# Patient Record
Sex: FEMALE | Race: WHITE | NOT HISPANIC OR LATINO | Employment: OTHER | URBAN - METROPOLITAN AREA
[De-identification: names, ages, dates, MRNs, and addresses within clinical notes are randomized per-mention and may not be internally consistent; named-entity substitution may affect disease eponyms.]

---

## 2017-03-16 ENCOUNTER — APPOINTMENT (OUTPATIENT)
Dept: LAB | Facility: CLINIC | Age: 67
End: 2017-03-16
Payer: COMMERCIAL

## 2017-03-16 ENCOUNTER — ALLSCRIPTS OFFICE VISIT (OUTPATIENT)
Dept: OTHER | Facility: OTHER | Age: 67
End: 2017-03-16

## 2017-03-16 ENCOUNTER — GENERIC CONVERSION - ENCOUNTER (OUTPATIENT)
Dept: OTHER | Facility: OTHER | Age: 67
End: 2017-03-16

## 2017-03-16 DIAGNOSIS — R10.9 ABDOMINAL PAIN: ICD-10-CM

## 2017-03-16 LAB
ALBUMIN SERPL BCP-MCNC: 3.3 G/DL (ref 3.5–5)
ALP SERPL-CCNC: 104 U/L (ref 46–116)
ALT SERPL W P-5'-P-CCNC: 28 U/L (ref 12–78)
ANION GAP SERPL CALCULATED.3IONS-SCNC: 8 MMOL/L (ref 4–13)
AST SERPL W P-5'-P-CCNC: 24 U/L (ref 5–45)
BASOPHILS # BLD AUTO: 0.04 THOUSANDS/ΜL (ref 0–0.1)
BASOPHILS NFR BLD AUTO: 1 % (ref 0–1)
BILIRUB SERPL-MCNC: 0.74 MG/DL (ref 0.2–1)
BUN SERPL-MCNC: 25 MG/DL (ref 5–25)
CALCIUM SERPL-MCNC: 9.2 MG/DL (ref 8.3–10.1)
CHLORIDE SERPL-SCNC: 107 MMOL/L (ref 100–108)
CO2 SERPL-SCNC: 28 MMOL/L (ref 21–32)
CREAT SERPL-MCNC: 0.8 MG/DL (ref 0.6–1.3)
EOSINOPHIL # BLD AUTO: 0.38 THOUSAND/ΜL (ref 0–0.61)
EOSINOPHIL NFR BLD AUTO: 5 % (ref 0–6)
ERYTHROCYTE [DISTWIDTH] IN BLOOD BY AUTOMATED COUNT: 12.7 % (ref 11.6–15.1)
GFR SERPL CREATININE-BSD FRML MDRD: >60 ML/MIN/1.73SQ M
GLUCOSE P FAST SERPL-MCNC: 80 MG/DL (ref 65–99)
HCT VFR BLD AUTO: 39.9 % (ref 34.8–46.1)
HGB BLD-MCNC: 13.3 G/DL (ref 11.5–15.4)
LYMPHOCYTES # BLD AUTO: 2.83 THOUSANDS/ΜL (ref 0.6–4.47)
LYMPHOCYTES NFR BLD AUTO: 34 % (ref 14–44)
MCH RBC QN AUTO: 30.4 PG (ref 26.8–34.3)
MCHC RBC AUTO-ENTMCNC: 33.3 G/DL (ref 31.4–37.4)
MCV RBC AUTO: 91 FL (ref 82–98)
MONOCYTES # BLD AUTO: 0.83 THOUSAND/ΜL (ref 0.17–1.22)
MONOCYTES NFR BLD AUTO: 10 % (ref 4–12)
NEUTROPHILS # BLD AUTO: 4.35 THOUSANDS/ΜL (ref 1.85–7.62)
NEUTS SEG NFR BLD AUTO: 50 % (ref 43–75)
NRBC BLD AUTO-RTO: 0 /100 WBCS
PLATELET # BLD AUTO: 285 THOUSANDS/UL (ref 149–390)
PMV BLD AUTO: 10.9 FL (ref 8.9–12.7)
POTASSIUM SERPL-SCNC: 4.4 MMOL/L (ref 3.5–5.3)
PROT SERPL-MCNC: 7.5 G/DL (ref 6.4–8.2)
RBC # BLD AUTO: 4.37 MILLION/UL (ref 3.81–5.12)
SODIUM SERPL-SCNC: 143 MMOL/L (ref 136–145)
TSH SERPL DL<=0.05 MIU/L-ACNC: 1.15 UIU/ML (ref 0.36–3.74)
WBC # BLD AUTO: 8.45 THOUSAND/UL (ref 4.31–10.16)

## 2017-03-16 PROCEDURE — 80053 COMPREHEN METABOLIC PANEL: CPT

## 2017-03-16 PROCEDURE — 84443 ASSAY THYROID STIM HORMONE: CPT

## 2017-03-16 PROCEDURE — 36415 COLL VENOUS BLD VENIPUNCTURE: CPT

## 2017-03-16 PROCEDURE — 85025 COMPLETE CBC W/AUTO DIFF WBC: CPT

## 2017-03-19 ENCOUNTER — HOSPITAL ENCOUNTER (OUTPATIENT)
Dept: ULTRASOUND IMAGING | Facility: HOSPITAL | Age: 67
Discharge: HOME | End: 2017-03-19
Payer: COMMERCIAL

## 2017-03-19 DIAGNOSIS — R10.9 ABDOMINAL PAIN: ICD-10-CM

## 2017-03-19 PROCEDURE — 76700 US EXAM ABDOM COMPLETE: CPT

## 2017-03-21 ENCOUNTER — GENERIC CONVERSION - ENCOUNTER (OUTPATIENT)
Dept: OTHER | Facility: OTHER | Age: 67
End: 2017-03-21

## 2017-09-19 ENCOUNTER — GENERIC CONVERSION - ENCOUNTER (OUTPATIENT)
Dept: OTHER | Facility: OTHER | Age: 67
End: 2017-09-19

## 2017-11-06 ENCOUNTER — ALLSCRIPTS OFFICE VISIT (OUTPATIENT)
Dept: OTHER | Facility: OTHER | Age: 67
End: 2017-11-06

## 2017-11-07 NOTE — PROGRESS NOTES
Assessment  1  Acute tonsillitis (463) (J03 90)    Plan  Acute tonsillitis    · Azithromycin 250 MG Oral Tablet; TAKE 2 TABLETS ON DAY 1 THEN TAKE 1  TABLET A DAY FOR 4 DAYS    Discussion/Summary    Tonsillitis  Patient given prescription for Zithromax Z-Manish take as directed  Patient will call if symptoms persist after medication completed  Chief Complaint  Patient is here c/o a sore throat and pain with swallowing x's 1 wk  All medications were reviewed and updated with the patient  History of Present Illness  HPI: I reviewed chief complaint with the patient  She denies any documented fevers      Review of Systems    Constitutional: feeling tired  ENT: as noted in HPI  Cardiovascular: no complaints of slow or fast heart rate, no chest pain, no palpitations, no leg claudication or lower extremity edema  Respiratory: no complaints of shortness of breath, no wheezing, no dyspnea on exertion, no orthopnea or PND  Gastrointestinal: no complaints of abdominal pain, no constipation, no nausea or diarrhea, no vomiting, no bloody stools  Genitourinary: no complaints of dysuria, no incontinence, no pelvic pain, no dysmenorrhea, no vaginal discharge or abnormal vaginal bleeding  Active Problems  1  Abdominal pain (789 00) (R10 9)   2  Acute tonsillitis (463) (J03 90)   3  Conjunctivitis (372 30) (H10 9)   4  Cough (786 2) (R05)   5  Elevated brain natriuretic peptide (BNP) level (790 99) (R79 89)   6  Focal nodular hyperplasia of liver (573 8) (K76 89)   7  Foot pain (729 5) (M79 673)   8  Gastric nodule (537 89) (K31 89)   9  IPMN (intraductal papillary mucinous neoplasm) (239 0) (D49 0)   10  Laryngitis, chronic (476 0) (J37 0)   11  Pancreatic cyst (577 2) (K86 2)   12  Skin lesion (709 9) (L98 9)    Past Medical History  1  History of Costochondritis (733 6) (M94 0)   2  History of Date Of Last Menstruation   3  History of Dysfunction of left eustachian tube (381 81) (H69 82)   4   History of Dyspepsia (536 8) (K30)   5  History Of 2  Previous Pregnancies (V61 5)   6  History of backache (V13 59) (Z87 39)   7  History of bronchitis (V12 69) (Z87 09)   8  History of pharyngitis (V12 69) (Z87 09)   9  History of sinusitis (V12 69) (Z87 09)   10  History of Pancreatic disorder (577 9) (K86 9)   11  History of Previous Pregnancies Resulted In 2  Live Birth(S)   12  History of Tingling (782 0) (R20 2)   13  History of Tingling Of The Right Leg    Family History  Father    1  Family history of Parkinson Disease  Sister    2  Family history of Cirrhosis  Paternal Grandmother    3  Family history of Colon Cancer (V16 0)  Maternal Grandfather    4  Family history of Colon Cancer (V16 0)    Social History   · Alcohol Use (History)   · Never A Smoker  The social history was reviewed and updated today  Surgical History  1  History of Arthroscopy Knee Left   2  History of Neuroplasty Decompression Median Nerve At Carpal Tunnel    Current Meds   1  Aspirin 81 MG TABS; one qd Recorded   2  Esomeprazole Magnesium 40 MG Oral Capsule Delayed Release; TAKE ONE   CAPSULE BY MOUTH EVERY DAY  Requested for: [id number]; Last Rx:[id number]   Ordered   3  Multivitamins Oral Capsule; TAKE 1 CAPSULE Daily Recorded   4  Vitamin D 2000 UNIT Oral Tablet; Take 2 tablets daily; Therapy: (Recorded:[id number]) to Recorded    The medication list was reviewed and updated today  Allergies  1  Bactrim TABS    Vitals   Recorded: 94ZDX7918 04:16PM   Temperature 98 1 F   Heart Rate 64   Respiration 16   Systolic 295   Diastolic 94   Height 5 ft 3 in   Weight 175 lb    BMI Calculated 31   BSA Calculated 1 83     Physical Exam    Constitutional   General appearance: No acute distress, well appearing and well nourished  Ears, Nose, Mouth, and Throat   External inspection of ears and nose: Normal     Otoscopic examination: Tympanic membranes translucent with normal light reflex  Canals patent without erythema      Oropharynx: Abnormal  -- Large left tonsil  Negative exudates  Pulmonary   Respiratory effort: No increased work of breathing or signs of respiratory distress  Auscultation of lungs: Clear to auscultation  Cardiovascular   Auscultation of heart: Normal rate and rhythm, normal S1 and S2, without murmurs  Lymphatic   Palpation of lymph nodes in neck: Abnormal  -- Minimally tender anterior cervical adenopathy  Skin   Skin and subcutaneous tissue: Normal without rashes or lesions           Signatures   Electronically signed by : JEFF Zuniga ; Nov 6 9081  5:22PM EST                       (Author)

## 2018-01-09 NOTE — RESULT NOTES
Verified Results  * US ABDOMEN COMPLETE [id number] 11:14LP Shai Hendrickson Order Number: [id number]    - Patient Instructions: To schedule this appointment, please contact Central Scheduling at [phone]  Test Name Result Flag Reference   US ABDOMEN COMPLETE (Report)     ABDOMEN ULTRASOUND, COMPLETE     INDICATION: Abdominal pain  COMPARISON: Ultrasound 9/21/2011, MRI abdomen 10/8/2016     TECHNIQUE:  Real-time ultrasound of the abdomen was performed with a curvilinear transducer with both volumetric sweeps and still imaging techniques  FINDINGS:     PANCREAS: 9 x 15 x 8 mm pancreatic body cyst  An additional 5 mm cyst is seen in the body/tail  Additional small cysts seen on previous MRI are not evident on today's exam      AORTA AND IVC: Visualized portions are normal for patient age  LIVER:   Size: Within normal range  The liver measures 16 1 cm in the midclavicular line  Contour: Surface contour is smooth  Parenchyma: Echogenicity and echotexture are within normal limits  1 6 x 0 9 x 1 4 cm slightly hypoechoic, anterior left hepatic lobe nodule  This appears to correspond to previously described [address] on MRI  Small cysts are seen in both lobes measuring up to 1 7 cm on the right  The main portal vein is patent and hepatopetal       BILIARY:   The gallbladder is normal in caliber  No wall thickening or pericholecystic fluid  No stones or sludge identified  Sonographic Garlan Graves sign is negative  No intrahepatic biliary dilatation  CBD measures 2 mm  No choledocholithiasis  KIDNEY:    Right kidney measures 11 7 x 4 1 cm  Within normal limits  Left kidney measures 10 0 x 5 9 cm  Within normal limits  SPLEEN:    Measures 8 2 cm  Within normal limits  ASCITES: None  IMPRESSION:     Grossly stable pancreatic cysts  1 6 cm slightly hypoechoic anterior left lobe nodule likely corresponding to [address] on previous MRI   Small hepatic cysts again identified  Workstation performed: [id number]     Signed by:   Levon Agustin DO   3/20/17       Discussion/Summary   u/s shows stable pancreatic cysts but nothing to explain her GI sx's   If still with sx's I would refer her to Dr Adelina Alan for further eval

## 2018-01-10 NOTE — RESULT NOTES
Message   bw from today was unremarkable  check u/s as we discussed     Verified Results  (1) CBC/PLT/DIFF [id number] 69:30YL Buffy Cutler Order Number: [id number]     Test Name Result Flag Reference   WBC COUNT 8 45 Thousand/uL  4 31-10 16   RBC COUNT 4 37 Million/uL  3 81-5 12   HEMOGLOBIN 13 3 g/dL  11 5-15 4   HEMATOCRIT 39 9 %  34 8-46  1   MCV 91 fL  82-98   MCH 30 4 pg  26 8-34 3   MCHC 33 3 g/dL  31 4-37 4   RDW 12 7 %  11 6-15 1   MPV 10 9 fL  8 9-12 7   PLATELET COUNT 525 Thousands/uL  149-390   nRBC AUTOMATED 0 /100 WBCs     NEUTROPHILS RELATIVE PERCENT 50 %  43-75   LYMPHOCYTES RELATIVE PERCENT 34 %  14-44   MONOCYTES RELATIVE PERCENT 10 %  4-12   EOSINOPHILS RELATIVE PERCENT 5 %  0-6   BASOPHILS RELATIVE PERCENT 1 %  0-1   NEUTROPHILS ABSOLUTE COUNT 4 35 Thousands/? ??L  1 85-7 62   LYMPHOCYTES ABSOLUTE COUNT 2 83 Thousands/? ??L  0 60-4 47   MONOCYTES ABSOLUTE COUNT 0 83 Thousand/? ??L  0 17-1 22   EOSINOPHILS ABSOLUTE COUNT 0 38 Thousand/? ??L  0 00-0 61   BASOPHILS ABSOLUTE COUNT 0 04 Thousands/? ??L  0 00-0 10   - Patient Instructions: This bloodwork is non-fasting  Please drink two glasses of water morning of bloodwork  - Patient Instructions: This bloodwork is non-fasting  Please drink two glasses of water morning of bloodwork       (1) COMPREHENSIVE METABOLIC PANEL [id number] 32:84RM Buffy Cutler Order Number: [id number]     Test Name Result Flag Reference   SODIUM 143 mmol/L  136-145   POTASSIUM 4 4 mmol/L  3 5-5 3   CHLORIDE 107 mmol/L  100-108   CARBON DIOXIDE 28 mmol/L  21-32   ANION GAP (CALC) 8 mmol/L  4-13   BLOOD UREA NITROGEN 25 mg/dL  5-25   CREATININE 0 80 mg/dL  0 60-1 30   Standardized to IDMS reference method   CALCIUM 9 2 mg/dL  8 3-10 1   BILI, TOTAL 0 74 mg/dL  0 20-1 00   ALK PHOSPHATAS 104 U/L     ALT (SGPT) 28 U/L  12-78   AST(SGOT) 24 U/L  5-45   ALBUMIN 3 3 g/dL L 3 5-5 0   TOTAL PROTEIN 7 5 g/dL  6 4-8 2   eGFR Non- >60 0 ml/min/1 73sq m   - Patient Instructions: This bloodwork is non-fasting  Please drink two glasses of water morning of bloodwork  National Kidney Disease Education Program recommendations are as follows:  GFR calculation is accurate only with a steady state creatinine  Chronic Kidney disease less than 60 ml/min/1 73 sq  meters  Kidney failure less than 15 ml/min/1 73 sq  meters  GLUCOSE FASTING 80 mg/dL  65-99     (1) TSH [id number] 83:38DR [facility] Order Number: [id number]     Test Name Result Flag Reference   TSH 1 150 uIU/mL  0 358-3 740   - Patient Instructions: This bloodwork is non-fasting  Please drink two glasses of water morning of bloodwork  - Patient Instructions: This bloodwork is non-fasting  Please drink two glasses of water morning of bloodwork  Patients undergoing fluorescein dye angiography may retain small amounts of fluorescein in the body for 48-72 hours post procedure  Samples containing fluorescein can produce falsely depressed TSH values  If the patient had this procedure,a specimen should be resubmitted post fluorescein clearance            The recommended reference ranges for TSH during pregnancy are as follows:  First trimester 0 1 to 2 5 uIU/mL  Second trimester  0 2 to 3 0 uIU/mL  Third trimester 0 3 to 3 0 uIU/m

## 2018-01-11 NOTE — RESULT NOTES
Message     Please contact patient  Throat culture is positive for strep  Please advise her to complete all 10 days of penicillin  She should not be contagious at present time     Verified Results  (1) THROAT CULTURE (CULTURE, UPPER RESPIRATORY) 21Nov2016 08:17PM Terese Cranker Order Number: [id number]     Test Name Result Flag Reference   CLINICAL REPORT (Report)     Test:        Throat culture  Specimen Type:   Throat  Specimen Date:   11/21/2016 8:17 PM  Result Date:    11/23/2016 1:22 PM  Result Status:   Final result  Resulting Lab:   [place] [address]            Tel: [phone]      CULTURE                                       ------------------                                   1+ Growth of Streptococcus Group A beta hemolytic     *** This organism is intrinisically susceptible to Penicillin  If sensitivites to other antibiotics are required, please call the      Microbiology Department at [phone] within 5 days  ***       Signatures   Electronically signed by :  JEFF Louis ; Nov 23 2016  3:01PM EST                       (Author)

## 2018-01-12 NOTE — RESULT NOTES
Verified Results  ECHO COMPLETE WITH CONTRAST IF INDICATED, TTE / TRANSTHORACIC [id number] 10:66 Consuelo Duenas     Test Name Result Flag Reference   ECHO COMPLETE WITH CONTRAST IF INDICATED (Report)     [facility], [address] [place] [address]   [phone]     Transthoracic Echocardiogram   2D, M-mode, Doppler, and Color Doppler     Study date: 10-Mar-2016     Patient: Scotty Lima   MR number: [id number]   Account number: [de-identified]   : 1950   Age: 60 years   Gender: Female   Status: Outpatient   Location: [address] [place] [address]   Height: 63 in   Weight: 164 6 lb   BP: 124/ 78 mmHg     Indications: Assess left ventricular function  Diagnoses: R79 9 - Abnormal finding of blood chemistry, unspecified     Sonographer: LOUIE Ruiz   Referring Physician: Araceli Vieira MD   Group: [id number]   Interpreting Physician: Marya Gamez MD     SUMMARY     LEFT VENTRICLE:   Systolic function was normal  Ejection fraction was estimated to be 60 %  Although no diagnostic regional wall motion abnormality was identified, this   possibility cannot be completely excluded on the basis of this study  Doppler   parameters were consistent with abnormal left ventricular relaxation (grade 1   diastolic dysfunction)  AORTIC VALVE:   There was trace regurgitation  TRICUSPID VALVE:   There was mild to moderate regurgitation  Estimated peak PA pressure was 35 mmHg  HISTORY: PRIOR HISTORY: Elevated BNP on insurance screening, no CV history,     PROCEDURE: The study was performed in the [address] [place] [address]  This was a routine study  The transthoracic approach was used  The study   included complete 2D imaging, M-mode, complete spectral Doppler, and color   Doppler  The heart rate was 66 bpm, at the start of the study  Images were   obtained from the parasternal, apical, subcostal, and suprasternal notch   acoustic windows   Image quality was adequate  LEFT VENTRICLE: Size was normal  Systolic function was normal  Ejection   fraction was estimated to be 60 %  Although no diagnostic regional wall motion   abnormality was identified, this possibility cannot be completely excluded on   the basis of this study  Wall thickness was normal  DOPPLER: There was an   increased relative contribution of atrial contraction to ventricular filling  Doppler parameters were consistent with abnormal left ventricular relaxation   (grade 1 diastolic dysfunction)  RIGHT VENTRICLE: The size was normal  Systolic function was normal      LEFT ATRIUM: The atrium was mildly dilated  RIGHT ATRIUM: Size was normal      MITRAL VALVE: There was mild annular calcification  Valve structure was normal    There was mild thickening  There was normal leaflet separation  DOPPLER: The   transmitral velocity was within the normal range  There was no evidence for   stenosis  There was trace regurgitation  AORTIC VALVE: The valve was trileaflet  Leaflets exhibited mildly increased   thickness and normal cuspal separation  DOPPLER: Transaortic velocity was   within the normal range  There was no evidence for stenosis  There was trace   regurgitation  TRICUSPID VALVE: The valve structure was normal  There was normal leaflet   separation  DOPPLER: The transtricuspid velocity was within the normal range  There was no evidence for stenosis  There was mild to moderate regurgitation  Estimated peak PA pressure was 35 mmHg  PULMONIC VALVE: Leaflets exhibited normal thickness, no calcification, and   normal cuspal separation  DOPPLER: The transpulmonic velocity was within the   normal range  There was trace regurgitation  PERICARDIUM: There was no pericardial effusion  A pericardial fat pad was   present  The pericardium was normal in appearance  AORTA: The root exhibited normal size       SYSTEMIC VEINS: IVC: The inferior vena cava was normal in size and course  Respirophasic changes were normal      SYSTEM MEASUREMENT TABLES     2D   %FS: 32 25 %   AV Diam: 3 05 cm   EDV(Teich): 115 81 ml   EF(Cube): 68 9 %   EF(Teich): 60 26 %   ESV(Cube): 37 85 ml   ESV(Teich): 46 02 ml   IVSd: 1 01 cm   LA Area: 19 49 cm2   LA Diam: 3 72 cm   LVEDV MOD A4C: 79 14 ml   LVEF MOD A4C: 60 48 %   LVESV MOD A4C: 31 27 ml   LVIDd: 4 96 cm   LVIDs: 3 36 cm   LVLd A4C: 6 83 cm   LVLs A4C: 5 61 cm   LVPWd: 0 97 cm   RA Area: 14 35 cm2   RV Diam: 2 71 cm   SI(Cube): 47 1 ml/m2   SI(Teich): 39 21 ml/m2   SV MOD A4C: 47 87 ml   SV(Cube): 83 84 ml   SV(Teich): 69 79 ml     CW   TR MaxP 53 mmHg   TR Vmax: 2 47 m/s     MM   TAPSE: 2 43 cm     PW   E': 0 05 m/s   E/E': 11 33   LVOT Env  Ti: 310 54 ms   LVOT VTI: 18 46 cm   LVOT Vmax: 0 8 m/s   LVOT Vmean: 0 59 m/s   LVOT maxP 53 mmHg   LVOT meanP 57 mmHg   MV A Sunny: 0 81 m/s   MV Dec [place]: 2 32 m/s2   MV DecT: 267 47 ms   MV E Sunny: 0 62 m/s   MV E/A Ratio: 0 76     [facility] Accredited [facility]     Prepared and electronically signed by     Mao Lobato MD   Signed 10-Mar-2016 09:31:31       Discussion/Summary   no significant findings on her echocardiogram

## 2018-01-13 VITALS
BODY MASS INDEX: 29.26 KG/M2 | SYSTOLIC BLOOD PRESSURE: 132 MMHG | WEIGHT: 165.13 LBS | HEART RATE: 80 BPM | TEMPERATURE: 97.4 F | DIASTOLIC BLOOD PRESSURE: 78 MMHG | HEIGHT: 63 IN

## 2018-01-15 VITALS
RESPIRATION RATE: 16 BRPM | HEIGHT: 63 IN | HEART RATE: 64 BPM | DIASTOLIC BLOOD PRESSURE: 94 MMHG | WEIGHT: 175 LBS | BODY MASS INDEX: 31.01 KG/M2 | TEMPERATURE: 98.1 F | SYSTOLIC BLOOD PRESSURE: 146 MMHG

## 2018-01-18 ENCOUNTER — ALLSCRIPTS OFFICE VISIT (OUTPATIENT)
Dept: OTHER | Facility: OTHER | Age: 68
End: 2018-01-18

## 2018-01-20 NOTE — PROGRESS NOTES
Assessment   1  Acute URI (465 9) (J06 9)    Plan   Acute URI    · Azithromycin 250 MG Oral Tablet; TAKE 2 TABLETS ON DAY 1 THEN TAKE 1    TABLET A DAY FOR 4 DAYS   · Promethazine-Codeine 6 25-10 MG/5ML Oral Syrup; TAKE 5 ML EVERY 4 TO 6    HOURS AS NEEDED    Discussion/Summary      URI  Patient with possible bronchitis  She will take Zithromax and cough medicine as ordered  Patient will call if symptoms persist after medication completed  Chief Complaint   Pt states that she began not feeling well on Monday  Pt is c/o stuffiness, cough, and congestion  Pt states that her throat was sore at the beginning but it has since resolved  Pt denies fever  History of Present Illness   HPI: I reviewed chief complaint with the patient  Review of Systems        Constitutional: feeling tired  ENT: as noted in HPI  Cardiovascular: no complaints of slow or fast heart rate, no chest pain, no palpitations, no leg claudication or lower extremity edema  Respiratory: as noted in HPI  Gastrointestinal: no complaints of abdominal pain, no constipation, no nausea or diarrhea, no vomiting, no bloody stools  Genitourinary: no complaints of dysuria, no incontinence, no pelvic pain, no dysmenorrhea, no vaginal discharge or abnormal vaginal bleeding  Musculoskeletal: no complaints of arthralgia, no myalgia, no joint swelling or stiffness, no limb pain or swelling  Integumentary: no complaints of skin rash or lesion, no itching or dry skin, no skin wounds  Active Problems   1  Abdominal pain (789 00) (R10 9)   2  Acute tonsillitis (463) (J03 90)   3  Conjunctivitis (372 30) (H10 9)   4  Cough (786 2) (R05)   5  Elevated brain natriuretic peptide (BNP) level (790 99) (R79 89)   6  Focal nodular hyperplasia of liver (573 8) (K76 89)   7  Foot pain (729 5) (M79 673)   8  Gastric nodule (537 89) (K31 89)   9  IPMN (intraductal papillary mucinous neoplasm) (239 0) (D49 0)   10   Laryngitis, chronic (476 0) (J37 0)   11  Pancreatic cyst (577 2) (K86 2)   12  Skin lesion (709 9) (L98 9)    Past Medical History   1  History of Costochondritis (733 6) (M94 0)   2  History of Date Of Last Menstruation   3  History of Dysfunction of left eustachian tube (381 81) (H69 82)   4  History of Dyspepsia (536 8) (K30)   5  History Of 2  Previous Pregnancies (V61 5)   6  History of backache (V13 59) (Z87 39)   7  History of bronchitis (V12 69) (Z87 09)   8  History of pharyngitis (V12 69) (Z87 09)   9  History of sinusitis (V12 69) (Z87 09)   10  History of Pancreatic disorder (577 9) (K86 9)   11  History of Previous Pregnancies Resulted In 2  Live Birth(S)   12  History of Tingling (782 0) (R20 2)   13  History of Tingling Of The Right Leg    Family History   Father    1  Family history of Parkinson Disease  Sister    2  Family history of Cirrhosis  Paternal Grandmother    3  Family history of Colon Cancer (V16 0)  Maternal Grandfather    4  Family history of Colon Cancer (V16 0)    Social History    · Alcohol Use (History)   · Never A Smoker  The social history was reviewed and updated today  Surgical History   1  History of Arthroscopy Knee Left   2  History of Neuroplasty Decompression Median Nerve At Carpal Tunnel    Current Meds    1  Aspirin 81 MG TABS; one qd Recorded   2  Esomeprazole Magnesium 40 MG Oral Capsule Delayed Release; TAKE ONE     CAPSULE BY MOUTH EVERY DAY  Requested for: 15Jlq2189; Last Rx:[id number]     Ordered   3  Multivitamins Oral Capsule; TAKE 1 CAPSULE Daily Recorded   4  Vitamin D 2000 UNIT Oral Tablet; Take 2 tablets daily; Therapy: (Recorded:17Qcq4472) to Recorded     The medication list was reviewed and updated today  Allergies   1   Bactrim TABS    Vitals    Recorded: 38ILH6423 04:09PM   Temperature 96 3 F   Heart Rate 70   Systolic 385   Diastolic 80   Height 5 ft 3 in   Weight 178 lb    BMI Calculated 31 53   BSA Calculated 1 84     Physical Exam        Constitutional General appearance: No acute distress, well appearing and well nourished  Ears, Nose, Mouth, and Throat      External inspection of ears and nose: Normal        Otoscopic examination: Tympanic membranes translucent with normal light reflex  Canals patent without erythema  Oropharynx: Normal with no erythema, edema, exudate or lesions  Pulmonary      Respiratory effort: No increased work of breathing or signs of respiratory distress  Auscultation of lungs: Abnormal  -- The patient with mild expiratory wheezing with coughing only  Cardiovascular      Auscultation of heart: Normal rate and rhythm, normal S1 and S2, without murmurs  Lymphatic      Palpation of lymph nodes in neck: No lymphadenopathy            Signatures    Electronically signed by : JEFF Lyons ; Jan 19 8621  7:08AM EST                       (Author)

## 2018-01-23 VITALS
HEART RATE: 70 BPM | SYSTOLIC BLOOD PRESSURE: 142 MMHG | DIASTOLIC BLOOD PRESSURE: 80 MMHG | TEMPERATURE: 96.3 F | HEIGHT: 63 IN | WEIGHT: 178 LBS | BODY MASS INDEX: 31.54 KG/M2

## 2018-03-18 ENCOUNTER — HOSPITAL ENCOUNTER (EMERGENCY)
Facility: HOSPITAL | Age: 68
Discharge: HOME | End: 2018-03-18
Attending: EMERGENCY MEDICINE | Admitting: EMERGENCY MEDICINE
Payer: COMMERCIAL

## 2018-03-18 ENCOUNTER — APPOINTMENT (EMERGENCY)
Dept: CT IMAGING | Facility: HOSPITAL | Age: 68
End: 2018-03-18
Payer: COMMERCIAL

## 2018-03-18 VITALS
OXYGEN SATURATION: 96 % | SYSTOLIC BLOOD PRESSURE: 146 MMHG | RESPIRATION RATE: 18 BRPM | BODY MASS INDEX: 34.27 KG/M2 | WEIGHT: 186.2 LBS | DIASTOLIC BLOOD PRESSURE: 67 MMHG | TEMPERATURE: 97.8 F | HEIGHT: 62 IN | HEART RATE: 66 BPM

## 2018-03-18 DIAGNOSIS — R42 ORTHOSTATIC LIGHTHEADEDNESS: Primary | ICD-10-CM

## 2018-03-18 LAB
ALBUMIN SERPL BCP-MCNC: 3.7 G/DL (ref 3.5–5)
ALP SERPL-CCNC: 110 U/L (ref 46–116)
ALT SERPL W P-5'-P-CCNC: 36 U/L (ref 12–78)
ANION GAP SERPL CALCULATED.3IONS-SCNC: 6 MMOL/L (ref 4–13)
APTT PPP: 29 SECONDS (ref 23–35)
AST SERPL W P-5'-P-CCNC: 23 U/L (ref 5–45)
BASOPHILS # BLD AUTO: 0.11 THOUSANDS/ΜL (ref 0–0.1)
BASOPHILS NFR BLD AUTO: 1 % (ref 0–1)
BILIRUB SERPL-MCNC: 0.6 MG/DL (ref 0.2–1)
BUN SERPL-MCNC: 20 MG/DL (ref 5–25)
CALCIUM SERPL-MCNC: 8.9 MG/DL (ref 8.3–10.1)
CHLORIDE SERPL-SCNC: 106 MMOL/L (ref 100–108)
CO2 SERPL-SCNC: 30 MMOL/L (ref 21–32)
CREAT SERPL-MCNC: 0.72 MG/DL (ref 0.6–1.3)
EOSINOPHIL # BLD AUTO: 0.85 THOUSAND/ΜL (ref 0–0.61)
EOSINOPHIL NFR BLD AUTO: 10 % (ref 0–6)
ERYTHROCYTE [DISTWIDTH] IN BLOOD BY AUTOMATED COUNT: 13.6 % (ref 11.6–15.1)
GFR SERPL CREATININE-BSD FRML MDRD: 88 ML/MIN/1.73SQ M
GLUCOSE SERPL-MCNC: 129 MG/DL (ref 65–140)
HCT VFR BLD AUTO: 37.1 % (ref 34.8–46.1)
HGB BLD-MCNC: 12.1 G/DL (ref 11.5–15.4)
INR PPP: 0.88 (ref 0.86–1.16)
LYMPHOCYTES # BLD AUTO: 2.43 THOUSANDS/ΜL (ref 0.6–4.47)
LYMPHOCYTES NFR BLD AUTO: 28 % (ref 14–44)
MAGNESIUM SERPL-MCNC: 1.7 MG/DL (ref 1.6–2.6)
MCH RBC QN AUTO: 28.5 PG (ref 26.8–34.3)
MCHC RBC AUTO-ENTMCNC: 32.6 G/DL (ref 31.4–37.4)
MCV RBC AUTO: 88 FL (ref 82–98)
MONOCYTES # BLD AUTO: 0.73 THOUSAND/ΜL (ref 0.17–1.22)
MONOCYTES NFR BLD AUTO: 9 % (ref 4–12)
NEUTROPHILS # BLD AUTO: 4.44 THOUSANDS/ΜL (ref 1.85–7.62)
NEUTS SEG NFR BLD AUTO: 52 % (ref 43–75)
PLATELET # BLD AUTO: 269 THOUSANDS/UL (ref 149–390)
PMV BLD AUTO: 10.6 FL (ref 8.9–12.7)
POTASSIUM SERPL-SCNC: 3.4 MMOL/L (ref 3.5–5.3)
PROT SERPL-MCNC: 7.6 G/DL (ref 6.4–8.2)
PROTHROMBIN TIME: 12.2 SECONDS (ref 12.1–14.4)
RBC # BLD AUTO: 4.24 MILLION/UL (ref 3.81–5.12)
SODIUM SERPL-SCNC: 142 MMOL/L (ref 136–145)
TROPONIN I SERPL-MCNC: <0.02 NG/ML
TSH SERPL DL<=0.05 MIU/L-ACNC: 1.92 UIU/ML (ref 0.36–3.74)
WBC # BLD AUTO: 8.56 THOUSAND/UL (ref 4.31–10.16)

## 2018-03-18 PROCEDURE — 70496 CT ANGIOGRAPHY HEAD: CPT

## 2018-03-18 PROCEDURE — 36415 COLL VENOUS BLD VENIPUNCTURE: CPT | Performed by: EMERGENCY MEDICINE

## 2018-03-18 PROCEDURE — 84443 ASSAY THYROID STIM HORMONE: CPT | Performed by: EMERGENCY MEDICINE

## 2018-03-18 PROCEDURE — 84484 ASSAY OF TROPONIN QUANT: CPT | Performed by: EMERGENCY MEDICINE

## 2018-03-18 PROCEDURE — 85610 PROTHROMBIN TIME: CPT | Performed by: EMERGENCY MEDICINE

## 2018-03-18 PROCEDURE — 93005 ELECTROCARDIOGRAM TRACING: CPT | Performed by: EMERGENCY MEDICINE

## 2018-03-18 PROCEDURE — 83735 ASSAY OF MAGNESIUM: CPT | Performed by: EMERGENCY MEDICINE

## 2018-03-18 PROCEDURE — 80053 COMPREHEN METABOLIC PANEL: CPT | Performed by: EMERGENCY MEDICINE

## 2018-03-18 PROCEDURE — 70498 CT ANGIOGRAPHY NECK: CPT

## 2018-03-18 PROCEDURE — 99284 EMERGENCY DEPT VISIT MOD MDM: CPT

## 2018-03-18 PROCEDURE — 85730 THROMBOPLASTIN TIME PARTIAL: CPT | Performed by: EMERGENCY MEDICINE

## 2018-03-18 PROCEDURE — 85025 COMPLETE CBC W/AUTO DIFF WBC: CPT | Performed by: EMERGENCY MEDICINE

## 2018-03-18 RX ORDER — MULTIVITAMIN
1 TABLET ORAL DAILY
COMMUNITY

## 2018-03-18 RX ORDER — ASPIRIN 81 MG/1
81 TABLET, CHEWABLE ORAL DAILY
COMMUNITY
End: 2018-03-26 | Stop reason: SDUPTHER

## 2018-03-18 RX ORDER — OMEPRAZOLE 20 MG/1
CAPSULE, DELAYED RELEASE ORAL
COMMUNITY
End: 2018-03-26 | Stop reason: ALTCHOICE

## 2018-03-18 RX ADMIN — IOHEXOL 85 ML: 350 INJECTION, SOLUTION INTRAVENOUS at 16:20

## 2018-03-18 NOTE — DISCHARGE INSTRUCTIONS
Lightheadedness   WHAT YOU NEED TO KNOW:   Lightheadedness is the feeling that you may faint, but you do not  Your heartbeat may be fast or feel like it flutters  Lightheadedness may occur when you take certain medicines, such as medicine to lower your blood pressure  Dehydration, low sodium, low blood sugar, an abnormal heart rhythm, and anxiety are other common causes  DISCHARGE INSTRUCTIONS:   Return to the emergency department if:   · You have sudden chest pain  · You have trouble breathing or shortness of breath  · You have vision changes, are sweating, and have nausea while you are sitting or lying down  · You feel flushed and your heart is fluttering  · You faint  Contact your healthcare provider if:   · You feel lightheaded often  · Your heart beats faster or slower than usual      · You have questions or concerns about your condition or care  Follow up with your healthcare provider as directed: You may need more tests to help find the cause of your lightheadedness  The tests will help healthcare providers plan the best treatment for you  Write down your questions so you remember to ask them during your visits  Self-care:  Talk with your healthcare provider about these and other ways to manage your symptoms:  · Lie down  when you feel lightheaded, your throat gets tight, or your vision changes  Raise your legs above the level of your heart  · Stand up slowly  Sit on the side of the bed or couch for a few minutes before you stand up  · Take slow, deep breaths when you feel lightheaded  This can help decrease the feeling that you might faint  · Ask if you need to avoid hot baths and saunas  These may make your symptoms worse  Watch for signs of low blood sugar: These include hunger, nervousness, sweating, and fast or fluttery heartbeats  Talk with your healthcare provider about ways to keep your blood sugar level steady    Check your blood pressure often:  You should do this especially if you take medicine to lower your blood pressure  Check your blood pressure when you are lying down and when you are standing  Ask how often to check during the day  Keep a record of your blood pressure numbers  Your healthcare provider may use the record to help plan your treatment  Keep a record of your lightheadedness episodes:  Include your symptoms and your activity before and after the episode  The record can help your healthcare provider find the cause of your lightheadedness and help you manage episodes  © 2017 [facility] Information is for End User's use only and may not be sold, redistributed or otherwise used for commercial purposes  All illustrations and images included in CareNotes® are the copyrighted property of A D A M , Inc  or [employer]  The above information is an  only  It is not intended as medical advice for individual conditions or treatments  Talk to your doctor, nurse or pharmacist before following any medical regimen to see if it is safe and effective for you

## 2018-03-18 NOTE — ED PROVIDER NOTES
History  Chief Complaint   Patient presents with    Dizziness     Pt presents to ED due to c/o dizziness 2x episode occuring yesterday after extension of neck  Single episode occured today while shopping, without neck movement  Neg hx of dizziness   Hypertension     Pt states her BP is high (160's at home)  Denies headache (c/o "foggy"), visual changes, CP, SOB  History provided by:  Patient   used: No     41-year-old female presented for evaluation of lightheadedness/dizziness that began yesterday after she woke up in bed noted extending her neck when symptoms came on acutely  States that symptoms have recurred intermittently when she moves her neck, especially looking upwards  Symptoms also recur when she moves or stands abruptly  No history of similar symptoms  States that her  took her blood pressure at home and it was elevated in the 160s  Denies history of hypertension, TIA, CVA, and cardiac disease     Takes meds for GERD  On exam she is well appearing overall  No cranial nerve deficits  Normal strength, sensation in the extremities  Normal orthostatics  Symptoms not reproduced with lateral turning of the neck  Felt mildly lightheaded when standing up during orthostatic vitals  Differential diagnosis includes a mild autonomic disorder, dehydration, ACS, cerebral artery dissection  Plan labs, EKG, CT of the head/neck  Prior to Admission Medications   Prescriptions Last Dose Informant Patient Reported? Taking?    Cholecalciferol (VITAMIN D PO)   Yes Yes   Sig: Take by mouth   Multiple Vitamin (MULTIVITAMIN) tablet   Yes Yes   Sig: Take 1 tablet by mouth daily   aspirin 81 mg chewable tablet   Yes Yes   Sig: Chew 81 mg daily   omeprazole (PriLOSEC) 20 mg delayed release capsule   Yes Yes   Sig: Take by mouth      Facility-Administered Medications: None       Past Medical History:   Diagnosis Date    Known health problems: none        Past Surgical History: Procedure Laterality Date    CARPAL TUNNEL RELEASE Bilateral     KNEE SURGERY Left     WISDOM TOOTH EXTRACTION         History reviewed  No pertinent family history  I have reviewed and agree with the history as documented  Social History   Substance Use Topics    Smoking status: Never Smoker    Smokeless tobacco: Never Used    Alcohol use Yes      Comment: social        Review of Systems   Constitutional: Negative for activity change, appetite change, fatigue and fever  Respiratory: Negative for cough, chest tightness and shortness of breath  Cardiovascular: Negative for chest pain and palpitations  Gastrointestinal: Negative for abdominal pain, diarrhea, nausea and vomiting  Musculoskeletal: Negative for back pain, neck pain and neck stiffness  Skin: Negative for color change and rash  Neurological: Positive for dizziness and light-headedness  Negative for syncope and weakness  All other systems reviewed and are negative  Physical Exam  ED Triage Vitals [03/18/18 1500]   Temperature Pulse Respirations Blood Pressure SpO2   97 8 °F (36 6 °C) 77 18 (!) 181/87 98 %      Temp Source Heart Rate Source Patient Position - Orthostatic VS BP Location FiO2 (%)   Oral Monitor Sitting Right arm --      Pain Score       No Pain           Orthostatic Vital Signs  Vitals:    03/18/18 1506 03/18/18 1509 03/18/18 1520 03/18/18 1600   BP: (!) 179/84 (!) 181/82 144/56 146/67   Pulse: 72 77 72 66   Patient Position - Orthostatic VS: Sitting - Orthostatic VS Standing - Orthostatic VS Sitting Lying       Physical Exam   Constitutional: She is oriented to person, place, and time  She appears well-developed and well-nourished  No distress  HENT:   Head: Normocephalic  Right Ear: External ear normal    Left Ear: External ear normal    Mouth/Throat: Oropharynx is clear and moist    Eyes: Conjunctivae and EOM are normal  Pupils are equal, round, and reactive to light  No nystagmus     Neck: Normal range of motion  Neck supple  No tenderness  Cardiovascular: Normal rate, regular rhythm and intact distal pulses  Pulmonary/Chest: Effort normal and breath sounds normal    Abdominal: Soft  She exhibits no distension  Musculoskeletal: Normal range of motion  She exhibits no edema or tenderness  Neurological: She is alert and oriented to person, place, and time  No cranial nerve deficit or sensory deficit  She exhibits normal muscle tone  Skin: Skin is warm and dry  No rash noted  Psychiatric: She has a normal mood and affect  Her behavior is normal    Nursing note and vitals reviewed  ED Medications  Medications   iohexol (OMNIPAQUE) 350 MG/ML injection (MULTI-DOSE) 85 mL (85 mL Intravenous Given 3/18/18 1620)       Diagnostic Studies  Results Reviewed     Procedure Component Value Units Date/Time    TSH [[id number]]  (Normal) Collected:  03/18/18 1524    Lab Status:  Final result Specimen:  Blood from Arm, Right Updated:  03/18/18 1559     TSH 3RD GENERATON 1 916 uIU/mL     Narrative:         Patients undergoing fluorescein dye angiography may retain small amounts of fluorescein in the body for 48-72 hours post procedure  Samples containing fluorescein can produce falsely depressed TSH values  If the patient had this procedure,a specimen should be resubmitted post fluorescein clearance            The recommended reference ranges for TSH during pregnancy are as follows:  First trimester 0 1 to 2 5 uIU/mL  Second trimester  0 2 to 3 0 uIU/mL  Third trimester 0 3 to 3 0 uIU/m      Magnesium [[id number]]  (Normal) Collected:  03/18/18 1524    Lab Status:  Final result Specimen:  Blood from Arm, Right Updated:  03/18/18 1559     Magnesium 1 7 mg/dL     Troponin I [[id number]]  (Normal) Collected:  03/18/18 1524    Lab Status:  Final result Specimen:  Blood from Arm, Right Updated:  03/18/18 1553     Troponin I <0 02 ng/mL     Narrative:         [employer] Chemistry analyzer 99% cutoff is > 0 04 ng/mL in network labs    o cTnI 99% cutoff is useful only when applied to patients in the clinical setting of myocardial ischemia  o cTnI 99% cutoff should be interpreted in the context of clinical history, ECG findings and possibly cardiac imaging to establish correct diagnosis  o cTnI 99% cutoff may be suggestive but clearly not indicative of a coronary event without the clinical setting of myocardial ischemia  Comprehensive metabolic panel [[id number]]  (Abnormal) Collected:  03/18/18 1524    Lab Status:  Final result Specimen:  Blood from Arm, Right Updated:  03/18/18 1551     Sodium 142 mmol/L      Potassium 3 4 (L) mmol/L      Chloride 106 mmol/L      CO2 30 mmol/L      Anion Gap 6 mmol/L      BUN 20 mg/dL      Creatinine 0 72 mg/dL      Glucose 129 mg/dL      Calcium 8 9 mg/dL      AST 23 U/L      ALT 36 U/L      Alkaline Phosphatase 110 U/L      Total Protein 7 6 g/dL      Albumin 3 7 g/dL      Total Bilirubin 0 60 mg/dL      eGFR 88 ml/min/1 73sq m     Narrative:         National Kidney Disease Education Program recommendations are as follows:  GFR calculation is accurate only with a steady state creatinine  Chronic Kidney disease less than 60 ml/min/1 73 sq  meters  Kidney failure less than 15 ml/min/1 73 sq  meters  Bhumika Manner [[id number]]  (Normal) Collected:  03/18/18 1524    Lab Status:  Final result Specimen:  Blood from Arm, Right Updated:  03/18/18 1545     Protime 12 2 seconds      INR 0 88    APTT [[id number]]  (Normal) Collected:  03/18/18 1524    Lab Status:  Final result Specimen:  Blood from Arm, Right Updated:  03/18/18 1545     PTT 29 seconds     Narrative:          Therapeutic Heparin Range = 60-90 seconds    CBC and differential [[id number]]  (Abnormal) Collected:  03/18/18 1524    Lab Status:  Final result Specimen:  Blood from Arm, Right Updated:  03/18/18 1536     WBC 8 56 Thousand/uL      RBC 4 24 Million/uL      Hemoglobin 12 1 g/dL      Hematocrit 37 1 %      MCV 88 fL      MCH 28 5 pg      MCHC 32 6 g/dL      RDW 13 6 %      MPV 10 6 fL      Platelets 116 Thousands/uL      Neutrophils Relative 52 %      Lymphocytes Relative 28 %      Monocytes Relative 9 %      Eosinophils Relative 10 (H) %      Basophils Relative 1 %      Neutrophils Absolute 4 44 Thousands/µL      Lymphocytes Absolute 2 43 Thousands/µL      Monocytes Absolute 0 73 Thousand/µL      Eosinophils Absolute 0 85 (H) Thousand/µL      Basophils Absolute 0 11 (H) Thousands/µL                  CTA head and neck with and without contrast   Final Result by Yamileth Reed MD (03/18 1642)      No acute intracranial abnormality  Mild chronic medical vascular ischemia  No signs of intracranial vascular occlusive disease or aneurysm formation  No hemodynamically significant stenosis within the cervical carotids by NASCET criteria  Patent bilateral cervical vertebral arteries  Workstation performed: [id number]                    Procedures  ECG 12 Lead Documentation  Date/Time: 3/18/2018 3:18 PM  Performed by: Tommy Mcmahon  Authorized by: Tommy Mcmahon     Indications / Diagnosis:  Lightheaded/dizzy  ECG reviewed by me, the ED Provider: yes    Patient location:  ED  Rate:     ECG rate:  76  Rhythm:     Rhythm: sinus rhythm    Ectopy:     Ectopy: none    QRS:     QRS axis:  Normal  Conduction:     Conduction: normal    ST segments:     ST segments:  Normal  T waves:     T waves: normal             Phone Contacts  ED Phone Contact    ED Course  ED Course                                MDM  Number of Diagnoses or Management Options  Diagnosis management comments: 56-year-old female presented with intermittent lightheadedness/dizziness beginning yesterday when she had extended her head in bed  Denies any pain  Symptoms mostly present upon quick motions  Unremarkable exam here  Lab work, EKG and CTA of the head/neck normal   Orthostatic vitals unremarkable    Patient's blood pressure was elevated on arrival but improved spontaneously  No evidence of end-organ damage  Will plan discharge and follow up with PCP if symptoms persist        Amount and/or Complexity of Data Reviewed  Clinical lab tests: ordered and reviewed  Tests in the radiology section of CPT®: reviewed and ordered    Patient Progress  Patient progress: stable    CritCare Time    Disposition  Final diagnoses:   Orthostatic lightheadedness     Time reflects when diagnosis was documented in both MDM as applicable and the Disposition within this note     Time User Action Codes Description Comment    3/18/2018  4:59 PM Ford Daily J Add [R42] Orthostatic lightheadedness       ED Disposition     ED Disposition Condition Comment    Discharge  [address] discharge to home/self care  Condition at discharge: Good        Follow-up Information     Follow up With Specialties Details Why Contact Info Additional Information    Floridalma Jo MD [facility] Medicine   [address] [place] [address] Emergency Department Emergency Medicine  If symptoms worsen [address] [facility]   ED,  [address] [place] [address], [id number]        Patient's Medications   Discharge Prescriptions    No medications on file     No discharge procedures on file      ED Provider  Electronically Signed by           Thana Goldberg, MD  03/18/18 1112

## 2018-03-18 NOTE — ED NOTES
EKG completed at 15:02, viewed and signed by Dr Colette Velasquez, copy on chart     Gwen Gordons  03/18/18 [phone]

## 2018-03-20 LAB
ATRIAL RATE: 76 BPM
P AXIS: 54 DEGREES
PR INTERVAL: 126 MS
QRS AXIS: -3 DEGREES
QRSD INTERVAL: 88 MS
QT INTERVAL: 384 MS
QTC INTERVAL: 432 MS
T WAVE AXIS: 34 DEGREES
VENTRICULAR RATE: 76 BPM

## 2018-03-20 PROCEDURE — 93010 ELECTROCARDIOGRAM REPORT: CPT | Performed by: INTERNAL MEDICINE

## 2018-03-26 ENCOUNTER — OFFICE VISIT (OUTPATIENT)
Dept: FAMILY MEDICINE CLINIC | Facility: CLINIC | Age: 68
End: 2018-03-26
Payer: COMMERCIAL

## 2018-03-26 VITALS
RESPIRATION RATE: 16 BRPM | DIASTOLIC BLOOD PRESSURE: 80 MMHG | SYSTOLIC BLOOD PRESSURE: 140 MMHG | HEIGHT: 62 IN | HEART RATE: 84 BPM | WEIGHT: 182.6 LBS | TEMPERATURE: 97.7 F | BODY MASS INDEX: 33.6 KG/M2

## 2018-03-26 DIAGNOSIS — R42 VERTIGO: Primary | ICD-10-CM

## 2018-03-26 DIAGNOSIS — H81.10 POSTURAL VERTIGO, UNSPECIFIED LATERALITY: ICD-10-CM

## 2018-03-26 DIAGNOSIS — L84 CORN OF TOE: ICD-10-CM

## 2018-03-26 PROBLEM — IMO0002 POSITIONAL VERTIGO: Status: ACTIVE | Noted: 2018-03-26

## 2018-03-26 PROCEDURE — 99214 OFFICE O/P EST MOD 30 MIN: CPT | Performed by: FAMILY MEDICINE

## 2018-03-26 PROCEDURE — 3008F BODY MASS INDEX DOCD: CPT | Performed by: FAMILY MEDICINE

## 2018-03-26 RX ORDER — MECLIZINE HCL 12.5 MG/1
12.5 TABLET ORAL 3 TIMES DAILY PRN
Qty: 30 TABLET | Refills: 0 | Status: SHIPPED | OUTPATIENT
Start: 2018-03-26 | End: 2019-02-11 | Stop reason: ALTCHOICE

## 2018-03-26 RX ORDER — MULTIVIT-MIN/IRON/FOLIC ACID/K 18-600-40
1 CAPSULE ORAL DAILY
COMMUNITY

## 2018-03-26 RX ORDER — ESOMEPRAZOLE MAGNESIUM 40 MG/1
1 CAPSULE, DELAYED RELEASE ORAL DAILY
COMMUNITY
End: 2018-04-12 | Stop reason: SDUPTHER

## 2018-03-26 RX ORDER — MULTIVITAMIN
1 CAPSULE ORAL DAILY
COMMUNITY
End: 2018-03-26 | Stop reason: SDUPTHER

## 2018-03-26 NOTE — ASSESSMENT & PLAN NOTE
Corn of toe    Patient will follow up with her podiatrist Dr Leigh Rhodes for further evaluation of her symptoms of toe pain

## 2018-03-26 NOTE — ASSESSMENT & PLAN NOTE
Vertigo  Patient appears to be experiencing position vertigo  She was given instructions on performing Epley exercises 2-3 times daily    He was also given prescription for meclizine 12 5 mg to use t i d  p r n   If symptoms persist without improvement over the next 2 weeks patient will call the office a we will consider formal balance therapy at local physical therapy department

## 2018-03-26 NOTE — PROGRESS NOTES
FAMILY PRACTICE OFFICE VISIT       NAME: Mariposa Bartlett  AGE: 78 y o  SEX: female       : 1950        MRN: [id number]    DATE: 3/26/2018  TIME: 4:02 PM    Assessment and Plan     Problem List Items Addressed This Visit     Positional vertigo     Vertigo  Patient appears to be experiencing position vertigo  She was given instructions on performing Epley exercises 2-3 times daily  He was also given prescription for meclizine 12 5 mg to use t i d  p r n   If symptoms persist without improvement over the next 2 weeks patient will call the office a we will consider formal balance therapy at local physical therapy department         Corn of toe     Corn of toe  Patient will follow up with her podiatrist Dr Eileen Dobson for further evaluation of her symptoms of toe pain           Other Visit Diagnoses     Vertigo    -  Primary    Relevant Medications    meclizine (ANTIVERT) 12 5 MG tablet            There are no Patient Instructions on file for this visit  Chief Complaint     Chief Complaint   Patient presents with    Follow-up     Pt is here for an ER follow up for dizziness        History of Present Illness     I reviewed the patient's emergency room report  She has had a few less severe episodes of loss of balance since hospital visit  She has noticed triggers such as leaning her head back which causes some loss of balance  She denies ever having similar symptoms in the past     Patient also reports unrelated 2 week history of right 5th toe discomfort with several of the shoes she wears  Symptoms improve when she takes off her shoes  She denies any trauma to her foot  She sits primarily during her work day  Review of Systems   Review of Systems   Constitutional: Negative  HENT: Negative  Cardiovascular: Negative  Gastrointestinal: Negative  Genitourinary: Negative  Musculoskeletal:        As per HPI   Skin: Negative      Neurological:        As per HPI Psychiatric/Behavioral: Negative  Active Problem List     Patient Active Problem List   Diagnosis    Positional vertigo    Corn of toe       Past Medical History:  Past Medical History:   Diagnosis Date    Known health problems: none        Past Surgical History:  Past Surgical History:   Procedure Laterality Date    CARPAL TUNNEL RELEASE Bilateral     KNEE SURGERY Left     WISDOM TOOTH EXTRACTION         Family History:  History reviewed  No pertinent family history  Social History:  Social History     Social History    Marital status: /Civil Union     Spouse name: N/A    Number of children: N/A    Years of education: N/A     Occupational History    Not on file  Social History Main Topics    Smoking status: Never Smoker    Smokeless tobacco: Never Used    Alcohol use Yes      Comment: social    Drug use: No    Sexual activity: Not on file     Other Topics Concern    Not on file     Social History Narrative    No narrative on file       Objective     Vitals:    03/26/18 1508   BP: 140/80   Pulse: 84   Resp: 16   Temp: 97 7 °F (36 5 °C)     Wt Readings from Last 3 Encounters:   03/26/18 82 8 kg (182 lb 9 6 oz)   03/18/18 84 5 kg (186 lb 3 2 oz)   01/18/18 80 7 kg (178 lb)       Physical Exam   Constitutional: She is oriented to person, place, and time  No distress  HENT:   Mouth/Throat: Oropharynx is clear and moist  No oropharyngeal exudate  Tympanic membranes within normal limits bilaterally   Neck:   No carotid bruits   Cardiovascular:   Regular rate and rhythm with no murmurs   Pulmonary/Chest:   Lungs are clear to auscultation without wheezes,rales, or rhonchi   Musculoskeletal: She exhibits no edema  The patient is 5th toe on right foot does have a large corn that the patient states has been present for a long time  The no swelling or redness noticed  Plus two dorsalis pedis and posterior tibialis    Normal range of motion toes no point tenderness to palpation of toe or metatarsal bone   Lymphadenopathy:     She has no cervical adenopathy  Neurological: She is alert and oriented to person, place, and time  No cranial nerve deficit  Negative Hallpike [place] maneuver   Psychiatric: She has a normal mood and affect   Her behavior is normal  Judgment and thought content normal        Pertinent Laboratory/Diagnostic Studies:  Lab Results   Component Value Date    GLUCOSE 129 03/18/2018    BUN 20 03/18/2018    CREATININE 0 72 03/18/2018    CALCIUM 8 9 03/18/2018     03/18/2018    K 3 4 (L) 03/18/2018    CO2 30 03/18/2018     03/18/2018     Lab Results   Component Value Date    ALT 36 03/18/2018    AST 23 03/18/2018    ALKPHOS 110 03/18/2018    BILITOT 0 60 03/18/2018       Lab Results   Component Value Date    WBC 8 56 03/18/2018    HGB 12 1 03/18/2018    HCT 37 1 03/18/2018    MCV 88 03/18/2018     03/18/2018       No results found for: TSH    No results found for: CHOL  No results found for: TRIG  No results found for: HDL  No results found for: LDLCALC  No results found for: HGBA1C    Results for orders placed or performed during the hospital encounter of 03/18/18   CBC and differential   Result Value Ref Range    WBC 8 56 4 31 - 10 16 Thousand/uL    RBC 4 24 3 81 - 5 12 Million/uL    Hemoglobin 12 1 11 5 - 15 4 g/dL    Hematocrit 37 1 34 8 - 46 1 %    MCV 88 82 - 98 fL    MCH 28 5 26 8 - 34 3 pg    MCHC 32 6 31 4 - 37 4 g/dL    RDW 13 6 11 6 - 15 1 %    MPV 10 6 8 9 - 12 7 fL    Platelets 199 185 - 915 Thousands/uL    Neutrophils Relative 52 43 - 75 %    Lymphocytes Relative 28 14 - 44 %    Monocytes Relative 9 4 - 12 %    Eosinophils Relative 10 (H) 0 - 6 %    Basophils Relative 1 0 - 1 %    Neutrophils Absolute 4 44 1 85 - 7 62 Thousands/µL    Lymphocytes Absolute 2 43 0 60 - 4 47 Thousands/µL    Monocytes Absolute 0 73 0 17 - 1 22 Thousand/µL    Eosinophils Absolute 0 85 (H) 0 00 - 0 61 Thousand/µL    Basophils Absolute 0 11 (H) 0 00 - 0 10 Thousands/µL Protime-INR   Result Value Ref Range    Protime 12 2 12 1 - 14 4 seconds    INR 0 88 0 86 - 1 16   APTT   Result Value Ref Range    PTT 29 23 - 35 seconds   Comprehensive metabolic panel   Result Value Ref Range    Sodium 142 136 - 145 mmol/L    Potassium 3 4 (L) 3 5 - 5 3 mmol/L    Chloride 106 100 - 108 mmol/L    CO2 30 21 - 32 mmol/L    Anion Gap 6 4 - 13 mmol/L    BUN 20 5 - 25 mg/dL    Creatinine 0 72 0 60 - 1 30 mg/dL    Glucose 129 65 - 140 mg/dL    Calcium 8 9 8 3 - 10 1 mg/dL    AST 23 5 - 45 U/L    ALT 36 12 - 78 U/L    Alkaline Phosphatase 110 46 - 116 U/L    Total Protein 7 6 6 4 - 8 2 g/dL    Albumin 3 7 3 5 - 5 0 g/dL    Total Bilirubin 0 60 0 20 - 1 00 mg/dL    eGFR 88 ml/min/1 73sq m   TSH   Result Value Ref Range    TSH 3RD GENERATON 1 916 0 358 - 3 740 uIU/mL   Magnesium   Result Value Ref Range    Magnesium 1 7 1 6 - 2 6 mg/dL   Troponin I   Result Value Ref Range    Troponin I <0 02 <=0 04 ng/mL   ECG 12 lead   Result Value Ref Range    Ventricular Rate 76 BPM    Atrial Rate 76 BPM    WA Interval 126 ms    QRSD Interval 88 ms    QT Interval 384 ms    QTC Interval 432 ms    P [place] 54 degrees    QRS Axis -3 degrees    T Wave Axis 34 degrees       No orders of the defined types were placed in this encounter  ALLERGIES:  Allergies   Allergen Reactions    Clam Shell Vomiting    Bactrim [Sulfamethoxazole-Trimethoprim]        Current Medications     Current Outpatient Prescriptions   Medication Sig Dispense Refill    aspirin 81 MG tablet Take 1 tablet by mouth daily      Cholecalciferol (VITAMIN D) 2000 units CAPS Take 1 tablet by mouth daily      esomeprazole (NexIUM) 40 MG capsule Take 1 capsule by mouth daily      Multiple Vitamin (MULTIVITAMIN) tablet Take 1 tablet by mouth daily      meclizine (ANTIVERT) 12 5 MG tablet Take 1 tablet (12 5 mg total) by mouth 3 (three) times a day as needed for dizziness 30 tablet 0     No current facility-administered medications for this visit  Health Maintenance     Health Maintenance   Topic Date Due    Hepatitis C Screening  1950    COLONOSCOPY  1950    Depression Screening PHQ-9  10/16/1963    DTaP,Tdap,and Td Vaccines (1 - Tdap) 10/16/1972    Fall Risk  10/16/2016    Urinary Incontinence Screening  10/16/2016    PNEUMOCOCCAL POLYSACCHARIDE VACCINE AGE 72 AND OVER  10/16/2016    INFLUENZA VACCINE  09/01/2017       There is no immunization history on file for this patient      Yolanda Castorena MD

## 2018-04-12 DIAGNOSIS — K21.9 GASTROESOPHAGEAL REFLUX DISEASE WITHOUT ESOPHAGITIS: Primary | ICD-10-CM

## 2018-04-12 RX ORDER — ESOMEPRAZOLE MAGNESIUM 40 MG/1
CAPSULE, DELAYED RELEASE ORAL
Qty: 90 CAPSULE | Refills: 3 | Status: SHIPPED | OUTPATIENT
Start: 2018-04-12 | End: 2019-04-17 | Stop reason: SDUPTHER

## 2018-09-25 DIAGNOSIS — D49.0 IPMN (INTRADUCTAL PAPILLARY MUCINOUS NEOPLASM): Primary | ICD-10-CM

## 2018-10-02 ENCOUNTER — TRANSCRIBE ORDERS (OUTPATIENT)
Dept: LAB | Facility: CLINIC | Age: 68
End: 2018-10-02

## 2018-10-02 ENCOUNTER — APPOINTMENT (OUTPATIENT)
Dept: LAB | Facility: CLINIC | Age: 68
End: 2018-10-02
Payer: COMMERCIAL

## 2018-10-02 LAB
BUN SERPL-MCNC: 19 MG/DL (ref 5–25)
CREAT SERPL-MCNC: 0.79 MG/DL (ref 0.6–1.3)
GFR SERPL CREATININE-BSD FRML MDRD: 78 ML/MIN/1.73SQ M

## 2018-10-02 PROCEDURE — 84520 ASSAY OF UREA NITROGEN: CPT

## 2018-10-02 PROCEDURE — 36415 COLL VENOUS BLD VENIPUNCTURE: CPT

## 2018-10-02 PROCEDURE — 82565 ASSAY OF CREATININE: CPT

## 2018-10-16 ENCOUNTER — HOSPITAL ENCOUNTER (OUTPATIENT)
Dept: MRI IMAGING | Facility: HOSPITAL | Age: 68
Discharge: HOME | End: 2018-10-16
Attending: SURGERY
Payer: COMMERCIAL

## 2018-10-16 DIAGNOSIS — D49.0 IPMN (INTRADUCTAL PAPILLARY MUCINOUS NEOPLASM): ICD-10-CM

## 2018-10-16 PROCEDURE — 74183 MRI ABD W/O CNTR FLWD CNTR: CPT

## 2018-10-16 PROCEDURE — A9585 GADOBUTROL INJECTION: HCPCS | Performed by: SURGERY

## 2018-10-16 RX ADMIN — GADOBUTROL 8 ML: 604.72 INJECTION INTRAVENOUS at 20:02

## 2018-10-17 ENCOUNTER — OFFICE VISIT (OUTPATIENT)
Dept: SURGICAL ONCOLOGY | Facility: CLINIC | Age: 68
End: 2018-10-17
Payer: COMMERCIAL

## 2018-10-17 ENCOUNTER — TELEPHONE (OUTPATIENT)
Dept: GASTROENTEROLOGY | Facility: MEDICAL CENTER | Age: 68
End: 2018-10-17

## 2018-10-17 VITALS
HEIGHT: 62 IN | RESPIRATION RATE: 14 BRPM | BODY MASS INDEX: 35.7 KG/M2 | TEMPERATURE: 96.8 F | DIASTOLIC BLOOD PRESSURE: 86 MMHG | HEART RATE: 102 BPM | WEIGHT: 194 LBS | SYSTOLIC BLOOD PRESSURE: 132 MMHG

## 2018-10-17 DIAGNOSIS — D49.0 IPMN (INTRADUCTAL PAPILLARY MUCINOUS NEOPLASM): Primary | ICD-10-CM

## 2018-10-17 PROCEDURE — 99215 OFFICE O/P EST HI 40 MIN: CPT | Performed by: SURGERY

## 2018-10-17 NOTE — LETTER
October 17, 8515     [place] [address] [place] [address]    Patient: Trell Prather   YOB: 1950   Date of Visit: 10/17/2018       Dear Dr Toussaint Ask: Thank you for referring Emmett Moreno to me for evaluation  Below are my notes for this consultation  If you have questions, please do not hesitate to call me  I look forward to following your patient along with you  Sincerely,        Ghada Chau MD        CC: No Recipients  Ghada Chau MD  10/17/2018  3:37 PM  Sign at close encounter               Surgical Oncology Follow Up       [address] [facility] [address] [facility]   1950  [id number]  [address] [facility] SURGICAL ONCOLOGY [facility] [address]    Diagnoses and all orders for this visit:    IPMN (intraductal papillary mucinous neoplasm)  -     Ambulatory referral to Gastroenterology; Future  -     US abdomen limited; Future        Chief Complaint   Patient presents with    Follow-up     Pt is here for 2 yr f/u for panc cyst        Return in about 3 weeks (around 11/7/2018)  No history exists  History of Present Illness  Diagnosis and Staging: Side branch IPMN 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 2013, 1 1cm   Current Therapy: Observation   Disease Status: Stable     History of Present Illness:   Patient returns in follow-up of her MRI  She is doing well at this time with no complaints except for over the last couple weeks, she has had some increasing fatigue and early satiety  This has slowly improved this week  No nausea, vomiting or unintentional weight loss  Her appetite has been good  MRI from October 16, 2018 reveals that the pancreatic lesions are essentially stable  There was a slightly enlarging cystic lesion at the junction of the pancreatic head and neck  There were no suspicious features seen in this    This measured 1 2 x 1 cm  The main pancreatic duct and other side branch is otherwise appeared normal  The there was scattered cysts in the liver as well as an [address]  In segment 6, there was a 1 4 x 1 cm peripherally enhancing lesion  This was concerning for a metastasis and biopsy was recommended  There was peripancreatic lymph node measuring 2 6 x 1 6 cm as well as a justina hepatis lymph node measuring 1 9 x 1 7 cm, and a celiac node measuring 1 7 x 1 1 cm  I personally reviewed the films  I also discussed this with Radiology  Review of Systems  Complete ROS Surg Onc:   Complete ROS Surg Onc:   Constitutional: The patient denies new or recent history of weight loss, no change in appetite  Eyes: No complaints of visual problems, no scleral icterus  ENT: no complaints of ear pain, no hoarseness, no difficulty swallowing,  no tinnitus and no new masses in head, oral cavity, or neck  Cardiovascular: No complaints of chest pain, no palpitations, no ankle edema  Respiratory: No complaints of shortness of breath, no cough  Gastrointestinal: No complaints of jaundice, no bloody stools, no pale stools  Genitourinary: No complaints of dysuria, no hematuria, no nocturia, no frequent urination, no urethral discharge  Musculoskeletal: No complaints of weakness, paralysis, joint stiffness or arthralgias  Integumentary: No complaints of rash, no new lesions  Neurological: No complaints of convulsions, no seizures, no dizziness  Hematologic/Lymphatic: No complaints of easy bruising  Endocrine:  No hot or cold intolerance  No polydipsia, polyphagia, or polyuria  Allergy/immunology:  No environmental allergies  No food allergies  Not immunocompromised  Skin:  No pallor or rash  No wound  Patient Active Problem List   Diagnosis    Positional vertigo    Corn of toe    Focal nodular hyperplasia of liver    IPMN (intraductal papillary mucinous neoplasm)     No past medical history on file    Past Surgical History:   Procedure Laterality Date    CARPAL TUNNEL RELEASE Bilateral     KNEE SURGERY Left     WISDOM TOOTH EXTRACTION       No family history on file  Social History     Social History    Marital status: /Civil Union     Spouse name: N/A    Number of children: N/A    Years of education: N/A     Occupational History    Not on file  Social History Main Topics    Smoking status: Never Smoker    Smokeless tobacco: Never Used    Alcohol use Yes      Comment: social    Drug use: No    Sexual activity: Not on file     Other Topics Concern    Not on file     Social History Narrative    No narrative on file       Current Outpatient Prescriptions:     aspirin 81 MG tablet, Take 1 tablet by mouth daily, Disp: , Rfl:     Cholecalciferol (VITAMIN D) 2000 units CAPS, Take 1 tablet by mouth daily, Disp: , Rfl:     esomeprazole (NexIUM) 40 MG capsule, TAKE ONE CAPSULE BY MOUTH EVERY DAY, Disp: 90 capsule, Rfl: 3    meclizine (ANTIVERT) 12 5 MG tablet, Take 1 tablet (12 5 mg total) by mouth 3 (three) times a day as needed for dizziness, Disp: 30 tablet, Rfl: 0    Multiple Vitamin (MULTIVITAMIN) tablet, Take 1 tablet by mouth daily, Disp: , Rfl:   No current facility-administered medications for this visit  Allergies   Allergen Reactions    Clam Shell Vomiting    Bactrim [Sulfamethoxazole-Trimethoprim]      Vitals:    10/17/18 1503   BP: 132/86   Pulse: 102   Resp: 14   Temp: (!) 96 8 °F (36 °C)       Physical Exam  Constitutional: General appearance: The Patient is well-developed and well-nourished who appears the stated age in no acute distress  Patient is pleasant and talkative  HEENT:  Normocephalic  Sclerae are anicteric  Mucous membranes are moist  Neck is supple without adenopathy  No JVD  Chest: The lungs are clear to auscultation  Cardiac: Heart is regular rate  Abdomen: Abdomen is soft, non-tender, non-distended and without masses  Extremities:  There is no clubbing or cyanosis  There is no edema  Symmetric  Neuro: Grossly nonfocal  Gait is normal      Lymphatic: No evidence of cervical adenopathy bilaterally  No evidence of axillary adenopathy bilaterally  No evidence of inguinal adenopathy bilaterally  Skin: Warm, anicteric  Psych:  Patient is pleasant and talkative  Breasts:        Pathology:      Labs:      Imaging  Mri Abdomen W Wo Contrast And Mrcp    Result Date: 10/17/2018  Narrative: MRI OF THE ABDOMEN (PANCREAS) WITH AND WITHOUT CONTRAST WITH MRCP INDICATION:  50-year-old female with pancreatic intraductal papillary mucinous neoplasms  COMPARISON: MRI abdomen 10/8/2016; abdominal ultrasound 3/9/2017  TECHNIQUE:  The following pulse sequences were obtained on a 1 5 T scanner:  Coronal and axial T2 with TE of 90 and 180 respectively, axial T2 with fat saturation, axial FIESTA fat-sat, axial  T1-weighted in-and-out-of phase, axial DWI/ADC, pre-contrast axial T1 with fat saturation, post-contrast dynamic axial T1 with fat saturation at 20, 70, and 180 seconds, followed by coronal T1 with fat saturation and 7-minute delayed axial T1 with fat saturation  3D MRCP images were obtained with radial thick slabs and projections  3D rendering was performed from the acquisition scanner  IV Contrast:  8 mL of gadobutrol injection (MULTI-DOSE) FINDINGS: PANCREAS:  General: Normal in morphology and signal intensity  Lesions: The majority of the previously described nonenhancing cysts scattered throughout the pancreatic tissue, suspected side branch intraductal papillary mucinous neoplasms, are stable in size and morphology including the largest measuring 11 mm in the body  There is however an enlarging cystic lesion at the junction of the pancreatic head and neck best seen on series 8, image 75 as well as series 12, image 61, measuring 12 x 10 mm  Consideration should be given to endoscopic ultrasound for potential biopsy    Duct: Main pancreatic duct and side-branches are normal in appearance  BILARY DUCTS:  No intra-hepatic biliary ductal dilatation  Common bile duct is normal in caliber  No evidence of bile duct stricture or choledocholithiasis  No mass identified  LIVER:  Scattered hepatic cysts throughout both lobes are stable as is suspected subcapsular FNH in the lateral segment left lobe measuring 1 4 x 1 4 cm  However, within segment 6 of the right hepatic lobe (12/48), a new peripherally enhancing lesion is  now identified measuring 1 4 x 1 1 cm which demonstrates central washout on delayed images and restricts water diffusion  The finding is concerning for a solitary metastasis  Biopsy is recommended  No loss of signal on out of phase images to suggest hepatic steatosis  Portal and hepatic veins are patent without evidence of thrombosis  GALLBLADDER:  Normal  ADRENAL GLANDS:  Normal  SPLEEN: Normal  KIDNEYS:  Normal  ABDOMINAL CAVITY: Mesenteric lymphadenopathy is now identified including a peripancreatic lymph node measuring 2 6 x 1 6 cm, a justina hepatis node measuring 1 9 x 1 7 cm, and a celiac axis node to the left of midline measuring 1 7 x 1 1 cm  BOWEL:  Scattered sigmoid diverticulosis  No bowel obstruction  OSSEOUS STRUCTURES:  No osseous destruction  VASCULAR STRUCTURES:  Visualized vasculature is normal  ABDOMINAL WALL:  Normal  LOWER CHEST:  Unremarkable MRI appearance  Impression: 1  Enlarging cystic lesion at the junction of the pancreatic head and neck, likely a side branch intraductal papillary mucinous neoplasm  Consideration should be given to endoscopic ultrasound for potential biopsy  Remaining pancreatic cystic lesion stable  2   New 1 4 x 1 1 cm suspected metastatic lesion in the right hepatic lobe for which biopsy is recommended  3   Mesenteric lymphadenopathy    I personally discussed this study with Rey Mercedes on 10/17/2018 at 11:58 AM  Workstation performed: [id number]     I reviewed the above laboratory and imaging data     Discussion/Summary:   52-year-old female with side branch IPMN  This is essentially stable  The 1 lesion has slightly increased in size, but has no worrisome or suspicious features  This is also less than 2 cm  It would be extremely unlikely that this would be malignant  There is also peripancreatic adenopathy, celiac and portal lymphadenopathy  In regard to the lymph nodes, I have recommended that we see if we can have an endoscopic ultrasound and biopsy performed  The cystic lesion in the pancreas can also be seen to see if there is any worrisome features  In regard to the liver lesion, I did discuss this with IR, and an ultrasound was recommended  If this can be seen with ultrasound we will schedule a biopsy  If this cannot be seen with ultrasound we will see if there is some other way of biopsying the liver lesion  I will see her back once we have the results of the pathology  She is agreeable to this plan  All of her questions were answered

## 2018-10-17 NOTE — PROGRESS NOTES
Surgical Oncology Follow Up       [address] [place] [address],[address] [place] [address]  [facility] SURGICAL ONCOLOGY [address]   1950  [id number]  [address] [place] [address],[address] [place] [address]  [facility] SURGICAL ONCOLOGY [address] [address]    Diagnoses and all orders for this visit:    IPMN (intraductal papillary mucinous neoplasm)  -     Ambulatory referral to Gastroenterology; Future  -     US abdomen limited; Future        Chief Complaint   Patient presents with    Follow-up     Pt is here for 2 yr f/u for panc cyst        Return in about 3 weeks (around 11/7/2018)  No history exists  History of Present Illness  Diagnosis and Staging: Side branch IPMN 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 2013, 1 1cm   Current Therapy: Observation   Disease Status: Stable     History of Present Illness:   Patient returns in follow-up of her MRI  She is doing well at this time with no complaints except for over the last couple weeks, she has had some increasing fatigue and early satiety  This has slowly improved this week  No nausea, vomiting or unintentional weight loss  Her appetite has been good  MRI from October 16, 2018 reveals that the pancreatic lesions are essentially stable  There was a slightly enlarging cystic lesion at the junction of the pancreatic head and neck  There were no suspicious features seen in this  This measured 1 2 x 1 cm  The main pancreatic duct and other side branch is otherwise appeared normal  The there was scattered cysts in the liver as well as an [address]  In segment 6, there was a 1 4 x 1 cm peripherally enhancing lesion  This was concerning for a metastasis and biopsy was recommended  There was peripancreatic lymph node measuring 2 6 x 1 6 cm as well as a justina hepatis lymph node measuring 1 9 x 1 7 cm, and a celiac node measuring 1 7 x 1 1 cm  I personally reviewed the films    I also discussed this with Radiology  Review of Systems  Complete ROS Surg Onc:   Complete ROS Surg Onc:   Constitutional: The patient denies new or recent history of weight loss, no change in appetite  Eyes: No complaints of visual problems, no scleral icterus  ENT: no complaints of ear pain, no hoarseness, no difficulty swallowing,  no tinnitus and no new masses in head, oral cavity, or neck  Cardiovascular: No complaints of chest pain, no palpitations, no ankle edema  Respiratory: No complaints of shortness of breath, no cough  Gastrointestinal: No complaints of jaundice, no bloody stools, no pale stools  Genitourinary: No complaints of dysuria, no hematuria, no nocturia, no frequent urination, no urethral discharge  Musculoskeletal: No complaints of weakness, paralysis, joint stiffness or arthralgias  Integumentary: No complaints of rash, no new lesions  Neurological: No complaints of convulsions, no seizures, no dizziness  Hematologic/Lymphatic: No complaints of easy bruising  Endocrine:  No hot or cold intolerance  No polydipsia, polyphagia, or polyuria  Allergy/immunology:  No environmental allergies  No food allergies  Not immunocompromised  Skin:  No pallor or rash  No wound  Patient Active Problem List   Diagnosis    Positional vertigo    Corn of toe    Focal nodular hyperplasia of liver    IPMN (intraductal papillary mucinous neoplasm)     No past medical history on file  Past Surgical History:   Procedure Laterality Date    CARPAL TUNNEL RELEASE Bilateral     KNEE SURGERY Left     WISDOM TOOTH EXTRACTION       No family history on file  Social History     Social History    Marital status: /Civil Union     Spouse name: N/A    Number of children: N/A    Years of education: N/A     Occupational History    Not on file       Social History Main Topics    Smoking status: Never Smoker    Smokeless tobacco: Never Used    Alcohol use Yes      Comment: social    Drug use: No    Sexual activity: Not on file     Other Topics Concern    Not on file     Social History Narrative    No narrative on file       Current Outpatient Prescriptions:     aspirin 81 MG tablet, Take 1 tablet by mouth daily, Disp: , Rfl:     Cholecalciferol (VITAMIN D) 2000 units CAPS, Take 1 tablet by mouth daily, Disp: , Rfl:     esomeprazole (NexIUM) 40 MG capsule, TAKE ONE CAPSULE BY MOUTH EVERY DAY, Disp: 90 capsule, Rfl: 3    meclizine (ANTIVERT) 12 5 MG tablet, Take 1 tablet (12 5 mg total) by mouth 3 (three) times a day as needed for dizziness, Disp: 30 tablet, Rfl: 0    Multiple Vitamin (MULTIVITAMIN) tablet, Take 1 tablet by mouth daily, Disp: , Rfl:   No current facility-administered medications for this visit  Allergies   Allergen Reactions    Clam Shell Vomiting    Bactrim [Sulfamethoxazole-Trimethoprim]      Vitals:    10/17/18 1503   BP: 132/86   Pulse: 102   Resp: 14   Temp: (!) 96 8 °F (36 °C)       Physical Exam  Constitutional: General appearance: The Patient is well-developed and well-nourished who appears the stated age in no acute distress  Patient is pleasant and talkative  HEENT:  Normocephalic  Sclerae are anicteric  Mucous membranes are moist  Neck is supple without adenopathy  No JVD  Chest: The lungs are clear to auscultation  Cardiac: Heart is regular rate  Abdomen: Abdomen is soft, non-tender, non-distended and without masses  Extremities: There is no clubbing or cyanosis  There is no edema  Symmetric  Neuro: Grossly nonfocal  Gait is normal      Lymphatic: No evidence of cervical adenopathy bilaterally  No evidence of axillary adenopathy bilaterally  No evidence of inguinal adenopathy bilaterally  Skin: Warm, anicteric  Psych:  Patient is pleasant and talkative    Breasts:        Pathology:      Labs:      Imaging  Mri Abdomen W Wo Contrast And Mrcp    Result Date: 10/17/2018  Narrative: MRI OF THE ABDOMEN (PANCREAS) WITH AND WITHOUT CONTRAST WITH MRCP INDICATION:  27-year-old female with pancreatic intraductal papillary mucinous neoplasms  COMPARISON: MRI abdomen 10/8/2016; abdominal ultrasound 3/9/2017  TECHNIQUE:  The following pulse sequences were obtained on a 1 5 T scanner:  Coronal and axial T2 with TE of 90 and 180 respectively, axial T2 with fat saturation, axial FIESTA fat-sat, axial  T1-weighted in-and-out-of phase, axial DWI/ADC, pre-contrast axial T1 with fat saturation, post-contrast dynamic axial T1 with fat saturation at 20, 70, and 180 seconds, followed by coronal T1 with fat saturation and 7-minute delayed axial T1 with fat saturation  3D MRCP images were obtained with radial thick slabs and projections  3D rendering was performed from the acquisition scanner  IV Contrast:  8 mL of gadobutrol injection (MULTI-DOSE) FINDINGS: PANCREAS:  General: Normal in morphology and signal intensity  Lesions: The majority of the previously described nonenhancing cysts scattered throughout the pancreatic tissue, suspected side branch intraductal papillary mucinous neoplasms, are stable in size and morphology including the largest measuring 11 mm in the body  There is however an enlarging cystic lesion at the junction of the pancreatic head and neck best seen on series 8, image 75 as well as series 12, image 61, measuring 12 x 10 mm  Consideration should be given to endoscopic ultrasound for potential biopsy  Duct: Main pancreatic duct and side-branches are normal in appearance  BILARY DUCTS:  No intra-hepatic biliary ductal dilatation  Common bile duct is normal in caliber  No evidence of bile duct stricture or choledocholithiasis  No mass identified  LIVER:  Scattered hepatic cysts throughout both lobes are stable as is suspected subcapsular FNH in the lateral segment left lobe measuring 1 4 x 1 4 cm    However, within segment 6 of the right hepatic lobe (12/48), a new peripherally enhancing lesion is  now identified measuring 1 4 x 1 1 cm which demonstrates central washout on delayed images and restricts water diffusion  The finding is concerning for a solitary metastasis  Biopsy is recommended  No loss of signal on out of phase images to suggest hepatic steatosis  Portal and hepatic veins are patent without evidence of thrombosis  GALLBLADDER:  Normal  ADRENAL GLANDS:  Normal  SPLEEN: Normal  KIDNEYS:  Normal  ABDOMINAL CAVITY: Mesenteric lymphadenopathy is now identified including a peripancreatic lymph node measuring 2 6 x 1 6 cm, a justina hepatis node measuring 1 9 x 1 7 cm, and a celiac axis node to the left of midline measuring 1 7 x 1 1 cm  BOWEL:  Scattered sigmoid diverticulosis  No bowel obstruction  OSSEOUS STRUCTURES:  No osseous destruction  VASCULAR STRUCTURES:  Visualized vasculature is normal  ABDOMINAL WALL:  Normal  LOWER CHEST:  Unremarkable MRI appearance  Impression: 1  Enlarging cystic lesion at the junction of the pancreatic head and neck, likely a side branch intraductal papillary mucinous neoplasm  Consideration should be given to endoscopic ultrasound for potential biopsy  Remaining pancreatic cystic lesion stable  2   New 1 4 x 1 1 cm suspected metastatic lesion in the right hepatic lobe for which biopsy is recommended  3   Mesenteric lymphadenopathy  I personally discussed this study with Jb Mix on 10/17/2018 at 11:58 AM  Workstation performed: [id number]     I reviewed the above laboratory and imaging data  Discussion/Summary:   50-year-old female with side branch IPMN  This is essentially stable  The 1 lesion has slightly increased in size, but has no worrisome or suspicious features  This is also less than 2 cm  It would be extremely unlikely that this would be malignant  There is also peripancreatic adenopathy, celiac and portal lymphadenopathy  In regard to the lymph nodes, I have recommended that we see if we can have an endoscopic ultrasound and biopsy performed   The cystic lesion in the pancreas can also be seen to see if there is any worrisome features  In regard to the liver lesion, I did discuss this with IR, and an ultrasound was recommended  If this can be seen with ultrasound we will schedule a biopsy  If this cannot be seen with ultrasound we will see if there is some other way of biopsying the liver lesion  I will see her back once we have the results of the pathology  She is agreeable to this plan  All of her questions were answered

## 2018-10-17 NOTE — TELEPHONE ENCOUNTER
New pt    Dr Rinku Le office called wanting to sched an appt in the [facility] Location for IPMN (intraductal papillary mucinous neoplasm)   Pt can be reached at [phone]

## 2018-10-19 ENCOUNTER — HOSPITAL ENCOUNTER (OUTPATIENT)
Dept: ULTRASOUND IMAGING | Facility: HOSPITAL | Age: 68
Discharge: HOME | End: 2018-10-19
Attending: SURGERY
Payer: COMMERCIAL

## 2018-10-19 DIAGNOSIS — D49.0 IPMN (INTRADUCTAL PAPILLARY MUCINOUS NEOPLASM): ICD-10-CM

## 2018-10-19 PROCEDURE — 76705 ECHO EXAM OF ABDOMEN: CPT

## 2018-10-23 DIAGNOSIS — K76.9 LESION OF LIVER: Primary | ICD-10-CM

## 2018-10-24 ENCOUNTER — OFFICE VISIT (OUTPATIENT)
Dept: GASTROENTEROLOGY | Facility: AMBULARY SURGERY CENTER | Age: 68
End: 2018-10-24
Payer: COMMERCIAL

## 2018-10-24 VITALS
HEART RATE: 78 BPM | SYSTOLIC BLOOD PRESSURE: 132 MMHG | WEIGHT: 194 LBS | HEIGHT: 62 IN | BODY MASS INDEX: 35.7 KG/M2 | DIASTOLIC BLOOD PRESSURE: 78 MMHG | TEMPERATURE: 98.9 F

## 2018-10-24 DIAGNOSIS — D49.0 IPMN (INTRADUCTAL PAPILLARY MUCINOUS NEOPLASM): ICD-10-CM

## 2018-10-24 PROBLEM — R93.5 ABNORMAL MRI OF ABDOMEN: Status: ACTIVE | Noted: 2018-10-24

## 2018-10-24 PROCEDURE — 99244 OFF/OP CNSLTJ NEW/EST MOD 40: CPT | Performed by: INTERNAL MEDICINE

## 2018-10-24 NOTE — PROGRESS NOTES
Consultation - [facility]) [facility]  Dionicio Martinez 80 y o  female MRN: [id number]          Assessment & Plan:    Pleasant 72-year-old female with a history of IPMN, on surveillance MRI now found to have new peripancreatic and portacaval lymphadenopathy as well as a right hepatic lesion which is concerning for metastatic disease  1   Abnormal MRI:  Very concerning for new metastatic and malignant process, possibly secondary to primary pancreatic with degeneration of a IPMN versus upper GI process given her symptoms of early satiety and nausea  -we will plan to proceed with an upper endoscopy as well as endoscopic ultrasound as soon as possible  -I discussed with the patient the risks of the procedure including bleeding, surgery, perforation  -we will check laboratory studies        _____________________________________________________________        CC:  Abnormal MRI    HPI:  Dionicio Martinez is a 80 y  o female who was referred for evaluation of abnormal MR I  As you know this is a pleasant 72-year-old female with history of IPMN which has been monitored over the past several years  She had endoscopic ultrasound many years ago  Recently had a follow-up MRI, newly noted was a new cystic lesion at the junction of the head and body of the pancreas, additionally there were several peripancreatic and portacaval lymph nodes up to 2 cm as well as a new lesion in the right lobe of her liver which appears to be metastatic in origin  Patient's last colonoscopy was 1 year ago  She has not been up-to-date on mammograms or Pap smears  She follows up with surgical oncology was referred her due to these new MRI findings  Patient does reports some early satiety, nausea  Reports vague epigastric and left-sided abdominal pain  She has had increasing fatigue decreased energy levels as well  Past surgical history is notable for arthroscopic knee surgery      Family history is notable for several cancers at her grandmother's on both sides of the family of unclear origin  She denies any tobacco, rarely drinks  She works as a   ROS:  The remainder of the ROS was negative except for the pertinent positives mentioned in HPI  Allergies: Clam shell and Bactrim [sulfamethoxazole-trimethoprim]    Medications:   Current Outpatient Prescriptions:     aspirin 81 MG tablet, Take 1 tablet by mouth daily, Disp: , Rfl:     Cholecalciferol (VITAMIN D) 2000 units CAPS, Take 1 tablet by mouth daily, Disp: , Rfl:     esomeprazole (NexIUM) 40 MG capsule, TAKE ONE CAPSULE BY MOUTH EVERY DAY, Disp: 90 capsule, Rfl: 3    meclizine (ANTIVERT) 12 5 MG tablet, Take 1 tablet (12 5 mg total) by mouth 3 (three) times a day as needed for dizziness, Disp: 30 tablet, Rfl: 0    Multiple Vitamin (MULTIVITAMIN) tablet, Take 1 tablet by mouth daily, Disp: , Rfl: '    History reviewed  No pertinent past medical history  Past Surgical History:   Procedure Laterality Date    CARPAL TUNNEL RELEASE Bilateral     COLONOSCOPY      KNEE SURGERY Left     UPPER GASTROINTESTINAL ENDOSCOPY      WISDOM TOOTH EXTRACTION         History reviewed  No pertinent family history  reports that she has never smoked  She has never used smokeless tobacco  She reports that she drinks alcohol  She reports that she does not use drugs            Physical Exam:     /78 (BP Location: Right arm, Patient Position: Sitting, Cuff Size: Adult)   Pulse 78   Temp 98 9 °F (37 2 °C) (Tympanic)   Ht 5' 2" (1 575 m)   Wt 88 kg (194 lb)   BMI 35 48 kg/m²     Gen: wn/wd, NAD  HEENT: anicteric, MMM, no cervical LAD  CVS: RRR, no m/r/g  CHEST: CTA b/l  ABD: +BS, soft, NT,ND, no hepatosplenomegaly  EXT: no c/c/e  NEURO: aaox3  SKIN: NO rashes

## 2018-10-24 NOTE — LETTER
October 24, 3610     Ashlyn Devries, [address] [place] [facility] [address]    Patient: Kaykay Armendariz   YOB: 1950   Date of Visit: 10/24/2018       Dear Dr Elysia Perdomo: Thank you for referring Kyler Martinez to me for evaluation  Below are my notes for this consultation  If you have questions, please do not hesitate to call me  I look forward to following your patient along with you  Sincerely,        Crystal Chase MD        CC: Sueellen Habermann, MD Shana Ham, MD  10/24/2018  5:47 PM  Sign at close encounter  Consultation - [facility]  Kaykay Armendariz 80 y o  female MRN: [id number]          Assessment & Plan:    Pleasant 18-year-old female with a history of IPMN, on surveillance MRI now found to have new peripancreatic and portacaval lymphadenopathy as well as a right hepatic lesion which is concerning for metastatic disease  1   Abnormal MRI:  Very concerning for new metastatic and malignant process, possibly secondary to primary pancreatic with degeneration of a IPMN versus upper GI process given her symptoms of early satiety and nausea  -we will plan to proceed with an upper endoscopy as well as endoscopic ultrasound as soon as possible  -I discussed with the patient the risks of the procedure including bleeding, surgery, perforation  -we will check laboratory studies        _____________________________________________________________        CC:  Abnormal MRI    HPI:  Kaykay Armendariz is a 80 y  o female who was referred for evaluation of abnormal MR I  As you know this is a pleasant 18-year-old female with history of IPMN which has been monitored over the past several years  She had endoscopic ultrasound many years ago    Recently had a follow-up MRI, newly noted was a new cystic lesion at the junction of the head and body of the pancreas, additionally there were several peripancreatic and portacaval lymph nodes up to 2 cm as well as a new lesion in the right lobe of her liver which appears to be metastatic in origin  Patient's last colonoscopy was 1 year ago  She has not been up-to-date on mammograms or Pap smears  She follows up with surgical oncology was referred her due to these new MRI findings  Patient does reports some early satiety, nausea  Reports vague epigastric and left-sided abdominal pain  She has had increasing fatigue decreased energy levels as well  Past surgical history is notable for arthroscopic knee surgery  Family history is notable for several cancers at her grandmother's on both sides of the family of unclear origin  She denies any tobacco, rarely drinks  She works as a   ROS:  The remainder of the ROS was negative except for the pertinent positives mentioned in HPI  Allergies: Clam shell and Bactrim [sulfamethoxazole-trimethoprim]    Medications:   Current Outpatient Prescriptions:     aspirin 81 MG tablet, Take 1 tablet by mouth daily, Disp: , Rfl:     Cholecalciferol (VITAMIN D) 2000 units CAPS, Take 1 tablet by mouth daily, Disp: , Rfl:     esomeprazole (NexIUM) 40 MG capsule, TAKE ONE CAPSULE BY MOUTH EVERY DAY, Disp: 90 capsule, Rfl: 3    meclizine (ANTIVERT) 12 5 MG tablet, Take 1 tablet (12 5 mg total) by mouth 3 (three) times a day as needed for dizziness, Disp: 30 tablet, Rfl: 0    Multiple Vitamin (MULTIVITAMIN) tablet, Take 1 tablet by mouth daily, Disp: , Rfl: '    History reviewed  No pertinent past medical history  Past Surgical History:   Procedure Laterality Date    CARPAL TUNNEL RELEASE Bilateral     COLONOSCOPY      KNEE SURGERY Left     UPPER GASTROINTESTINAL ENDOSCOPY      WISDOM TOOTH EXTRACTION         History reviewed  No pertinent family history  reports that she has never smoked  She has never used smokeless tobacco  She reports that she drinks alcohol  She reports that she does not use drugs            Physical Exam:     /78 (BP Location: Right arm, Patient Position: Sitting, Cuff Size: Adult)   Pulse 78   Temp 98 9 °F (37 2 °C) (Tympanic)   Ht 5' 2" (1 575 m)   Wt 88 kg (194 lb)   BMI 35 48 kg/m²      Gen: wn/wd, NAD  HEENT: anicteric, MMM, no cervical LAD  CVS: RRR, no m/r/g  CHEST: CTA b/l  ABD: +BS, soft, NT,ND, no hepatosplenomegaly  EXT: no c/c/e  NEURO: aaox3  SKIN: NO rashes

## 2018-10-26 ENCOUNTER — APPOINTMENT (OUTPATIENT)
Dept: LAB | Facility: CLINIC | Age: 68
End: 2018-10-26
Payer: COMMERCIAL

## 2018-10-26 ENCOUNTER — TELEPHONE (OUTPATIENT)
Dept: GASTROENTEROLOGY | Facility: CLINIC | Age: 68
End: 2018-10-26

## 2018-10-26 DIAGNOSIS — D49.0 NEOPLASM OF DIGESTIVE SYSTEM: ICD-10-CM

## 2018-10-26 DIAGNOSIS — D49.0 IPMN (INTRADUCTAL PAPILLARY MUCINOUS NEOPLASM): ICD-10-CM

## 2018-10-26 LAB
ALBUMIN SERPL BCP-MCNC: 3.4 G/DL (ref 3.5–5)
ALP SERPL-CCNC: 122 U/L (ref 46–116)
ALT SERPL W P-5'-P-CCNC: 31 U/L (ref 12–78)
ANION GAP SERPL CALCULATED.3IONS-SCNC: 8 MMOL/L (ref 4–13)
AST SERPL W P-5'-P-CCNC: 24 U/L (ref 5–45)
BASOPHILS # BLD AUTO: 0.1 THOUSANDS/ΜL (ref 0–0.1)
BASOPHILS NFR BLD AUTO: 1 % (ref 0–1)
BILIRUB DIRECT SERPL-MCNC: 0.13 MG/DL (ref 0–0.2)
BILIRUB SERPL-MCNC: 0.7 MG/DL (ref 0.2–1)
BUN SERPL-MCNC: 17 MG/DL (ref 5–25)
CALCIUM SERPL-MCNC: 9.3 MG/DL (ref 8.3–10.1)
CHLORIDE SERPL-SCNC: 103 MMOL/L (ref 100–108)
CO2 SERPL-SCNC: 28 MMOL/L (ref 21–32)
CREAT SERPL-MCNC: 0.94 MG/DL (ref 0.6–1.3)
EOSINOPHIL # BLD AUTO: 0 THOUSAND/ΜL (ref 0–0.61)
EOSINOPHIL NFR BLD AUTO: 0 % (ref 0–6)
ERYTHROCYTE [DISTWIDTH] IN BLOOD BY AUTOMATED COUNT: 12.9 % (ref 11.6–15.1)
GFR SERPL CREATININE-BSD FRML MDRD: 63 ML/MIN/1.73SQ M
GLUCOSE P FAST SERPL-MCNC: 111 MG/DL (ref 65–99)
HCT VFR BLD AUTO: 38.4 % (ref 34.8–46.1)
HGB BLD-MCNC: 12.5 G/DL (ref 11.5–15.4)
IMM GRANULOCYTES # BLD AUTO: 0.02 THOUSAND/UL (ref 0–0.2)
IMM GRANULOCYTES NFR BLD AUTO: 0 % (ref 0–2)
INR PPP: 0.97 (ref 0.86–1.17)
LIPASE SERPL-CCNC: 120 U/L (ref 73–393)
LYMPHOCYTES # BLD AUTO: 1.52 THOUSANDS/ΜL (ref 0.6–4.47)
LYMPHOCYTES NFR BLD AUTO: 20 % (ref 14–44)
MCH RBC QN AUTO: 29.1 PG (ref 26.8–34.3)
MCHC RBC AUTO-ENTMCNC: 32.6 G/DL (ref 31.4–37.4)
MCV RBC AUTO: 89 FL (ref 82–98)
MONOCYTES # BLD AUTO: 0.87 THOUSAND/ΜL (ref 0.17–1.22)
MONOCYTES NFR BLD AUTO: 11 % (ref 4–12)
NEUTROPHILS # BLD AUTO: 5.15 THOUSANDS/ΜL (ref 1.85–7.62)
NEUTS SEG NFR BLD AUTO: 68 % (ref 43–75)
NRBC BLD AUTO-RTO: 0 /100 WBCS
PLATELET # BLD AUTO: 309 THOUSANDS/UL (ref 149–390)
PMV BLD AUTO: 9.9 FL (ref 8.9–12.7)
POTASSIUM SERPL-SCNC: 3.7 MMOL/L (ref 3.5–5.3)
PROT SERPL-MCNC: 7.6 G/DL (ref 6.4–8.2)
PROTHROMBIN TIME: 12.6 SECONDS (ref 11.8–14.2)
RBC # BLD AUTO: 4.3 MILLION/UL (ref 3.81–5.12)
SODIUM SERPL-SCNC: 139 MMOL/L (ref 136–145)
WBC # BLD AUTO: 7.66 THOUSAND/UL (ref 4.31–10.16)

## 2018-10-26 PROCEDURE — 80076 HEPATIC FUNCTION PANEL: CPT

## 2018-10-26 PROCEDURE — 80048 BASIC METABOLIC PNL TOTAL CA: CPT

## 2018-10-26 PROCEDURE — 85025 COMPLETE CBC W/AUTO DIFF WBC: CPT

## 2018-10-26 PROCEDURE — 36415 COLL VENOUS BLD VENIPUNCTURE: CPT

## 2018-10-26 PROCEDURE — 85610 PROTHROMBIN TIME: CPT

## 2018-10-26 PROCEDURE — 83690 ASSAY OF LIPASE: CPT

## 2018-10-26 NOTE — TELEPHONE ENCOUNTER
----- Message from Karen Kaminski MD sent at 10/26/2018 12:07 PM EDT -----  Please inform the patient that her blood tests were normal except for a mild elevation in 1 liver enzyme, her alkaline phosphatase  We will see her at upcoming procedure, please have the patient call with any questions or concerns

## 2018-10-27 ENCOUNTER — ANESTHESIA EVENT (OUTPATIENT)
Dept: GASTROENTEROLOGY | Facility: HOSPITAL | Age: 68
End: 2018-10-27
Payer: COMMERCIAL

## 2018-10-29 ENCOUNTER — ANESTHESIA (OUTPATIENT)
Dept: GASTROENTEROLOGY | Facility: HOSPITAL | Age: 68
End: 2018-10-29
Payer: COMMERCIAL

## 2018-10-29 ENCOUNTER — HOSPITAL ENCOUNTER (OUTPATIENT)
Facility: HOSPITAL | Age: 68
Setting detail: OUTPATIENT SURGERY
Discharge: HOME | End: 2018-10-29
Attending: INTERNAL MEDICINE | Admitting: INTERNAL MEDICINE
Payer: COMMERCIAL

## 2018-10-29 VITALS
DIASTOLIC BLOOD PRESSURE: 69 MMHG | SYSTOLIC BLOOD PRESSURE: 142 MMHG | HEART RATE: 76 BPM | HEIGHT: 62 IN | BODY MASS INDEX: 34.6 KG/M2 | WEIGHT: 188 LBS | TEMPERATURE: 97.9 F | RESPIRATION RATE: 18 BRPM | OXYGEN SATURATION: 100 %

## 2018-10-29 DIAGNOSIS — K76.89 FOCAL NODULAR HYPERPLASIA OF LIVER: ICD-10-CM

## 2018-10-29 DIAGNOSIS — R93.5 ABNORMAL MRI OF ABDOMEN: ICD-10-CM

## 2018-10-29 PROCEDURE — 88172 CYTP DX EVAL FNA 1ST EA SITE: CPT | Performed by: PATHOLOGY

## 2018-10-29 PROCEDURE — 88173 CYTOPATH EVAL FNA REPORT: CPT | Performed by: PATHOLOGY

## 2018-10-29 PROCEDURE — 88184 FLOWCYTOMETRY/ TC 1 MARKER: CPT | Performed by: INTERNAL MEDICINE

## 2018-10-29 PROCEDURE — 88185 FLOWCYTOMETRY/TC ADD-ON: CPT

## 2018-10-29 PROCEDURE — 88172 CYTP DX EVAL FNA 1ST EA SITE: CPT | Performed by: INTERNAL MEDICINE

## 2018-10-29 PROCEDURE — 88305 TISSUE EXAM BY PATHOLOGIST: CPT | Performed by: PATHOLOGY

## 2018-10-29 PROCEDURE — 88173 CYTOPATH EVAL FNA REPORT: CPT | Performed by: INTERNAL MEDICINE

## 2018-10-29 PROCEDURE — 43238 EGD US FINE NEEDLE BX/ASPIR: CPT | Performed by: INTERNAL MEDICINE

## 2018-10-29 RX ORDER — GLYCOPYRROLATE 0.2 MG/ML
INJECTION INTRAMUSCULAR; INTRAVENOUS AS NEEDED
Status: DISCONTINUED | OUTPATIENT
Start: 2018-10-29 | End: 2018-10-29 | Stop reason: SURG

## 2018-10-29 RX ORDER — SODIUM CHLORIDE 9 MG/ML
125 INJECTION, SOLUTION INTRAVENOUS CONTINUOUS
Status: DISCONTINUED | OUTPATIENT
Start: 2018-10-29 | End: 2018-10-29 | Stop reason: HOSPADM

## 2018-10-29 RX ORDER — FENTANYL CITRATE 50 UG/ML
INJECTION, SOLUTION INTRAMUSCULAR; INTRAVENOUS AS NEEDED
Status: DISCONTINUED | OUTPATIENT
Start: 2018-10-29 | End: 2018-10-29 | Stop reason: SURG

## 2018-10-29 RX ORDER — PROPOFOL 10 MG/ML
INJECTION, EMULSION INTRAVENOUS CONTINUOUS PRN
Status: DISCONTINUED | OUTPATIENT
Start: 2018-10-29 | End: 2018-10-29 | Stop reason: SURG

## 2018-10-29 RX ORDER — PROPOFOL 10 MG/ML
INJECTION, EMULSION INTRAVENOUS AS NEEDED
Status: DISCONTINUED | OUTPATIENT
Start: 2018-10-29 | End: 2018-10-29 | Stop reason: SURG

## 2018-10-29 RX ADMIN — GLYCOPYRROLATE 0.2 MG: 0.2 INJECTION, SOLUTION INTRAMUSCULAR; INTRAVENOUS at 11:22

## 2018-10-29 RX ADMIN — FENTANYL CITRATE 25 MCG: 50 INJECTION, SOLUTION INTRAMUSCULAR; INTRAVENOUS at 11:23

## 2018-10-29 RX ADMIN — SODIUM CHLORIDE: 0.9 INJECTION, SOLUTION INTRAVENOUS at 11:30

## 2018-10-29 RX ADMIN — SODIUM CHLORIDE 125 ML/HR: 0.9 INJECTION, SOLUTION INTRAVENOUS at 10:12

## 2018-10-29 RX ADMIN — PROPOFOL 100 MCG/KG/MIN: 10 INJECTION, EMULSION INTRAVENOUS at 11:24

## 2018-10-29 RX ADMIN — PROPOFOL 20 MG: 10 INJECTION, EMULSION INTRAVENOUS at 11:37

## 2018-10-29 RX ADMIN — PROPOFOL 100 MG: 10 INJECTION, EMULSION INTRAVENOUS at 11:24

## 2018-10-29 RX ADMIN — LIDOCAINE HYDROCHLORIDE 80 MG: 20 INJECTION, SOLUTION INTRAVENOUS at 11:24

## 2018-10-29 NOTE — DISCHARGE INSTRUCTIONS
Discharge home  Resume regular diet  Resume home medications  Follow up biopsy results  Continue liver biopsy as previously scheduled  Call with any abdominal pain, bleeding, fevers

## 2018-10-29 NOTE — OP NOTE
ESOPHAGOGASTRODUODENOSCOPY    PROCEDURE: EGD    SEDATION: Monitored anesthesia care, check anesthesia records    ASA Class: 2    INDICATIONS:  Abnormal CT scan    CONSENT:  Informed consent was obtained for the procedure, including sedation after explaining the risks and benefits of the procedure  Risks including but not limited to bleeding, perforation, infection, and missed lesion  PREPARATION:   Telemetry, pulse oximetry, blood pressure were monitored throughout the procedure  Patient was identified by myself both verbally and by visual inspection of ID band  DESCRIPTION:   Patient was placed in the left lateral decubitus position and was sedated with the above medication  The gastroscope was introduced in to the oropharynx and the esophagus was intubated under direct visualization  Scope was passed down the esophagus up to 2nd part of the duodenum  A careful inspection was made as the gastroscope was withdrawn, including a retroflexed view of the stomach; findings and interventions are described below  FINDINGS:    #1  Esophagus- normal esophagus and GE junction    #2  Stomach- normal appearing stomach mild gastritis, normal retroflexion, no mass lesions were noted    #3  Duodenum- normal duodenum to the 3rd portion          EUS:  The linear echo endoscope was advanced to the stomach, the celiac axis was identified there was a 1 3 cm hypo echoic well-defined celiac lymph nodes  Using a 25 gauge needle FNA x3 was performed  The celiac axis and SMA were unremarkable  The pancreas was identified, there were multiple hypoechoic well-defined cysts within the pancreas, likely side branch IPMNs, most of them less than 1 5 cm  There was no focal lesion seen within any of the cyst to distinguish 1 cyst from the next  The pancreatic duct itself was normal with normal diameter, no ductal dilatation  The common bile duct was 5 mm within the head of the pancreas and tapered smoothly to the ampulla  Pancreatic duct was normal within the head of the pancreas into the ampulla  The ampulla itself was unremarkable  There was a portal clinical lymph node, measuring 2 3 cm in greatest diameter, isoechoic and multilobulated  FNA x5 was performed with 1 pass sent for flow cytometry  Liver was identified there w 1 3 cm anechoic cystic lesion seen in the liver, appeared to be a cyst     The 1 4 cm hepatic lesion seen on MRI was not identified  IMPRESSIONS:      Celiac lymph node, FNA x3  Portacaval/peripancreatic lymph node, FNA x5  Multiple pancreatic cysts, anechoic measuring 1 5 cm in greatest diameter, appeared to be side branch IPMNs  The pancreatic duct itself was normal   Nose solid lesions were noted within the pancreatic cysts  RECOMMENDATIONS:     Discharge home  Resume regular diet  Resume home medications  Follow up biopsy results  Continue liver biopsy as previously scheduled  Call with any abdominal pain, bleeding, fevers    COMPLICATIONS:  None; patient tolerated the procedure well            DISPOSITION: PACU           CONDITION: Stable

## 2018-10-29 NOTE — ANESTHESIA POSTPROCEDURE EVALUATION
Post-Op Assessment Note      CV Status:  Stable    Mental Status:  Lethargic    Hydration Status:  Euvolemic    PONV Controlled:  Controlled    Airway Patency:  Patent    Post Op Vitals Reviewed: Yes          Staff: CRNA           /70 (10/29/18 1219)    Temp      Pulse 81 (10/29/18 1219)   Resp 16 (10/29/18 1219)    SpO2 93 % (10/29/18 1219)

## 2018-10-29 NOTE — H&P
History and Physical -  [facility]  Dhara Hernandez 80 y o  female MRN: [id number]    HPI: Dhara Hernandez is a 80y o  year old female who presents with abnormal CT, lymph nodes  Review of Systems    Historical Information   History reviewed  No pertinent past medical history  Past Surgical History:   Procedure Laterality Date    CARPAL TUNNEL RELEASE Bilateral     COLONOSCOPY      2017    KNEE SURGERY Left     UPPER GASTROINTESTINAL ENDOSCOPY      WISDOM TOOTH EXTRACTION       Social History   History   Alcohol Use    Yes     Comment: social     History   Drug Use No     History   Smoking Status    Never Smoker   Smokeless Tobacco    Never Used     History reviewed  No pertinent family history  Meds/Allergies     Prescriptions Prior to Admission   Medication    aspirin 81 MG tablet    Cholecalciferol (VITAMIN D) 2000 units CAPS    Multiple Vitamin (MULTIVITAMIN) tablet    esomeprazole (NexIUM) 40 MG capsule    meclizine (ANTIVERT) 12 5 MG tablet       Allergies   Allergen Reactions    Clam Shell Vomiting    Bactrim [Sulfamethoxazole-Trimethoprim]        Objective     Blood pressure 167/88, pulse 83, temperature 97 9 °F (36 6 °C), temperature source Tympanic, resp  rate 20, height 5' 2" (1 575 m), weight 85 3 kg (188 lb), SpO2 98 %  PHYSICAL EXAM    Gen: NAD  CV: RRR  CHEST: Clear  ABD: soft, NT/ND  EXT: no edema  Neuro: AAO      ASSESSMENT/PLAN:  This is a 80y o  year old female here for abnormal CT, lymph nodes         PLAN:   Procedure: egd/EUS

## 2018-10-29 NOTE — ANESTHESIA PREPROCEDURE EVALUATION
Review of Systems/Medical History          Cardiovascular   Pulmonary       GI/Hepatic    Liver disease ,             Endo/Other    Obesity    GYN       Hematology   Musculoskeletal       Neurology   Psychology           Physical Exam    Airway    Mallampati score: II         Dental   No notable dental hx     Cardiovascular      Pulmonary      Other Findings        Anesthesia Plan  ASA Score- 2     Anesthesia Type- IV sedation with anesthesia with ASA Monitors  Additional Monitors:   Airway Plan:     Comment: I, Dr Luisa Lovelace, the attending physician, have personally seen and evaluated the patient prior to anesthetic care  I have reviewed the pre-anesthetic record, and other medical records if appropriate to the anesthetic care  If a CRNA is involved in the case, I have reviewed the CRNA assessment, if present, and agree  The patient is in a suitable condition to proceed with my formulated anesthetic plan        Plan Factors-    Induction- intravenous  Postoperative Plan-     Informed Consent- Anesthetic plan and risks discussed with patient  I personally reviewed this patient with the CRNA  Discussed and agreed on the Anesthesia Plan with the CRNA  Dara Soulier

## 2018-10-31 LAB — SCAN RESULT: NORMAL

## 2018-11-05 NOTE — TELEPHONE ENCOUNTER
Patient called because she got a letter for results  Looked for results and the letter was from labs, however patient inquired about her EGD  She stated her GYN doctor had govern her the results of the EGD, however I told her that we would also call her with the results once the tissue exam was resulted by the doctor who performed the exam  She said ok   Please advise on tissue exam

## 2018-11-06 ENCOUNTER — TELEPHONE (OUTPATIENT)
Dept: SURGICAL ONCOLOGY | Facility: CLINIC | Age: 68
End: 2018-11-06

## 2018-11-06 ENCOUNTER — HOSPITAL ENCOUNTER (OUTPATIENT)
Dept: RADIOLOGY | Facility: HOSPITAL | Age: 68
Discharge: HOME | End: 2018-11-06
Attending: SURGERY | Admitting: SURGERY
Payer: COMMERCIAL

## 2018-11-06 VITALS
RESPIRATION RATE: 18 BRPM | HEART RATE: 84 BPM | DIASTOLIC BLOOD PRESSURE: 59 MMHG | OXYGEN SATURATION: 96 % | SYSTOLIC BLOOD PRESSURE: 127 MMHG | TEMPERATURE: 98.2 F

## 2018-11-06 DIAGNOSIS — N64.4 BREAST PAIN, LEFT: Primary | ICD-10-CM

## 2018-11-06 DIAGNOSIS — K76.9 LESION OF LIVER: ICD-10-CM

## 2018-11-06 PROCEDURE — 47000 NEEDLE BIOPSY OF LIVER PERQ: CPT

## 2018-11-06 PROCEDURE — 88313 SPECIAL STAINS GROUP 2: CPT | Performed by: PATHOLOGY

## 2018-11-06 PROCEDURE — 76942 ECHO GUIDE FOR BIOPSY: CPT

## 2018-11-06 PROCEDURE — 88365 INSITU HYBRIDIZATION (FISH): CPT

## 2018-11-06 PROCEDURE — 99153 MOD SED SAME PHYS/QHP EA: CPT

## 2018-11-06 PROCEDURE — 99152 MOD SED SAME PHYS/QHP 5/>YRS: CPT | Performed by: RADIOLOGY

## 2018-11-06 PROCEDURE — 88342 IMHCHEM/IMCYTCHM 1ST ANTB: CPT | Performed by: PATHOLOGY

## 2018-11-06 PROCEDURE — 88312 SPECIAL STAINS GROUP 1: CPT | Performed by: PATHOLOGY

## 2018-11-06 PROCEDURE — 47000 NEEDLE BIOPSY OF LIVER PERQ: CPT | Performed by: RADIOLOGY

## 2018-11-06 PROCEDURE — 88307 TISSUE EXAM BY PATHOLOGIST: CPT | Performed by: PATHOLOGY

## 2018-11-06 PROCEDURE — 88342 IMHCHEM/IMCYTCHM 1ST ANTB: CPT

## 2018-11-06 PROCEDURE — 88333 PATH CONSLTJ SURG CYTO XM 1: CPT | Performed by: PATHOLOGY

## 2018-11-06 PROCEDURE — 88341 IMHCHEM/IMCYTCHM EA ADD ANTB: CPT

## 2018-11-06 PROCEDURE — 99152 MOD SED SAME PHYS/QHP 5/>YRS: CPT

## 2018-11-06 PROCEDURE — 76942 ECHO GUIDE FOR BIOPSY: CPT | Performed by: RADIOLOGY

## 2018-11-06 RX ORDER — FENTANYL CITRATE 50 UG/ML
INJECTION, SOLUTION INTRAMUSCULAR; INTRAVENOUS CODE/TRAUMA/SEDATION MEDICATION
Status: COMPLETED | OUTPATIENT
Start: 2018-11-06 | End: 2018-11-06

## 2018-11-06 RX ORDER — MIDAZOLAM HYDROCHLORIDE 1 MG/ML
INJECTION INTRAMUSCULAR; INTRAVENOUS CODE/TRAUMA/SEDATION MEDICATION
Status: COMPLETED | OUTPATIENT
Start: 2018-11-06 | End: 2018-11-06

## 2018-11-06 RX ORDER — ACETAMINOPHEN 325 MG/1
650 TABLET ORAL EVERY 6 HOURS PRN
Status: DISCONTINUED | OUTPATIENT
Start: 2018-11-06 | End: 2018-11-06 | Stop reason: HOSPADM

## 2018-11-06 RX ORDER — SODIUM CHLORIDE 9 MG/ML
75 INJECTION, SOLUTION INTRAVENOUS CONTINUOUS
Status: DISCONTINUED | OUTPATIENT
Start: 2018-11-06 | End: 2018-11-06 | Stop reason: HOSPADM

## 2018-11-06 RX ADMIN — FENTANYL CITRATE 25 MCG: 50 INJECTION, SOLUTION INTRAMUSCULAR; INTRAVENOUS at 10:49

## 2018-11-06 RX ADMIN — FENTANYL CITRATE 25 MCG: 50 INJECTION, SOLUTION INTRAMUSCULAR; INTRAVENOUS at 11:04

## 2018-11-06 RX ADMIN — MIDAZOLAM 0.5 MG: 1 INJECTION INTRAMUSCULAR; INTRAVENOUS at 10:49

## 2018-11-06 RX ADMIN — ACETAMINOPHEN 650 MG: 325 TABLET ORAL at 14:24

## 2018-11-06 RX ADMIN — FENTANYL CITRATE 25 MCG: 50 INJECTION, SOLUTION INTRAMUSCULAR; INTRAVENOUS at 11:39

## 2018-11-06 RX ADMIN — MIDAZOLAM 0.5 MG: 1 INJECTION INTRAMUSCULAR; INTRAVENOUS at 10:59

## 2018-11-06 RX ADMIN — MIDAZOLAM 0.5 MG: 1 INJECTION INTRAMUSCULAR; INTRAVENOUS at 11:04

## 2018-11-06 RX ADMIN — FENTANYL CITRATE 25 MCG: 50 INJECTION, SOLUTION INTRAMUSCULAR; INTRAVENOUS at 11:00

## 2018-11-06 RX ADMIN — MIDAZOLAM 0.5 MG: 1 INJECTION INTRAMUSCULAR; INTRAVENOUS at 10:53

## 2018-11-06 RX ADMIN — FENTANYL CITRATE 25 MCG: 50 INJECTION, SOLUTION INTRAMUSCULAR; INTRAVENOUS at 10:53

## 2018-11-06 RX ADMIN — SODIUM CHLORIDE 75 ML/HR: 0.9 INJECTION, SOLUTION INTRAVENOUS at 07:34

## 2018-11-06 NOTE — BRIEF OP NOTE (RAD/CATH)
IR IMAGE GUIDED BIOPSY/ASPIRATION LIVER  Procedure Note    PATIENT NAME: Herbert Mckeon  : 1950  MRN: [id number]     Pre-op Diagnosis:   1  Lesion of liver      Post-op Diagnosis:   1  Lesion of liver        Surgeon:   Haydee Eldridge MD    Estimated Blood Loss: 1 cc    Findings: Successful US guided targeted liver biopsy of segment 6 lesion using 18 gauge needle  Specimens: 4 core needle samples placed in formalin      Complications:  None    Anesthesia: Conscious sedation and Local    Haydee Eldridge MD     Date: 2018  Time: 11:46 AM

## 2018-11-06 NOTE — H&P
IR H&P    HPI:  80year old female with history of IPMN was found to have a new enhancing lesion in segment 6 liver concerning for metastasis  Portacaval and celiac LNs have been biopsied which were negative for malignancy  PMH:  IPMN  FNH  Vertigo    PSH:  Bilateral carpal tunnel release  Left knee surgery    Vitals:    11/06/18 0700   BP: 165/76   Pulse: 87   Resp: 18   Temp: 97 6 °F (36 4 °C)   SpO2: 96%     Gen: NAD  Abd: soft, NT/ND    Lab Results   Component Value Date    WBC 7 66 10/26/2018    HGB 12 5 10/26/2018    HCT 38 4 10/26/2018    MCV 89 10/26/2018     10/26/2018     Lab Results   Component Value Date    INR 0 97 10/26/2018    INR 0 88 03/18/2018    PROTIME 12 6 10/26/2018    PROTIME 12 2 03/18/2018     Lab Results   Component Value Date    K 3 7 10/26/2018     10/26/2018    CO2 28 10/26/2018    BUN 17 10/26/2018    CREATININE 0 94 10/26/2018    GLUF 111 (H) 10/26/2018    CALCIUM 9 3 10/26/2018    AST 24 10/26/2018    ALT 31 10/26/2018    ALKPHOS 122 (H) 10/26/2018    EGFR 63 10/26/2018     A:  80year old female with history of IPMN was found to have a new enhancing lesion in segment 6 liver concerning for metastasis  Portacaval and celiac LNs have been biopsied which were negative for malignancy      P:  - Targeted liver biopsy

## 2018-11-06 NOTE — DISCHARGE INSTRUCTIONS
Percutaneous Liver Biopsy   WHAT YOU NEED TO KNOW:   A PLB is a procedure to remove a sample of tissue from your liver  The sample can be sent to a lab and tested for liver disease, cancer, or infection  After the procedure you may have pain and bruising at the biopsy site  You may also have pain in your right shoulder  These symptoms should get better in 48 to 72 hours  DISCHARGE INSTRUCTIONS:     [facility] patients,  Contact Interventional Radiology at [phone] PATIENTS: Contact Interventional Radiology at [phone]   [facility] PATIENTS: Contact Interventional Radiology at [phone] if:    · Fever greater than 101 or chills  · You have severe pain in your abdomen  · Your abdomen is larger than usual and feels hard  · Your neck is more swollen and you have trouble swallowing  · You feel weak or dizzy  · Your heart is beating faster than usual    · Your pain does not get better after you take pain medicine  · Your wound is red, swollen, or draining pus  · You have nausea or are vomiting  · Your skin is itchy, swollen, or you have a rash  · You have questions or concerns about your condition or care  Medicines:   · Acetaminophen decreases pain and fever  It is available without a doctor's order  Acetaminophen can cause liver damage if not taken correctly  · Take your home medicine as directed  · Resume your normal diet  Small sips of flat soda will help with mild nausea  Self-care:   · Rest as directed  Do not play sports, exercise, or lift anything heavier than 5 pounds for up to 1 week  · Apply firm, steady pressure if bleeding occurs  A small amount of bleeding from your wound is possible  Apply pressure with a clean gauze or towel for 5 to 10 minutes  Call 911 if bleeding becomes heavy or does not stop  · Ask your healthcare provider when to take your blood thinner or antiplatelet medicine   You may need to wait 24 to 72 hours to take your medicine  This will prevent bleeding  Follow up with your healthcare provider as directed: Write down your questions so you remember to ask them during your visits

## 2018-11-08 ENCOUNTER — HOSPITAL ENCOUNTER (OUTPATIENT)
Dept: MAMMOGRAPHY | Facility: CLINIC | Age: 68
Discharge: HOME | End: 2018-11-08
Payer: COMMERCIAL

## 2018-11-08 ENCOUNTER — HOSPITAL ENCOUNTER (OUTPATIENT)
Dept: ULTRASOUND IMAGING | Facility: CLINIC | Age: 68
Discharge: HOME | End: 2018-11-08
Payer: COMMERCIAL

## 2018-11-08 DIAGNOSIS — N64.4 BREAST PAIN, LEFT: ICD-10-CM

## 2018-11-08 PROCEDURE — G0279 TOMOSYNTHESIS, MAMMO: HCPCS

## 2018-11-08 PROCEDURE — 77066 DX MAMMO INCL CAD BI: CPT

## 2018-11-10 ENCOUNTER — APPOINTMENT (EMERGENCY)
Dept: CT IMAGING | Facility: HOSPITAL | Age: 68
End: 2018-11-10
Payer: COMMERCIAL

## 2018-11-10 ENCOUNTER — HOSPITAL ENCOUNTER (EMERGENCY)
Facility: HOSPITAL | Age: 68
Discharge: HOME | End: 2018-11-10
Attending: EMERGENCY MEDICINE | Admitting: EMERGENCY MEDICINE
Payer: COMMERCIAL

## 2018-11-10 VITALS
WEIGHT: 190.92 LBS | RESPIRATION RATE: 18 BRPM | TEMPERATURE: 97.6 F | OXYGEN SATURATION: 98 % | DIASTOLIC BLOOD PRESSURE: 79 MMHG | HEART RATE: 78 BPM | SYSTOLIC BLOOD PRESSURE: 184 MMHG | BODY MASS INDEX: 34.92 KG/M2

## 2018-11-10 DIAGNOSIS — R10.9 ABDOMINAL PAIN: ICD-10-CM

## 2018-11-10 DIAGNOSIS — R59.0 ABDOMINAL LYMPHADENOPATHY: ICD-10-CM

## 2018-11-10 DIAGNOSIS — R59.0 MEDIASTINAL LYMPHADENOPATHY: Primary | ICD-10-CM

## 2018-11-10 LAB
ALBUMIN SERPL BCP-MCNC: 3.5 G/DL (ref 3.5–5)
ALP SERPL-CCNC: 113 U/L (ref 46–116)
ALT SERPL W P-5'-P-CCNC: 62 U/L (ref 12–78)
ANION GAP SERPL CALCULATED.3IONS-SCNC: 9 MMOL/L (ref 4–13)
AST SERPL W P-5'-P-CCNC: 31 U/L (ref 5–45)
ATRIAL RATE: 81 BPM
BASOPHILS # BLD AUTO: 0.09 THOUSANDS/ΜL (ref 0–0.1)
BASOPHILS NFR BLD AUTO: 1 % (ref 0–1)
BILIRUB SERPL-MCNC: 0.6 MG/DL (ref 0.2–1)
BUN SERPL-MCNC: 19 MG/DL (ref 5–25)
CALCIUM SERPL-MCNC: 9.4 MG/DL (ref 8.3–10.1)
CHLORIDE SERPL-SCNC: 103 MMOL/L (ref 100–108)
CO2 SERPL-SCNC: 29 MMOL/L (ref 21–32)
CREAT SERPL-MCNC: 0.81 MG/DL (ref 0.6–1.3)
EOSINOPHIL # BLD AUTO: 0 THOUSAND/ΜL (ref 0–0.61)
EOSINOPHIL NFR BLD AUTO: 0 % (ref 0–6)
ERYTHROCYTE [DISTWIDTH] IN BLOOD BY AUTOMATED COUNT: 13 % (ref 11.6–15.1)
GFR SERPL CREATININE-BSD FRML MDRD: 75 ML/MIN/1.73SQ M
GLUCOSE SERPL-MCNC: 100 MG/DL (ref 65–140)
HCT VFR BLD AUTO: 38.8 % (ref 34.8–46.1)
HGB BLD-MCNC: 13 G/DL (ref 11.5–15.4)
IMM GRANULOCYTES # BLD AUTO: 0.02 THOUSAND/UL (ref 0–0.2)
IMM GRANULOCYTES NFR BLD AUTO: 0 % (ref 0–2)
LYMPHOCYTES # BLD AUTO: 1.41 THOUSANDS/ΜL (ref 0.6–4.47)
LYMPHOCYTES NFR BLD AUTO: 20 % (ref 14–44)
MCH RBC QN AUTO: 29.6 PG (ref 26.8–34.3)
MCHC RBC AUTO-ENTMCNC: 33.5 G/DL (ref 31.4–37.4)
MCV RBC AUTO: 88 FL (ref 82–98)
MONOCYTES # BLD AUTO: 0.83 THOUSAND/ΜL (ref 0.17–1.22)
MONOCYTES NFR BLD AUTO: 12 % (ref 4–12)
NEUTROPHILS # BLD AUTO: 4.64 THOUSANDS/ΜL (ref 1.85–7.62)
NEUTS SEG NFR BLD AUTO: 67 % (ref 43–75)
NRBC BLD AUTO-RTO: 0 /100 WBCS
P AXIS: 54 DEGREES
PLATELET # BLD AUTO: 323 THOUSANDS/UL (ref 149–390)
PMV BLD AUTO: 9.5 FL (ref 8.9–12.7)
POTASSIUM SERPL-SCNC: 3.6 MMOL/L (ref 3.5–5.3)
PR INTERVAL: 126 MS
PROT SERPL-MCNC: 8.1 G/DL (ref 6.4–8.2)
QRS AXIS: -15 DEGREES
QRSD INTERVAL: 86 MS
QT INTERVAL: 354 MS
QTC INTERVAL: 411 MS
RBC # BLD AUTO: 4.39 MILLION/UL (ref 3.81–5.12)
SODIUM SERPL-SCNC: 141 MMOL/L (ref 136–145)
T WAVE AXIS: 32 DEGREES
TROPONIN I SERPL-MCNC: <0.02 NG/ML
VENTRICULAR RATE: 81 BPM
WBC # BLD AUTO: 6.99 THOUSAND/UL (ref 4.31–10.16)

## 2018-11-10 PROCEDURE — 93010 ELECTROCARDIOGRAM REPORT: CPT | Performed by: INTERNAL MEDICINE

## 2018-11-10 PROCEDURE — C9113 INJ PANTOPRAZOLE SODIUM, VIA: HCPCS | Performed by: EMERGENCY MEDICINE

## 2018-11-10 PROCEDURE — 74175 CTA ABDOMEN W/CONTRAST: CPT

## 2018-11-10 PROCEDURE — 93005 ELECTROCARDIOGRAM TRACING: CPT

## 2018-11-10 PROCEDURE — 36415 COLL VENOUS BLD VENIPUNCTURE: CPT | Performed by: EMERGENCY MEDICINE

## 2018-11-10 PROCEDURE — 99284 EMERGENCY DEPT VISIT MOD MDM: CPT

## 2018-11-10 PROCEDURE — 85025 COMPLETE CBC W/AUTO DIFF WBC: CPT | Performed by: EMERGENCY MEDICINE

## 2018-11-10 PROCEDURE — 96374 THER/PROPH/DIAG INJ IV PUSH: CPT

## 2018-11-10 PROCEDURE — 80053 COMPREHEN METABOLIC PANEL: CPT | Performed by: EMERGENCY MEDICINE

## 2018-11-10 PROCEDURE — 71275 CT ANGIOGRAPHY CHEST: CPT

## 2018-11-10 PROCEDURE — 96361 HYDRATE IV INFUSION ADD-ON: CPT

## 2018-11-10 PROCEDURE — 84484 ASSAY OF TROPONIN QUANT: CPT | Performed by: EMERGENCY MEDICINE

## 2018-11-10 RX ORDER — PANTOPRAZOLE SODIUM 40 MG/1
40 INJECTION, POWDER, FOR SOLUTION INTRAVENOUS ONCE
Status: COMPLETED | OUTPATIENT
Start: 2018-11-10 | End: 2018-11-10

## 2018-11-10 RX ORDER — HYDROCODONE BITARTRATE AND ACETAMINOPHEN 5; 325 MG/1; MG/1
1 TABLET ORAL ONCE
Status: COMPLETED | OUTPATIENT
Start: 2018-11-10 | End: 2018-11-10

## 2018-11-10 RX ORDER — MAGNESIUM HYDROXIDE/ALUMINUM HYDROXICE/SIMETHICONE 120; 1200; 1200 MG/30ML; MG/30ML; MG/30ML
30 SUSPENSION ORAL ONCE
Status: COMPLETED | OUTPATIENT
Start: 2018-11-10 | End: 2018-11-10

## 2018-11-10 RX ORDER — DOCUSATE SODIUM 100 MG/1
100 CAPSULE, LIQUID FILLED ORAL 2 TIMES DAILY PRN
Qty: 60 CAPSULE | Refills: 0 | Status: SHIPPED | OUTPATIENT
Start: 2018-11-10 | End: 2019-02-11 | Stop reason: ALTCHOICE

## 2018-11-10 RX ORDER — HYDROCODONE BITARTRATE AND ACETAMINOPHEN 5; 325 MG/1; MG/1
1 TABLET ORAL EVERY 6 HOURS PRN
Qty: 13 TABLET | Refills: 0 | Status: SHIPPED | OUTPATIENT
Start: 2018-11-10 | End: 2018-11-26 | Stop reason: SDUPTHER

## 2018-11-10 RX ADMIN — SODIUM CHLORIDE 1000 ML: 0.9 INJECTION, SOLUTION INTRAVENOUS at 09:10

## 2018-11-10 RX ADMIN — PANTOPRAZOLE SODIUM 40 MG: 40 INJECTION, POWDER, FOR SOLUTION INTRAVENOUS at 10:39

## 2018-11-10 RX ADMIN — ALUMINUM HYDROXIDE, MAGNESIUM HYDROXIDE, AND SIMETHICONE 30 ML: 200; 200; 20 SUSPENSION ORAL at 10:38

## 2018-11-10 RX ADMIN — HYDROCODONE BITARTRATE AND ACETAMINOPHEN 1 TABLET: 5; 325 TABLET ORAL at 11:32

## 2018-11-10 RX ADMIN — IOHEXOL 100 ML: 350 INJECTION, SOLUTION INTRAVENOUS at 09:58

## 2018-11-10 NOTE — ED NOTES
Pt  Ambulated out of dept , vss, normal gait, no acute distress       Alex Avalos, RN  11/10/18 3487

## 2018-11-10 NOTE — ED PROVIDER NOTES
History  Chief Complaint   Patient presents with    Numbness     pt reports starting a few weeks ago with numbness under her right arm, and few days ago starting with spots of numbness to left lower abdomen and left upper back, pt with hx of recent EGD and liver bx, pt tried Advil with no relief     27-year-old female presents today complaining several weeks epigastric pain radiating to her back and several spots of numbness scattered across her abdomen and back  Being worked up for abnormal lymphadenopathy in her abdomen as well as lesions on her liver  Had EGD with lymph node biopsy done 2 weeks ago, and have a liver core biopsy 4 days ago  Results on the lymph node biopsy was negative for malignancy  Liver biopsy results are still pending  History provided by:  Patient  Chest Pain   Pain location:  Substernal area and epigastric  Pain quality: aching and pressure    Pain radiates to:  Mid back  Pain radiates to the back: yes    Pain severity:  Moderate  Onset quality:  Unable to specify  Timing:  Constant  Progression:  Waxing and waning  Chronicity:  New  Context: at rest    Relieved by:  None tried  Worsened by:  Nothing tried  Ineffective treatments:  None tried  Associated symptoms: abdominal pain and fatigue    Associated symptoms: no back pain, no diaphoresis, no dizziness, no headache, no lower extremity edema, no near-syncope, no palpitations, no shortness of breath and no weakness    Risk factors: no coronary artery disease, no high cholesterol, no hypertension and no smoking        Prior to Admission Medications   Prescriptions Last Dose Informant Patient Reported? Taking?    Cholecalciferol (VITAMIN D) 2000 units CAPS  Self Yes No   Sig: Take 1 tablet by mouth daily   Multiple Vitamin (MULTIVITAMIN) tablet  Self Yes No   Sig: Take 1 tablet by mouth daily   aspirin 81 MG tablet  Self Yes No   Sig: Take 1 tablet by mouth daily   esomeprazole (NexIUM) 40 MG capsule  Self No No   Sig: TAKE ONE CAPSULE BY MOUTH EVERY DAY   meclizine (ANTIVERT) 12 5 MG tablet  Self No No   Sig: Take 1 tablet (12 5 mg total) by mouth 3 (three) times a day as needed for dizziness      Facility-Administered Medications: None       History reviewed  No pertinent past medical history  Past Surgical History:   Procedure Laterality Date    CARPAL TUNNEL RELEASE Bilateral     COLONOSCOPY      2017    IR IMAGE GUIDED BIOPSY/ASPIRATION LIVER  11/6/2018    KNEE SURGERY Left     UT EDG US EXAM SURGICAL ALTER STOM DUODENUM/JEJUNUM N/A 10/29/2018    Procedure: LINEAR ENDOSCOPIC U/S;  Surgeon: Karri Augustin MD;  Location: [facility]; Service: Gastroenterology    UPPER GASTROINTESTINAL ENDOSCOPY      WISDOM TOOTH EXTRACTION         History reviewed  No pertinent family history  I have reviewed and agree with the history as documented  Social History   Substance Use Topics    Smoking status: Never Smoker    Smokeless tobacco: Never Used    Alcohol use Yes      Comment: social        Review of Systems   Constitutional: Positive for fatigue  Negative for diaphoresis  HENT: Negative for congestion and facial swelling  Eyes: Negative for visual disturbance  Respiratory: Negative for shortness of breath  Cardiovascular: Positive for chest pain  Negative for palpitations and near-syncope  Gastrointestinal: Positive for abdominal pain  Musculoskeletal: Negative for back pain  Skin: Negative for rash and wound  Neurological: Negative for dizziness, weakness and headaches  Psychiatric/Behavioral: Negative for confusion  Physical Exam  Physical Exam   Constitutional: She is oriented to person, place, and time  She appears well-developed and well-nourished  HENT:   Head: Normocephalic and atraumatic  Mouth/Throat: Uvula is midline, oropharynx is clear and moist and mucous membranes are normal  No tonsillar exudate  Eyes: Pupils are equal, round, and reactive to light  Neck: Normal range of motion   Neck supple  Cardiovascular: Normal rate and regular rhythm  Pulmonary/Chest: Effort normal and breath sounds normal    Abdominal: Soft  Bowel sounds are normal  There is no tenderness  There is no rebound and no guarding  Musculoskeletal: Normal range of motion  Neurological: She is alert and oriented to person, place, and time  Patient moving all extremities equally, no focal neuro deficits noted  Skin: Skin is warm and dry  Psychiatric: She has a normal mood and affect  Nursing note and vitals reviewed        Vital Signs  ED Triage Vitals   Temperature Pulse Respirations Blood Pressure SpO2   11/10/18 0807 11/10/18 0804 11/10/18 0804 11/10/18 0804 11/10/18 0804   97 6 °F (36 4 °C) 91 18 (!) 210/97 97 %      Temp Source Heart Rate Source Patient Position - Orthostatic VS BP Location FiO2 (%)   11/10/18 0807 11/10/18 0804 11/10/18 0804 11/10/18 0804 --   Oral Monitor Sitting Right arm       Pain Score       11/10/18 0804       7           Vitals:    11/10/18 0804 11/10/18 1011 11/10/18 1015   BP: (!) 210/97 (!) 184/79 (!) 184/79   Pulse: 91 76 78   Patient Position - Orthostatic VS: Sitting Lying        Visual Acuity      ED Medications  Medications   sodium chloride 0 9 % bolus 1,000 mL (0 mL Intravenous Stopped 11/10/18 1010)   pantoprazole (PROTONIX) injection 40 mg (40 mg Intravenous Given 11/10/18 1039)   aluminum-magnesium hydroxide-simethicone (MYLANTA) 200-200-20 mg/5 mL oral suspension 30 mL (30 mL Oral Given 11/10/18 1038)   iohexol (OMNIPAQUE) 350 MG/ML injection (MULTI-DOSE) 100 mL (100 mL Intravenous Given 11/10/18 0958)   HYDROcodone-acetaminophen (NORCO) 5-325 mg per tablet 1 tablet (1 tablet Oral Given 11/10/18 1132)       Diagnostic Studies  Results Reviewed     Procedure Component Value Units Date/Time    Troponin I [[id number]]  (Normal) Collected:  11/10/18 0910    Lab Status:  Final result Specimen:  Blood from Arm, Right Updated:  11/10/18 0935     Troponin I <0 02 ng/mL Comprehensive metabolic panel [[id number]] Collected:  11/10/18 0910    Lab Status:  Final result Specimen:  Blood from Arm, Right Updated:  11/10/18 1947     Sodium 141 mmol/L      Potassium 3 6 mmol/L      Chloride 103 mmol/L      CO2 29 mmol/L      ANION GAP 9 mmol/L      BUN 19 mg/dL      Creatinine 0 81 mg/dL      Glucose 100 mg/dL      Calcium 9 4 mg/dL      AST 31 U/L      ALT 62 U/L      Alkaline Phosphatase 113 U/L      Total Protein 8 1 g/dL      Albumin 3 5 g/dL      Total Bilirubin 0 60 mg/dL      eGFR 75 ml/min/1 73sq m     Narrative:         National Kidney Disease Education Program recommendations are as follows:  GFR calculation is accurate only with a steady state creatinine  Chronic Kidney disease less than 60 ml/min/1 73 sq  meters  Kidney failure less than 15 ml/min/1 73 sq  meters  CBC and differential [[id number]] Collected:  11/10/18 0910    Lab Status:  Final result Specimen:  Blood from Arm, Right Updated:  11/10/18 0917     WBC 6 99 Thousand/uL      RBC 4 39 Million/uL      Hemoglobin 13 0 g/dL      Hematocrit 38 8 %      MCV 88 fL      MCH 29 6 pg      MCHC 33 5 g/dL      RDW 13 0 %      MPV 9 5 fL      Platelets 040 Thousands/uL      nRBC 0 /100 WBCs      Neutrophils Relative 67 %      Immat GRANS % 0 %      Lymphocytes Relative 20 %      Monocytes Relative 12 %      Eosinophils Relative 0 %      Basophils Relative 1 %      Neutrophils Absolute 4 64 Thousands/µL      Immature Grans Absolute 0 02 Thousand/uL      Lymphocytes Absolute 1 41 Thousands/µL      Monocytes Absolute 0 83 Thousand/µL      Eosinophils Absolute 0 00 Thousand/µL      Basophils Absolute 0 09 Thousands/µL                  CTA dissection protocol chest and abdomen   Final Result by Jewell Jain MD (11/10 1048)   1  Negative for acute aortic pathology  2   Marked mediastinal bilateral hilar adenopathy  The upper mediastinal adenopathy is new since 3/18/2018     In addition, there are innumerable randomly distributed pulmonary micronodules throughout both lungs  These findings may related to the abdominal process for which the patient is currently being worked up  The notable differential for the chest findings as sarcoidosis  Recent liver biopsy results are not available at the current time  3   Upper abdominal adenopathy as above is similar to MR 10/16/2018  4   There are 2 irregular mesenteric masses with questionable spiculation which may represent adenopathy though differential considerations would include carcinoid and desmoid  5   Liver lesions were better evaluated on recent abdominal MR  I personally discussed this study with Magaly Allison on 11/10/2018 at 10:37 AM                Workstation performed: [id number]                    Procedures  ECG 12 Lead Documentation  Date/Time: 11/10/2018 10:39 AM  Performed by: Jitendra Barry  Authorized by: Jitendra Barry     Indications / Diagnosis:  Epigastric abdominal pain  ECG reviewed by me, the ED Provider: yes    Patient location:  ED  Previous ECG:     Previous ECG:  Compared to current  Comments:      Normal sinus rhythm 81 beats per minute  Normal axis, slow R progression, no ST T wave abnormalities suggestive of ischemia  No significant change compared to prior from 03/18/2018  Phone Contacts  ED Phone Contact    ED Course                               MDM  Number of Diagnoses or Management Options  Abdominal lymphadenopathy: new and requires workup  Abdominal pain: new and requires workup  Mediastinal lymphadenopathy: new and requires workup  Diagnosis management comments: 11:18 AM  Spoke with radiology regarding CT C/A/P results: 1  Negative for acute aortic pathology  2   Marked mediastinal bilateral hilar adenopathy  The upper mediastinal adenopathy is new since 3/18/2018  In addition, there are innumerable randomly distributed pulmonary micronodules throughout both lungs    These findings may related to the abdominal process for which the patient is currently being worked up  The notable differential for the chest findings as sarcoidosis  Recent liver biopsy results are not available at the current time  3   Upper abdominal adenopathy as above is similar to MR 10/16/2018  4   There are 2 irregular mesenteric masses with questionable spiculation which may represent adenopathy though differential considerations would include carcinoid and desmoid  5   Liver lesions were better evaluated on recent abdominal MR  Will treat with antiinflammatories and pain meds  Recommend f/u with heme/onc as an outpatient  RTED instructions reviewed  Amount and/or Complexity of Data Reviewed  Clinical lab tests: ordered and reviewed  Tests in the radiology section of CPT®: ordered and reviewed  Tests in the medicine section of CPT®: reviewed and ordered  Review and summarize past medical records: yes  Independent visualization of images, tracings, or specimens: yes    Risk of Complications, Morbidity, and/or Mortality  Presenting problems: high  Diagnostic procedures: high  Management options: moderate    Patient Progress  Patient progress: stable    CritCare Time    Disposition  Final diagnoses:   Mediastinal lymphadenopathy   Abdominal lymphadenopathy   Abdominal pain     Time reflects when diagnosis was documented in both MDM as applicable and the Disposition within this note     Time User Action Codes Description Comment    11/10/2018 11:17 AM Gerry Mendoza Add [R59 0] Mediastinal lymphadenopathy     11/10/2018 11:17 AM Renetta Thurman Add [R59 0] Mesenteric lymphadenopathy     11/10/2018 11:19 AM Gerrytejas Mendoza Remove [R59 0] Mesenteric lymphadenopathy     11/10/2018 11:20 AM [email or web address] [email or web address] Add [R59 0] Abdominal lymphadenopathy     11/10/2018 11:25 AM Renetta Thurman Add [R10 9] Abdominal pain       ED Disposition     ED Disposition Condition Comment    Discharge  [address] discharge to home/self care      Condition at discharge: Stable Follow-up Information     Follow up With Specialties Details Why Contact Info Additional Information    Diaz Yuan MD Family Medicine Schedule an appointment as soon as possible for a visit  [address]       [facility] Emergency Department Emergency Medicine  If symptoms worsen [address]  [phone] [facility], [address], [place], [place], [address]    Yoni Black MD Hematology, Hematology and Oncology, Oncology Schedule an appointment as soon as possible for a visit  [address] [facility]  [address] [facility] [address]  [phone]             Discharge Medication List as of 11/10/2018 11:27 AM      START taking these medications    Details   docusate sodium (COLACE) 100 mg capsule Take 1 capsule (100 mg total) by mouth 2 (two) times a day as needed for constipation, Starting Sat 11/10/2018, Print      HYDROcodone-acetaminophen (NORCO) 5-325 mg per tablet Take 1 tablet by mouth every 6 (six) hours as needed for pain for up to 13 doses Max Daily Amount: 4 tablets, Starting Sat 11/10/2018, Print         CONTINUE these medications which have NOT CHANGED    Details   aspirin 81 MG tablet Take 1 tablet by mouth daily, Historical Med      Cholecalciferol (VITAMIN D) 2000 units CAPS Take 1 tablet by mouth daily, Historical Med      esomeprazole (NexIUM) 40 MG capsule TAKE ONE CAPSULE BY MOUTH EVERY DAY, Normal      meclizine (ANTIVERT) 12 5 MG tablet Take 1 tablet (12 5 mg total) by mouth 3 (three) times a day as needed for dizziness, Starting Mon 3/26/2018, Print      Multiple Vitamin (MULTIVITAMIN) tablet Take 1 tablet by mouth daily, Historical Med           No discharge procedures on file      ED Provider  Electronically Signed by           Rosalba Mccann DO  11/10/18 6890

## 2018-11-10 NOTE — DISCHARGE INSTRUCTIONS
Lymphadenopathy   WHAT YOU NEED TO KNOW:   Lymphadenopathy is swelling of your lymph nodes  Lymph nodes are small organs that are part of your immune system  The lymph nodes are found throughout your body  They are most easily felt in your neck, under your arms, and near your groin  Lymphadenopathy can occur in one or more areas of your body  It is usually caused by an infection  DISCHARGE INSTRUCTIONS:   Return to the emergency department if:   · The swollen lymph nodes bleed  · You have swollen lymph nodes in your neck that affect your breathing or swallowing  Contact your healthcare provider if:   · You have a fever  · You have a new swollen and painful lymph node  · You have a skin rash  · Your lymph node remains swollen or painful, or it gets bigger  · Your lymph node has red streaks around it, or the skin around the lymph node is red  · You have questions or concerns about your condition or care  Follow up with your healthcare provider as directed:  Write down your questions so you remember to ask them during your visits  Self-care:   · Do not poke or squeeze  the swollen lymph nodes  · Apply heat to the swollen glands  You may use warm compresses, or an electric heating pad set on low  · Rest as needed  If you have a fever, rest until your temperature returns to normal  Return to your normal daily activities slowly after your fever is gone  © 2017 [address] Information is for End User's use only and may not be sold, redistributed or otherwise used for commercial purposes  All illustrations and images included in CareNotes® are the copyrighted property of A D A M , [employer]  or [employer]  The above information is an  only  It is not intended as medical advice for individual conditions or treatments  Talk to your doctor, nurse or pharmacist before following any medical regimen to see if it is safe and effective for you

## 2018-11-13 PROBLEM — R59.1 LYMPHADENOPATHY: Status: ACTIVE | Noted: 2018-11-13

## 2018-11-13 PROBLEM — R16.0 LIVER MASS: Status: ACTIVE | Noted: 2018-11-13

## 2018-11-14 ENCOUNTER — OFFICE VISIT (OUTPATIENT)
Dept: SURGICAL ONCOLOGY | Facility: CLINIC | Age: 68
End: 2018-11-14
Payer: COMMERCIAL

## 2018-11-14 VITALS
SYSTOLIC BLOOD PRESSURE: 144 MMHG | WEIGHT: 191 LBS | DIASTOLIC BLOOD PRESSURE: 92 MMHG | HEART RATE: 83 BPM | HEIGHT: 62 IN | BODY MASS INDEX: 35.15 KG/M2 | TEMPERATURE: 97 F | RESPIRATION RATE: 16 BRPM

## 2018-11-14 DIAGNOSIS — R16.0 LIVER MASS: ICD-10-CM

## 2018-11-14 DIAGNOSIS — R59.1 LYMPHADENOPATHY: Primary | ICD-10-CM

## 2018-11-14 PROCEDURE — 99215 OFFICE O/P EST HI 40 MIN: CPT | Performed by: SURGERY

## 2018-11-14 RX ORDER — HYDROCODONE BITARTRATE AND ACETAMINOPHEN 5; 325 MG/1; MG/1
1 TABLET ORAL EVERY 6 HOURS PRN
Qty: 30 TABLET | Refills: 0 | Status: SHIPPED | OUTPATIENT
Start: 2018-11-14 | End: 2018-11-21 | Stop reason: ALTCHOICE

## 2018-11-14 NOTE — LETTER
November 14, 8147     Aidee Cardoso [place] [address]    Patient: Medina Nicole   YOB: 1950   Date of Visit: 11/14/2018       Dear Dr Zechariah Hsu: Thank you for referring Flor Calderon to me for evaluation  Below are my notes for this consultation  If you have questions, please do not hesitate to call me  I look forward to following your patient along with you  Sincerely,        John Staley MD        CC: MD Darline Rachel MD Dasie Novak, MD  11/14/2018  3:19 PM  Sign at close encounter               Surgical Oncology Follow Up       [address] [place] [address] Cable  1950  [id number]  [address]  [facility] [place] [id number]    Diagnoses and all orders for this visit:    Lymphadenopathy  -     HYDROcodone-acetaminophen ([address]) 5-325 mg per tablet; Take 1 tablet by mouth every 6 (six) hours as needed for pain Max Daily Amount: 4 tablets  -     Ambulatory referral to Thoracic Surgery; Future    Liver mass        Chief Complaint   Patient presents with    Follow-up     Pt is here for f/u after recent imaging and bx  Return in about 3 weeks (around 12/5/2018)  No history exists  Diagnosis and Staging: Side branch IPMN 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 2013, 1 1cm   Current Therapy: Observation   Disease Status: Stable     History of Present Illness:  Patient returns in follow-up  She had upper endoscopy that did not notice any obvious mass lesions  There was significant celiac and portal lymphadenopathy  The pancreas appeared normal   Biopsy of the portal lymph node was benign  She then underwent liver biopsy  This result is still pending  On my discussion with pathology this is likely going to be benign  She went to the ED on November 10th with chest and back pain  CTA revealed innumerable 1-2 mm pulmonary nodules  There was also significant mediastinal and hilar lymphadenopathy  Her mammogram was negative as well  I personally reviewed the films  She comes in now to discuss further therapy  She is still feeling somewhat poorly  She still has back and abdominal pain and complains of a numbness on her back in abdomen  She does have early satiety and fatigue  No fevers, chills, or night sweats  Review of Systems  Complete ROS Surg Onc:   Complete ROS Surg Onc:   Constitutional: The patient denies new or recent history of general fatigue, no recent weight loss, no change in appetite  Eyes: No complaints of visual problems, no scleral icterus  ENT: no complaints of ear pain, no hoarseness, no difficulty swallowing,  no tinnitus and no new masses in head, oral cavity, or neck  Cardiovascular: No complaints of chest pain, no palpitations, no ankle edema  Respiratory: No complaints of shortness of breath, no cough  Gastrointestinal: No complaints of jaundice, no bloody stools, no pale stools  Genitourinary: No complaints of dysuria, no hematuria, no nocturia, no frequent urination, no urethral discharge  Musculoskeletal: No complaints of weakness, paralysis, joint stiffness or arthralgias  Integumentary: No complaints of rash, no new lesions  Neurological: No complaints of convulsions, no seizures, no dizziness  Hematologic/Lymphatic: No complaints of easy bruising  Endocrine:  No hot or cold intolerance  No polydipsia, polyphagia, or polyuria  Allergy/immunology:  No environmental allergies  No food allergies  Not immunocompromised  Skin:  No pallor or rash  No wound  Patient Active Problem List   Diagnosis    Positional vertigo    Corn of toe    Focal nodular hyperplasia of liver    IPMN (intraductal papillary mucinous neoplasm)    Abnormal MRI of abdomen    Lymphadenopathy    Liver mass     No past medical history on file    Past Surgical History:   Procedure Laterality Date    CARPAL TUNNEL RELEASE Bilateral     COLONOSCOPY      2017    IR IMAGE GUIDED BIOPSY/ASPIRATION LIVER  11/6/2018    KNEE SURGERY Left     [facility] EDG US EXAM SURGICAL ALTER STOM DUODENUM/JEJUNUM N/A 10/29/2018    Procedure: LINEAR ENDOSCOPIC U/S;  Surgeon: Dash Dejesus MD;  Location: [facility]; Service: Gastroenterology    UPPER GASTROINTESTINAL ENDOSCOPY      WISDOM TOOTH EXTRACTION       No family history on file  Social History     Social History    Marital status: /Civil Union     Spouse name: N/A    Number of children: N/A    Years of education: N/A     Occupational History    Not on file       Social History Main Topics    Smoking status: Never Smoker    Smokeless tobacco: Never Used    Alcohol use Yes      Comment: social    Drug use: No    Sexual activity: Not on file     Other Topics Concern    Not on file     Social History Narrative    No narrative on file       Current Outpatient Prescriptions:     aspirin 81 MG tablet, Take 1 tablet by mouth daily, Disp: , Rfl:     Cholecalciferol (VITAMIN D) 2000 units CAPS, Take 1 tablet by mouth daily, Disp: , Rfl:     docusate sodium (COLACE) 100 mg capsule, Take 1 capsule (100 mg total) by mouth 2 (two) times a day as needed for constipation, Disp: 60 capsule, Rfl: 0    esomeprazole (NexIUM) 40 MG capsule, TAKE ONE CAPSULE BY MOUTH EVERY DAY, Disp: 90 capsule, Rfl: 3    HYDROcodone-acetaminophen (NORCO) 5-325 mg per tablet, Take 1 tablet by mouth every 6 (six) hours as needed for pain for up to 13 doses Max Daily Amount: 4 tablets, Disp: 13 tablet, Rfl: 0    HYDROcodone-acetaminophen (NORCO) 5-325 mg per tablet, Take 1 tablet by mouth every 6 (six) hours as needed for pain Max Daily Amount: 4 tablets, Disp: 30 tablet, Rfl: 0    meclizine (ANTIVERT) 12 5 MG tablet, Take 1 tablet (12 5 mg total) by mouth 3 (three) times a day as needed for dizziness, Disp: 30 tablet, Rfl: 0    Multiple Vitamin (MULTIVITAMIN) tablet, Take 1 tablet by mouth daily, Disp: , Rfl:   Allergies   Allergen Reactions    Clam Shell Vomiting    Bactrim [Sulfamethoxazole-Trimethoprim]      Vitals:    11/14/18 1453   BP: 144/92   Pulse: 83   Resp: 16   Temp: (!) 97 °F (36 1 °C)       Physical Exam  Constitutional: General appearance: The Patient is well-developed and well-nourished who appears the stated age in no acute distress  Patient is pleasant and talkative  HEENT:  Normocephalic  Sclerae are anicteric  Mucous membranes are moist  Neck is supple without adenopathy  No JVD  Chest: The lungs are clear to auscultation  Cardiac: Heart is regular rate  Abdomen: Abdomen is soft, non-tender, non-distended and without masses  Extremities: There is no clubbing or cyanosis  There is no edema  Symmetric  Neuro: Grossly nonfocal  Gait is normal      Lymphatic: No evidence of cervical adenopathy bilaterally  No evidence of axillary adenopathy bilaterally  No evidence of inguinal adenopathy bilaterally  Skin: Warm, anicteric  Psych:  Patient is pleasant and talkative  Breasts:        Pathology:  [unfilled]    Labs:      Imaging  Mri Abdomen W Wo Contrast And Mrcp    Result Date: 10/17/2018  Narrative: MRI OF THE ABDOMEN (PANCREAS) WITH AND WITHOUT CONTRAST WITH MRCP INDICATION:  64-year-old female with pancreatic intraductal papillary mucinous neoplasms  COMPARISON: MRI abdomen 10/8/2016; abdominal ultrasound 3/9/2017  TECHNIQUE:  The following pulse sequences were obtained on a 1 5 T scanner:  Coronal and axial T2 with TE of 90 and 180 respectively, axial T2 with fat saturation, axial FIESTA fat-sat, axial  T1-weighted in-and-out-of phase, axial DWI/ADC, pre-contrast axial T1 with fat saturation, post-contrast dynamic axial T1 with fat saturation at 20, 70, and 180 seconds, followed by coronal T1 with fat saturation and 7-minute delayed axial T1 with fat saturation    3D MRCP images were obtained with radial thick slabs and projections  3D rendering was performed from the acquisition scanner  IV Contrast:  8 mL of gadobutrol injection (MULTI-DOSE) FINDINGS: PANCREAS:  General: Normal in morphology and signal intensity  Lesions: The majority of the previously described nonenhancing cysts scattered throughout the pancreatic tissue, suspected side branch intraductal papillary mucinous neoplasms, are stable in size and morphology including the largest measuring 11 mm in the body  There is however an enlarging cystic lesion at the junction of the pancreatic head and neck best seen on series 8, image 75 as well as series 12, image 61, measuring 12 x 10 mm  Consideration should be given to endoscopic ultrasound for potential biopsy  Duct: Main pancreatic duct and side-branches are normal in appearance  BILARY DUCTS:  No intra-hepatic biliary ductal dilatation  Common bile duct is normal in caliber  No evidence of bile duct stricture or choledocholithiasis  No mass identified  LIVER:  Scattered hepatic cysts throughout both lobes are stable as is suspected subcapsular FNH in the lateral segment left lobe measuring 1 4 x 1 4 cm  However, within segment 6 of the right hepatic lobe (12/48), a new peripherally enhancing lesion is  now identified measuring 1 4 x 1 1 cm which demonstrates central washout on delayed images and restricts water diffusion  The finding is concerning for a solitary metastasis  Biopsy is recommended  No loss of signal on out of phase images to suggest hepatic steatosis  Portal and hepatic veins are patent without evidence of thrombosis  GALLBLADDER:  Normal  ADRENAL GLANDS:  Normal  SPLEEN: Normal  KIDNEYS:  Normal  ABDOMINAL CAVITY: Mesenteric lymphadenopathy is now identified including a peripancreatic lymph node measuring 2 6 x 1 6 cm, a justina hepatis node measuring 1 9 x 1 7 cm, and a celiac axis node to the left of midline measuring 1 7 x 1 1 cm   BOWEL:  Scattered sigmoid diverticulosis  No bowel obstruction  OSSEOUS STRUCTURES:  No osseous destruction  VASCULAR STRUCTURES:  Visualized vasculature is normal  ABDOMINAL WALL:  Normal  LOWER CHEST:  Unremarkable MRI appearance  Impression: 1  Enlarging cystic lesion at the junction of the pancreatic head and neck, likely a side branch intraductal papillary mucinous neoplasm  Consideration should be given to endoscopic ultrasound for potential biopsy  Remaining pancreatic cystic lesion stable  2   New 1 4 x 1 1 cm suspected metastatic lesion in the right hepatic lobe for which biopsy is recommended  3   Mesenteric lymphadenopathy  I personally discussed this study with Alen Wilson on 10/17/2018 at 11:58 AM  Workstation performed: [id number]     Ir Image Guided Biopsy/aspiration Liver    Result Date: 11/6/2018  Narrative: EXAMINATION: Ultrasound-guided liver biopsy INDICATION: 81-year-old female with history of IDDM and was found to have a new enhancing lesion in segment 6 liver concerning for metastasis  Portacaval and celiac lymph node FNA have been negative  IMAGES:  3 ANESTHESIA: Moderate sedation and local lidocaine PROCEDURE:  The patient was identified verbally and by wristband  Timeout was performed  Informed consent was obtained  Following obtaining informed consent, the patient was prepped and draped in the usual sterile fashion  All elements of maximal sterile barrier technique, cap and mask and sterile gown and sterile gloves and sterile drape and hand hygiene and 2% chlorhexidine for cutaneous antisepsis  Sterile ultrasound technique with sterile gel and sterile probe covers was also utilized  1% local lidocaine was infiltrated skin and subcutaneous tissues in the right upper quadrant  Under ultrasound guidance, a 17-gauge coaxial introducer needle was advanced into the hyperechoic lesion in segment 6 of liver  Images were saved    18-gauge BioPince needle was advanced through the introducer needle and 4 core needle biopsy samples were obtained and placed into formalin  D-Stat hemostatic agent was injected through the introducer needle during removal of the needle  Postprocedure ultrasound was performed  Bandage was applied  The patient tolerated the procedure well without complication  The patient left the IR department in stable condition  Findings: 1  Preprocedure ultrasound shows a hyperechoic lesion measuring 1 x 1 cm segment 6 of liver  2   Successful ultrasound-guided core needle biopsy of segment 6 lesion with 4 core needle samples obtained  3   Postprocedure ultrasound shows no hematoma  Impression: Impression: Successful ultrasound-guided core needle biopsy of segment 6 hyperechoic lesion  Workstation performed: [id number]     Us Abdomen Limited    Result Date: 10/23/2018  Narrative: RIGHT UPPER QUADRANT ULTRASOUND INDICATION:    Liver lesion  COMPARISON:  10/16/2018 TECHNIQUE:   Real-time ultrasound of the right upper quadrant was performed with a curvilinear transducer with both volumetric sweeps and still imaging techniques  FINDINGS: PANCREAS:  Multiple pancreatic cysts are seen, the largest measuring 1 6 cm  These were better evaluated on MRI  AORTA AND IVC:  Visualized portions are normal for patient age  LIVER: Size:  Mildly enlarged  The liver measures 17 1 cm in the midclavicular line  Contour:  Surface contour is smooth  Parenchyma:  Echogenicity and echotexture are within normal limits  The suspicious lesion seen on MRI may correspond to a 1 0 x 0 9 x 1 4 cm echogenic lesion in the right lobe  Multiple cysts are again seen  There is a subtle hypoechogenic focus in the subcapsular left lobe anteriorly measuring 1 2 x 1 3 x 1 6 cm stable multiple prior MRIs and previously characterized as FNH  Multiple hepatic cysts are also seen  Limited imaging of the main portal vein shows it to be patent and hepatopetal   BILIARY: The gallbladder is normal in caliber   No wall thickening or pericholecystic fluid  No stones or sludge identified  No sonographic Weber's sign  No intrahepatic biliary dilatation  CBD measures 6 mm  No choledocholithiasis  KIDNEY: Right kidney measures 11 5 x 3 8 cm  Unremarkable ASCITES:   None  Impression: 1   Echogenic lesion in the posterior segment of the right hepatic lobe which appears to correspond to the suspicious lesion on MRI  2   Additional hepatic cysts and stable FNH  3   Multiple pancreatic cysts  The study was marked in EPIC for significant notification  Workstation performed: [id number]     Cta Dissection Protocol Chest And Abdomen    Result Date: 11/10/2018  Narrative: CTA - CHEST AND ABDOMEN  - WITHOUT AND WITH IV CONTRAST INDICATION:   chest pain  COMPARISON:  Abdominal MR 10/16/2018  Abdominal CT 8/13/2013  No prior chest CTs available  Head and neck CT 3/18/2018  TECHNIQUE: CT examination of the chest and abdomen was performed both prior to and after the administration of intravenous contrast   Thin section angiographic arterial phase post contrast technique was used in order to evaluate for aortic dissection  3D reformatted images and volume rendering were performed on an independent workstation  Additionally, axial, sagittal, and coronal 2D reformatted images were created from the source data and submitted for interpretation  Radiation dose length product (DLP) for this visit:  1295 mGy-cm   This examination, like all CT scans performed in the [facility], was performed utilizing techniques to minimize radiation dose exposure, including the use of iterative reconstruction and automated exposure control  IV Contrast:  100 mL of iohexol (OMNIPAQUE) Enteric Contrast: Enteric contrast was not administered  FINDINGS: AORTA: There is no aortic dissection or intramural hematoma  There is no aortic aneurysm   CHEST LUNGS:  There are innumerable 1 to 2 mm administered due to pulmonary nodules throughout all lobes of both lungs (best seen on coronal MIPS)  0 5 cm left lower lobe pulmonary nodule (series 3 image 62)  No convincing perilymphatic nodules along the bronchovascular bundles or the fissures  There is no tracheal or endobronchial lesion  PLEURA:  Unremarkable  HEART/GREAT VESSELS:  Mild cardiomegaly with enlargement of the right atrium  No pericardial effusion  Main pulmonary size is normal   Contrast timing does not allow for evaluation of pulmonary embolism  MEDIASTINUM AND BRIGIDA:  There is marked mediastinal and hilar adenopathy  2 1 cm left paratracheal lymph node is new since 3/18/2018 (series 3 image 37)  Subcarinal lymph node measuring 2 0 cm (series 3 image 53)  2 5 cm right hilar lymph node and 2 7 cm left hilar lymph node (series 3 image 48 and 56, respectively)  CHEST WALL AND LOWER NECK:   Unremarkable  ABDOMEN LIVER/BILIARY TREE:  Liver lesions were better evaluated on prior liver MR  GALLBLADDER:  No calcified gallstones  No pericholecystic inflammatory change  SPLEEN:  Unremarkable  PANCREAS:  Unremarkable  ADRENAL GLANDS:  Unremarkable  KIDNEYS/URETERS:  Unremarkable  No hydronephrosis  VISUALIZED STOMACH, BOWEL AND APPENDIX:  Unremarkable  VISUALIZED ABDOMINOPELVIC CAVITY:  No ascites or free intraperitoneal air  2 5 x 1 5 cm justina hepatis lymph node is unchanged since 10/16/2018 (series 3 image 110)  2 3 x 1 2 cm celiac Axis lymph node is unchanged (series 3 image 107)  1 2 x 1 2 cm indistinct mesenteric mass (series 3 image 166) was not well seen on prior studies  ABDOMINAL WALL:  Unremarkable  OSSEOUS STRUCTURES:  No acute fracture or destructive osseous lesion  Impression: 1  Negative for acute aortic pathology  2   Marked mediastinal bilateral hilar adenopathy  The upper mediastinal adenopathy is new since 3/18/2018  In addition, there are innumerable randomly distributed pulmonary micronodules throughout both lungs   These findings may related to the abdominal process for which the patient is currently being worked up  The notable differential for the chest findings as sarcoidosis  Recent liver biopsy results are not available at the current time  3   Upper abdominal adenopathy as above is similar to MR 10/16/2018  4   There are 2 irregular mesenteric masses with questionable spiculation which may represent adenopathy though differential considerations would include carcinoid and desmoid  5   Liver lesions were better evaluated on recent abdominal MR  I personally discussed this study with Jenna Foley on 11/10/2018 at 10:37 AM  Workstation performed: [id number]     Mammo Diagnostic Bilateral W 3d & Cad    Result Date: 11/8/2018  Narrative: Patient History: Patient is postmenopausal  Family history of colorectal cancer at age 48 or over in maternal grandfather, colorectal cancer at age 48 or over in paternal grandmother  Benign excisional biopsy of the left breast, 1996  Took hormonal contraceptives for 8 years  Took estrogen for 1 year  Patient has never smoked  Patient's BMI is 37 2  Reason for exam: clinical finding  Mammo Diagnostic Bilateral W DBT and CAD: November 8, 2018 - Check In #: [de-identified] 2D/3D Procedure 3D views: Bilateral MLO view(s) were taken  2D views: Bilateral CC view(s) were taken  Technologist: SINDHU Shaffer Prior study comparison: June 11, 2015, digital bilateral screening mammogram, performed at [address]  June 5, 2014, digital bilateral screening mammogram, performed at [address]  June 1, 2013, digital bilateral screening mammogram, performed at [address]  May 29, 2012, digital bilateral screening mammogram, performed at [address]  May 21, 2011, [facility]  The breast tissue is heterogeneously dense, potentially limiting the sensitivity of mammography   Patient risk, included in this report, assists in determining the appropriate screening regimen (such as 3-D mammography or the inclusion of automated breast ultrasound or MRI)  3-D mammography may also remain indicated as screening  The parenchymal pattern is stable  No dominant masses or suspicious microcalcifications are seen  Benign-appearing calcifications are identified  Stable upper posterior left breast nodule is identified, probable intramammary lymph node  There is no architectural distortion or skin thickening  The axillary regions are benign  As the patient was unable to localize an area of pain, ultrasound was not performed  IMPRESSION 1  Stable bilateral mammogram  2   Management of any clinical symptoms or findings must be based on clinical grounds  3   Bilateral screening mammography in one year recommended  ACR BI-RADS® Assessments: BiRad:2 - Benign Recommendation: Clinical management  Routine screening mammogram of both breasts in 1 year  The patient is scheduled in a reminder system for screening mammography  8-10% of cancers will be missed on mammography  Management of a palpable abnormality must be based on clinical grounds  Patients will be notified of their results via letter from our facility  Accredited by [employer] of Radiology and FDA  Transcription Location: [facility]: [id number] Risk Value(s): Tyrer-Cuzick 10 Year: 2 800%, Tyrer-Cuzick Lifetime: 5 400%, Myriad Table: 1 5%, MERCEDES 5 Year: 2 4%, NCI Lifetime: 8 1%    I reviewed the above laboratory and imaging data  Discussion/Summary: 50-year-old female with side branch IPMN  This is essentially stable  The 1 lesion has slightly increased in size, but has no worrisome or suspicious features  This is also less than 2 cm  It would be extremely unlikely that this would be malignant  There is also peripancreatic adenopathy, celiac and portal lymphadenopathy  the lymph nodes are negative  The liver biopsy is probably going to be benign as well based on my discussion with pathology    Her mediastinal and celiac adenopathy is worrisome  It appears that the majority of her abnormality, at least on imaging is in her chest   I doubt the scarring in her mesentery is related to desmoid her carcinoid  I recommended that she see thoracic surgery for either lymph node or lung biopsy to rule out lymphoma versus sarcoid  I have refilled her Vicodin  I will see her back once we have the results of the pathology  She is agreeable to this plan  All of her questions were answered

## 2018-11-14 NOTE — PROGRESS NOTES
Surgical Oncology Follow Up       [address] [place] [address]  CANCER CARE [facility] SURGICAL ONCOLOGY [address]   1950  [id number]  [address] [place] [address]  CANCER [facility] SURGICAL ONCOLOGY [place] [id number]    Diagnoses and all orders for this visit:    Lymphadenopathy  -     HYDROcodone-acetaminophen (NORCO) 5-325 mg per tablet; Take 1 tablet by mouth every 6 (six) hours as needed for pain Max Daily Amount: 4 tablets  -     Ambulatory referral to Thoracic Surgery; Future    Liver mass        Chief Complaint   Patient presents with    Follow-up     Pt is here for f/u after recent imaging and bx  Return in about 3 weeks (around 12/5/2018)  No history exists  Diagnosis and Staging: Side branch IPMN 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 2013, 1 1cm   Current Therapy: Observation   Disease Status: Stable     History of Present Illness:  Patient returns in follow-up  She had upper endoscopy that did not notice any obvious mass lesions  There was significant celiac and portal lymphadenopathy  The pancreas appeared normal   Biopsy of the portal lymph node was benign  She then underwent liver biopsy  This result is still pending  On my discussion with pathology this is likely going to be benign  She went to the ED on November 10th with chest and back pain  CTA revealed innumerable 1-2 mm pulmonary nodules  There was also significant mediastinal and hilar lymphadenopathy  Her mammogram was negative as well  I personally reviewed the films  She comes in now to discuss further therapy  She is still feeling somewhat poorly  She still has back and abdominal pain and complains of a numbness on her back in abdomen  She does have early satiety and fatigue  No fevers, chills, or night sweats      Review of Systems  Complete ROS Surg Onc:   Complete ROS Surg Onc:   Constitutional: The patient denies new or recent history of general fatigue, no recent weight loss, no change in appetite  Eyes: No complaints of visual problems, no scleral icterus  ENT: no complaints of ear pain, no hoarseness, no difficulty swallowing,  no tinnitus and no new masses in head, oral cavity, or neck  Cardiovascular: No complaints of chest pain, no palpitations, no ankle edema  Respiratory: No complaints of shortness of breath, no cough  Gastrointestinal: No complaints of jaundice, no bloody stools, no pale stools  Genitourinary: No complaints of dysuria, no hematuria, no nocturia, no frequent urination, no urethral discharge  Musculoskeletal: No complaints of weakness, paralysis, joint stiffness or arthralgias  Integumentary: No complaints of rash, no new lesions  Neurological: No complaints of convulsions, no seizures, no dizziness  Hematologic/Lymphatic: No complaints of easy bruising  Endocrine:  No hot or cold intolerance  No polydipsia, polyphagia, or polyuria  Allergy/immunology:  No environmental allergies  No food allergies  Not immunocompromised  Skin:  No pallor or rash  No wound  Patient Active Problem List   Diagnosis    Positional vertigo    Corn of toe    Focal nodular hyperplasia of liver    IPMN (intraductal papillary mucinous neoplasm)    Abnormal MRI of abdomen    Lymphadenopathy    Liver mass     No past medical history on file  Past Surgical History:   Procedure Laterality Date    CARPAL TUNNEL RELEASE Bilateral     COLONOSCOPY      2017    IR IMAGE GUIDED BIOPSY/ASPIRATION LIVER  11/6/2018    KNEE SURGERY Left     MA EDG US EXAM SURGICAL ALTER STOM DUODENUM/JEJUNUM N/A 10/29/2018    Procedure: LINEAR ENDOSCOPIC U/S;  Surgeon: Delvin Long MD;  Location: [facility]; Service: Gastroenterology    UPPER GASTROINTESTINAL ENDOSCOPY      WISDOM TOOTH EXTRACTION       No family history on file    Social History     Social History    Marital status: /Civil Union     Spouse name: N/A    Number of children: N/A    Years of education: N/A     Occupational History    Not on file  Social History Main Topics    Smoking status: Never Smoker    Smokeless tobacco: Never Used    Alcohol use Yes      Comment: social    Drug use: No    Sexual activity: Not on file     Other Topics Concern    Not on file     Social History Narrative    No narrative on file       Current Outpatient Prescriptions:     aspirin 81 MG tablet, Take 1 tablet by mouth daily, Disp: , Rfl:     Cholecalciferol (VITAMIN D) 2000 units CAPS, Take 1 tablet by mouth daily, Disp: , Rfl:     docusate sodium (COLACE) 100 mg capsule, Take 1 capsule (100 mg total) by mouth 2 (two) times a day as needed for constipation, Disp: 60 capsule, Rfl: 0    esomeprazole (NexIUM) 40 MG capsule, TAKE ONE CAPSULE BY MOUTH EVERY DAY, Disp: 90 capsule, Rfl: 3    HYDROcodone-acetaminophen (NORCO) 5-325 mg per tablet, Take 1 tablet by mouth every 6 (six) hours as needed for pain for up to 13 doses Max Daily Amount: 4 tablets, Disp: 13 tablet, Rfl: 0    HYDROcodone-acetaminophen (NORCO) 5-325 mg per tablet, Take 1 tablet by mouth every 6 (six) hours as needed for pain Max Daily Amount: 4 tablets, Disp: 30 tablet, Rfl: 0    meclizine (ANTIVERT) 12 5 MG tablet, Take 1 tablet (12 5 mg total) by mouth 3 (three) times a day as needed for dizziness, Disp: 30 tablet, Rfl: 0    Multiple Vitamin (MULTIVITAMIN) tablet, Take 1 tablet by mouth daily, Disp: , Rfl:   Allergies   Allergen Reactions    Clam Shell Vomiting    Bactrim [Sulfamethoxazole-Trimethoprim]      Vitals:    11/14/18 1453   BP: 144/92   Pulse: 83   Resp: 16   Temp: (!) 97 °F (36 1 °C)       Physical Exam  Constitutional: General appearance: The Patient is well-developed and well-nourished who appears the stated age in no acute distress  Patient is pleasant and talkative  HEENT:  Normocephalic  Sclerae are anicteric   Mucous membranes are moist  Neck is supple without adenopathy  No JVD  Chest: The lungs are clear to auscultation  Cardiac: Heart is regular rate  Abdomen: Abdomen is soft, non-tender, non-distended and without masses  Extremities: There is no clubbing or cyanosis  There is no edema  Symmetric  Neuro: Grossly nonfocal  Gait is normal      Lymphatic: No evidence of cervical adenopathy bilaterally  No evidence of axillary adenopathy bilaterally  No evidence of inguinal adenopathy bilaterally  Skin: Warm, anicteric  Psych:  Patient is pleasant and talkative  Breasts:        Pathology:  [unfilled]    Labs:      Imaging  Mri Abdomen W Wo Contrast And Mrcp    Result Date: 10/17/2018  Narrative: MRI OF THE ABDOMEN (PANCREAS) WITH AND WITHOUT CONTRAST WITH MRCP INDICATION:  16-year-old female with pancreatic intraductal papillary mucinous neoplasms  COMPARISON: MRI abdomen 10/8/2016; abdominal ultrasound 3/9/2017  TECHNIQUE:  The following pulse sequences were obtained on a 1 5 T scanner:  Coronal and axial T2 with TE of 90 and 180 respectively, axial T2 with fat saturation, axial FIESTA fat-sat, axial  T1-weighted in-and-out-of phase, axial DWI/ADC, pre-contrast axial T1 with fat saturation, post-contrast dynamic axial T1 with fat saturation at 20, 70, and 180 seconds, followed by coronal T1 with fat saturation and 7-minute delayed axial T1 with fat saturation  3D MRCP images were obtained with radial thick slabs and projections  3D rendering was performed from the acquisition scanner  IV Contrast:  8 mL of gadobutrol injection (MULTI-DOSE) FINDINGS: PANCREAS:  General: Normal in morphology and signal intensity  Lesions: The majority of the previously described nonenhancing cysts scattered throughout the pancreatic tissue, suspected side branch intraductal papillary mucinous neoplasms, are stable in size and morphology including the largest measuring 11 mm in the body    There is however an enlarging cystic lesion at the junction of the pancreatic head and neck best seen on series 8, image 75 as well as series 12, image 61, measuring 12 x 10 mm  Consideration should be given to endoscopic ultrasound for potential biopsy  Duct: Main pancreatic duct and side-branches are normal in appearance  BILARY DUCTS:  No intra-hepatic biliary ductal dilatation  Common bile duct is normal in caliber  No evidence of bile duct stricture or choledocholithiasis  No mass identified  LIVER:  Scattered hepatic cysts throughout both lobes are stable as is suspected subcapsular FNH in the lateral segment left lobe measuring 1 4 x 1 4 cm  However, within segment 6 of the right hepatic lobe (12/48), a new peripherally enhancing lesion is  now identified measuring 1 4 x 1 1 cm which demonstrates central washout on delayed images and restricts water diffusion  The finding is concerning for a solitary metastasis  Biopsy is recommended  No loss of signal on out of phase images to suggest hepatic steatosis  Portal and hepatic veins are patent without evidence of thrombosis  GALLBLADDER:  Normal  ADRENAL GLANDS:  Normal  SPLEEN: Normal  KIDNEYS:  Normal  ABDOMINAL CAVITY: Mesenteric lymphadenopathy is now identified including a peripancreatic lymph node measuring 2 6 x 1 6 cm, a justina hepatis node measuring 1 9 x 1 7 cm, and a celiac axis node to the left of midline measuring 1 7 x 1 1 cm  BOWEL:  Scattered sigmoid diverticulosis  No bowel obstruction  OSSEOUS STRUCTURES:  No osseous destruction  VASCULAR STRUCTURES:  Visualized vasculature is normal  ABDOMINAL WALL:  Normal  LOWER CHEST:  Unremarkable MRI appearance  Impression: 1  Enlarging cystic lesion at the junction of the pancreatic head and neck, likely a side branch intraductal papillary mucinous neoplasm  Consideration should be given to endoscopic ultrasound for potential biopsy  Remaining pancreatic cystic lesion stable   2   New 1 4 x 1 1 cm suspected metastatic lesion in the right hepatic lobe for which biopsy is recommended  3   Mesenteric lymphadenopathy  I personally discussed this study with Lulusapphire Joseph on 10/17/2018 at 11:58 AM  Workstation performed: [id number]     Ir Image Guided Biopsy/aspiration Liver    Result Date: 11/6/2018  Narrative: EXAMINATION: Ultrasound-guided liver biopsy INDICATION: 15-year-old female with history of IDDM and was found to have a new enhancing lesion in segment 6 liver concerning for metastasis  Portacaval and celiac lymph node FNA have been negative  IMAGES:  3 ANESTHESIA: Moderate sedation and local lidocaine PROCEDURE:  The patient was identified verbally and by wristband  Timeout was performed  Informed consent was obtained  Following obtaining informed consent, the patient was prepped and draped in the usual sterile fashion  All elements of maximal sterile barrier technique, cap and mask and sterile gown and sterile gloves and sterile drape and hand hygiene and 2% chlorhexidine for cutaneous antisepsis  Sterile ultrasound technique with sterile gel and sterile probe covers was also utilized  1% local lidocaine was infiltrated skin and subcutaneous tissues in the right upper quadrant  Under ultrasound guidance, a 17-gauge coaxial introducer needle was advanced into the hyperechoic lesion in segment 6 of liver  Images were saved  18-gauge BioPince needle was advanced through the introducer needle and 4 core needle biopsy samples were obtained and placed into formalin  D-Stat hemostatic agent was injected through the introducer needle during removal of the needle  Postprocedure ultrasound was performed  Bandage was applied  The patient tolerated the procedure well without complication  The patient left the IR department in stable condition  Findings: 1  Preprocedure ultrasound shows a hyperechoic lesion measuring 1 x 1 cm segment 6 of liver  2   Successful ultrasound-guided core needle biopsy of segment 6 lesion with 4 core needle samples obtained    3  Postprocedure ultrasound shows no hematoma  Impression: Impression: Successful ultrasound-guided core needle biopsy of segment 6 hyperechoic lesion  Workstation performed: [id number]     Us Abdomen Limited    Result Date: 10/23/2018  Narrative: RIGHT UPPER QUADRANT ULTRASOUND INDICATION:    Liver lesion  COMPARISON:  10/16/2018 TECHNIQUE:   Real-time ultrasound of the right upper quadrant was performed with a curvilinear transducer with both volumetric sweeps and still imaging techniques  FINDINGS: PANCREAS:  Multiple pancreatic cysts are seen, the largest measuring 1 6 cm  These were better evaluated on MRI  AORTA AND IVC:  Visualized portions are normal for patient age  LIVER: Size:  Mildly enlarged  The liver measures 17 1 cm in the midclavicular line  Contour:  Surface contour is smooth  Parenchyma:  Echogenicity and echotexture are within normal limits  The suspicious lesion seen on MRI may correspond to a 1 0 x 0 9 x 1 4 cm echogenic lesion in the right lobe  Multiple cysts are again seen  There is a subtle hypoechogenic focus in the subcapsular left lobe anteriorly measuring 1 2 x 1 3 x 1 6 cm stable multiple prior MRIs and previously characterized as FNH  Multiple hepatic cysts are also seen  Limited imaging of the main portal vein shows it to be patent and hepatopetal   BILIARY: The gallbladder is normal in caliber  No wall thickening or pericholecystic fluid  No stones or sludge identified  No sonographic Weber's sign  No intrahepatic biliary dilatation  CBD measures 6 mm  No choledocholithiasis  KIDNEY: Right kidney measures 11 5 x 3 8 cm  Unremarkable ASCITES:   None  Impression: 1   Echogenic lesion in the posterior segment of the right hepatic lobe which appears to correspond to the suspicious lesion on MRI  2   Additional hepatic cysts and stable FNH  3   Multiple pancreatic cysts  The study was marked in EPIC for significant notification   Workstation performed: [id number]     Cta Dissection Protocol Chest And Abdomen    Result Date: 11/10/2018  Narrative: CTA - CHEST AND ABDOMEN  - WITHOUT AND WITH IV CONTRAST INDICATION:   chest pain  COMPARISON:  Abdominal MR 10/16/2018  Abdominal CT 8/13/2013  No prior chest CTs available  Head and neck CT 3/18/2018  TECHNIQUE: CT examination of the chest and abdomen was performed both prior to and after the administration of intravenous contrast   Thin section angiographic arterial phase post contrast technique was used in order to evaluate for aortic dissection  3D reformatted images and volume rendering were performed on an independent workstation  Additionally, axial, sagittal, and coronal 2D reformatted images were created from the source data and submitted for interpretation  Radiation dose length product (DLP) for this visit:  1295 mGy-cm   This examination, like all CT scans performed in the [facility], was performed utilizing techniques to minimize radiation dose exposure, including the use of iterative reconstruction and automated exposure control  IV Contrast:  100 mL of iohexol (OMNIPAQUE) Enteric Contrast: Enteric contrast was not administered  FINDINGS: AORTA: There is no aortic dissection or intramural hematoma  There is no aortic aneurysm  CHEST LUNGS:  There are innumerable 1 to 2 mm administered due to pulmonary nodules throughout all lobes of both lungs (best seen on coronal MIPS)  0 5 cm left lower lobe pulmonary nodule (series 3 image 62)  No convincing perilymphatic nodules along the bronchovascular bundles or the fissures  There is no tracheal or endobronchial lesion  PLEURA:  Unremarkable  HEART/GREAT VESSELS:  Mild cardiomegaly with enlargement of the right atrium  No pericardial effusion  Main pulmonary size is normal   Contrast timing does not allow for evaluation of pulmonary embolism  MEDIASTINUM AND BRIGIDA:  There is marked mediastinal and hilar adenopathy    2 1 cm left paratracheal lymph node is new since 3/18/2018 (series 3 image 37)  Subcarinal lymph node measuring 2 0 cm (series 3 image 53)  2 5 cm right hilar lymph node and 2 7 cm left hilar lymph node (series 3 image 48 and 56, respectively)  CHEST WALL AND LOWER NECK:   Unremarkable  ABDOMEN LIVER/BILIARY TREE:  Liver lesions were better evaluated on prior liver MR  GALLBLADDER:  No calcified gallstones  No pericholecystic inflammatory change  SPLEEN:  Unremarkable  PANCREAS:  Unremarkable  ADRENAL GLANDS:  Unremarkable  KIDNEYS/URETERS:  Unremarkable  No hydronephrosis  VISUALIZED STOMACH, BOWEL AND APPENDIX:  Unremarkable  VISUALIZED ABDOMINOPELVIC CAVITY:  No ascites or free intraperitoneal air  2 5 x 1 5 cm justina hepatis lymph node is unchanged since 10/16/2018 (series 3 image 110)  2 3 x 1 2 cm celiac Axis lymph node is unchanged (series 3 image 107)  1 2 x 1 2 cm indistinct mesenteric mass (series 3 image 166) was not well seen on prior studies  ABDOMINAL WALL:  Unremarkable  OSSEOUS STRUCTURES:  No acute fracture or destructive osseous lesion  Impression: 1  Negative for acute aortic pathology  2   Marked mediastinal bilateral hilar adenopathy  The upper mediastinal adenopathy is new since 3/18/2018  In addition, there are innumerable randomly distributed pulmonary micronodules throughout both lungs  These findings may related to the abdominal process for which the patient is currently being worked up  The notable differential for the chest findings as sarcoidosis  Recent liver biopsy results are not available at the current time  3   Upper abdominal adenopathy as above is similar to MR 10/16/2018  4   There are 2 irregular mesenteric masses with questionable spiculation which may represent adenopathy though differential considerations would include carcinoid and desmoid  5   Liver lesions were better evaluated on recent abdominal MR    I personally discussed this study with Liseth Flores on 11/10/2018 at 10:37 AM  Workstation performed: [id number]     Mammo Diagnostic Bilateral W 3d & Cad    Result Date: 11/8/2018  Narrative: Patient History: Patient is postmenopausal  Family history of colorectal cancer at age 48 or over in maternal grandfather, colorectal cancer at age 48 or over in paternal grandmother  Benign excisional biopsy of the left breast, 1996  Took hormonal contraceptives for 8 years  Took estrogen for 1 year  Patient has never smoked  Patient's BMI is 37 2  Reason for exam: clinical finding  Mammo Diagnostic Bilateral W DBT and CAD: November 8, 2018 - Check In #: [de-identified] 2D/3D Procedure 3D views: Bilateral MLO view(s) were taken  2D views: Bilateral CC view(s) were taken  Technologist: SINDHU Plunkett Prior study comparison: June 11, 2015, digital bilateral screening mammogram, [facility]  June 5, 2014, digital bilateral screening mammogram, [facility] at [facility]  June 1, 2013, digital bilateral screening mammogram, performed at [facility]  May 29, 2012, digital bilateral screening mammogram, performed at [facility]  May 21, 2011, digital bilateral screening mammogram, performed at [facility]  The breast tissue is heterogeneously dense, potentially limiting the sensitivity of mammography  Patient risk, included in this report, assists in determining the appropriate screening regimen (such as 3-D mammography or the inclusion of automated breast ultrasound or MRI)  3-D mammography may also remain indicated as screening  The parenchymal pattern is stable  No dominant masses or suspicious microcalcifications are seen  Benign-appearing calcifications are identified  Stable upper posterior left breast nodule is identified, probable intramammary lymph node  There is no architectural distortion or skin thickening  The axillary regions are benign   As the patient was unable to localize an area of pain, ultrasound was not performed  IMPRESSION 1  Stable bilateral mammogram  2   Management of any clinical symptoms or findings must be based on clinical grounds  3   Bilateral screening mammography in one year recommended  ACR BI-RADS® Assessments: BiRad:2 - Benign Recommendation: Clinical management  Routine screening mammogram of both breasts in 1 year  The patient is scheduled in a reminder system for screening mammography  8-10% of cancers will be missed on mammography  Management of a palpable abnormality must be based on clinical grounds  Patients will be notified of their results via letter from our facility  Accredited by [employer] of Radiology and FDA  Transcription Location: [address] [place] [address]: [id number] Risk Value(s): Tyrer-Cuzick 10 Year: 2 800%, Tyrer-Cuzick Lifetime: 5 400%, Myriad Table: 1 5%, MERCEDES 5 Year: 2 4%, NCI Lifetime: 8 1%    I reviewed the above laboratory and imaging data  Discussion/Summary: 79-year-old female with side branch IPMN  This is essentially stable  The 1 lesion has slightly increased in size, but has no worrisome or suspicious features  This is also less than 2 cm  It would be extremely unlikely that this would be malignant  There is also peripancreatic adenopathy, celiac and portal lymphadenopathy  the lymph nodes are negative  The liver biopsy is probably going to be benign as well based on my discussion with pathology  Her mediastinal and celiac adenopathy is worrisome  It appears that the majority of her abnormality, at least on imaging is in her chest   I doubt the scarring in her mesentery is related to desmoid her carcinoid  I recommended that she see thoracic surgery for either lymph node or lung biopsy to rule out lymphoma versus sarcoid  I have refilled her Vicodin  I will see her back once we have the results of the pathology  She is agreeable to this plan  All of her questions were answered

## 2018-11-21 ENCOUNTER — OFFICE VISIT (OUTPATIENT)
Dept: CARDIAC SURGERY | Facility: CLINIC | Age: 68
End: 2018-11-21
Payer: COMMERCIAL

## 2018-11-21 VITALS
TEMPERATURE: 97.8 F | OXYGEN SATURATION: 97 % | WEIGHT: 191.4 LBS | HEART RATE: 79 BPM | BODY MASS INDEX: 35.22 KG/M2 | HEIGHT: 62 IN | SYSTOLIC BLOOD PRESSURE: 157 MMHG | DIASTOLIC BLOOD PRESSURE: 81 MMHG

## 2018-11-21 DIAGNOSIS — R59.0 MEDIASTINAL ADENOPATHY: Primary | ICD-10-CM

## 2018-11-21 PROCEDURE — 99244 OFF/OP CNSLTJ NEW/EST MOD 40: CPT | Performed by: THORACIC SURGERY (CARDIOTHORACIC VASCULAR SURGERY)

## 2018-11-21 RX ORDER — CEFAZOLIN SODIUM 2 G/50ML
2000 SOLUTION INTRAVENOUS ONCE
Status: CANCELLED | OUTPATIENT
Start: 2018-11-21 | End: 2018-11-21

## 2018-11-21 NOTE — LETTER
November 21, 2018     Michelle Cowan MD  [address] [place] [id number]    Patient: Sherman Ho   YOB: 1950   Date of Visit: 11/21/2018       Dear Dr Trae Soriano:    Thank you for referring Charlotte Hoop to me for evaluation  Below are my notes for this consultation  If you have questions, please do not hesitate to call me  I look forward to following your patient along with you  Sincerely,        Radha Guzman MD        CC: Marthe Krabbe, MD Rande Costa, MD  11/21/2018  4:50 PM  Sign at close encounter  Thoracic Consult  Assessment/Plan:   60-year-old female with IPMN and newly found mediastinal adenopathy of uncertain significance  Her previous biopsies have been no a concerning her nondiagnostic  Based upon her symptoms and mediastinal adenopathy she requires tissue diagnosis at this time  We extensively reviewed her imaging in the office  She does have multiple enlarged paratracheal lymph nodes  We discussed surgical procedure biopsy for these  We will plan on a bronchoscopy with endobronchial ultrasound and biopsy  If this is nondiagnostic then we will proceed with mediastinoscopy with lymph node removal  All risks alternatives and benefits were discussed in my office  Ultimately she could signed consent we will proceed with surgery on Tuesday November 27th  Diagnoses and all orders for this visit:    Mediastinal adenopathy  -     Case request operating room: Bronchoscopy, EBUS with biopsy, possible mediastinoscopy; Standing  -     Type and screen; Future  -     Case request operating room: Bronchoscopy, EBUS with biopsy, possible mediastinoscopy    Other orders  -     Diet NPO; Sips with meds; Standing  -     Height and weight upon arrival; Standing  -     Void on call to OR; Standing  -     Insert peripheral IV;  Standing  -     Place sequential compression device; Standing  -     ceFAZolin (ANCEF) IVPB (premix) 2,000 mg; Infuse 2,000 mg into a venous catheter once             Thoracic History   Diagnosis:    Procedures/Surgeries:    Pathology:    Adjuvant Therapy:       Subjective:    Patient ID: Ede Hannah is a 80 y o  female  HPI  81 yo F with PMH of IPMN followed with serial MRI imaging and newly found abdominal adenopathy, liver lesions and mediastinal adenopathy  She previously underwent workup including EUS with biopsy which was negative for malignancy showing granulomatous disease  She also went IR biopsy of the liver lesions which is so far nondiagnostic  She was referred to our office by surgical oncologist Dr Zachariah Garcia for evaluation of her mediastinal adenopathy  Of note the patient reports intermittent chest pain wrapping around to her back  This was actually found on CT AA with dissection protocol  There is no evidence of dissection  There is no evidence of coronary disease  She does have occasional shortness of breath with this sharp pain  She denies any dysphagia nausea or vomiting  She denies any major fevers or chills  No unintended weight loss  She does have decreased appetite and fatigue      The following portions of the patient's history were reviewed and updated as appropriate: allergies, current medications, past family history, past medical history, past social history, past surgical history and problem list     Past Medical History:   Diagnosis Date    Abdominal pain     Acute tonsillitis     Breast pain, left     GERD without esophagitis     Lesion of liver     Liver mass     Lymphadenopathy     Mediastinal lymphadenopathy     Neoplasm of digestive system     Orthostatic lightheadedness     Vertigo       Past Surgical History:   Procedure Laterality Date    CARPAL TUNNEL RELEASE Bilateral     COLONOSCOPY      2017    IR IMAGE GUIDED BIOPSY/ASPIRATION LIVER  11/6/2018    KNEE SURGERY Left     [place] EDG US EXAM SURGICAL ALTER STOM DUODENUM/JEJUNUM N/A 10/29/2018    Procedure: LINEAR ENDOSCOPIC U/S; Surgeon: Ceferino Whitfield MD;  Location:  GI LAB; Service: Gastroenterology    UPPER GASTROINTESTINAL ENDOSCOPY      WISDOM TOOTH EXTRACTION        Family History   Problem Relation Age of Onset   Kyle Garcia Alzheimer's disease Mother    Earenrique Garcia Parkinsonism Father       Her sister does have Sjogren's disease  Her maternal grandmother had a GI malignancy in her grandfather had colon cancer  Social History     Social History    Marital status: /Civil Union     Spouse name: N/A    Number of children: N/A    Years of education: N/A     Occupational History    Not on file  Social History Main Topics    Smoking status: Never Smoker    Smokeless tobacco: Never Used    Alcohol use Yes      Comment: social    Drug use: No    Sexual activity: Not on file     Other Topics Concern    Not on file     Social History Narrative    No narrative on file      Review of Systems   Constitutional: Positive for appetite change and fatigue  Negative for activity change, chills, diaphoresis, fever and unexpected weight change  HENT: Negative  Eyes: Negative  Respiratory: Positive for chest tightness and shortness of breath  Negative for apnea, cough, wheezing and stridor  Cardiovascular: Positive for chest pain  Negative for palpitations and leg swelling  Gastrointestinal: Negative for abdominal distention, abdominal pain, blood in stool, constipation, diarrhea, nausea and vomiting  Endocrine: Negative  Genitourinary: Negative  Musculoskeletal: Negative  Skin: Negative  Allergic/Immunologic: Negative  Neurological: Negative  Hematological: Negative  Psychiatric/Behavioral: Negative  Objective:   Physical Exam   Constitutional: She is oriented to person, place, and time  She appears well-developed and well-nourished  No distress  HENT:   Head: Normocephalic and atraumatic  Mouth/Throat: Oropharynx is clear and moist  No oropharyngeal exudate     Eyes: Pupils are equal, round, and reactive to light  Conjunctivae and EOM are normal  No scleral icterus  Neck: Normal range of motion  Neck supple  No JVD present  No tracheal deviation present  No thyromegaly present  Cardiovascular: Normal rate, regular rhythm, normal heart sounds and intact distal pulses  Exam reveals no gallop and no friction rub  No murmur heard  Pulmonary/Chest: Effort normal and breath sounds normal  No respiratory distress  She has no wheezes  She has no rales  She exhibits no tenderness  Abdominal: Soft  Bowel sounds are normal  She exhibits no distension and no mass  There is no tenderness  There is no rebound and no guarding  Musculoskeletal: Normal range of motion  She exhibits no edema, tenderness or deformity  Neurological: She is alert and oriented to person, place, and time  Skin: Skin is warm and dry  No rash noted  She is not diaphoretic  No erythema  No pallor  Psychiatric: She has a normal mood and affect  Her behavior is normal  Judgment and thought content normal    Nursing note and vitals reviewed  /81 (BP Location: Left arm, Patient Position: Sitting, Cuff Size: Adult)   Pulse 79   Temp 97 8 °F (36 6 °C)   Ht 5' 2" (1 575 m)   Wt 86 8 kg (191 lb 6 4 oz)   SpO2 97%   BMI 35 01 kg/m²          11/10/18 CT A chest - IMPRESSION:  1  Negative for acute aortic pathology  2   Marked mediastinal bilateral hilar adenopathy  The upper mediastinal adenopathy is new since 3/18/2018  In addition, there are innumerable randomly distributed pulmonary micronodules throughout both lungs  These findings may related to the abdominal process for which the patient is currently being worked up  The notable differential for the chest findings as sarcoidosis  Recent liver biopsy results are not available at the current time  3   Upper abdominal adenopathy as above is similar to MR 10/16/2018    4   There are 2 irregular mesenteric masses with questionable spiculation which may represent adenopathy though differential considerations would include carcinoid and desmoid    5   Liver lesions were better evaluated on recent abdominal MR        Maribel Cuenca MD  Thoracic Surgeon

## 2018-11-21 NOTE — PROGRESS NOTES
Thoracic Consult  Assessment/Plan:   16-year-old female with IPMN and newly found mediastinal adenopathy of uncertain significance  Her previous biopsies have been no a concerning her nondiagnostic  Based upon her symptoms and mediastinal adenopathy she requires tissue diagnosis at this time  We extensively reviewed her imaging in the office  She does have multiple enlarged paratracheal lymph nodes  We discussed surgical procedure biopsy for these  We will plan on a bronchoscopy with endobronchial ultrasound and biopsy  If this is nondiagnostic then we will proceed with mediastinoscopy with lymph node removal  All risks alternatives and benefits were discussed in my office  Ultimately she could signed consent we will proceed with surgery on Tuesday November 27th  Diagnoses and all orders for this visit:    Mediastinal adenopathy  -     Case request operating room: Bronchoscopy, EBUS with biopsy, possible mediastinoscopy; Standing  -     Type and screen; Future  -     Case request operating room: Bronchoscopy, EBUS with biopsy, possible mediastinoscopy    Other orders  -     Diet NPO; Sips with meds; Standing  -     Height and weight upon arrival; Standing  -     Void on call to OR; Standing  -     Insert peripheral IV; Standing  -     Place sequential compression device; Standing  -     ceFAZolin (ANCEF) IVPB (premix) 2,000 mg; Infuse 2,000 mg into a venous catheter once             Thoracic History   Diagnosis:    Procedures/Surgeries:    Pathology:    Adjuvant Therapy:       Subjective:    Patient ID: Elvin Landau is a 80 y o  female  HPI  81 yo F with PMH of IPMN followed with serial MRI imaging and newly found abdominal adenopathy, liver lesions and mediastinal adenopathy  She previously underwent workup including EUS with biopsy which was negative for malignancy showing granulomatous disease  She also went IR biopsy of the liver lesions which is so far nondiagnostic     She was referred to our office by surgical oncologist Dr Galilea Marrufo for evaluation of her mediastinal adenopathy  Of note the patient reports intermittent chest pain wrapping around to her back  This was actually found on CT AA with dissection protocol  There is no evidence of dissection  There is no evidence of coronary disease  She does have occasional shortness of breath with this sharp pain  She denies any dysphagia nausea or vomiting  She denies any major fevers or chills  No unintended weight loss  She does have decreased appetite and fatigue  The following portions of the patient's history were reviewed and updated as appropriate: allergies, current medications, past family history, past medical history, past social history, past surgical history and problem list     Past Medical History:   Diagnosis Date    Abdominal pain     Acute tonsillitis     Breast pain, left     GERD without esophagitis     Lesion of liver     Liver mass     Lymphadenopathy     Mediastinal lymphadenopathy     Neoplasm of digestive system     Orthostatic lightheadedness     Vertigo       Past Surgical History:   Procedure Laterality Date    CARPAL TUNNEL RELEASE Bilateral     COLONOSCOPY      2017    IR IMAGE GUIDED BIOPSY/ASPIRATION LIVER  11/6/2018    KNEE SURGERY Left     MN EDG US EXAM SURGICAL ALTER STOM DUODENUM/JEJUNUM N/A 10/29/2018    Procedure: LINEAR ENDOSCOPIC U/S;  Surgeon: Scott Pagan MD;  Location: [facility]; Service: Gastroenterology    UPPER GASTROINTESTINAL ENDOSCOPY      WISDOM TOOTH EXTRACTION        Family History   Problem Relation Age of Onset   Haim Angel Alzheimer's disease Mother    Haim Angel Parkinsonism Father       Her sister does have Sjogren's disease  Her maternal grandmother had a GI malignancy in her grandfather had colon cancer      Social History     Social History    Marital status: /Civil Union     Spouse name: N/A    Number of children: N/A    Years of education: N/A     Occupational History    Not on file  Social History Main Topics    Smoking status: Never Smoker    Smokeless tobacco: Never Used    Alcohol use Yes      Comment: social    Drug use: No    Sexual activity: Not on file     Other Topics Concern    Not on file     Social History Narrative    No narrative on file      Review of Systems   Constitutional: Positive for appetite change and fatigue  Negative for activity change, chills, diaphoresis, fever and unexpected weight change  HENT: Negative  Eyes: Negative  Respiratory: Positive for chest tightness and shortness of breath  Negative for apnea, cough, wheezing and stridor  Cardiovascular: Positive for chest pain  Negative for palpitations and leg swelling  Gastrointestinal: Negative for abdominal distention, abdominal pain, blood in stool, constipation, diarrhea, nausea and vomiting  Endocrine: Negative  Genitourinary: Negative  Musculoskeletal: Negative  Skin: Negative  Allergic/Immunologic: Negative  Neurological: Negative  Hematological: Negative  Psychiatric/Behavioral: Negative  Objective:   Physical Exam   Constitutional: She is oriented to person, place, and time  She appears well-developed and well-nourished  No distress  HENT:   Head: Normocephalic and atraumatic  Mouth/Throat: Oropharynx is clear and moist  No oropharyngeal exudate  Eyes: Pupils are equal, round, and reactive to light  Conjunctivae and EOM are normal  No scleral icterus  Neck: Normal range of motion  Neck supple  No JVD present  No tracheal deviation present  No thyromegaly present  Cardiovascular: Normal rate, regular rhythm, normal heart sounds and intact distal pulses  Exam reveals no gallop and no friction rub  No murmur heard  Pulmonary/Chest: Effort normal and breath sounds normal  No respiratory distress  She has no wheezes  She has no rales  She exhibits no tenderness  Abdominal: Soft   Bowel sounds are normal  She exhibits no distension and no mass  There is no tenderness  There is no rebound and no guarding  Musculoskeletal: Normal range of motion  She exhibits no edema, tenderness or deformity  Neurological: She is alert and oriented to person, place, and time  Skin: Skin is warm and dry  No rash noted  She is not diaphoretic  No erythema  No pallor  Psychiatric: She has a normal mood and affect  Her behavior is normal  Judgment and thought content normal    Nursing note and vitals reviewed  /81 (BP Location: Left arm, Patient Position: Sitting, Cuff Size: Adult)   Pulse 79   Temp 97 8 °F (36 6 °C)   Ht 5' 2" (1 575 m)   Wt 86 8 kg (191 lb 6 4 oz)   SpO2 97%   BMI 35 01 kg/m²         11/10/18 CT A chest - IMPRESSION:  1  Negative for acute aortic pathology  2   Marked mediastinal bilateral hilar adenopathy  The upper mediastinal adenopathy is new since 3/18/2018  In addition, there are innumerable randomly distributed pulmonary micronodules throughout both lungs  These findings may related to the abdominal process for which the patient is currently being worked up  The notable differential for the chest findings as sarcoidosis  Recent liver biopsy results are not available at the current time  3   Upper abdominal adenopathy as above is similar to MR 10/16/2018  4   There are 2 irregular mesenteric masses with questionable spiculation which may represent adenopathy though differential considerations would include carcinoid and desmoid    5   Liver lesions were better evaluated on recent abdominal MR        Avi Augustine MD  Thoracic Surgeon

## 2018-11-26 ENCOUNTER — ANESTHESIA EVENT (OUTPATIENT)
Dept: PERIOP | Facility: HOSPITAL | Age: 68
End: 2018-11-26
Payer: COMMERCIAL

## 2018-11-26 DIAGNOSIS — R59.0 ABDOMINAL LYMPHADENOPATHY: ICD-10-CM

## 2018-11-26 RX ORDER — HYDROCODONE BITARTRATE AND ACETAMINOPHEN 5; 325 MG/1; MG/1
1 TABLET ORAL EVERY 6 HOURS PRN
Qty: 30 TABLET | Refills: 0 | Status: SHIPPED | OUTPATIENT
Start: 2018-11-26 | End: 2019-02-11 | Stop reason: ALTCHOICE

## 2018-11-26 NOTE — ANESTHESIA PREPROCEDURE EVALUATION
Review of Systems/Medical History  Patient summary reviewed  Chart reviewed  No history of anesthetic complications     Cardiovascular  EKG reviewed, Negative cardio ROS Exercise tolerance (METS): >4,     Pulmonary  Shortness of breath (Increasing SOB with exertion),   Comment: Mediastinal adenopathy     GI/Hepatic    GERD , Liver disease (Nonmalignant liver lesions) ,        Negative  ROS        Endo/Other  Negative endo/other ROS      GYN       Hematology  Negative hematology ROS      Musculoskeletal       Neurology  Negative neurology ROS      Psychology   Negative psychology ROS            Lab Results   Component Value Date    WBC 6 99 11/10/2018    HGB 13 0 11/10/2018    HCT 38 8 11/10/2018    MCV 88 11/10/2018     11/10/2018     Lab Results   Component Value Date    K 3 6 11/10/2018    CO2 29 11/10/2018     11/10/2018    BUN 19 11/10/2018    CREATININE 0 81 11/10/2018     Lab Results   Component Value Date    INR 0 97 10/26/2018    INR 0 88 03/18/2018    PROTIME 12 6 10/26/2018    PROTIME 12 2 03/18/2018     Lab Results   Component Value Date    PTT 29 03/18/2018       No results found for: GLUCOSE    No results found for: HGBA1C    Type and Screen:    TTE 3/10/2016  SUMMARY     LEFT VENTRICLE:  Systolic function was normal  Ejection fraction was estimated to be 60 %  Although no diagnostic regional wall motion abnormality was identified, this  possibility cannot be completely excluded on the basis of this study  Doppler  parameters were consistent with abnormal left ventricular relaxation (grade 1  diastolic dysfunction)      AORTIC VALVE:  There was trace regurgitation      TRICUSPID VALVE:  There was mild to moderate regurgitation  Estimated peak PA pressure was 35 mmHg  Physical Exam    Airway    Mallampati score:  I  TM Distance: >3 FB  Neck ROM: full     Dental       Cardiovascular  Comment: Negative ROS, Rhythm: regular, Rate: normal,     Pulmonary  Breath sounds clear to auscultation,     Other Findings        Anesthesia Plan  ASA Score- 2     Anesthesia Type- general with ASA Monitors  Additional Monitors: arterial line  Airway Plan: ETT and LMA  Comment: Possible faraz  Will defer pending decision on mediastinoscopy   Plan Factors-    Induction- intravenous  Postoperative Plan- Plan for postoperative opioid use  Informed Consent- Anesthetic plan and risks discussed with patient  I personally reviewed this patient with the CRNA  Discussed and agreed on the Anesthesia Plan with the CRNA  Luisa Marshall

## 2018-11-26 NOTE — PRE-PROCEDURE INSTRUCTIONS
Pre-Surgery Instructions:   Medication Instructions    aspirin 81 MG tablet Patient was instructed by Physician and understands   Cholecalciferol (VITAMIN D) 2000 units CAPS Patient was instructed by Physician and understands   docusate sodium (COLACE) 100 mg capsule Patient was instructed by Physician and understands   esomeprazole (NexIUM) 40 MG capsule Patient was instructed by Physician and understands   HYDROcodone-acetaminophen (NORCO) 5-325 mg per tablet Instructed patient per Anesthesia Guidelines   meclizine (ANTIVERT) 12 5 MG tablet Instructed patient per Anesthesia Guidelines   Multiple Vitamin (MULTIVITAMIN) tablet Patient was instructed by Physician and understands  Acetaminophen Med Class     Continue to take this medication on your normal schedule  If this is an oral medication and you take it in the morning, then you may take this medicine with a sip of water  ASA Med Class: Aspirin     Should be discontinued at least one week prior to planned operation, unless specifically stated otherwise by surgical service  Your Surgeon may have patient stop taking aspirin up to a week before surgery if having intracranial, middle ear, posterior eye, spine surgery or prostate surgery  [Patients taking aspirin for coronary stents should be reviewed by an anesthesiologist in the optimization clinic  Please do not discontinue aspirin in patients with coronary stents unless given specific permission to do so by the cardiologist who prescribed medication ]   If your surgeon approves please continue to take this medication on your normal schedule  You may take this medication on the morning of your surgery with a sip of water  Opioid Med Class     Continue to take this medication on your normal schedule  If this is an oral medication and you take it in the morning, then you may take this medicine with a sip of water    Stool Softener Med Class     Continue to take this medication on your normal schedule  If this is an oral medication and you take it in the morning, then you may take this medicine with a sip of water  Reviewed with Pt [facility] call for final inst for DOS, reviewed NPO at [facility] for DOS  If Pt has pain will only take [address]  Pt stated she has been taking all of her vitamins and 81 mg of ASA  Pt stated she took all of her medications today and will not take any tomorrow  PAT RN contact # given if questions arose  Pt verbalized understanding to all of the above

## 2018-11-27 ENCOUNTER — ANESTHESIA (OUTPATIENT)
Dept: PERIOP | Facility: HOSPITAL | Age: 68
End: 2018-11-27
Payer: COMMERCIAL

## 2018-11-27 ENCOUNTER — HOSPITAL ENCOUNTER (OUTPATIENT)
Facility: HOSPITAL | Age: 68
Setting detail: OUTPATIENT SURGERY
Discharge: HOME | End: 2018-11-27
Attending: THORACIC SURGERY (CARDIOTHORACIC VASCULAR SURGERY) | Admitting: THORACIC SURGERY (CARDIOTHORACIC VASCULAR SURGERY)
Payer: COMMERCIAL

## 2018-11-27 VITALS
SYSTOLIC BLOOD PRESSURE: 148 MMHG | RESPIRATION RATE: 20 BRPM | DIASTOLIC BLOOD PRESSURE: 73 MMHG | TEMPERATURE: 98.9 F | BODY MASS INDEX: 35.88 KG/M2 | HEIGHT: 62 IN | OXYGEN SATURATION: 95 % | HEART RATE: 74 BPM | WEIGHT: 195 LBS

## 2018-11-27 DIAGNOSIS — R59.0 MEDIASTINAL ADENOPATHY: ICD-10-CM

## 2018-11-27 LAB
ABO GROUP BLD: NORMAL
BLD GP AB SCN SERPL QL: NEGATIVE
RH BLD: POSITIVE
SPECIMEN EXPIRATION DATE: NORMAL

## 2018-11-27 PROCEDURE — 88305 TISSUE EXAM BY PATHOLOGIST: CPT | Performed by: PATHOLOGY

## 2018-11-27 PROCEDURE — 88172 CYTP DX EVAL FNA 1ST EA SITE: CPT | Performed by: PATHOLOGY

## 2018-11-27 PROCEDURE — 88333 PATH CONSLTJ SURG CYTO XM 1: CPT | Performed by: PATHOLOGY

## 2018-11-27 PROCEDURE — 88342 IMHCHEM/IMCYTCHM 1ST ANTB: CPT | Performed by: PATHOLOGY

## 2018-11-27 PROCEDURE — 87070 CULTURE OTHR SPECIMN AEROBIC: CPT | Performed by: THORACIC SURGERY (CARDIOTHORACIC VASCULAR SURGERY)

## 2018-11-27 PROCEDURE — 88312 SPECIAL STAINS GROUP 1: CPT | Performed by: PATHOLOGY

## 2018-11-27 PROCEDURE — 86901 BLOOD TYPING SEROLOGIC RH(D): CPT | Performed by: THORACIC SURGERY (CARDIOTHORACIC VASCULAR SURGERY)

## 2018-11-27 PROCEDURE — 87176 TISSUE HOMOGENIZATION CULTR: CPT | Performed by: THORACIC SURGERY (CARDIOTHORACIC VASCULAR SURGERY)

## 2018-11-27 PROCEDURE — 39402 MEDIASTINOSCPY W/LMPH NOD BX: CPT | Performed by: THORACIC SURGERY (CARDIOTHORACIC VASCULAR SURGERY)

## 2018-11-27 PROCEDURE — 88331 PATH CONSLTJ SURG 1 BLK 1SPC: CPT | Performed by: PATHOLOGY

## 2018-11-27 PROCEDURE — 87206 SMEAR FLUORESCENT/ACID STAI: CPT | Performed by: THORACIC SURGERY (CARDIOTHORACIC VASCULAR SURGERY)

## 2018-11-27 PROCEDURE — 88331 PATH CONSLTJ SURG 1 BLK 1SPC: CPT | Performed by: THORACIC SURGERY (CARDIOTHORACIC VASCULAR SURGERY)

## 2018-11-27 PROCEDURE — 88173 CYTOPATH EVAL FNA REPORT: CPT | Performed by: PATHOLOGY

## 2018-11-27 PROCEDURE — 31653 BRONCH EBUS SAMPLNG 3/> NODE: CPT | Performed by: THORACIC SURGERY (CARDIOTHORACIC VASCULAR SURGERY)

## 2018-11-27 PROCEDURE — 87102 FUNGUS ISOLATION CULTURE: CPT | Performed by: THORACIC SURGERY (CARDIOTHORACIC VASCULAR SURGERY)

## 2018-11-27 PROCEDURE — 87205 SMEAR GRAM STAIN: CPT | Performed by: THORACIC SURGERY (CARDIOTHORACIC VASCULAR SURGERY)

## 2018-11-27 PROCEDURE — 87116 MYCOBACTERIA CULTURE: CPT | Performed by: THORACIC SURGERY (CARDIOTHORACIC VASCULAR SURGERY)

## 2018-11-27 PROCEDURE — 86900 BLOOD TYPING SEROLOGIC ABO: CPT | Performed by: THORACIC SURGERY (CARDIOTHORACIC VASCULAR SURGERY)

## 2018-11-27 PROCEDURE — 86850 RBC ANTIBODY SCREEN: CPT | Performed by: THORACIC SURGERY (CARDIOTHORACIC VASCULAR SURGERY)

## 2018-11-27 PROCEDURE — 87075 CULTR BACTERIA EXCEPT BLOOD: CPT | Performed by: THORACIC SURGERY (CARDIOTHORACIC VASCULAR SURGERY)

## 2018-11-27 RX ORDER — SODIUM CHLORIDE, SODIUM LACTATE, POTASSIUM CHLORIDE, CALCIUM CHLORIDE 600; 310; 30; 20 MG/100ML; MG/100ML; MG/100ML; MG/100ML
INJECTION, SOLUTION INTRAVENOUS CONTINUOUS PRN
Status: DISCONTINUED | OUTPATIENT
Start: 2018-11-27 | End: 2018-11-27 | Stop reason: SURG

## 2018-11-27 RX ORDER — OXYCODONE HYDROCHLORIDE 5 MG/1
5 TABLET ORAL EVERY 4 HOURS PRN
Qty: 15 TABLET | Refills: 0 | Status: SHIPPED | OUTPATIENT
Start: 2018-11-27 | End: 2018-12-07

## 2018-11-27 RX ORDER — ROCURONIUM BROMIDE 10 MG/ML
INJECTION, SOLUTION INTRAVENOUS AS NEEDED
Status: DISCONTINUED | OUTPATIENT
Start: 2018-11-27 | End: 2018-11-27 | Stop reason: SURG

## 2018-11-27 RX ORDER — ONDANSETRON 2 MG/ML
INJECTION INTRAMUSCULAR; INTRAVENOUS AS NEEDED
Status: DISCONTINUED | OUTPATIENT
Start: 2018-11-27 | End: 2018-11-27 | Stop reason: SURG

## 2018-11-27 RX ORDER — CEFAZOLIN SODIUM 2 G/50ML
2000 SOLUTION INTRAVENOUS ONCE
Status: COMPLETED | OUTPATIENT
Start: 2018-11-27 | End: 2018-11-27

## 2018-11-27 RX ORDER — PROPOFOL 10 MG/ML
INJECTION, EMULSION INTRAVENOUS CONTINUOUS PRN
Status: DISCONTINUED | OUTPATIENT
Start: 2018-11-27 | End: 2018-11-27 | Stop reason: SURG

## 2018-11-27 RX ORDER — MIDAZOLAM HYDROCHLORIDE 1 MG/ML
INJECTION INTRAMUSCULAR; INTRAVENOUS AS NEEDED
Status: DISCONTINUED | OUTPATIENT
Start: 2018-11-27 | End: 2018-11-27 | Stop reason: SURG

## 2018-11-27 RX ORDER — ONDANSETRON 2 MG/ML
4 INJECTION INTRAMUSCULAR; INTRAVENOUS ONCE AS NEEDED
Status: DISCONTINUED | OUTPATIENT
Start: 2018-11-27 | End: 2018-11-27 | Stop reason: HOSPADM

## 2018-11-27 RX ORDER — SUCCINYLCHOLINE CHLORIDE 20 MG/ML
INJECTION INTRAMUSCULAR; INTRAVENOUS AS NEEDED
Status: DISCONTINUED | OUTPATIENT
Start: 2018-11-27 | End: 2018-11-27 | Stop reason: SURG

## 2018-11-27 RX ORDER — PROPOFOL 10 MG/ML
INJECTION, EMULSION INTRAVENOUS AS NEEDED
Status: DISCONTINUED | OUTPATIENT
Start: 2018-11-27 | End: 2018-11-27 | Stop reason: SURG

## 2018-11-27 RX ORDER — FENTANYL CITRATE/PF 50 MCG/ML
25 SYRINGE (ML) INJECTION
Status: DISCONTINUED | OUTPATIENT
Start: 2018-11-27 | End: 2018-11-27 | Stop reason: HOSPADM

## 2018-11-27 RX ORDER — OXYCODONE HYDROCHLORIDE 5 MG/1
5 TABLET ORAL EVERY 4 HOURS PRN
Status: DISCONTINUED | OUTPATIENT
Start: 2018-11-27 | End: 2018-11-27 | Stop reason: HOSPADM

## 2018-11-27 RX ORDER — LIDOCAINE HYDROCHLORIDE 10 MG/ML
INJECTION, SOLUTION INFILTRATION; PERINEURAL AS NEEDED
Status: DISCONTINUED | OUTPATIENT
Start: 2018-11-27 | End: 2018-11-27 | Stop reason: SURG

## 2018-11-27 RX ORDER — ACETAMINOPHEN 325 MG/1
650 TABLET ORAL EVERY 6 HOURS PRN
Status: DISCONTINUED | OUTPATIENT
Start: 2018-11-27 | End: 2018-11-27 | Stop reason: HOSPADM

## 2018-11-27 RX ADMIN — MIDAZOLAM 2 MG: 1 INJECTION INTRAMUSCULAR; INTRAVENOUS at 07:48

## 2018-11-27 RX ADMIN — FENTANYL CITRATE 25 MCG: 50 INJECTION, SOLUTION INTRAMUSCULAR; INTRAVENOUS at 10:41

## 2018-11-27 RX ADMIN — LIDOCAINE HYDROCHLORIDE 50 MG: 10 INJECTION, SOLUTION INFILTRATION; PERINEURAL at 07:55

## 2018-11-27 RX ADMIN — SUGAMMADEX 180 MG: 100 INJECTION, SOLUTION INTRAVENOUS at 10:20

## 2018-11-27 RX ADMIN — PROPOFOL 200 MG: 10 INJECTION, EMULSION INTRAVENOUS at 07:55

## 2018-11-27 RX ADMIN — FENTANYL CITRATE 25 MCG: 50 INJECTION, SOLUTION INTRAMUSCULAR; INTRAVENOUS at 10:54

## 2018-11-27 RX ADMIN — CEFAZOLIN SODIUM 2000 MG: 2 SOLUTION INTRAVENOUS at 09:05

## 2018-11-27 RX ADMIN — DEXAMETHASONE SODIUM PHOSPHATE 8 MG: 10 INJECTION INTRAMUSCULAR; INTRAVENOUS at 09:22

## 2018-11-27 RX ADMIN — SODIUM CHLORIDE, SODIUM LACTATE, POTASSIUM CHLORIDE, AND CALCIUM CHLORIDE: .6; .31; .03; .02 INJECTION, SOLUTION INTRAVENOUS at 07:47

## 2018-11-27 RX ADMIN — FENTANYL CITRATE 25 MCG: 50 INJECTION, SOLUTION INTRAMUSCULAR; INTRAVENOUS at 11:03

## 2018-11-27 RX ADMIN — ROCURONIUM BROMIDE 30 MG: 10 INJECTION INTRAVENOUS at 07:57

## 2018-11-27 RX ADMIN — ROCURONIUM BROMIDE 30 MG: 10 INJECTION INTRAVENOUS at 08:58

## 2018-11-27 RX ADMIN — PROPOFOL 80 MCG/KG/MIN: 10 INJECTION, EMULSION INTRAVENOUS at 08:00

## 2018-11-27 RX ADMIN — OXYCODONE HYDROCHLORIDE 5 MG: 5 TABLET ORAL at 12:19

## 2018-11-27 RX ADMIN — FENTANYL CITRATE 25 MCG: 50 INJECTION, SOLUTION INTRAMUSCULAR; INTRAVENOUS at 11:09

## 2018-11-27 RX ADMIN — ONDANSETRON 4 MG: 2 INJECTION INTRAMUSCULAR; INTRAVENOUS at 10:07

## 2018-11-27 RX ADMIN — SUCCINYLCHOLINE CHLORIDE 100 MG: 20 INJECTION, SOLUTION INTRAMUSCULAR; INTRAVENOUS at 07:55

## 2018-11-27 RX ADMIN — PROPOFOL 100 MCG/KG/MIN: 10 INJECTION, EMULSION INTRAVENOUS at 08:49

## 2018-11-27 NOTE — ANESTHESIA POSTPROCEDURE EVALUATION
Post-Op Assessment Note      CV Status:  Stable    Mental Status:  Alert and awake    Hydration Status:  Euvolemic    PONV Controlled:  Controlled    Airway Patency:  Patent    Post Op Vitals Reviewed: Yes          Staff: CRNA           BP      Temp (!) 97 °F (36 1 °C) (11/27/18 1029)    Pulse 72 (11/27/18 1029)   Resp 16 (11/27/18 1029)    SpO2

## 2018-11-27 NOTE — OP NOTE
OPERATIVE REPORT  PATIENT NAME: Brenna Samson    :  1950  MRN: [id number]  Pt Location: [facility] OR ROOM 08    SURGERY DATE: 2018    Surgeon(s) and Role:     * Adrianne Saldana MD - Primary     * Nelson Hinds MD - Assisting    Preop Diagnosis:  Mediastinal adenopathy [R59 0]    Post-Op Diagnosis Codes:     * Mediastinal adenopathy [R59 0]    Procedure(s) (LRB):  EBUS with biopsy (N/A)  BRONCHOSCOPY FLEXIBLE (N/A)  MEDIASTINOSCOPY (N/A)    Specimen(s):  ID Type Source Tests Collected by Time Destination   1 : level 4R FNA Lymph Node FINE NEEDLE ASPIRATION Adrianne Saldana MD 2018 0810    2 : level 4R FNA Lymph Node FINE NEEDLE ASPIRATION Adrianne Saldana MD 2018 0810    3 : level 4L FNA Lymph Node FINE NEEDLE ASPIRATION Adrianne Saldana MD 2018 0824    4 : level 4L FNA Lymph Node FINE NEEDLE ASPIRATION Adrianne Saldana MD 2018 0839    5 : level 10L FNA Lymph Node FINE NEEDLE ASPIRATION Adrianne Saldana MD 2018 0844    6 : LEVEL 4 R Tissue Lymph Node - Lymphoma Prtocol TISSUE EXAM, LEUKEMIA/LYMPHOMA FLOW CYTOMETRY Adrianne Saldana MD 2018 0919    7 : LEVEL 4 R Tissue Lymph Node TISSUE Van Amato MD 2018 0926    8 : LEVEL 4 L Tissue Lymph Node TISSUE Van Amato MD 2018 0933    9 : LEVEL 4 L Tissue Lymph Node - Lymphoma Prtocol TISSUE EXAM, LEUKEMIA/LYMPHOMA FLOW CYTOMETRY Adrianne Saldana MD 2018 0933    10 : LEVEL 4 L Tissue Lymph Node TISSUE Van Amato MD 2018 0936    11 : LEVEL 7 Tissue Lymph Node TISSUE EXAM Adrianne Saldana MD 2018 0944    12 : LEVEL 7 Tissue Lymph Node - Lymphoma Prtocol TISSUE EXAM, LEUKEMIA/LYMPHOMA FLOW CYTOMETRY Adrianne Saldana MD 2018 3307    A : level 4L Tissue Lymph Node ANAEROBIC CULTURE AND GRAM STAIN, FUNGAL CULTURE, CULTURE, TISSUE AND GRAM STAIN, AFB CULTURE WITH STAIN Adrianne Saldana MD 2018 1010        Estimated Blood Loss: 20 mL    Drains:       Anesthesia Type:   General    Operative Indications:  Mediastinal adenopathy [R610]  81-year-old female with IPMN of the pancreas in newly found mediastinal adenopathy  She additionally had mesenteric adenopathy which did not show to be malignant and a liver mass which was biopsied and shown to be benign  Based on this large mediastinal adenopathy with multiple nodes greater than 2 cm decision was made for surgical biopsy  Operative Findings:  Dense fibrotic maykel tissue consistent with granulomatous inflammation on frozen pathology  Complications:   None    Procedure and Technique:  After obtaining informed consent from the patient, they were transported to the operating room and placed supine on the OR table  General anesthesia was induced and a single lumen endotracheal tube was placed without incident  A formal time-out was performed at this time including patient, date of birth, intended procedure, antibiotic usage as appropriate, beta-blocker usage as appropriate and plans for specimen handling  A thorough bronchoscopy was then performed examining the trachea, mainstem bronchi, sigmoid segmental and subsegmental bronchi  There were no endo luminal masses or any other abnormalities identified  All mucus was suctioned out to improve visualization  The Olympus EBUS scope was then inserted and used to identify mediastinal lymph node stations  Mediastinal stations 4R, 4 L, 10 are, 7 were visualized using ultrasound guidance  Color Doppler was used as needed to identify and avoid surrounding vasculature  A 21 gauge EBUS needle was used to obtain node biopsies under direct visualization  Biopsies were obtained from stations 4R, 4 L, 10 are, 7  These were immediately analyzed by cytopathology showing the following, some evidence of maykel tissue but no evidence of malignancy  Fibrotic changes in the lymph nodes nondiagnostic       There was no significant bleeding seen from the airway  This portion of the procedure was completed at this time  We performed a secondary time-out at this time verifying antibiotics, prepped and procedure with anticipated specimens  The patient was positioned at the top of the table with neck in full extension, supported by a shoulder roll  The patient's neck and chest were prepped and draped in standard sterile fashion  A 3cm incision was made 1 finger-breadth above the sternal notch  Electrocautery was used to separate the soft tissue and platysma  Fascia above the strap muscles was identified and divided vertically  This strap muscles were retracted laterally exposing the midline  We continued dissecting in this plane directly down onto the trachea  The pretracheal fascia was grasped and sharply incised  A finger was used to bluntly establish this pretracheal plane down into the chest making sure to stay directly on top of trachea  The video mediastinum scope was inserted at this time  Using suction dissection as well as brief bursts of electrocautery we dissected down onto the trachea and to the subcarinal space  Appropriate landmarks were identified including the left mainstem bronchus, subcarinal space, right mainstem bronchus, and azygos vein  Care was taken to avoid electrocautery on the left side to avoid recurrent laryngeal nerve injury  There was a large contiguous lymph node in the pretracheal space  This went from 4R to 10 R down to 7 and over to 4 L  These were multiple enlarged nodes that had groove and fused together  There was increased vascularity to these nodes  Multiple biopsies were taken from these maykel packets and sent separately  This was sent for frozen pathology  Additionally this was sent for permanent pathology and possible flow cytometry  Pathology called back with the frozen pathology specimen found this to be consistent with dense granulomatous tissue consistent with benign pathology  Based on this we stopped at this time  The remainder of the specimens were sent for combination of permanent and culture  The dissection space was packed with Surgicel gauze and gauze sponges  Once hemostasis was ensured, the video mediastinoscope was removed  The strap muscles were reapproximated with interrupted 3-0 Vicryl sutures  The platysma and soft tissue was closed with interrupted 3-0 Vicryl sutures and the skin was closed with running 4-0 Monocryl  Surgical glue was applied to the incision  All instrument, needle and sponge counts were correct at the conclusion of the procedure  Patient was extubated and transferred to the PACU in stable condition       I was present for the entire procedure    Patient Disposition:  PACU     SIGNATURE: Avi Augustine MD  DATE: November 27, 2018  TIME: 12:05 PM

## 2018-11-27 NOTE — DISCHARGE INSTRUCTIONS
Mediastinoscopy   WHAT YOU NEED TO KNOW:   A mediastinoscopy is a procedure to look inside your mediastinum  The mediastinum is the space inside your chest between and in front of your lungs  DISCHARGE INSTRUCTIONS:   Medicines:   · Medicines  may be given to help decrease pain and prevent or treat an infection  Ask your healthcare provider how to take prescription pain medicine safely  · Take your medicine as directed  Contact your healthcare provider if you think your medicine is not helping or if you have side effects  Tell him or her if you are allergic to any medicine  Keep a list of the medicines, vitamins, and herbs you take  Include the amounts, and when and why you take them  Bring the list or the pill bottles to follow-up visits  Carry your medicine list with you in case of an emergency  Follow up with your healthcare provider or surgeon as directed: You will need to return to have your wounds checked and the stitches removed  Write down your questions so you remember to ask them during your visits  Bathing with stitches: Follow your healthcare provider's instructions on when you can bathe  Gently wash the part of your body that has the stitches  Do not rub on the stitches to dry your skin  Pat the area gently with a towel  When the area is dry, put on a clean, new bandage as directed  Contact your healthcare provider or surgeon if:   · You have a fever  · You have nausea or are vomiting  · Your skin is itchy, swollen, or has a rash  · You have questions or concerns about your condition or care  Seek care immediately or call 911 if:   · You feel lightheaded, short of breath, and have chest pain  · You cough up blood  · Blood soaks through your bandage  · Your wound has blood, pus, or a foul-smelling odor  © 2017 [facility][note 3]0 [employer]selmo Turner Information is for End User's use only and may not be sold, redistributed or otherwise used for commercial purposes   All illustrations and images included in CareNotes® are the copyrighted property of A D A M , Inc  or Hector Redmond  The above information is an  only  It is not intended as medical advice for individual conditions or treatments  Talk to your doctor, nurse or pharmacist before following any medical regimen to see if it is safe and effective for you

## 2018-11-27 NOTE — DISCHARGE INSTR - AVS FIRST PAGE
Pain Control:    Please take the following: Ibuprofen and Acetaminophen  For appropriate pain control you can alternate the above medications in 3 hour intervals  You can take 2x Ibuprofen and alternate with 2x Acetaminophen every three hours for 4-5 days

## 2018-11-27 NOTE — H&P (VIEW-ONLY)
Thoracic Consult  Assessment/Plan:   72-year-old female with IPMN and newly found mediastinal adenopathy of uncertain significance  Her previous biopsies have been no a concerning her nondiagnostic  Based upon her symptoms and mediastinal adenopathy she requires tissue diagnosis at this time  We extensively reviewed her imaging in the office  She does have multiple enlarged paratracheal lymph nodes  We discussed surgical procedure biopsy for these  We will plan on a bronchoscopy with endobronchial ultrasound and biopsy  If this is nondiagnostic then we will proceed with mediastinoscopy with lymph node removal  All risks alternatives and benefits were discussed in my office  Ultimately she could signed consent we will proceed with surgery on Tuesday November 27th  Diagnoses and all orders for this visit:    Mediastinal adenopathy  -     Case request operating room: Bronchoscopy, EBUS with biopsy, possible mediastinoscopy; Standing  -     Type and screen; Future  -     Case request operating room: Bronchoscopy, EBUS with biopsy, possible mediastinoscopy    Other orders  -     Diet NPO; Sips with meds; Standing  -     Height and weight upon arrival; Standing  -     Void on call to OR; Standing  -     Insert peripheral IV; Standing  -     Place sequential compression device; Standing  -     ceFAZolin (ANCEF) IVPB (premix) 2,000 mg; Infuse 2,000 mg into a venous catheter once             Thoracic History   Diagnosis:    Procedures/Surgeries:    Pathology:    Adjuvant Therapy:       Subjective:    Patient ID: Maria Elena Cadet is a 80 y o  female  HPI  79 yo F with PMH of IPMN followed with serial MRI imaging and newly found abdominal adenopathy, liver lesions and mediastinal adenopathy  She previously underwent workup including EUS with biopsy which was negative for malignancy showing granulomatous disease  She also went IR biopsy of the liver lesions which is so far nondiagnostic     She was referred to our office by surgical oncologist Dr Jim Cheng for evaluation of her mediastinal adenopathy  Of note the patient reports intermittent chest pain wrapping around to her back  This was actually found on CT AA with dissection protocol  There is no evidence of dissection  There is no evidence of coronary disease  She does have occasional shortness of breath with this sharp pain  She denies any dysphagia nausea or vomiting  She denies any major fevers or chills  No unintended weight loss  She does have decreased appetite and fatigue  The following portions of the patient's history were reviewed and updated as appropriate: allergies, current medications, past family history, past medical history, past social history, past surgical history and problem list     Past Medical History:   Diagnosis Date    Abdominal pain     Acute tonsillitis     Breast pain, left     GERD without esophagitis     Lesion of liver     Liver mass     Lymphadenopathy     Mediastinal lymphadenopathy     Neoplasm of digestive system     Orthostatic lightheadedness     Vertigo       Past Surgical History:   Procedure Laterality Date    CARPAL TUNNEL RELEASE Bilateral     COLONOSCOPY      2017    IR IMAGE GUIDED BIOPSY/ASPIRATION LIVER  11/6/2018    KNEE SURGERY Left     NV EDG US EXAM SURGICAL ALTER STOM DUODENUM/JEJUNUM N/A 10/29/2018    Procedure: LINEAR ENDOSCOPIC U/S;  Surgeon: Neil Chavez MD;  Location: [facility]; Service: Gastroenterology    UPPER GASTROINTESTINAL ENDOSCOPY      WISDOM TOOTH EXTRACTION        Family History   Problem Relation Age of Onset   Radha Mare Alzheimer's disease Mother    Radha Mare Parkinsonism Father       Her sister does have Sjogren's disease  Her maternal grandmother had a GI malignancy in her grandfather had colon cancer      Social History     Social History    Marital status: /Civil Union     Spouse name: N/A    Number of children: N/A    Years of education: N/A     Occupational History    Not on file  Social History Main Topics    Smoking status: Never Smoker    Smokeless tobacco: Never Used    Alcohol use Yes      Comment: social    Drug use: No    Sexual activity: Not on file     Other Topics Concern    Not on file     Social History Narrative    No narrative on file      Review of Systems   Constitutional: Positive for appetite change and fatigue  Negative for activity change, chills, diaphoresis, fever and unexpected weight change  HENT: Negative  Eyes: Negative  Respiratory: Positive for chest tightness and shortness of breath  Negative for apnea, cough, wheezing and stridor  Cardiovascular: Positive for chest pain  Negative for palpitations and leg swelling  Gastrointestinal: Negative for abdominal distention, abdominal pain, blood in stool, constipation, diarrhea, nausea and vomiting  Endocrine: Negative  Genitourinary: Negative  Musculoskeletal: Negative  Skin: Negative  Allergic/Immunologic: Negative  Neurological: Negative  Hematological: Negative  Psychiatric/Behavioral: Negative  Objective:   Physical Exam   Constitutional: She is oriented to person, place, and time  She appears well-developed and well-nourished  No distress  HENT:   Head: Normocephalic and atraumatic  Mouth/Throat: Oropharynx is clear and moist  No oropharyngeal exudate  Eyes: Pupils are equal, round, and reactive to light  Conjunctivae and EOM are normal  No scleral icterus  Neck: Normal range of motion  Neck supple  No JVD present  No tracheal deviation present  No thyromegaly present  Cardiovascular: Normal rate, regular rhythm, normal heart sounds and intact distal pulses  Exam reveals no gallop and no friction rub  No murmur heard  Pulmonary/Chest: Effort normal and breath sounds normal  No respiratory distress  She has no wheezes  She has no rales  She exhibits no tenderness  Abdominal: Soft   Bowel sounds are normal  She exhibits no distension and no mass  There is no tenderness  There is no rebound and no guarding  Musculoskeletal: Normal range of motion  She exhibits no edema, tenderness or deformity  Neurological: She is alert and oriented to person, place, and time  Skin: Skin is warm and dry  No rash noted  She is not diaphoretic  No erythema  No pallor  Psychiatric: She has a normal mood and affect  Her behavior is normal  Judgment and thought content normal    Nursing note and vitals reviewed  /81 (BP Location: Left arm, Patient Position: Sitting, Cuff Size: Adult)   Pulse 79   Temp 97 8 °F (36 6 °C)   Ht 5' 2" (1 575 m)   Wt 86 8 kg (191 lb 6 4 oz)   SpO2 97%   BMI 35 01 kg/m²         11/10/18 CT A chest - IMPRESSION:  1  Negative for acute aortic pathology  2   Marked mediastinal bilateral hilar adenopathy  The upper mediastinal adenopathy is new since 3/18/2018  In addition, there are innumerable randomly distributed pulmonary micronodules throughout both lungs  These findings may related to the abdominal process for which the patient is currently being worked up  The notable differential for the chest findings as sarcoidosis  Recent liver biopsy results are not available at the current time  3   Upper abdominal adenopathy as above is similar to MR 10/16/2018  4   There are 2 irregular mesenteric masses with questionable spiculation which may represent adenopathy though differential considerations would include carcinoid and desmoid    5   Liver lesions were better evaluated on recent abdominal MR        Ej Garcia MD  Thoracic Surgeon

## 2018-11-30 ENCOUNTER — TELEPHONE (OUTPATIENT)
Dept: SURGICAL ONCOLOGY | Facility: CLINIC | Age: 68
End: 2018-11-30

## 2018-11-30 ENCOUNTER — TELEPHONE (OUTPATIENT)
Dept: CARDIAC SURGERY | Facility: CLINIC | Age: 68
End: 2018-11-30

## 2018-11-30 LAB — BACTERIA SPEC ANAEROBE CULT: NO GROWTH

## 2018-11-30 NOTE — TELEPHONE ENCOUNTER
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  There is no evidence of malignancy in any specimen  I discussed with her that this was obviously reassuring and benign  She mentioned that her liver biopsy also came back consistent with granulomatous disease  From a surgery standpoint she still has mild fatigue but her previous chest pain radiating to her back is significantly improved  She has no significant shortness of breath or soreness  No issues with her incision  We will see her back in the office in 2 weeks to evaluate her wound  Likely referral to Pulmonary Medicine at that time to do with her granulomatous disease  Will discuss with Dr Sol Moncada

## 2018-11-30 NOTE — TELEPHONE ENCOUNTER
Discussed liver pathology results as liver granulomas with the patient  We will await the mediastinal lymph node pathology  Discussed that she may need to follow-up with a pulmonologist if this is granulomatous disease or sarcoid  All her questions were answered

## 2018-12-01 LAB
BACTERIA TISS AEROBE CULT: NO GROWTH
GRAM STN SPEC: NORMAL

## 2018-12-12 ENCOUNTER — OFFICE VISIT (OUTPATIENT)
Dept: CARDIAC SURGERY | Facility: CLINIC | Age: 68
End: 2018-12-12
Payer: COMMERCIAL

## 2018-12-12 VITALS
BODY MASS INDEX: 34.56 KG/M2 | SYSTOLIC BLOOD PRESSURE: 180 MMHG | TEMPERATURE: 98.6 F | HEIGHT: 62 IN | OXYGEN SATURATION: 98 % | DIASTOLIC BLOOD PRESSURE: 85 MMHG | HEART RATE: 82 BPM | WEIGHT: 187.8 LBS

## 2018-12-12 DIAGNOSIS — R59.0 MEDIASTINAL ADENOPATHY: Primary | ICD-10-CM

## 2018-12-12 PROCEDURE — 99212 OFFICE O/P EST SF 10 MIN: CPT | Performed by: THORACIC SURGERY (CARDIOTHORACIC VASCULAR SURGERY)

## 2018-12-12 NOTE — LETTER
December 12, 2504     Marthe Krabbe, [address] [place] [address]    Patient: Sherman Ho   YOB: 1950   Date of Visit: 12/12/2018       Dear Dr Kell Jones: Thank you for referring [place] to me for evaluation  Below are my notes for this consultation  If you have questions, please do not hesitate to call me  I look forward to following your patient along with you  Sincerely,        Radha Guzman MD        CC: MD Rajesh Hammond PA-C  12/12/2018 11:19 AM  Sign at close encounter  Thoracic Follow-Up  Assessment/Plan:    Mediastinal adenopathy  Ms Josias Zavala has recovered from her procedure and her incision is healing well  She was advised to continue to wash it gently with soap and water and let the scab fall off on its own  Since her pathology was consistent with granulomatous lymphadenitis, she should be followed by a pulmonologist  We will schedule an appointment with Dr Marcus Chavez, since the patient's  is followed by her  She will only need to return to our office on as needed basis  She was instructed to call our office if she has any concerns regarding her incision and is in complete agreement  Diagnoses and all orders for this visit:    Mediastinal adenopathy  -     Ambulatory referral to Pulmonology; Future          Thoracic History      Diagnosis:  Granulomatous disease  Procedure: Flexible bronchoscopy, EBUS with biopsy, and cervical mediastinoscopy on 11/27/18  Pathology: Lymph node level 4R, 4L, and 7 + for granulomatous lymphadenitis, but negative for malignancy  Patient ID: Sherman Ho is a 80 y o  female  HPI    Ms Josias Zavala is a 79 yo female who was referred by Dr Trae Soriano for mediastinal adenopathy  She underwent a bronchoscopy, EBUS with transbronchial biopsies, and a cervical mediastinoscopy on 11/27/18  She tolerated the procedure well and presents today for a post op appointment   Dr Micaela Richardson had previously reviewed her pathology with her, which was consistent granulomatous disease  She is feeling well, without any complaints  Review of Systems      Objective:   Physical Exam   Constitutional: She is oriented to person, place, and time  She appears well-developed and well-nourished  HENT:   Head: Normocephalic and atraumatic  Eyes: Pupils are equal, round, and reactive to light  EOM are normal    + glasses   Neck: Normal range of motion  Neck supple  Minimal generalized erythema, no drainage or evidence of infection  Scab present at right end of incision    Pulmonary/Chest: No respiratory distress  Neurological: She is alert and oriented to person, place, and time  Psychiatric: She has a normal mood and affect  Her behavior is normal    Vitals reviewed      BP (!) 180/85 (BP Location: Left arm, Patient Position: Sitting, Cuff Size: Adult)   Pulse 82   Temp 98 6 °F (37 °C)   Ht 5' 2" (1 575 m)   Wt 85 2 kg (187 lb 12 8 oz)   SpO2 98%   BMI 34 35 kg/m²

## 2018-12-12 NOTE — PROGRESS NOTES
Thoracic Follow-Up  Assessment/Plan:    Mediastinal adenopathy  Ms Cheryl Levy has recovered from her procedure and her incision is healing well  She was advised to continue to wash it gently with soap and water and let the scab fall off on its own  Since her pathology was consistent with granulomatous lymphadenitis, she should be followed by a pulmonologist  We will schedule an appointment with Dr Andres Green, since the patient's  is followed by her  She will only need to return to our office on as needed basis  She was instructed to call our office if she has any concerns regarding her incision and is in complete agreement  Diagnoses and all orders for this visit:    Mediastinal adenopathy  -     Ambulatory referral to Pulmonology; Future          Thoracic History      Diagnosis:  Granulomatous disease  Procedure: Flexible bronchoscopy, EBUS with biopsy, and cervical mediastinoscopy on 11/27/18  Pathology: Lymph node level 4R, 4L, and 7 + for granulomatous lymphadenitis, but negative for malignancy  Patient ID: Fely Gonzalez is a 80 y o  female  HPI    Ms Cheryl Levy is a 79 yo female who was referred by Dr Shahriar Alvarado for mediastinal adenopathy  She underwent a bronchoscopy, EBUS with transbronchial biopsies, and a cervical mediastinoscopy on 11/27/18  She tolerated the procedure well and presents today for a post op appointment  Dr Frank Faust had previously reviewed her pathology with her, which was consistent granulomatous disease  She is feeling well, without any complaints  Review of Systems      Objective:   Physical Exam   Constitutional: She is oriented to person, place, and time  She appears well-developed and well-nourished  HENT:   Head: Normocephalic and atraumatic  Eyes: Pupils are equal, round, and reactive to light  EOM are normal    + glasses   Neck: Normal range of motion  Neck supple  Minimal generalized erythema, no drainage or evidence of infection   Scab present at right end of incision    Pulmonary/Chest: No respiratory distress  Neurological: She is alert and oriented to person, place, and time  Psychiatric: She has a normal mood and affect  Her behavior is normal    Vitals reviewed      BP (!) 180/85 (BP Location: Left arm, Patient Position: Sitting, Cuff Size: Adult)   Pulse 82   Temp 98 6 °F (37 °C)   Ht 5' 2" (1 575 m)   Wt 85 2 kg (187 lb 12 8 oz)   SpO2 98%   BMI 34 35 kg/m²

## 2018-12-12 NOTE — ASSESSMENT & PLAN NOTE
Ms Lucia Bowen has recovered from her procedure and her incision is healing well  She was advised to continue to wash it gently with soap and water and let the scab fall off on its own  Since her pathology was consistent with granulomatous lymphadenitis, she should be followed by a pulmonologist  We will schedule an appointment with Dr Shy Vivar, since the patient's  is followed by her  She will only need to return to our office on as needed basis  She was instructed to call our office if she has any concerns regarding her incision and is in complete agreement

## 2018-12-14 ENCOUNTER — TELEPHONE (OUTPATIENT)
Dept: CARDIAC SURGERY | Facility: CLINIC | Age: 68
End: 2018-12-14

## 2018-12-14 ENCOUNTER — IMMUNIZATION (OUTPATIENT)
Dept: FAMILY MEDICINE CLINIC | Facility: CLINIC | Age: 68
End: 2018-12-14
Payer: COMMERCIAL

## 2018-12-14 DIAGNOSIS — Z23 ENCOUNTER FOR IMMUNIZATION: ICD-10-CM

## 2018-12-14 PROCEDURE — 90662 IIV NO PRSV INCREASED AG IM: CPT

## 2018-12-14 PROCEDURE — 90471 IMMUNIZATION ADMIN: CPT

## 2018-12-14 NOTE — TELEPHONE ENCOUNTER
Patient called looking to check on the status of her pulmonary appt  Francesco Vasquez had called to schedule on Tuesday 12/11 and left message  patient was to be called with appt  Patient has not heard back yet       12/14 9:45 am - Patient has upcoming appt with Dr Sera Wilcox on 12/19 @3:45pm

## 2018-12-18 ENCOUNTER — OFFICE VISIT (OUTPATIENT)
Dept: SURGICAL ONCOLOGY | Facility: CLINIC | Age: 68
End: 2018-12-18
Payer: COMMERCIAL

## 2018-12-18 VITALS
WEIGHT: 187 LBS | HEART RATE: 102 BPM | SYSTOLIC BLOOD PRESSURE: 136 MMHG | TEMPERATURE: 97.9 F | BODY MASS INDEX: 34.41 KG/M2 | DIASTOLIC BLOOD PRESSURE: 82 MMHG | HEIGHT: 62 IN | RESPIRATION RATE: 16 BRPM

## 2018-12-18 DIAGNOSIS — R59.1 LYMPHADENOPATHY: Primary | ICD-10-CM

## 2018-12-18 PROCEDURE — 99215 OFFICE O/P EST HI 40 MIN: CPT | Performed by: SURGERY

## 2018-12-18 NOTE — LETTER
December 18, 1710     Aidee [address] [place] [address]    Patient: Dhara Hernandez   YOB: 1950   Date of Visit: 12/18/2018       Dear Dr Chiqui Kothari: Thank you for referring Kizzy Funez to me for evaluation  Below are my notes for this consultation  If you have questions, please do not hesitate to call me  I look forward to following your patient along with you  Sincerely,        Lam Zuniga MD        CC: DO Lam South MD  12/18/2018  8:49 AM  Sign at close encounter               Surgical Oncology Follow Up       [address] [place] [address] [address] [place] [address]   1950  [id number]  [address] [facility],[address]  [facility] [address] [address]    Diagnoses and all orders for this visit:    Lymphadenopathy  -     MRI abdomen w wo contrast; Future  -     BUN; Future  -     Creatinine, serum; Future        Chief Complaint   Patient presents with    Follow-up     Pt is here for f/u after recent thoracic bx  Return in about 3 months (around 3/18/2019) for Office Visit, Imaging - See orders  No history exists  Diagnosis and Staging: Side branch IPMN 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 2013, 1 1cm   Current Therapy: Observation   Disease Status: Stable     History of Present Illness: Patient returns in follow-up  She had upper endoscopy that did not notice any obvious mass lesions  There was significant celiac and portal lymphadenopathy  The pancreas appeared normal   Biopsy of the portal lymph node was benign  She then underwent liver biopsy  This  revealed non-necrotizing granulomas and focal hemorrhage  Follow-up mediastinoscope be revealed 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  She is actually feeling better  The numbness in her abdomen and back have improved  Her pain is improved    Her appetite she says is still poor  She does have early satiety and fatigue  No fevers, chills, or night sweats  She is following up with pulmonology tomorrow  Review of Systems  Complete ROS Surg Onc:   Complete ROS Surg Onc:   Constitutional: The patient denies new or recent history of general fatigue  Still with a poor appetite  Eyes: No complaints of visual problems, no scleral icterus  ENT: no complaints of ear pain, no hoarseness, no difficulty swallowing,  no tinnitus and no new masses in head, oral cavity, or neck  Cardiovascular: No complaints of chest pain, no palpitations, no ankle edema  Respiratory: No complaints of shortness of breath, no cough  Gastrointestinal: No complaints of jaundice, no bloody stools, no pale stools  Genitourinary: No complaints of dysuria, no hematuria, no nocturia, no frequent urination, no urethral discharge  Musculoskeletal: No complaints of weakness, paralysis, joint stiffness or arthralgias  Integumentary: No complaints of rash, no new lesions  Neurological: No complaints of convulsions, no seizures, no dizziness  Hematologic/Lymphatic: No complaints of easy bruising  Endocrine:  No hot or cold intolerance  No polydipsia, polyphagia, or polyuria  Allergy/immunology:  No environmental allergies  No food allergies  Not immunocompromised  Skin:  No pallor or rash  No wound          Patient Active Problem List   Diagnosis    Positional vertigo    Corn of toe    Focal nodular hyperplasia of liver    IPMN (intraductal papillary mucinous neoplasm)    Abnormal MRI of abdomen    Lymphadenopathy    Liver mass    Mediastinal adenopathy     Past Medical History:   Diagnosis Date    Abdominal pain     Acute tonsillitis     Breast pain, left     GERD without esophagitis     Lesion of liver     Liver mass     Lymphadenopathy     Mediastinal lymphadenopathy     Neoplasm of digestive system     Orthostatic lightheadedness     Vertigo      Past Surgical History:   Procedure Laterality Date    CARPAL TUNNEL RELEASE Bilateral     COLONOSCOPY      2017    IR IMAGE GUIDED BIOPSY/ASPIRATION LIVER  11/6/2018    KNEE SURGERY Left     MEDIASTINOSCOPY N/A 11/27/2018    Procedure: MEDIASTINOSCOPY;  Surgeon: Eneida Mascorro MD;  [facility];  Service: Thoracic    OR BRONCHOSCOPY NEEDLE BX TRACHEA MAIN STEM&/BRON N/A 11/27/2018    Procedure: EBUS with biopsy;  Surgeon: Eneida Mascorro MD;  [facility];  Service: Thoracic    OR [address] N/A 11/27/2018    Procedure: Raynaldo Hunger;  Surgeon: Eneida Mascorro MD;  [facility];  Service: Thoracic    OR EDG US EXAM SURGICAL ALTER STOM DUODENUM/JEJUNUM N/A 10/29/2018    Procedure: LINEAR ENDOSCOPIC U/S;  Surgeon: Olesya Giraldo MD;  [facility]; Service: Gastroenterology    UPPER GASTROINTESTINAL ENDOSCOPY      WISDOM TOOTH EXTRACTION       Family History   Problem Relation Age of Onset    Alzheimer's disease Mother     Parkinsonism Father      Social History     Social History    Marital status: /Civil Union     Spouse name: N/A    Number of children: N/A    Years of education: N/A     Occupational History    Not on file       Social History Main Topics    Smoking status: Never Smoker    Smokeless tobacco: Never Used    Alcohol use Yes      Comment: social    Drug use: No    Sexual activity: Not on file     Other Topics Concern    Not on file     Social History Narrative    No narrative on file       Current Outpatient Prescriptions:     aspirin 81 MG tablet, Take 1 tablet by mouth daily, Disp: , Rfl:     Cholecalciferol (VITAMIN D) 2000 units CAPS, Take 1 tablet by mouth daily, Disp: , Rfl:     docusate sodium (COLACE) 100 mg capsule, Take 1 capsule (100 mg total) by mouth 2 (two) times a day as needed for constipation, Disp: 60 capsule, Rfl: 0    esomeprazole (NexIUM) 40 MG capsule, TAKE ONE CAPSULE BY MOUTH EVERY DAY, Disp: 90 capsule, Rfl: 3    HYDROcodone-acetaminophen (NORCO) 5-325 mg per tablet, Take 1 tablet by mouth every 6 (six) hours as needed for pain for up to 13 doses Max Daily Amount: 4 tablets, Disp: 30 tablet, Rfl: 0    meclizine (ANTIVERT) 12 5 MG tablet, Take 1 tablet (12 5 mg total) by mouth 3 (three) times a day as needed for dizziness, Disp: 30 tablet, Rfl: 0    Multiple Vitamin (MULTIVITAMIN) tablet, Take 1 tablet by mouth daily, Disp: , Rfl:   Allergies   Allergen Reactions    Clam Shell Vomiting    Bactrim [Sulfamethoxazole-Trimethoprim] Hives     Vitals:    12/18/18 0832   BP: 136/82   Pulse: 102   Resp: 16   Temp: 97 9 °F (36 6 °C)       Physical Exam  Constitutional: General appearance: The Patient is well-developed and well-nourished who appears the stated age in no acute distress  Patient is pleasant and talkative  HEENT:  Normocephalic  Sclerae are anicteric  Mucous membranes are moist  Neck is supple without adenopathy  No JVD  Chest: The lungs are clear to auscultation  Cardiac: Heart is regular rate  Abdomen: Abdomen is soft, non-tender, non-distended and without masses  Extremities: There is no clubbing or cyanosis  There is no edema  Symmetric  Neuro: Grossly nonfocal  Gait is normal      Lymphatic: No evidence of cervical adenopathy bilaterally  No evidence of axillary adenopathy bilaterally  No evidence of inguinal adenopathy bilaterally  Skin: Warm, anicteric  Psych:  Patient is pleasant and talkative  Breasts:        Pathology:  Final Diagnosis   A  Liver, core biopsy:    - Liver with non-necrotizing granulomas and focal hemorrhage; see Note       Electronically signed by David Gaines MD on 11/30/2018 at  3:53 PM   Comments: This is an appended report  These results have been appended to a previously preliminary verified report  Comments: This is an appended report  These results have been appended to a previously preliminary verified report  Preliminary Diagnosis   A  Liver, core biopsies:     - Liver parenchyma with a few scattered granulomas and an area of fibrin/hemorrhage      - Final diagnosis pending outside expert consultation  Preliminary result electronically signed by Edna Cannon MD on 11/13/2018 at 10:24 AM   Note   This case was seen in consultation with Dr Benita Magana, an expert in Liver Pathology from [address] [place] [address] [address]  His diagnosis and comment are given below  Dr Geovanni Odom original report is on file in Pathology Department, [address] [place] [address]         Liver Core Needle Biopsy:    - Liver with non-necrotizing granulomas and focal hemorrhage; see comment      Comment: the liver biopsy demonstrates ~7 portal tracts for evaluation  Scattered non-necrotizing granulomas are noted with associated giant-cells and eosinophils in the lobular parenchyma  No necrotizing granulomas or fibrin-ring granulomas are present, and there are no parasitic organisms  The portal tracts have intact bile ducts, without duct-centric inflammation or ductular reaction  There is minimal steatosis (<5%, grade 0, scale 0-3) without ballooned hepatocytes or Neha hyaline  No unpaired arteries or atypical cytoarchitectural features are present, which argues against a hepatocellular lesion  Focally, there are several foci of dissecting hemorrhage, in association with fibrin and large vessels  A reticulin stain shows no regenerative change, but does highlight some reticulin fibers in the area of hemorrhage, to suggest that the hemorrhage may have dissected through fibrous tissue  CD34 does not show diffuse sinusoidal staining and this does not suggest a hepatocellular lesion  GMS is negative for fungal organisms  An AFB stain is negative    Given an abundance of eosinophils, we excluded a Langerhans cell histiocytosis by immunostaining (CD68, , and CD1a) on block A2, which show small clusters of macrophages (CD68 and  positive) and only rare CD1a positive cells, providing no support for Langerhans cell histiocytosis and supporting interpretation as granulomatous inflammation; however, the granulomas are not well sampled on the level section  We also further excluded viral infections associated with granulomas on additional stains (CMV, EBV and PAX5), which were negative for both CMV and EBV (with only rare B-cells on the PAX5 stain)  We note the reported history of a mass lesion, but there is no evidence of a hepatocellular neoplasm or metastatic adenocarcinoma  There is a large area of hemorrhage and fibrin adjacent to a large vessel  This appearance is not specific, but raises the differential diagnosis of a possible subcapsular hematoma or hemorrhage near the hilum and clinical/imaging correlation is suggested  Alternatively this may represent procedure related hemorrhage, and the lesion of concern was not sampled in this biopsy  There are no endothelial lined spaces, which argues against a hemangioma  Radiologic correlation is therefore required for this distinction  In addition,the presence of lobular granulomas raises a wide differential diagnosis  Confluent granulomas from Mycobacterium tuberculosis can lead to radiologically apparent masses (tuberculomas), although no necrotizing granulomatous inflammation is noted in this case  Clinical correlation with travel history as well as ancillary testing for other infectious etiologies could include a tuberculin skin test/QuantFERON-TB, serologies for hepatitis B,C as well as syphilis, Q fever and brucella  Various drugs (including various antihypertensive, antirheumatic and analgesic, anticonvulsant and antimicrobial agents) as well as chemical exposures to beryllium and thorotrast have been reported to result in hepatic granulomas (1,2)  There is no significant duct-centric inflammation or duct damage to suggest primary biliary cholangitis (PBC)   Sarcoidosis also typically shows periportal granulomas rather than a predominant lobular location, but correlation with serum ACE levels may also be considered  Granulomas can also be associated with various hematolymphoid neoplasms including Hodgkin lymphoma, but no large atypical cells are noted in this biopsy and thus there is no histologic evidence of lymphoma, clinical correlation is suggested  References:  1  Kathleen Castorena et al  "Granulomatous liver disease: a review " Journal of the [address] [place] [address] [address]  1(2012):3-13  2  SHAR Law  "Drug-induced granulomatous hepatitis " Laboratory investigation; a journal of technical methods and pathology 44 1(1981):61-73      Mio Cornelius/Pathologist          Comments: This is an appended report  These results have been appended to a previously preliminary verified report  Labs:      Imaging  No results found  I reviewed the above laboratory and imaging data  Discussion/Summary: 60-year-old female with side branch IPMN   This is essentially stable   The 1 lesion has slightly increased in size, but has no worrisome or suspicious features   This is also less than 2 cm   It would be extremely unlikely that this would be malignant  Viola Pale is also peripancreatic adenopathy, celiac and portal lymphadenopathy   The liver biopsy shows non necrotizing granulomas  The chest node showed granulomatous lymphadenitis  She may require a course of steroids  She is seeing pulmonology tomorrow  Because of her MRI findings, I will repeat her MRI in 3 months and see her again at that time for another clinical exam   She is agreeable to this plan   All of her questions were answered

## 2018-12-18 NOTE — PROGRESS NOTES
Surgical Oncology Follow Up       [address] [place] [address]  CANCER CARE [facility] SURGICAL ONCOLOGY [address]   1950  [id number]  [address] [place] [address]  CANCER [facility] SURGICAL ONCOLOGY [address] [address]    Diagnoses and all orders for this visit:    Lymphadenopathy  -     MRI abdomen w wo contrast; Future  -     BUN; Future  -     Creatinine, serum; Future        Chief Complaint   Patient presents with    Follow-up     Pt is here for f/u after recent thoracic bx  Return in about 3 months (around 3/18/2019) for Office Visit, Imaging - See orders  No history exists  Diagnosis and Staging: Side branch IPMN 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 2013, 1 1cm   Current Therapy: Observation   Disease Status: Stable     History of Present Illness: Patient returns in follow-up  She had upper endoscopy that did not notice any obvious mass lesions  There was significant celiac and portal lymphadenopathy  The pancreas appeared normal   Biopsy of the portal lymph node was benign  She then underwent liver biopsy  This revealed non-necrotizing granulomas and focal hemorrhage  Follow-up mediastinoscope be revealed 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  She is actually feeling better  The numbness in her abdomen and back have improved  Her pain is improved  Her appetite she says is still poor  She does have early satiety and fatigue  No fevers, chills, or night sweats  She is following up with pulmonology tomorrow  Review of Systems  Complete ROS Surg Onc:   Complete ROS Surg Onc:   Constitutional: The patient denies new or recent history of general fatigue  Still with a poor appetite  Eyes: No complaints of visual problems, no scleral icterus  ENT: no complaints of ear pain, no hoarseness, no difficulty swallowing,  no tinnitus and no new masses in head, oral cavity, or neck  Cardiovascular: No complaints of chest pain, no palpitations, no ankle edema  Respiratory: No complaints of shortness of breath, no cough  Gastrointestinal: No complaints of jaundice, no bloody stools, no pale stools  Genitourinary: No complaints of dysuria, no hematuria, no nocturia, no frequent urination, no urethral discharge  Musculoskeletal: No complaints of weakness, paralysis, joint stiffness or arthralgias  Integumentary: No complaints of rash, no new lesions  Neurological: No complaints of convulsions, no seizures, no dizziness  Hematologic/Lymphatic: No complaints of easy bruising  Endocrine:  No hot or cold intolerance  No polydipsia, polyphagia, or polyuria  Allergy/immunology:  No environmental allergies  No food allergies  Not immunocompromised  Skin:  No pallor or rash  No wound          Patient Active Problem List   Diagnosis    Positional vertigo    Corn of toe    Focal nodular hyperplasia of liver    IPMN (intraductal papillary mucinous neoplasm)    Abnormal MRI of abdomen    Lymphadenopathy    Liver mass    Mediastinal adenopathy     Past Medical History:   Diagnosis Date    Abdominal pain     Acute tonsillitis     Breast pain, left     GERD without esophagitis     Lesion of liver     Liver mass     Lymphadenopathy     Mediastinal lymphadenopathy     Neoplasm of digestive system     Orthostatic lightheadedness     Vertigo      Past Surgical History:   Procedure Laterality Date    CARPAL TUNNEL RELEASE Bilateral     COLONOSCOPY      2017    IR IMAGE GUIDED BIOPSY/ASPIRATION LIVER  11/6/2018    KNEE SURGERY Left     MEDIASTINOSCOPY N/A 11/27/2018    Procedure: MEDIASTINOSCOPY;  Surgeon: Rosaline Gama MD;  [facility];  Service: Thoracic    CA BRONCHOSCOPY NEEDLE BX TRACHEA MAIN STEM&/BRON N/A 11/27/2018    Procedure: EBUS with biopsy;  Surgeon: Rosaline Gama MD;  [facility];  Service: Thoracic    CA [address] N/A 11/27/2018    Procedure: BRONCHOSCOPY FLEXIBLE;  Surgeon: John Hassan MD;  [facility];  Service: Thoracic    UT EDG US EXAM SURGICAL ALTER STOM DUODENUM/JEJUNUM N/A 10/29/2018    Procedure: LINEAR ENDOSCOPIC U/S;  Surgeon: May Ellis MD;  Location:  GI LAB; Service: Gastroenterology    UPPER GASTROINTESTINAL ENDOSCOPY      WISDOM TOOTH EXTRACTION       Family History   Problem Relation Age of Onset    Alzheimer's disease Mother     Parkinsonism Father      Social History     Social History    Marital status: /Civil Union     Spouse name: N/A    Number of children: N/A    Years of education: N/A     Occupational History    Not on file       Social History Main Topics    Smoking status: Never Smoker    Smokeless tobacco: Never Used    Alcohol use Yes      Comment: social    Drug use: No    Sexual activity: Not on file     Other Topics Concern    Not on file     Social History Narrative    No narrative on file       Current Outpatient Prescriptions:     aspirin 81 MG tablet, Take 1 tablet by mouth daily, Disp: , Rfl:     Cholecalciferol (VITAMIN D) 2000 units CAPS, Take 1 tablet by mouth daily, Disp: , Rfl:     docusate sodium (COLACE) 100 mg capsule, Take 1 capsule (100 mg total) by mouth 2 (two) times a day as needed for constipation, Disp: 60 capsule, Rfl: 0    esomeprazole (NexIUM) 40 MG capsule, TAKE ONE CAPSULE BY MOUTH EVERY DAY, Disp: 90 capsule, Rfl: 3    HYDROcodone-acetaminophen (NORCO) 5-325 mg per tablet, Take 1 tablet by mouth every 6 (six) hours as needed for pain for up to 13 doses Max Daily Amount: 4 tablets, Disp: 30 tablet, Rfl: 0    meclizine (ANTIVERT) 12 5 MG tablet, Take 1 tablet (12 5 mg total) by mouth 3 (three) times a day as needed for dizziness, Disp: 30 tablet, Rfl: 0    Multiple Vitamin (MULTIVITAMIN) tablet, Take 1 tablet by mouth daily, Disp: , Rfl:   Allergies   Allergen Reactions    Clam Shell Vomiting    Bactrim [Sulfamethoxazole-Trimethoprim] Hives     Vitals:    12/18/18 0832   BP: 136/82   Pulse: 102   Resp: 16   Temp: 97 9 °F (36 6 °C)       Physical Exam  Constitutional: General appearance: The Patient is well-developed and well-nourished who appears the stated age in no acute distress  Patient is pleasant and talkative  HEENT:  Normocephalic  Sclerae are anicteric  Mucous membranes are moist  Neck is supple without adenopathy  No JVD  Chest: The lungs are clear to auscultation  Cardiac: Heart is regular rate  Abdomen: Abdomen is soft, non-tender, non-distended and without masses  Extremities: There is no clubbing or cyanosis  There is no edema  Symmetric  Neuro: Grossly nonfocal  Gait is normal      Lymphatic: No evidence of cervical adenopathy bilaterally  No evidence of axillary adenopathy bilaterally  No evidence of inguinal adenopathy bilaterally  Skin: Warm, anicteric  Psych:  Patient is pleasant and talkative  Breasts:        Pathology:  Final Diagnosis   A  Liver, core biopsy:    - Liver with non-necrotizing granulomas and focal hemorrhage; see Note       Electronically signed by Ileana Hutson MD on 11/30/2018 at  3:53 PM   Comments: This is an appended report  These results have been appended to a previously preliminary verified report  Comments: This is an appended report  These results have been appended to a previously preliminary verified report  Preliminary Diagnosis   A  Liver, core biopsies:     - Liver parenchyma with a few scattered granulomas and an area of fibrin/hemorrhage      - Final diagnosis pending outside expert consultation  Preliminary result electronically signed by Ileana Hutson MD on 11/13/2018 at 10:24 AM   Note   This case was seen in consultation with Dr Can Mehta, an expert in Liver Pathology from [address] [place] [address] [place]  His diagnosis and comment are given below    Dr Dale original report is on file in Pathology Department, [place] [id number] [place], [place]         Liver Core Needle Biopsy:    - Liver with non-necrotizing granulomas and focal hemorrhage; see comment      Comment: the liver biopsy demonstrates ~7 portal tracts for evaluation  Scattered non-necrotizing granulomas are noted with associated giant-cells and eosinophils in the lobular parenchyma  No necrotizing granulomas or fibrin-ring granulomas are present, and there are no parasitic organisms  The portal tracts have intact bile ducts, without duct-centric inflammation or ductular reaction  There is minimal steatosis (<5%, grade 0, scale 0-3) without ballooned hepatocytes or Neha hyaline  No unpaired arteries or atypical cytoarchitectural features are present, which argues against a hepatocellular lesion  Focally, there are several foci of dissecting hemorrhage, in association with fibrin and large vessels  A reticulin stain shows no regenerative change, but does highlight some reticulin fibers in the area of hemorrhage, to suggest that the hemorrhage may have dissected through fibrous tissue  CD34 does not show diffuse sinusoidal staining and this does not suggest a hepatocellular lesion  GMS is negative for fungal organisms  An AFB stain is negative  Given an abundance of eosinophils, we excluded a Langerhans cell histiocytosis by immunostaining (CD68, , and CD1a) on block A2, which show small clusters of macrophages (CD68 and  positive) and only rare CD1a positive cells, providing no support for Langerhans cell histiocytosis and supporting interpretation as granulomatous inflammation; however, the granulomas are not well sampled on the level section  We also further excluded viral infections associated with granulomas on additional stains (CMV, EBV and PAX5), which were negative for both CMV and EBV (with only rare B-cells on the PAX5 stain)    We note the reported history of a mass lesion, but there is no evidence of a hepatocellular neoplasm or metastatic adenocarcinoma  There is a large area of hemorrhage and fibrin adjacent to a large vessel  This appearance is not specific, but raises the differential diagnosis of a possible subcapsular hematoma or hemorrhage near the hilum and clinical/imaging correlation is suggested  Alternatively this may represent procedure related hemorrhage, and the lesion of concern was not sampled in this biopsy  There are no endothelial lined spaces, which argues against a hemangioma  Radiologic correlation is therefore required for this distinction  In addition,the presence of lobular granulomas raises a wide differential diagnosis  Confluent granulomas from Mycobacterium tuberculosis can lead to radiologically apparent masses (tuberculomas), although no necrotizing granulomatous inflammation is noted in this case  Clinical correlation with travel history as well as ancillary testing for other infectious etiologies could include a tuberculin skin test/QuantFERON-TB, serologies for hepatitis B,C as well as syphilis, Q fever and brucella  Various drugs (including various antihypertensive, antirheumatic and analgesic, anticonvulsant and antimicrobial agents) as well as chemical exposures to beryllium and thorotrast have been reported to result in hepatic granulomas (1,2)  There is no significant duct-centric inflammation or duct damage to suggest primary biliary cholangitis (PBC)  Sarcoidosis also typically shows periportal granulomas rather than a predominant lobular location, but correlation with serum ACE levels may also be considered  Granulomas can also be associated with various hematolymphoid neoplasms including Hodgkin lymphoma, but no large atypical cells are noted in this biopsy and thus there is no histologic evidence of lymphoma, clinical correlation is suggested  References:  1  miranda Espinosa al  "Granulomatous liver disease: a review " Journal of the [address] [place] [address] [address]  1(2012):3-13    2  SHAR Law  "Drug-induced granulomatous hepatitis " Laboratory investigation; a journal of technical methods and pathology 44 1(1981):61-73      Mio Cornelius/Pathologist          Comments: This is an appended report  These results have been appended to a previously preliminary verified report  Labs:      Imaging  No results found  I reviewed the above laboratory and imaging data  Discussion/Summary: 81-year-old female with side branch IPMN   This is essentially stable   The 1 lesion has slightly increased in size, but has no worrisome or suspicious features   This is also less than 2 cm   It would be extremely unlikely that this would be malignant  Michael Solo is also peripancreatic adenopathy, celiac and portal lymphadenopathy   The liver biopsy shows non necrotizing granulomas  The chest node showed granulomatous lymphadenitis  She may require a course of steroids  She is seeing pulmonology tomorrow  Because of her MRI findings, I will repeat her MRI in 3 months and see her again at that time for another clinical exam   She is agreeable to this plan   All of her questions were answered

## 2018-12-19 ENCOUNTER — OFFICE VISIT (OUTPATIENT)
Dept: PULMONOLOGY | Facility: CLINIC | Age: 68
End: 2018-12-19
Payer: COMMERCIAL

## 2018-12-19 VITALS
DIASTOLIC BLOOD PRESSURE: 84 MMHG | WEIGHT: 189.8 LBS | BODY MASS INDEX: 34.93 KG/M2 | RESPIRATION RATE: 18 BRPM | TEMPERATURE: 97 F | OXYGEN SATURATION: 99 % | HEART RATE: 87 BPM | HEIGHT: 62 IN | SYSTOLIC BLOOD PRESSURE: 140 MMHG

## 2018-12-19 DIAGNOSIS — R59.0 MEDIASTINAL ADENOPATHY: ICD-10-CM

## 2018-12-19 PROCEDURE — 94010 BREATHING CAPACITY TEST: CPT | Performed by: INTERNAL MEDICINE

## 2018-12-19 PROCEDURE — 99245 OFF/OP CONSLTJ NEW/EST HI 55: CPT | Performed by: INTERNAL MEDICINE

## 2018-12-19 RX ORDER — PREDNISONE 10 MG/1
10 TABLET ORAL DAILY
Qty: 130 TABLET | Refills: 0 | Status: SHIPPED | OUTPATIENT
Start: 2018-12-19 | End: 2019-06-04 | Stop reason: ALTCHOICE

## 2018-12-19 NOTE — PATIENT INSTRUCTIONS
Prednisone 60 a day for 5 days, 50 a day for 5 days 40 a day for 5 days 30 a day for 5 days 20 a day for 5 days 10 a day   Recheck CT chest in 1 month with followup  Lab work ASAP    SUMMARY: Non-caseating granulomas in lymph nodes, liver and lung which could be infectious (already ruled out), medication related or immune related  Given ruled out other causes this is most consistent with sarcoidosis which can go untreated  Given significant symptoms we will treat with steroids and attempt to wean off steroids  Sarcoidosis may resolve and never return or it can be a chronic illness that requires intermittent treatment

## 2018-12-20 ENCOUNTER — APPOINTMENT (OUTPATIENT)
Dept: LAB | Facility: CLINIC | Age: 68
End: 2018-12-20
Payer: COMMERCIAL

## 2018-12-20 ENCOUNTER — TRANSCRIBE ORDERS (OUTPATIENT)
Dept: LAB | Facility: CLINIC | Age: 68
End: 2018-12-20

## 2018-12-20 DIAGNOSIS — R59.0 MEDIASTINAL ADENOPATHY: ICD-10-CM

## 2018-12-20 PROBLEM — D86.9 SARCOIDOSIS: Status: ACTIVE | Noted: 2018-12-20

## 2018-12-20 LAB
HBV CORE AB SER QL: NORMAL
HBV CORE IGM SER QL: NORMAL
HBV SURFACE AG SER QL: NORMAL
HCV AB SER QL: NORMAL

## 2018-12-20 PROCEDURE — 86705 HEP B CORE ANTIBODY IGM: CPT

## 2018-12-20 PROCEDURE — 86803 HEPATITIS C AB TEST: CPT

## 2018-12-20 PROCEDURE — 36415 COLL VENOUS BLD VENIPUNCTURE: CPT

## 2018-12-20 PROCEDURE — 82164 ANGIOTENSIN I ENZYME TEST: CPT

## 2018-12-20 PROCEDURE — 86704 HEP B CORE ANTIBODY TOTAL: CPT

## 2018-12-20 PROCEDURE — 86480 TB TEST CELL IMMUN MEASURE: CPT

## 2018-12-20 PROCEDURE — 87340 HEPATITIS B SURFACE AG IA: CPT

## 2018-12-20 NOTE — PROGRESS NOTES
Assessment/Plan:    Sarcoidosis  Mrs Glynn Knee and I had a very long discussion about her most recent health issues  I reviewed all of her biopsies, her scans as well as her spirometry in the office today  We talked about possible causes of granulomatous disease  Granulomas disease can be  into infectious versus noninfectious  Infectious causes include tuleremia, cat scratch and fungal infections like cryptococcus, histoplasmosis and coccidiomycosis  In addition tuberculosis and atypical mycobacterium as well as BCG toxoplasma and rare infections as well  Most of these have been ruled out directly by culture of her tissue  From a non infectious standpoint a diffuse granulomatous disease like this leaves us with the sarcoid like reaction and/or sarcoidosis  This could also be from foreign body granulomatosis although this was ruled out with polarized light examination  I doubt any rare disease like histoplasmosis given her lack of other supporting CT scan findings  All in all this is consistent with sarcoidosis despite the fact that the pathology does not show broncho vascular bundles that we would typically see in a distribution consistent with sarcoid  I am going to call this sarcoidosis and treated as such  We discussed at length the diagnosis of sarcoidosis and how it typically be haze with regards to waxing and waning versus going into remission versus possibly causing ongoing chronic disease  For now will treat her with prednisone starting at 60 mg daily and decreasing by 10 mg every 5 days until we maintain her on 10 mg daily  Repeat the CAT scan in 1 month and if she has response to treatment will consider discontinuing the prednisone at that time and monitoring for recurrence     She is allergic to Bactrim so we opted against prophylaxis for PCP given the short period of high-dose steroids       Diagnoses and all orders for this visit:    Mediastinal adenopathy  -     Ambulatory referral to Pulmonology  -     POCT spirometry  -     Quantiferon TB ([facility]); Future  -     Angiotensin converting enzyme; Future  -     Chronic Hepatitis Panel; Future  -     predniSONE 10 mg tablet; Take 1 tablet (10 mg total) by mouth daily 6 for 5 days, 5 for 5 days, 4 for 5 days 3 for 5 days 2 for 5 days then 1 a day  -     CT chest without contrast; Future          Subjective:      Patient ID: Shiloh Hogue is a 80 y o  female  Mrs  Cable had no significant health problems before October of this year  She is being followed routinely for some abnormal findings on her pancreas  She went for her normal yearly MRI of her pancreas in October  After that she started developed diffuse abdominal pain  On November 17th the pain became so bad that she went to the emergency department  At that time she was found to have adenopathy in her mesentery with a lesion in her liver that looked to be a metastatic lesion  That was biopsied and showed granulomatous disease  On 11/27 she went for mediastinoscopy and EBUS due to bulky adenopathy in her chest   During that bronchoscopy was not reported that she had endobronchial cobblestoning consistent with sarcoid by the surgeon  She also describes some numbness in her armpit area and significant shortness of breath over the past 2 months  Prior to 2 months ago she had no issues with her breathing now she gets short of breath walking up stairs  She works as a   She is a nonsmoker and has had no issues with recent travel  She does not travel throughout the country or out of the [facility]  She has no pets and no significant outdoor exposure  primary symptoms   Associated symptoms include headaches  Pertinent negatives include no chest pain, fever, myalgias or sore throat         The following portions of the patient's history were reviewed and updated as appropriate: allergies, current medications, past family history, past medical history, past social history, past surgical history and problem list     Review of Systems   Constitutional: Positive for appetite change  Negative for fever  HENT: Negative for ear pain, postnasal drip, rhinorrhea, sneezing, sore throat and trouble swallowing  Respiratory: Positive for shortness of breath  Cardiovascular: Negative for chest pain  Musculoskeletal: Negative for myalgias  Neurological: Positive for headaches  Objective:      /84 (BP Location: Right arm, Patient Position: Sitting, Cuff Size: Adult)   Pulse 87   Temp (!) 97 °F (36 1 °C) (Tympanic)   Resp 18   Ht 5' 2" (1 575 m)   Wt 86 1 kg (189 lb 12 8 oz)   SpO2 99% Comment: room air  BMI 34 71 kg/m²          Physical Exam   Constitutional: She is oriented to person, place, and time  She appears well-developed and well-nourished  HENT:   Head: Normocephalic  Eyes: Pupils are equal, round, and reactive to light  Neck: Normal range of motion  Neck supple  Cardiovascular: Normal rate  No murmur heard  Pulmonary/Chest: Effort normal and breath sounds normal  No respiratory distress  She has no wheezes  She has no rales  Abdominal: Soft  Musculoskeletal: Normal range of motion  She exhibits no edema  Neurological: She is alert and oriented to person, place, and time  Skin: Skin is warm and dry         Answers for HPI/ROS submitted by the patient on 12/15/2018   Primary symptoms  Do you experience frequent throat clearing?: Yes  Chronicity: new  When did you first notice your symptoms?: more than 1 month ago  How often do your symptoms occur?: constantly  Since you first noticed this problem, how has it changed?: unchanged  Do you have shortness of breath that occurs with effort or exertion?: Yes  Do you have ear congestion?: No  Do you have heartburn?: No  Do you have fatigue?: Yes  Do you have nasal congestion?: No  Do you have shortness of breath when lying flat?: No  Do you have shortness of breath when you wake up?: No  Do you have sweats?: No  Have you experienced weight loss?: Yes  Which of the following makes your symptoms worse?: climbing stairs, exercise, strenuous activity  Which of the following makes your symptoms better?: nothing, rest  Risk factors for lung disease: animal exposure

## 2018-12-20 NOTE — ASSESSMENT & PLAN NOTE
Mrs Ferrara Breath and I had a very long discussion about her most recent health issues  I reviewed all of her biopsies, her scans as well as her spirometry in the office today  We talked about possible causes of granulomatous disease  Granulomas disease can be  into infectious versus noninfectious  Infectious causes include tuleremia, cat scratch and fungal infections like cryptococcus, histoplasmosis and coccidiomycosis  In addition tuberculosis and atypical mycobacterium as well as BCG toxoplasma and rare infections as well  Most of these have been ruled out directly by culture of her tissue  From a non infectious standpoint a diffuse granulomatous disease like this leaves us with the sarcoid like reaction and/or sarcoidosis  This could also be from foreign body granulomatosis although this was ruled out with polarized light examination  I doubt any rare disease like histoplasmosis given her lack of other supporting CT scan findings  All in all this is consistent with sarcoidosis despite the fact that the pathology does not show broncho vascular bundles that we would typically see in a distribution consistent with sarcoid  I am going to call this sarcoidosis and treated as such  We discussed at length the diagnosis of sarcoidosis and how it typically be haze with regards to waxing and waning versus going into remission versus possibly causing ongoing chronic disease  For now will treat her with prednisone starting at 60 mg daily and decreasing by 10 mg every 5 days until we maintain her on 10 mg daily  Repeat the CAT scan in 1 month and if she has response to treatment will consider discontinuing the prednisone at that time and monitoring for recurrence     She is allergic to Bactrim so we opted against prophylaxis for PCP given the short period of high-dose steroids

## 2018-12-21 LAB
ACE SERPL-CCNC: 64 U/L (ref 14–82)
GAMMA INTERFERON BACKGROUND BLD IA-ACNC: 0.09 IU/ML
M TB IFN-G BLD-IMP: NEGATIVE
M TB IFN-G CD4+ BCKGRND COR BLD-ACNC: -0.01 IU/ML
M TB IFN-G CD4+ BCKGRND COR BLD-ACNC: 0.01 IU/ML
MITOGEN IGNF BCKGRD COR BLD-ACNC: >10 IU/ML

## 2018-12-31 LAB — FUNGUS SPEC CULT: NORMAL

## 2019-01-15 LAB
MYCOBACTERIUM SPEC CULT: NORMAL
RHODAMINE-AURAMINE STN SPEC: NORMAL

## 2019-01-19 ENCOUNTER — HOSPITAL ENCOUNTER (OUTPATIENT)
Dept: CT IMAGING | Facility: HOSPITAL | Age: 69
Discharge: HOME | End: 2019-01-19
Attending: INTERNAL MEDICINE
Payer: COMMERCIAL

## 2019-01-19 DIAGNOSIS — R59.0 MEDIASTINAL ADENOPATHY: ICD-10-CM

## 2019-01-19 PROCEDURE — 71250 CT THORAX DX C-: CPT

## 2019-02-11 ENCOUNTER — OFFICE VISIT (OUTPATIENT)
Dept: PULMONOLOGY | Facility: CLINIC | Age: 69
End: 2019-02-11
Payer: COMMERCIAL

## 2019-02-11 VITALS
SYSTOLIC BLOOD PRESSURE: 140 MMHG | HEIGHT: 62 IN | WEIGHT: 198.2 LBS | BODY MASS INDEX: 36.47 KG/M2 | DIASTOLIC BLOOD PRESSURE: 82 MMHG | RESPIRATION RATE: 18 BRPM | TEMPERATURE: 98 F | HEART RATE: 95 BPM | OXYGEN SATURATION: 98 %

## 2019-02-11 DIAGNOSIS — R16.0 LIVER MASS: ICD-10-CM

## 2019-02-11 DIAGNOSIS — R59.0 MEDIASTINAL ADENOPATHY: ICD-10-CM

## 2019-02-11 DIAGNOSIS — D86.9 SARCOIDOSIS: ICD-10-CM

## 2019-02-11 DIAGNOSIS — D86.0 SARCOIDOSIS, LUNG (HCC): Primary | ICD-10-CM

## 2019-02-11 PROCEDURE — 99214 OFFICE O/P EST MOD 30 MIN: CPT | Performed by: INTERNAL MEDICINE

## 2019-02-11 PROCEDURE — 90670 PCV13 VACCINE IM: CPT

## 2019-02-11 PROCEDURE — 90471 IMMUNIZATION ADMIN: CPT

## 2019-02-11 RX ORDER — PREDNISONE 10 MG/1
10 TABLET ORAL DAILY
Qty: 60 TABLET | Refills: 0 | Status: SHIPPED | OUTPATIENT
Start: 2019-02-11 | End: 2019-04-03

## 2019-02-11 NOTE — PATIENT INSTRUCTIONS
Take prednisone 10 mg a day for 30 more days,  Take 5 mg a day for 2 weeks, then 5mg M-W-F for 2 weeks  Follow up in 3 months  Follow with opthamologist  Schedule PFTs

## 2019-02-11 NOTE — PROGRESS NOTES
Assessment/Plan:    Sarcoidosis  Mrs Encarnacion CT scan seems to be responding with regards to the adenopathy  She continues to have shortness of breath  At this point her dyspnea is likely related to either some alveolitis related to the sarcoidosis versus endobronchial infiltration causing obstructive lung disease  At this point I have ordered pulmonary function tests  If heard diffusion capacity for carbon monoxide is reduced will favor a longer course of prednisone  For now I have asked her to continue prednisone at 10 for 30 days decreased to 5 for 2 weeks and then 5 Monday Wednesday Friday prior to discontinuing  I have also started her on Advair 250/51 puff twice a day to treat endobronchial sarcoid  At her next office visit we will do a follow-up CT scan to assess the stability of her disease  I have given her the Prevnar vaccine in the office today  Diagnoses and all orders for this visit:    Sarcoidosis, lung ([facility] [place] [address] )  -     Pulmonary function test; Future  -     predniSONE 10 mg tablet; Take 1 tablet (10 mg total) by mouth daily  -     fluticasone-salmeterol (ADVAIR) 250-50 mcg/dose inhaler; Inhale 1 puff 2 (two) times a day Rinse mouth after use  -     PNEUMOCOCCAL CONJUGATE VACCINE 13-VALENT GREATER THAN 6 MONTHS    Mediastinal adenopathy    Sarcoidosis    Liver mass          Subjective:      Patient ID: Dustin Mckeon is a 80 y o  female  Mrs Kevin Franklin has no further symptoms of chest pain or abdominal pain  She continues to have dyspnea with exertion  She feels that when she is walking or exposed to cold air  This is much different than last year at this time  She also describes some itching in her nasal bridge and cheeks  She felt like this got much better while on the steroids  primary symptoms   Associated symptoms include headaches  Pertinent negatives include no chest pain, coughing, fever, myalgias or sore throat         The following portions of the patient's history were reviewed and updated as appropriate: allergies, current medications, past family history, past medical history, past social history, past surgical history and problem list     Review of Systems   Constitutional: Negative  Negative for appetite change, fever and unexpected weight change  HENT: Negative  Negative for ear pain, postnasal drip, rhinorrhea, sneezing, sore throat and trouble swallowing  Eyes: Negative  Respiratory: Positive for shortness of breath  Negative for cough and wheezing  Cardiovascular: Negative  Negative for chest pain and leg swelling  Gastrointestinal: Negative  Endocrine: Negative  Genitourinary: Negative  Musculoskeletal: Negative  Negative for myalgias  Allergic/Immunologic: Negative  Neurological: Positive for headaches  Hematological: Negative  Objective:      /82 (BP Location: Left arm, Patient Position: Sitting, Cuff Size: Adult)   Pulse 95   Temp 98 °F (36 7 °C) (Tympanic)   Resp 18   Ht 5' 2" (1 575 m)   Wt 89 9 kg (198 lb 3 2 oz)   SpO2 98% Comment: room air  BMI 36 25 kg/m²          Physical Exam   Constitutional: She is oriented to person, place, and time  She appears well-developed and well-nourished  HENT:   Head: Normocephalic  Eyes: Pupils are equal, round, and reactive to light  Neck: Normal range of motion  Neck supple  Cardiovascular: Normal rate  No murmur heard  Pulmonary/Chest: Effort normal and breath sounds normal  No respiratory distress  She has no wheezes  She has no rales  Abdominal: Soft  Musculoskeletal: Normal range of motion  She exhibits no edema  Neurological: She is alert and oriented to person, place, and time  Skin: Skin is warm and dry

## 2019-02-11 NOTE — ASSESSMENT & PLAN NOTE
Mrs Lillian Bello CT scan seems to be responding with regards to the adenopathy  She continues to have shortness of breath  At this point her dyspnea is likely related to either some alveolitis related to the sarcoidosis versus endobronchial infiltration causing obstructive lung disease  At this point I have ordered pulmonary function tests  If heard diffusion capacity for carbon monoxide is reduced will favor a longer course of prednisone  For now I have asked her to continue prednisone at 10 for 30 days decreased to 5 for 2 weeks and then 5 Monday Wednesday Friday prior to discontinuing  I have also started her on Advair 250/51 puff twice a day to treat endobronchial sarcoid  At her next office visit we will do a follow-up CT scan to assess the stability of her disease  I have given her the Prevnar vaccine in the office today

## 2019-02-13 ENCOUNTER — TELEPHONE (OUTPATIENT)
Dept: PULMONOLOGY | Facility: CLINIC | Age: 69
End: 2019-02-13

## 2019-02-13 DIAGNOSIS — D86.0 SARCOIDOSIS, LUNG (HCC): Primary | ICD-10-CM

## 2019-02-13 RX ORDER — FLUTICASONE FUROATE AND VILANTEROL 200; 25 UG/1; UG/1
1 POWDER RESPIRATORY (INHALATION) DAILY
Qty: 1 INHALER | Refills: 3 | Status: SHIPPED | OUTPATIENT
Start: 2019-02-13 | End: 2019-04-03

## 2019-02-13 NOTE — TELEPHONE ENCOUNTER
Patient called the office back today to let me know that she will go ahead with the one time fee of $290 for her Advair  She had not realized that she had to meet a deductible  Once her deductible is met, she will have a $40 copay for her medication

## 2019-02-21 ENCOUNTER — HOSPITAL ENCOUNTER (OUTPATIENT)
Dept: PULMONOLOGY | Facility: HOSPITAL | Age: 69
Discharge: HOME | End: 2019-02-21
Attending: INTERNAL MEDICINE
Payer: COMMERCIAL

## 2019-02-21 DIAGNOSIS — D86.0 SARCOIDOSIS, LUNG (HCC): ICD-10-CM

## 2019-02-21 PROCEDURE — 94010 BREATHING CAPACITY TEST: CPT

## 2019-02-21 PROCEDURE — 94726 PLETHYSMOGRAPHY LUNG VOLUMES: CPT

## 2019-02-21 PROCEDURE — 94729 DIFFUSING CAPACITY: CPT | Performed by: INTERNAL MEDICINE

## 2019-02-21 PROCEDURE — 94729 DIFFUSING CAPACITY: CPT

## 2019-02-21 PROCEDURE — 94010 BREATHING CAPACITY TEST: CPT | Performed by: INTERNAL MEDICINE

## 2019-02-21 PROCEDURE — 94760 N-INVAS EAR/PLS OXIMETRY 1: CPT

## 2019-02-21 PROCEDURE — 94726 PLETHYSMOGRAPHY LUNG VOLUMES: CPT | Performed by: INTERNAL MEDICINE

## 2019-03-08 ENCOUNTER — APPOINTMENT (OUTPATIENT)
Dept: LAB | Facility: CLINIC | Age: 69
End: 2019-03-08
Payer: COMMERCIAL

## 2019-03-08 DIAGNOSIS — R59.1 LYMPHADENOPATHY: ICD-10-CM

## 2019-03-08 LAB
BUN SERPL-MCNC: 19 MG/DL (ref 5–25)
CREAT SERPL-MCNC: 0.84 MG/DL (ref 0.6–1.3)
GFR SERPL CREATININE-BSD FRML MDRD: 72 ML/MIN/1.73SQ M

## 2019-03-08 PROCEDURE — 36415 COLL VENOUS BLD VENIPUNCTURE: CPT

## 2019-03-08 PROCEDURE — 82565 ASSAY OF CREATININE: CPT

## 2019-03-08 PROCEDURE — 84520 ASSAY OF UREA NITROGEN: CPT

## 2019-03-23 ENCOUNTER — HOSPITAL ENCOUNTER (OUTPATIENT)
Dept: MRI IMAGING | Facility: HOSPITAL | Age: 69
Discharge: HOME | End: 2019-03-23
Attending: SURGERY
Payer: COMMERCIAL

## 2019-03-23 DIAGNOSIS — R59.1 LYMPHADENOPATHY: ICD-10-CM

## 2019-03-23 PROCEDURE — A9585 GADOBUTROL INJECTION: HCPCS | Performed by: SURGERY

## 2019-03-23 PROCEDURE — 74183 MRI ABD W/O CNTR FLWD CNTR: CPT

## 2019-03-23 RX ADMIN — GADOBUTROL 8 ML: 604.72 INJECTION INTRAVENOUS at 09:37

## 2019-04-03 ENCOUNTER — OFFICE VISIT (OUTPATIENT)
Dept: SURGICAL ONCOLOGY | Facility: CLINIC | Age: 69
End: 2019-04-03
Payer: COMMERCIAL

## 2019-04-03 VITALS
BODY MASS INDEX: 37.54 KG/M2 | WEIGHT: 204 LBS | RESPIRATION RATE: 16 BRPM | DIASTOLIC BLOOD PRESSURE: 80 MMHG | TEMPERATURE: 97.7 F | HEIGHT: 62 IN | SYSTOLIC BLOOD PRESSURE: 122 MMHG | HEART RATE: 91 BPM

## 2019-04-03 DIAGNOSIS — R16.0 LIVER MASS: ICD-10-CM

## 2019-04-03 DIAGNOSIS — I88.8 GRANULOMATOUS LYMPHADENITIS: ICD-10-CM

## 2019-04-03 DIAGNOSIS — R59.1 LYMPHADENOPATHY: ICD-10-CM

## 2019-04-03 DIAGNOSIS — D49.0 IPMN (INTRADUCTAL PAPILLARY MUCINOUS NEOPLASM): Primary | ICD-10-CM

## 2019-04-03 PROCEDURE — 99213 OFFICE O/P EST LOW 20 MIN: CPT | Performed by: SURGERY

## 2019-04-17 DIAGNOSIS — K21.9 GASTROESOPHAGEAL REFLUX DISEASE WITHOUT ESOPHAGITIS: ICD-10-CM

## 2019-04-18 RX ORDER — ESOMEPRAZOLE MAGNESIUM 40 MG/1
CAPSULE, DELAYED RELEASE ORAL
Qty: 90 CAPSULE | Refills: 1 | Status: SHIPPED | OUTPATIENT
Start: 2019-04-18 | End: 2019-10-07 | Stop reason: SDUPTHER

## 2019-04-22 RX ORDER — PREDNISONE 1 MG/1
TABLET ORAL
Qty: 60 TABLET | Refills: 0 | OUTPATIENT
Start: 2019-04-22

## 2019-05-02 ENCOUNTER — TELEPHONE (OUTPATIENT)
Dept: PULMONOLOGY | Facility: CLINIC | Age: 69
End: 2019-05-02

## 2019-05-14 ENCOUNTER — DOCUMENTATION (OUTPATIENT)
Dept: PULMONOLOGY | Facility: CLINIC | Age: 69
End: 2019-05-14

## 2019-06-02 ENCOUNTER — APPOINTMENT (EMERGENCY)
Dept: RADIOLOGY | Facility: HOSPITAL | Age: 69
End: 2019-06-02
Payer: COMMERCIAL

## 2019-06-02 ENCOUNTER — OFFICE VISIT (OUTPATIENT)
Dept: URGENT CARE | Age: 69
End: 2019-06-02
Payer: COMMERCIAL

## 2019-06-02 ENCOUNTER — APPOINTMENT (EMERGENCY)
Dept: ULTRASOUND IMAGING | Facility: HOSPITAL | Age: 69
End: 2019-06-02
Payer: COMMERCIAL

## 2019-06-02 ENCOUNTER — HOSPITAL ENCOUNTER (EMERGENCY)
Facility: HOSPITAL | Age: 69
Discharge: HOME | End: 2019-06-02
Attending: EMERGENCY MEDICINE | Admitting: EMERGENCY MEDICINE
Payer: COMMERCIAL

## 2019-06-02 VITALS
DIASTOLIC BLOOD PRESSURE: 78 MMHG | WEIGHT: 204 LBS | BODY MASS INDEX: 37.54 KG/M2 | HEART RATE: 88 BPM | TEMPERATURE: 97.4 F | SYSTOLIC BLOOD PRESSURE: 134 MMHG | RESPIRATION RATE: 18 BRPM | OXYGEN SATURATION: 98 % | HEIGHT: 62 IN

## 2019-06-02 VITALS
DIASTOLIC BLOOD PRESSURE: 77 MMHG | BODY MASS INDEX: 37.42 KG/M2 | RESPIRATION RATE: 16 BRPM | WEIGHT: 204.59 LBS | SYSTOLIC BLOOD PRESSURE: 169 MMHG | TEMPERATURE: 97.7 F | HEART RATE: 77 BPM | OXYGEN SATURATION: 96 %

## 2019-06-02 DIAGNOSIS — M25.561 ACUTE PAIN OF RIGHT KNEE: Primary | ICD-10-CM

## 2019-06-02 DIAGNOSIS — R60.0 EDEMA OF RIGHT LOWER EXTREMITY: Primary | ICD-10-CM

## 2019-06-02 PROCEDURE — S9083 URGENT CARE CENTER GLOBAL: HCPCS | Performed by: FAMILY MEDICINE

## 2019-06-02 PROCEDURE — G0382 LEV 3 HOSP TYPE B ED VISIT: HCPCS | Performed by: FAMILY MEDICINE

## 2019-06-02 PROCEDURE — 73564 X-RAY EXAM KNEE 4 OR MORE: CPT

## 2019-06-02 PROCEDURE — 99283 EMERGENCY DEPT VISIT LOW MDM: CPT | Performed by: PHYSICIAN ASSISTANT

## 2019-06-02 PROCEDURE — 99284 EMERGENCY DEPT VISIT MOD MDM: CPT

## 2019-06-02 PROCEDURE — 93971 EXTREMITY STUDY: CPT

## 2019-06-03 PROCEDURE — 93971 EXTREMITY STUDY: CPT | Performed by: SURGERY

## 2019-06-04 ENCOUNTER — OFFICE VISIT (OUTPATIENT)
Dept: PULMONOLOGY | Facility: CLINIC | Age: 69
End: 2019-06-04
Payer: COMMERCIAL

## 2019-06-04 VITALS
DIASTOLIC BLOOD PRESSURE: 80 MMHG | RESPIRATION RATE: 14 BRPM | HEART RATE: 82 BPM | TEMPERATURE: 97.8 F | WEIGHT: 206 LBS | SYSTOLIC BLOOD PRESSURE: 124 MMHG | BODY MASS INDEX: 36.5 KG/M2 | OXYGEN SATURATION: 98 % | HEIGHT: 63 IN

## 2019-06-04 DIAGNOSIS — D86.0 SARCOIDOSIS, LUNG (HCC): Primary | ICD-10-CM

## 2019-06-04 DIAGNOSIS — R59.0 MEDIASTINAL ADENOPATHY: ICD-10-CM

## 2019-06-04 PROBLEM — I51.89 DIASTOLIC DYSFUNCTION: Status: ACTIVE | Noted: 2019-06-04

## 2019-06-04 PROCEDURE — 99214 OFFICE O/P EST MOD 30 MIN: CPT | Performed by: INTERNAL MEDICINE

## 2019-06-04 RX ORDER — HYDROCHLOROTHIAZIDE 12.5 MG/1
12.5 TABLET ORAL DAILY
Qty: 30 TABLET | Refills: 5 | Status: SHIPPED | OUTPATIENT
Start: 2019-06-04 | End: 2019-11-17 | Stop reason: SDUPTHER

## 2019-06-21 ENCOUNTER — OFFICE VISIT (OUTPATIENT)
Dept: OBGYN CLINIC | Facility: CLINIC | Age: 69
End: 2019-06-21
Payer: COMMERCIAL

## 2019-06-21 VITALS
HEIGHT: 63 IN | WEIGHT: 206 LBS | SYSTOLIC BLOOD PRESSURE: 145 MMHG | HEART RATE: 50 BPM | DIASTOLIC BLOOD PRESSURE: 67 MMHG | BODY MASS INDEX: 36.5 KG/M2

## 2019-06-21 DIAGNOSIS — M25.561 ACUTE PAIN OF RIGHT KNEE: Primary | ICD-10-CM

## 2019-06-21 PROCEDURE — 99203 OFFICE O/P NEW LOW 30 MIN: CPT | Performed by: ORTHOPAEDIC SURGERY

## 2019-07-08 ENCOUNTER — HOSPITAL ENCOUNTER (OUTPATIENT)
Dept: MRI IMAGING | Facility: HOSPITAL | Age: 69
Discharge: HOME | End: 2019-07-08
Payer: COMMERCIAL

## 2019-07-08 DIAGNOSIS — M25.561 ACUTE PAIN OF RIGHT KNEE: ICD-10-CM

## 2019-07-08 PROCEDURE — 73721 MRI JNT OF LWR EXTRE W/O DYE: CPT

## 2019-07-12 ENCOUNTER — OFFICE VISIT (OUTPATIENT)
Dept: OBGYN CLINIC | Facility: CLINIC | Age: 69
End: 2019-07-12
Payer: COMMERCIAL

## 2019-07-12 VITALS
WEIGHT: 204 LBS | DIASTOLIC BLOOD PRESSURE: 87 MMHG | SYSTOLIC BLOOD PRESSURE: 158 MMHG | HEIGHT: 63 IN | BODY MASS INDEX: 36.14 KG/M2 | HEART RATE: 79 BPM

## 2019-07-12 DIAGNOSIS — S83.231A COMPLEX TEAR OF MEDIAL MENISCUS OF RIGHT KNEE AS CURRENT INJURY, INITIAL ENCOUNTER: Primary | ICD-10-CM

## 2019-07-12 PROCEDURE — 99213 OFFICE O/P EST LOW 20 MIN: CPT | Performed by: ORTHOPAEDIC SURGERY

## 2019-07-12 NOTE — PROGRESS NOTES
Patient Name:  Lucrecia Adams  MRN:  [id number]    Assessment & Plan     Right knee medial meniscus tear  1  Reviewed treatment options consisting of right knee arthroscopic partial medial meniscectomy in detail as well as risks and benefits  2  Patient would like to proceed with surgery but needs to discuss scheduling her   She will contact the office via telephone to schedule surgery  Subjective     71-year-old female returns to the office today for follow-up regarding her right knee  She recently underwent an MRI and is here to review results  She still notes persistent pain in the right knee localized primarily to the medial aspect  Pain is worse with increased activities  She notes weakness and instability  No numbness or tingling  No fevers or chills  General ROS:  Negative for fever or chills  Neurological ROS:  Negative for numbness or tingling  Objective     /87   Pulse 79   Ht 5' 2 5" (1 588 m)   Wt 92 5 kg (204 lb)   BMI 36 72 kg/m²       Counseling     The patient was counseled regarding diagnostic results, impressions, patient/family education, instructions for management, risks and benefits of treatment options, and prognosis  The total time of the encounter was 15 minutes, and more than 50% of that time was spent in counseling and coordination of care  Data Review     I have personally reviewed pertinent films in PACS, and my interpretation follows  MRI right knee 7/8/19 reveals a moderate-sized horizontal undersurface tear of the medial meniscus at the junction of the posterior horn and body  Minimal degenerative changes noted in the medial tibial femoral compartment        Social History     Tobacco Use    Smoking status: Never Smoker    Smokeless tobacco: Never Used   Substance Use Topics    Alcohol use: Yes     Comment: social    Drug use: No       Scribe Attestation    I,:   Irasema Yi PA-C am acting as a scribe while in the presence of the attending physician :        I,:   Leela Blank MD personally performed the services described in this documentation    as scribed in my presence :

## 2019-08-12 ENCOUNTER — TELEPHONE (OUTPATIENT)
Dept: OBGYN CLINIC | Facility: HOSPITAL | Age: 69
End: 2019-08-12

## 2019-08-12 NOTE — TELEPHONE ENCOUNTER
Patient sees Dr Irma Kirk  She is calling because she was told to contact the office when she wanted to schedule surgery so that she can coordinate a date   She can be reached at #[phone]

## 2019-08-13 NOTE — TELEPHONE ENCOUNTER
Please see message below  I do not have a consent for this pt  Do you remember if she signed a consent and maybe Josi has it? Or does she has to come in for an office visit?

## 2019-08-21 ENCOUNTER — PREP FOR PROCEDURE (OUTPATIENT)
Dept: OBGYN CLINIC | Facility: CLINIC | Age: 69
End: 2019-08-21

## 2019-08-21 ENCOUNTER — TELEPHONE (OUTPATIENT)
Dept: OBGYN CLINIC | Facility: CLINIC | Age: 69
End: 2019-08-21

## 2019-08-21 ENCOUNTER — TRANSCRIBE ORDERS (OUTPATIENT)
Dept: LAB | Facility: CLINIC | Age: 69
End: 2019-08-21

## 2019-08-21 ENCOUNTER — APPOINTMENT (OUTPATIENT)
Dept: LAB | Facility: CLINIC | Age: 69
End: 2019-08-21
Payer: COMMERCIAL

## 2019-08-21 ENCOUNTER — OFFICE VISIT (OUTPATIENT)
Dept: LAB | Facility: CLINIC | Age: 69
End: 2019-08-21
Payer: COMMERCIAL

## 2019-08-21 DIAGNOSIS — S83.241A OTHER TEAR OF MEDIAL MENISCUS, CURRENT INJURY, RIGHT KNEE, INITIAL ENCOUNTER: ICD-10-CM

## 2019-08-21 DIAGNOSIS — S83.241A OTHER TEAR OF MEDIAL MENISCUS, CURRENT INJURY, RIGHT KNEE, INITIAL ENCOUNTER: Primary | ICD-10-CM

## 2019-08-21 LAB
ANION GAP SERPL CALCULATED.3IONS-SCNC: 8 MMOL/L (ref 4–13)
ATRIAL RATE: 80 BPM
BUN SERPL-MCNC: 19 MG/DL (ref 5–25)
CALCIUM SERPL-MCNC: 9.1 MG/DL (ref 8.3–10.1)
CHLORIDE SERPL-SCNC: 105 MMOL/L (ref 100–108)
CO2 SERPL-SCNC: 29 MMOL/L (ref 21–32)
CREAT SERPL-MCNC: 0.89 MG/DL (ref 0.6–1.3)
GFR SERPL CREATININE-BSD FRML MDRD: 67 ML/MIN/1.73SQ M
GLUCOSE SERPL-MCNC: 121 MG/DL (ref 65–140)
P AXIS: 38 DEGREES
POTASSIUM SERPL-SCNC: 3.4 MMOL/L (ref 3.5–5.3)
PR INTERVAL: 140 MS
QRS AXIS: -6 DEGREES
QRSD INTERVAL: 88 MS
QT INTERVAL: 396 MS
QTC INTERVAL: 456 MS
SODIUM SERPL-SCNC: 142 MMOL/L (ref 136–145)
T WAVE AXIS: 21 DEGREES
VENTRICULAR RATE: 80 BPM

## 2019-08-21 PROCEDURE — 36415 COLL VENOUS BLD VENIPUNCTURE: CPT

## 2019-08-21 PROCEDURE — 80048 BASIC METABOLIC PNL TOTAL CA: CPT

## 2019-08-21 PROCEDURE — 93010 ELECTROCARDIOGRAM REPORT: CPT | Performed by: INTERNAL MEDICINE

## 2019-08-21 PROCEDURE — 93005 ELECTROCARDIOGRAM TRACING: CPT

## 2019-08-21 NOTE — TELEPHONE ENCOUNTER
Patient reported to our surgical scheduler this morning to schedule a right knee arthroscopic partial medial meniscectomy  I did discuss with the patient home exercises including stretching and strengthening exercises to help maintain her range of motion and strength which will lead to a quicker recovery  Patient understands this      Vicente Cannon PA-C

## 2019-08-23 ENCOUNTER — TELEPHONE (OUTPATIENT)
Dept: OBGYN CLINIC | Facility: CLINIC | Age: 69
End: 2019-08-23

## 2019-08-23 NOTE — TELEPHONE ENCOUNTER
Provided patient with an exercise handout her knee prior to her knee arthroscopy to maintain her range of motion and strength  Also discussed weight loss with the patient  Discussed diet in conjunction with exercising regularly  Discussed avoiding foods high in fat and sugar  Discussed foods high in fiber  Patient understands

## 2019-08-29 DIAGNOSIS — S83.231A COMPLEX TEAR OF MEDIAL MENISCUS OF RIGHT KNEE AS CURRENT INJURY, INITIAL ENCOUNTER: Primary | ICD-10-CM

## 2019-09-04 ENCOUNTER — OFFICE VISIT (OUTPATIENT)
Dept: PULMONOLOGY | Facility: CLINIC | Age: 69
End: 2019-09-04
Payer: COMMERCIAL

## 2019-09-04 VITALS
TEMPERATURE: 97.4 F | OXYGEN SATURATION: 98 % | HEIGHT: 63 IN | DIASTOLIC BLOOD PRESSURE: 76 MMHG | SYSTOLIC BLOOD PRESSURE: 122 MMHG | HEART RATE: 88 BPM | BODY MASS INDEX: 35.97 KG/M2 | WEIGHT: 203 LBS

## 2019-09-04 DIAGNOSIS — D86.0 SARCOIDOSIS, LUNG (HCC): Primary | ICD-10-CM

## 2019-09-04 DIAGNOSIS — I88.8 GRANULOMATOUS LYMPHADENITIS: ICD-10-CM

## 2019-09-04 DIAGNOSIS — S83.241A OTHER TEAR OF MEDIAL MENISCUS, CURRENT INJURY, RIGHT KNEE, INITIAL ENCOUNTER: ICD-10-CM

## 2019-09-04 PROCEDURE — 99214 OFFICE O/P EST MOD 30 MIN: CPT | Performed by: INTERNAL MEDICINE

## 2019-09-04 RX ORDER — PREDNISONE 10 MG/1
10 TABLET ORAL DAILY
Qty: 30 TABLET | Refills: 0 | Status: SHIPPED | OUTPATIENT
Start: 2019-09-04 | End: 2020-08-26 | Stop reason: ALTCHOICE

## 2019-09-04 NOTE — ASSESSMENT & PLAN NOTE
Zia Castellano describes multiple symptoms a lot of which could be attributed to her sarcoidosis  She describes skin lesions which are not typical but certainly could be due to sarcoid, and neuropathy symptoms it certainly could be attributed to granulomas  I would like to trial her on a course of steroids 60 mg for 1 day and then 10 mg a day for 14 days and 5 mg a day for 14 days to assess response of her symptoms  I would also like to repeat a CT of her chest to assess stability of her granulomatous disease  I do think she is limited with regards to physical therapy at this point due to her breathing  It may be unreasonable to assume she could do physical therapy and weight loss with her active sarcoidosis and prednisone consumption  See her back in 8 weeks  Of note her blood pressure is under better control on hydrochlorothiazide  She still has swelling of her right leg greater than left with negative Dopplers presumably due to her knee problems

## 2019-09-04 NOTE — PROGRESS NOTES
Assessment/Plan:    No problem-specific Assessment & Plan notes found for this encounter  Diagnoses and all orders for this visit:    Sarcoidosis, lung ([facility] [address] )  -     CT chest without contrast; Future  -     predniSONE 10 mg tablet; Take 1 tablet (10 mg total) by mouth daily Take 6 pills day one then 1 a day for 14 days then 1/2 pill a day for 14 days    Other tear of medial meniscus, current injury, right knee, initial encounter    Granulomatous lymphadenitis          Subjective:      Patient ID: Vernon Daley is a 80 y o  female  June Qi has been struggling with some chronic health problems  She has severe right knee pain due to a torn meniscus  Unfortunately her insurance will not allow her to go through with surgical repair and suggest for 6 weeks of physical therapy and weight loss  Unfortunately she is short of breath from her sarcoid which is limiting her exercise and she will need to start back on prednisone for her sarcoidosis  She has several skin lesions, some neuropathic pain in her groin as well as some hoarseness and shortness of Breath all likely sequela of her chronic sarcoidosis  primary symptoms   Pertinent negatives include no chest pain, coughing, fever, headaches, myalgias or sore throat  The following portions of the patient's history were reviewed and updated as appropriate: allergies, current medications, past family history, past medical history, past social history, past surgical history and problem list     Review of Systems   Constitutional: Negative  Negative for appetite change, fever and unexpected weight change  HENT: Positive for voice change  Negative for ear pain, postnasal drip, rhinorrhea, sneezing, sore throat and trouble swallowing  Eyes: Negative  Respiratory: Positive for shortness of breath  Negative for cough and wheezing  Cardiovascular: Negative  Negative for chest pain and leg swelling  Gastrointestinal: Negative  Endocrine: Negative  Genitourinary: Negative  Musculoskeletal: Negative  Negative for myalgias  Skin:        Itching of her eyelids and nose, several skin lesions throughout arms and neck   Allergic/Immunologic: Negative  Neurological: Negative for headaches  Burning pain in her groin intermittent   Hematological: Negative  Objective:      /76 (BP Location: Left arm, Patient Position: Sitting)   Pulse 88   Temp (!) 97 4 °F (36 3 °C) (Tympanic)   Ht 5' 2 5" (1 588 m)   Wt 92 1 kg (203 lb)   SpO2 98%   BMI 36 54 kg/m²          Physical Exam   Constitutional: She is oriented to person, place, and time  She appears well-developed and well-nourished  HENT:   Head: Normocephalic  Eyes: Pupils are equal, round, and reactive to light  Neck: Normal range of motion  Neck supple  Cardiovascular: Normal rate  No murmur heard  Pulmonary/Chest: Effort normal and breath sounds normal  No respiratory distress  She has no wheezes  She has no rales  Abdominal: Soft  Musculoskeletal: Normal range of motion  She exhibits no edema  Neurological: She is alert and oriented to person, place, and time  Skin: Skin is warm and dry         Answers for HPI/ROS submitted by the patient on 8/28/2019   Primary symptoms  Do you experience frequent throat clearing?: Yes  Do you have a hoarse voice?: Yes  Chronicity: recurrent  When did you first notice your symptoms?: more than 1 month ago  How often do your symptoms occur?: daily  Since you first noticed this problem, how has it changed?: unchanged  Do you have shortness of breath that occurs with effort or exertion?: Yes  Do you have ear congestion?: No  Do you have heartburn?: No  Do you have fatigue?: Yes  Do you have nasal congestion?: No  Do you have shortness of breath when lying flat?: No  Do you have shortness of breath when you wake up?: No  Do you have sweats?: No  Have you experienced weight loss?: No  Which of the following makes your symptoms worse?: any activity, climbing stairs, exercise, strenuous activity  Which of the following makes your symptoms better?: rest  Risk factors for lung disease: animal exposure

## 2019-09-10 ENCOUNTER — EVALUATION (OUTPATIENT)
Dept: PHYSICAL THERAPY | Facility: CLINIC | Age: 69
End: 2019-09-10
Payer: COMMERCIAL

## 2019-09-10 ENCOUNTER — HOSPITAL ENCOUNTER (OUTPATIENT)
Dept: CT IMAGING | Facility: HOSPITAL | Age: 69
Discharge: HOME | End: 2019-09-10
Attending: INTERNAL MEDICINE
Payer: COMMERCIAL

## 2019-09-10 DIAGNOSIS — D86.0 SARCOIDOSIS, LUNG (HCC): ICD-10-CM

## 2019-09-10 DIAGNOSIS — S83.231D COMPLEX TEAR OF MEDIAL MENISCUS OF RIGHT KNEE AS CURRENT INJURY, SUBSEQUENT ENCOUNTER: Primary | ICD-10-CM

## 2019-09-10 DIAGNOSIS — S83.231A COMPLEX TEAR OF MEDIAL MENISCUS OF RIGHT KNEE AS CURRENT INJURY, INITIAL ENCOUNTER: ICD-10-CM

## 2019-09-10 PROCEDURE — 71250 CT THORAX DX C-: CPT

## 2019-09-10 PROCEDURE — 97110 THERAPEUTIC EXERCISES: CPT | Performed by: PHYSICAL THERAPIST

## 2019-09-10 PROCEDURE — 97162 PT EVAL MOD COMPLEX 30 MIN: CPT | Performed by: PHYSICAL THERAPIST

## 2019-09-10 NOTE — PROGRESS NOTES
PT Evaluation     Today's date: 9/10/2019  Patient name: Gisela Tim  : 1950  MRN: [id number]  Referring provider: Tiara Bojorquez PA-C  Dx:   Encounter Diagnosis     ICD-10-CM    1  Complex tear of medial meniscus of right knee as current injury, subsequent encounter E91 491H                   Assessment  Assessment details: Patient presents with signs and symptoms consistent with referring diagnosis  She has pain, tenderness, tightness, mild ROM limitations, weakness and functional deficits outlined  Positive prognostic indicators include: Motivated to improve, improving while on Prednisone  Negative prognostic indicators include: (-) She presents with: pain, decreased: ROM, strength and  functional capacity  She requires skilled PT to address these deficits and restore maximal functional capacity  Thank you for this pleasant referral        Impairments: abnormal or restricted ROM, activity intolerance, impaired physical strength, lacks appropriate home exercise program and pain with function  Understanding of Dx/Px/POC: good   Prognosis: good    Goals  ST-6 weeks  1  Patient to be independent with HEP  2   Decrease pain at least 2 subjective levels  LT-12 weeks  1    Patient to voice comfort with self management of condition  2   75% or > decreased pain  3   75% or > decreased functional deficits  4   Normalize AROM of all deficit planes  5   Normalize strength  6   Functional Status Score: 59  7  Patient to voice understanding of activities/positions to avoid    9   Normalize Gait    Plan  Patient would benefit from: skilled PT  Referral necessary: No  Planned modality interventions: cryotherapy  Planned therapy interventions: IADL retraining, joint mobilization, manual therapy, motor coordination training, neuromuscular re-education, patient education, postural training, self care, strengthening, stretching, therapeutic activities, therapeutic exercise, home exercise program, flexibility, ADL training, balance and body mechanics training  Frequency: 2x week  Duration in weeks: 6  Treatment plan discussed with: patient        Subjective Evaluation    History of Present Illness  Onset date: 6 months  Mechanism of injury: Chief Complaint: R knee pain    History: Insidious onset of R knee pain beginning 6 months ago  She was scheduled for surgery but was denied by her insurance company and instructed to try PT first   She had an MRI which revealed a meniscal tear  She had a recent flare of her sarcoidosis and began taking prednisone and notes knee pain has improved since that time  She works full duty currently with mostly sitting responsibilities  She lives in a 2 floor home   Stairs have been very difficult but improved on prednisone  Stairs are especially difficult when carrying items like laundry  She has been limiting how frequently she does stairs  She has been using a step to pattern on stairs           PMH: Sarcoidosis, L knee menisectomy 16 y/o     Aggravating factors: prolonged standing, walking  Relieving factors: rest    Functional Deficits: Stairs, kneeling, squatting, prolonged standing    Patient Goals: avoid surgery    Quality of life: good    Pain  At best pain ratin  At worst pain ratin  Location: R medial knee          Objective     Active Range of Motion   Left Knee   Flexion: 126 degrees     Right Knee   Flexion: 123 degrees with pain  Extension: 0 degrees     Passive Range of Motion     Right Knee   Flexion: WFL and with pain    Strength/Myotome Testing     Right Knee   Flexion: 4+  Extension: 4+    General Comments:      Knee Comments  Gait: WFL  Stairs: Handrail, reciprocal pattern, minor pain  SLS WFL Eyes Open  DL Squat: 85; Pain > 75  SL Squat R 65, painful  (-) Meniscal/ligamentous testing  (+) TTP Medial Joint Line  (+) Ritchie  SLR 70    Hip MMT 4+/5  5 Times Sit <--> Stand 11 35 sec, no UE support               Precautions: Sarcoidosis      Manual  9/10            PROM                                                                     Exercise Diary  9/10            HEP 10'                         Bike             TKE             Leg Press SL             Total Gym more SL             Stand Hip 3 way             SLR             LAQ             Sidestepping             Q Stretch             Q sets                                                                                                                         Modalities

## 2019-09-16 ENCOUNTER — OFFICE VISIT (OUTPATIENT)
Dept: PHYSICAL THERAPY | Facility: CLINIC | Age: 69
End: 2019-09-16
Payer: COMMERCIAL

## 2019-09-16 DIAGNOSIS — S83.231D COMPLEX TEAR OF MEDIAL MENISCUS OF RIGHT KNEE AS CURRENT INJURY, SUBSEQUENT ENCOUNTER: Primary | ICD-10-CM

## 2019-09-16 DIAGNOSIS — S83.231A COMPLEX TEAR OF MEDIAL MENISCUS OF RIGHT KNEE AS CURRENT INJURY, INITIAL ENCOUNTER: ICD-10-CM

## 2019-09-16 PROCEDURE — 97110 THERAPEUTIC EXERCISES: CPT | Performed by: PHYSICAL THERAPIST

## 2019-09-16 NOTE — PROGRESS NOTES
Daily Note     Today's date: 2019  Patient name: Harry Ruggiero  : 1950  MRN: [id number]  Referring provider: Chantal Cockayne, PA-C  Dx:   Encounter Diagnosis     ICD-10-CM    1  Complex tear of medial meniscus of right knee as current injury, subsequent encounter S83 231D    2  Complex tear of medial meniscus of right knee as current injury, initial encounter S83 231A        Start Time: 999  Stop Time:   Total time in clinic (min): 47 minutes    Subjective: She states she has been on steroids for 1 month due to sarcoidosis  Ever since her this she has had less pain and more energy  Objective: See treatment diary below      Assessment: Tolerated treatment well  Patient would benefit from continued PT   She tolerated all TE well today but had a bit of muscle soreness with TG    Plan: Continue per plan of care        Precautions: Sarcoidosis      Manual  9/10 9/16           PROM  6'                                                                   Exercise Diary  9/10 9/16           HEP 10'                         Bike  6'           TKE  2x10 BTB           Leg Press SL #50  2x10           Total Gym more SL  2x10 L21           Stand Hip 3 way  2x10           SLR  2x10           LAQ  2x10           Sidestepping  5 laps           Q Stretch  3x :20           Q sets                                                                                                                         Modalities

## 2019-09-23 ENCOUNTER — OFFICE VISIT (OUTPATIENT)
Dept: PHYSICAL THERAPY | Facility: CLINIC | Age: 69
End: 2019-09-23
Payer: COMMERCIAL

## 2019-09-23 DIAGNOSIS — S83.231A COMPLEX TEAR OF MEDIAL MENISCUS OF RIGHT KNEE AS CURRENT INJURY, INITIAL ENCOUNTER: ICD-10-CM

## 2019-09-23 DIAGNOSIS — D86.0 SARCOIDOSIS, LUNG (HCC): Primary | ICD-10-CM

## 2019-09-23 DIAGNOSIS — S83.231D COMPLEX TEAR OF MEDIAL MENISCUS OF RIGHT KNEE AS CURRENT INJURY, SUBSEQUENT ENCOUNTER: Primary | ICD-10-CM

## 2019-09-23 PROCEDURE — 97140 MANUAL THERAPY 1/> REGIONS: CPT | Performed by: PHYSICAL THERAPIST

## 2019-09-23 PROCEDURE — 97110 THERAPEUTIC EXERCISES: CPT | Performed by: PHYSICAL THERAPIST

## 2019-09-23 PROCEDURE — 97112 NEUROMUSCULAR REEDUCATION: CPT | Performed by: PHYSICAL THERAPIST

## 2019-09-23 RX ORDER — PREDNISONE 10 MG/1
10 TABLET ORAL DAILY
Qty: 14 TABLET | Refills: 0 | Status: SHIPPED | OUTPATIENT
Start: 2019-09-23 | End: 2019-10-11 | Stop reason: SDUPTHER

## 2019-09-23 NOTE — PROGRESS NOTES
Daily Note     Today's date: 2019  Patient name: Dakota Urias  : 1950  MRN: [id number]  Referring provider: Ifeoma Toney PA-C  Dx:   Encounter Diagnosis     ICD-10-CM    1  Complex tear of medial meniscus of right knee as current injury, subsequent encounter S83 231D    2  Complex tear of medial meniscus of right knee as current injury, initial encounter S83 231A                   Subjective: Pt to be on steroids another 3 weeks, knee feels better when on steroids  Objective: See treatment diary below      Assessment: Tolerated treatment well  Patient would benefit from continued PT  Updated HEP with GTB to perform standing hip 3 way  Plan: Continue per plan of care        Precautions: Sarcoidosis      Manual  9/10 9/16 9/23          PROM  6' 8'                                                                  Exercise Diary  9/10 9/16 9/23          HEP 10'                         Bike  6' 6'          TKE  2x10 BTB S 20 :03          Leg Press SL #50  2x10 50# 20          Total Gym more SL  2x10 L21 L 21 20          Stand Hip 3 way  2x10 20 1 5#          SLR  2x10 20          LAQ  2x10           Sidestepping  5 laps 5 laps R          Q Stretch  3x :20 :20/6          Q sets             SLR Abd   20                                                                                                         Modalities

## 2019-09-30 ENCOUNTER — EVALUATION (OUTPATIENT)
Dept: PHYSICAL THERAPY | Facility: CLINIC | Age: 69
End: 2019-09-30
Payer: COMMERCIAL

## 2019-09-30 DIAGNOSIS — S83.231A COMPLEX TEAR OF MEDIAL MENISCUS OF RIGHT KNEE AS CURRENT INJURY, INITIAL ENCOUNTER: ICD-10-CM

## 2019-09-30 DIAGNOSIS — S83.231D COMPLEX TEAR OF MEDIAL MENISCUS OF RIGHT KNEE AS CURRENT INJURY, SUBSEQUENT ENCOUNTER: Primary | ICD-10-CM

## 2019-09-30 PROCEDURE — 97116 GAIT TRAINING THERAPY: CPT | Performed by: PHYSICAL THERAPIST

## 2019-09-30 PROCEDURE — 97110 THERAPEUTIC EXERCISES: CPT | Performed by: PHYSICAL THERAPIST

## 2019-09-30 PROCEDURE — 97112 NEUROMUSCULAR REEDUCATION: CPT | Performed by: PHYSICAL THERAPIST

## 2019-09-30 NOTE — PROGRESS NOTES
PT Re-Evaluation    Today's date: 2019  Patient name: Qi Dewitt  : 1950  MRN: [id number]  Referring provider: Yenny Green PA-C  Dx:   Encounter Diagnosis     ICD-10-CM    1  Complex tear of medial meniscus of right knee as current injury, subsequent encounter S83 231D    2  Complex tear of medial meniscus of right knee as current injury, initial encounter S83 231A                   Assessment  Assessment details: Patient has been compliant with attending PT and home exercise program since initial eval   She  has made progress towards her goals with full ROM, improved mobilty and strength  She still has some strength defiicts  Unclear how much steroids may be masking any remaining symptoms  Will continue a few more visits then transition to HEP as able  Thank you for this pleasant referral          Impairments: abnormal or restricted ROM, activity intolerance, impaired physical strength, lacks appropriate home exercise program and pain with function  Understanding of Dx/Px/POC: good   Prognosis: good    Goals  ST-6 weeks  1  Patient to be independent with HEP  -Met  2  Decrease pain at least 2 subjective levels  -Met    LT-12 weeks  1    Patient to voice comfort with self management of condition -Met  2   75% or > decreased pain  -Met  3   75% or > decreased functional deficits  -Met  4  Normalize AROM of all deficit planes  -Met  5  Normalize strength -Not Met  6  Functional Status Score: 59  7  Patient to voice understanding of activities/positions to avoid  -Met  9    Normalize Gait- Met    Plan  Patient would benefit from: skilled PT  Referral necessary: No  Planned modality interventions: cryotherapy  Planned therapy interventions: IADL retraining, joint mobilization, manual therapy, motor coordination training, neuromuscular re-education, patient education, postural training, self care, strengthening, stretching, therapeutic activities, therapeutic exercise, home exercise program, flexibility, ADL training, balance and body mechanics training  Frequency: 2x week  Duration in weeks: 6  Treatment plan discussed with: patient        Subjective Evaluation    History of Present Illness  Onset date: 6 months  Mechanism of injury: Patient notes her knee has been feeling good, though she has been on steroids  She is able to walk prolonged distances including hills and stairs without difficulty  She has not yet attempted kneeling/deep squatting  She has no pain       Quality of life: good    Pain  At best pain ratin  At worst pain ratin          Objective     Active Range of Motion   Left Knee   Flexion: 126 degrees     Right Knee   Flexion: 130 degrees   Extension: 0 degrees     Passive Range of Motion     Right Knee   Flexion: WFL    Strength/Myotome Testing     Right Knee   Flexion: 4+  Extension: 4+    General Comments:      Knee Comments  Gait: WFL  Stairs: Handrail, reciprocal pattern, no pain, minimal handrail use  SLS WFL Eyes Open  DL Squat: WFL  SL Squat R 75,   (-) Meniscal/ligamentous testing  (-) TTP Medial Joint Line- RESOLVED  (-) [facility]    Hip MMT 4+/5                 Precautions: Sarcoidosis          Manual  9/10 9/16 9/23 9/30         PROM  6' 8' DC                                                                 Exercise Diary  9/10 9/16 9/23 9/30         HEP 10'                         Bike  6' 6' 6'         TKE  2x10 BTB S 20 :03 S :03 20         Leg Press SL #50  2x10 50# 20 50# 20         Total Gym more SL  2x10 L21 L 21 20 L22 20         Stand Hip 3 way  2x10 20 1 5# 20 1 5#         SLR  2x10 20 20         LAQ  2x10  2# 20         Sidestepping  5 laps 5 laps R G 5 laps         Q Stretch  3x :20 :20/6 :20/6         Q sets             SLR Abd   20 20                                                                                                        Modalities

## 2019-10-05 ENCOUNTER — HOSPITAL ENCOUNTER (OUTPATIENT)
Dept: MRI IMAGING | Facility: HOSPITAL | Age: 69
Discharge: HOME | End: 2019-10-05
Attending: SURGERY
Payer: COMMERCIAL

## 2019-10-05 DIAGNOSIS — R16.0 LIVER MASS: ICD-10-CM

## 2019-10-05 DIAGNOSIS — D49.0 IPMN (INTRADUCTAL PAPILLARY MUCINOUS NEOPLASM): ICD-10-CM

## 2019-10-05 PROCEDURE — 74183 MRI ABD W/O CNTR FLWD CNTR: CPT

## 2019-10-05 PROCEDURE — A9585 GADOBUTROL INJECTION: HCPCS | Performed by: SURGERY

## 2019-10-05 RX ADMIN — GADOBUTROL 9 ML: 604.72 INJECTION INTRAVENOUS at 10:33

## 2019-10-07 ENCOUNTER — OFFICE VISIT (OUTPATIENT)
Dept: PHYSICAL THERAPY | Facility: CLINIC | Age: 69
End: 2019-10-07
Payer: COMMERCIAL

## 2019-10-07 DIAGNOSIS — S83.231A COMPLEX TEAR OF MEDIAL MENISCUS OF RIGHT KNEE AS CURRENT INJURY, INITIAL ENCOUNTER: ICD-10-CM

## 2019-10-07 DIAGNOSIS — S83.231D COMPLEX TEAR OF MEDIAL MENISCUS OF RIGHT KNEE AS CURRENT INJURY, SUBSEQUENT ENCOUNTER: Primary | ICD-10-CM

## 2019-10-07 DIAGNOSIS — K21.9 GASTROESOPHAGEAL REFLUX DISEASE WITHOUT ESOPHAGITIS: ICD-10-CM

## 2019-10-07 PROCEDURE — 97530 THERAPEUTIC ACTIVITIES: CPT | Performed by: PHYSICAL MEDICINE & REHABILITATION

## 2019-10-07 PROCEDURE — 97110 THERAPEUTIC EXERCISES: CPT | Performed by: PHYSICAL MEDICINE & REHABILITATION

## 2019-10-07 PROCEDURE — 97112 NEUROMUSCULAR REEDUCATION: CPT | Performed by: PHYSICAL MEDICINE & REHABILITATION

## 2019-10-07 RX ORDER — ESOMEPRAZOLE MAGNESIUM 40 MG/1
40 CAPSULE, DELAYED RELEASE ORAL
Qty: 90 CAPSULE | Refills: 1 | Status: SHIPPED | OUTPATIENT
Start: 2019-10-07 | End: 2020-09-15

## 2019-10-07 NOTE — PROGRESS NOTES
Daily Note     Today's date: 10/7/2019  Patient name: Blaze Parsons  : 1950  MRN: [id number]  Referring provider: Patsie Harada, PA-C  Dx:   Encounter Diagnosis     ICD-10-CM    1  Complex tear of medial meniscus of right knee as current injury, subsequent encounter S83 231D    2  Complex tear of medial meniscus of right knee as current injury, initial encounter S83 231A                   Subjective: Patient offers no new complaints, no changes or issues since last visit  Patient denies functional deficits at this time  Objective: See treatment diary below      Assessment: Tolerated treatment well, including progressions  Patient exhibited good technique with therapeutic exercises, denies pain with activity as charted  Plan: Progress treatment as tolerated         Precautions: Sarcoidosis    Manual  9/10 9/16 9/23 9/30         PROM  6' 8' DC                                                                 Exercise Diary  9/10 9/16 9/23 9/30 10/7        HEP 10'                         Bike  6' 6' 6' 5'        TKE  2x10 BTB S 20 :03 S :03 20 20x5"        Leg Press SL #50  2x10 50# 20 50# 20 60#, SL 20x        Total Gym more SL  2x10 L21 L 21 20 L22 20 L22, 20x w/ eccentric lower        Stand Hip 3 way  2x10 20 1 5# 20 1 5# 2#, 20x ea        SLR  2x10 20 20 1#, 20x        LAQ  2x10  2# 20 3#, 20x, 5" lower        Sidestepping  5 laps 5 laps R G 5 laps B 5 laps        Q Stretch  3x :20 :20/6 :20/6 hep        Q sets             SLR Abd   20 20 1#, 20x                                                                                                       Modalities

## 2019-10-10 ENCOUNTER — OFFICE VISIT (OUTPATIENT)
Dept: SURGICAL ONCOLOGY | Facility: CLINIC | Age: 69
End: 2019-10-10
Payer: COMMERCIAL

## 2019-10-10 VITALS
HEART RATE: 77 BPM | WEIGHT: 200 LBS | RESPIRATION RATE: 16 BRPM | TEMPERATURE: 97 F | BODY MASS INDEX: 35.44 KG/M2 | HEIGHT: 63 IN | DIASTOLIC BLOOD PRESSURE: 74 MMHG | SYSTOLIC BLOOD PRESSURE: 120 MMHG

## 2019-10-10 DIAGNOSIS — R16.0 LIVER MASS: ICD-10-CM

## 2019-10-10 DIAGNOSIS — D49.0 IPMN (INTRADUCTAL PAPILLARY MUCINOUS NEOPLASM): Primary | ICD-10-CM

## 2019-10-10 PROCEDURE — 99213 OFFICE O/P EST LOW 20 MIN: CPT | Performed by: NURSE PRACTITIONER

## 2019-10-10 NOTE — PROGRESS NOTES
Surgical Oncology Follow Up       [address]  [facility] [address] [place] [address]  1950  [id number]      Chief Complaint   Patient presents with    Follow-up     Pt is here for 6 month f/u IPMN       Assessment/Plan:  1  IPMN (intraductal papillary mucinous neoplasm)  - Stable, no worrisome features, continue to monitor  - Creatinine, serum; Future  - BUN; Future  - MRI abdomen w wo contrast and mrcp; Future    2  Liver mass  - Stable, most likely benign/ r/t sarcoidosis, continue to monitor   - Creatinine, serum; Future  - BUN; Future  - MRI abdomen w wo contrast and mrcp; Future  - 6 mo f/u visit with Dr Aj Mccray    Discussion/Summary:  Patient is a 81-year-old female with a PMH of sarcoidosis diagnosed in 2018 who presents today for a six-month follow-up visit for pancreatic cysts as well as a liver lesion  She underwent an MRI/MRCP on October 5, 2019 which revealed a stable 13 mm indeterminate arterial enhancing right hepatic lobe nodule  This was biopsied in 11/2018 and was benign- non-necrotizing granulomas and focal hemorrhage  There stable 6 mm nodule along the left lobe of of the gallbladder fossa  There are no new hepatic lesions  There are multiple pancreatic cysts which are suggestive of a side branch IPMNs which are all stable  There are stable mesenteric nodules  There is no intrahepatic ductal dilation  The common bile duct is stable, measuring 6-7 mm  She has no new abdominal complaints today and there are no concerns on today's exam   Overall, she feels significantly better over the course of the last year with steroid treatment  She does still report shortness of breath as well as voice changes  She is continuing to follow closely with her pulmonologist   I recommended that she be evaluated by ENT for the occasional hoarseness    I will repeat her MRI/MRCP in 6 months and we will plan to see the patient back at that time or sooner if the need arises  She was instructed to call with any new concerns or symptoms prior to that time  All of her questions were answered today  History of Present Illness:     -Interval History:  Patient presents today for a six-month follow-up visit for pancreatic cysts as well as liver lesions  She underwent an MRI/MRCP on October 5, 2019 which revealed a stable 13 mm indeterminate arterial enhancing right hepatic lobe nodule  This was biopsied in 11/2018 and was benign- non-necrotizing granulomas and focal hemorrhage  There stable 6 mm nodule along the left lobe of of the gallbladder fossa  There are no new hepatic lesions  There are multiple pancreatic cysts which are suggestive of a side branch IPMNs which are all stable  There are stable mesenteric nodules  There is no intrahepatic ductal dilation  The common bile duct is stable, measuring 6-7 mm  She has no new abdominal complaints today and there are no concerns on today's exam   Overall, she feels significantly better over the course of the last year with steroid treatment  She does still report shortness of breath as well as voice changes  Denies abdominal pain, nausea, vomiting, diarrhea, weight loss  She is continuing to follow closely with her pulmonologist for treatment of sarcoidosis  Review of Systems:  Review of Systems   Constitutional: Negative for activity change, appetite change, chills, fatigue, fever and unexpected weight change  HENT: Positive for voice change  Negative for trouble swallowing  Eyes: Negative for pain, redness and visual disturbance  Respiratory: Positive for shortness of breath  Negative for cough and wheezing  Cardiovascular: Negative for chest pain, palpitations and leg swelling  Gastrointestinal: Negative for abdominal pain, constipation, diarrhea, nausea and vomiting  Endocrine: Negative for cold intolerance and heat intolerance     Musculoskeletal: Negative for arthralgias, back pain, gait problem and myalgias  Skin: Negative for color change and rash  Neurological: Negative for dizziness, syncope, light-headedness, numbness and headaches  Hematological: Negative for adenopathy  Psychiatric/Behavioral: Negative for agitation and confusion  All other systems reviewed and are negative        Patient Active Problem List   Diagnosis    Positional vertigo    Corn of toe    Focal nodular hyperplasia of liver    IPMN (intraductal papillary mucinous neoplasm)    Abnormal MRI of abdomen    Lymphadenopathy    Liver mass    Mediastinal adenopathy    Sarcoidosis, lung (HCC)    Granulomatous lymphadenitis    Edema of right lower extremity    Diastolic dysfunction    Complex tear of medial meniscus of right knee as current injury    Other tear of medial meniscus, current injury, right knee, initial encounter     Past Medical History:   Diagnosis Date    Abdominal pain     Acute tonsillitis     Breast pain, left     Edema of both lower extremities     GERD without esophagitis     Kidney stone     " [place]"    Lesion of liver     Liver mass     Lymphadenopathy     Mediastinal lymphadenopathy     Neoplasm of digestive system     Orthostatic lightheadedness     Sarcoidosis     Vertigo      Past Surgical History:   Procedure Laterality Date    CARPAL TUNNEL RELEASE Bilateral     COLONOSCOPY      2017    IR IMAGE GUIDED BIOPSY/ASPIRATION LIVER  11/6/2018    KNEE ARTHROSCOPY Left     MEDIASTINOSCOPY N/A 11/27/2018    Procedure: MEDIASTINOSCOPY;  Surgeon: Hina Richter MD;  [facility];  Service: Thoracic    AZ BRONCHOSCOPY NEEDLE BX TRACHEA MAIN STEM&/BRON N/A 11/27/2018    Procedure: EBUS with biopsy;  Surgeon: Hina Richter MD;  [facility];  Service: Thoracic    AZ [place] 46 N/A 11/27/2018    Procedure: Merlyn Ruggiero;  Surgeon: Hina Richter MD;  [facility];  Service: Thoracic    AZ EDG US EXAM SURGICAL ALTER STOM DUODENUM/JEJUNUM N/A 10/29/2018    Procedure: LINEAR ENDOSCOPIC U/S;  Surgeon: Elijah Julian MD;  Location: [facility];   Service: Gastroenterology    WISDOM TOOTH EXTRACTION       Family History   Problem Relation Age of Onset    Alzheimer's disease Mother     Parkinsonism Father      Social History     Socioeconomic History    Marital status: /Civil Union     Spouse name: Not on file    Number of children: Not on file    Years of education: Not on file    Highest education level: Not on file   Occupational History    Not on file   Social Needs    Financial resource strain: Not on file    Food insecurity:     Worry: Not on file     Inability: Not on file    Transportation needs:     Medical: Not on file     Non-medical: Not on file   Tobacco Use    Smoking status: Never Smoker    Smokeless tobacco: Never Used   Substance and Sexual Activity    Alcohol use: Yes     Frequency: Monthly or less     Drinks per session: 1 or 2     Binge frequency: Never     Comment: socially    Drug use: No    Sexual activity: Not on file   Lifestyle    Physical activity:     Days per week: Not on file     Minutes per session: Not on file    Stress: Not on file   Relationships    Social connections:     Talks on phone: Not on file     Gets together: Not on file     Attends [employer] service: Not on file     Active member of club or organization: Not on file     Attends meetings of clubs or organizations: Not on file     Relationship status: Not on file    Intimate partner violence:     Fear of current or ex partner: Not on file     Emotionally abused: Not on file     Physically abused: Not on file     Forced sexual activity: Not on file   Other Topics Concern    Not on file   Social History Narrative    Not on file       Current Outpatient Medications:     aspirin 81 MG tablet, Take 1 tablet by mouth daily in the early morning , Disp: , Rfl:     Cholecalciferol (VITAMIN D) 2000 units CAPS, Take 1 tablet by mouth daily, Disp: , Rfl:     esomeprazole (NexIUM) 40 MG capsule, Take 1 capsule (40 mg total) by mouth daily in the early morning, Disp: 90 capsule, Rfl: 1    hydrochlorothiazide (HYDRODIURIL) 12 5 mg tablet, Take 1 tablet (12 5 mg total) by mouth daily (Patient taking differently: Take 12 5 mg by mouth daily in the early morning ), Disp: 30 tablet, Rfl: 5    Multiple Vitamin (MULTIVITAMIN) tablet, Take 1 tablet by mouth daily, Disp: , Rfl:     predniSONE 10 mg tablet, Take 1 tablet (10 mg total) by mouth daily Take 6 pills day one then 1 a day for 14 days then 1/2 pill a day for 14 days, Disp: 30 tablet, Rfl: 0    predniSONE 10 mg tablet, Take 1 tablet (10 mg total) by mouth daily Take one a day for 7 days then 1/2 a day for 14 days, Disp: 14 tablet, Rfl: 0    WIXELA INHUB 250-50 MCG/DOSE inhaler, Inhale 1 puff daily in the early morning , Disp: , Rfl: 5  Allergies   Allergen Reactions    Bactrim [Sulfamethoxazole-Trimethoprim] Hives    Clam Shell Vomiting     Vitals:    10/10/19 0806   BP: 120/74   Pulse: 77   Resp: 16   Temp: (!) 97 °F (36 1 °C)       Physical Exam   Constitutional: She is oriented to person, place, and time  Vital signs are normal  She appears well-developed and well-nourished  No distress  HENT:   Head: Normocephalic and atraumatic  Neck: Normal range of motion  No thyroid mass and no thyromegaly present  Cardiovascular: Normal rate, regular rhythm and normal heart sounds  Pulmonary/Chest: Effort normal and breath sounds normal    Abdominal: Soft  Normal appearance  She exhibits no mass  There is no hepatosplenomegaly  There is no tenderness  Musculoskeletal: Normal range of motion  Lymphadenopathy:     She has no axillary adenopathy  Right: No supraclavicular adenopathy present  Left: No supraclavicular adenopathy present  Neurological: She is alert and oriented to person, place, and time  Skin: Skin is warm, dry and intact  No rash noted  She is not diaphoretic  Psychiatric: She has a normal mood and affect  Her speech is normal    Vitals reviewed  Results:    Imaging  Mri Abdomen W Wo Contrast And Mrcp    Result Date: 10/8/2019  Narrative: MRI OF THE ABDOMEN WITH AND WITHOUT CONTRAST WITH MRCP INDICATION:  Follow-up pancreatic cyst, arterial enhancing nodule right hepatic lobe  COMPARISON: MRI abdomen 3/23/2019 and 10/16/2018, CTA chest and abdomen 11/10/2018 TECHNIQUE:  The following pulse sequences were obtained:  Coronal and axial T2 with TE of 90 and 180 respectively, axial T2 with fat saturation, axial FIESTA fat-sat, axial T1-weighted in-and-out-of phase, axial DWI/ADC, pre-contrast axial T1 with fat saturation, post-contrast dynamic axial T1 with fat saturation at 20, 70, and 180 seconds, followed by coronal and 7 minute delayed axial T1 with fat saturation  3D MRCP images were obtained with radial thick slabs and projections  3D rendering was performed from the acquisition scanner  Pre- and postcontrast subtraction images were also obtained  IV Contrast:  9 mL of gadobutrol injection (MULTI-DOSE) FINDINGS: LOWER CHEST:   Mediastinal and hilar lymphadenopathy described on previous imaging is not as well visualized on today's study  LIVER: The liver is enlarged measuring 19 1 cm in length  Element of mild fatty infiltration may be present  Liver morphology is grossly unremarkable  Small area of geographic wedge-shaped arterial enhancement in the inferolateral right lobe is again noted, unchanged  13 x 13 mm uniformly arterial enhancing nodule in the posterior right hepatic lobe (segment 7) appears unchanged  The nodule demonstrates washout and diffusion restriction as before  Some capsular enhancement may be present  6 mm nodule seen just above the gallbladder fossa along the left lobe series 12 image 69 appears unchanged, possibly corresponding to volume averaging with the gallbladder itself   No new hepatic lesions  Several small hepatic cysts are again identified  The previously described anterior left lobe FNH is not well visualized on the current study  The hepatic veins and portal veins are patent  BILE DUCTS: No intrahepatic bile duct dilation  Common bile duct is similar in size measuring 6-7 mm  No choledocholithiasis, biliary stricture or suspicious mass  GALLBLADDER:  No gallstones  T1 hyperintense bile suggested  PANCREAS: Mildly atrophic  Numerous, nonenhancing cysts scattered throughout the pancreas appear grossly stable  Again many communicate with the pancreatic duct and are suggestive of sidebranch IPMNs  ADRENAL GLANDS:  Normal  SPLEEN:  Normal  KIDNEYS/PROXIMAL URETERS: Unremarkable  BOWEL:  Colonic diverticulosis  PERITONEUM/RETROPERITONEUM: No ascites  LYMPH NODES: 2 2 x 0 9 cm mesenteric nodule appears grossly stable, previously measuring 2 3 x 1 1 cm  Adjacent smaller mesenteric nodules also appear grossly similar such as series 12 images 112 -120  Shoddy lymph nodes along the celiac axis and periportal region are not significantly changed  VASCULAR STRUCTURES:  No aneurysm  ABDOMINAL WALL:  Unremarkable  OSSEOUS STRUCTURES:  No suspicious osseous lesion  Impression: 1  Stable 13 mm indeterminate arterial enhancing right hepatic lobe nodule  6 mm nodule along the left lobe above the gallbladder fossa seen previously is grossly stable  This may represent volume averaging with gallbladder or adjacent structures  No new hepatic lesions  Continued 4-6 month imaging surveillance is advised  2  Stable appearance of multiple pancreatic cysts suggestive of sidebranch IPMNs  No suspicious interval changes  3  Stable mesenteric nodules  The study was marked in EPIC for significant notification  Workstation performed: [id number]       I reviewed the above imaging data  Advance Care Planning/Advance Directives:  Discussed disease status and treatment goals with the patient

## 2019-10-11 DIAGNOSIS — D86.0 SARCOIDOSIS, LUNG (HCC): ICD-10-CM

## 2019-10-11 RX ORDER — PREDNISONE 10 MG/1
TABLET ORAL
Qty: 14 TABLET | Refills: 0 | Status: SHIPPED | OUTPATIENT
Start: 2019-10-11 | End: 2020-08-26 | Stop reason: ALTCHOICE

## 2019-10-13 DIAGNOSIS — D86.0 SARCOIDOSIS, LUNG (HCC): ICD-10-CM

## 2019-10-14 ENCOUNTER — OFFICE VISIT (OUTPATIENT)
Dept: PHYSICAL THERAPY | Facility: CLINIC | Age: 69
End: 2019-10-14
Payer: COMMERCIAL

## 2019-10-14 DIAGNOSIS — S83.231D COMPLEX TEAR OF MEDIAL MENISCUS OF RIGHT KNEE AS CURRENT INJURY, SUBSEQUENT ENCOUNTER: Primary | ICD-10-CM

## 2019-10-14 DIAGNOSIS — S83.231A COMPLEX TEAR OF MEDIAL MENISCUS OF RIGHT KNEE AS CURRENT INJURY, INITIAL ENCOUNTER: ICD-10-CM

## 2019-10-14 PROCEDURE — 97530 THERAPEUTIC ACTIVITIES: CPT | Performed by: PHYSICAL THERAPIST

## 2019-10-14 PROCEDURE — 97110 THERAPEUTIC EXERCISES: CPT | Performed by: PHYSICAL THERAPIST

## 2019-10-14 PROCEDURE — 97112 NEUROMUSCULAR REEDUCATION: CPT | Performed by: PHYSICAL THERAPIST

## 2019-10-15 ENCOUNTER — OFFICE VISIT (OUTPATIENT)
Dept: OBGYN CLINIC | Facility: CLINIC | Age: 69
End: 2019-10-15
Payer: COMMERCIAL

## 2019-10-15 VITALS
HEART RATE: 69 BPM | BODY MASS INDEX: 36.44 KG/M2 | DIASTOLIC BLOOD PRESSURE: 83 MMHG | HEIGHT: 62 IN | WEIGHT: 198 LBS | SYSTOLIC BLOOD PRESSURE: 132 MMHG

## 2019-10-15 DIAGNOSIS — S83.231D COMPLEX TEAR OF MEDIAL MENISCUS OF RIGHT KNEE AS CURRENT INJURY, SUBSEQUENT ENCOUNTER: Primary | ICD-10-CM

## 2019-10-15 PROCEDURE — 99213 OFFICE O/P EST LOW 20 MIN: CPT | Performed by: ORTHOPAEDIC SURGERY

## 2019-10-15 NOTE — PROGRESS NOTES
Patient Name:  Khang Tuttle  MRN:  [id number]    Assessment & Plan     Right Knee, Medial Meniscal Tear    1  Continue formal PT/HEP as tolerated  2  Contact office if symptoms increase after finishing Prednisone  3  Follow up on an as needed basis      Subjective     81 y/o female who presents today for her right knee  She has a known medial meniscus tear and has been treating conservatively with formal PT/HEP  She also began Prednisone as prescribed by her pulmonlogist  Today, she states that she has no pain in her knee  She has no pain ambulating up and down stairs or performing daily activities  General ROS:  Negative for fever or chills  Neurological ROS:  Negative for numbness or tingling  Objective     /83   Pulse 69   Ht 5' 2" (1 575 m)   Wt 89 8 kg (198 lb)   BMI 36 21 kg/m²     Right knee:  Soft tissue swelling: None  Effusion: None  Tenderness to palpation: None  Range of motion:  Extension: Normal  Flexion: Normal  Lachman test: Stable  Valgus stress: Stable  Varus stress: Stable  Posterior drawer test: Stable  Mat's test: Negative  Patellar grind test: Negative  Neurovascular intact distally  2+ DP pulse  Psychiatric: Mood and affect are appropriate      Social History     Tobacco Use    Smoking status: Never Smoker    Smokeless tobacco: Never Used   Substance Use Topics    Alcohol use: Yes     Frequency: Monthly or less     Drinks per session: 1 or 2     Binge frequency: Never     Comment: socially    Drug use: No       Scribe Attestation    I,:   Low Feng am acting as a scribe while in the presence of the attending physician :        I,:   Dena Vaz MD personally performed the services described in this documentation    as scribed in my presence :

## 2019-10-21 ENCOUNTER — EVALUATION (OUTPATIENT)
Dept: PHYSICAL THERAPY | Facility: CLINIC | Age: 69
End: 2019-10-21
Payer: COMMERCIAL

## 2019-10-21 DIAGNOSIS — S83.231A COMPLEX TEAR OF MEDIAL MENISCUS OF RIGHT KNEE AS CURRENT INJURY, INITIAL ENCOUNTER: ICD-10-CM

## 2019-10-21 DIAGNOSIS — S83.231D COMPLEX TEAR OF MEDIAL MENISCUS OF RIGHT KNEE AS CURRENT INJURY, SUBSEQUENT ENCOUNTER: Primary | ICD-10-CM

## 2019-10-21 PROCEDURE — 97112 NEUROMUSCULAR REEDUCATION: CPT | Performed by: PHYSICAL THERAPIST

## 2019-10-21 PROCEDURE — 97110 THERAPEUTIC EXERCISES: CPT | Performed by: PHYSICAL THERAPIST

## 2019-10-21 NOTE — PROGRESS NOTES
Daily Note     Today's date: 10/21/2019  Patient name: Aleksey Beatty  : 1950  MRN: [id number]  Referring provider: Robinson Sr PA-C  Dx:   Encounter Diagnosis     ICD-10-CM    1  Complex tear of medial meniscus of right knee as current injury, subsequent encounter S83 231D    2  Complex tear of medial meniscus of right knee as current injury, initial encounter S83 231A                   Subjective: Pt feels "100%", no limitations voiced  Objective: See treatment diary below  Full AROM/strength  Assessment: Tolerated treatment well  Patient ready for DC      Plan: Continue per plan of care        Precautions: Sarcoidosis    Manual  9/10 9/16 9/23 9/30         PROM  6' 8' DC                                                                 Exercise Diary  9/10 9/16 9/23 9/30 10/7 10/14 10/21      HEP 10'                         Bike  6' 6' 6' 5' 6' 6'      TKE  2x10 BTB S 20 :03 S :03 20 20x5" 20x "05 :05 20      Leg Press SL #50  2x10 50# 20 50# 20 60#, SL 20x #60 20x 60# 20x      Total Gym more SL  2x10 L21 L 21 20 L22 20 L22, 20x w/ eccentric lower L22 20x L 22 20x      Stand Hip 3 way  2x10 20 1 5# 20 1 5# 2#, 20x ea #2 20x 2# 20      SLR  2x10 20 20 1#, 20x #1 20x 1# 20      LAQ  2x10  2# 20 3#, 20x, 5" lower #3 20x 3# 20      Sidestepping  5 laps 5 laps R G 5 laps B 5 laps B 5 laps B 5 laps      Q Stretch  3x :20 :20/6 :20/6 hep        Q sets             SLR Abd   20 20 1#, 20x 20x #1 20 1#                                                                                                      Modalities

## 2019-11-06 ENCOUNTER — OFFICE VISIT (OUTPATIENT)
Dept: PULMONOLOGY | Facility: CLINIC | Age: 69
End: 2019-11-06
Payer: COMMERCIAL

## 2019-11-06 VITALS
DIASTOLIC BLOOD PRESSURE: 72 MMHG | TEMPERATURE: 97.9 F | HEIGHT: 63 IN | OXYGEN SATURATION: 97 % | HEART RATE: 79 BPM | WEIGHT: 203 LBS | RESPIRATION RATE: 15 BRPM | BODY MASS INDEX: 35.97 KG/M2 | SYSTOLIC BLOOD PRESSURE: 128 MMHG

## 2019-11-06 DIAGNOSIS — D86.0 SARCOIDOSIS, LUNG (HCC): Primary | ICD-10-CM

## 2019-11-06 PROCEDURE — 90662 IIV NO PRSV INCREASED AG IM: CPT | Performed by: INTERNAL MEDICINE

## 2019-11-06 PROCEDURE — 99214 OFFICE O/P EST MOD 30 MIN: CPT | Performed by: INTERNAL MEDICINE

## 2019-11-06 PROCEDURE — 90471 IMMUNIZATION ADMIN: CPT | Performed by: INTERNAL MEDICINE

## 2019-11-06 NOTE — ASSESSMENT & PLAN NOTE
Unfortunately Kathryn Ennis seems to have sarcoidosis that will be a chronic treatment issue  She has been on chronic steroids for the better part of a year and seems to relapse symptomatically and radiographically when weaned off steroids  I would like to trial her off steroids now and repeat CT chest in 4 weeks  If she has relapsing disease I suggested that we restart prednisone as low dose and start a steroids sparing agent particularly Methotrexate  I did give her a flu shot today

## 2019-11-06 NOTE — PROGRESS NOTES
Assessment/Plan:    Sarcoidosis, lung ([facility] )  Unfortunately Sai Herndon seems to have sarcoidosis that will be a chronic treatment issue  She has been on chronic steroids for the better part of a year and seems to relapse symptomatically and radiographically when weaned off steroids  I would like to trial her off steroids now and repeat CT chest in 4 weeks  If she has relapsing disease I suggested that we restart prednisone as low dose and start a steroids sparing agent particularly Methotrexate  I did give her a flu shot today  Diagnoses and all orders for this visit:    Sarcoidosis, lung ([facility] )  -     influenza vaccine, [id number], high-dose, PF 0 5 mL (FLUZONE HIGH-DOSE)  -     CT chest high resolution; Future          Subjective:      Patient ID: Dian Kirkpatrick is a 77 y o  female  Sai Herndon has been off prednisone for 1 week and is already having some increase in her symptoms  She is fatigued short of breath and has a cough with worsening of her itchy eyes  primary symptoms   Associated symptoms include coughing, headaches and myalgias  Pertinent negatives include no chest pain, fever or sore throat  The following portions of the patient's history were reviewed and updated as appropriate: allergies, current medications, past family history, past medical history, past social history, past surgical history and problem list     Review of Systems   Constitutional: Negative for appetite change and fever  HENT: Negative for ear pain, postnasal drip, rhinorrhea, sneezing, sore throat and trouble swallowing  Respiratory: Positive for cough and shortness of breath  Cardiovascular: Negative for chest pain  Musculoskeletal: Positive for myalgias  Neurological: Positive for headaches           Objective:      /72 (BP Location: Left arm, Patient Position: Sitting, Cuff Size: Adult)   Pulse 79   Temp 97 9 °F (36 6 °C) (Tympanic)   Resp 15   Ht 5' 2 5" (1 588 m)   Wt 92 1 kg (203 lb)   SpO2 97% BMI 36 54 kg/m²          Physical Exam   Constitutional: She is oriented to person, place, and time  She appears well-developed and well-nourished  HENT:   Head: Normocephalic  Eyes: Pupils are equal, round, and reactive to light  Neck: Normal range of motion  Neck supple  Cardiovascular: Normal rate  No murmur heard  Pulmonary/Chest: Effort normal and breath sounds normal  No respiratory distress  She has no wheezes  She has no rales  Abdominal: Soft  Musculoskeletal: Normal range of motion  She exhibits no edema  Neurological: She is alert and oriented to person, place, and time  Skin: Skin is warm and dry         Answers for HPI/ROS submitted by the patient on 11/5/2019   Primary symptoms  Do you experience frequent throat clearing?: Yes  Do you have a hoarse voice?: Yes  Chronicity: recurrent  When did you first notice your symptoms?: in the past 7 days  How often do your symptoms occur?: daily  Since you first noticed this problem, how has it changed?: gradually worsening  Do you have shortness of breath that occurs with effort or exertion?: Yes  Do you have ear congestion?: No  Do you have heartburn?: No  Do you have fatigue?: Yes  Do you have nasal congestion?: No  Do you have shortness of breath when lying flat?: No  Do you have shortness of breath when you wake up?: No  Do you have sweats?: No  Have you experienced weight loss?: No  Which of the following makes your symptoms worse?: climbing stairs, exercise, strenuous activity  Which of the following makes your symptoms better?: oral steroids

## 2019-11-17 DIAGNOSIS — D86.0 SARCOIDOSIS, LUNG (HCC): ICD-10-CM

## 2019-11-18 RX ORDER — HYDROCHLOROTHIAZIDE 12.5 MG/1
TABLET ORAL
Qty: 30 TABLET | Refills: 5 | Status: SHIPPED | OUTPATIENT
Start: 2019-11-18 | End: 2020-07-20 | Stop reason: SDUPTHER

## 2019-12-07 ENCOUNTER — HOSPITAL ENCOUNTER (OUTPATIENT)
Dept: CT IMAGING | Facility: HOSPITAL | Age: 69
Discharge: HOME | End: 2019-12-07
Attending: INTERNAL MEDICINE
Payer: COMMERCIAL

## 2019-12-07 DIAGNOSIS — D86.0 SARCOIDOSIS, LUNG (HCC): ICD-10-CM

## 2019-12-07 PROCEDURE — 71250 CT THORAX DX C-: CPT

## 2019-12-11 ENCOUNTER — OFFICE VISIT (OUTPATIENT)
Dept: PULMONOLOGY | Facility: CLINIC | Age: 69
End: 2019-12-11
Payer: COMMERCIAL

## 2019-12-11 VITALS
SYSTOLIC BLOOD PRESSURE: 132 MMHG | HEART RATE: 82 BPM | WEIGHT: 200 LBS | TEMPERATURE: 97.6 F | DIASTOLIC BLOOD PRESSURE: 74 MMHG | RESPIRATION RATE: 18 BRPM | OXYGEN SATURATION: 99 % | BODY MASS INDEX: 35.44 KG/M2 | HEIGHT: 63 IN

## 2019-12-11 DIAGNOSIS — I51.89 DIASTOLIC DYSFUNCTION: ICD-10-CM

## 2019-12-11 DIAGNOSIS — R16.0 LIVER MASS: Primary | ICD-10-CM

## 2019-12-11 DIAGNOSIS — I88.8 GRANULOMATOUS LYMPHADENITIS: ICD-10-CM

## 2019-12-11 DIAGNOSIS — D86.0 SARCOIDOSIS, LUNG (HCC): ICD-10-CM

## 2019-12-11 PROCEDURE — 99214 OFFICE O/P EST MOD 30 MIN: CPT | Performed by: INTERNAL MEDICINE

## 2019-12-11 NOTE — ASSESSMENT & PLAN NOTE
Mrs Felix Dotson has been maintaining off prednisone for approximately 6 weeks  I would encourage her to continue to manage her symptoms and let me know if any things changed  She takes her Prudence Mura once a day and will continue to do so  I would like to see her back in 3 months to assess how she is doing off prednisone  Again we may need to maintain her on low-dose prednisone for her chronic sarcoidosis but would prefer to use the least effective dose  Will schedule her for repeat testing at her next visit    She is up-to-date on her pneumonia shots and flu shot

## 2019-12-11 NOTE — PROGRESS NOTES
Assessment/Plan:    Sarcoidosis, lung [facility])  Mrs Val Philippe has been maintaining off prednisone for approximately 6 weeks  I would encourage her to continue to manage her symptoms and let me know if any things changed  She takes her Radha Hoguet once a day and will continue to do so  I would like to see her back in 3 months to assess how she is doing off prednisone  Again we may need to maintain her on low-dose prednisone for her chronic sarcoidosis but would prefer to use the least effective dose  Will schedule her for repeat testing at her next visit  She is up-to-date on her pneumonia shots and flu shot       Diagnoses and all orders for this visit:    Liver mass    Granulomatous lymphadenitis    Sarcoidosis, lung (HCC)    Diastolic dysfunction          Subjective:      Patient ID: Dara Junior is a 77 y o  female  Mrs Val Philippe is here for follow-up of her sarcoidosis  She did stop her prednisone about 6 weeks ago  She has not noted any change in her symptoms  She denies any significant cough wheeze or shortness of breath  She does have some hoarseness which is persistent  She does feel fatigued but has been going through a lot at home  primary symptoms   Associated symptoms include myalgias  Pertinent negatives include no chest pain, coughing, fever, headaches or sore throat  The following portions of the patient's history were reviewed and updated as appropriate: allergies, current medications, past family history, past medical history, past social history, past surgical history and problem list     Review of Systems   Constitutional: Negative  Negative for appetite change, fever and unexpected weight change  HENT: Negative  Negative for ear pain, postnasal drip, rhinorrhea, sneezing, sore throat and trouble swallowing  Eyes: Negative  Respiratory: Positive for shortness of breath  Negative for cough and wheezing  Cardiovascular: Negative  Negative for chest pain and leg swelling  Gastrointestinal: Negative  Endocrine: Negative  Genitourinary: Negative  Musculoskeletal: Positive for myalgias  Allergic/Immunologic: Negative  Neurological: Negative  Negative for headaches  Hematological: Negative  Objective:      /74 (BP Location: Left arm, Patient Position: Sitting, Cuff Size: Adult)   Pulse 82   Temp 97 6 °F (36 4 °C) (Tympanic)   Resp 18   Ht 5' 2 5" (1 588 m)   Wt 90 7 kg (200 lb)   SpO2 99%   BMI 36 00 kg/m²          Physical Exam   Constitutional: She is oriented to person, place, and time  She appears well-developed and well-nourished  HENT:   Head: Normocephalic  Eyes: Pupils are equal, round, and reactive to light  Neck: Normal range of motion  Neck supple  Cardiovascular: Normal rate  No murmur heard  Pulmonary/Chest: Effort normal and breath sounds normal  No respiratory distress  She has no wheezes  She has no rales  Abdominal: Soft  Musculoskeletal: Normal range of motion  She exhibits no edema  Neurological: She is alert and oriented to person, place, and time  Skin: Skin is warm and dry         Answers for HPI/ROS submitted by the patient on 12/10/2019   Primary symptoms  Do you experience frequent throat clearing?: Yes  Do you have a hoarse voice?: Yes  Chronicity: chronic  When did you first notice your symptoms?: more than 1 month ago  How often do your symptoms occur?: daily  Since you first noticed this problem, how has it changed?: unchanged  Do you have shortness of breath that occurs with effort or exertion?: Yes  Do you have ear congestion?: No  Do you have heartburn?: No  Do you have fatigue?: Yes  Do you have nasal congestion?: No  Do you have shortness of breath when lying flat?: No  Do you have shortness of breath when you wake up?: No  Do you have sweats?: No  Have you experienced weight loss?: No  Which of the following makes your symptoms worse?: any activity, climbing stairs, exercise, strenuous activity  Which of the following makes your symptoms better?: rest  Risk factors for lung disease: animal exposure

## 2019-12-30 ENCOUNTER — TELEPHONE (OUTPATIENT)
Dept: FAMILY MEDICINE CLINIC | Facility: CLINIC | Age: 69
End: 2019-12-30

## 2019-12-30 NOTE — TELEPHONE ENCOUNTER
[facility] called regarding patient's RX for Esomeprazole stating that the copay is expensive for patient, and was wondering if she can try Prilosec instead  If so please send a new RX to [facility] for patient

## 2019-12-31 DIAGNOSIS — K21.9 GASTROESOPHAGEAL REFLUX DISEASE WITHOUT ESOPHAGITIS: Primary | ICD-10-CM

## 2019-12-31 RX ORDER — OMEPRAZOLE 40 MG/1
40 CAPSULE, DELAYED RELEASE ORAL
Qty: 90 CAPSULE | Refills: 1 | Status: SHIPPED | OUTPATIENT
Start: 2019-12-31 | End: 2020-06-15

## 2020-02-10 ENCOUNTER — OFFICE VISIT (OUTPATIENT)
Dept: PULMONOLOGY | Facility: CLINIC | Age: 70
End: 2020-02-10
Payer: COMMERCIAL

## 2020-02-10 VITALS
TEMPERATURE: 97.2 F | HEIGHT: 63 IN | OXYGEN SATURATION: 98 % | WEIGHT: 198.8 LBS | BODY MASS INDEX: 35.22 KG/M2 | DIASTOLIC BLOOD PRESSURE: 64 MMHG | HEART RATE: 88 BPM | SYSTOLIC BLOOD PRESSURE: 124 MMHG

## 2020-02-10 DIAGNOSIS — D86.0 SARCOIDOSIS, LUNG (HCC): ICD-10-CM

## 2020-02-10 DIAGNOSIS — R59.0 MEDIASTINAL ADENOPATHY: ICD-10-CM

## 2020-02-10 DIAGNOSIS — S83.241A OTHER TEAR OF MEDIAL MENISCUS, CURRENT INJURY, RIGHT KNEE, INITIAL ENCOUNTER: Primary | ICD-10-CM

## 2020-02-10 PROCEDURE — 1160F RVW MEDS BY RX/DR IN RCRD: CPT | Performed by: INTERNAL MEDICINE

## 2020-02-10 PROCEDURE — 99214 OFFICE O/P EST MOD 30 MIN: CPT | Performed by: INTERNAL MEDICINE

## 2020-02-10 PROCEDURE — 1036F TOBACCO NON-USER: CPT | Performed by: INTERNAL MEDICINE

## 2020-02-10 PROCEDURE — 4040F PNEUMOC VAC/ADMIN/RCVD: CPT | Performed by: INTERNAL MEDICINE

## 2020-02-10 PROCEDURE — 3008F BODY MASS INDEX DOCD: CPT | Performed by: INTERNAL MEDICINE

## 2020-02-10 NOTE — ASSESSMENT & PLAN NOTE
Jeronimo Beaulieu is actually doing quite well with regards to her breathing  She has denying any shortness of breath cough or weight loss  She does still complain about joint pain but otherwise no significant complaints today  I will see her back in 6 months  She is up-to-date on her flu and pneumonia vaccines  I would not do any follow-up radiographic testing at that visit unless her symptoms change

## 2020-02-10 NOTE — PROGRESS NOTES
Assessment/Plan:    Sarcoidosis, lung ([facility] )  Do Abernathy is actually doing quite well with regards to her breathing  She has denying any shortness of breath cough or weight loss  She does still complain about joint pain but otherwise no significant complaints today  I will see her back in 6 months  She is up-to-date on her flu and pneumonia vaccines  I would not do any follow-up radiographic testing at that visit unless her symptoms change  Diagnoses and all orders for this visit:    Other tear of medial meniscus, current injury, right knee, initial encounter    Mediastinal adenopathy    Sarcoidosis, lung ([facility])          Subjective:      Patient ID: Aleksey Beatty is a 77 y o  female  Do Abernathy is here for follow-up visit for her sarcoidosis  She has been off steroids since November and has had no escalation in her symptoms  She continues to have hoarseness and some joint pain since stopping the steroids but no further cough or shortness of breath  primary symptoms   Associated symptoms include coughing and myalgias  Pertinent negatives include no chest pain, fever, headaches or sore throat  The following portions of the patient's history were reviewed and updated as appropriate: allergies, current medications, past family history, past medical history, past social history, past surgical history and problem list     Review of Systems   Constitutional: Negative  Negative for appetite change, fever and unexpected weight change  HENT: Negative  Negative for ear pain, postnasal drip, rhinorrhea, sneezing, sore throat and trouble swallowing  Eyes: Negative  Respiratory: Positive for cough and shortness of breath  Negative for wheezing  Cardiovascular: Negative  Negative for chest pain and leg swelling  Gastrointestinal: Negative  Endocrine: Negative  Genitourinary: Negative  Musculoskeletal: Positive for myalgias  Allergic/Immunologic: Negative  Neurological: Negative    Negative for headaches  Hematological: Negative  Objective:      /64 (BP Location: Left arm, Patient Position: Sitting)   Pulse 88   Temp (!) 97 2 °F (36 2 °C) (Tympanic)   Ht 5' 2 5" (1 588 m)   Wt 90 2 kg (198 lb 12 8 oz)   SpO2 98%   BMI 35 78 kg/m²          Physical Exam   Constitutional: She is oriented to person, place, and time  She appears well-developed and well-nourished  HENT:   Head: Normocephalic  Eyes: Pupils are equal, round, and reactive to light  Neck: Normal range of motion  Neck supple  Cardiovascular: Normal rate  No murmur heard  Pulmonary/Chest: Effort normal and breath sounds normal  No respiratory distress  She has no wheezes  She has no rales  Abdominal: Soft  Musculoskeletal: Normal range of motion  She exhibits no edema  Neurological: She is alert and oriented to person, place, and time  Skin: Skin is warm and dry         Answers for HPI/ROS submitted by the patient on 2/7/2020   Primary symptoms  Do you experience frequent throat clearing?: Yes  Do you have a hoarse voice?: Yes  Chronicity: chronic  When did you first notice your symptoms?: more than 1 year ago  How often do your symptoms occur?: constantly  Since you first noticed this problem, how has it changed?: unchanged  Do you have shortness of breath that occurs with effort or exertion?: Yes  Do you have ear congestion?: No  Do you have heartburn?: No  Do you have fatigue?: Yes  Do you have nasal congestion?: No  Do you have shortness of breath when lying flat?: No  Do you have shortness of breath when you wake up?: No  Do you have sweats?: No  Have you experienced weight loss?: No  Which of the following makes your symptoms worse?: any activity, climbing stairs, exercise, strenuous activity  Which of the following makes your symptoms better?: nothing

## 2020-03-20 ENCOUNTER — TELEPHONE (OUTPATIENT)
Dept: SURGICAL ONCOLOGY | Facility: CLINIC | Age: 70
End: 2020-03-20

## 2020-03-20 NOTE — TELEPHONE ENCOUNTER
Peer to peer performed for patient's upcoming abdominal MRI/MRCP  This is being denied by her insurance  I did leave the patient a voicemail stating this and canceled her study for April with central scheduling  I would like this to be rescheduled for October and Dr Whit Kraus will see the patient back at that time

## 2020-03-30 ENCOUNTER — TELEPHONE (OUTPATIENT)
Dept: SURGICAL ONCOLOGY | Facility: CLINIC | Age: 70
End: 2020-03-30

## 2020-03-30 NOTE — TELEPHONE ENCOUNTER
Spoke with patient and reviewed that her MRI/MRCP was denied by insurance  She reports no new abdominal symptoms and has no concerns  I will order her MRI to be performed in October and we will see her back at that time  We will cancel her appointment for April  She is in agreement this plan  I did tell her to call the office should she develop any abnormal abdominal symptoms  ----- Message from Carlin Mcrae MA sent at 3/30/2020  3:02 PM EDT -----  Regarding: Returning your message  Contact: [phone]  Pt is returning your call from last week

## 2020-06-15 DIAGNOSIS — K21.9 GASTROESOPHAGEAL REFLUX DISEASE WITHOUT ESOPHAGITIS: ICD-10-CM

## 2020-06-15 RX ORDER — OMEPRAZOLE 40 MG/1
CAPSULE, DELAYED RELEASE ORAL
Qty: 90 CAPSULE | Refills: 1 | Status: SHIPPED | OUTPATIENT
Start: 2020-06-15 | End: 2020-12-31

## 2020-07-20 DIAGNOSIS — D86.0 SARCOIDOSIS, LUNG (HCC): ICD-10-CM

## 2020-07-20 RX ORDER — HYDROCHLOROTHIAZIDE 12.5 MG/1
12.5 TABLET ORAL DAILY
Qty: 30 TABLET | Refills: 5 | Status: SHIPPED | OUTPATIENT
Start: 2020-07-20 | End: 2021-01-27

## 2020-08-19 ENCOUNTER — TELEPHONE (OUTPATIENT)
Dept: PULMONOLOGY | Facility: CLINIC | Age: 70
End: 2020-08-19

## 2020-08-19 NOTE — TELEPHONE ENCOUNTER

## 2020-08-20 ENCOUNTER — OFFICE VISIT (OUTPATIENT)
Dept: PULMONOLOGY | Facility: CLINIC | Age: 70
End: 2020-08-20
Payer: COMMERCIAL

## 2020-08-20 VITALS
HEART RATE: 69 BPM | SYSTOLIC BLOOD PRESSURE: 126 MMHG | DIASTOLIC BLOOD PRESSURE: 64 MMHG | BODY MASS INDEX: 31.28 KG/M2 | HEIGHT: 62 IN | TEMPERATURE: 96 F | OXYGEN SATURATION: 100 % | WEIGHT: 170 LBS

## 2020-08-20 DIAGNOSIS — R59.0 MEDIASTINAL ADENOPATHY: ICD-10-CM

## 2020-08-20 DIAGNOSIS — I88.8 GRANULOMATOUS LYMPHADENITIS: ICD-10-CM

## 2020-08-20 DIAGNOSIS — I51.89 DIASTOLIC DYSFUNCTION: ICD-10-CM

## 2020-08-20 DIAGNOSIS — D86.0 SARCOIDOSIS, LUNG (HCC): Primary | ICD-10-CM

## 2020-08-20 PROCEDURE — 1036F TOBACCO NON-USER: CPT | Performed by: INTERNAL MEDICINE

## 2020-08-20 PROCEDURE — 99215 OFFICE O/P EST HI 40 MIN: CPT | Performed by: INTERNAL MEDICINE

## 2020-08-20 PROCEDURE — 4040F PNEUMOC VAC/ADMIN/RCVD: CPT | Performed by: INTERNAL MEDICINE

## 2020-08-20 PROCEDURE — 3008F BODY MASS INDEX DOCD: CPT | Performed by: INTERNAL MEDICINE

## 2020-08-20 PROCEDURE — 1160F RVW MEDS BY RX/DR IN RCRD: CPT | Performed by: INTERNAL MEDICINE

## 2020-08-20 NOTE — PROGRESS NOTES
Assessment/Plan:    Sarcoidosis, lung ([facility] )  Sascha Bhagat has no significant symptoms of respiratory disease at this time  She has maintained off steroids for almost a year and no improved crease in her cough wheeze shortness of breath or pain  She is concerned about some inflammatory markers like trigger finger as well as a rash  From a pulmonary standpoint will check a CT scan and PFTs at the 1 year evelyn to assure her disease is stable  I also do blood work a complete blood panel with differential, allergy panel, and CRP  If any of these are abnormal it may be prudent to pursue a rheumatology evaluation  She will get the flu shot in the upcoming months  I did review her CT scan personally on the PACS system  I also reviewed her previous lab work  Granulomatous lymphadenitis  Symptoms appear to be stable I reviewed her most recent lab work  Diastolic dysfunction  Stable reviewed her most recent echocardiogram from 2016  Diagnoses and all orders for this visit:    Sarcoidosis, lung ([facility] )  -     CBC and differential; Future  -     C-reactive protein; Future  -     [facility] Allergy Panel, Adult; Future  -     Complete PFT without post bronchodilator; Future  -     CT chest without contrast; Future    Mediastinal adenopathy    Granulomatous lymphadenitis    Diastolic dysfunction          Subjective:      Patient ID: Venkata Heller is a 77 y o  female  Sascha Bhagat is from stable with regards to her breathing  She denies any shortness of breath cough or wheeze  She does have some vague symptoms of inflammation including a maculopapular rash which is not itching and is stable  His only on her chest   She also complains of some tendonitis in her finger  She has been off steroids and takes wixella once daily    primary symptoms   Pertinent negatives include no chest pain or coughing         The following portions of the patient's history were reviewed and updated as appropriate: allergies, current medications, past family history, past medical history, past social history, past surgical history and problem list     Review of Systems   Constitutional: Negative  Negative for unexpected weight change  HENT: Negative  Negative for postnasal drip  Eyes: Negative  Respiratory: Negative  Negative for cough, shortness of breath and wheezing  Cardiovascular: Negative  Negative for chest pain and leg swelling  Gastrointestinal: Negative  Endocrine: Negative  Genitourinary: Negative  Musculoskeletal: Negative  Allergic/Immunologic: Negative  Neurological: Negative  Hematological: Negative  Objective:      /64 (BP Location: Left arm, Patient Position: Sitting)   Pulse 69   Temp (!) 96 °F (35 6 °C) (Temporal)   Ht 5' 2" (1 575 m)   Wt 77 1 kg (170 lb)   SpO2 100%   BMI 31 09 kg/m²          Physical Exam  Constitutional:       Appearance: She is well-developed  HENT:      Head: Normocephalic  Eyes:      Pupils: Pupils are equal, round, and reactive to light  Neck:      Musculoskeletal: Normal range of motion and neck supple  Cardiovascular:      Rate and Rhythm: Normal rate  Heart sounds: No murmur  Pulmonary:      Effort: Pulmonary effort is normal  No respiratory distress  Breath sounds: Normal breath sounds  No wheezing or rales  Abdominal:      Palpations: Abdomen is soft  Musculoskeletal: Normal range of motion  Skin:     General: Skin is warm and dry  Neurological:      Mental Status: She is alert and oriented to person, place, and time           Answers for HPI/ROS submitted by the patient on 8/13/2020   Primary symptoms  Do you have a hoarse voice?: Yes  Chronicity: recurrent  When did you first notice your symptoms?: more than 1 year ago  How often do your symptoms occur?: constantly  Since you first noticed this problem, how has it changed?: unchanged  Do you have shortness of breath that occurs with effort or exertion?: Yes  Which of the following makes your symptoms worse?: nothing  Which of the following makes your symptoms better?: nothing

## 2020-08-20 NOTE — ASSESSMENT & PLAN NOTE
Kevin Galvez has no significant symptoms of respiratory disease at this time  She has maintained off steroids for almost a year and no improved crease in her cough wheeze shortness of breath or pain  She is concerned about some inflammatory markers like trigger finger as well as a rash  From a pulmonary standpoint will check a CT scan and PFTs at the 1 year evelyn to assure her disease is stable  I also do blood work a complete blood panel with differential, allergy panel, and CRP  If any of these are abnormal it may be prudent to pursue a rheumatology evaluation  She will get the flu shot in the upcoming months  I did review her CT scan personally on the PACS system  I also reviewed her previous lab work

## 2020-08-21 ENCOUNTER — TRANSCRIBE ORDERS (OUTPATIENT)
Dept: LAB | Facility: CLINIC | Age: 70
End: 2020-08-21

## 2020-08-21 ENCOUNTER — APPOINTMENT (OUTPATIENT)
Dept: LAB | Facility: CLINIC | Age: 70
End: 2020-08-21
Payer: COMMERCIAL

## 2020-08-21 DIAGNOSIS — R16.0 LIVER MASS: ICD-10-CM

## 2020-08-21 DIAGNOSIS — D86.0 SARCOIDOSIS, LUNG (HCC): ICD-10-CM

## 2020-08-21 DIAGNOSIS — D49.0 IPMN (INTRADUCTAL PAPILLARY MUCINOUS NEOPLASM): ICD-10-CM

## 2020-08-21 LAB
BASOPHILS # BLD AUTO: 0.08 THOUSANDS/ΜL (ref 0–0.1)
BASOPHILS NFR BLD AUTO: 1 % (ref 0–1)
CRP SERPL QL: 3.3 MG/L
EOSINOPHIL # BLD AUTO: 0 THOUSAND/ΜL (ref 0–0.61)
EOSINOPHIL NFR BLD AUTO: 0 % (ref 0–6)
ERYTHROCYTE [DISTWIDTH] IN BLOOD BY AUTOMATED COUNT: 14.3 % (ref 11.6–15.1)
HCT VFR BLD AUTO: 36.8 % (ref 34.8–46.1)
HGB BLD-MCNC: 11.7 G/DL (ref 11.5–15.4)
IMM GRANULOCYTES # BLD AUTO: 0.02 THOUSAND/UL (ref 0–0.2)
IMM GRANULOCYTES NFR BLD AUTO: 0 % (ref 0–2)
LYMPHOCYTES # BLD AUTO: 1.95 THOUSANDS/ΜL (ref 0.6–4.47)
LYMPHOCYTES NFR BLD AUTO: 28 % (ref 14–44)
MCH RBC QN AUTO: 28.6 PG (ref 26.8–34.3)
MCHC RBC AUTO-ENTMCNC: 31.8 G/DL (ref 31.4–37.4)
MCV RBC AUTO: 90 FL (ref 82–98)
MONOCYTES # BLD AUTO: 0.59 THOUSAND/ΜL (ref 0.17–1.22)
MONOCYTES NFR BLD AUTO: 9 % (ref 4–12)
NEUTROPHILS # BLD AUTO: 4.31 THOUSANDS/ΜL (ref 1.85–7.62)
NEUTS SEG NFR BLD AUTO: 62 % (ref 43–75)
NRBC BLD AUTO-RTO: 0 /100 WBCS
PLATELET # BLD AUTO: 264 THOUSANDS/UL (ref 149–390)
PMV BLD AUTO: 11.4 FL (ref 8.9–12.7)
RBC # BLD AUTO: 4.09 MILLION/UL (ref 3.81–5.12)
WBC # BLD AUTO: 6.95 THOUSAND/UL (ref 4.31–10.16)

## 2020-08-21 PROCEDURE — 85025 COMPLETE CBC W/AUTO DIFF WBC: CPT

## 2020-08-21 PROCEDURE — 82785 ASSAY OF IGE: CPT

## 2020-08-21 PROCEDURE — 36415 COLL VENOUS BLD VENIPUNCTURE: CPT

## 2020-08-21 PROCEDURE — 86003 ALLG SPEC IGE CRUDE XTRC EA: CPT

## 2020-08-21 PROCEDURE — 86140 C-REACTIVE PROTEIN: CPT

## 2020-08-24 LAB
A ALTERNATA IGE QN: <0.1 KUA/I
A FUMIGATUS IGE QN: <0.1 KUA/I
ALLERGEN COMMENT: NORMAL
BERMUDA GRASS IGE QN: <0.1 KUA/I
BOXELDER IGE QN: <0.1 KUA/I
C HERBARUM IGE QN: <0.1 KUA/I
CAT DANDER IGE QN: <0.1 KUA/I
CMN PIGWEED IGE QN: <0.1 KUA/I
COMMON RAGWEED IGE QN: <0.1 KUA/I
COTTONWOOD IGE QN: <0.1 KUA/I
D FARINAE IGE QN: <0.1 KUA/I
D PTERONYSS IGE QN: <0.1 KUA/I
DOG DANDER IGE QN: <0.1 KUA/I
LONDON PLANE IGE QN: <0.1 KUA/I
MOUSE URINE PROT IGE QN: <0.1 KUA/I
MT JUNIPER IGE QN: <0.1 KUA/I
MUGWORT IGE QN: <0.1 KUA/I
P NOTATUM IGE QN: <0.1 KUA/I
ROACH IGE QN: <0.1 KUA/I
SHEEP SORREL IGE QN: <0.1 KUA/I
SILVER BIRCH IGE QN: <0.1 KUA/I
TIMOTHY IGE QN: <0.1 KUA/I
TOTAL IGE SMQN RAST: 10.1 KU/L (ref 0–113)
WALNUT IGE QN: <0.1 KUA/I
WHITE ASH IGE QN: <0.1 KUA/I
WHITE ELM IGE QN: <0.1 KUA/I
WHITE MULBERRY IGE QN: <0.1 KUA/I
WHITE OAK IGE QN: <0.1 KUA/I

## 2020-08-26 ENCOUNTER — OFFICE VISIT (OUTPATIENT)
Dept: FAMILY MEDICINE CLINIC | Facility: CLINIC | Age: 70
End: 2020-08-26
Payer: COMMERCIAL

## 2020-08-26 VITALS
HEIGHT: 62 IN | SYSTOLIC BLOOD PRESSURE: 120 MMHG | OXYGEN SATURATION: 98 % | BODY MASS INDEX: 31.47 KG/M2 | WEIGHT: 171 LBS | TEMPERATURE: 98 F | DIASTOLIC BLOOD PRESSURE: 78 MMHG | HEART RATE: 60 BPM | RESPIRATION RATE: 15 BRPM

## 2020-08-26 DIAGNOSIS — I88.8 GRANULOMATOUS LYMPHADENITIS: ICD-10-CM

## 2020-08-26 DIAGNOSIS — M65.331 TRIGGER MIDDLE FINGER OF RIGHT HAND: Primary | ICD-10-CM

## 2020-08-26 DIAGNOSIS — D49.0 IPMN (INTRADUCTAL PAPILLARY MUCINOUS NEOPLASM): Primary | ICD-10-CM

## 2020-08-26 PROCEDURE — 3008F BODY MASS INDEX DOCD: CPT | Performed by: FAMILY MEDICINE

## 2020-08-26 PROCEDURE — 3725F SCREEN DEPRESSION PERFORMED: CPT | Performed by: FAMILY MEDICINE

## 2020-08-26 PROCEDURE — 4040F PNEUMOC VAC/ADMIN/RCVD: CPT | Performed by: FAMILY MEDICINE

## 2020-08-26 PROCEDURE — 1160F RVW MEDS BY RX/DR IN RCRD: CPT | Performed by: FAMILY MEDICINE

## 2020-08-26 PROCEDURE — 1036F TOBACCO NON-USER: CPT | Performed by: FAMILY MEDICINE

## 2020-08-26 PROCEDURE — 99213 OFFICE O/P EST LOW 20 MIN: CPT | Performed by: FAMILY MEDICINE

## 2020-08-26 NOTE — PROGRESS NOTES
FAMILY PRACTICE OFFICE VISIT       NAME: Sachi Mercado  AGE: 77 y o  SEX: female       : 1950        MRN: [id number]    DATE: 2020  TIME: 12:12 PM    Assessment and Plan     Problem List Items Addressed This Visit        Musculoskeletal and Integument    Trigger middle finger of right hand - Primary      Trigger finger  Patient was given referral to [facility] hand surgeon [phone]  For further evaluation and treatment  Relevant Orders    Ambulatory referral to Orthopedic Surgery       Immune and Lymphatic    Granulomatous lymphadenitis       Sarcoidosis  Patient with a diagnosis of sarcoidosis and patient requested referral to rheumatologist   Patient currently sees pulmonologist on a regular basis  She was referred to [facility] rheumatologist [address] [facility]                   Chief Complaint     Chief Complaint   Patient presents with    Annual Exam    Hand Problem     right middle finger stuck down, 6-8 weeks on and off       History of Present Illness      Patient states for approximately 6 weeks she has noticed pain and mild swelling of right 3rd finger  She also notices clicking where finger becomes fixed in a flexed position  Patient has discomfort in trying to stretch out her hand  She denies any  Recent injury  Review of Systems   Review of Systems   Constitutional: Negative  Respiratory: Negative  Cardiovascular: Negative  Gastrointestinal: Negative  Musculoskeletal:          As per HPI   Neurological: Negative  Psychiatric/Behavioral: Negative          Active Problem List     Patient Active Problem List   Diagnosis    Positional vertigo    Corn of toe    Focal nodular hyperplasia of liver    IPMN (intraductal papillary mucinous neoplasm)    Abnormal MRI of abdomen    Lymphadenopathy    Liver mass    Mediastinal adenopathy    Sarcoidosis, lung (HCC)    Granulomatous lymphadenitis    Edema of right lower extremity    Diastolic dysfunction    Complex tear of medial meniscus of right knee as current injury    Other tear of medial meniscus, current injury, right knee, initial encounter    Trigger middle finger of right hand       Past Medical History:  Past Medical History:   Diagnosis Date    Abdominal pain     Acute tonsillitis     Breast pain, left     Edema of both lower extremities     GERD without esophagitis     Kidney stone     " [place]"    Lesion of liver     Liver mass     Lymphadenopathy     Mediastinal lymphadenopathy     Neoplasm of digestive system     Orthostatic lightheadedness     Sarcoidosis     Vertigo        Past Surgical History:  Past Surgical History:   Procedure Laterality Date    CARPAL TUNNEL RELEASE Bilateral     COLONOSCOPY      2017    IR BIOPSY LIVER MASS  11/6/2018    KNEE ARTHROSCOPY Left     MEDIASTINOSCOPY N/A 11/27/2018    Procedure: MEDIASTINOSCOPY;  Surgeon: Temi Martin MD;  [facility];  Service: Thoracic    WI BRONCHOSCOPY NEEDLE BX TRACHEA MAIN STEM&/BRON N/A 11/27/2018    Procedure: EBUS with biopsy;  Surgeon: Temi Martin MD;  [facility];  Service: Thoracic    [address] N/A 11/27/2018    Procedure: Lee Ann Swann;  Surgeon: Temi Martin MD;  [facility];  Service: Thoracic    WI EDG US EXAM SURGICAL ALTER STOM DUODENUM/JEJUNUM N/A 10/29/2018    Procedure: LINEAR ENDOSCOPIC U/S;  Surgeon: Mirza Vega MD;  [facility];   Service: Gastroenterology    WISDOM TOOTH EXTRACTION         Family History:  Family History   Problem Relation Age of Onset    Alzheimer's disease Mother     Parkinsonism Father        Social History:  Social History     Socioeconomic History    Marital status: /Civil Union     Spouse name: Not on file    Number of children: Not on file    Years of education: Not on file    Highest education level: Not on file   Occupational History    Not on file   Social Needs    Financial resource strain: Not on file    Food insecurity     Worry: Not on file     Inability: Not on file    Transportation needs     Medical: Not on file     Non-medical: Not on file   Tobacco Use    Smoking status: Never Smoker    Smokeless tobacco: Never Used   Substance and Sexual Activity    Alcohol use: Yes     Frequency: Monthly or less     Drinks per session: 1 or 2     Binge frequency: Never     Comment: socially    Drug use: No    Sexual activity: Not on file   Lifestyle    Physical activity     Days per week: Not on file     Minutes per session: Not on file    Stress: Not on file   Relationships    Social connections     Talks on phone: Not on file     Gets together: Not on file     Attends Buddhist service: Not on file     Active member of club or organization: Not on file     Attends meetings of clubs or organizations: Not on file     Relationship status: Not on file    Intimate partner violence     Fear of current or ex partner: Not on file     Emotionally abused: Not on file     Physically abused: Not on file     Forced sexual activity: Not on file   Other Topics Concern    Not on file   Social History Narrative    Not on file       Objective     Vitals:    08/26/20 1024   BP: 120/78   Pulse: 60   Resp: 15   Temp: 98 °F (36 7 °C)   SpO2: 98%     Wt Readings from Last 3 Encounters:   08/26/20 77 6 kg (171 lb)   08/20/20 77 1 kg (170 lb)   02/10/20 90 2 kg (198 lb 12 8 oz)       Physical Exam  Constitutional:       General: She is not in acute distress  Appearance: Normal appearance  She is not ill-appearing  Musculoskeletal:         General: Swelling, tenderness and deformity present  No signs of injury  Right lower leg: No edema  Left lower leg: No edema  Comments:  Patient's 3rd finger of her right hand appears mildly swollen at the IP joint  There is no redness and mild tenderness to palpation along tendon of finger as it extends into her palm of her hand    She has 5/5 grasp strength however finger tends to lock in a flexed position  I was able to slowly extend the finger   Neurological:      Mental Status: She is alert  Pertinent Laboratory/Diagnostic Studies:  Lab Results   Component Value Date    BUN 19 08/21/2019    CREATININE 0 89 08/21/2019    CALCIUM 9 1 08/21/2019    K 3 4 (L) 08/21/2019    CO2 29 08/21/2019     08/21/2019     Lab Results   Component Value Date    ALT 62 11/10/2018    AST 31 11/10/2018    ALKPHOS 113 11/10/2018       Lab Results   Component Value Date    WBC 6 95 08/21/2020    HGB 11 7 08/21/2020    HCT 36 8 08/21/2020    MCV 90 08/21/2020     08/21/2020       No results found for: TSH    No results found for: CHOL  No results found for: TRIG  No results found for: HDL  No results found for: LDLCALC  No results found for: HGBA1C    Results for orders placed or performed in visit on 08/21/20   CBC and differential   Result Value Ref Range    WBC 6 95 4 31 - 10 16 Thousand/uL    RBC 4 09 3 81 - 5 12 Million/uL    Hemoglobin 11 7 11 5 - 15 4 g/dL    Hematocrit 36 8 34 8 - 46 1 %    MCV 90 82 - 98 fL    MCH 28 6 26 8 - 34 3 pg    MCHC 31 8 31 4 - 37 4 g/dL    RDW 14 3 11 6 - 15 1 %    MPV 11 4 8 9 - 12 7 fL    Platelets 184 876 - 077 Thousands/uL    nRBC 0 /100 WBCs    Neutrophils Relative 62 43 - 75 %    Immat GRANS % 0 0 - 2 %    Lymphocytes Relative 28 14 - 44 %    Monocytes Relative 9 4 - 12 %    Eosinophils Relative 0 0 - 6 %    Basophils Relative 1 0 - 1 %    Neutrophils Absolute 4 31 1 85 - 7 62 Thousands/µL    Immature Grans Absolute 0 02 0 00 - 0 20 Thousand/uL    Lymphocytes Absolute 1 95 0 60 - 4 47 Thousands/µL    Monocytes Absolute 0 59 0 17 - 1 22 Thousand/µL    Eosinophils Absolute 0 00 0 00 - 0 61 Thousand/µL    Basophils Absolute 0 08 0 00 - 0 10 Thousands/µL   C-reactive protein   Result Value Ref Range    CRP 3 3 (H) <3 0 mg/L   [place] Allergy Panel, Adult   Result Value Ref Range    A  ALTERNATA <0 10 0 00 - 0 09 kUA/I    A  FUMIGATUS <0 10 0 00 - 0 09 kUA/I    [place] Grass <0 10 0 00 - 0 09 kUA/I    [place]  <0 10 0 00 - 0 09 kUA/I    Cat Epithellium-Dander <0 10 0 00 - 0 09 kUA/I    C HERBARUM <0 10 0 00 - 0 09 kUA/I    Cockroach <0 10 0 00 - 0 09 kUA/I    Common Silver Birch <0 10 0 00 - 0 09 kUA/I    [place] <0 10 0 00 - 0 09 kUA/I    D  farinae <0 10 0 00 - 0 09 kUA/I    D  pteronyssinus <0 10 0 00 - 0 09 kUA/I    Dog Dander <0 10 0 00 - 0 09 kUA/I    Elm IgE <0 10 0 00 - 0 09 kUA/I    [place] Tree <0 10 0 00 - 0 09 kUA/I    Mugwort <0 10 0 00 - 0 09 kUA/I    [place] Tree <0 10 0 00 - 0 09 kUA/I    Oak <0 10 0 00 - 0 09 kUA/I    P CHRYSOGENUM <0 10 0 00 - 0 09 kUA/I    Rough Pigweed  IgE <0 10 0 00 - 0 09 kUA/I    Common Ragweed <0 10 0 00 - 0 09 kUA/I    Sheep Sorrel IgE <0 10 0 00 - 0 09 kUA/I    [place] Tree <0 10 0 00 - 0 09 kUA/I    Thierry Grass <0 10 0 00 - 0 09 kUA/I    [place] Tree <0 10 0 00 - 0 09 kUA/I    [place] Tree <0 10 0 00 - 0 09 kUA/I    IgE 10 1 0 - 113 kU/l    Allergen Comment See Below     MOUSE URINE <0 10 0 00 - 0 09 kUA/I       Orders Placed This Encounter   Procedures    Ambulatory referral to Orthopedic Surgery       ALLERGIES:  Allergies   Allergen Reactions    Bactrim [Sulfamethoxazole-Trimethoprim] Hives    Clam Shell Vomiting       Current Medications     Current Outpatient Medications   Medication Sig Dispense Refill    aspirin 81 MG tablet Take 1 tablet by mouth daily in the early morning       Cholecalciferol (VITAMIN D) 2000 units CAPS Take 1 tablet by mouth daily      esomeprazole (NexIUM) 40 MG capsule Take 1 capsule (40 mg total) by mouth daily in the early morning 90 capsule 1    hydrochlorothiazide (HYDRODIURIL) 12 5 mg tablet Take 1 tablet (12 5 mg total) by mouth daily 30 tablet 5    Multiple Vitamin (MULTIVITAMIN) tablet Take 1 tablet by mouth daily      omeprazole (PriLOSEC) 40 MG capsule TAKE 1 CAPSULE BY MOUTH EVERY DAY BEFORE BREAKFAST 90 capsule 1    WIXELA INHUB 250-50 MCG/DOSE inhaler INHALE 1 PUFF 2 (TWO) TIMES A DAY RINSE MOUTH AFTER USE  (Patient taking differently: 1 puff daily ) 1 Inhaler 5     No current facility-administered medications for this visit            Health Maintenance     Health Maintenance   Topic Date Due    BMI: Followup Plan  10/16/1969    Annual Physical  10/16/1969    DTaP,Tdap,and Td Vaccines (1 - Tdap) 10/16/1972    Pneumococcal Vaccine: 65+ Years (2 of 2 - PPSV23) 02/11/2020    Influenza Vaccine  07/01/2020    Fall Risk  09/10/2020    MAMMOGRAM  11/08/2020    Depression Screening PHQ  08/26/2021    BMI: Adult  08/26/2021    Colonoscopy Surveillance  09/19/2022    Colorectal Cancer Screening  09/19/2027    Hepatitis C Screening  Completed    HIB Vaccine  Aged Out    Hepatitis B Vaccine  Aged Out    IPV Vaccine  Aged Out    Hepatitis A Vaccine  Aged Out    Meningococcal ACWY Vaccine  Aged Out    HPV Vaccine  Aged Out     Immunization History   Administered Date(s) Administered    INFLUENZA 12/14/2018    Influenza, high dose seasonal 0 7 mL 12/14/2018, 11/06/2019    Pneumococcal Conjugate 13-Valent 27/14/1713       Ilir Rothman MD

## 2020-08-26 NOTE — ASSESSMENT & PLAN NOTE
Trigger finger  Patient was given referral to [address] hand surgeon [phone]  For further evaluation and treatment

## 2020-08-26 NOTE — ASSESSMENT & PLAN NOTE
Sarcoidosis  Patient with a diagnosis of sarcoidosis and patient requested referral to rheumatologist   Patient currently sees pulmonologist on a regular basis    She was referred to [facility] rheumatologist Bailey Swain

## 2020-09-16 ENCOUNTER — OFFICE VISIT (OUTPATIENT)
Dept: OCCUPATIONAL THERAPY | Facility: HOSPITAL | Age: 70
End: 2020-09-16

## 2020-09-16 ENCOUNTER — OFFICE VISIT (OUTPATIENT)
Dept: OBGYN CLINIC | Facility: HOSPITAL | Age: 70
End: 2020-09-16
Payer: COMMERCIAL

## 2020-09-16 VITALS
DIASTOLIC BLOOD PRESSURE: 77 MMHG | BODY MASS INDEX: 31.47 KG/M2 | HEART RATE: 73 BPM | SYSTOLIC BLOOD PRESSURE: 127 MMHG | HEIGHT: 62 IN | WEIGHT: 171 LBS

## 2020-09-16 DIAGNOSIS — M65.331 TRIGGER MIDDLE FINGER OF RIGHT HAND: Primary | ICD-10-CM

## 2020-09-16 DIAGNOSIS — M65.331 TRIGGER MIDDLE FINGER OF RIGHT HAND: ICD-10-CM

## 2020-09-16 PROCEDURE — 1160F RVW MEDS BY RX/DR IN RCRD: CPT | Performed by: ORTHOPAEDIC SURGERY

## 2020-09-16 PROCEDURE — 20550 NJX 1 TENDON SHEATH/LIGAMENT: CPT | Performed by: ORTHOPAEDIC SURGERY

## 2020-09-16 PROCEDURE — 97760 ORTHOTIC MGMT&TRAING 1ST ENC: CPT

## 2020-09-16 PROCEDURE — 1036F TOBACCO NON-USER: CPT | Performed by: ORTHOPAEDIC SURGERY

## 2020-09-16 PROCEDURE — 99213 OFFICE O/P EST LOW 20 MIN: CPT | Performed by: ORTHOPAEDIC SURGERY

## 2020-09-16 RX ORDER — BETAMETHASONE SODIUM PHOSPHATE AND BETAMETHASONE ACETATE 3; 3 MG/ML; MG/ML
6 INJECTION, SUSPENSION INTRA-ARTICULAR; INTRALESIONAL; INTRAMUSCULAR; SOFT TISSUE
Status: COMPLETED | OUTPATIENT
Start: 2020-09-16 | End: 2020-09-16

## 2020-09-16 RX ORDER — LIDOCAINE HYDROCHLORIDE 10 MG/ML
1 INJECTION, SOLUTION INFILTRATION; PERINEURAL
Status: COMPLETED | OUTPATIENT
Start: 2020-09-16 | End: 2020-09-16

## 2020-09-16 RX ADMIN — BETAMETHASONE SODIUM PHOSPHATE AND BETAMETHASONE ACETATE 6 MG: 3; 3 INJECTION, SUSPENSION INTRA-ARTICULAR; INTRALESIONAL; INTRAMUSCULAR; SOFT TISSUE at 15:10

## 2020-09-16 RX ADMIN — LIDOCAINE HYDROCHLORIDE 1 ML: 10 INJECTION, SOLUTION INFILTRATION; PERINEURAL at 15:10

## 2020-09-16 NOTE — PROGRESS NOTES
Orthosis    Diagnosis:   1  Trigger middle finger of right hand  Ambulatory referral to PT/OT hand therapy     Indication: Motion Blocking    Location: Right  long finger  Supplies: Custom Fit Orthotic  Orthosis type: MCP extension blocking  Wearing Schedule: Night only  Describe Position: MCP blocked into full ext    Precautions: Universal (skin contact/breakdown)    Patient or Caregiver expresses understanding of wearing Schedule and Precautions? Yes  Patient or Caregiver able to don/doff orthotic independently? Yes    Written orders provided to patient?  No  Orders Obtained: Written  Orders Obtained from: Zach Martinez    Return for evaluation and treatment No

## 2020-09-16 NOTE — PROGRESS NOTES
ASSESSMENT/PLAN:    Assessment:   Trigger Finger  right  long finger    Plan:   77year old female with right long finger trigger finger  Patient was offered and administered a corticosteroid injection to her right long finger trigger finger  She tolerated the procedure well  Patient will follow up with us in 6 weeks for repeat exam that time  Follow Up:  6  week(s)    General Discussions:  Trigger FInger: The anatomy and physiology of trigger finger was discussed with the patient today in the office  Edema and increased contact pressure within the flexor tendons at the A1 pulley can cause pain, crepitation, and limitation of function  Treatment options include resting MP blocking splints to decrease edema, oral anti-inflammatory medications, home or formal therapy exercises, up to 2 steroid injections within the tendon sheath, or surgical release  While majority of patients do respond to conservative treatment, up to 20% may require surgical release        _____________________________________________________  CHIEF COMPLAINT:  Chief Complaint   Patient presents with    Right Middle Finger - Locking         SUBJECTIVE:  Laverne Zeng is a 77 y o  female who presents with right long finger pain and stiffness  Patient states this began a few months ago  Pain is worse in the morning, better with rest and brace use  No numbness or tingling  Patient does report associated locking/clicking of finger, which does require manual straightening at times    Patient is right-hand-dominant and works as a       PAST MEDICAL HISTORY:  Past Medical History:   Diagnosis Date    Abdominal pain     Acute tonsillitis     Breast pain, left     Edema of both lower extremities     GERD without esophagitis     Kidney stone     " [place]"    Lesion of liver     Liver mass     Lymphadenopathy     Mediastinal lymphadenopathy     Neoplasm of digestive system     Orthostatic lightheadedness     Sarcoidosis  Vertigo        PAST SURGICAL HISTORY:  Past Surgical History:   Procedure Laterality Date    CARPAL TUNNEL RELEASE Bilateral     COLONOSCOPY      2017    IR BIOPSY LIVER MASS  11/6/2018    KNEE ARTHROSCOPY Left     MEDIASTINOSCOPY N/A 11/27/2018    Procedure: MEDIASTINOSCOPY;  Surgeon: Irvin Jones MD;  [facility];  Service: Thoracic    CO BRONCHOSCOPY NEEDLE BX TRACHEA MAIN STEM&/BRON N/A 11/27/2018    Procedure: EBUS with biopsy;  Surgeon: Irvin Jones MD;  [facility];  Service: Thoracic    [address] N/A 11/27/2018    Procedure: Trever Query;  Surgeon: Irvin Jones MD;  [facility];  Service: Thoracic    CO EDG US EXAM SURGICAL ALTER STOM DUODENUM/JEJUNUM N/A 10/29/2018    Procedure: LINEAR ENDOSCOPIC U/S;  Surgeon: Ceferino Whitfield MD;  [facility];   Service: Gastroenterology    WISDOM TOOTH EXTRACTION         FAMILY HISTORY:  Family History   Problem Relation Age of Onset    Alzheimer's disease Mother     Parkinsonism Father        SOCIAL HISTORY:  Social History     Tobacco Use    Smoking status: Never Smoker    Smokeless tobacco: Never Used   Substance Use Topics    Alcohol use: Yes     Frequency: Monthly or less     Drinks per session: 1 or 2     Binge frequency: Never     Comment: socially    Drug use: No       MEDICATIONS:    Current Outpatient Medications:     aspirin 81 MG tablet, Take 1 tablet by mouth daily in the early morning , Disp: , Rfl:     Cholecalciferol (VITAMIN D) 2000 units CAPS, Take 1 tablet by mouth daily, Disp: , Rfl:     hydrochlorothiazide (HYDRODIURIL) 12 5 mg tablet, Take 1 tablet (12 5 mg total) by mouth daily, Disp: 30 tablet, Rfl: 5    Multiple Vitamin (MULTIVITAMIN) tablet, Take 1 tablet by mouth daily, Disp: , Rfl:     omeprazole (PriLOSEC) 40 MG capsule, TAKE 1 CAPSULE BY MOUTH EVERY DAY BEFORE BREAKFAST, Disp: 90 capsule, Rfl: 1    WIXELA INHUB 250-50 MCG/DOSE inhaler, INHALE 1 PUFF 2 (TWO) TIMES A DAY RINSE MOUTH AFTER USE  (Patient taking differently: 1 puff daily ), Disp: 1 Inhaler, Rfl: 5    ALLERGIES:  Allergies   Allergen Reactions    Bactrim [Sulfamethoxazole-Trimethoprim] Hives    Clam Shell Vomiting       REVIEW OF SYSTEMS:  Pertinent items are noted in HPI  A comprehensive review of systems was negative      LABS:  HgA1c: No results found for: HGBA1C  BMP:   Lab Results   Component Value Date    CALCIUM 9 1 08/21/2019    K 3 4 (L) 08/21/2019    CO2 29 08/21/2019     08/21/2019    BUN 19 08/21/2019    CREATININE 0 89 08/21/2019         _____________________________________________________  PHYSICAL EXAMINATION:  Vital signs: /77   Pulse 73   Ht 5' 2" (1 575 m)   Wt 77 6 kg (171 lb)   BMI 31 28 kg/m²   General: well developed and well nourished, alert, oriented times 3 and appears comfortable  Psychiatric: Normal  HEENT: Trachea Midline, No torticollis  Cardiovascular: No discernable arrhythmia  Pulmonary: No wheezing or stridor  Skin: No masses, erythema, lacerations, fluctation, ulcerations  Neurovascular: Sensation Intact to the Median, Ulnar, Radial Nerve, Motor Intact to the Median, Ulnar, Radial Nerve and Pulses Intact    MUSCULOSKELETAL EXAMINATION:  MSK right upper extremity   Skin intact without erythema or ecchymosis   TTP over long finger A1 pulley   SILT M/R/U   Motor intact FDP2, FDP5, FDI, EDC, APB   Trigger Finger: Palpable nodule in  the flexor tendon of the long finger and Triggering of the long finger   2 sec cap refill      _____________________________________________________  STUDIES REVIEWED:  No Studies to review      PROCEDURES PERFORMED:  Hand/upper extremity injection: R long A1  Date/Time: 9/16/2020 3:10 PM  Consent given by: patient  Site marked: site marked  Timeout: Immediately prior to procedure a time out was called to verify the correct patient, procedure, equipment, support staff and site/side marked as required   Supporting Documentation  Indications: pain and tendon swelling   Procedure Details  Condition:trigger finger Location: long finger - R long A1   Preparation: Patient was prepped and draped in the usual sterile fashion  Needle size: 25 G  Approach: volar  Medications administered: 1 mL lidocaine 1 %; 6 mg betamethasone acetate-betamethasone sodium phosphate 6 (3-3) mg/mL    Patient tolerance: patient tolerated the procedure well with no immediate complications  Dressing:  Sterile dressing applied               I interviewed, took the history and examined the patient  I discuss the case with the resident and reviewed the resident's note  I supervised the resident and I agree with the resident management plan as it was presented to me  I was present in the clinic and examined the patient

## 2020-10-05 ENCOUNTER — APPOINTMENT (OUTPATIENT)
Dept: LAB | Facility: CLINIC | Age: 70
End: 2020-10-05
Payer: COMMERCIAL

## 2020-10-05 ENCOUNTER — TRANSCRIBE ORDERS (OUTPATIENT)
Dept: LAB | Facility: CLINIC | Age: 70
End: 2020-10-05

## 2020-10-05 DIAGNOSIS — D49.0 IPMN (INTRADUCTAL PAPILLARY MUCINOUS NEOPLASM): ICD-10-CM

## 2020-10-05 LAB
BUN SERPL-MCNC: 19 MG/DL (ref 5–25)
CREAT SERPL-MCNC: 0.8 MG/DL (ref 0.6–1.3)
GFR SERPL CREATININE-BSD FRML MDRD: 76 ML/MIN/1.73SQ M

## 2020-10-05 PROCEDURE — 82565 ASSAY OF CREATININE: CPT

## 2020-10-05 PROCEDURE — 84520 ASSAY OF UREA NITROGEN: CPT

## 2020-10-05 PROCEDURE — 36415 COLL VENOUS BLD VENIPUNCTURE: CPT

## 2020-10-09 ENCOUNTER — TELEPHONE (OUTPATIENT)
Dept: SURGICAL ONCOLOGY | Facility: CLINIC | Age: 70
End: 2020-10-09

## 2020-10-14 ENCOUNTER — TELEPHONE (OUTPATIENT)
Dept: SURGICAL ONCOLOGY | Facility: CLINIC | Age: 70
End: 2020-10-14

## 2020-10-27 DIAGNOSIS — D86.0 SARCOIDOSIS, LUNG (HCC): ICD-10-CM

## 2020-11-05 ENCOUNTER — HOSPITAL ENCOUNTER (OUTPATIENT)
Dept: MRI IMAGING | Facility: HOSPITAL | Age: 70
Discharge: HOME | End: 2020-11-05
Payer: COMMERCIAL

## 2020-11-05 DIAGNOSIS — R16.0 LIVER MASS: ICD-10-CM

## 2020-11-05 DIAGNOSIS — D49.0 IPMN (INTRADUCTAL PAPILLARY MUCINOUS NEOPLASM): ICD-10-CM

## 2020-11-05 PROCEDURE — 74183 MRI ABD W/O CNTR FLWD CNTR: CPT

## 2020-11-05 PROCEDURE — A9585 GADOBUTROL INJECTION: HCPCS | Performed by: NURSE PRACTITIONER

## 2020-11-05 RX ADMIN — GADOBUTROL 7 ML: 604.72 INJECTION INTRAVENOUS at 17:30

## 2020-11-10 ENCOUNTER — OFFICE VISIT (OUTPATIENT)
Dept: SURGICAL ONCOLOGY | Facility: CLINIC | Age: 70
End: 2020-11-10
Payer: COMMERCIAL

## 2020-11-10 ENCOUNTER — PREP FOR PROCEDURE (OUTPATIENT)
Dept: INTERVENTIONAL RADIOLOGY/VASCULAR | Facility: CLINIC | Age: 70
End: 2020-11-10

## 2020-11-10 VITALS
DIASTOLIC BLOOD PRESSURE: 80 MMHG | WEIGHT: 171.5 LBS | TEMPERATURE: 97.9 F | RESPIRATION RATE: 15 BRPM | HEART RATE: 59 BPM | HEIGHT: 62 IN | BODY MASS INDEX: 31.56 KG/M2 | SYSTOLIC BLOOD PRESSURE: 122 MMHG

## 2020-11-10 DIAGNOSIS — D49.0 IPMN (INTRADUCTAL PAPILLARY MUCINOUS NEOPLASM): Primary | ICD-10-CM

## 2020-11-10 DIAGNOSIS — R16.0 LIVER MASS: ICD-10-CM

## 2020-11-10 DIAGNOSIS — R16.0 LIVER MASS: Primary | ICD-10-CM

## 2020-11-10 PROCEDURE — 99214 OFFICE O/P EST MOD 30 MIN: CPT | Performed by: SURGERY

## 2020-11-10 PROCEDURE — 1036F TOBACCO NON-USER: CPT | Performed by: SURGERY

## 2020-11-10 PROCEDURE — 1160F RVW MEDS BY RX/DR IN RCRD: CPT | Performed by: SURGERY

## 2020-11-10 PROCEDURE — 3008F BODY MASS INDEX DOCD: CPT | Performed by: SURGERY

## 2020-11-10 RX ORDER — SODIUM CHLORIDE 9 MG/ML
75 INJECTION, SOLUTION INTRAVENOUS CONTINUOUS
Status: CANCELLED | OUTPATIENT
Start: 2020-11-10

## 2020-11-13 ENCOUNTER — TELEPHONE (OUTPATIENT)
Dept: PULMONOLOGY | Facility: CLINIC | Age: 70
End: 2020-11-13

## 2020-11-27 ENCOUNTER — HOSPITAL ENCOUNTER (OUTPATIENT)
Dept: CT IMAGING | Facility: HOSPITAL | Age: 70
Discharge: HOME | End: 2020-11-27
Attending: INTERNAL MEDICINE
Payer: COMMERCIAL

## 2020-11-27 DIAGNOSIS — D86.0 SARCOIDOSIS, LUNG (HCC): ICD-10-CM

## 2020-11-27 PROCEDURE — G1004 CDSM NDSC: HCPCS

## 2020-11-27 PROCEDURE — 71250 CT THORAX DX C-: CPT

## 2020-12-01 ENCOUNTER — TELEPHONE (OUTPATIENT)
Dept: GYNECOLOGIC ONCOLOGY | Facility: CLINIC | Age: 70
End: 2020-12-01

## 2020-12-01 ENCOUNTER — TELEPHONE (OUTPATIENT)
Dept: SURGICAL ONCOLOGY | Facility: CLINIC | Age: 70
End: 2020-12-01

## 2020-12-03 ENCOUNTER — TELEPHONE (OUTPATIENT)
Dept: RADIOLOGY | Facility: HOSPITAL | Age: 70
End: 2020-12-03

## 2020-12-08 ENCOUNTER — HOSPITAL ENCOUNTER (OUTPATIENT)
Dept: CT IMAGING | Facility: HOSPITAL | Age: 70
Discharge: HOME | End: 2020-12-08
Payer: COMMERCIAL

## 2020-12-08 ENCOUNTER — HOSPITAL ENCOUNTER (OUTPATIENT)
Dept: RADIOLOGY | Facility: HOSPITAL | Age: 70
Discharge: HOME | End: 2020-12-08
Attending: RADIOLOGY
Payer: COMMERCIAL

## 2020-12-08 VITALS
RESPIRATION RATE: 18 BRPM | TEMPERATURE: 97 F | HEART RATE: 77 BPM | OXYGEN SATURATION: 97 % | HEIGHT: 62 IN | WEIGHT: 171 LBS | DIASTOLIC BLOOD PRESSURE: 60 MMHG | SYSTOLIC BLOOD PRESSURE: 133 MMHG | BODY MASS INDEX: 31.47 KG/M2

## 2020-12-08 DIAGNOSIS — R16.0 LIVER MASS: ICD-10-CM

## 2020-12-08 LAB
ALBUMIN SERPL BCP-MCNC: 3.6 G/DL (ref 3.5–5)
ALP SERPL-CCNC: 112 U/L (ref 46–116)
ALT SERPL W P-5'-P-CCNC: 32 U/L (ref 12–78)
ANION GAP SERPL CALCULATED.3IONS-SCNC: 8 MMOL/L (ref 4–13)
AST SERPL W P-5'-P-CCNC: 23 U/L (ref 5–45)
BASOPHILS # BLD AUTO: 0.09 THOUSANDS/ΜL (ref 0–0.1)
BASOPHILS NFR BLD AUTO: 1 % (ref 0–1)
BILIRUB SERPL-MCNC: 0.85 MG/DL (ref 0.2–1)
BUN SERPL-MCNC: 22 MG/DL (ref 5–25)
CALCIUM SERPL-MCNC: 8.9 MG/DL (ref 8.3–10.1)
CHLORIDE SERPL-SCNC: 105 MMOL/L (ref 100–108)
CO2 SERPL-SCNC: 27 MMOL/L (ref 21–32)
CREAT SERPL-MCNC: 0.79 MG/DL (ref 0.6–1.3)
EOSINOPHIL # BLD AUTO: 0 THOUSAND/ΜL (ref 0–0.61)
EOSINOPHIL NFR BLD AUTO: 0 % (ref 0–6)
ERYTHROCYTE [DISTWIDTH] IN BLOOD BY AUTOMATED COUNT: 13.6 % (ref 11.6–15.1)
GFR SERPL CREATININE-BSD FRML MDRD: 77 ML/MIN/1.73SQ M
GLUCOSE P FAST SERPL-MCNC: 105 MG/DL (ref 65–99)
GLUCOSE SERPL-MCNC: 105 MG/DL (ref 65–140)
HCT VFR BLD AUTO: 37 % (ref 34.8–46.1)
HGB BLD-MCNC: 11.6 G/DL (ref 11.5–15.4)
IMM GRANULOCYTES # BLD AUTO: 0.02 THOUSAND/UL (ref 0–0.2)
IMM GRANULOCYTES NFR BLD AUTO: 0 % (ref 0–2)
INR PPP: 0.99 (ref 0.84–1.19)
LYMPHOCYTES # BLD AUTO: 2.38 THOUSANDS/ΜL (ref 0.6–4.47)
LYMPHOCYTES NFR BLD AUTO: 32 % (ref 14–44)
MCH RBC QN AUTO: 28.1 PG (ref 26.8–34.3)
MCHC RBC AUTO-ENTMCNC: 31.4 G/DL (ref 31.4–37.4)
MCV RBC AUTO: 90 FL (ref 82–98)
MONOCYTES # BLD AUTO: 0.84 THOUSAND/ΜL (ref 0.17–1.22)
MONOCYTES NFR BLD AUTO: 11 % (ref 4–12)
NEUTROPHILS # BLD AUTO: 4.23 THOUSANDS/ΜL (ref 1.85–7.62)
NEUTS SEG NFR BLD AUTO: 56 % (ref 43–75)
NRBC BLD AUTO-RTO: 0 /100 WBCS
PLATELET # BLD AUTO: 306 THOUSANDS/UL (ref 149–390)
PMV BLD AUTO: 10.6 FL (ref 8.9–12.7)
POTASSIUM SERPL-SCNC: 3.5 MMOL/L (ref 3.5–5.3)
PROT SERPL-MCNC: 7.6 G/DL (ref 6.4–8.2)
PROTHROMBIN TIME: 13.2 SECONDS (ref 11.6–14.5)
RBC # BLD AUTO: 4.13 MILLION/UL (ref 3.81–5.12)
SODIUM SERPL-SCNC: 140 MMOL/L (ref 136–145)
WBC # BLD AUTO: 7.56 THOUSAND/UL (ref 4.31–10.16)

## 2020-12-08 PROCEDURE — 88305 TISSUE EXAM BY PATHOLOGIST: CPT | Performed by: PATHOLOGY

## 2020-12-08 PROCEDURE — 76942 ECHO GUIDE FOR BIOPSY: CPT

## 2020-12-08 PROCEDURE — 88342 IMHCHEM/IMCYTCHM 1ST ANTB: CPT | Performed by: PATHOLOGY

## 2020-12-08 PROCEDURE — 85025 COMPLETE CBC W/AUTO DIFF WBC: CPT | Performed by: RADIOLOGY

## 2020-12-08 PROCEDURE — 88334 PATH CONSLTJ SURG CYTO XM EA: CPT | Performed by: PATHOLOGY

## 2020-12-08 PROCEDURE — 88341 IMHCHEM/IMCYTCHM EA ADD ANTB: CPT | Performed by: PATHOLOGY

## 2020-12-08 PROCEDURE — 88333 PATH CONSLTJ SURG CYTO XM 1: CPT | Performed by: PATHOLOGY

## 2020-12-08 PROCEDURE — 47000 NEEDLE BIOPSY OF LIVER PERQ: CPT

## 2020-12-08 PROCEDURE — 85610 PROTHROMBIN TIME: CPT | Performed by: RADIOLOGY

## 2020-12-08 PROCEDURE — 80053 COMPREHEN METABOLIC PANEL: CPT | Performed by: RADIOLOGY

## 2020-12-08 PROCEDURE — 47000 NEEDLE BIOPSY OF LIVER PERQ: CPT | Performed by: RADIOLOGY

## 2020-12-08 PROCEDURE — 99153 MOD SED SAME PHYS/QHP EA: CPT

## 2020-12-08 PROCEDURE — 99152 MOD SED SAME PHYS/QHP 5/>YRS: CPT

## 2020-12-08 PROCEDURE — 76942 ECHO GUIDE FOR BIOPSY: CPT | Performed by: RADIOLOGY

## 2020-12-08 RX ORDER — FENTANYL CITRATE 50 UG/ML
INJECTION, SOLUTION INTRAMUSCULAR; INTRAVENOUS CODE/TRAUMA/SEDATION MEDICATION
Status: COMPLETED | OUTPATIENT
Start: 2020-12-08 | End: 2020-12-08

## 2020-12-08 RX ORDER — MIDAZOLAM HYDROCHLORIDE 2 MG/2ML
INJECTION, SOLUTION INTRAMUSCULAR; INTRAVENOUS CODE/TRAUMA/SEDATION MEDICATION
Status: COMPLETED | OUTPATIENT
Start: 2020-12-08 | End: 2020-12-08

## 2020-12-08 RX ORDER — SODIUM CHLORIDE 9 MG/ML
75 INJECTION, SOLUTION INTRAVENOUS CONTINUOUS
Status: DISCONTINUED | OUTPATIENT
Start: 2020-12-08 | End: 2020-12-09 | Stop reason: HOSPADM

## 2020-12-08 RX ORDER — HYDROMORPHONE HCL 110MG/55ML
0.5 PATIENT CONTROLLED ANALGESIA SYRINGE INTRAVENOUS EVERY 4 HOURS PRN
Status: DISCONTINUED | OUTPATIENT
Start: 2020-12-08 | End: 2020-12-08

## 2020-12-08 RX ORDER — ACETAMINOPHEN 160 MG/5ML
650 SUSPENSION, ORAL (FINAL DOSE FORM) ORAL EVERY 4 HOURS PRN
Status: DISCONTINUED | OUTPATIENT
Start: 2020-12-08 | End: 2020-12-09 | Stop reason: HOSPADM

## 2020-12-08 RX ORDER — MORPHINE SULFATE 15 MG/1
15 TABLET ORAL EVERY 4 HOURS PRN
Status: DISCONTINUED | OUTPATIENT
Start: 2020-12-08 | End: 2020-12-09 | Stop reason: HOSPADM

## 2020-12-08 RX ORDER — HYDROMORPHONE HCL 110MG/55ML
0.5 PATIENT CONTROLLED ANALGESIA SYRINGE INTRAVENOUS
Status: DISCONTINUED | OUTPATIENT
Start: 2020-12-08 | End: 2020-12-09 | Stop reason: HOSPADM

## 2020-12-08 RX ADMIN — FENTANYL CITRATE 100 MCG: 50 INJECTION, SOLUTION INTRAMUSCULAR; INTRAVENOUS at 09:12

## 2020-12-08 RX ADMIN — MIDAZOLAM HYDROCHLORIDE 1 MG: 1 INJECTION, SOLUTION INTRAMUSCULAR; INTRAVENOUS at 09:12

## 2020-12-08 RX ADMIN — HYDROMORPHONE HYDROCHLORIDE 0.5 MG: 2 INJECTION, SOLUTION INTRAMUSCULAR; INTRAVENOUS; SUBCUTANEOUS at 09:56

## 2020-12-08 RX ADMIN — MIDAZOLAM HYDROCHLORIDE 1 MG: 1 INJECTION, SOLUTION INTRAMUSCULAR; INTRAVENOUS at 09:19

## 2020-12-09 ENCOUNTER — OFFICE VISIT (OUTPATIENT)
Dept: RHEUMATOLOGY | Facility: CLINIC | Age: 70
End: 2020-12-09
Payer: COMMERCIAL

## 2020-12-09 VITALS — HEART RATE: 80 BPM | WEIGHT: 171 LBS | BODY MASS INDEX: 31.47 KG/M2 | HEIGHT: 62 IN

## 2020-12-09 DIAGNOSIS — R16.0 LIVER MASS: ICD-10-CM

## 2020-12-09 DIAGNOSIS — M65.331 TRIGGER MIDDLE FINGER OF RIGHT HAND: ICD-10-CM

## 2020-12-09 DIAGNOSIS — M25.542 ARTHRALGIA OF BOTH HANDS: Primary | ICD-10-CM

## 2020-12-09 DIAGNOSIS — M25.541 ARTHRALGIA OF BOTH HANDS: Primary | ICD-10-CM

## 2020-12-09 DIAGNOSIS — D86.0 SARCOIDOSIS, LUNG (HCC): ICD-10-CM

## 2020-12-09 PROCEDURE — 99245 OFF/OP CONSLTJ NEW/EST HI 55: CPT | Performed by: INTERNAL MEDICINE

## 2020-12-10 ENCOUNTER — TELEPHONE (OUTPATIENT)
Dept: SURGICAL ONCOLOGY | Facility: CLINIC | Age: 70
End: 2020-12-10

## 2020-12-10 DIAGNOSIS — D3A.8 NEUROENDOCRINE TUMOR OF LIVER: Primary | ICD-10-CM

## 2020-12-11 ENCOUNTER — TELEPHONE (OUTPATIENT)
Dept: SURGICAL ONCOLOGY | Facility: CLINIC | Age: 70
End: 2020-12-11

## 2020-12-11 PROBLEM — C7B.8 METASTATIC MALIGNANT NEUROENDOCRINE TUMOR TO LIVER (HCC): Status: ACTIVE | Noted: 2020-12-11

## 2020-12-15 ENCOUNTER — LAB (OUTPATIENT)
Dept: LAB | Facility: HOSPITAL | Age: 70
End: 2020-12-15
Payer: COMMERCIAL

## 2020-12-15 ENCOUNTER — OFFICE VISIT (OUTPATIENT)
Dept: SURGICAL ONCOLOGY | Facility: CLINIC | Age: 70
End: 2020-12-15
Payer: COMMERCIAL

## 2020-12-15 VITALS
HEART RATE: 104 BPM | SYSTOLIC BLOOD PRESSURE: 136 MMHG | RESPIRATION RATE: 18 BRPM | HEIGHT: 62 IN | TEMPERATURE: 98 F | DIASTOLIC BLOOD PRESSURE: 88 MMHG | BODY MASS INDEX: 31.83 KG/M2 | WEIGHT: 173 LBS

## 2020-12-15 DIAGNOSIS — C7B.8 METASTATIC MALIGNANT NEUROENDOCRINE TUMOR TO LIVER (HCC): Primary | ICD-10-CM

## 2020-12-15 DIAGNOSIS — C7B.8 METASTATIC MALIGNANT NEUROENDOCRINE TUMOR TO LIVER (HCC): ICD-10-CM

## 2020-12-15 DIAGNOSIS — D3A.8 NEUROENDOCRINE TUMOR OF LIVER: ICD-10-CM

## 2020-12-15 LAB
BUN SERPL-MCNC: 19 MG/DL (ref 5–25)
CREAT SERPL-MCNC: 0.72 MG/DL (ref 0.6–1.3)
ERYTHROCYTE [SEDIMENTATION RATE] IN BLOOD: 32 MM/HOUR (ref 0–29)
GFR SERPL CREATININE-BSD FRML MDRD: 86 ML/MIN/1.73SQ M
RHEUMATOID FACT SER QL LA: NEGATIVE

## 2020-12-15 PROCEDURE — 3008F BODY MASS INDEX DOCD: CPT | Performed by: SURGERY

## 2020-12-15 PROCEDURE — 86235 NUCLEAR ANTIGEN ANTIBODY: CPT | Performed by: INTERNAL MEDICINE

## 2020-12-15 PROCEDURE — 86430 RHEUMATOID FACTOR TEST QUAL: CPT | Performed by: INTERNAL MEDICINE

## 2020-12-15 PROCEDURE — 86316 IMMUNOASSAY TUMOR OTHER: CPT

## 2020-12-15 PROCEDURE — 84520 ASSAY OF UREA NITROGEN: CPT

## 2020-12-15 PROCEDURE — 99215 OFFICE O/P EST HI 40 MIN: CPT | Performed by: SURGERY

## 2020-12-15 PROCEDURE — 86255 FLUORESCENT ANTIBODY SCREEN: CPT | Performed by: INTERNAL MEDICINE

## 2020-12-15 PROCEDURE — 1160F RVW MEDS BY RX/DR IN RCRD: CPT | Performed by: SURGERY

## 2020-12-15 PROCEDURE — 1036F TOBACCO NON-USER: CPT | Performed by: SURGERY

## 2020-12-15 PROCEDURE — 86200 CCP ANTIBODY: CPT | Performed by: INTERNAL MEDICINE

## 2020-12-15 PROCEDURE — 36415 COLL VENOUS BLD VENIPUNCTURE: CPT | Performed by: INTERNAL MEDICINE

## 2020-12-15 PROCEDURE — 85652 RBC SED RATE AUTOMATED: CPT | Performed by: INTERNAL MEDICINE

## 2020-12-15 PROCEDURE — 86038 ANTINUCLEAR ANTIBODIES: CPT | Performed by: INTERNAL MEDICINE

## 2020-12-15 PROCEDURE — 82565 ASSAY OF CREATININE: CPT

## 2020-12-16 LAB
ENA SS-A AB SER-ACNC: <0.2 AI (ref 0–0.9)
ENA SS-B AB SER-ACNC: <0.2 AI (ref 0–0.9)
RYE IGE QN: NEGATIVE

## 2020-12-17 LAB
C-ANCA TITR SER IF: NORMAL TITER
CCP IGA+IGG SERPL IA-ACNC: 6 UNITS (ref 0–19)
MYELOPEROXIDASE AB SER IA-ACNC: <9 U/ML (ref 0–9)
P-ANCA ATYPICAL TITR SER IF: NORMAL TITER
P-ANCA TITR SER IF: NORMAL TITER
PROTEINASE3 AB SER IA-ACNC: <3.5 U/ML (ref 0–3.5)

## 2020-12-18 LAB — CGA SERPL-MCNC: 468.2 NG/ML (ref 0–101.8)

## 2020-12-22 ENCOUNTER — HOSPITAL ENCOUNTER (OUTPATIENT)
Dept: RADIOLOGY | Age: 70
Discharge: HOME | End: 2020-12-22
Payer: COMMERCIAL

## 2020-12-22 ENCOUNTER — TELEPHONE (OUTPATIENT)
Dept: SURGICAL ONCOLOGY | Facility: CLINIC | Age: 70
End: 2020-12-22

## 2020-12-22 DIAGNOSIS — C7B.8 METASTATIC MALIGNANT NEUROENDOCRINE TUMOR TO LIVER (HCC): Primary | ICD-10-CM

## 2020-12-22 DIAGNOSIS — D3A.8 NEUROENDOCRINE TUMOR OF LIVER: ICD-10-CM

## 2020-12-22 PROCEDURE — 78815 PET IMAGE W/CT SKULL-THIGH: CPT

## 2020-12-22 PROCEDURE — A9552 F18 FDG: HCPCS

## 2020-12-22 PROCEDURE — G1004 CDSM NDSC: HCPCS

## 2020-12-30 DIAGNOSIS — K21.9 GASTROESOPHAGEAL REFLUX DISEASE WITHOUT ESOPHAGITIS: ICD-10-CM

## 2020-12-31 ENCOUNTER — DOCUMENTATION (OUTPATIENT)
Dept: HEMATOLOGY ONCOLOGY | Facility: CLINIC | Age: 70
End: 2020-12-31

## 2020-12-31 RX ORDER — OMEPRAZOLE 40 MG/1
CAPSULE, DELAYED RELEASE ORAL
Qty: 90 CAPSULE | Refills: 1 | Status: SHIPPED | OUTPATIENT
Start: 2020-12-31 | End: 2021-06-25

## 2021-01-08 ENCOUNTER — HOSPITAL ENCOUNTER (OUTPATIENT)
Dept: MRI IMAGING | Facility: HOSPITAL | Age: 71
Discharge: HOME | End: 2021-01-08
Attending: SURGERY
Payer: COMMERCIAL

## 2021-01-08 DIAGNOSIS — C7B.8 METASTATIC MALIGNANT NEUROENDOCRINE TUMOR TO LIVER (HCC): ICD-10-CM

## 2021-01-08 PROCEDURE — A9585 GADOBUTROL INJECTION: HCPCS | Performed by: SURGERY

## 2021-01-08 PROCEDURE — 72157 MRI CHEST SPINE W/O & W/DYE: CPT

## 2021-01-08 PROCEDURE — G1004 CDSM NDSC: HCPCS

## 2021-01-08 RX ADMIN — GADOBUTROL 7 ML: 604.72 INJECTION INTRAVENOUS at 15:30

## 2021-01-11 ENCOUNTER — TELEPHONE (OUTPATIENT)
Dept: SURGICAL ONCOLOGY | Facility: CLINIC | Age: 71
End: 2021-01-11

## 2021-01-12 ENCOUNTER — TRANSCRIBE ORDERS (OUTPATIENT)
Dept: LAB | Facility: CLINIC | Age: 71
End: 2021-01-12

## 2021-01-12 ENCOUNTER — CONSULT (OUTPATIENT)
Dept: HEMATOLOGY ONCOLOGY | Facility: CLINIC | Age: 71
End: 2021-01-12
Payer: COMMERCIAL

## 2021-01-12 ENCOUNTER — LAB REQUISITION (OUTPATIENT)
Dept: LAB | Facility: HOSPITAL | Age: 71
End: 2021-01-12
Payer: COMMERCIAL

## 2021-01-12 ENCOUNTER — LAB (OUTPATIENT)
Dept: LAB | Facility: CLINIC | Age: 71
End: 2021-01-12
Payer: COMMERCIAL

## 2021-01-12 ENCOUNTER — OFFICE VISIT (OUTPATIENT)
Dept: SURGICAL ONCOLOGY | Facility: CLINIC | Age: 71
End: 2021-01-12
Payer: COMMERCIAL

## 2021-01-12 ENCOUNTER — APPOINTMENT (OUTPATIENT)
Dept: LAB | Facility: CLINIC | Age: 71
End: 2021-01-12
Payer: COMMERCIAL

## 2021-01-12 VITALS
HEART RATE: 60 BPM | WEIGHT: 178.5 LBS | SYSTOLIC BLOOD PRESSURE: 126 MMHG | DIASTOLIC BLOOD PRESSURE: 82 MMHG | RESPIRATION RATE: 18 BRPM | TEMPERATURE: 97.8 F | HEIGHT: 62 IN | BODY MASS INDEX: 32.85 KG/M2

## 2021-01-12 VITALS
RESPIRATION RATE: 18 BRPM | HEART RATE: 83 BPM | OXYGEN SATURATION: 98 % | DIASTOLIC BLOOD PRESSURE: 82 MMHG | HEIGHT: 62 IN | WEIGHT: 178 LBS | SYSTOLIC BLOOD PRESSURE: 126 MMHG | TEMPERATURE: 97.8 F | BODY MASS INDEX: 32.76 KG/M2

## 2021-01-12 DIAGNOSIS — C7B.8 METASTATIC MALIGNANT NEUROENDOCRINE TUMOR TO LIVER (HCC): Primary | ICD-10-CM

## 2021-01-12 DIAGNOSIS — C7B.8 SECONDARY MALIGNANT NEUROENDOCRINE TUMOR OF LIVER (HCC): ICD-10-CM

## 2021-01-12 DIAGNOSIS — C7B.8 METASTATIC MALIGNANT NEUROENDOCRINE TUMOR TO LIVER (HCC): ICD-10-CM

## 2021-01-12 DIAGNOSIS — C7B.8 OTHER SECONDARY NEUROENDOCRINE TUMORS (HCC): ICD-10-CM

## 2021-01-12 DIAGNOSIS — C7B.8 SECONDARY MALIGNANT NEUROENDOCRINE TUMOR OF LIVER (HCC): Primary | ICD-10-CM

## 2021-01-12 LAB
ABO GROUP BLD: NORMAL
ALBUMIN SERPL BCP-MCNC: 3.5 G/DL (ref 3.5–5)
ALP SERPL-CCNC: 98 U/L (ref 46–116)
ALT SERPL W P-5'-P-CCNC: 31 U/L (ref 12–78)
ANION GAP SERPL CALCULATED.3IONS-SCNC: 6 MMOL/L (ref 4–13)
APTT PPP: 32 SECONDS (ref 23–37)
AST SERPL W P-5'-P-CCNC: 21 U/L (ref 5–45)
ATRIAL RATE: 77 BPM
BASOPHILS # BLD AUTO: 0.08 THOUSANDS/ΜL (ref 0–0.1)
BASOPHILS NFR BLD AUTO: 1 % (ref 0–1)
BILIRUB SERPL-MCNC: 0.87 MG/DL (ref 0.2–1)
BLD GP AB SCN SERPL QL: NEGATIVE
BUN SERPL-MCNC: 21 MG/DL (ref 5–25)
CALCIUM SERPL-MCNC: 8.9 MG/DL (ref 8.3–10.1)
CHLORIDE SERPL-SCNC: 106 MMOL/L (ref 100–108)
CO2 SERPL-SCNC: 29 MMOL/L (ref 21–32)
CREAT SERPL-MCNC: 0.66 MG/DL (ref 0.6–1.3)
EOSINOPHIL # BLD AUTO: 0 THOUSAND/ΜL (ref 0–0.61)
EOSINOPHIL NFR BLD AUTO: 0 % (ref 0–6)
ERYTHROCYTE [DISTWIDTH] IN BLOOD BY AUTOMATED COUNT: 13.6 % (ref 11.6–15.1)
EST. AVERAGE GLUCOSE BLD GHB EST-MCNC: 128 MG/DL
GFR SERPL CREATININE-BSD FRML MDRD: 90 ML/MIN/1.73SQ M
GLUCOSE SERPL-MCNC: 77 MG/DL (ref 65–140)
HBA1C MFR BLD: 6.1 %
HCT VFR BLD AUTO: 34.8 % (ref 34.8–46.1)
HGB BLD-MCNC: 11 G/DL (ref 11.5–15.4)
IMM GRANULOCYTES # BLD AUTO: 0.02 THOUSAND/UL (ref 0–0.2)
IMM GRANULOCYTES NFR BLD AUTO: 0 % (ref 0–2)
INR PPP: 0.96 (ref 0.84–1.19)
LYMPHOCYTES # BLD AUTO: 1.81 THOUSANDS/ΜL (ref 0.6–4.47)
LYMPHOCYTES NFR BLD AUTO: 23 % (ref 14–44)
MCH RBC QN AUTO: 28.3 PG (ref 26.8–34.3)
MCHC RBC AUTO-ENTMCNC: 31.6 G/DL (ref 31.4–37.4)
MCV RBC AUTO: 90 FL (ref 82–98)
MONOCYTES # BLD AUTO: 0.77 THOUSAND/ΜL (ref 0.17–1.22)
MONOCYTES NFR BLD AUTO: 10 % (ref 4–12)
NEUTROPHILS # BLD AUTO: 5.06 THOUSANDS/ΜL (ref 1.85–7.62)
NEUTS SEG NFR BLD AUTO: 66 % (ref 43–75)
NRBC BLD AUTO-RTO: 0 /100 WBCS
P AXIS: 74 DEGREES
PLATELET # BLD AUTO: 289 THOUSANDS/UL (ref 149–390)
PMV BLD AUTO: 10.6 FL (ref 8.9–12.7)
POTASSIUM SERPL-SCNC: 3.5 MMOL/L (ref 3.5–5.3)
PR INTERVAL: 166 MS
PROT SERPL-MCNC: 7.6 G/DL (ref 6.4–8.2)
PROTHROMBIN TIME: 12.9 SECONDS (ref 11.6–14.5)
QRS AXIS: -1 DEGREES
QRSD INTERVAL: 86 MS
QT INTERVAL: 408 MS
QTC INTERVAL: 461 MS
RBC # BLD AUTO: 3.89 MILLION/UL (ref 3.81–5.12)
RH BLD: POSITIVE
SODIUM SERPL-SCNC: 141 MMOL/L (ref 136–145)
SPECIMEN EXPIRATION DATE: NORMAL
T WAVE AXIS: 14 DEGREES
VENTRICULAR RATE: 77 BPM
WBC # BLD AUTO: 7.74 THOUSAND/UL (ref 4.31–10.16)

## 2021-01-12 PROCEDURE — 85730 THROMBOPLASTIN TIME PARTIAL: CPT

## 2021-01-12 PROCEDURE — 99245 OFF/OP CONSLTJ NEW/EST HI 55: CPT | Performed by: INTERNAL MEDICINE

## 2021-01-12 PROCEDURE — 85025 COMPLETE CBC W/AUTO DIFF WBC: CPT

## 2021-01-12 PROCEDURE — 86901 BLOOD TYPING SEROLOGIC RH(D): CPT | Performed by: SURGERY

## 2021-01-12 PROCEDURE — 85610 PROTHROMBIN TIME: CPT

## 2021-01-12 PROCEDURE — 86850 RBC ANTIBODY SCREEN: CPT | Performed by: SURGERY

## 2021-01-12 PROCEDURE — 99215 OFFICE O/P EST HI 40 MIN: CPT | Performed by: SURGERY

## 2021-01-12 PROCEDURE — 86900 BLOOD TYPING SEROLOGIC ABO: CPT | Performed by: SURGERY

## 2021-01-12 PROCEDURE — 80053 COMPREHEN METABOLIC PANEL: CPT

## 2021-01-12 PROCEDURE — 93010 ELECTROCARDIOGRAM REPORT: CPT | Performed by: INTERNAL MEDICINE

## 2021-01-12 PROCEDURE — 93005 ELECTROCARDIOGRAM TRACING: CPT

## 2021-01-12 PROCEDURE — 83036 HEMOGLOBIN GLYCOSYLATED A1C: CPT

## 2021-01-12 PROCEDURE — 36415 COLL VENOUS BLD VENIPUNCTURE: CPT

## 2021-01-12 RX ORDER — LANREOTIDE ACETATE 120 MG/.5ML
120 INJECTION SUBCUTANEOUS ONCE
Status: CANCELLED | OUTPATIENT
Start: 2021-01-18

## 2021-01-12 NOTE — PROGRESS NOTES
Oncology Outpatient Consult Note  Jordon Cavazos 72 y o  female MRN: @ Encounter: [id number]        Date:  1/12/2021        CC:  Metastatic well-differentiated carcinoid tumor      HPI:  Jordon Cavazos is seen for initial consultation 1/12/2021 regarding a metastatic well-differentiated carcinoid tumor  The patient has seen our colleagues in surgery and is actually going for resection  Her PET-CT scan had showed    1  At least 3 foci of radiotracer uptake within the small bowel suspicious for underlying neuroendocrine lesions  Consider follow-up with CT enterography or small bowel pill study  2  Increased radiotracer uptake in two mesenteric soft tissue nodules suspicious for metastasis  3  Focal radiotracer uptake in the right lobe of the liver posteriorly compatible with the known metastasis  Slight focal increased radiotracer uptake in the right lobe of the liver anteriorly corresponding to the 5 mm subcapsular lesion segment 8   lesion seen on MRI suspicious for additional metastasis  4  Focal radiotracer uptake at the T11 vertebral body suspicious for osseous metastasis  MRI revealed the T11 vertebral body did have malignancy present  Again biopsy has confirmed well-differentiated neuroendocrine tumor  She is going for surgery  I had a discussion with her surgeon and she was presented at tumor board where they wish for her to start lanreotide upfront and I will arrange for this  Once she has surgery she will need to have her remaining lesions addressed via radiation or possibly PPRT  She is a candidate for PP RT as she did have uptake on her PET-CT scan in these lesions  As far symptoms are concerned she is at baseline  Denies any nausea denies any vomiting denies any constipation  The rest of her 14 point review of systems today was negative        Previous Hematologic/ Oncologic History:    Oncology History   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 [facility])   12/8/2020 Initial Diagnosis    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 ([facility] [address] )     12/8/2020 Biopsy    IR Liver biopsy:  A  Liver mass, needle core biopsy:  - Metastatic well-differentiated neuroendocrine tumor, G2             Test Results:    Imaging: Mri Thoracic Spine W Wo Contrast    Result Date: 1/11/2021  Narrative: MRI THORACIC SPINE WITH AND WITHOUT CONTRAST INDICATION: C7B 8: Other secondary neuroendocrine tumors  COMPARISON:  CT chest dated 11/27/2020  TECHNIQUE:  Sagittal T1, sagittal T2, sagittal inversion recovery, axial T2,  axial 2D MERGE  Sagittal and axial T1 postcontrast  IV Contrast:  7 mL of Gadobutrol injection (SINGLE-DOSE) IMAGE QUALITY:  Diagnostic  FINDINGS: ALIGNMENT:  Normal alignment of the thoracic spine  No compression fracture  No subluxation  No evidence of scoliosis  MARROW SIGNAL:  Within the T11 superior endplate there is a small focal marrow replacing lesion best seen on T1-weighted imaging  No significant enhancement identified  This corresponds to the abnormality seen on recent PET/CT  No other focal marrow replacing lesions are identified  THORACIC CORD:  Normal signal within the thoracic cord  PREVERTEBRAL AND PARASPINAL SOFT TISSUES:   There is a partially visualized T2 hyperintensity within the posterior segment of the right lobe of the liver on series 10 image 40 incompletely evaluated on this examination  THORACIC DEGENERATIVE CHANGE:  Minor degenerative change of the lower thoracic spine  At T11-12 there is slight annular bulging without significant canal stenosis or foraminal narrowing  POSTCONTRAST:  No abnormal enhancement  Impression: There is a small focal marrow replacing lesion within the posterior superior aspect of the T11 superior endplate which corresponds to the PET/CT abnormality, suggestive of osseous metastasis  No other definitive marrow replacing lesions are identified  Minor lower thoracic degenerative change    No canal stenosis or foraminal narrowing  No cord compression  Partially visualized T2 hyperintensity within the posterior segment of the right lobe of the liver on series 10 image 40 is incompletely evaluated on this examination  Previous imaging does report a liver metastasis including MRI dated 11/5/2020  Workstation performed: [id number]     Nm Pet Ct Skull Base To Mid Thigh    Result Date: 12/22/2020  Narrative: NETSPOT PET/CT SCAN  INDICATION:  Newly diagnosed metastatic NET of the liver  Initial staging  D3A 8: Other benign neuroendocrine tumors  MODIFIER: PI      COMPARISON:  CT chest 11/27/2020, MRI abdomen 11/5/2020 and additional priors  CELL TYPE:  Metastatic well-differentiated neuroendocrine tumor, 12/8/2020, liver mass  TECHNIQUE:   4 32 mCi Gallium-68 Dotatate Netspot administered IV  Multiplanar attenuation corrected and non-attenuation corrected PET images were acquired 60 minutes post injection  Contiguous, low dose, axial CT sections were obtained from the vertex  through the femurs for anatomic localization  Intravenous contrast was not utilized  FINDINGS:   BRAIN:    Normal pituitary gland uptake is demonstrated  No acute abnormalities are seen  HEAD/NECK:  There is a physiologic distribution of the radiotracer  Normal salivary gland and thyroid uptake is demonstrated  CT images:  Unremarkable  CHEST:   There is a physiologic distribution of the radiotracer  CT images: Mild patchy groundglass opacity in the left lung  Linear atelectasis versus scarring in the lingula and right middle lobe  ABDOMEN:  There are 2 mesenteric nodules with radiotracer uptake  A central mesenteric nodule demonstrates SUV max of 47 2  This measures 2 5 x 1 3 cm image 229 series 3  More anterior mesenteric nodule demonstrates SUV max of 22 1  This measures 1 6 x 1 3 cm image 230 series 3  Associated haziness of the mesenteric fat with spiculations    Abnormal radiotracer uptake from this nodule extends to an adjacent small bowel loop anteriorly, SUV max here of 20 9  See image 203 series 1200 of the PET/CT fusion images  No obvious findings on limited CT  Focal radiotracer uptake also suggested in a small bowel loop at the same level on the right, SUV max of 15 1  This is better distinguished from normal small bowel activity on the PET workstation but see image 207 series 1200 of the PET/CT fusion images  Focal radiotracer uptake suggested in a small bowel loop more superiorly on the right, SUV max of 27 9  This is better distinguished from normal small bowel activity on the PET workstation but see image 192 series 1200 of the PET/CT fusion images  Focal radiotracer uptake in the right lobe of the liver posteriorly, SUV max of 25 7  This corresponds to the segment 7 lesion seen on MRI where it measured 1 6 x 1 5 cm  This is not well-seen on the limited noncontrast CT images  Slight focal increased radiotracer uptake in the right lobe of the liver anteriorly, SUV max of 8 3  There was a 5 mm subcapsular lesion seen here on MRI, segment 8  No obvious findings on limited CT  No suspicious focal radiotracer uptake at the 5 mm segment 2 lesion  Normal pancreatic uncinate process activity is also visualized  Mild patchy radiotracer uptake at the umbilicus may be inflammatory  There is a small fat-containing umbilical hernia  CT images: Scattered small hepatic cysts and additional subcentimeter hypodensities, too small to characterize  Colonic diverticulosis  PELVIS:  There is a physiologic distribution of the radiotracer  CT images: Unremarkable  OSSEOUS STRUCTURES:   Focal radiotracer uptake at the T11 vertebral body posteriorly suspicious for osseous metastasis, SUV max of 10 4  No CT correlate  No additional suspicious osseous lesions  CT images: Asymmetric focal sclerosis noted at the right posterior iliac bone  No focal radiotracer uptake here  This has been stable since a pelvic CT from 2013 favoring a benign finding  Impression: 1  At least 3 foci of radiotracer uptake within the small bowel suspicious for underlying neuroendocrine lesions  Consider follow-up with CT enterography or small bowel pill study  2  Increased radiotracer uptake in two mesenteric soft tissue nodules suspicious for metastasis  3  Focal radiotracer uptake in the right lobe of the liver posteriorly compatible with the known metastasis  Slight focal increased radiotracer uptake in the right lobe of the liver anteriorly corresponding to the 5 mm subcapsular lesion segment 8 lesion seen on MRI suspicious for additional metastasis  4  Focal radiotracer uptake at the T11 vertebral body suspicious for osseous metastasis  The study was marked in EPIC for significant notification  Workstation performed: [id number]       Labs:   Lab Results   Component Value Date    WBC 7 56 12/08/2020    HGB 11 6 12/08/2020    HCT 37 0 12/08/2020    MCV 90 12/08/2020     12/08/2020     Lab Results   Component Value Date    K 3 5 12/08/2020     12/08/2020    CO2 27 12/08/2020    BUN 19 12/15/2020    CREATININE 0 72 12/15/2020    GLUF 105 (H) 12/08/2020    CALCIUM 8 9 12/08/2020    AST 23 12/08/2020    ALT 32 12/08/2020    ALKPHOS 112 12/08/2020    EGFR 86 12/15/2020       ROS: As stated in history of present illness otherwise her 14 point review of systems today was negative          Active Problems:   Patient Active Problem List   Diagnosis    Positional vertigo    Corn of toe    Focal nodular hyperplasia of liver    IPMN (intraductal papillary mucinous neoplasm)    Abnormal MRI of abdomen    Lymphadenopathy    Liver mass    Mediastinal adenopathy    Sarcoidosis, lung (HCC)    Granulomatous lymphadenitis    Edema of right lower extremity    Diastolic dysfunction    Complex tear of medial meniscus of right knee as current injury    Other tear of medial meniscus, current injury, right knee, initial encounter    Trigger middle finger of right hand  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 [facility])       Past Medical History:   Past Medical History:   Diagnosis Date    Abdominal pain     Acute tonsillitis     Breast pain, left     Edema of both lower extremities     GERD without esophagitis     Kidney stone     " [place]"    Lesion of liver     Liver mass     Lymphadenopathy     Mediastinal lymphadenopathy     Neoplasm of digestive system     Orthostatic lightheadedness     Sarcoidosis     Vertigo        Surgical History:   Past Surgical History:   Procedure Laterality Date    CARPAL TUNNEL RELEASE Bilateral     COLONOSCOPY      2017    IR BIOPSY LIVER MASS  11/6/2018    IR BIOPSY LIVER MASS  12/8/2020    KNEE ARTHROSCOPY Left     MEDIASTINOSCOPY N/A 11/27/2018    Procedure: MEDIASTINOSCOPY;  Surgeon: Salud Encinas MD;  [facility];  Service: Thoracic    MS BRONCHOSCOPY NEEDLE BX TRACHEA MAIN STEM&/BRON N/A 11/27/2018    Procedure: EBUS with biopsy;  Surgeon: Salud Encinas MD;  [facility];  Service: Thoracic    MS [address] N/A 11/27/2018    Procedure: Rebeca Blade;  Surgeon: Salud Encinas MD;  [facility];  Service: Thoracic    MS EDG US EXAM SURGICAL ALTER STOM DUODENUM/JEJUNUM N/A 10/29/2018    Procedure: LINEAR ENDOSCOPIC U/S;  Surgeon: Tashi Sagastume MD;  [facility]; Service: Gastroenterology    WISDOM TOOTH EXTRACTION         Family History:    Family History   Problem Relation Age of Onset   Mylene Graves Alzheimer's disease Mother     Parkinsonism Father        Cancer-related family history is not on file      Social History:   Social History     Socioeconomic History    Marital status: /Civil Union     Spouse name: Not on file    Number of children: Not on file    Years of education: Not on file    Highest education level: Not on file   Occupational History    Not on file   Social Needs    Financial resource strain: Not on file   [place] insecurity     Worry: Not on file     Inability: Not on file    Transportation needs     Medical: Not on file     Non-medical: Not on file   Tobacco Use    Smoking status: Never Smoker    Smokeless tobacco: Never Used   Substance and Sexual Activity    Alcohol use: Yes     Frequency: Monthly or less     Drinks per session: 1 or 2     Binge frequency: Never     Comment: socially    Drug use: No    Sexual activity: Not on file   Lifestyle    Physical activity     Days per week: Not on file     Minutes per session: Not on file    Stress: Not on file   Relationships    Social connections     Talks on phone: Not on file     Gets together: Not on file     Attends Yazdanism service: Not on file     Active member of club or organization: Not on file     Attends meetings of clubs or organizations: Not on file     Relationship status: Not on file    Intimate partner violence     Fear of current or ex partner: Not on file     Emotionally abused: Not on file     Physically abused: Not on file     Forced sexual activity: Not on file   Other Topics Concern    Not on file   Social History Narrative    Not on file       Current Medications:   Current Outpatient Medications   Medication Sig Dispense Refill    aspirin 81 MG tablet Take 1 tablet by mouth daily in the early morning       Cholecalciferol (VITAMIN D) 2000 units CAPS Take 1 tablet by mouth daily      hydrochlorothiazide (HYDRODIURIL) 12 5 mg tablet Take 1 tablet (12 5 mg total) by mouth daily 30 tablet 5    Multiple Vitamin (MULTIVITAMIN) tablet Take 1 tablet by mouth daily      omeprazole (PriLOSEC) 40 MG capsule TAKE 1 CAPSULE BY MOUTH EVERY DAY BEFORE BREAKFAST 90 capsule 1    Wixela Inhub 250-50 MCG/DOSE inhaler INHALE 1 PUFF 2 (TWO) TIMES A DAY RINSE MOUTH AFTER USE  1 Inhaler 5     No current facility-administered medications for this visit  Allergies:    Allergies   Allergen Reactions    Bactrim [Sulfamethoxazole-Trimethoprim] Hives    Clam Shell Vomiting         Physical Exam:    There is no height or weight on file to calculate BSA  Wt Readings from Last 3 Encounters:   01/12/21 81 kg (178 lb 8 oz)   01/08/21 78 5 kg (173 lb)   12/15/20 78 5 kg (173 lb)        Temp Readings from Last 3 Encounters:   01/12/21 97 8 °F (36 6 °C) (Temporal)   12/15/20 98 °F (36 7 °C) (Temporal)   12/08/20 (!) 97 °F (36 1 °C) (Temporal)        BP Readings from Last 3 Encounters:   01/12/21 126/82   12/15/20 136/88   12/08/20 133/60         Pulse Readings from Last 3 Encounters:   01/12/21 60   12/15/20 104   12/09/20 80       Physical Exam     Constitutional   General appearance: No acute distress, well appearing and well nourished  Eyes   Conjunctiva and lids: No swelling, erythema or discharge  Pupils and irises: Equal, round and reactive to light  Ears, Nose, Mouth, and Throat   External inspection of ears and nose: Normal     Nasal mucosa, septum, and turbinates: Normal without edema or erythema  Oropharynx: Normal with no erythema, edema, exudate or lesions  Pulmonary   Respiratory effort: No increased work of breathing or signs of respiratory distress  Auscultation of lungs: Clear to auscultation  Cardiovascular   Palpation of heart: Normal PMI, no thrills  Auscultation of heart: Normal rate and rhythm, normal S1 and S2, without murmurs  Examination of extremities for edema and/or varicosities: Normal     Carotid pulses: Normal     Abdomen   Abdomen: Non-tender, no masses  Liver and spleen: No hepatomegaly or splenomegaly  Lymphatic   Palpation of lymph nodes in neck: No lymphadenopathy  Musculoskeletal   Gait and station: Normal     Digits and nails: Normal without clubbing or cyanosis  Inspection/palpation of joints, bones, and muscles: Normal     Skin   Skin and subcutaneous tissue: Normal without rashes or lesions  Neurologic   Cranial nerves: Cranial nerves 2-12 intact  Sensation: No sensory loss  Psychiatric   Orientation to person, place, and time: Normal     Mood and affect: Normal           Assessment/ Plan:      The patient is a pleasant 71-year-old female with metastatic well-differentiated neuroendocrine tumor who is going to go for surgery for debulking  We will start on lanreotide as she does have metastatic disease  She will need to have the area at T11 superior endplate addressed where there was uptake which was confirmed on MRI  For now I will start her on lanreotide  I will discuss this with her surgeon as to the timing of her treatment to the spine  I discussed all this with the patient  She agreed with the plan  We will provider with paperwork and she will sign a consent form for lanreotide today  I went over the risks benefits and alternatives of the drug  She agreed to proceed  I will set her up to see our colleagues in Radiation Oncology  The plan will be to debulk her tumor, radiate the spine and then consider PPRT depending on surgical outcome and final extent of disease  I will see her back in 6 weeks  She is going for surgery next week  She should get lanreotide prior to that  Goals and Barriers:  Current Goal: Prolong Survival from well-differentiated neuroendocrine Cancer  Barriers: None  Patient's Capacity to Self Care:  Patient able to self care  Portions of the record may have been created with voice recognition software   Occasional wrong word or "sound a like" substitutions may have occurred due to the inherent limitations of voice recognition software   Read the chart carefully and recognize, using context, where substitutions have occurred

## 2021-01-12 NOTE — H&P (VIEW-ONLY)
Surgical Oncology Follow Up       [address] 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 [facility])  -     Case request operating room: RESECTION SMALL BOWEL, LIVER RESECTION/ABLATION, IOUS LIVER; Standing  -     PAT Covid Screening; Future  -     Type and screen; Future  -     Prepare Leukoreduced RBC: 2 Units; Future  -     Comprehensive metabolic panel; Future  -     CBC and differential; Future  -     APTT; Future  -     Protime-INR; Future  -     HEMOGLOBIN A1C W/ EAG ESTIMATION; Future  -     EKG 12 lead; Future  -     Case request operating room: RESECTION SMALL BOWEL, LIVER RESECTION/ABLATION, IOUS LIVER  -     Ambulatory referral to Hematology / Oncology; Future    Other orders  -     Incentive spirometry; Standing  -     Insert and maintain IV line; Standing  -     Void On-Call to O R ; Standing  -     Place sequential compression device; Standing  -     Nursing communication Please give pre-op Carbohydrate drink to patient 2-4 hours prior to surgery (Drink is provided by [address] [place] [address]); Standing  -     ceFAZolin (ANCEF) 2,000 mg in dextrose 5 % 100 mL IVPB  -     metroNIDAZOLE (FLAGYL) IVPB (premix) 500 mg 100 mL        Chief Complaint   Patient presents with    Follow-up       No follow-ups on file  Oncology History   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 [facility])   12/8/2020 Initial Diagnosis    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 ([facility] [address] )     12/8/2020 Biopsy    IR Liver biopsy:  A   Liver mass, needle core biopsy:  - Metastatic well-differentiated neuroendocrine tumor, G2         Diagnosis and Staging: Side branch IPMN 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 2013, 1 2cm   Metastatic neuroendocrine tumor, December 2020  Current Therapy: Observation   Disease Status: Stable     History of Present Illness: The patient returns in follow-up of her well-differentiated neuroendocrine tumor  PET-CT from December 22nd revealed uptake in 3 areas of the small bowel and 2 soft tissue nodules  There was also uptake in the liver and the T11 vertebral body  MRI from last week revealed marrow replacing lesion at T11  I personally reviewed the films  She comes in now to discuss further therapy  She denies any flushing, diarrhea, palpitations, headaches, or sweats  She does have some intermittent abdominal discomfort that comes and goes  No mid back pain but occasionally has some right-sided flank discomfort  Review of Systems  Complete ROS Surg Onc:   Complete ROS Surg Onc:   Constitutional: The patient denies new or recent history of general fatigue, no recent weight loss, no change in appetite  Eyes: No complaints of visual problems, no scleral icterus  ENT: no complaints of ear pain, no hoarseness, no difficulty swallowing,  no tinnitus and no new masses in head, oral cavity, or neck  Cardiovascular: No complaints of chest pain, no palpitations, no ankle edema  Respiratory: No complaints of shortness of breath, no cough  Gastrointestinal: No complaints of jaundice, no bloody stools, no pale stools  Genitourinary: No complaints of dysuria, no hematuria, no nocturia, no frequent urination, no urethral discharge  Musculoskeletal: No complaints of weakness, paralysis, joint stiffness or arthralgias  Integumentary: No complaints of rash, no new lesions  Neurological: No complaints of convulsions, no seizures, no dizziness  Hematologic/Lymphatic: No complaints of easy bruising  Endocrine:  No hot or cold intolerance  No polydipsia, polyphagia, or polyuria  Allergy/immunology:  No environmental allergies  No food allergies  Not immunocompromised    Skin: No pallor or rash  No wound  Patient Active Problem List   Diagnosis    Positional vertigo    Corn of toe    Focal nodular hyperplasia of liver    IPMN (intraductal papillary mucinous neoplasm)    Abnormal MRI of abdomen    Lymphadenopathy    Liver mass    Mediastinal adenopathy    Sarcoidosis, lung (HCC)    Granulomatous lymphadenitis    Edema of right lower extremity    Diastolic dysfunction    Complex tear of medial meniscus of right knee as current injury    Other tear of medial meniscus, current injury, right knee, initial encounter    Trigger middle finger of right hand    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 [facility])     Past Medical History:   Diagnosis Date    Abdominal pain     Acute tonsillitis     Breast pain, left     Edema of both lower extremities     GERD without esophagitis     Kidney stone     " [place]"    Lesion of liver     Liver mass     Lymphadenopathy     Mediastinal lymphadenopathy     Neoplasm of digestive system     Orthostatic lightheadedness     Sarcoidosis     Vertigo      Past Surgical History:   Procedure Laterality Date    CARPAL TUNNEL RELEASE Bilateral     COLONOSCOPY      2017    IR BIOPSY LIVER MASS  11/6/2018    IR BIOPSY LIVER MASS  12/8/2020    KNEE ARTHROSCOPY Left     MEDIASTINOSCOPY N/A 11/27/2018    Procedure: MEDIASTINOSCOPY;  Surgeon: Taye Negro MD;  [facility];  Service: Thoracic    VT BRONCHOSCOPY NEEDLE BX TRACHEA MAIN STEM&/BRON N/A 11/27/2018    Procedure: EBUS with biopsy;  Surgeon: Taye Negro MD;  [facility];  Service: Thoracic    VT [address] N/A 11/27/2018    Procedure: Alberta Roth;  Surgeon: Taye Negro MD;  [facility];  Service: Thoracic    VT EDG US EXAM SURGICAL ALTER STOM DUODENUM/JEJUNUM N/A 10/29/2018    Procedure: LINEAR ENDOSCOPIC U/S;  Surgeon: Rosemarie Cowan MD;  [facility];   Service: Gastroenterology   Sriram Hankins TOOTH EXTRACTION       Family History   Problem Relation Age of Onset    Alzheimer's disease Mother     Parkinsonism Father      Social History     Socioeconomic History    Marital status: /Civil Union     Spouse name: Not on file    Number of children: Not on file    Years of education: Not on file    Highest education level: Not on file   Occupational History    Not on file   Social Needs    Financial resource strain: Not on file    Food insecurity     Worry: Not on file     Inability: Not on file   [employer] needs     Medical: Not on file     Non-medical: Not on file   Tobacco Use    Smoking status: Never Smoker    Smokeless tobacco: Never Used   Substance and Sexual Activity    Alcohol use: Yes     Frequency: Monthly or less     Drinks per session: 1 or 2     Binge frequency: Never     Comment: socially    Drug use: No    Sexual activity: Not on file   Lifestyle    Physical activity     Days per week: Not on file     Minutes per session: Not on file    Stress: Not on file   Relationships    Social connections     Talks on phone: Not on file     Gets together: Not on file     Attends Tenriism service: Not on file     Active member of club or organization: Not on file     Attends meetings of clubs or organizations: Not on file     Relationship status: Not on file    Intimate partner violence     Fear of current or ex partner: Not on file     Emotionally abused: Not on file     Physically abused: Not on file     Forced sexual activity: Not on file   Other Topics Concern    Not on file   Social History Narrative    Not on file       Current Outpatient Medications:     aspirin 81 MG tablet, Take 1 tablet by mouth daily in the early morning , Disp: , Rfl:     Cholecalciferol (VITAMIN D) 2000 units CAPS, Take 1 tablet by mouth daily, Disp: , Rfl:     hydrochlorothiazide (HYDRODIURIL) 12 5 mg tablet, Take 1 tablet (12 5 mg total) by mouth daily, Disp: 30 tablet, Rfl: 5    Multiple Vitamin (MULTIVITAMIN) tablet, Take 1 tablet by mouth daily, Disp: , Rfl:     omeprazole (PriLOSEC) 40 MG capsule, TAKE 1 CAPSULE BY MOUTH EVERY DAY BEFORE BREAKFAST, Disp: 90 capsule, Rfl: 1    Wixela Inhub 250-50 MCG/DOSE inhaler, INHALE 1 PUFF 2 (TWO) TIMES A DAY RINSE MOUTH AFTER USE , Disp: 1 Inhaler, Rfl: 5  Allergies   Allergen Reactions    Bactrim [Sulfamethoxazole-Trimethoprim] Hives    Clam Shell Vomiting     Vitals:    01/12/21 0850   BP: 126/82   Pulse: 60   Resp: 18   Temp: 97 8 °F (36 6 °C)       Physical Exam  Constitutional: General appearance: The Patient is well-developed and well-nourished who appears the stated age in no acute distress  Patient is pleasant and talkative  HEENT:  Normocephalic  Sclerae are anicteric  Mucous membranes are moist  Neck is supple without adenopathy  No JVD  Chest: The lungs are clear to auscultation  Cardiac: Heart is regular rate  Abdomen: Abdomen is soft, non-tender, non-distended and without masses  Extremities: There is no clubbing or cyanosis  There is no edema  Symmetric  Neuro: Grossly nonfocal  Gait is normal      Lymphatic: No evidence of cervical adenopathy bilaterally  No evidence of axillary adenopathy bilaterally  No evidence of inguinal adenopathy bilaterally  Skin: Warm, anicteric  Psych:  Patient is pleasant and talkative  Breasts:        Pathology:  [unfilled]    Labs:      Imaging  Mri Thoracic Spine W Wo Contrast    Result Date: 1/11/2021  Narrative: MRI THORACIC SPINE WITH AND WITHOUT CONTRAST INDICATION: C7B 8: Other secondary neuroendocrine tumors  COMPARISON:  CT chest dated 11/27/2020  TECHNIQUE:  Sagittal T1, sagittal T2, sagittal inversion recovery, axial T2,  axial 2D MERGE  Sagittal and axial T1 postcontrast  IV Contrast:  7 mL of Gadobutrol injection (SINGLE-DOSE) IMAGE QUALITY:  Diagnostic  FINDINGS: ALIGNMENT:  Normal alignment of the thoracic spine  No compression fracture  No subluxation    No evidence of scoliosis  MARROW SIGNAL:  Within the T11 superior endplate there is a small focal marrow replacing lesion best seen on T1-weighted imaging  No significant enhancement identified  This corresponds to the abnormality seen on recent PET/CT  No other focal marrow replacing lesions are identified  THORACIC CORD:  Normal signal within the thoracic cord  PREVERTEBRAL AND PARASPINAL SOFT TISSUES:   There is a partially visualized T2 hyperintensity within the posterior segment of the right lobe of the liver on series 10 image 40 incompletely evaluated on this examination  THORACIC DEGENERATIVE CHANGE:  Minor degenerative change of the lower thoracic spine  At T11-12 there is slight annular bulging without significant canal stenosis or foraminal narrowing  POSTCONTRAST:  No abnormal enhancement  Impression: There is a small focal marrow replacing lesion within the posterior superior aspect of the T11 superior endplate which corresponds to the PET/CT abnormality, suggestive of osseous metastasis  No other definitive marrow replacing lesions are identified  Minor lower thoracic degenerative change  No canal stenosis or foraminal narrowing  No cord compression  Partially visualized T2 hyperintensity within the posterior segment of the right lobe of the liver on series 10 image 40 is incompletely evaluated on this examination  Previous imaging does report a liver metastasis including MRI dated 11/5/2020  Workstation performed: [id number]     Nm Pet Ct Skull Base To Mid Thigh    Result Date: 12/22/2020  Narrative: NETSPOT PET/CT SCAN  INDICATION:  Newly diagnosed metastatic NET of the liver  Initial staging  D3A 8: Other benign neuroendocrine tumors  MODIFIER: PI      COMPARISON:  CT chest 11/27/2020, MRI abdomen 11/5/2020 and additional priors  CELL TYPE:  Metastatic well-differentiated neuroendocrine tumor, 12/8/2020, liver mass  TECHNIQUE:   4 32 mCi Gallium-68 Dotatate Netspot administered IV  Multiplanar attenuation corrected and non-attenuation corrected PET images were acquired 60 minutes post injection  Contiguous, low dose, axial CT sections were obtained from the vertex  through the femurs for anatomic localization  Intravenous contrast was not utilized  FINDINGS:   BRAIN:    Normal pituitary gland uptake is demonstrated  No acute abnormalities are seen  HEAD/NECK:  There is a physiologic distribution of the radiotracer  Normal salivary gland and thyroid uptake is demonstrated  CT images:  Unremarkable  CHEST:   There is a physiologic distribution of the radiotracer  CT images: Mild patchy groundglass opacity in the left lung  Linear atelectasis versus scarring in the lingula and right middle lobe  ABDOMEN:  There are 2 mesenteric nodules with radiotracer uptake  A central mesenteric nodule demonstrates SUV max of 47 2  This measures 2 5 x 1 3 cm image 229 series 3  More anterior mesenteric nodule demonstrates SUV max of 22 1  This measures 1 6 x 1 3 cm image 230 series 3  Associated haziness of the mesenteric fat with spiculations  Abnormal radiotracer uptake from this nodule extends to an adjacent small bowel loop anteriorly, SUV max here of 20 9  See image 203 series 1200 of the PET/CT fusion images  No obvious findings on limited CT  Focal radiotracer uptake also suggested in a small bowel loop at the same level on the right, SUV max of 15 1  This is better distinguished from normal small bowel activity on the PET workstation but see image 207 series 1200 of the PET/CT fusion images  Focal radiotracer uptake suggested in a small bowel loop more superiorly on the right, SUV max of 27 9  This is better distinguished from normal small bowel activity on the PET workstation but see image 192 series 1200 of the PET/CT fusion images  Focal radiotracer uptake in the right lobe of the liver posteriorly, SUV max of 25 7    This corresponds to the segment 7 lesion seen on MRI where it measured 1 6 x 1 5 cm  This is not well-seen on the limited noncontrast CT images  Slight focal increased radiotracer uptake in the right lobe of the liver anteriorly, SUV max of 8 3  There was a 5 mm subcapsular lesion seen here on MRI, segment 8  No obvious findings on limited CT  No suspicious focal radiotracer uptake at the 5 mm segment 2 lesion  Normal pancreatic uncinate process activity is also visualized  Mild patchy radiotracer uptake at the umbilicus may be inflammatory  There is a small fat-containing umbilical hernia  CT images: Scattered small hepatic cysts and additional subcentimeter hypodensities, too small to characterize  Colonic diverticulosis  PELVIS:  There is a physiologic distribution of the radiotracer  CT images: Unremarkable  OSSEOUS STRUCTURES:   Focal radiotracer uptake at the T11 vertebral body posteriorly suspicious for osseous metastasis, SUV max of 10 4  No CT correlate  No additional suspicious osseous lesions  CT images: Asymmetric focal sclerosis noted at the right posterior iliac bone  No focal radiotracer uptake here  This has been stable since a pelvic CT from 2013 favoring a benign finding  Impression: 1  At least 3 foci of radiotracer uptake within the small bowel suspicious for underlying neuroendocrine lesions  Consider follow-up with CT enterography or small bowel pill study  2  Increased radiotracer uptake in two mesenteric soft tissue nodules suspicious for metastasis  3  Focal radiotracer uptake in the right lobe of the liver posteriorly compatible with the known metastasis  Slight focal increased radiotracer uptake in the right lobe of the liver anteriorly corresponding to the 5 mm subcapsular lesion segment 8 lesion seen on MRI suspicious for additional metastasis  4  Focal radiotracer uptake at the T11 vertebral body suspicious for osseous metastasis  The study was marked in EPIC for significant notification   Workstation performed: [id number]     I reviewed the above laboratory and imaging data  Discussion/Summary:  41-year-old female with side branch IPMN that is essentially stable  This is less than 2 cm in size  This is being observed with her MRI  The liver lesion was enlarging and biopsy revealed well-differentiated neuroendocrine tumor  PET-CT reveals a primary tumor to likely be in the small bowel as well as a single liver lesion and a bone lesion  We discussed that there is potentially a survival advantage by resecting the primary  I would recommend that she undergo exploratory laparotomy with resection of the small bowel, intraoperative ultrasound of the liver with resection/ablation of the liver lesion  I explained the risks including bleeding, infection, need for further surgery, recurrence, wound complications, biliary fistula, anastomotic leak, adjacent organ injury, sepsis, mi, DVT, stroke, pulmonary embolism, and death  Informed consent was obtained  I would like to have her started on octreotide prior to surgical intervention  I have discussed this with Dr Guy Mena, and we will arrange for this  I would then treat the bone lesion with either external beam radiation or potentially PRRT if there is other disease seen at the time of surgery  She is agreeable to this plan  All of her questions were answered

## 2021-01-12 NOTE — LETTER
January 12, 2943     Ashly Score, [address] [place] [address]    Patient: Katelynn Salgado   YOB: 1950   Date of Visit: 1/12/2021       Dear Dr De La Torre Speaker: Thank you for referring Kevin Childress to me for evaluation  Below are my notes for this consultation  If you have questions, please do not hesitate to call me  I look forward to following your patient along with you  Sincerely,        Jenise Guerrero MD        CC: MD Ray Alford MD Loyola Priestly, MD  1/12/2021  9:27 AM  Sign when Signing Visit               Surgical Oncology Follow Up       [address] [facility]  1600 [facility] 7[address]  1950  [id number]  355 [facility] SURGICAL ONCOLOGY [place]  [address] [id number]    Diagnoses and all orders for this visit:    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 [facility])  -     Case request operating room: RESECTION SMALL BOWEL, LIVER RESECTION/ABLATION, IOUS LIVER; Standing  -     PAT Covid Screening; Future  -     Type and screen; Future  -     Prepare Leukoreduced RBC: 2 Units; Future  -     Comprehensive metabolic panel; Future  -     CBC and differential; Future  -     APTT; Future  -     Protime-INR; Future  -     HEMOGLOBIN A1C W/ EAG ESTIMATION; Future  -     EKG 12 lead; Future  -     Case request operating room: RESECTION SMALL BOWEL, LIVER RESECTION/ABLATION, IOUS LIVER  -     Ambulatory referral to Hematology / Oncology; Future    Other orders  -     Incentive spirometry; Standing  -     Insert and maintain IV line; Standing  -     Void On-Call to O R ; Standing  -     Place sequential compression device; Standing  -     Nursing communication Please give pre-op Carbohydrate drink to patient 2-4 hours prior to surgery (Drink is provided by [address] [place] [address]);  Standing  -     ceFAZolin (ANCEF) 2,000 mg in dextrose 5 % 100 mL IVPB  -     metroNIDAZOLE (FLAGYL) IVPB (premix) 500 mg 100 mL        Chief Complaint   Patient presents with    Follow-up       No follow-ups on file  Oncology History   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 [facility])   12/8/2020 Initial Diagnosis    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 ([facility] [address] )     12/8/2020 Biopsy    IR Liver biopsy:  A  Liver mass, needle core biopsy:  - Metastatic well-differentiated neuroendocrine tumor, G2         Diagnosis and Staging: Side branch IPMN 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 2013, 1 2cm   Metastatic neuroendocrine tumor, December 2020  Current Therapy: Observation   Disease Status: Stable     History of Present Illness: The patient returns in follow-up of her well-differentiated neuroendocrine tumor  PET-CT from December 22nd revealed uptake in 3 areas of the small bowel and 2 soft tissue nodules  There was also uptake in the liver and the T11 vertebral body  MRI from last week revealed marrow replacing lesion at T11  I personally reviewed the films  She comes in now to discuss further therapy  She denies any flushing, diarrhea, palpitations, headaches, or sweats  She does have some intermittent abdominal discomfort that comes and goes  No mid back pain but occasionally has some right-sided flank discomfort  Review of Systems  Complete ROS Surg Onc:   Complete ROS Surg Onc:   Constitutional: The patient denies new or recent history of general fatigue, no recent weight loss, no change in appetite  Eyes: No complaints of visual problems, no scleral icterus  ENT: no complaints of ear pain, no hoarseness, no difficulty swallowing,  no tinnitus and no new masses in head, oral cavity, or neck  Cardiovascular: No complaints of chest pain, no palpitations, no ankle edema  Respiratory: No complaints of shortness of breath, no cough  Gastrointestinal: No complaints of jaundice, no bloody stools, no pale stools     Genitourinary: No complaints of dysuria, no hematuria, no nocturia, no frequent urination, no urethral discharge  Musculoskeletal: No complaints of weakness, paralysis, joint stiffness or arthralgias  Integumentary: No complaints of rash, no new lesions  Neurological: No complaints of convulsions, no seizures, no dizziness  Hematologic/Lymphatic: No complaints of easy bruising  Endocrine:  No hot or cold intolerance  No polydipsia, polyphagia, or polyuria  Allergy/immunology:  No environmental allergies  No food allergies  Not immunocompromised  Skin:  No pallor or rash  No wound          Patient Active Problem List   Diagnosis    Positional vertigo    Corn of toe    Focal nodular hyperplasia of liver    IPMN (intraductal papillary mucinous neoplasm)    Abnormal MRI of abdomen    Lymphadenopathy    Liver mass    Mediastinal adenopathy    Sarcoidosis, lung (HCC)    Granulomatous lymphadenitis    Edema of right lower extremity    Diastolic dysfunction    Complex tear of medial meniscus of right knee as current injury    Other tear of medial meniscus, current injury, right knee, initial encounter    Trigger middle finger of right hand    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 [facility])     Past Medical History:   Diagnosis Date    Abdominal pain     Acute tonsillitis     Breast pain, left     Edema of both lower extremities     GERD without esophagitis     Kidney stone     " [place]"    Lesion of liver     Liver mass     Lymphadenopathy     Mediastinal lymphadenopathy     Neoplasm of digestive system     Orthostatic lightheadedness     Sarcoidosis     Vertigo      Past Surgical History:   Procedure Laterality Date    CARPAL TUNNEL RELEASE Bilateral     COLONOSCOPY      2017    IR BIOPSY LIVER MASS  11/6/2018    IR BIOPSY LIVER MASS  12/8/2020    KNEE ARTHROSCOPY Left     MEDIASTINOSCOPY N/A 11/27/2018    Procedure: MEDIASTINOSCOPY;  Surgeon: Nesha Finney MD;  [facility];  Service: Thoracic    MS BRONCHOSCOPY NEEDLE BX TRACHEA MAIN STEM&/BRON N/A 11/27/2018    Procedure: EBUS with biopsy;  Surgeon: Carmen Navas MD;  [facility];  Service: Thoracic    MS [address] N/A 11/27/2018    Procedure: Jolly Pinta;  Surgeon: Carmen Navas MD;  [facility];  Service: Thoracic    MS EDG US EXAM SURGICAL ALTER STOM DUODENUM/JEJUNUM N/A 10/29/2018    Procedure: LINEAR ENDOSCOPIC U/S;  Surgeon: Marcelo Finley MD;  Location[facility];   Service: Gastroenterology    WISDOM TOOTH EXTRACTION       Family History   Problem Relation Age of Onset    Alzheimer's disease Mother     Parkinsonism Father      Social History     Socioeconomic History    Marital status: /Civil Union     Spouse name: Not on file    Number of children: Not on file    Years of education: Not on file    Highest education level: Not on file   Occupational History    Not on file   Social Needs    Financial resource strain: Not on file    Food insecurity     Worry: Not on file     Inability: Not on file   [employer] needs     Medical: Not on file     Non-medical: Not on file   Tobacco Use    Smoking status: Never Smoker    Smokeless tobacco: Never Used   Substance and Sexual Activity    Alcohol use: Yes     Frequency: Monthly or less     Drinks per session: 1 or 2     Binge frequency: Never     Comment: socially    Drug use: No    Sexual activity: Not on file   Lifestyle    Physical activity     Days per week: Not on file     Minutes per session: Not on file    Stress: Not on file   Relationships    Social connections     Talks on phone: Not on file     Gets together: Not on file     Attends Jainism service: Not on file     Active member of club or organization: Not on file     Attends meetings of clubs or organizations: Not on file     Relationship status: Not on file    Intimate partner violence     Fear of current or ex partner: Not on file     Emotionally abused: Not on file     Physically abused: Not on file     Forced sexual activity: Not on file   Other Topics Concern    Not on file   Social History Narrative    Not on file       Current Outpatient Medications:     aspirin 81 MG tablet, Take 1 tablet by mouth daily in the early morning , Disp: , Rfl:     Cholecalciferol (VITAMIN D) 2000 units CAPS, Take 1 tablet by mouth daily, Disp: , Rfl:     hydrochlorothiazide (HYDRODIURIL) 12 5 mg tablet, Take 1 tablet (12 5 mg total) by mouth daily, Disp: 30 tablet, Rfl: 5    Multiple Vitamin (MULTIVITAMIN) tablet, Take 1 tablet by mouth daily, Disp: , Rfl:     omeprazole (PriLOSEC) 40 MG capsule, TAKE 1 CAPSULE BY MOUTH EVERY DAY BEFORE BREAKFAST, Disp: 90 capsule, Rfl: 1    Wixela Inhub 250-50 MCG/DOSE inhaler, INHALE 1 PUFF 2 (TWO) TIMES A DAY RINSE MOUTH AFTER USE , Disp: 1 Inhaler, Rfl: 5  Allergies   Allergen Reactions    Bactrim [Sulfamethoxazole-Trimethoprim] Hives    Clam Shell Vomiting     Vitals:    01/12/21 0850   BP: 126/82   Pulse: 60   Resp: 18   Temp: 97 8 °F (36 6 °C)       Physical Exam  Constitutional: General appearance: The Patient is well-developed and well-nourished who appears the stated age in no acute distress  Patient is pleasant and talkative  HEENT:  Normocephalic  Sclerae are anicteric  Mucous membranes are moist  Neck is supple without adenopathy  No JVD  Chest: The lungs are clear to auscultation  Cardiac: Heart is regular rate  Abdomen: Abdomen is soft, non-tender, non-distended and without masses  Extremities: There is no clubbing or cyanosis  There is no edema  Symmetric  Neuro: Grossly nonfocal  Gait is normal      Lymphatic: No evidence of cervical adenopathy bilaterally  No evidence of axillary adenopathy bilaterally  No evidence of inguinal adenopathy bilaterally  Skin: Warm, anicteric      Psych:  Patient is pleasant and talkative  Breasts:        Pathology:  [unfilled]    Labs:      Imaging  Mri Thoracic Spine W Wo Contrast    Result Date: 1/11/2021  Narrative: MRI THORACIC SPINE WITH AND WITHOUT CONTRAST INDICATION: C7B 8: Other secondary neuroendocrine tumors  COMPARISON:  CT chest dated 11/27/2020  TECHNIQUE:  Sagittal T1, sagittal T2, sagittal inversion recovery, axial T2,  axial 2D MERGE  Sagittal and axial T1 postcontrast  IV Contrast:  7 mL of Gadobutrol injection (SINGLE-DOSE) IMAGE QUALITY:  Diagnostic  FINDINGS: ALIGNMENT:  Normal alignment of the thoracic spine  No compression fracture  No subluxation  No evidence of scoliosis  MARROW SIGNAL:  Within the T11 superior endplate there is a small focal marrow replacing lesion best seen on T1-weighted imaging  No significant enhancement identified  This corresponds to the abnormality seen on recent PET/CT  No other focal marrow replacing lesions are identified  THORACIC CORD:  Normal signal within the thoracic cord  PREVERTEBRAL AND PARASPINAL SOFT TISSUES:   There is a partially visualized T2 hyperintensity within the posterior segment of the right lobe of the liver on series 10 image 40 incompletely evaluated on this examination  THORACIC DEGENERATIVE CHANGE:  Minor degenerative change of the lower thoracic spine  At T11-12 there is slight annular bulging without significant canal stenosis or foraminal narrowing  POSTCONTRAST:  No abnormal enhancement  Impression: There is a small focal marrow replacing lesion within the posterior superior aspect of the T11 superior endplate which corresponds to the PET/CT abnormality, suggestive of osseous metastasis  No other definitive marrow replacing lesions are identified  Minor lower thoracic degenerative change  No canal stenosis or foraminal narrowing  No cord compression   Partially visualized T2 hyperintensity within the posterior segment of the right lobe of the liver on series 10 image 40 is incompletely evaluated on this examination  Previous imaging does report a liver metastasis including MRI dated 11/5/2020  Workstation performed: [id number]     Nm Pet Ct Skull Base To Mid Thigh    Result Date: 12/22/2020  Narrative: NETSPOT PET/CT SCAN  INDICATION:  Newly diagnosed metastatic NET of the liver  Initial staging  D3A 8: Other benign neuroendocrine tumors  MODIFIER: PI      COMPARISON:  CT chest 11/27/2020, MRI abdomen 11/5/2020 and additional priors  CELL TYPE:  Metastatic well-differentiated neuroendocrine tumor, 12/8/2020, liver mass  TECHNIQUE:   4 32 mCi Gallium-68 Dotatate Netspot administered IV  Multiplanar attenuation corrected and non-attenuation corrected PET images were acquired 60 minutes post injection  Contiguous, low dose, axial CT sections were obtained from the vertex  through the femurs for anatomic localization  Intravenous contrast was not utilized  FINDINGS:   BRAIN:    Normal pituitary gland uptake is demonstrated  No acute abnormalities are seen  HEAD/NECK:  There is a physiologic distribution of the radiotracer  Normal salivary gland and thyroid uptake is demonstrated  CT images:  Unremarkable  CHEST:   There is a physiologic distribution of the radiotracer  CT images: Mild patchy groundglass opacity in the left lung  Linear atelectasis versus scarring in the lingula and right middle lobe  ABDOMEN:  There are 2 mesenteric nodules with radiotracer uptake  A central mesenteric nodule demonstrates SUV max of 47 2  This measures 2 5 x 1 3 cm image 229 series 3  More anterior mesenteric nodule demonstrates SUV max of 22 1  This measures 1 6 x 1 3 cm image 230 series 3  Associated haziness of the mesenteric fat with spiculations  Abnormal radiotracer uptake from this nodule extends to an adjacent small bowel loop anteriorly, SUV max here of 20 9  See image 203 series 1200 of the PET/CT fusion images  No obvious findings on limited CT    Focal radiotracer uptake also suggested in a small bowel loop at the same level on the right, SUV max of 15 1  This is better distinguished from normal small bowel activity on the PET workstation but see image 207 series 1200 of the PET/CT fusion images  Focal radiotracer uptake suggested in a small bowel loop more superiorly on the right, SUV max of 27 9  This is better distinguished from normal small bowel activity on the PET workstation but see image 192 series 1200 of the PET/CT fusion images  Focal radiotracer uptake in the right lobe of the liver posteriorly, SUV max of 25 7  This corresponds to the segment 7 lesion seen on MRI where it measured 1 6 x 1 5 cm  This is not well-seen on the limited noncontrast CT images  Slight focal increased radiotracer uptake in the right lobe of the liver anteriorly, SUV max of 8 3  There was a 5 mm subcapsular lesion seen here on MRI, segment 8  No obvious findings on limited CT  No suspicious focal radiotracer uptake at the 5 mm segment 2 lesion  Normal pancreatic uncinate process activity is also visualized  Mild patchy radiotracer uptake at the umbilicus may be inflammatory  There is a small fat-containing umbilical hernia  CT images: Scattered small hepatic cysts and additional subcentimeter hypodensities, too small to characterize  Colonic diverticulosis  PELVIS:  There is a physiologic distribution of the radiotracer  CT images: Unremarkable  OSSEOUS STRUCTURES:   Focal radiotracer uptake at the T11 vertebral body posteriorly suspicious for osseous metastasis, SUV max of 10 4  No CT correlate  No additional suspicious osseous lesions  CT images: Asymmetric focal sclerosis noted at the right posterior iliac bone  No focal radiotracer uptake here  This has been stable since a pelvic CT from 2013 favoring a benign finding  Impression: 1  At least 3 foci of radiotracer uptake within the small bowel suspicious for underlying neuroendocrine lesions    Consider follow-up with CT enterography or small bowel pill study  2  Increased radiotracer uptake in two mesenteric soft tissue nodules suspicious for metastasis  3  Focal radiotracer uptake in the right lobe of the liver posteriorly compatible with the known metastasis  Slight focal increased radiotracer uptake in the right lobe of the liver anteriorly corresponding to the 5 mm subcapsular lesion segment 8 lesion seen on MRI suspicious for additional metastasis  4  Focal radiotracer uptake at the T11 vertebral body suspicious for osseous metastasis  The study was marked in EPIC for significant notification  Workstation performed: [id number]     I reviewed the above laboratory and imaging data  Discussion/Summary:  67-year-old female with side branch IPMN that is essentially stable  This is less than 2 cm in size  This is being observed with her MRI  The liver lesion was enlarging and biopsy revealed well-differentiated neuroendocrine tumor  PET-CT reveals a primary tumor to likely be in the small bowel as well as a single liver lesion and a bone lesion  We discussed that there is potentially a survival advantage by resecting the primary  I would recommend that she undergo exploratory laparotomy with resection of the small bowel, intraoperative ultrasound of the liver with resection/ablation of the liver lesion  I explained the risks including bleeding, infection, need for further surgery, recurrence, wound complications, biliary fistula, anastomotic leak, adjacent organ injury, sepsis, mi, DVT, stroke, pulmonary embolism, and death  Informed consent was obtained  I would like to have her started on octreotide prior to surgical intervention  I have discussed this with Dr Kimberly Ramesh, and we will arrange for this  I would then treat the bone lesion with either external beam radiation or potentially PRRT if there is other disease seen at the time of surgery  She is agreeable to this plan  All of her questions were answered

## 2021-01-12 NOTE — PROGRESS NOTES
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)  -     Case request operating room: RESECTION SMALL BOWEL, LIVER RESECTION/ABLATION, IOUS LIVER; Standing  -     PAT Covid Screening; Future  -     Type and screen; Future  -     Prepare Leukoreduced RBC: 2 Units; Future  -     Comprehensive metabolic panel; Future  -     CBC and differential; Future  -     APTT; Future  -     Protime-INR; Future  -     HEMOGLOBIN A1C W/ EAG ESTIMATION; Future  -     EKG 12 lead; Future  -     Case request operating room: RESECTION SMALL BOWEL, LIVER RESECTION/ABLATION, IOUS LIVER  -     Ambulatory referral to Hematology / Oncology; Future    Other orders  -     Incentive spirometry; Standing  -     Insert and maintain IV line; Standing  -     Void On-Call to O R ; Standing  -     Place sequential compression device; Standing  -     Nursing communication Please give pre-op Carbohydrate drink to patient 2-4 hours prior to surgery (Drink is provided by [address] [place] [address]); Standing  -     ceFAZolin (ANCEF) 2,000 mg in dextrose 5 % 100 mL IVPB  -     metroNIDAZOLE (FLAGYL) IVPB (premix) 500 mg 100 mL        Chief Complaint   Patient presents with    Follow-up       No follow-ups on file  Oncology History   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 ([facility] [address] )     12/8/2020 Biopsy    IR Liver biopsy:  A   Liver mass, needle core biopsy:  - Metastatic well-differentiated neuroendocrine tumor, G2         Diagnosis and Staging: Side branch IPMN 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 2013, 1 2cm   Metastatic neuroendocrine tumor, December 2020  Current Therapy: Observation   Disease Status: Stable     History of Present Illness: The patient returns in follow-up of her well-differentiated neuroendocrine tumor  PET-CT from December 22nd revealed uptake in 3 areas of the small bowel and 2 soft tissue nodules  There was also uptake in the liver and the T11 vertebral body  MRI from last week revealed marrow replacing lesion at T11  I personally reviewed the films  She comes in now to discuss further therapy  She denies any flushing, diarrhea, palpitations, headaches, or sweats  She does have some intermittent abdominal discomfort that comes and goes  No mid back pain but occasionally has some right-sided flank discomfort  Review of Systems  Complete ROS Surg Onc:   Complete ROS Surg Onc:   Constitutional: The patient denies new or recent history of general fatigue, no recent weight loss, no change in appetite  Eyes: No complaints of visual problems, no scleral icterus  ENT: no complaints of ear pain, no hoarseness, no difficulty swallowing,  no tinnitus and no new masses in head, oral cavity, or neck  Cardiovascular: No complaints of chest pain, no palpitations, no ankle edema  Respiratory: No complaints of shortness of breath, no cough  Gastrointestinal: No complaints of jaundice, no bloody stools, no pale stools  Genitourinary: No complaints of dysuria, no hematuria, no nocturia, no frequent urination, no urethral discharge  Musculoskeletal: No complaints of weakness, paralysis, joint stiffness or arthralgias  Integumentary: No complaints of rash, no new lesions  Neurological: No complaints of convulsions, no seizures, no dizziness  Hematologic/Lymphatic: No complaints of easy bruising  Endocrine:  No hot or cold intolerance  No polydipsia, polyphagia, or polyuria  Allergy/immunology:  No environmental allergies  No food allergies  Not immunocompromised    Skin: No pallor or rash  No wound  Patient Active Problem List   Diagnosis    Positional vertigo    Corn of toe    Focal nodular hyperplasia of liver    IPMN (intraductal papillary mucinous neoplasm)    Abnormal MRI of abdomen    Lymphadenopathy    Liver mass    Mediastinal adenopathy    Sarcoidosis, lung (HCC)    Granulomatous lymphadenitis    Edema of right lower extremity    Diastolic dysfunction    Complex tear of medial meniscus of right knee as current injury    Other tear of medial meniscus, current injury, right knee, initial encounter    Trigger middle finger of right hand    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 [facility])     Past Medical History:   Diagnosis Date    Abdominal pain     Acute tonsillitis     Breast pain, left     Edema of both lower extremities     GERD without esophagitis     Kidney stone     " [place]"    Lesion of liver     Liver mass     Lymphadenopathy     Mediastinal lymphadenopathy     Neoplasm of digestive system     Orthostatic lightheadedness     Sarcoidosis     Vertigo      Past Surgical History:   Procedure Laterality Date    CARPAL TUNNEL RELEASE Bilateral     COLONOSCOPY      2017    IR BIOPSY LIVER MASS  11/6/2018    IR BIOPSY LIVER MASS  12/8/2020    KNEE ARTHROSCOPY Left     MEDIASTINOSCOPY N/A 11/27/2018    Procedure: MEDIASTINOSCOPY;  Surgeon: Ramos Delgado MD;  [facility];  Service: Thoracic    MS BRONCHOSCOPY NEEDLE BX TRACHEA MAIN STEM&/BRON N/A 11/27/2018    Procedure: EBUS with biopsy;  Surgeon: Ramos Delgado MD;  [facility];  Service: Thoracic    MS [address] N/A 11/27/2018    Procedure: Herschell Needle;  Surgeon: Ramos Delgado MD;  [facility];  Service: Thoracic    MS EDG US EXAM SURGICAL ALTER STOM DUODENUM/JEJUNUM N/A 10/29/2018    Procedure: LINEAR ENDOSCOPIC U/S;  Surgeon: Johnny Schaeffer MD;  Location[facility];   Service: Gastroenterology   Nancy Costello TOOTH EXTRACTION       Family History   Problem Relation Age of Onset    Alzheimer's disease Mother     Parkinsonism Father      Social History     Socioeconomic History    Marital status: /Civil Union     Spouse name: Not on file    Number of children: Not on file    Years of education: Not on file    Highest education level: Not on file   Occupational History    Not on file   Social Needs    Financial resource strain: Not on file    Food insecurity     Worry: Not on file     Inability: Not on file   [employer] needs     Medical: Not on file     Non-medical: Not on file   Tobacco Use    Smoking status: Never Smoker    Smokeless tobacco: Never Used   Substance and Sexual Activity    Alcohol use: Yes     Frequency: Monthly or less     Drinks per session: 1 or 2     Binge frequency: Never     Comment: socially    Drug use: No    Sexual activity: Not on file   Lifestyle    Physical activity     Days per week: Not on file     Minutes per session: Not on file    Stress: Not on file   Relationships    Social connections     Talks on phone: Not on file     Gets together: Not on file     Attends [employer] service: Not on file     Active member of club or organization: Not on file     Attends meetings of clubs or organizations: Not on file     Relationship status: Not on file    Intimate partner violence     Fear of current or ex partner: Not on file     Emotionally abused: Not on file     Physically abused: Not on file     Forced sexual activity: Not on file   Other Topics Concern    Not on file   Social History Narrative    Not on file       Current Outpatient Medications:     aspirin 81 MG tablet, Take 1 tablet by mouth daily in the early morning , Disp: , Rfl:     Cholecalciferol (VITAMIN D) 2000 units CAPS, Take 1 tablet by mouth daily, Disp: , Rfl:     hydrochlorothiazide (HYDRODIURIL) 12 5 mg tablet, Take 1 tablet (12 5 mg total) by mouth daily, Disp: 30 tablet, Rfl: 5    Multiple Vitamin (MULTIVITAMIN) tablet, Take 1 tablet by mouth daily, Disp: , Rfl:     omeprazole (PriLOSEC) 40 MG capsule, TAKE 1 CAPSULE BY MOUTH EVERY DAY BEFORE BREAKFAST, Disp: 90 capsule, Rfl: 1    Wixela Inhub 250-50 MCG/DOSE inhaler, INHALE 1 PUFF 2 (TWO) TIMES A DAY RINSE MOUTH AFTER USE , Disp: 1 Inhaler, Rfl: 5  Allergies   Allergen Reactions    Bactrim [Sulfamethoxazole-Trimethoprim] Hives    Clam Shell Vomiting     Vitals:    01/12/21 0850   BP: 126/82   Pulse: 60   Resp: 18   Temp: 97 8 °F (36 6 °C)       Physical Exam  Constitutional: General appearance: The Patient is well-developed and well-nourished who appears the stated age in no acute distress  Patient is pleasant and talkative  HEENT:  Normocephalic  Sclerae are anicteric  Mucous membranes are moist  Neck is supple without adenopathy  No JVD  Chest: The lungs are clear to auscultation  Cardiac: Heart is regular rate  Abdomen: Abdomen is soft, non-tender, non-distended and without masses  Extremities: There is no clubbing or cyanosis  There is no edema  Symmetric  Neuro: Grossly nonfocal  Gait is normal      Lymphatic: No evidence of cervical adenopathy bilaterally  No evidence of axillary adenopathy bilaterally  No evidence of inguinal adenopathy bilaterally  Skin: Warm, anicteric  Psych:  Patient is pleasant and talkative  Breasts:        Pathology:  [unfilled]    Labs:      Imaging  Mri Thoracic Spine W Wo Contrast    Result Date: 1/11/2021  Narrative: MRI THORACIC SPINE WITH AND WITHOUT CONTRAST INDICATION: C7B 8: Other secondary neuroendocrine tumors  COMPARISON:  CT chest dated 11/27/2020  TECHNIQUE:  Sagittal T1, sagittal T2, sagittal inversion recovery, axial T2,  axial 2D MERGE  Sagittal and axial T1 postcontrast  IV Contrast:  7 mL of Gadobutrol injection (SINGLE-DOSE) IMAGE QUALITY:  Diagnostic  FINDINGS: ALIGNMENT:  Normal alignment of the thoracic spine  No compression fracture  No subluxation    No evidence of scoliosis  MARROW SIGNAL:  Within the T11 superior endplate there is a small focal marrow replacing lesion best seen on T1-weighted imaging  No significant enhancement identified  This corresponds to the abnormality seen on recent PET/CT  No other focal marrow replacing lesions are identified  THORACIC CORD:  Normal signal within the thoracic cord  PREVERTEBRAL AND PARASPINAL SOFT TISSUES:   There is a partially visualized T2 hyperintensity within the posterior segment of the right lobe of the liver on series 10 image 40 incompletely evaluated on this examination  THORACIC DEGENERATIVE CHANGE:  Minor degenerative change of the lower thoracic spine  At T11-12 there is slight annular bulging without significant canal stenosis or foraminal narrowing  POSTCONTRAST:  No abnormal enhancement  Impression: There is a small focal marrow replacing lesion within the posterior superior aspect of the T11 superior endplate which corresponds to the PET/CT abnormality, suggestive of osseous metastasis  No other definitive marrow replacing lesions are identified  Minor lower thoracic degenerative change  No canal stenosis or foraminal narrowing  No cord compression  Partially visualized T2 hyperintensity within the posterior segment of the right lobe of the liver on series 10 image 40 is incompletely evaluated on this examination  Previous imaging does report a liver metastasis including MRI dated 11/5/2020  Workstation performed: [id number]     Nm Pet Ct Skull Base To Mid Thigh    Result Date: 12/22/2020  Narrative: NETSPOT PET/CT SCAN  INDICATION:  Newly diagnosed metastatic NET of the liver  Initial staging  D3A 8: Other benign neuroendocrine tumors  MODIFIER: PI      COMPARISON:  CT chest 11/27/2020, MRI abdomen 11/5/2020 and additional priors  CELL TYPE:  Metastatic well-differentiated neuroendocrine tumor, 12/8/2020, liver mass  TECHNIQUE:   4 32 mCi Gallium-68 Dotatate Netspot administered IV  Multiplanar attenuation corrected and non-attenuation corrected PET images were acquired 60 minutes post injection  Contiguous, low dose, axial CT sections were obtained from the vertex  through the femurs for anatomic localization  Intravenous contrast was not utilized  FINDINGS:   BRAIN:    Normal pituitary gland uptake is demonstrated  No acute abnormalities are seen  HEAD/NECK:  There is a physiologic distribution of the radiotracer  Normal salivary gland and thyroid uptake is demonstrated  CT images:  Unremarkable  CHEST:   There is a physiologic distribution of the radiotracer  CT images: Mild patchy groundglass opacity in the left lung  Linear atelectasis versus scarring in the lingula and right middle lobe  ABDOMEN:  There are 2 mesenteric nodules with radiotracer uptake  A central mesenteric nodule demonstrates SUV max of 47 2  This measures 2 5 x 1 3 cm image 229 series 3  More anterior mesenteric nodule demonstrates SUV max of 22 1  This measures 1 6 x 1 3 cm image 230 series 3  Associated haziness of the mesenteric fat with spiculations  Abnormal radiotracer uptake from this nodule extends to an adjacent small bowel loop anteriorly, SUV max here of 20 9  See image 203 series 1200 of the PET/CT fusion images  No obvious findings on limited CT  Focal radiotracer uptake also suggested in a small bowel loop at the same level on the right, SUV max of 15 1  This is better distinguished from normal small bowel activity on the PET workstation but see image 207 series 1200 of the PET/CT fusion images  Focal radiotracer uptake suggested in a small bowel loop more superiorly on the right, SUV max of 27 9  This is better distinguished from normal small bowel activity on the PET workstation but see image 192 series 1200 of the PET/CT fusion images  Focal radiotracer uptake in the right lobe of the liver posteriorly, SUV max of 25 7    This corresponds to the segment 7 lesion seen on MRI where it measured 1 6 x 1 5 cm  This is not well-seen on the limited noncontrast CT images  Slight focal increased radiotracer uptake in the right lobe of the liver anteriorly, SUV max of 8 3  There was a 5 mm subcapsular lesion seen here on MRI, segment 8  No obvious findings on limited CT  No suspicious focal radiotracer uptake at the 5 mm segment 2 lesion  Normal pancreatic uncinate process activity is also visualized  Mild patchy radiotracer uptake at the umbilicus may be inflammatory  There is a small fat-containing umbilical hernia  CT images: Scattered small hepatic cysts and additional subcentimeter hypodensities, too small to characterize  Colonic diverticulosis  PELVIS:  There is a physiologic distribution of the radiotracer  CT images: Unremarkable  OSSEOUS STRUCTURES:   Focal radiotracer uptake at the T11 vertebral body posteriorly suspicious for osseous metastasis, SUV max of 10 4  No CT correlate  No additional suspicious osseous lesions  CT images: Asymmetric focal sclerosis noted at the right posterior iliac bone  No focal radiotracer uptake here  This has been stable since a pelvic CT from 2013 favoring a benign finding  Impression: 1  At least 3 foci of radiotracer uptake within the small bowel suspicious for underlying neuroendocrine lesions  Consider follow-up with CT enterography or small bowel pill study  2  Increased radiotracer uptake in two mesenteric soft tissue nodules suspicious for metastasis  3  Focal radiotracer uptake in the right lobe of the liver posteriorly compatible with the known metastasis  Slight focal increased radiotracer uptake in the right lobe of the liver anteriorly corresponding to the 5 mm subcapsular lesion segment 8 lesion seen on MRI suspicious for additional metastasis  4  Focal radiotracer uptake at the T11 vertebral body suspicious for osseous metastasis  The study was marked in EPIC for significant notification   Workstation performed: [id number]     I reviewed the above laboratory and imaging data  Discussion/Summary:  41-year-old female with side branch IPMN that is essentially stable  This is less than 2 cm in size  This is being observed with her MRI  The liver lesion was enlarging and biopsy revealed well-differentiated neuroendocrine tumor  PET-CT reveals a primary tumor to likely be in the small bowel as well as a single liver lesion and a bone lesion  We discussed that there is potentially a survival advantage by resecting the primary  I would recommend that she undergo exploratory laparotomy with resection of the small bowel, intraoperative ultrasound of the liver with resection/ablation of the liver lesion  I explained the risks including bleeding, infection, need for further surgery, recurrence, wound complications, biliary fistula, anastomotic leak, adjacent organ injury, sepsis, mi, DVT, stroke, pulmonary embolism, and death  Informed consent was obtained  I would like to have her started on octreotide prior to surgical intervention  I have discussed this with Dr Judson Hunt, and we will arrange for this  I would then treat the bone lesion with either external beam radiation or potentially PRRT if there is other disease seen at the time of surgery  She is agreeable to this plan  All of her questions were answered

## 2021-01-13 DIAGNOSIS — C7B.8 METASTATIC MALIGNANT NEUROENDOCRINE TUMOR TO LIVER (HCC): ICD-10-CM

## 2021-01-13 PROCEDURE — U0005 INFEC AGEN DETEC AMPLI PROBE: HCPCS | Performed by: SURGERY

## 2021-01-13 PROCEDURE — U0003 INFECTIOUS AGENT DETECTION BY NUCLEIC ACID (DNA OR RNA); SEVERE ACUTE RESPIRATORY SYNDROME CORONAVIRUS 2 (SARS-COV-2) (CORONAVIRUS DISEASE [COVID-19]), AMPLIFIED PROBE TECHNIQUE, MAKING USE OF HIGH THROUGHPUT TECHNOLOGIES AS DESCRIBED BY CMS-2020-01-R: HCPCS | Performed by: SURGERY

## 2021-01-14 ENCOUNTER — TELEPHONE (OUTPATIENT)
Dept: HEMATOLOGY ONCOLOGY | Facility: CLINIC | Age: 71
End: 2021-01-14

## 2021-01-14 ENCOUNTER — ANESTHESIA EVENT (OUTPATIENT)
Dept: PERIOP | Facility: HOSPITAL | Age: 71
DRG: 827 | End: 2021-01-14
Payer: COMMERCIAL

## 2021-01-14 LAB — SARS-COV-2 RNA SPEC QL NAA+PROBE: NOT DETECTED

## 2021-01-14 NOTE — PRE-PROCEDURE INSTRUCTIONS
Pre-Surgery Instructions:   Medication Instructions    aspirin 81 MG tablet stop 7 days prior    Cholecalciferol (VITAMIN D) 2000 units CAPS stop 7 days prior    hydrochlorothiazide (HYDRODIURIL) 12 5 mg tablet hold DOS    Multiple Vitamin (MULTIVITAMIN) tablet stop 7 days prior    omeprazole (PriLOSEC) 40 MG capsule take am DOS    Wixela Inhub 250-50 MCG/DOSE inhaler take am DOS      Spoke to pt  Med list reviewed & instructed   As of 1/14 pt already stopped aspirin, MV, vit D  Instructed on tylenol only   Am DOS pt ok to take prilosec with small sip of water  Pt to use wixela inhaler  Showering instructions provided by surgeon office, reviewed @ time of call   Negative COVID screening and swab   Reviewed visitation policy   Will confirm with office if pt needs bowel prep or pre op abx? Advised no alcohol 24 hour prior  All instructions verbally understood by patient  All questions answered  Callback number provided     Per office: No bowel prep needed, thanks   From: Papi So RN - To: Harsha Murillo MA, Chanell Hurley RN  2 hours ago    Diuretic Med Class    Continue this medication up to the evening before surgery/procedure, but do not take the morning of the day of surgery  ASA Med Class: Aspirin    Should be discontinued at least one week prior to planned operation, unless specifically stated otherwise by surgical service  Your Surgeon may have patient stop taking aspirin up to a week before surgery if having intracranial, middle ear, posterior eye, spine surgery or prostate surgery  [Patients taking aspirin for coronary stents should be reviewed by an anesthesiologist in the optimization clinic  Please do not discontinue aspirin in patients with coronary stents unless given specific permission to do so by the cardiologist who prescribed medication ]   If your surgeon approves please continue to take this medication on your normal schedule    You may take this medication on the morning of your surgery with a sip of water  Inhalational Med Class    Continue to take these inhaler medications on your normal schedule up to and including the day of surgery  Vitamin Med Class    You may continue to take any vitamin that your surgeon has prescribed to you up to the day before surgery  If your surgeon has not specifically prescribed this vitamin or instructed you to continue then stop taking 7 days prior to surgery

## 2021-01-14 NOTE — TELEPHONE ENCOUNTER
Patient called to confirm office hours  Patient stated she is dropping off paperwork that needs to be completed for short term disability  Patient will be dropping off afternoon of 1/14/21

## 2021-01-18 ENCOUNTER — HOSPITAL ENCOUNTER (OUTPATIENT)
Dept: INFUSION CENTER | Facility: CLINIC | Age: 71
Discharge: HOME | End: 2021-01-18
Payer: COMMERCIAL

## 2021-01-18 ENCOUNTER — OFFICE VISIT (OUTPATIENT)
Dept: PULMONOLOGY | Facility: CLINIC | Age: 71
End: 2021-01-18
Payer: COMMERCIAL

## 2021-01-18 VITALS
DIASTOLIC BLOOD PRESSURE: 70 MMHG | RESPIRATION RATE: 18 BRPM | BODY MASS INDEX: 32.57 KG/M2 | SYSTOLIC BLOOD PRESSURE: 130 MMHG | WEIGHT: 177 LBS | TEMPERATURE: 98 F | OXYGEN SATURATION: 99 % | HEIGHT: 62 IN | HEART RATE: 63 BPM

## 2021-01-18 VITALS
BODY MASS INDEX: 32.37 KG/M2 | WEIGHT: 177 LBS | OXYGEN SATURATION: 99 % | TEMPERATURE: 96.2 F | DIASTOLIC BLOOD PRESSURE: 62 MMHG | HEART RATE: 76 BPM | RESPIRATION RATE: 18 BRPM | SYSTOLIC BLOOD PRESSURE: 114 MMHG

## 2021-01-18 DIAGNOSIS — D86.0 SARCOIDOSIS, LUNG (HCC): Primary | ICD-10-CM

## 2021-01-18 DIAGNOSIS — R16.0 LIVER MASS: ICD-10-CM

## 2021-01-18 DIAGNOSIS — C7B.8 METASTATIC MALIGNANT NEUROENDOCRINE TUMOR TO LIVER (HCC): Primary | ICD-10-CM

## 2021-01-18 PROCEDURE — 99214 OFFICE O/P EST MOD 30 MIN: CPT | Performed by: INTERNAL MEDICINE

## 2021-01-18 PROCEDURE — 96372 THER/PROPH/DIAG INJ SC/IM: CPT

## 2021-01-18 PROCEDURE — 3008F BODY MASS INDEX DOCD: CPT | Performed by: INTERNAL MEDICINE

## 2021-01-18 PROCEDURE — 1160F RVW MEDS BY RX/DR IN RCRD: CPT | Performed by: INTERNAL MEDICINE

## 2021-01-18 PROCEDURE — 1036F TOBACCO NON-USER: CPT | Performed by: INTERNAL MEDICINE

## 2021-01-18 RX ORDER — LANREOTIDE ACETATE 120 MG/.5ML
120 INJECTION SUBCUTANEOUS ONCE
Status: CANCELLED | OUTPATIENT
Start: 2021-02-15

## 2021-01-18 RX ORDER — LANREOTIDE ACETATE 120 MG/.5ML
120 INJECTION SUBCUTANEOUS ONCE
Status: COMPLETED | OUTPATIENT
Start: 2021-01-18 | End: 2021-01-18

## 2021-01-18 RX ADMIN — LANREOTIDE ACETATE 120 MG: 120 INJECTION SUBCUTANEOUS at 09:17

## 2021-01-18 NOTE — PROGRESS NOTES
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 [facility])  Unfortunately in the interim Bobbi Lucas has developed metastatic malignant neuroendocrine tumor  She is undergoing treatment with heme Onc as well as Radiation Oncology and surgery Onc  She has no evidence of metastasis to her chest        Diagnoses and all orders for this visit:    Sarcoidosis, lung ([facility] [address] )    Liver mass          Subjective:      Patient ID: Greta Linton is a 72 y o  female  Bobbi Lucas has had no change in her symptoms the past 6 months  She denies any significant shortness of breath cough abdominal pain  She has recently been diagnosed with metastatic neuroendocrine tumor  She is using her Wixela once daily and not needing to use her rescue inhaler  primary symptoms  Pertinent negatives include no chest pain, coughing, fever, headaches, myalgias or sore throat  The following portions of the patient's history were reviewed and updated as appropriate: allergies, current medications, past family history, past medical history, past social history, past surgical history and problem list     Review of Systems   Constitutional: Negative for appetite change, fever and unexpected weight change  HENT: Negative  Negative for ear pain, postnasal drip, rhinorrhea, sneezing, sore throat and trouble swallowing  Eyes: Negative  Respiratory: Negative for cough, shortness of breath and wheezing  Cardiovascular: Negative for chest pain and leg swelling  Gastrointestinal: Negative  Endocrine: Negative  Genitourinary: Negative  Musculoskeletal: Negative  Negative for myalgias  Allergic/Immunologic: Negative  Neurological: Negative  Negative for headaches  Hematological: Negative            Objective:      /70 (BP Location: Left arm, Patient Position: Sitting, Cuff Size: Adult)   Pulse 63   Temp 98 °F (36 7 °C) (Tympanic)   Resp 18   Ht 5' 2" (1 575 m)   Wt 80 3 kg (177 lb)   SpO2 99%   BMI 32 37 kg/m²          Physical Exam  Constitutional:       Appearance: She is well-developed  HENT:      Head: Normocephalic  Eyes:      Pupils: Pupils are equal, round, and reactive to light  Neck:      Musculoskeletal: Normal range of motion and neck supple  Cardiovascular:      Rate and Rhythm: Normal rate  Heart sounds: No murmur  Pulmonary:      Effort: Pulmonary effort is normal  No respiratory distress  Breath sounds: Normal breath sounds  No wheezing or rales  Abdominal:      Palpations: Abdomen is soft  Musculoskeletal: Normal range of motion  Skin:     General: Skin is warm and dry  Neurological:      Mental Status: She is alert and oriented to person, place, and time           Answers for HPI/ROS submitted by the patient on 1/13/2021   Primary symptoms  Do you experience frequent throat clearing?: Yes  Do you have a hoarse voice?: Yes  Chronicity: chronic  When did you first notice your symptoms?: more than 1 year ago  How often do your symptoms occur?: constantly  Since you first noticed this problem, how has it changed?: unchanged  Do you have shortness of breath that occurs with effort or exertion?: Yes  Do you have ear congestion?: No  Do you have heartburn?: No  Do you have fatigue?: Yes  Do you have nasal congestion?: No  Do you have shortness of breath when lying flat?: No  Do you have shortness of breath when you wake up?: No  Do you have sweats?: No  Have you experienced weight loss?: No  Which of the following makes your symptoms worse?: nothing  Which of the following makes your symptoms better?: nothing

## 2021-01-18 NOTE — ASSESSMENT & PLAN NOTE
Unfortunately in the interim Steph Hidalgo has developed metastatic malignant neuroendocrine tumor  She is undergoing treatment with heme Onc as well as Radiation Oncology and surgery Onc    She has no evidence of metastasis to her chest

## 2021-01-18 NOTE — ASSESSMENT & PLAN NOTE
Sai Herndon is most recent CT scan of the chest showed no evidence of granulomatous disease, no lymph adenopathy or nodules  She will maintain on Wixela 1 puff daily

## 2021-01-18 NOTE — PROGRESS NOTES
Pt here for Lanreotide  Vital signs stable  Injection given in right buttocks  Pt tolerated well  Pt aware of future appts   Declines AVS

## 2021-01-19 NOTE — ANESTHESIA PREPROCEDURE EVALUATION
Procedure:  RESECTION SMALL BOWEL (N/A Abdomen)  LIVER RESECTION/ABLATION, INTRAOPERATIVE U/S OF LIVER (N/A Abdomen)    Relevant Problems   GI/HEPATIC   (+) Focal nodular hyperplasia of liver   (+) IPMN (intraductal papillary mucinous neoplasm)   (+) 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 (HCC)      Other   (+) Diastolic dysfunction   (+) Granulomatous lymphadenitis   (+) Mediastinal adenopathy   (+) Sarcoidosis, lung ([address] )     ECHO SUMMARY 2016     LEFT VENTRICLE:  Systolic function was normal  Ejection fraction was estimated to be 60 %  Although no diagnostic regional wall motion abnormality was identified, this  possibility cannot be completely excluded on the basis of this study  Doppler  parameters were consistent with abnormal left ventricular relaxation (grade 1  diastolic dysfunction)      AORTIC VALVE:  There was trace regurgitation      TRICUSPID VALVE:  There was mild to moderate regurgitation  Estimated peak PA pressure was 35 mmHg  Physical Exam    Airway    Mallampati score: III  TM Distance: >3 FB  Neck ROM: full     Dental       Cardiovascular  Rhythm: regular, Rate: normal, Cardiovascular exam normal    Pulmonary  Pulmonary exam normal Breath sounds clear to auscultation,     Other Findings        Anesthesia Plan  ASA Score- 3     Anesthesia Type- general and epidural with ASA Monitors  Additional Monitors: arterial line  Airway Plan: ETT  Comment: Risks/benefits and alternatives discussed with patient including likely possibility of PONV and sore throat, as well as the rare possibilities of aspiration, dental/oropharyngeal/ocular injuries, or grave/life threatening anesthetic and surgical emergencies          Plan Factors-    Patient summary reviewed  Patient instructed to abstain from smoking on day of procedure  Patient did not smoke on day of surgery  Induction- intravenous  Postoperative Plan- Plan for postoperative opioid use   Planned trial extubation    Informed Consent- Anesthetic plan and risks discussed with patient  I personally reviewed this patient with the CRNA  Discussed and agreed on the Anesthesia Plan with the CELINE Morales

## 2021-01-20 ENCOUNTER — HOSPITAL ENCOUNTER (INPATIENT)
Facility: HOSPITAL | Age: 71
LOS: 6 days | Discharge: HOME | DRG: 827 | End: 2021-01-26
Attending: SURGERY | Admitting: SURGERY
Payer: COMMERCIAL

## 2021-01-20 ENCOUNTER — ANESTHESIA (OUTPATIENT)
Dept: PERIOP | Facility: HOSPITAL | Age: 71
DRG: 827 | End: 2021-01-20
Payer: COMMERCIAL

## 2021-01-20 VITALS — HEART RATE: 65 BPM

## 2021-01-20 DIAGNOSIS — C7B.8 METASTATIC MALIGNANT NEUROENDOCRINE TUMOR TO LIVER (HCC): ICD-10-CM

## 2021-01-20 LAB — GLUCOSE SERPL-MCNC: 139 MG/DL (ref 65–140)

## 2021-01-20 PROCEDURE — 88309 TISSUE EXAM BY PATHOLOGIST: CPT | Performed by: PATHOLOGY

## 2021-01-20 PROCEDURE — 88305 TISSUE EXAM BY PATHOLOGIST: CPT | Performed by: PATHOLOGY

## 2021-01-20 PROCEDURE — 88341 IMHCHEM/IMCYTCHM EA ADD ANTB: CPT | Performed by: PATHOLOGY

## 2021-01-20 PROCEDURE — 0DT80ZZ RESECTION OF SMALL INTESTINE, OPEN APPROACH: ICD-10-PCS | Performed by: SURGERY

## 2021-01-20 PROCEDURE — 88342 IMHCHEM/IMCYTCHM 1ST ANTB: CPT | Performed by: PATHOLOGY

## 2021-01-20 PROCEDURE — 47380 OPEN ABLATE LIVER TUMOR RF: CPT | Performed by: SURGERY

## 2021-01-20 PROCEDURE — C1886 CATHETER, ABLATION: HCPCS | Performed by: SURGERY

## 2021-01-20 PROCEDURE — 99024 POSTOP FOLLOW-UP VISIT: CPT | Performed by: SURGERY

## 2021-01-20 PROCEDURE — 86923 COMPATIBILITY TEST ELECTRIC: CPT

## 2021-01-20 PROCEDURE — 0F500ZF DESTRUCTION OF LIVER USING IRREVERSIBLE ELECTROPORATION, OPEN APPROACH: ICD-10-PCS | Performed by: SURGERY

## 2021-01-20 PROCEDURE — 82948 REAGENT STRIP/BLOOD GLUCOSE: CPT

## 2021-01-20 PROCEDURE — 44120 REMOVAL OF SMALL INTESTINE: CPT | Performed by: SURGERY

## 2021-01-20 RX ORDER — ONDANSETRON 2 MG/ML
4 INJECTION INTRAMUSCULAR; INTRAVENOUS ONCE AS NEEDED
Status: DISCONTINUED | OUTPATIENT
Start: 2021-01-20 | End: 2021-01-20 | Stop reason: HOSPADM

## 2021-01-20 RX ORDER — FLUTICASONE FUROATE AND VILANTEROL 100; 25 UG/1; UG/1
1 POWDER RESPIRATORY (INHALATION)
Status: DISCONTINUED | OUTPATIENT
Start: 2021-01-21 | End: 2021-01-26 | Stop reason: HOSPADM

## 2021-01-20 RX ORDER — PROPOFOL 10 MG/ML
INJECTION, EMULSION INTRAVENOUS AS NEEDED
Status: DISCONTINUED | OUTPATIENT
Start: 2021-01-20 | End: 2021-01-20

## 2021-01-20 RX ORDER — MIDAZOLAM HYDROCHLORIDE 2 MG/2ML
INJECTION, SOLUTION INTRAMUSCULAR; INTRAVENOUS AS NEEDED
Status: DISCONTINUED | OUTPATIENT
Start: 2021-01-20 | End: 2021-01-20

## 2021-01-20 RX ORDER — LABETALOL 20 MG/4 ML (5 MG/ML) INTRAVENOUS SYRINGE
10 EVERY 4 HOURS PRN
Status: DISCONTINUED | OUTPATIENT
Start: 2021-01-20 | End: 2021-01-26 | Stop reason: HOSPADM

## 2021-01-20 RX ORDER — ROPIVACAINE HYDROCHLORIDE 2 MG/ML
INJECTION, SOLUTION EPIDURAL; INFILTRATION; PERINEURAL AS NEEDED
Status: DISCONTINUED | OUTPATIENT
Start: 2021-01-20 | End: 2021-01-20

## 2021-01-20 RX ORDER — ROCURONIUM BROMIDE 10 MG/ML
INJECTION, SOLUTION INTRAVENOUS AS NEEDED
Status: DISCONTINUED | OUTPATIENT
Start: 2021-01-20 | End: 2021-01-20

## 2021-01-20 RX ORDER — DEXMEDETOMIDINE HYDROCHLORIDE 100 UG/ML
INJECTION, SOLUTION INTRAVENOUS AS NEEDED
Status: DISCONTINUED | OUTPATIENT
Start: 2021-01-20 | End: 2021-01-20

## 2021-01-20 RX ORDER — FENTANYL CITRATE/PF 50 MCG/ML
50 SYRINGE (ML) INJECTION
Status: DISCONTINUED | OUTPATIENT
Start: 2021-01-20 | End: 2021-01-20 | Stop reason: HOSPADM

## 2021-01-20 RX ORDER — MAGNESIUM HYDROXIDE 1200 MG/15ML
LIQUID ORAL AS NEEDED
Status: DISCONTINUED | OUTPATIENT
Start: 2021-01-20 | End: 2021-01-20 | Stop reason: HOSPADM

## 2021-01-20 RX ORDER — NEOSTIGMINE METHYLSULFATE 1 MG/ML
INJECTION INTRAVENOUS AS NEEDED
Status: DISCONTINUED | OUTPATIENT
Start: 2021-01-20 | End: 2021-01-20

## 2021-01-20 RX ORDER — DEXTROSE, SODIUM CHLORIDE, AND POTASSIUM CHLORIDE 5; .45; .15 G/100ML; G/100ML; G/100ML
100 INJECTION INTRAVENOUS CONTINUOUS
Status: DISCONTINUED | OUTPATIENT
Start: 2021-01-20 | End: 2021-01-23

## 2021-01-20 RX ORDER — ACETAMINOPHEN 160 MG/5ML
650 SUSPENSION, ORAL (FINAL DOSE FORM) ORAL EVERY 4 HOURS PRN
Status: DISCONTINUED | OUTPATIENT
Start: 2021-01-20 | End: 2021-01-26 | Stop reason: HOSPADM

## 2021-01-20 RX ORDER — ONDANSETRON 2 MG/ML
4 INJECTION INTRAMUSCULAR; INTRAVENOUS EVERY 6 HOURS PRN
Status: DISCONTINUED | OUTPATIENT
Start: 2021-01-20 | End: 2021-01-21

## 2021-01-20 RX ORDER — HYDROMORPHONE HCL/PF 1 MG/ML
SYRINGE (ML) INJECTION AS NEEDED
Status: DISCONTINUED | OUTPATIENT
Start: 2021-01-20 | End: 2021-01-20

## 2021-01-20 RX ORDER — HEPARIN SODIUM 5000 [USP'U]/ML
5000 INJECTION, SOLUTION INTRAVENOUS; SUBCUTANEOUS EVERY 12 HOURS SCHEDULED
Status: DISCONTINUED | OUTPATIENT
Start: 2021-01-20 | End: 2021-01-26 | Stop reason: HOSPADM

## 2021-01-20 RX ORDER — ONDANSETRON 2 MG/ML
INJECTION INTRAMUSCULAR; INTRAVENOUS AS NEEDED
Status: DISCONTINUED | OUTPATIENT
Start: 2021-01-20 | End: 2021-01-20

## 2021-01-20 RX ORDER — HYDROMORPHONE HCL/PF 1 MG/ML
0.5 SYRINGE (ML) INJECTION EVERY 2 HOUR PRN
Status: DISCONTINUED | OUTPATIENT
Start: 2021-01-20 | End: 2021-01-26

## 2021-01-20 RX ORDER — DEXAMETHASONE SODIUM PHOSPHATE 10 MG/ML
INJECTION, SOLUTION INTRAMUSCULAR; INTRAVENOUS AS NEEDED
Status: DISCONTINUED | OUTPATIENT
Start: 2021-01-20 | End: 2021-01-20

## 2021-01-20 RX ORDER — PANTOPRAZOLE SODIUM 40 MG/1
40 TABLET, DELAYED RELEASE ORAL
Status: DISCONTINUED | OUTPATIENT
Start: 2021-01-21 | End: 2021-01-21

## 2021-01-20 RX ORDER — LIDOCAINE HYDROCHLORIDE AND EPINEPHRINE 15; 5 MG/ML; UG/ML
INJECTION, SOLUTION EPIDURAL
Status: COMPLETED | OUTPATIENT
Start: 2021-01-20 | End: 2021-01-20

## 2021-01-20 RX ORDER — SODIUM CHLORIDE, SODIUM LACTATE, POTASSIUM CHLORIDE, CALCIUM CHLORIDE 600; 310; 30; 20 MG/100ML; MG/100ML; MG/100ML; MG/100ML
100 INJECTION, SOLUTION INTRAVENOUS CONTINUOUS
Status: DISCONTINUED | OUTPATIENT
Start: 2021-01-20 | End: 2021-01-21

## 2021-01-20 RX ORDER — LIDOCAINE HYDROCHLORIDE 10 MG/ML
0.5 INJECTION, SOLUTION EPIDURAL; INFILTRATION; INTRACAUDAL; PERINEURAL ONCE AS NEEDED
Status: DISCONTINUED | OUTPATIENT
Start: 2021-01-20 | End: 2021-01-20 | Stop reason: HOSPADM

## 2021-01-20 RX ORDER — KETAMINE HYDROCHLORIDE 50 MG/ML
INJECTION, SOLUTION, CONCENTRATE INTRAMUSCULAR; INTRAVENOUS AS NEEDED
Status: DISCONTINUED | OUTPATIENT
Start: 2021-01-20 | End: 2021-01-20

## 2021-01-20 RX ORDER — SODIUM CHLORIDE 9 MG/ML
INJECTION, SOLUTION INTRAVENOUS CONTINUOUS PRN
Status: DISCONTINUED | OUTPATIENT
Start: 2021-01-20 | End: 2021-01-20

## 2021-01-20 RX ORDER — SODIUM CHLORIDE, SODIUM LACTATE, POTASSIUM CHLORIDE, CALCIUM CHLORIDE 600; 310; 30; 20 MG/100ML; MG/100ML; MG/100ML; MG/100ML
125 INJECTION, SOLUTION INTRAVENOUS CONTINUOUS
Status: DISCONTINUED | OUTPATIENT
Start: 2021-01-20 | End: 2021-01-21

## 2021-01-20 RX ORDER — HYDROMORPHONE HCL/PF 1 MG/ML
0.2 SYRINGE (ML) INJECTION
Status: DISCONTINUED | OUTPATIENT
Start: 2021-01-20 | End: 2021-01-20 | Stop reason: HOSPADM

## 2021-01-20 RX ORDER — FENTANYL CITRATE 50 UG/ML
INJECTION, SOLUTION INTRAMUSCULAR; INTRAVENOUS AS NEEDED
Status: DISCONTINUED | OUTPATIENT
Start: 2021-01-20 | End: 2021-01-20

## 2021-01-20 RX ORDER — SODIUM CHLORIDE, SODIUM LACTATE, POTASSIUM CHLORIDE, CALCIUM CHLORIDE 600; 310; 30; 20 MG/100ML; MG/100ML; MG/100ML; MG/100ML
INJECTION, SOLUTION INTRAVENOUS CONTINUOUS PRN
Status: DISCONTINUED | OUTPATIENT
Start: 2021-01-20 | End: 2021-01-20

## 2021-01-20 RX ORDER — GLYCOPYRROLATE 0.2 MG/ML
INJECTION INTRAMUSCULAR; INTRAVENOUS AS NEEDED
Status: DISCONTINUED | OUTPATIENT
Start: 2021-01-20 | End: 2021-01-20

## 2021-01-20 RX ORDER — ALBUMIN, HUMAN INJ 5% 5 %
SOLUTION INTRAVENOUS CONTINUOUS PRN
Status: DISCONTINUED | OUTPATIENT
Start: 2021-01-20 | End: 2021-01-20

## 2021-01-20 RX ORDER — ACETAMINOPHEN 325 MG/1
975 TABLET ORAL ONCE
Status: COMPLETED | OUTPATIENT
Start: 2021-01-20 | End: 2021-01-20

## 2021-01-20 RX ORDER — CEFAZOLIN SODIUM 1 G/3ML
INJECTION, POWDER, FOR SOLUTION INTRAMUSCULAR; INTRAVENOUS AS NEEDED
Status: DISCONTINUED | OUTPATIENT
Start: 2021-01-20 | End: 2021-01-20

## 2021-01-20 RX ORDER — LIDOCAINE HYDROCHLORIDE 10 MG/ML
INJECTION, SOLUTION EPIDURAL; INFILTRATION; INTRACAUDAL; PERINEURAL AS NEEDED
Status: DISCONTINUED | OUTPATIENT
Start: 2021-01-20 | End: 2021-01-20

## 2021-01-20 RX ORDER — CEFAZOLIN SODIUM 2 G/50ML
2000 SOLUTION INTRAVENOUS ONCE
Status: DISCONTINUED | OUTPATIENT
Start: 2021-01-20 | End: 2021-01-20

## 2021-01-20 RX ADMIN — KETAMINE HYDROCHLORIDE 20 MG: 50 INJECTION, SOLUTION INTRAMUSCULAR; INTRAVENOUS at 09:00

## 2021-01-20 RX ADMIN — MIDAZOLAM 1 MG: 1 INJECTION INTRAMUSCULAR; INTRAVENOUS at 09:30

## 2021-01-20 RX ADMIN — HYDROMORPHONE HYDROCHLORIDE 0.2 MG: 1 INJECTION, SOLUTION INTRAMUSCULAR; INTRAVENOUS; SUBCUTANEOUS at 13:55

## 2021-01-20 RX ADMIN — ACETAMINOPHEN 975 MG: 325 TABLET, FILM COATED ORAL at 08:07

## 2021-01-20 RX ADMIN — ALBUMIN (HUMAN): 12.5 INJECTION, SOLUTION INTRAVENOUS at 09:36

## 2021-01-20 RX ADMIN — KETAMINE HYDROCHLORIDE 0.2 MG/KG/HR: 50 INJECTION, SOLUTION INTRAMUSCULAR; INTRAVENOUS at 09:23

## 2021-01-20 RX ADMIN — DEXAMETHASONE SODIUM PHOSPHATE 10 MG: 10 INJECTION, SOLUTION INTRAMUSCULAR; INTRAVENOUS at 09:21

## 2021-01-20 RX ADMIN — ONDANSETRON 4 MG: 2 INJECTION INTRAMUSCULAR; INTRAVENOUS at 09:21

## 2021-01-20 RX ADMIN — HYDROMORPHONE HYDROCHLORIDE 0.5 MG: 1 INJECTION, SOLUTION INTRAMUSCULAR; INTRAVENOUS; SUBCUTANEOUS at 10:21

## 2021-01-20 RX ADMIN — CEFAZOLIN 2000 MG: 1 INJECTION, POWDER, FOR SOLUTION INTRAVENOUS at 09:20

## 2021-01-20 RX ADMIN — SODIUM CHLORIDE: 0.9 INJECTION, SOLUTION INTRAVENOUS at 09:05

## 2021-01-20 RX ADMIN — ROCURONIUM BROMIDE 50 MG: 50 INJECTION, SOLUTION INTRAVENOUS at 09:38

## 2021-01-20 RX ADMIN — ALBUMIN (HUMAN): 12.5 INJECTION, SOLUTION INTRAVENOUS at 10:04

## 2021-01-20 RX ADMIN — ROCURONIUM BROMIDE 50 MG: 50 INJECTION, SOLUTION INTRAVENOUS at 09:00

## 2021-01-20 RX ADMIN — LIDOCAINE HYDROCHLORIDE AND EPINEPHRINE 3 ML: 15; 5 INJECTION, SOLUTION EPIDURAL at 08:47

## 2021-01-20 RX ADMIN — DEXMEDETOMIDINE HCL 8 MCG: 100 INJECTION INTRAVENOUS at 10:36

## 2021-01-20 RX ADMIN — Medication 50 MCG: at 13:22

## 2021-01-20 RX ADMIN — DEXMEDETOMIDINE HCL 8 MCG: 100 INJECTION INTRAVENOUS at 10:33

## 2021-01-20 RX ADMIN — GLYCOPYRROLATE 0.2 MG: 0.2 INJECTION, SOLUTION INTRAMUSCULAR; INTRAVENOUS at 10:14

## 2021-01-20 RX ADMIN — Medication 50 MCG: at 13:33

## 2021-01-20 RX ADMIN — ROCURONIUM BROMIDE 30 MG: 50 INJECTION, SOLUTION INTRAVENOUS at 10:58

## 2021-01-20 RX ADMIN — NEOSTIGMINE METHYLSULFATE 4 MG: 1 INJECTION INTRAVENOUS at 12:28

## 2021-01-20 RX ADMIN — FENTANYL CITRATE: 50 INJECTION INTRAMUSCULAR; INTRAVENOUS at 13:15

## 2021-01-20 RX ADMIN — SODIUM CHLORIDE, SODIUM LACTATE, POTASSIUM CHLORIDE, AND CALCIUM CHLORIDE: .6; .31; .03; .02 INJECTION, SOLUTION INTRAVENOUS at 08:30

## 2021-01-20 RX ADMIN — DEXMEDETOMIDINE HCL 4 MCG: 100 INJECTION INTRAVENOUS at 10:30

## 2021-01-20 RX ADMIN — DEXTROSE, SODIUM CHLORIDE, AND POTASSIUM CHLORIDE 100 ML/HR: 5; .45; .15 INJECTION INTRAVENOUS at 14:52

## 2021-01-20 RX ADMIN — LIDOCAINE HYDROCHLORIDE 80 MG: 10 INJECTION, SOLUTION EPIDURAL; INFILTRATION; INTRACAUDAL; PERINEURAL at 09:00

## 2021-01-20 RX ADMIN — HYDROMORPHONE HYDROCHLORIDE 0.2 MG: 1 INJECTION, SOLUTION INTRAMUSCULAR; INTRAVENOUS; SUBCUTANEOUS at 13:45

## 2021-01-20 RX ADMIN — MIDAZOLAM 1 MG: 1 INJECTION INTRAMUSCULAR; INTRAVENOUS at 09:41

## 2021-01-20 RX ADMIN — GLYCOPYRROLATE 0.6 MG: 0.2 INJECTION, SOLUTION INTRAMUSCULAR; INTRAVENOUS at 12:28

## 2021-01-20 RX ADMIN — ROPIVACAINE HYDROCHLORIDE 5 MG: 2 INJECTION, SOLUTION EPIDURAL; INFILTRATION at 11:58

## 2021-01-20 RX ADMIN — HEPARIN SODIUM 5000 UNITS: 5000 INJECTION INTRAVENOUS; SUBCUTANEOUS at 21:49

## 2021-01-20 RX ADMIN — PHENYLEPHRINE HYDROCHLORIDE 100 MCG: 10 INJECTION INTRAVENOUS at 09:17

## 2021-01-20 RX ADMIN — PROPOFOL 140 MG: 10 INJECTION, EMULSION INTRAVENOUS at 09:00

## 2021-01-20 RX ADMIN — FENTANYL CITRATE 50 MCG: 50 INJECTION INTRAMUSCULAR; INTRAVENOUS at 08:30

## 2021-01-20 RX ADMIN — NOREPINEPHRINE BITARTRATE 4 MCG/MIN: 1 INJECTION, SOLUTION, CONCENTRATE INTRAVENOUS at 10:00

## 2021-01-20 RX ADMIN — ONDANSETRON 4 MG: 2 INJECTION INTRAMUSCULAR; INTRAVENOUS at 15:56

## 2021-01-20 RX ADMIN — FENTANYL CITRATE 50 MCG: 50 INJECTION INTRAMUSCULAR; INTRAVENOUS at 09:00

## 2021-01-20 RX ADMIN — Medication 500 MG: at 09:15

## 2021-01-20 RX ADMIN — PHENYLEPHRINE HYDROCHLORIDE 100 MCG: 10 INJECTION INTRAVENOUS at 09:05

## 2021-01-20 NOTE — PLAN OF CARE
Problem: PAIN - ADULT  Goal: Verbalizes/displays adequate comfort level or baseline comfort level  Description: Interventions:  - Encourage patient to monitor pain and request assistance  - Assess pain using appropriate pain scale  - Administer analgesics based on type and severity of pain and evaluate response  - Implement non-pharmacological measures as appropriate and evaluate response  - Consider cultural and social influences on pain and pain management  - Notify physician/advanced practitioner if interventions unsuccessful or patient reports new pain  Outcome: Progressing     Problem: INFECTION - ADULT  Goal: Absence or prevention of progression during hospitalization  Description: INTERVENTIONS:  - Assess and monitor for signs and symptoms of infection  - Monitor lab/diagnostic results  - Monitor all insertion sites, i e  indwelling lines, tubes, and drains  - Monitor endotracheal if appropriate and nasal secretions for changes in amount and color  - [place] appropriate cooling/warming therapies per order  - Administer medications as ordered  - Instruct and encourage patient and family to use good hand hygiene technique  - Identify and instruct in appropriate isolation precautions for identified infection/condition  Outcome: Progressing  Goal: Absence of fever/infection during neutropenic period  Description: INTERVENTIONS:  - Monitor WBC    Outcome: Progressing     Problem: SAFETY ADULT  Goal: Patient will remain free of falls  Description: INTERVENTIONS:  - Assess patient frequently for physical needs  -  Identify cognitive and physical deficits and behaviors that affect risk of falls    -  [place] fall precautions as indicated by assessment   - Educate patient/family on patient safety including physical limitations  - Instruct patient to call for assistance with activity based on assessment  - Modify environment to reduce risk of injury  - Consider OT/PT consult to assist with strengthening/mobility  Outcome: Progressing  Goal: Maintain or return to baseline ADL function  Description: INTERVENTIONS:  -  Assess patient's ability to carry out ADLs; assess patient's baseline for ADL function and identify physical deficits which impact ability to perform ADLs (bathing, care of mouth/teeth, toileting, grooming, dressing, etc )  - Assess/evaluate cause of self-care deficits   - Assess range of motion  - Assess patient's mobility; develop plan if impaired  - Assess patient's need for assistive devices and provide as appropriate  - Encourage maximum independence but intervene and supervise when necessary  - Involve family in performance of ADLs  - Assess for home care needs following discharge   - Consider OT consult to assist with ADL evaluation and planning for discharge  - Provide patient education as appropriate  Outcome: Progressing  Goal: Maintain or return mobility status to optimal level  Description: INTERVENTIONS:  - Assess patient's baseline mobility status (ambulation, transfers, stairs, etc )    - Identify cognitive and physical deficits and behaviors that affect mobility  - Identify mobility aids required to assist with transfers and/or ambulation (gait belt, sit-to-stand, lift, walker, cane, etc )  - [place] fall precautions as indicated by assessment  - Record patient progress and toleration of activity level on Mobility SBAR; progress patient to next Phase/Stage  - Instruct patient to call for assistance with activity based on assessment  - Consider rehabilitation consult to assist with strengthening/weightbearing, etc   Outcome: Progressing

## 2021-01-20 NOTE — PROGRESS NOTES
Post op Note - Koki Rodriguez 72 y o  female MRN: [id number]    Unit/Bed#: [facility] 915-01 Encounter: [id number]      Assessment:  Pt is a 73 y/o F w malignant carcinoid of SB s/p partial SB resection, intraop US and ablation of liver mets x2  VSS  Afebrile  Incision is clean, dry, intact  Abdomen is soft, nontender, non distended  Plan:  Clear liquid diet  Ambulate  Maintain epidural  Continue DVT ppx  Continue mak  ambulate    Subjective:   Feels tired, but overall no complaints  Denied fever, chills, chest pain or shortness of breath  Objective:     Vitals: Blood pressure 102/53, pulse 67, temperature (!) 97 °F (36 1 °C), resp  rate 14, height 5' 2" (1 575 m), weight 78 5 kg (173 lb), SpO2 99 %  ,Body mass index is 31 64 kg/m²  Intake/Output Summary (Last 24 hours) at 1/20/2021 1625  Last data filed at 1/20/2021 1245  Gross per 24 hour   Intake 2050 ml   Output 420 ml   Net 1630 ml       Physical Exam  General: NAD  HEENT: NC/AT  MMM  Cv: RRR     Lungs: normal effort  Ab: Soft, NT/ND  Ex: no CCE  Neuro: AAOx3    Scheduled Meds:  Current Facility-Administered Medications   Medication Dose Route Frequency Provider Last Rate    acetaminophen  650 mg Oral Q4H PRN Armand Ponce MD      dextrose 5 % and sodium chloride 0 45 % with KCl 20 mEq/L  100 mL/hr Intravenous Continuous Pavel Romo  mL/hr (01/20/21 5522)    [START ON 1/21/2021] fluticasone-vilanterol  1 puff Inhalation Daily Pavel Romo MD      heparin (porcine)  5,000 Units Subcutaneous Q12H [facility] Pavel Romo MD      HYDROmorphone  0 5 mg Intravenous Q2H PRN Alia Rodriguez MD      Labetalol HCl  10 mg Intravenous Q4H PRN Armand Ponce MD      lactated ringers  125 mL/hr Intravenous Continuous Alia Rodriguez MD      lactated ringers  100 mL/hr Intravenous Continuous Brinda Feng CRNA Stopped (01/20/21 6748)    ondansetron  4 mg Intravenous Q6H PRN MD Colin Tuttle ON 1/21/2021] pantoprazole  40 mg Oral Early Morning Juan Johnson MD      ropivacaine 0 1% and fentaNYL 2 mcg/mL   Epidural Continuous Angelina Sarah MD       Continuous Infusions:dextrose 5 % and sodium chloride 0 45 % with KCl 20 mEq/L, 100 mL/hr, Last Rate: 100 mL/hr (01/20/21 1452)  lactated ringers, 125 mL/hr  lactated ringers, 100 mL/hr, Last Rate: Stopped (01/20/21 4498)  ropivacaine 0 1% and fentaNYL 2 mcg/mL,       PRN Meds:   acetaminophen    HYDROmorphone    Labetalol HCl    ondansetron      Invasive Devices     Peripheral Intravenous Line            Peripheral IV 01/20/21 Left Hand less than 1 day    Peripheral IV 01/20/21 Left Wrist less than 1 day          Epidural Line            Epidural Catheter 01/20/21 less than 1 day          Drain            Urethral Catheter Latex 16 Fr  less than 1 day                Lab, Imaging and other studies: I have personally reviewed pertinent reports      VTE Pharmacologic Prophylaxis: Sequential compression device (Venodyne)   VTE Mechanical Prophylaxis: sequential compression device

## 2021-01-20 NOTE — ANESTHESIA PROCEDURE NOTES
Arterial Line Insertion  Performed by: Shira Maya CRNA  Authorized by: Denisha Mahoney MD   Consent: Verbal consent obtained  Written consent obtained  Risks and benefits: risks, benefits and alternatives were discussed  Consent given by: patient  Patient understanding: patient states understanding of the procedure being performed  Patient consent: the patient's understanding of the procedure matches consent given  Procedure consent: procedure consent matches procedure scheduled  Relevant documents: relevant documents present and verified  Patient identity confirmed: arm band and hospital-assigned identification number  Preparation: Patient was prepped and draped in the usual sterile fashion    Indications: hemodynamic monitoring  Orientation:  Left  Location: radial artery  Sedation:  Patient sedated: under general     Procedure Details:  Needle gauge: 20  Seldinger technique: Seldinger technique used  Number of attempts: 2    Post-procedure:  Post-procedure: dressing applied  Waveform: good waveform  Patient tolerance: Patient tolerated the procedure well with no immediate complications  Comments: Performed under ultrasound

## 2021-01-20 NOTE — INTERVAL H&P NOTE
H&P reviewed  After examining the patient I find no changes in the patients condition since the H&P had been written      Vitals:    01/20/21 0801   BP: 141/80   Pulse: 63   Resp: 18   Temp: (!) 96 7 °F (35 9 °C)   SpO2: 97%

## 2021-01-20 NOTE — ANESTHESIA POSTPROCEDURE EVALUATION
Post-Op Assessment Note    CV Status:  Stable    Pain management: adequate     Mental Status:  Alert and awake   Hydration Status:  Euvolemic   PONV Controlled:  Controlled   Airway Patency:  Patent      Post Op Vitals Reviewed: Yes      Staff: Anesthesiologist, CRNA     Post-op block assessment: catheter intact, secured with tape and sterile dressing applied      No complications documented      /60 (01/20/21 1259)    Temp 98 5 °F (36 9 °C) (01/20/21 1259)    Pulse 59 (01/20/21 1259)   Resp 18 (01/20/21 1259)    SpO2 99 % (01/20/21 1259)

## 2021-01-20 NOTE — ANESTHESIA PROCEDURE NOTES
Epidural Block    Patient location during procedure: holding area  Start time: 1/20/2021 8:45 AM  Reason for block: procedure for pain and at surgeon's request  Staffing  Anesthesiologist: Claire Calles MD  Performed: anesthesiologist   Preanesthetic Checklist  Completed: patient identified, site marked, surgical consent, pre-op evaluation, timeout performed, IV checked, risks and benefits discussed and monitors and equipment checked  Epidural  Patient position: sitting  Prep: ChloraPrep  Patient monitoring: cardiac monitor and frequent blood pressure checks  Approach: right paramedian  Location: thoracic (1-12)  Injection technique: DECLAN air and DECLAN saline  Needle  Needle type: Tuohy   Needle gauge: 18 G  Catheter type: side hole  Catheter size: 20 G  Catheter at skin depth: 13 cm  Test dose: negativelidocaine 1 5% with epinephrine 1:200,000 test dose, 3 mL  Assessment  Sensory level: [id number] aspiration for CSF, negative aspiration for heme and no paresthesia on injection  patient tolerated the procedure well with no immediate complications  Additional Notes  Initial midline approach at presumed T10-T11, with good tract, but narrow approach, unable to advance beyond os after two attempts in same skin site, same level  Paramedian approach at same level 1cm lateral with excellent DECLAN at 7cm, with very easy catheter threading, no paraesthesia  Negative aspiration, negative test dose  Some mild bleeding at paramedian insertion site that initially resolved with pressure, but during procedural positioning in the OR there was a moderate collection of blood, still contained by the dressing  Will monitor

## 2021-01-20 NOTE — OP NOTE
OPERATIVE REPORT  PATIENT NAME: Carly Cade    :  1950  MRN: [id number]  Pt Location: BE OR ROOM 05    SURGERY DATE: 2021    Surgeon(s) and Role:     * Alex Sin MD - Primary     * Khloe Lechuga MD - Assisting     * Wilner Fountain MD - Hermelindo Alexander MD - Co-surgeon    Preop Diagnosis:  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 [facility]) [C7B 8]    Post-Op Diagnosis Codes:     * 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 ([facility] [address] ) [C7B 8]    Procedure(s) (LRB):  RESECTION SMALL BOWEL AND ANASTOMOSIS, RESECTION SMALL BOWEL NODULE (N/A)  LIVER ABLATION SEGMENT 7, INTRAOPERATIVE U/S OF LIVER (N/A)  LAPAROTOMY EXPLORATORY (N/A)    Specimen(s):  ID Type Source Tests Collected by Time Destination   1 : SMALL BOWEL RESECTION WITH MESENTERIC LYMPH NODES Tissue Small Bowel, NOS TISSUE EXAM Alex Sin MD 2021 1013    2 : SMALL BOWEL NODULE Tissue Small Bowel, NOS TISSUE EXAM Alex Sin MD 2021 1036        Estimated Blood Loss:   20 mL    Drains:  NG/OG/Enteral Tube Nasogastric 18 Fr Left nares (Active)   Number of days: 0       NG/OG/Enteral Tube (Active)   Number of days: 0       NG/OG/Enteral Tube Orogastric 18 Fr Center mouth (Active)   Number of days: 0       Urethral Catheter Latex 16 Fr  (Active)   Number of days: 0       Anesthesia Type:   General w/ Epidural    Operative Indications:  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 [facility]) [C9 8]  67-year-old female with metastatic neuroendocrine tumor  It was recommended that she undergo resection of the primary and resection/ablation liver lesions  Risks and benefits were explained  Informed consent was obtained  Patient was brought to the operating room  Operative Findings: Three primary site seen in the small bowel with associated mesenteric adenopathy  Two liver metastasis seen corresponding to the MRI and PET        Complications:   None    Procedure and Technique:  After identifying the patient, general anesthesia was achieved  Eyes were placed by Anesthesia  Teague catheter was placed  Patient was then prepped and draped in the usual sterile fashion  A time-out was performed  Midline incision was made  Using sharp dissection this taken through skin, subcutaneous tissue and through the fascia into the peritoneal cavity  A Bookwalter retractor was placed  There was no evidence of carcinomatosis  Small bowel was run from the ligament Treitz to the ileocecal valve  The lesion seen on the PET-CT was identified with the mesenteric adenopathy with 3 obvious small-bowel primaries  There was also a small area more proximally that we elected to excise  We initially isolated small bowel proximal and distal to the mass and the mesenteric mass is and divided the small bowel with ROSARIO 75 stapling device  Mesentery was divided with the Harmonic scalpel  At the base around the larger node, mesentery was clamped, divided and ligated with 2 0 silk suture  Specimen was handed off table  There were 2 areas of bleeding along the mesentery there were clamped, divided and suture ligated with 2 0 Vicryl suture  The small bowel appeared somewhat candy-caned off of the mesentery  Consequently we divided this redundant small bowel as well with a [address] 75 stapling device from either end  At this point both bowel limbs appeared completely viable  The small bowel was brought side to side with a 3-0 silk suture  The distal portion of the staple line was removed  A GI 75 stapling device was placed in each limb and fired  The defect was closed with a TA 60 stapling device  The mesentery was closed with a running 3-0 Vicryl suture  We now solved M0 proximal lesion in the small bowel  This was sharply divided off of the bowel and the serosa was oversewn with 3-0 silk suture  There was no evidence of any obvious mucosal injury    This point we now extended the incision more superiorly for the liver portion of the procedure  The falciform ligament was clamped, divided and ligated with 2 0 silk suture  This was then divided down to the vena cava  The triangular ligament was divided a using the Bovie electrocautery  This point we now performed intraoperative ultrasound of the liver  The larger lesion in segment 7 was he easily visual   This more smaller lesion in segment 8 was much harder to identify would ultimately we were able to identify this  No other suspicious liver lesions are seen on intraoperative ultrasound  We started with a smaller lesion in segment 8  Using 61 w of energy for 2 minutes this area was completely ablated under ultrasound guidance  We now went to the larger lesion and using year microwave energy, 140 w at 2 minutes to completely ablate this lesion  Inferiorly I did feel we had ablated this lesion but just to ensure that the more inferior aspect was ablated we ablated this area for 2 minutes at 140 w as well  Intraoperative ultrasound from both areas were completely ablated  There is no evidence of bleeding from the needle tracks  The liver now was returned to its normal anatomic position  Wound was now copiously irrigated  There was excellent hemostasis  Sponge and needle counts were correct  The fascia was approximated with 1 PDS suture starting at either end of the incision and secured centrally  Subcutaneous tissue was irrigated skin was approximated with a running 4 Monocryl suture in subcuticular fashion  Skin glue was used on the skin  Patient was then extubated and taken to the recovery room in stable condition having tolerated the procedure well  I was present and participated in all aspects of this procedure         I was present for the entire procedure    Patient Disposition:  PACU     SIGNATURE: Sergio Soto MD  DATE: January 20, 2021  TIME: 12:12 PM

## 2021-01-21 LAB
ABO GROUP BLD BPU: NORMAL
ABO GROUP BLD BPU: NORMAL
ANION GAP SERPL CALCULATED.3IONS-SCNC: 5 MMOL/L (ref 4–13)
BASOPHILS # BLD AUTO: 0.03 THOUSANDS/ΜL (ref 0–0.1)
BASOPHILS NFR BLD AUTO: 0 % (ref 0–1)
BPU ID: NORMAL
BPU ID: NORMAL
BUN SERPL-MCNC: 24 MG/DL (ref 5–25)
CALCIUM SERPL-MCNC: 8.2 MG/DL (ref 8.3–10.1)
CHLORIDE SERPL-SCNC: 109 MMOL/L (ref 100–108)
CO2 SERPL-SCNC: 26 MMOL/L (ref 21–32)
CREAT SERPL-MCNC: 0.83 MG/DL (ref 0.6–1.3)
CROSSMATCH: NORMAL
CROSSMATCH: NORMAL
EOSINOPHIL # BLD AUTO: 0 THOUSAND/ΜL (ref 0–0.61)
EOSINOPHIL NFR BLD AUTO: 0 % (ref 0–6)
ERYTHROCYTE [DISTWIDTH] IN BLOOD BY AUTOMATED COUNT: 13.9 % (ref 11.6–15.1)
GFR SERPL CREATININE-BSD FRML MDRD: 72 ML/MIN/1.73SQ M
GLUCOSE SERPL-MCNC: 123 MG/DL (ref 65–140)
HCT VFR BLD AUTO: 31.8 % (ref 34.8–46.1)
HGB BLD-MCNC: 9.9 G/DL (ref 11.5–15.4)
IMM GRANULOCYTES # BLD AUTO: 0.08 THOUSAND/UL (ref 0–0.2)
IMM GRANULOCYTES NFR BLD AUTO: 1 % (ref 0–2)
LYMPHOCYTES # BLD AUTO: 1.13 THOUSANDS/ΜL (ref 0.6–4.47)
LYMPHOCYTES NFR BLD AUTO: 7 % (ref 14–44)
MAGNESIUM SERPL-MCNC: 1.8 MG/DL (ref 1.6–2.6)
MCH RBC QN AUTO: 27.9 PG (ref 26.8–34.3)
MCHC RBC AUTO-ENTMCNC: 31.1 G/DL (ref 31.4–37.4)
MCV RBC AUTO: 90 FL (ref 82–98)
MONOCYTES # BLD AUTO: 1.43 THOUSAND/ΜL (ref 0.17–1.22)
MONOCYTES NFR BLD AUTO: 9 % (ref 4–12)
NEUTROPHILS # BLD AUTO: 13.49 THOUSANDS/ΜL (ref 1.85–7.62)
NEUTS SEG NFR BLD AUTO: 83 % (ref 43–75)
NRBC BLD AUTO-RTO: 0 /100 WBCS
PHOSPHATE SERPL-MCNC: 2.8 MG/DL (ref 2.3–4.1)
PLATELET # BLD AUTO: 272 THOUSANDS/UL (ref 149–390)
PMV BLD AUTO: 11 FL (ref 8.9–12.7)
POTASSIUM SERPL-SCNC: 4.1 MMOL/L (ref 3.5–5.3)
RBC # BLD AUTO: 3.55 MILLION/UL (ref 3.81–5.12)
SODIUM SERPL-SCNC: 140 MMOL/L (ref 136–145)
UNIT DISPENSE STATUS: NORMAL
UNIT DISPENSE STATUS: NORMAL
UNIT PRODUCT CODE: NORMAL
UNIT PRODUCT CODE: NORMAL
UNIT RH: NORMAL
UNIT RH: NORMAL
WBC # BLD AUTO: 16.16 THOUSAND/UL (ref 4.31–10.16)

## 2021-01-21 PROCEDURE — C9113 INJ PANTOPRAZOLE SODIUM, VIA: HCPCS | Performed by: PHYSICIAN ASSISTANT

## 2021-01-21 PROCEDURE — 99024 POSTOP FOLLOW-UP VISIT: CPT | Performed by: SURGERY

## 2021-01-21 PROCEDURE — 84100 ASSAY OF PHOSPHORUS: CPT | Performed by: SURGERY

## 2021-01-21 PROCEDURE — 80048 BASIC METABOLIC PNL TOTAL CA: CPT | Performed by: SURGERY

## 2021-01-21 PROCEDURE — 97163 PT EVAL HIGH COMPLEX 45 MIN: CPT

## 2021-01-21 PROCEDURE — 83735 ASSAY OF MAGNESIUM: CPT | Performed by: SURGERY

## 2021-01-21 PROCEDURE — 99252 IP/OBS CONSLTJ NEW/EST SF 35: CPT | Performed by: ANESTHESIOLOGY

## 2021-01-21 PROCEDURE — 85025 COMPLETE CBC W/AUTO DIFF WBC: CPT | Performed by: SURGERY

## 2021-01-21 RX ORDER — METOCLOPRAMIDE HYDROCHLORIDE 5 MG/ML
10 INJECTION INTRAMUSCULAR; INTRAVENOUS EVERY 6 HOURS PRN
Status: DISCONTINUED | OUTPATIENT
Start: 2021-01-21 | End: 2021-01-26 | Stop reason: HOSPADM

## 2021-01-21 RX ORDER — MAGNESIUM SULFATE 1 G/100ML
1 INJECTION INTRAVENOUS ONCE
Status: COMPLETED | OUTPATIENT
Start: 2021-01-21 | End: 2021-01-22

## 2021-01-21 RX ORDER — PANTOPRAZOLE SODIUM 40 MG/1
40 INJECTION, POWDER, FOR SOLUTION INTRAVENOUS
Status: DISCONTINUED | OUTPATIENT
Start: 2021-01-21 | End: 2021-01-26

## 2021-01-21 RX ADMIN — HEPARIN SODIUM 5000 UNITS: 5000 INJECTION INTRAVENOUS; SUBCUTANEOUS at 22:36

## 2021-01-21 RX ADMIN — DEXTROSE, SODIUM CHLORIDE, AND POTASSIUM CHLORIDE 100 ML/HR: 5; .45; .15 INJECTION INTRAVENOUS at 19:12

## 2021-01-21 RX ADMIN — FLUTICASONE FUROATE AND VILANTEROL TRIFENATATE 1 PUFF: 100; 25 POWDER RESPIRATORY (INHALATION) at 08:21

## 2021-01-21 RX ADMIN — HEPARIN SODIUM 5000 UNITS: 5000 INJECTION INTRAVENOUS; SUBCUTANEOUS at 08:21

## 2021-01-21 RX ADMIN — PANTOPRAZOLE SODIUM 40 MG: 40 TABLET, DELAYED RELEASE ORAL at 05:10

## 2021-01-21 RX ADMIN — SODIUM CHLORIDE 1000 ML: 0.9 INJECTION, SOLUTION INTRAVENOUS at 09:29

## 2021-01-21 RX ADMIN — DEXTROSE, SODIUM CHLORIDE, AND POTASSIUM CHLORIDE 100 ML/HR: 5; .45; .15 INJECTION INTRAVENOUS at 09:37

## 2021-01-21 RX ADMIN — MAGNESIUM SULFATE HEPTAHYDRATE 1 G: 1 INJECTION, SOLUTION INTRAVENOUS at 11:34

## 2021-01-21 RX ADMIN — FENTANYL CITRATE: 50 INJECTION INTRAMUSCULAR; INTRAVENOUS at 14:17

## 2021-01-21 RX ADMIN — ONDANSETRON 4 MG: 2 INJECTION INTRAMUSCULAR; INTRAVENOUS at 05:11

## 2021-01-21 RX ADMIN — SODIUM CHLORIDE 500 ML: 0.9 INJECTION, SOLUTION INTRAVENOUS at 06:00

## 2021-01-21 RX ADMIN — PANTOPRAZOLE SODIUM 40 MG: 40 INJECTION, POWDER, FOR SOLUTION INTRAVENOUS at 09:29

## 2021-01-21 RX ADMIN — METOCLOPRAMIDE 10 MG: 5 INJECTION, SOLUTION INTRAMUSCULAR; INTRAVENOUS at 09:29

## 2021-01-21 NOTE — UTILIZATION REVIEW
Initial Clinical Review    Elective IP surgical procedure    Age/Sex: 72 y o  female     Surgery Date: 1/20/21    Procedure:   Preop Diagnosis:  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 ([facility] [address] ) [C7B 8]     Post-Op Diagnosis Codes:     * 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 ([facility] [address] ) [C7B 8]     Procedure(s) (LRB):  RESECTION SMALL BOWEL AND ANASTOMOSIS, RESECTION SMALL BOWEL NODULE (N/A)  LIVER ABLATION SEGMENT 7, INTRAOPERATIVE U/S OF LIVER (N/A)  LAPAROTOMY EXPLORATORY (N/A)    Anesthesia: General with Epidural     Operative Findings: Three primary site seen in the small bowel with associated mesenteric adenopathy  Two liver metastasis seen corresponding to the MRI and PET      POD#1 Progress Note:     Pt is nauseated this morning, some hypotension overnight, keep PCEA in place, pain well controlled  Teague draining dark vane urine,  No flatus, some clear liquids last evening  Will get IV Fluids bolus of 500 ML, monitor URO, continue clear liquids and monitor nausea, get OOB to chair at minimum  Continue IV fluids with KCl  Admission Orders: Date/Time/Statement:   Admission Orders (From admission, onward)     Ordered        01/20/21 1244  INPATIENT ADMISSION  Once                   Orders Placed This Encounter   Procedures    INPATIENT ADMISSION     Standing Status:   Standing     Number of Occurrences:   1     Order Specific Question:   Level of Care     Answer:   Level 2 Stepdown / HOT [14]     Order Specific Question:   Estimated length of stay     Answer:   More than 2 Midnights     Order Specific Question:   Certification     Answer:   I certify that inpatient services are medically necessary for this patient for a duration of greater than two midnights  See H&P and MD Progress Notes for additional information about the patient's course of treatment       Vital Signs: /51   Pulse 56   Temp 97 6 °F (36 4 °C)   Resp 20   Ht 5' 2" (1 575 m)   Wt 78 5 kg (173 lb)   SpO2 93%   BMI 31 64 kg/m²      Diet: clears     Mobility: OOB to chair     DVT Prophylaxis: SCDs, heparin Sq    Medications/Pain Control:     Scheduled Medications:  fluticasone-vilanterol, 1 puff, Inhalation, Daily  heparin (porcine), 5,000 Units, Subcutaneous, Q12H MASON  magnesium sulfate, 1 g, Intravenous, Once  pantoprazole, 40 mg, Intravenous, Q24H [address]      Continuous IV Infusions:  dextrose 5 % and sodium chloride 0 45 % with KCl 20 mEq/L, 100 mL/hr, Intravenous, Continuous  ropivacaine 0 1% and fentaNYL 2 mcg/mL, , Epidural, Continuous      PRN Meds:  acetaminophen, 650 mg, Oral, Q4H PRN  HYDROmorphone, 0 5 mg, Intravenous, Q2H PRN - x 2 1/20  Labetalol HCl, 10 mg, Intravenous, Q4H PRN  metoclopramide, 10 mg, Intravenous, Q6H PRN - x 1 1/21  Zofran 4 mg IV q 6 hr PRN - x 1 1/20, 1/21 and d/c       Network Utilization Review Department  ATTENTION: Please call with any questions or concerns to [phone] and carefully listen to the prompts so that you are directed to the right person  All voicemails are confidential   [place] all requests for admission clinical reviews, approved or denied determinations and any other requests to dedicated fax number below belonging to the campus where the patient is receiving treatment   List of dedicated fax numbers for the Facilities:  [address] DENIALS (Administrative/Medical Necessity) [phone]   [address] (Maternity/NICU/Pediatrics) [phone]   [address] [place] [address] 40 [phone] [facility] Dexter Cooper [address] ([address] [facility] "[facility]ha" 103) [address] [facility] [address] [place] [address]   Arnold Pappas [address]  [place] [id number] [phone]

## 2021-01-21 NOTE — PHYSICAL THERAPY NOTE
Physical Therapy Evaluation     Patient's Name: Jayne Sepulveda    Admitting Diagnosis  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 [facility]) [C7B 8]    Problem List  Patient Active Problem List   Diagnosis    Positional vertigo    Corn of toe    Focal nodular hyperplasia of liver    IPMN (intraductal papillary mucinous neoplasm)    Abnormal MRI of abdomen    Lymphadenopathy    Liver mass    Mediastinal adenopathy    Sarcoidosis, lung (HCC)    Granulomatous lymphadenitis    Edema of right lower extremity    Diastolic dysfunction    Complex tear of medial meniscus of right knee as current injury    Other tear of medial meniscus, current injury, right knee, initial encounter    Trigger middle finger of right hand    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 [facility])       Past Medical History  Past Medical History:   Diagnosis Date    Abdominal pain     Acute tonsillitis     Breast pain, left     Edema of both lower extremities     GERD without esophagitis     Kidney stone     " [place]"    Lesion of liver     Liver mass     Lymphadenopathy     Mediastinal lymphadenopathy     Neoplasm of digestive system     Orthostatic lightheadedness     Sarcoidosis     Vertigo        Past Surgical History  Past Surgical History:   Procedure Laterality Date    CARPAL TUNNEL RELEASE Bilateral     COLONOSCOPY      2017    IR BIOPSY LIVER MASS  11/6/2018    IR BIOPSY LIVER MASS  12/8/2020    KNEE ARTHROSCOPY Left     LAPAROTOMY N/A 1/20/2021    Procedure: LAPAROTOMY EXPLORATORY;  Surgeon: Carlie Huntley MD;  [facility];  Service: Surgical Oncology    LIVER LOBECTOMY N/A 1/20/2021    Procedure: LIVER ABLATION SEGMENT 7, INTRAOPERATIVE U/S OF LIVER;  Surgeon: Carlie Huntley MD;  Location[facility] OR;  Service: Surgical Oncology    MEDIASTINOSCOPY N/A 11/27/2018    Procedure: MEDIASTINOSCOPY;  Surgeon: Jocy Hammodn MD;  Location: [facility] MA[facility] OR;  Service: Thoracic    WI BRONCHOSCOPY NEEDLE BX TRACHEA MAIN STEM&/BRON N/A 11/27/2018    Procedure: EBUS with biopsy;  Surgeon: Mara Arevalo MD;  Location: [facility] MA[facility] OR;  Service: Thoracic    [place] [address] N/A 11/27/2018    Procedure: Shanae Gone;  Surgeon: Mara Arevalo MD;  Location: [facility];  Service: Thoracic    TN EDG US EXAM SURGICAL ALTER STOM DUODENUM/JEJUNUM N/A 10/29/2018    Procedure: LINEAR ENDOSCOPIC U/S;  Surgeon: Nereida Cogan, MD;  Location: [facility] GI LAB; Service: Gastroenterology    SMALL INTESTINE SURGERY N/A 1/20/2021    Procedure: RESECTION SMALL BOWEL AND ANASTOMOSIS, RESECTION SMALL BOWEL NODULE;  Surgeon: Otto Braga MD;  Location: [facility];  Service: Surgical Oncology    WISDOM TOOTH EXTRACTION          01/21/21 0934   PT Last Visit   PT Visit Date 01/21/21   Note Type   Note type Evaluation   Pain Assessment   Pain Assessment Tool 0-10   Pain Score 3   Pain Location/Orientation Location: Abdomen   Home Living   Type of [address] [place] [address] One level; Laundry in basement;Stairs to enter with rails   Bathroom Toilet Raised   Home Equipment Cane;Crutches   Additional Comments Pt resides in [facility] w/ 1 JANEE and FF to laundry in basement  Pt reports ambulating w/out AD PTA   Prior Function   Level of [place] Independent with ADLs and functional mobility   Lives With Spouse   Receives Help From Family   ADL Assistance Independent   IADLs Independent   Falls in the last 6 months 0   Restrictions/Precautions   Weight Bearing Precautions Per Order No   Other Precautions Multiple lines; Fall Risk;Pain  (epidural)   General   Family/Caregiver Present No   Cognition   Overall Cognitive Status WFL   Arousal/Participation Alert   Orientation Level Oriented X4   Memory Within functional limits   Following Commands Follows all commands and directions without difficulty   Comments Pt pleasant and cooperative to work w/ therapy   RLE Assessment   RLE Assessment WFL   LLE Assessment   LLE Assessment [facility] Coordination   Movements are Fluid and Coordinated 1   Bed Mobility   Supine to Sit 5  Supervision   Additional items HOB elevated; Increased time required   Sit to Supine Unable to assess   Additional Comments Pt lying supine upon PT arrival  Pt returned seated OOB at end of session w/ all needs within reach   Transfers   Sit to Stand 5  Supervision   Additional items Increased time required   Stand to Sit 5  Supervision   Additional items Increased time required   Additional Comments Transfers w/ no AD   Ambulation/Elevation   Gait pattern Excessively slow; Short stride; Inconsistent amilcar   Gait Assistance   (CGA)   Additional items Assist x 1;Verbal cues   Assistive Device None   Distance 3ft to chair  (limited 2' hypotension and nausea)   Stair Management Assistance Not tested   Balance   Static Sitting Fair +   Dynamic Sitting Fair   Static Standing Fair   Dynamic Standing Fair -   Ambulatory Fair -   Endurance Deficit   Endurance Deficit Yes   Endurance Deficit Description hypotension, nausea   Activity Tolerance   Activity Tolerance Patient limited by fatigue; Other (Comment)  (hypotension, nausea)   Medical Staff Made Aware CM   Nurse Made Aware yes   Assessment   Prognosis Good   Problem List Decreased strength;Decreased endurance; Impaired balance;Decreased mobility;Pain   Assessment Pt is 72 y o  female seen for PT evaluation s/p admit to [facility] on 1/20/2021 w/ 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 [facility])  Pt is POD #1 RESECTION SMALL BOWEL AND ANASTOMOSIS, RESECTION SMALL BOWEL NODULE (N/A), LIVER ABLATION SEGMENT 7, INTRAOPERATIVE U/S OF LIVER (N/A), LAPAROTOMY EXPLORATORY (N/A)  PT consulted to assess pt's functional mobility and d/c needs  Order placed for PT eval and tx, w/ ambulate patient order  Comorbidities affecting pt's physical performance at time of assessment include: metastatic malignant neuroendocrine tumor of liver, positional vertigo, liver mass, RLE edema   PTA, pt was independent w/ all functional mobility w/ no AD and lives w/ spouse in one level house w/ 1 JANEE and FF to laundry in basement  Pt reports being IND w/ all ADLs  Personal factors affecting pt at time of IE include: inaccessible home environment, stairs to enter home, inability to navigate community distances and inability to perform IADLs  Please find objective findings from PT assessment regarding body systems outlined above with impairments and limitations including weakness, impaired balance, decreased endurance, gait deviations, pain, decreased activity tolerance, decreased functional mobility tolerance and fall risk  Pt performed bed mobility w/ HOB elevated at SUP  Pt performed STS at SUP and ambulated 3ft to chair w/out AD at [facility]- limited 2' hypotension and nausea  The following objective measures performed on IE also reveal limitations: AM-PAC 6-Clicks: 18/32  Pt's clinical presentation is currently unstable/unpredictable seen in pt's presentation of recent admission for 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 requiring medical attention, recent decline in function as compared to baseline, multiple lines, fall risk, pain  Pt to benefit from continued PT tx to address deficits as defined above and maximize level of functional independent mobility and consistency  From PT/mobility standpoint, recommendation at time of d/c would be home w/ increased support pending progress in order to facilitate return to PLOF  Barriers to Discharge Inaccessible home environment   Goals   Patient Goals to feel better   [facility] Expiration Date 02/04/21   Short Term Goal #1 1  Pt will demonstrate the ability to perform all aspects of bed mobility at Mod I in order to maximize functional independence and decrease burden on caregivers  2 Pt will demonstrate the ability to perform all functional transfers at Mod I in order to maximize functional independence and decrease burden on caregivers   3 Pt will demonstrate the ability to ambulate at least 150ft with least restrictive device at Mod I in order to maximize functional independence  4 Pt will demonstrate the ability to negotiate 12 steps at Mod I in order to return to household/community mobility  5 Pt will demonstrate improved balance by one grade in order to decrease risk of falls  6 Pt will increase b/l LE strength by 1 grade in order to increase ease of functional mobility and transfers   PT Treatment Day 0   Plan   Treatment/Interventions Functional transfer training;LE strengthening/ROM; Elevations; Therapeutic exercise; Endurance training;Patient/family training;Equipment eval/education; Bed mobility;Gait training;Spoke to nursing   PT Frequency   (3-5x/week)   Recommendation   PT Discharge Recommendation Return to previous environment with social support   PT - OK to Discharge No   Additional Comments more ambulation   AM-PAC Basic Mobility Inpatient   Turning in Bed Without Bedrails 4   Lying on Back to Sitting on Edge of Flat Bed 2   Moving Bed to Chair 3   Standing Up From Chair 3   Walk in Room 3   Climb 3-5 Stairs 3   Basic Mobility Inpatient Raw Score 18   Basic Mobility Standardized Score 41 05   Modified Amador Scale   Modified Marcio Scale 3     Delvis Stearns, PT, DPT

## 2021-01-21 NOTE — CASE MANAGEMENT
Met with pt, explained CM program/CM role  LOS-1  Bundle-no  Unplanned readmission color-green  30 day readmission-no  Resides with , ranch home, 1 JANEE  I PTA for ADL's/ambulation, drives, employed  PCP Dr Renetta Orozco  Pharmacy-[facility] Shira Mccormack  Denies HHC/DME/IP Rehab  Denies MH illness, D&A abuse  Primary contact is  [phone]  Has POA-, LW, states provided on admission   will transport home    CM reviewed d/c planning process including the following: identifying help at home, patient preference for d/c planning needs, Discharge Lounge, [facility] Meds to Bed program, availability of treatment team to discuss questions or concerns patient and/or family may have regarding understanding medications and recognizing signs and symptoms once discharged  CM also encouraged patient to follow up with all recommended appointments after discharge  Patient advised of importance for patient and family to participate in managing patients medical well being  Patient/caregiver received discharge checklist  Content reviewed  Patient/caregiver encouraged to participate in discharge plan of care prior to discharge home

## 2021-01-21 NOTE — PROGRESS NOTES
Patient ask to see nurse stating her abdomin felt "different"  Her abdomin is soft, non distended, pt states she is not nauseated, however she is not passing any gas as of yet  Teague checked, draining well  Will continue to monitor

## 2021-01-21 NOTE — QUICK NOTE
Patient responded temporarily with the 500 ml bolus  Patient still nauseated and Zofran not helping  Reglan ordered for the nausea    1 Liter bolus of NSS ordered since urine still very dark and concentrated  IV fluids running at 100 mls/hr    Will watch patient, if no response to bolus will check with anesthesia about epidural rate

## 2021-01-21 NOTE — PLAN OF CARE
Problem: PHYSICAL THERAPY ADULT  Goal: Performs mobility at highest level of function for planned discharge setting  See evaluation for individualized goals  Description: Treatment/Interventions: Functional transfer training, LE strengthening/ROM, Elevations, Therapeutic exercise, Endurance training, Patient/family training, Equipment eval/education, Bed mobility, Gait training, Spoke to nursing          See flowsheet documentation for full assessment, interventions and recommendations  Note: Prognosis: Good  Problem List: Decreased strength, Decreased endurance, Impaired balance, Decreased mobility, Pain  Assessment: Pt is 72 y o  female seen for PT evaluation s/p admit to [facility] on 1/20/2021 w/ 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 [facility])  Pt is POD #1 RESECTION SMALL BOWEL AND ANASTOMOSIS, RESECTION SMALL BOWEL NODULE (N/A), LIVER ABLATION SEGMENT 7, INTRAOPERATIVE U/S OF LIVER (N/A), LAPAROTOMY EXPLORATORY (N/A)  PT consulted to assess pt's functional mobility and d/c needs  Order placed for PT eval and tx, w/ ambulate patient order  Comorbidities affecting pt's physical performance at time of assessment include: metastatic malignant neuroendocrine tumor of liver, positional vertigo, liver mass, RLE edema  PTA, pt was independent w/ all functional mobility w/ no AD and lives w/ spouse in one level house w/ 1 JANEE and FF to laundry in basement  Pt reports being IND w/ all ADLs  Personal factors affecting pt at time of IE include: inaccessible home environment, stairs to enter home, inability to navigate community distances and inability to perform IADLs  Please find objective findings from PT assessment regarding body systems outlined above with impairments and limitations including weakness, impaired balance, decreased endurance, gait deviations, pain, decreased activity tolerance, decreased functional mobility tolerance and fall risk   Pt performed bed mobility w/ HOB elevated at SUP  Pt performed STS at SUP and ambulated 3ft to chair w/out AD at [facility]- limited 2' hypotension and nausea  The following objective measures performed on IE also reveal limitations: AM-PAC 6-Clicks: 14/93  Pt's clinical presentation is currently unstable/unpredictable seen in pt's presentation of recent admission for 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 requiring medical attention, recent decline in function as compared to baseline, multiple lines, fall risk, pain  Pt to benefit from continued PT tx to address deficits as defined above and maximize level of functional independent mobility and consistency  From PT/mobility standpoint, recommendation at time of d/c would be home w/ increased support pending progress in order to facilitate return to PLOF  Barriers to Discharge: Inaccessible home environment     PT Discharge Recommendation: Return to previous environment with social support     PT - OK to Discharge: No    See flowsheet documentation for full assessment

## 2021-01-21 NOTE — CONSULTS
Consultation - Acute Pain Service   Author Jane 72 y o  female MRN: [id number]  Unit/Bed#: [facility] 915-01 Encounter: [id number]               Assessment/Plan     Assessment:   Patient Active Problem List   Diagnosis    Positional vertigo    Corn of toe    Focal nodular hyperplasia of liver    IPMN (intraductal papillary mucinous neoplasm)    Abnormal MRI of abdomen    Lymphadenopathy    Liver mass    Mediastinal adenopathy    Sarcoidosis, lung (HCC)    Granulomatous lymphadenitis    Edema of right lower extremity    Diastolic dysfunction    Complex tear of medial meniscus of right knee as current injury    Other tear of medial meniscus, current injury, right knee, initial encounter    Trigger middle finger of right hand    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)      71 yo female s/p small bowel resection and ablation of segment 7 and 8 liver lesions  Epidural placed 1/20/21  Site is fine  Patient doing well  Plan:   1  Continue epidural at current rate  2  Dilaudid 0 5 mg q2h PRN breakthrough pain  3  Await return of bowel function  APS will continue to follow; please contact APS ( btn [phone]) with any further questions    History of Present Illness    Admit Date:  1/20/2021  Hospital Day:  1 day  Primary Service:  Oncology-Surgical  Attending Provider:  Heath Machado MD  Reason for Consult / Principal Problem: sandrita-operative epidural placement  HPI: Author Jane is a 72y o  year old female who presents with metastatic well-differentiated carcinoid tumor  She was taken to the OR for the above surgery  She had an epidural placed for post operative pain control  She has a history of sarcoidosis  No opioid use history      Current pain location(s): abdomen  Pain Scale:   5/10  Quality: burning  Current Analgesic regimen:  Acetaminophen, epidural infusion, dilaudid    Pain History: none   Pain Management Provider:  n/a    I have reviewed the patient's controlled substance dispensing history in the Prescription Drug Monitoring Program in compliance with the [facility] regulations before prescribing any controlled substances  Inpatient consult to Acute Pain Service  Consult performed by: Nitza Russell MD  Consult ordered by: Talita Parmar MD          Review of Systems   Constitutional: Negative  HENT: Negative  Eyes: Negative  Psychiatric/Behavioral: Negative          Historical Information   Past Medical History:   Diagnosis Date    Abdominal pain     Acute tonsillitis     Breast pain, left     Edema of both lower extremities     GERD without esophagitis     Kidney stone     " [place]"    Lesion of liver     Liver mass     Lymphadenopathy     Mediastinal lymphadenopathy     Neoplasm of digestive system     Orthostatic lightheadedness     Sarcoidosis     Vertigo      Past Surgical History:   Procedure Laterality Date    CARPAL TUNNEL RELEASE Bilateral     COLONOSCOPY      2017    IR BIOPSY LIVER MASS  11/6/2018    IR BIOPSY LIVER MASS  12/8/2020    KNEE ARTHROSCOPY Left     LAPAROTOMY N/A 1/20/2021    Procedure: LAPAROTOMY EXPLORATORY;  Surgeon: Claudia Mena MD;  Location: [facility] MAIN OR;  Service: Surgical Oncology    LIVER LOBECTOMY N/A 1/20/2021    Procedure: LIVER ABLATION SEGMENT 7, INTRAOPERATIVE U/S OF LIVER;  Surgeon: Claudia Mena MD;  Location: [facility] OR;  Service: Surgical Oncology    MEDIASTINOSCOPY N/A 11/27/2018    Procedure: MEDIASTINOSCOPY;  Surgeon: Suma Dumont MD;  Location: [facility] MAIN OR;  Service: Thoracic    [place] BRONCHOSCOPY NEEDLE BX TRACHEA MAIN STEM&/BRON N/A 11/27/2018    Procedure: EBUS with biopsy;  Surgeon: Suma Dumont MD;  Location: [facility] MAIN OR;  Service: Thoracic    [place] [address] N/A 11/27/2018    Procedure: BRONCHOSCOPY FLEXIBLE;  Surgeon: Suma Dumont MD;  Location: [facility] MA[facility] OR;  Service: Thoracic    [place] [address] N/A 10/29/2018    Procedure: LINEAR ENDOSCOPIC U/S;  Surgeon: Sarika Zarate MD;  Location: [facility] GI LAB; Service: Gastroenterology    SMALL INTESTINE SURGERY N/A 1/20/2021    Procedure: RESECTION SMALL BOWEL AND ANASTOMOSIS, RESECTION SMALL BOWEL NODULE;  Surgeon: Sergio Soto MD;  Location: [facility] OR;  Service: Surgical Oncology    WISDOM TOOTH EXTRACTION       Social History   Social History     Substance and Sexual Activity   Alcohol Use Yes    Frequency: Monthly or less     Social History     Substance and Sexual Activity   Drug Use No     Social History     Tobacco Use   Smoking Status Never Smoker   Smokeless Tobacco Never Used     Family History: non-contributory    Meds/Allergies   all current active meds have been reviewed    Allergies   Allergen Reactions    Bactrim [Sulfamethoxazole-Trimethoprim] Hives    Clam Shell Vomiting       Objective   Temp:  [97 °F (36 1 °C)-98 1 °F (36 7 °C)] 97 6 °F (36 4 °C)  HR:  [56-67] 56  Resp:  [14-20] 20  BP: ()/(48-65) 100/51  Arterial Line BP: (132)/(64) 132/64    Intake/Output Summary (Last 24 hours) at 1/21/2021 1342  Last data filed at 1/21/2021 1323  Gross per 24 hour   Intake 2817 6 ml   Output 1025 ml   Net 1792 6 ml       Physical Exam  Constitutional:       Appearance: Normal appearance  HENT:      Mouth/Throat:      Mouth: Mucous membranes are moist    Pulmonary:      Effort: Pulmonary effort is normal    Skin:     General: Skin is warm  Neurological:      General: No focal deficit present  Mental Status: She is alert  Psychiatric:         Mood and Affect: Mood normal          Behavior: Behavior normal          Thought Content: Thought content normal          Judgment: Judgment normal          Lab Results: I have personally reviewed pertinent labs  Imaging Studies: I have personally reviewed pertinent reports  EKG, Pathology, and Other Studies: I have personally reviewed pertinent reports        Counseling / Coordination of Care  Total floor / unit time spent today Level 2 = 40 minutes  Greater than 50% of total time was spent with the patient and / or family counseling and / or coordination of care  A description of the counseling / coordination of care: Expectations of post operative pain      Chin Nguyen MD

## 2021-01-21 NOTE — PROGRESS NOTES
Progress Note - Surgical Oncology  Shantal oHrta 72 y o  female MRN: [id number]  Unit/Bed#: [facility] 915-01 Encounter: [id number]      Objective:  Patient complaining of being nauseated this morning  Did have episodes of hypotension  Epidural catheter is in place and pain is controlled  Teague is draining dark vane urine  No tachycardia  Patient states she tolerated some clear liquids last evening but is nauseated this morning  No flatus as of yet  [address]    Blood pressure 95/51, pulse 61, temperature 98 °F (36 7 °C), resp  rate 18, height 5' 2" (1 575 m), weight 78 5 kg (173 lb), SpO2 98 %  ,Body mass index is 31 64 kg/m²  Intake/Output Summary (Last 24 hours) at 1/21/2021 0530  Last data filed at 1/21/2021 0518  Gross per 24 hour   Intake 2187 6 ml   Output 895 ml   Net 1292 6 ml       Invasive Devices     Peripheral Intravenous Line            Peripheral IV 01/20/21 Left Hand less than 1 day    Peripheral IV 01/20/21 Left Wrist less than 1 day          Epidural Line            Epidural Catheter 01/20/21 less than 1 day          Drain            Urethral Catheter Latex 16 Fr  less than 1 day                Physical Exam:   Alert, orientated x3, pleasant  Abdomen:  Appears slightly distended, midline incision is clean and dry with subcuticular stitching, no bowel sounds audible, incisional tenderness throughout midline incision  Extremities: There is no calf tenderness, no pedal edema, denies are on  Teague catheter:  Dark vane urine    Lab, Imaging and other studies:  Pending  VTE Pharmacologic Prophylaxis: Heparin  VTE Mechanical Prophylaxis: sequential compression device    Assessment:  POD # 1 exploratory laparotomy, small-bowel resection, ablation of segment 7 and 8 of the liver    Plan:   1  Will bolus patient for her hypotension and low urine output which is concentrated with 500 mL of normal saline  2  Monitor urine output this morning  3  Continue epidural catheter for pain management  4  Would leave the Teague today for urine outputs  5  PT OT  6  Would continue just on clears since patient nauseated and abdomen distended with very little bowel sounds  7  Check a m  Labs  8   Will have patient out of bed today

## 2021-01-22 LAB
ANION GAP SERPL CALCULATED.3IONS-SCNC: 1 MMOL/L (ref 4–13)
BASOPHILS # BLD AUTO: 0.06 THOUSANDS/ΜL (ref 0–0.1)
BASOPHILS NFR BLD AUTO: 1 % (ref 0–1)
BUN SERPL-MCNC: 16 MG/DL (ref 5–25)
CALCIUM SERPL-MCNC: 8.3 MG/DL (ref 8.3–10.1)
CHLORIDE SERPL-SCNC: 113 MMOL/L (ref 100–108)
CO2 SERPL-SCNC: 26 MMOL/L (ref 21–32)
CREAT SERPL-MCNC: 0.75 MG/DL (ref 0.6–1.3)
EOSINOPHIL # BLD AUTO: 0 THOUSAND/ΜL (ref 0–0.61)
EOSINOPHIL NFR BLD AUTO: 0 % (ref 0–6)
ERYTHROCYTE [DISTWIDTH] IN BLOOD BY AUTOMATED COUNT: 14.2 % (ref 11.6–15.1)
GFR SERPL CREATININE-BSD FRML MDRD: 82 ML/MIN/1.73SQ M
GLUCOSE SERPL-MCNC: 115 MG/DL (ref 65–140)
HCT VFR BLD AUTO: 32.9 % (ref 34.8–46.1)
HGB BLD-MCNC: 9.7 G/DL (ref 11.5–15.4)
IMM GRANULOCYTES # BLD AUTO: 0.04 THOUSAND/UL (ref 0–0.2)
IMM GRANULOCYTES NFR BLD AUTO: 0 % (ref 0–2)
LYMPHOCYTES # BLD AUTO: 1.73 THOUSANDS/ΜL (ref 0.6–4.47)
LYMPHOCYTES NFR BLD AUTO: 14 % (ref 14–44)
MAGNESIUM SERPL-MCNC: 2.2 MG/DL (ref 1.6–2.6)
MCH RBC QN AUTO: 27.6 PG (ref 26.8–34.3)
MCHC RBC AUTO-ENTMCNC: 29.5 G/DL (ref 31.4–37.4)
MCV RBC AUTO: 94 FL (ref 82–98)
MONOCYTES # BLD AUTO: 1.21 THOUSAND/ΜL (ref 0.17–1.22)
MONOCYTES NFR BLD AUTO: 10 % (ref 4–12)
NEUTROPHILS # BLD AUTO: 9.53 THOUSANDS/ΜL (ref 1.85–7.62)
NEUTS SEG NFR BLD AUTO: 75 % (ref 43–75)
NRBC BLD AUTO-RTO: 0 /100 WBCS
PLATELET # BLD AUTO: 242 THOUSANDS/UL (ref 149–390)
PMV BLD AUTO: 11.3 FL (ref 8.9–12.7)
POTASSIUM SERPL-SCNC: 4.4 MMOL/L (ref 3.5–5.3)
RBC # BLD AUTO: 3.52 MILLION/UL (ref 3.81–5.12)
SODIUM SERPL-SCNC: 140 MMOL/L (ref 136–145)
WBC # BLD AUTO: 12.57 THOUSAND/UL (ref 4.31–10.16)

## 2021-01-22 PROCEDURE — 99024 POSTOP FOLLOW-UP VISIT: CPT | Performed by: SURGERY

## 2021-01-22 PROCEDURE — 83735 ASSAY OF MAGNESIUM: CPT | Performed by: PHYSICIAN ASSISTANT

## 2021-01-22 PROCEDURE — 80048 BASIC METABOLIC PNL TOTAL CA: CPT | Performed by: PHYSICIAN ASSISTANT

## 2021-01-22 PROCEDURE — C9113 INJ PANTOPRAZOLE SODIUM, VIA: HCPCS | Performed by: PHYSICIAN ASSISTANT

## 2021-01-22 PROCEDURE — 85025 COMPLETE CBC W/AUTO DIFF WBC: CPT | Performed by: PHYSICIAN ASSISTANT

## 2021-01-22 PROCEDURE — 99232 SBSQ HOSP IP/OBS MODERATE 35: CPT | Performed by: ANESTHESIOLOGY

## 2021-01-22 PROCEDURE — 97167 OT EVAL HIGH COMPLEX 60 MIN: CPT

## 2021-01-22 RX ADMIN — HYDROMORPHONE HYDROCHLORIDE 0.5 MG: 1 INJECTION, SOLUTION INTRAMUSCULAR; INTRAVENOUS; SUBCUTANEOUS at 09:07

## 2021-01-22 RX ADMIN — FLUTICASONE FUROATE AND VILANTEROL TRIFENATATE 1 PUFF: 100; 25 POWDER RESPIRATORY (INHALATION) at 09:08

## 2021-01-22 RX ADMIN — PANTOPRAZOLE SODIUM 40 MG: 40 INJECTION, POWDER, FOR SOLUTION INTRAVENOUS at 09:07

## 2021-01-22 RX ADMIN — DEXTROSE, SODIUM CHLORIDE, AND POTASSIUM CHLORIDE 100 ML/HR: 5; .45; .15 INJECTION INTRAVENOUS at 05:16

## 2021-01-22 RX ADMIN — FENTANYL CITRATE: 50 INJECTION INTRAMUSCULAR; INTRAVENOUS at 14:11

## 2021-01-22 RX ADMIN — HEPARIN SODIUM 5000 UNITS: 5000 INJECTION INTRAVENOUS; SUBCUTANEOUS at 22:08

## 2021-01-22 RX ADMIN — HEPARIN SODIUM 5000 UNITS: 5000 INJECTION INTRAVENOUS; SUBCUTANEOUS at 09:08

## 2021-01-22 NOTE — PLAN OF CARE
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 [facility])  She underwent small bowel resection, intraoperative ultrasound and ablation of liver mets on 1/20/21  Now cleared for OOB and OT evaluation  Pt have multiple lines, including IV's, epidural and mak, O2  Comorbidities affecting pt's functional performance at time of assessment include: lymphademopathy, liver mass, mediastinal adenopathy, sarcoidosis lung  Personal factors affecting pt at time of IE include:steps to enter environment, difficulty performing ADLS and difficulty performing IADLS   Prior to admission, pt was living at home w her spouse in a one story home w basement laundry  She reports being fully independent w all self care, mobility, driving etc  Reports good family support  Upon evaluation: Pt requires min assist self care and mobility 2* the following deficits impacting occupational performance: decreased balance and decreased tolerance  Pt to benefit from continued skilled OT tx while in the hospital to address deficits as defined above and maximize level of functional independence w ADL's and functional mobility  Occupational Performance areas to address include: grooming, bathing/shower, toilet hygiene, dressing, functional mobility and clothing management  Pt scored  45 /100 on the barthel index  Based on findings from OT evaluation and functional performance deficits, pt has been identified as a  High complexity evaluation   From OT standpoint, recommendation at time of d/c would be home with family support      OT Discharge Recommendation: Home with skilled therapy  OT - OK to Discharge:  Yes

## 2021-01-22 NOTE — PLAN OF CARE
Problem: PAIN - ADULT  Goal: Verbalizes/displays adequate comfort level or baseline comfort level  Description: Interventions:  - Encourage patient to monitor pain and request assistance  - Assess pain using appropriate pain scale  - Administer analgesics based on type and severity of pain and evaluate response  - Implement non-pharmacological measures as appropriate and evaluate response  - Consider cultural and social influences on pain and pain management  - Notify physician/advanced practitioner if interventions unsuccessful or patient reports new pain  Outcome: Progressing     Problem: INFECTION - ADULT  Goal: Absence or prevention of progression during hospitalization  Description: INTERVENTIONS:  - Assess and monitor for signs and symptoms of infection  - Monitor lab/diagnostic results  - Monitor all insertion sites, i e  indwelling lines, tubes, and drains  - Monitor endotracheal if appropriate and nasal secretions for changes in amount and color  - [place] appropriate cooling/warming therapies per order  - Administer medications as ordered  - Instruct and encourage patient and family to use good hand hygiene technique  - Identify and instruct in appropriate isolation precautions for identified infection/condition  Outcome: Progressing  Goal: Absence of fever/infection during neutropenic period  Description: INTERVENTIONS:  - Monitor WBC    Outcome: Progressing     Problem: SAFETY ADULT  Goal: Patient will remain free of falls  Description: INTERVENTIONS:  - Assess patient frequently for physical needs  -  Identify cognitive and physical deficits and behaviors that affect risk of falls    -  [place] fall precautions as indicated by assessment   - Educate patient/family on patient safety including physical limitations  - Instruct patient to call for assistance with activity based on assessment  - Modify environment to reduce risk of injury  - Consider OT/PT consult to assist with strengthening/mobility  Outcome: Progressing  Goal: Maintain or return to baseline ADL function  Description: INTERVENTIONS:  -  Assess patient's ability to carry out ADLs; assess patient's baseline for ADL function and identify physical deficits which impact ability to perform ADLs (bathing, care of mouth/teeth, toileting, grooming, dressing, etc )  - Assess/evaluate cause of self-care deficits   - Assess range of motion  - Assess patient's mobility; develop plan if impaired  - Assess patient's need for assistive devices and provide as appropriate  - Encourage maximum independence but intervene and supervise when necessary  - Involve family in performance of ADLs  - Assess for home care needs following discharge   - Consider OT consult to assist with ADL evaluation and planning for discharge  - Provide patient education as appropriate  Outcome: Progressing  Goal: Maintain or return mobility status to optimal level  Description: INTERVENTIONS:  - Assess patient's baseline mobility status (ambulation, transfers, stairs, etc )    - Identify cognitive and physical deficits and behaviors that affect mobility  - Identify mobility aids required to assist with transfers and/or ambulation (gait belt, sit-to-stand, lift, walker, cane, etc )  - [place] fall precautions as indicated by assessment  - Record patient progress and toleration of activity level on Mobility SBAR; progress patient to next Phase/Stage  - Instruct patient to call for assistance with activity based on assessment  - Consider rehabilitation consult to assist with strengthening/weightbearing, etc   Outcome: Progressing     Problem: DISCHARGE PLANNING  Goal: Discharge to home or other facility with appropriate resources  Description: INTERVENTIONS:  - Identify barriers to discharge w/patient and caregiver  - Arrange for needed discharge resources and transportation as appropriate  - Identify discharge learning needs (meds, wound care, etc )  - Arrange for interpretive services to assist at discharge as needed  - Refer to Case Management Department for coordinating discharge planning if the patient needs post-hospital services based on physician/advanced practitioner order or complex needs related to functional status, cognitive ability, or social support system  Outcome: Progressing     Problem: Knowledge Deficit  Goal: Patient/family/caregiver demonstrates understanding of disease process, treatment plan, medications, and discharge instructions  Description: Complete learning assessment and assess knowledge base  Interventions:  - Provide teaching at level of understanding  - Provide teaching via preferred learning methods  Outcome: Progressing     Problem: Potential for Falls  Goal: Patient will remain free of falls  Description: INTERVENTIONS:  - Assess patient frequently for physical needs  -  Identify cognitive and physical deficits and behaviors that affect risk of falls    -  [place] fall precautions as indicated by assessment   - Educate patient/family on patient safety including physical limitations  - Instruct patient to call for assistance with activity based on assessment  - Modify environment to reduce risk of injury  - Consider OT/PT consult to assist with strengthening/mobility  Outcome: Progressing

## 2021-01-22 NOTE — PROGRESS NOTES
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 status post small bowel resection, intraoperative ultrasound and ablation of liver Mets x2 on 1/20  Vss  Afebrile  abd is soft, nontender, moderate distension  UOP: 1700cc  No flatus, no bowel sounds  Plan:  Continue clear liquid diet, add toast and crackers  MIVF @ 100cc/h  Ambulate  D/c mak  Continue epidural analgesia  Continue dvt ppx    Subjective:   Denied any nausea or vomiting  Denied passing flatus  Denied abd pain  Denied chest pain or shortness of breath  Objective:     Vitals: Blood pressure 120/59, pulse 65, temperature 98 7 °F (37 1 °C), resp  rate 18, height 5' 2" (1 575 m), weight 78 5 kg (173 lb), SpO2 98 %  ,Body mass index is 31 64 kg/m²  Intake/Output Summary (Last 24 hours) at 1/22/2021 0454  Last data filed at 1/22/2021 0134  Gross per 24 hour   Intake 4402 82 ml   Output 1775 ml   Net 2627 82 ml     Physical Exam  General: NAD  HEENT: NC/AT  MMM  Cv: RRR     Lungs: normal effort  Ab: Soft, NT/ND  Ex: no CCE  Neuro: AAOx3    Scheduled Meds:  Current Facility-Administered Medications   Medication Dose Route Frequency Provider Last Rate    acetaminophen  650 mg Oral Q4H PRN Byron Gomez MD      dextrose 5 % and sodium chloride 0 45 % with KCl 20 mEq/L  100 mL/hr Intravenous Continuous Herrera Hernandez  mL/hr (01/21/21 1912)    fluticasone-vilanterol  1 puff Inhalation Daily Herrera Hernandez MD      heparin (porcine)  5,000 Units Subcutaneous Q12H [facility] Claud[facility]      HYDROmorphone  0 5 mg Intravenous Q2H PRN aRshi Reyes MD      Labetalol HCl  10 mg Intravenous Q4H PRN Byron Gomez MD      metoclopramide  10 mg Intravenous Q6H PRN Mariya Hyman PA-C      pantoprazole  40 mg Intravenous Q24H [address] Mariya EDMUND Hyman      ropivacaine 0 1% and fentaNYL 2 mcg/mL   Epidural Continuous Lidia Arreola MD       Continuous Infusions:dextrose 5 % and sodium chloride 0 45 % with KCl 20 mEq/L, 100 mL/hr, Last Rate: 100 mL/hr (01/21/21 1912)  ropivacaine 0 1% and fentaNYL 2 mcg/mL,       PRN Meds:   acetaminophen    HYDROmorphone    Labetalol HCl    metoclopramide      Invasive Devices     Peripheral Intravenous Line            Peripheral IV 01/20/21 Left Hand 1 day    Peripheral IV 01/20/21 Left Wrist 1 day          Epidural Line            Epidural Catheter 01/20/21 1 day          Drain            Urethral Catheter Latex 16 Fr  1 day                Lab, Imaging and other studies: I have personally reviewed pertinent reports      VTE Pharmacologic Prophylaxis: Sequential compression device (Venodyne)   VTE Mechanical Prophylaxis: sequential compression device

## 2021-01-22 NOTE — PROGRESS NOTES
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 [facility])      Praful Nieves is a 72y o  year old female with neuroendocrine tumor POD 2 small bowel resection and liver ablation with epidural for postop pain control  Her pain has been well controlled for most of today and she has been out of bed in her chair but recently started to become uncomfortable with burning sensations in her abdomen  She has been pushing for demand boluses and has received some relief  Plan:  Analgesia:  1) PCEA increased to 10/5/10/4, encourage demand boluses as needed    Pain History  Current pain location(s): Abdomen  Pain Scale:   2-7/10  Quality: Burning  24 hour history: See above      Meds/Allergies     Allergies   Allergen Reactions    Bactrim [Sulfamethoxazole-Trimethoprim] Hives    Clam Shell Vomiting       Objective     Temp:  [98 2 °F (36 8 °C)-98 8 °F (37 1 °C)] 98 2 °F (36 8 °C)  HR:  [60-71] 62  Resp:  [17-20] 17  BP: (104-121)/(56-61) 121/59    Physical Exam  Vitals signs and nursing note reviewed  Constitutional:       Appearance: Normal appearance  She is normal weight  HENT:      Head: Normocephalic and atraumatic  Neck:      Musculoskeletal: Normal range of motion and neck supple  Cardiovascular:      Rate and Rhythm: Normal rate and regular rhythm  Pulses: Normal pulses  Heart sounds: Normal heart sounds  Pulmonary:      Effort: Pulmonary effort is normal       Breath sounds: Normal breath sounds  Abdominal:      General: Abdomen is flat  Palpations: Abdomen is soft  Comments: Postoperative tenderness to palpation   Musculoskeletal: Normal range of motion  Skin:     General: Skin is warm and dry  Neurological:      General: No focal deficit present        Mental Status: She is alert and oriented to person, place, and time  Mental status is at baseline  Epidural: Catheter in good position, not tender to palpation, site clean    Lab Results:   Results from last 7 days   Lab Units 01/22/21  0507   WBC Thousand/uL 12 57*   HEMOGLOBIN g/dL 9 7*   HEMATOCRIT % 32 9*   PLATELETS Thousands/uL 242      Results from last 7 days   Lab Units 01/22/21  0507   POTASSIUM mmol/L 4 4   CHLORIDE mmol/L 113*   CO2 mmol/L 26   BUN mg/dL 16   CREATININE mg/dL 0 75   CALCIUM mg/dL 8 3          Counseling / Coordination of Care  Total floor / unit time spent today 15 minutes  Greater than 50% of total time was spent with the patient and / or family counseling and / or coordination of care  Please note that the APS provides consultative services regarding pain management only  With the exception of ketamine, peripheral nerve catheters, and epidural infusions (and except when indicated), final decisions regarding starting or changing doses of analgesic medications are at the discretion of the consulting service  Off hours consultation and/or medication management is generally not available      Michael Marcano MD  Acute Pain Service

## 2021-01-22 NOTE — OCCUPATIONAL THERAPY NOTE
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 [facility])      Past Medical History:   Diagnosis Date    Abdominal pain     Acute tonsillitis     Breast pain, left     Edema of both lower extremities     GERD without esophagitis     Kidney stone     " [place]"    Lesion of liver     Liver mass     Lymphadenopathy     Mediastinal lymphadenopathy     Neoplasm of digestive system     Orthostatic lightheadedness     Sarcoidosis     Vertigo        Past Surgical History:   Procedure Laterality Date    CARPAL TUNNEL RELEASE Bilateral     COLONOSCOPY      2017    IR BIOPSY LIVER MASS  11/6/2018    IR BIOPSY LIVER MASS  12/8/2020    KNEE ARTHROSCOPY Left     LAPAROTOMY N/A 1/20/2021    Procedure: LAPAROTOMY EXPLORATORY;  Surgeon: Deanna Álvarez MD;  [facility];  Service: Surgical Oncology    LIVER LOBECTOMY N/A 1/20/2021    Procedure: LIVER ABLATION SEGMENT 7, INTRAOPERATIVE U/S OF LIVER;  Surgeon: Deanna Álvarez MD;  [facility];  Service: Surgical Oncology    MEDIASTINOSCOPY N/A 11/27/2018    Procedure: MEDIASTINOSCOPY;  Surgeon: Mg Allen MD;  [facility];  Service: Thoracic    WI BRONCHOSCOPY NEEDLE BX TRACHEA MAIN STEM&/BRON N/A 11/27/2018    Procedure: EBUS with biopsy;  Surgeon: Mg Allen MD;  [facility];  Service: Thoracic    [place] [address] N/A 11/27/2018    Procedure: [place] Switch Eis;  Surgeon: Mg Allen MD;  [facility];  Service: Thoracic    [place] EDG US EXAM SURGICAL ALTER STOM DUODENUM/JEJUNUM N/A 10/29/2018    Procedure: LINEAR ENDOSCOPIC U/S;  Surgeon: Key Johnson MD;  [facility];   Service: Gastroenterology    SMALL INTESTINE SURGERY N/A 1/20/2021    Procedure: RESECTION SMALL BOWEL AND ANASTOMOSIS, RESECTION SMALL BOWEL NODULE;  Surgeon: Deanna Álvarez MD;  [facility];  Service: Surgical Oncology    WISDOM TOOTH EXTRACTION          01/22/21 0910   OT Last Visit   OT Visit Date 01/22/21   Note Type   Note type Evaluation   Restrictions/Precautions   Weight Bearing Precautions Per Order No   Other Precautions Pain;Multiple lines;O2;Fall Risk   Pain Assessment   Pain Assessment Tool 0-10   Pain Score 7   Pain Location/Orientation Orientation: Bilateral;Location: Abdomen; Location: Incision   Home Living   Type of [address] One level; Laundry in basement   [employer] Walk-in shower   Bathroom Toilet Raised   Bathroom Equipment Grab bars in [facility] Cane;Crutches   Additional Comments pt denies use of DME at baseline   Prior Function   Level of [place] Independent with ADLs and functional mobility   Lives With [place] Help From Family   ADL Assistance Independent   IADLs Independent   Falls in the last 6 months 0   Vocational Retired   Comments pt reports being independent w all self care, mobiltiy, driving etc at baseline   Lifestyle   Autonomy fully independent w all self care, mobility, home management, driving etc   Reciprocal Relationships supportive family   Psychosocial   Psychosocial (WDL) WDL   Subjective   Subjective "My belly hurts"   ADL   Eating Assistance 7  Independent   Eating Deficit Other (Comment)  (can feed self  currently on clear liquids)   Grooming Assistance 5  Supervision/Setup   Grooming Deficit Setup; Increased time to complete;Wash/dry hands; Wash/dry face;Brushing hair   UB Bathing Assistance 5  Supervision/Setup   UB Bathing Deficit Increased time to complete; Chest;Right arm;Left arm; Abdomen   LB Bathing Assistance 4  Minimal Assistance   LB Bathing Deficit Right lower leg including foot; Left lower leg including foot; Buttocks   UB Dressing Assistance 5  Supervision/Setup   UB Dressing Deficit Increased time to complete   LB Dressing Assistance 4  Minimal Assistance   LB Dressing Deficit Don/doff R sock; Don/doff L sock; Thread RLE into underwear; Thread LLE into underwear   Toileting Assistance  4  Minimal Assistance   Additional Comments min assist due to postop weakness   Bed Mobility   Supine to Sit 5  Supervision   Additional items HOB elevated; Increased time required   Transfers   Sit to Stand 5  Supervision   Additional items Verbal cues; Increased time required   Stand to Sit 5  Supervision   Additional items Verbal cues; Increased time required   Stand pivot 4  Minimal assistance  (HHA)   Functional Mobility   Functional Mobility 5  Supervision   Balance   Static Sitting Fair +   Dynamic Sitting Fair   Static Standing Fair   Dynamic Standing Fair   Activity Tolerance   Activity Tolerance Patient limited by pain   Nurse [address] to see   RUE Assessment   RUE Assessment WFL   LUE Assessment   LUE Assessment WFL   Hand Function   Gross Motor Coordination Functional   Fine Motor Coordination Functional   Sensation   Light Touch No apparent deficits   Cognition   Overall Cognitive Status WFL   Arousal/Participation Alert; Cooperative   Attention Within functional limits   Orientation Level Oriented X4   Memory Within functional limits   Following Commands Follows all commands and directions without difficulty   Comments pt pleasant and cooperative   Assessment   Limitation Decreased ADL status; Decreased self-care trans;Decreased high-level ADLs; Decreased endurance   Assessment Pt is a 72 y o  female seen for OT evaluation s/p [facility] on 1/20/2021 w/ 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 [facility])  She underwent small bowel resection, intraoperative ultrasound and ablation of liver mets on 1/20/21  Now cleared for OOB and OT evaluation  Pt have multiple lines, including IV's, epidural and mak, O2  Comorbidities affecting pt's functional performance at time of assessment include: lymphademopathy, liver mass, mediastinal adenopathy, sarcoidosis lung   Personal factors affecting pt at time of IE include:steps to enter environment, difficulty performing ADLS and difficulty performing IADLS   Prior to admission, pt was living at home w her spouse in a one story home w basement laundry  She reports being fully independent w all self care, mobility, driving etc  Reports good family support  Upon evaluation: Pt requires min assist self care and mobility 2* the following deficits impacting occupational performance: decreased balance and decreased tolerance  Pt to benefit from continued skilled OT tx while in the hospital to address deficits as defined above and maximize level of functional independence w ADL's and functional mobility  Occupational Performance areas to address include: grooming, bathing/shower, toilet hygiene, dressing, functional mobility and clothing management  Pt scored  45 /100 on the barthel index  Based on findings from OT evaluation and functional performance deficits, pt has been identified as a  High complexity evaluation  From OT standpoint, recommendation at time of d/c would be home with family support    Goals   Patient Goals to have less pain   STG Time Frame 3-5   Short Term Goal #1 pt will complete bedmobility mod I    Short Term Goal #2 normal balance sitting at EOB x 15 min for increased safety w self care   Short Term Goal  demonstrate good ECT/self pacing skills with ADL's   LTG Time Frame 7-10   Long Term Goal #1 pt will tolerate standing x 10 min w fair + balance at sinkside for completion of hygieen   Long Term Goal #2 functional mobility in room and bathroom mod I w good safety   Functional Transfer Goals   Pt Will Perform All Functional Transfers With mod indep; With good judgment/safety   ADL Goals   Pt Will Perform Grooming Independently   Pt Will Perform Bathing Independently   Pt Will Perform UE Dressing Independently   Pt Will Perform LE Dressing With mod indep   Pt Will Perform Toileting With mod indep   Plan   Treatment Interventions ADL retraining;Functional transfer training; Endurance training;Patient/family training; Compensatory technique education; Energy conservation; Activityengagement   Goal Expiration Date 02/01/21   OT Frequency 3-5x/wk   Recommendation   OT Discharge Recommendation Home with skilled therapy   OT - OK to Discharge Yes   AM-PAC Daily Activity Inpatient   Lower Body Dressing 3   Bathing 3   Toileting 3   Upper Body Dressing 4   Grooming 4   Eating 4   Daily Activity Raw Score 21   Daily Activity Standardized Score (Calc for Raw Score >=11) 44 27   Barthel Index   Feeding 10   Bathing 0   Grooming Score 5   Dressing Score 5   Bladder Score 0   Bowels Score 10   Toilet Use Score 5   Transfers (Bed/Chair) Score 10   Mobility (Level Surface) Score 0   Stairs Score 0   Barthel Index Score 45

## 2021-01-23 LAB
ANION GAP SERPL CALCULATED.3IONS-SCNC: 3 MMOL/L (ref 4–13)
BASOPHILS # BLD AUTO: 0.07 THOUSANDS/ΜL (ref 0–0.1)
BASOPHILS NFR BLD AUTO: 1 % (ref 0–1)
BUN SERPL-MCNC: 9 MG/DL (ref 5–25)
CALCIUM SERPL-MCNC: 8 MG/DL (ref 8.3–10.1)
CHLORIDE SERPL-SCNC: 108 MMOL/L (ref 100–108)
CO2 SERPL-SCNC: 26 MMOL/L (ref 21–32)
CREAT SERPL-MCNC: 0.64 MG/DL (ref 0.6–1.3)
EOSINOPHIL # BLD AUTO: 0 THOUSAND/ΜL (ref 0–0.61)
EOSINOPHIL NFR BLD AUTO: 0 % (ref 0–6)
ERYTHROCYTE [DISTWIDTH] IN BLOOD BY AUTOMATED COUNT: 13.8 % (ref 11.6–15.1)
GFR SERPL CREATININE-BSD FRML MDRD: 91 ML/MIN/1.73SQ M
GLUCOSE SERPL-MCNC: 119 MG/DL (ref 65–140)
HCT VFR BLD AUTO: 29.7 % (ref 34.8–46.1)
HGB BLD-MCNC: 9.1 G/DL (ref 11.5–15.4)
IMM GRANULOCYTES # BLD AUTO: 0.05 THOUSAND/UL (ref 0–0.2)
IMM GRANULOCYTES NFR BLD AUTO: 0 % (ref 0–2)
LYMPHOCYTES # BLD AUTO: 1.82 THOUSANDS/ΜL (ref 0.6–4.47)
LYMPHOCYTES NFR BLD AUTO: 16 % (ref 14–44)
MCH RBC QN AUTO: 27.7 PG (ref 26.8–34.3)
MCHC RBC AUTO-ENTMCNC: 30.6 G/DL (ref 31.4–37.4)
MCV RBC AUTO: 91 FL (ref 82–98)
MONOCYTES # BLD AUTO: 1.12 THOUSAND/ΜL (ref 0.17–1.22)
MONOCYTES NFR BLD AUTO: 10 % (ref 4–12)
NEUTROPHILS # BLD AUTO: 8.68 THOUSANDS/ΜL (ref 1.85–7.62)
NEUTS SEG NFR BLD AUTO: 73 % (ref 43–75)
NRBC BLD AUTO-RTO: 0 /100 WBCS
PLATELET # BLD AUTO: 222 THOUSANDS/UL (ref 149–390)
PMV BLD AUTO: 11 FL (ref 8.9–12.7)
POTASSIUM SERPL-SCNC: 4.1 MMOL/L (ref 3.5–5.3)
RBC # BLD AUTO: 3.28 MILLION/UL (ref 3.81–5.12)
SODIUM SERPL-SCNC: 137 MMOL/L (ref 136–145)
WBC # BLD AUTO: 11.74 THOUSAND/UL (ref 4.31–10.16)

## 2021-01-23 PROCEDURE — 80048 BASIC METABOLIC PNL TOTAL CA: CPT | Performed by: PHYSICIAN ASSISTANT

## 2021-01-23 PROCEDURE — 85025 COMPLETE CBC W/AUTO DIFF WBC: CPT | Performed by: PHYSICIAN ASSISTANT

## 2021-01-23 PROCEDURE — 99024 POSTOP FOLLOW-UP VISIT: CPT | Performed by: STUDENT IN AN ORGANIZED HEALTH CARE EDUCATION/TRAINING PROGRAM

## 2021-01-23 PROCEDURE — C9113 INJ PANTOPRAZOLE SODIUM, VIA: HCPCS | Performed by: PHYSICIAN ASSISTANT

## 2021-01-23 PROCEDURE — 99232 SBSQ HOSP IP/OBS MODERATE 35: CPT | Performed by: ANESTHESIOLOGY

## 2021-01-23 RX ORDER — DEXTROSE, SODIUM CHLORIDE, AND POTASSIUM CHLORIDE 5; .45; .15 G/100ML; G/100ML; G/100ML
50 INJECTION INTRAVENOUS CONTINUOUS
Status: DISCONTINUED | OUTPATIENT
Start: 2021-01-23 | End: 2021-01-24

## 2021-01-23 RX ADMIN — DEXTROSE, SODIUM CHLORIDE, AND POTASSIUM CHLORIDE 75 ML/HR: 5; .45; .15 INJECTION INTRAVENOUS at 09:43

## 2021-01-23 RX ADMIN — HEPARIN SODIUM 5000 UNITS: 5000 INJECTION INTRAVENOUS; SUBCUTANEOUS at 21:01

## 2021-01-23 RX ADMIN — FENTANYL CITRATE: 50 INJECTION INTRAMUSCULAR; INTRAVENOUS at 06:47

## 2021-01-23 RX ADMIN — FLUTICASONE FUROATE AND VILANTEROL TRIFENATATE 1 PUFF: 100; 25 POWDER RESPIRATORY (INHALATION) at 09:42

## 2021-01-23 RX ADMIN — METOCLOPRAMIDE 10 MG: 5 INJECTION, SOLUTION INTRAMUSCULAR; INTRAVENOUS at 06:16

## 2021-01-23 RX ADMIN — PANTOPRAZOLE SODIUM 40 MG: 40 INJECTION, POWDER, FOR SOLUTION INTRAVENOUS at 09:45

## 2021-01-23 RX ADMIN — DEXTROSE, SODIUM CHLORIDE, AND POTASSIUM CHLORIDE 75 ML/HR: 5; .45; .15 INJECTION INTRAVENOUS at 21:10

## 2021-01-23 RX ADMIN — DEXTROSE, SODIUM CHLORIDE, AND POTASSIUM CHLORIDE 100 ML/HR: 5; .45; .15 INJECTION INTRAVENOUS at 00:41

## 2021-01-23 RX ADMIN — HEPARIN SODIUM 5000 UNITS: 5000 INJECTION INTRAVENOUS; SUBCUTANEOUS at 09:44

## 2021-01-23 RX ADMIN — METOCLOPRAMIDE 10 MG: 5 INJECTION, SOLUTION INTRAMUSCULAR; INTRAVENOUS at 17:31

## 2021-01-23 NOTE — PROGRESS NOTES
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, intraoperative ultrasound and ablation of liver Mets x2 on 1/20  Vss  Afebrile  abd soft, nontender, non distended  No flatus  UOP: 3200 cc    Plan:  Continue clears, toast crackers  Decrease IVF to 75cc/h  Ambulate frequently  Wait return of bowel fcn  Continue dvt ppx, SQH    Subjective:   Feels well, but still intermittent nausea  And not passing flatus  Denied chest pain, abd pain, vomiting or shortness of breath  Objective:     Vitals: Blood pressure 133/71, pulse 79, temperature 99 4 °F (37 4 °C), resp  rate 20, height 5' 2" (1 575 m), weight 78 5 kg (173 lb), SpO2 94 %  ,Body mass index is 31 64 kg/m²  Intake/Output Summary (Last 24 hours) at 1/23/2021 0647  Last data filed at 1/23/2021 0646  Gross per 24 hour   Intake 3043 73 ml   Output 3200 ml   Net -156 27 ml       Physical Exam  General: NAD  HEENT: NC/AT  MMM  Cv: RRR     Lungs: normal effort  Ab: Soft, NT/ND  Ex: no CCE  Neuro: AAOx3    Scheduled Meds:  Current Facility-Administered Medications   Medication Dose Route Frequency Provider Last Rate    acetaminophen  650 mg Oral Q4H PRN Ceferino Oliva MD      dextrose 5 % and sodium chloride 0 45 % with KCl 20 mEq/L  100 mL/hr Intravenous Continuous Jina Araiza  mL/hr (01/23/21 0041)    fluticasone-vilanterol  1 puff Inhalation Daily Jina Araiza MD      heparin (porcine)  5,000 Units Subcutaneous Q12H [facility] Jina Araiza MD      HYDROmorphone  0 5 mg Intravenous Q2H PRN Carolina Forrest MD      Labetalol HCl  10 mg Intravenous Q4H PRN Cefernio Oliva MD      metoclopramide  10 mg Intravenous Q6H PRN Mariya Hyman PA-C      pantoprazole  40 mg Intravenous Q24H [facility] Mariya Hyman PA-C      ropivacaine 0 1% and fentaNYL 2 mcg/mL   Epidural Continuous Geroge Lundborg, MD Stopped (01/23/21 6353)     Continuous Infusions:dextrose 5 % and sodium chloride 0 45 % with KCl 20 mEq/L, 100 mL/hr, Last Rate: 100 mL/hr (01/23/21 0041)  ropivacaine 0 1% and fentaNYL 2 mcg/mL, , Last Rate: Stopped (01/23/21 0646)      PRN Meds:   acetaminophen    HYDROmorphone    Labetalol HCl    metoclopramide       Invasive Devices     Peripheral Intravenous Line            Peripheral IV 01/20/21 Left Hand 2 days    Peripheral IV 01/20/21 Left Wrist 2 days          Epidural Line            Epidural Catheter 01/20/21 2 days                Lab, Imaging and other studies: I have personally reviewed pertinent reports      VTE Pharmacologic Prophylaxis: Sequential compression device (Venodyne)   VTE Mechanical Prophylaxis: sequential compression device

## 2021-01-23 NOTE — PROGRESS NOTES
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)    India Alan is a 72y o  year old female with neuroendocrine tumor POD 2 small bowel resection and liver ablation with epidural for postop pain control  Her pain has been well controlled overnight  She has no complaints and is doing well  Epidural site inspected and is fine      Plan:  Analgesia:  1) PCEA 10/5/10/4 stay the same  APS will continue to follow; please contact APS ( [phone]) with any further questions    Pain History  Current pain location(s): abdomen  Pain Scale:   2/10  Quality: burning  24 hour history: no acute events    Opioid requirement previous 24 hours: none    Meds/Allergies   all current active meds have been reviewed    Allergies   Allergen Reactions    Bactrim [Sulfamethoxazole-Trimethoprim] Hives    Clam Shell Vomiting       Objective     Temp:  [98 2 °F (36 8 °C)-99 7 °F (37 6 °C)] 98 5 °F (36 9 °C)  HR:  [62-84] 80  Resp:  [17-20] 18  BP: (109-134)/(59-76) 133/76    Physical Exam  Constitutional:       Appearance: Normal appearance  HENT:      Head: Normocephalic and atraumatic  Skin:     General: Skin is warm and dry  Neurological:      General: No focal deficit present  Mental Status: She is alert and oriented to person, place, and time  Psychiatric:         Mood and Affect: Mood normal          Behavior: Behavior normal          Thought Content:  Thought content normal          Lab Results:   Results from last 7 days   Lab Units 01/23/21  0518   WBC Thousand/uL 11 74*   HEMOGLOBIN g/dL 9 1*   HEMATOCRIT % 29 7*   PLATELETS Thousands/uL 222      Results from last 7 days   Lab Units 01/23/21  0518   POTASSIUM mmol/L 4 1   CHLORIDE mmol/L 108   CO2 mmol/L 26   BUN mg/dL 9   CREATININE mg/dL 0 64   CALCIUM mg/dL 8 0* Imaging Studies: I have personally reviewed pertinent reports  EKG, Pathology, and Other Studies: I have personally reviewed pertinent reports  Counseling / Coordination of Care  Total floor / unit time spent today 15 minutes  Greater than 50% of total time was spent with the patient and / or family counseling and / or coordination of care  A description of the counseling / coordination of care: Expectations of post operative pain      Marquis Ysabel MD

## 2021-01-23 NOTE — PLAN OF CARE
Problem: PAIN - ADULT  Goal: Verbalizes/displays adequate comfort level or baseline comfort level  Description: Interventions:  - Encourage patient to monitor pain and request assistance  - Assess pain using appropriate pain scale  - Administer analgesics based on type and severity of pain and evaluate response  - Implement non-pharmacological measures as appropriate and evaluate response  - Consider cultural and social influences on pain and pain management  - Notify physician/advanced practitioner if interventions unsuccessful or patient reports new pain  Outcome: Progressing     Problem: INFECTION - ADULT  Goal: Absence or prevention of progression during hospitalization  Description: INTERVENTIONS:  - Assess and monitor for signs and symptoms of infection  - Monitor lab/diagnostic results  - Monitor all insertion sites, i e  indwelling lines, tubes, and drains  - Monitor endotracheal if appropriate and nasal secretions for changes in amount and color  - [place] appropriate cooling/warming therapies per order  - Administer medications as ordered  - Instruct and encourage patient and family to use good hand hygiene technique  - Identify and instruct in appropriate isolation precautions for identified infection/condition  Outcome: Progressing  Goal: Absence of fever/infection during neutropenic period  Description: INTERVENTIONS:  - Monitor WBC    Outcome: Progressing     Problem: SAFETY ADULT  Goal: Patient will remain free of falls  Description: INTERVENTIONS:  - Assess patient frequently for physical needs  -  Identify cognitive and physical deficits and behaviors that affect risk of falls    -  [place] fall precautions as indicated by assessment   - Educate patient/family on patient safety including physical limitations  - Instruct patient to call for assistance with activity based on assessment  - Modify environment to reduce risk of injury  - Consider OT/PT consult to assist with strengthening/mobility  Outcome: Progressing  Goal: Maintain or return to baseline ADL function  Description: INTERVENTIONS:  -  Assess patient's ability to carry out ADLs; assess patient's baseline for ADL function and identify physical deficits which impact ability to perform ADLs (bathing, care of mouth/teeth, toileting, grooming, dressing, etc )  - Assess/evaluate cause of self-care deficits   - Assess range of motion  - Assess patient's mobility; develop plan if impaired  - Assess patient's need for assistive devices and provide as appropriate  - Encourage maximum independence but intervene and supervise when necessary  - Involve family in performance of ADLs  - Assess for home care needs following discharge   - Consider OT consult to assist with ADL evaluation and planning for discharge  - Provide patient education as appropriate  Outcome: Progressing  Goal: Maintain or return mobility status to optimal level  Description: INTERVENTIONS:  - Assess patient's baseline mobility status (ambulation, transfers, stairs, etc )    - Identify cognitive and physical deficits and behaviors that affect mobility  - Identify mobility aids required to assist with transfers and/or ambulation (gait belt, sit-to-stand, lift, walker, cane, etc )  - [place] fall precautions as indicated by assessment  - Record patient progress and toleration of activity level on Mobility SBAR; progress patient to next Phase/Stage  - Instruct patient to call for assistance with activity based on assessment  - Consider rehabilitation consult to assist with strengthening/weightbearing, etc   Outcome: Progressing     Problem: DISCHARGE PLANNING  Goal: Discharge to home or other facility with appropriate resources  Description: INTERVENTIONS:  - Identify barriers to discharge w/patient and caregiver  - Arrange for needed discharge resources and transportation as appropriate  - Identify discharge learning needs (meds, wound care, etc )  - Arrange for interpretive services to assist at discharge as needed  - Refer to Case Management Department for coordinating discharge planning if the patient needs post-hospital services based on physician/advanced practitioner order or complex needs related to functional status, cognitive ability, or social support system  Outcome: Progressing     Problem: Knowledge Deficit  Goal: Patient/family/caregiver demonstrates understanding of disease process, treatment plan, medications, and discharge instructions  Description: Complete learning assessment and assess knowledge base  Interventions:  - Provide teaching at level of understanding  - Provide teaching via preferred learning methods  Outcome: Progressing     Problem: Potential for Falls  Goal: Patient will remain free of falls  Description: INTERVENTIONS:  - Assess patient frequently for physical needs  -  Identify cognitive and physical deficits and behaviors that affect risk of falls    -  [place] fall precautions as indicated by assessment   - Educate patient/family on patient safety including physical limitations  - Instruct patient to call for assistance with activity based on assessment  - Modify environment to reduce risk of injury  - Consider OT/PT consult to assist with strengthening/mobility  Outcome: Progressing

## 2021-01-24 LAB
ANION GAP SERPL CALCULATED.3IONS-SCNC: 4 MMOL/L (ref 4–13)
BASOPHILS # BLD AUTO: 0.06 THOUSANDS/ΜL (ref 0–0.1)
BASOPHILS NFR BLD AUTO: 1 % (ref 0–1)
BUN SERPL-MCNC: 8 MG/DL (ref 5–25)
CALCIUM SERPL-MCNC: 9 MG/DL (ref 8.3–10.1)
CHLORIDE SERPL-SCNC: 108 MMOL/L (ref 100–108)
CO2 SERPL-SCNC: 30 MMOL/L (ref 21–32)
CREAT SERPL-MCNC: 0.64 MG/DL (ref 0.6–1.3)
EOSINOPHIL # BLD AUTO: 0 THOUSAND/ΜL (ref 0–0.61)
EOSINOPHIL NFR BLD AUTO: 0 % (ref 0–6)
ERYTHROCYTE [DISTWIDTH] IN BLOOD BY AUTOMATED COUNT: 13.8 % (ref 11.6–15.1)
GFR SERPL CREATININE-BSD FRML MDRD: 91 ML/MIN/1.73SQ M
GLUCOSE SERPL-MCNC: 161 MG/DL (ref 65–140)
HCT VFR BLD AUTO: 30.7 % (ref 34.8–46.1)
HGB BLD-MCNC: 9.6 G/DL (ref 11.5–15.4)
IMM GRANULOCYTES # BLD AUTO: 0.02 THOUSAND/UL (ref 0–0.2)
IMM GRANULOCYTES NFR BLD AUTO: 0 % (ref 0–2)
LYMPHOCYTES # BLD AUTO: 1.58 THOUSANDS/ΜL (ref 0.6–4.47)
LYMPHOCYTES NFR BLD AUTO: 20 % (ref 14–44)
MCH RBC QN AUTO: 27.9 PG (ref 26.8–34.3)
MCHC RBC AUTO-ENTMCNC: 31.3 G/DL (ref 31.4–37.4)
MCV RBC AUTO: 89 FL (ref 82–98)
MONOCYTES # BLD AUTO: 0.66 THOUSAND/ΜL (ref 0.17–1.22)
MONOCYTES NFR BLD AUTO: 8 % (ref 4–12)
NEUTROPHILS # BLD AUTO: 5.54 THOUSANDS/ΜL (ref 1.85–7.62)
NEUTS SEG NFR BLD AUTO: 71 % (ref 43–75)
NRBC BLD AUTO-RTO: 0 /100 WBCS
PLATELET # BLD AUTO: 248 THOUSANDS/UL (ref 149–390)
PMV BLD AUTO: 10.9 FL (ref 8.9–12.7)
POTASSIUM SERPL-SCNC: 3.9 MMOL/L (ref 3.5–5.3)
RBC # BLD AUTO: 3.44 MILLION/UL (ref 3.81–5.12)
SODIUM SERPL-SCNC: 142 MMOL/L (ref 136–145)
WBC # BLD AUTO: 7.86 THOUSAND/UL (ref 4.31–10.16)

## 2021-01-24 PROCEDURE — C9113 INJ PANTOPRAZOLE SODIUM, VIA: HCPCS | Performed by: PHYSICIAN ASSISTANT

## 2021-01-24 PROCEDURE — 85025 COMPLETE CBC W/AUTO DIFF WBC: CPT | Performed by: SURGERY

## 2021-01-24 PROCEDURE — 80048 BASIC METABOLIC PNL TOTAL CA: CPT | Performed by: SURGERY

## 2021-01-24 PROCEDURE — 99024 POSTOP FOLLOW-UP VISIT: CPT | Performed by: STUDENT IN AN ORGANIZED HEALTH CARE EDUCATION/TRAINING PROGRAM

## 2021-01-24 PROCEDURE — 99232 SBSQ HOSP IP/OBS MODERATE 35: CPT | Performed by: ANESTHESIOLOGY

## 2021-01-24 RX ORDER — PROMETHAZINE HYDROCHLORIDE 25 MG/ML
12.5 INJECTION, SOLUTION INTRAMUSCULAR; INTRAVENOUS EVERY 6 HOURS PRN
Status: DISCONTINUED | OUTPATIENT
Start: 2021-01-24 | End: 2021-01-25

## 2021-01-24 RX ADMIN — METOCLOPRAMIDE 10 MG: 5 INJECTION, SOLUTION INTRAMUSCULAR; INTRAVENOUS at 03:51

## 2021-01-24 RX ADMIN — PANTOPRAZOLE SODIUM 40 MG: 40 INJECTION, POWDER, FOR SOLUTION INTRAVENOUS at 08:26

## 2021-01-24 RX ADMIN — HEPARIN SODIUM 5000 UNITS: 5000 INJECTION INTRAVENOUS; SUBCUTANEOUS at 09:00

## 2021-01-24 RX ADMIN — FENTANYL CITRATE: 50 INJECTION INTRAMUSCULAR; INTRAVENOUS at 02:33

## 2021-01-24 RX ADMIN — DEXTROSE, SODIUM CHLORIDE, AND POTASSIUM CHLORIDE 75 ML/HR: 5; .45; .15 INJECTION INTRAVENOUS at 05:44

## 2021-01-24 RX ADMIN — FLUTICASONE FUROATE AND VILANTEROL TRIFENATATE 1 PUFF: 100; 25 POWDER RESPIRATORY (INHALATION) at 09:38

## 2021-01-24 RX ADMIN — FENTANYL CITRATE: 50 INJECTION INTRAMUSCULAR; INTRAVENOUS at 16:12

## 2021-01-24 RX ADMIN — PROMETHAZINE HYDROCHLORIDE 12.5 MG: 25 INJECTION INTRAMUSCULAR; INTRAVENOUS at 07:32

## 2021-01-24 RX ADMIN — HEPARIN SODIUM 5000 UNITS: 5000 INJECTION INTRAVENOUS; SUBCUTANEOUS at 20:54

## 2021-01-24 NOTE — PLAN OF CARE
Problem: Nutrition/Hydration-ADULT  Goal: Nutrient/Hydration intake appropriate for improving, restoring or maintaining nutritional needs  Description: Monitor and assess patient's nutrition/hydration status for malnutrition  Collaborate with interdisciplinary team and initiate plan and interventions as ordered  Monitor patient's weight and dietary intake as ordered or per policy  Utilize nutrition screening tool and intervene as necessary  Determine patient's food preferences and provide high-protein, high-caloric foods as appropriate       INTERVENTIONS:  - Monitor oral intake, urinary output, labs, and treatment plans  - Assess nutrition and hydration status and recommend course of action  - Evaluate amount of meals eaten  - Assist patient with eating if necessary   - Allow adequate time for meals  - Recommend/ encourage appropriate diets, oral nutritional supplements, and vitamin/mineral supplements  - Order, calculate, and assess calorie counts as needed  - Recommend, monitor, and adjust tube feedings and TPN/PPN based on assessed needs  - Assess need for intravenous fluids  - Provide specific nutrition/hydration education as appropriate  - Include patient/family/caregiver in decisions related to nutrition  Outcome: Progressing  Note: Anticipate diet increase; would benefit from low fiber diet features for short term to assure GI tolerance; RD to monitor for diet increase and need for oral nutritional supplements

## 2021-01-24 NOTE — PROGRESS NOTES
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, intraoperative ultrasound and ablation of liver Mets x2 on 1/20  Vss  Afebrile  abd is soft, nontender, mild distension  No flatus  UOP: 3500cc/24 h    Plan:  Continue clears, toast crackers  MIVF @50cc/h  F/u APS plan, possible d/c epidural on 1/25, need to hold AM [facility] 1/25  Walk 3x today  Waiting return of bowel fcn    Subjective:   Feels well, walked yesterday, however still not passing flatus  Still intermittently nauseous  Denied any vomiting  Denied any fever chills chest pain or shortness of breath today  Objective:     Vitals: Blood pressure 122/81, pulse 72, temperature 98 1 °F (36 7 °C), resp  rate 18, height 5' 2" (1 575 m), weight 78 5 kg (173 lb), SpO2 94 %  ,Body mass index is 31 64 kg/m²  Intake/Output Summary (Last 24 hours) at 1/24/2021 0552  Last data filed at 1/24/2021 0544  Gross per 24 hour   Intake 2678 7 ml   Output 4025 ml   Net -1346 3 ml     Physical Exam  General: NAD  HEENT: NC/AT  MMM  Cv: RRR     Lungs: normal effort  Ab: Soft, NT/ND  Ex: no CCE  Neuro: AAOx3    Scheduled Meds:  Current Facility-Administered Medications   Medication Dose Route Frequency Provider Last Rate    acetaminophen  650 mg Oral Q4H PRN Ceferino Oliva MD      dextrose 5 % and sodium chloride 0 45 % with KCl 20 mEq/L  75 mL/hr Intravenous Continuous Ceferino Oliva MD 75 mL/hr (01/24/21 0544)   Ata Rothman fluticasone-vilanterol  1 puff Inhalation Daily Jina Araiza MD      heparin (porcine)  5,000 Units Subcutaneous Q12H [address] Jina Araiza MD      HYDROmorphone  0 5 mg Intravenous Q2H PRN Carolina Forrest MD      Labetalol HCl  10 mg Intravenous Q4H PRN Ceferino Oliva MD      metoclopramide  10 mg Intravenous Q6H PRN Mariya Hyman, [id number]      pantoprazole  40 mg Intravenous Q24H [address] Mariya Hyman PA-C      ropivacaine 0 1% and fentaNYL 2 mcg/mL   Epidural Continuous Bailey Cardona MD       Continuous Infusions:dextrose 5 % and sodium chloride 0 45 % with KCl 20 mEq/L, 75 mL/hr, Last Rate: 75 mL/hr (01/24/21 0544)  ropivacaine 0 1% and fentaNYL 2 mcg/mL,       PRN Meds:   acetaminophen    HYDROmorphone    Labetalol HCl    metoclopramide      Invasive Devices     Peripheral Intravenous Line            Peripheral IV 01/20/21 Left Wrist 3 days          Epidural Line            Epidural Catheter 01/20/21 3 days                Lab, Imaging and other studies: I have personally reviewed pertinent reports      VTE Pharmacologic Prophylaxis: Sequential compression device (Venodyne)   VTE Mechanical Prophylaxis: sequential compression device

## 2021-01-24 NOTE — PLAN OF CARE
Problem: PAIN - ADULT  Goal: Verbalizes/displays adequate comfort level or baseline comfort level  Description: Interventions:  - Encourage patient to monitor pain and request assistance  - Assess pain using appropriate pain scale  - Administer analgesics based on type and severity of pain and evaluate response  - Implement non-pharmacological measures as appropriate and evaluate response  - Consider cultural and social influences on pain and pain management  - Notify physician/advanced practitioner if interventions unsuccessful or patient reports new pain  Outcome: Progressing     Problem: INFECTION - ADULT  Goal: Absence or prevention of progression during hospitalization  Description: INTERVENTIONS:  - Assess and monitor for signs and symptoms of infection  - Monitor lab/diagnostic results  - Monitor all insertion sites, i e  indwelling lines, tubes, and drains  - Monitor endotracheal if appropriate and nasal secretions for changes in amount and color  - [place] appropriate cooling/warming therapies per order  - Administer medications as ordered  - Instruct and encourage patient and family to use good hand hygiene technique  - Identify and instruct in appropriate isolation precautions for identified infection/condition  Outcome: Progressing  Goal: Absence of fever/infection during neutropenic period  Description: INTERVENTIONS:  - Monitor WBC    Outcome: Progressing     Problem: SAFETY ADULT  Goal: Patient will remain free of falls  Description: INTERVENTIONS:  - Assess patient frequently for physical needs  -  Identify cognitive and physical deficits and behaviors that affect risk of falls    -  [place] fall precautions as indicated by assessment   - Educate patient/family on patient safety including physical limitations  - Instruct patient to call for assistance with activity based on assessment  - Modify environment to reduce risk of injury  - Consider OT/PT consult to assist with strengthening/mobility  Outcome: Progressing  Goal: Maintain or return to baseline ADL function  Description: INTERVENTIONS:  -  Assess patient's ability to carry out ADLs; assess patient's baseline for ADL function and identify physical deficits which impact ability to perform ADLs (bathing, care of mouth/teeth, toileting, grooming, dressing, etc )  - Assess/evaluate cause of self-care deficits   - Assess range of motion  - Assess patient's mobility; develop plan if impaired  - Assess patient's need for assistive devices and provide as appropriate  - Encourage maximum independence but intervene and supervise when necessary  - Involve family in performance of ADLs  - Assess for home care needs following discharge   - Consider OT consult to assist with ADL evaluation and planning for discharge  - Provide patient education as appropriate  Outcome: Progressing  Goal: Maintain or return mobility status to optimal level  Description: INTERVENTIONS:  - Assess patient's baseline mobility status (ambulation, transfers, stairs, etc )    - Identify cognitive and physical deficits and behaviors that affect mobility  - Identify mobility aids required to assist with transfers and/or ambulation (gait belt, sit-to-stand, lift, walker, cane, etc )  - [place] fall precautions as indicated by assessment  - Record patient progress and toleration of activity level on Mobility SBAR; progress patient to next Phase/Stage  - Instruct patient to call for assistance with activity based on assessment  - Consider rehabilitation consult to assist with strengthening/weightbearing, etc   Outcome: Progressing     Problem: DISCHARGE PLANNING  Goal: Discharge to home or other facility with appropriate resources  Description: INTERVENTIONS:  - Identify barriers to discharge w/patient and caregiver  - Arrange for needed discharge resources and transportation as appropriate  - Identify discharge learning needs (meds, wound care, etc )  - Arrange for interpretive services to assist at discharge as needed  - Refer to Case Management Department for coordinating discharge planning if the patient needs post-hospital services based on physician/advanced practitioner order or complex needs related to functional status, cognitive ability, or social support system  Outcome: Progressing     Problem: Knowledge Deficit  Goal: Patient/family/caregiver demonstrates understanding of disease process, treatment plan, medications, and discharge instructions  Description: Complete learning assessment and assess knowledge base  Interventions:  - Provide teaching at level of understanding  - Provide teaching via preferred learning methods  Outcome: Progressing     Problem: Potential for Falls  Goal: Patient will remain free of falls  Description: INTERVENTIONS:  - Assess patient frequently for physical needs  -  Identify cognitive and physical deficits and behaviors that affect risk of falls    -  [place] fall precautions as indicated by assessment   - Educate patient/family on patient safety including physical limitations  - Instruct patient to call for assistance with activity based on assessment  - Modify environment to reduce risk of injury  - Consider OT/PT consult to assist with strengthening/mobility  Outcome: Progressing

## 2021-01-24 NOTE — PROGRESS NOTES
Epidural Follow-up Note - Acute Pain Service    Blaze Parsons 72 y o  female MRN: [id number]  Unit/Bed#: [facility] 915-01 Encounter: [id number]      Assessment:   Principal Problem:    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 [facility])      Blaze Parsons is a 72y o  year old female POD #4 S/P small bowel resection  She has an epidural in place which is working well to control her pain  Plan:  Analgesia:  - Continue Thoracic epidural infusion of Ropivacaine 0 1% with fentanyl 2 mcg/mL at 10/5/10/4  - Continue Dilaudid 0 5 mg IV q2hr PRN for breakthrough pain  - Anticipate epidural removal and transition to primarily PO regimen tomorrow    Bowel Regimen:  - Bowel regimen when appropriate from surgical perspective    APS will continue to follow   Please call  / [phone] or [employer] Acute Pain Service - [facility] (/ between [phone] and on weekends) with questions or concerns    Pain History  Current pain location(s): none  Pain Scale:   0/10  Quality: N/A  24 hour history: pain well controlled    PCEA use: none  Opioid requirement previous 24 hours: none    Meds/Allergies   current meds:   Current Facility-Administered Medications   Medication Dose Route Frequency    acetaminophen (TYLENOL) oral suspension 650 mg  650 mg Oral Q4H PRN    fluticasone-vilanterol (BREO ELLIPTA) 100-25 mcg/inh inhaler 1 puff  1 puff Inhalation Daily    heparin (porcine) subcutaneous injection 5,000 Units  5,000 Units Subcutaneous Q12H [address]    HYDROmorphone (DILAUDID) injection 0 5 mg  0 5 mg Intravenous Q2H PRN    Labetalol HCl (NORMODYNE) injection 10 mg  10 mg Intravenous Q4H PRN    metoclopramide (REGLAN) injection 10 mg  10 mg Intravenous Q6H PRN    pantoprazole (PROTONIX) injection 40 mg  40 mg Intravenous Q24H MASON    promethazine (PHENERGAN) injection 12 5 mg  12 5 mg Intravenous Q6H PRN    ropivacaine 0 1% and fentaNYL 2 mcg/mL PCEA   Epidural Continuous       Allergies   Allergen Reactions    Bactrim [Sulfamethoxazole-Trimethoprim] Hives    Clam Shell Vomiting       Objective     Temp:  [97 7 °F (36 5 °C)-99 °F (37 2 °C)] 98 7 °F (37 1 °C)  HR:  [68-92] 69  Resp:  [18-20] 18  BP: (115-158)/(75-88) 151/81    Physical Exam  Vitals signs reviewed  Constitutional:       Appearance: Normal appearance  HENT:      Head: Normocephalic  Eyes:      Pupils: Pupils are equal, round, and reactive to light  Neck:      Musculoskeletal: Normal range of motion  Cardiovascular:      Rate and Rhythm: Normal rate and regular rhythm  Pulses: Normal pulses  Heart sounds: Normal heart sounds  Pulmonary:      Effort: Pulmonary effort is normal       Breath sounds: Normal breath sounds  Abdominal:      General: Abdomen is flat  Tenderness: There is no abdominal tenderness  Musculoskeletal: Normal range of motion  Neurological:      General: No focal deficit present  Mental Status: She is alert and oriented to person, place, and time  Psychiatric:         Behavior: Behavior normal        Epidural: Site clean/dry/intact, no surrounding erythema/edema/induration, infusion functioning appropriately    Lab Results:   Results from last 7 days   Lab Units 01/24/21  1027   WBC Thousand/uL 7 86   HEMOGLOBIN g/dL 9 6*   HEMATOCRIT % 30 7*   PLATELETS Thousands/uL 248      Results from last 7 days   Lab Units 01/24/21  1026   POTASSIUM mmol/L 3 9   CHLORIDE mmol/L 108   CO2 mmol/L 30   BUN mg/dL 8   CREATININE mg/dL 0 64   CALCIUM mg/dL 9 0          Imaging Studies: I have personally reviewed pertinent reports  EKG, Pathology, and Other Studies: I have personally reviewed pertinent reports  Counseling / Coordination of Care  Total floor / unit time spent today 15 minutes  Greater than 50% of total time was spent with the patient and / or family counseling and / or coordination of care  Please note that the APS provides consultative services regarding pain management only    With the exception of ketamine, peripheral nerve catheters, and epidural infusions (and except when indicated), final decisions regarding starting or changing doses of analgesic medications are at the discretion of the consulting service  Off hours consultation and/or medication management is generally not available      Zoya Nava MD  Acute Pain Service

## 2021-01-25 LAB
ANION GAP SERPL CALCULATED.3IONS-SCNC: 5 MMOL/L (ref 4–13)
BASOPHILS # BLD AUTO: 0.06 THOUSANDS/ΜL (ref 0–0.1)
BASOPHILS NFR BLD AUTO: 1 % (ref 0–1)
BUN SERPL-MCNC: 10 MG/DL (ref 5–25)
CALCIUM SERPL-MCNC: 9.6 MG/DL (ref 8.3–10.1)
CHLORIDE SERPL-SCNC: 106 MMOL/L (ref 100–108)
CO2 SERPL-SCNC: 31 MMOL/L (ref 21–32)
CREAT SERPL-MCNC: 0.67 MG/DL (ref 0.6–1.3)
EOSINOPHIL # BLD AUTO: 0 THOUSAND/ΜL (ref 0–0.61)
EOSINOPHIL NFR BLD AUTO: 0 % (ref 0–6)
ERYTHROCYTE [DISTWIDTH] IN BLOOD BY AUTOMATED COUNT: 13.8 % (ref 11.6–15.1)
GFR SERPL CREATININE-BSD FRML MDRD: 90 ML/MIN/1.73SQ M
GLUCOSE SERPL-MCNC: 100 MG/DL (ref 65–140)
HCT VFR BLD AUTO: 32.4 % (ref 34.8–46.1)
HGB BLD-MCNC: 9.8 G/DL (ref 11.5–15.4)
IMM GRANULOCYTES # BLD AUTO: 0.02 THOUSAND/UL (ref 0–0.2)
IMM GRANULOCYTES NFR BLD AUTO: 0 % (ref 0–2)
LYMPHOCYTES # BLD AUTO: 2.39 THOUSANDS/ΜL (ref 0.6–4.47)
LYMPHOCYTES NFR BLD AUTO: 28 % (ref 14–44)
MAGNESIUM SERPL-MCNC: 1.8 MG/DL (ref 1.6–2.6)
MCH RBC QN AUTO: 27.1 PG (ref 26.8–34.3)
MCHC RBC AUTO-ENTMCNC: 30.2 G/DL (ref 31.4–37.4)
MCV RBC AUTO: 90 FL (ref 82–98)
MONOCYTES # BLD AUTO: 1.04 THOUSAND/ΜL (ref 0.17–1.22)
MONOCYTES NFR BLD AUTO: 12 % (ref 4–12)
NEUTROPHILS # BLD AUTO: 5.01 THOUSANDS/ΜL (ref 1.85–7.62)
NEUTS SEG NFR BLD AUTO: 59 % (ref 43–75)
NRBC BLD AUTO-RTO: 0 /100 WBCS
PLATELET # BLD AUTO: 283 THOUSANDS/UL (ref 149–390)
PMV BLD AUTO: 11.4 FL (ref 8.9–12.7)
POTASSIUM SERPL-SCNC: 3.8 MMOL/L (ref 3.5–5.3)
RBC # BLD AUTO: 3.61 MILLION/UL (ref 3.81–5.12)
SODIUM SERPL-SCNC: 142 MMOL/L (ref 136–145)
WBC # BLD AUTO: 8.52 THOUSAND/UL (ref 4.31–10.16)

## 2021-01-25 PROCEDURE — 97116 GAIT TRAINING THERAPY: CPT | Performed by: PHYSICAL THERAPIST

## 2021-01-25 PROCEDURE — 85025 COMPLETE CBC W/AUTO DIFF WBC: CPT | Performed by: SURGERY

## 2021-01-25 PROCEDURE — C9113 INJ PANTOPRAZOLE SODIUM, VIA: HCPCS | Performed by: PHYSICIAN ASSISTANT

## 2021-01-25 PROCEDURE — 99024 POSTOP FOLLOW-UP VISIT: CPT | Performed by: SURGERY

## 2021-01-25 PROCEDURE — 83735 ASSAY OF MAGNESIUM: CPT | Performed by: SURGERY

## 2021-01-25 PROCEDURE — 97110 THERAPEUTIC EXERCISES: CPT | Performed by: PHYSICAL THERAPIST

## 2021-01-25 PROCEDURE — 80048 BASIC METABOLIC PNL TOTAL CA: CPT | Performed by: SURGERY

## 2021-01-25 RX ORDER — OXYCODONE HYDROCHLORIDE 5 MG/1
5 TABLET ORAL EVERY 4 HOURS PRN
Status: DISCONTINUED | OUTPATIENT
Start: 2021-01-25 | End: 2021-01-26 | Stop reason: HOSPADM

## 2021-01-25 RX ORDER — PROMETHAZINE HYDROCHLORIDE 25 MG/ML
12.5 INJECTION, SOLUTION INTRAMUSCULAR; INTRAVENOUS EVERY 6 HOURS PRN
Status: DISCONTINUED | OUTPATIENT
Start: 2021-01-25 | End: 2021-01-26 | Stop reason: HOSPADM

## 2021-01-25 RX ORDER — OXYCODONE HYDROCHLORIDE 10 MG/1
10 TABLET ORAL EVERY 4 HOURS PRN
Status: DISCONTINUED | OUTPATIENT
Start: 2021-01-25 | End: 2021-01-26 | Stop reason: HOSPADM

## 2021-01-25 RX ADMIN — PANTOPRAZOLE SODIUM 40 MG: 40 INJECTION, POWDER, FOR SOLUTION INTRAVENOUS at 09:27

## 2021-01-25 RX ADMIN — OXYCODONE HYDROCHLORIDE 5 MG: 5 TABLET ORAL at 20:14

## 2021-01-25 RX ADMIN — HEPARIN SODIUM 5000 UNITS: 5000 INJECTION INTRAVENOUS; SUBCUTANEOUS at 20:14

## 2021-01-25 RX ADMIN — OXYCODONE HYDROCHLORIDE 5 MG: 5 TABLET ORAL at 11:32

## 2021-01-25 RX ADMIN — HYDROMORPHONE HYDROCHLORIDE 0.5 MG: 1 INJECTION, SOLUTION INTRAMUSCULAR; INTRAVENOUS; SUBCUTANEOUS at 13:08

## 2021-01-25 RX ADMIN — OXYCODONE HYDROCHLORIDE 5 MG: 5 TABLET ORAL at 16:08

## 2021-01-25 RX ADMIN — FLUTICASONE FUROATE AND VILANTEROL TRIFENATATE 1 PUFF: 100; 25 POWDER RESPIRATORY (INHALATION) at 09:26

## 2021-01-25 NOTE — PLAN OF CARE
Problem: PHYSICAL THERAPY ADULT  Goal: Performs mobility at highest level of function for planned discharge setting  See evaluation for individualized goals  Description: Treatment/Interventions: Functional transfer training, LE strengthening/ROM, Elevations, Therapeutic exercise, Endurance training, Patient/family training, Equipment eval/education, Bed mobility, Gait training, Spoke to nursing          See flowsheet documentation for full assessment, interventions and recommendations  Note: Prognosis: Good  Problem List: Decreased strength, Decreased endurance, Impaired balance, Decreased mobility, Decreased skin integrity  Assessment: pt reports she is feeling better, able to amb in hallway using rollator rw  pt tolerated 10 reps ther ex ble supine all ranges  pt amb 300' using rollator rw modified indep    pt has 2 stairs to enter  no railing but will have spouse assist her  pt did not have any light headedness with ex or activity  will be able to return home, family assist   would like rollator rw for home use  Barriers to Discharge: None     PT Discharge Recommendation: Return to previous environment with social support     PT - OK to Discharge: No    See flowsheet documentation for full assessment

## 2021-01-25 NOTE — PROGRESS NOTES
Progress Note - Surgical Oncology   Aleksey Beatty 72 y o  female MRN: [id number]  Unit/Bed#: [facility] 915-01 Encounter: [id number]    Assessment:  71 y/o F p/w neuroendocrine tumor of small bowel, s/p ex-lap, small bowel resection and anastomosis, segment 7&8 liver ablation on 1/20    Plan:  --Clears/[place], advance to Surgical Soft diet  --d/c Epidural, SQH held   --Pain control  --DVT ppx  --OOB, ambulate    Subjective/Objective     Subjective:     No acute events overnight  Pt doing well this AM  Reports abdominal pain well-controlled, tolerating PO  Passing flatus, no bowel movements  Objective:     Blood pressure 145/86, pulse 66, temperature 98 °F (36 7 °C), resp  rate 18, height 5' 2" (1 575 m), weight 78 5 kg (173 lb), SpO2 94 %  ,Body mass index is 31 64 kg/m²  Intake/Output Summary (Last 24 hours) at 1/25/2021 0624  Last data filed at 1/25/2021 0506  Gross per 24 hour   Intake 1021 23 ml   Output 3150 ml   Net -2128 77 ml       Invasive Devices     Peripheral Intravenous Line            Peripheral IV 01/24/21 Left;Ventral (anterior) Forearm less than 1 day          Epidural Line            Epidural Catheter 01/20/21 4 days                Physical Exam:     GEN: NAD  HEENT: MMM  CV: RRR  Lung: normal effort  Ab: Soft, NT/ND, incision c/d/i  Extrem: No CCE  Neuro:  A+Ox3, motor and sensation grossly intact    Lab, Imaging and other studies:  CBC:   Lab Results   Component Value Date    WBC 7 86 01/24/2021    HGB 9 6 (L) 01/24/2021    HCT 30 7 (L) 01/24/2021    MCV 89 01/24/2021     01/24/2021    MCH 27 9 01/24/2021    MCHC 31 3 (L) 01/24/2021    RDW 13 8 01/24/2021    MPV 10 9 01/24/2021    NRBC 0 01/24/2021   , CMP:   Lab Results   Component Value Date    SODIUM 142 01/25/2021    K 3 8 01/25/2021     01/25/2021    CO2 31 01/25/2021    BUN 10 01/25/2021    CREATININE 0 67 01/25/2021    CALCIUM 9 6 01/25/2021    EGFR 90 01/25/2021   , Coagulation: No results found for: PT, INR, APTT  VTE Pharmacologic Prophylaxis: Heparin  VTE Mechanical Prophylaxis: sequential compression device

## 2021-01-25 NOTE — PROGRESS NOTES
Epidural Follow-up Note - Acute Pain Service    Louise Michael 72 y o  female MRN: [id number]  Unit/Bed#: [facility] 915-01 Encounter: [id number]      Assessment:   Principal Problem:    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 [facility])      Louise Michael is a 72y o  year old female POD #5 S/P small bowel resection  She has an epidural in place which is working well to control her pain  Plan:  Analgesia:  - discontinue Thoracic epidural infusion   - oxycodone 5-10mg q4hrs PRN mod-severe pain  - Continue Dilaudid 0 5 mg IV q2hr PRN for breakthrough pain  - tylenol 650mg q6hrs PRN mild pain    Bowel Regimen:  - Bowel regimen when appropriate from surgical perspective    APS will sign off at this time  Thank you for the consult  All opioids and other analgesics to be written at discretion of primary team  Please call  / [phone] or [employer]? Acute Pain Service - [facility] (/ between 3791-1003 and on weekends) with questions or concerns    Pain History  Current pain location(s): none  Pain Scale:   0  Quality: denies  24 hour history: pt seen and examined, no overnight events  Pain well controlled  No nausea/pruritus/LE weakness  Removed tip intact  PCEA use:  Opioid requirement previous 24 hours: none    Meds/Allergies   all current active meds have been reviewed    Allergies   Allergen Reactions    Bactrim [Sulfamethoxazole-Trimethoprim] Hives    Clam Shell Vomiting       Objective     Temp:  [97 9 °F (36 6 °C)-98 9 °F (37 2 °C)] 98 8 °F (37 1 °C)  HR:  [66-74] 66  Resp:  [17-20] 17  BP: (131-154)/(70-87) 150/70    Physical Exam  Vitals signs and nursing note reviewed  Constitutional:       General: She is not in acute distress  Cardiovascular:      Rate and Rhythm: Normal rate  Pulmonary:      Effort: Pulmonary effort is normal  No respiratory distress  Abdominal:      General: Abdomen is flat  Musculoskeletal:         General: No swelling  Skin:     General: Skin is warm and dry  Neurological:      Mental Status: She is alert and oriented to person, place, and time  Psychiatric:         Mood and Affect: Mood normal          Behavior: Behavior normal        Epidural: Site clean/dry/intact, no surrounding erythema/edema/induration, infusion functioning appropriately  Removed tip intact    Lab Results:   Results from last 7 days   Lab Units 01/25/21  0503   WBC Thousand/uL 8 52   HEMOGLOBIN g/dL 9 8*   HEMATOCRIT % 32 4*   PLATELETS Thousands/uL 283      Results from last 7 days   Lab Units 01/25/21  0503   POTASSIUM mmol/L 3 8   CHLORIDE mmol/L 106   CO2 mmol/L 31   BUN mg/dL 10   CREATININE mg/dL 0 67   CALCIUM mg/dL 9 6          Imaging Studies: I have personally reviewed pertinent reports  EKG, Pathology, and Other Studies: I have personally reviewed pertinent reports  Counseling / Coordination of Care  Total floor / unit time spent today 15 minutes  Greater than 50% of total time was spent with the patient and / or family counseling and / or coordination of care  A description of the counseling / coordination of care: chart review, post op pain and neuraxial pain management, discussion/planning with nursing/medical/surgical teams    Please note that the APS provides consultative services regarding pain management only  With the exception of ketamine, peripheral nerve catheters, and epidural infusions (and except when indicated), final decisions regarding starting or changing doses of analgesic medications are at the discretion of the consulting service  Off hours consultation and/or medication management is generally not available      Johann Solano MD  Acute Pain Service

## 2021-01-25 NOTE — PHYSICAL THERAPY NOTE
Physical Therapy Progress Note       01/25/21 9465   PT Last Visit   PT Visit Date 01/25/21   Note Type   Note Type Treatment   Pain Assessment   Pain Assessment Tool 0-10   Pain Score 4   Pain Location/Orientation Orientation: Mid;Location: Back; Location: Abdomen   Hospital Pain Intervention(s) Repositioned; Ambulation/increased activity; Elevated; Emotional support   Restrictions/Precautions   Weight Bearing Precautions Per Order No   Other Precautions Pain; Fall Risk   General   Chart Reviewed Yes   Response to Previous Treatment Patient with no complaints from previous session  Family/Caregiver Present No   Cognition   Overall Cognitive Status WFL   Orientation Level Oriented X4   Subjective   Subjective pt reports she just got pain medicatoin, hoping to discharge tomorrow   Bed Mobility   Supine to Sit 5  Supervision   Sit to Supine 5  Supervision   Transfers   Sit to Stand 5  Supervision   Stand to Sit 5  Supervision   Ambulation/Elevation   Gait pattern Excessively slow; WNL   Gait Assistance 6  Modified independent   Assistive Device 4-wheeled walker   Distance 300'   Balance   Static Sitting Good   Dynamic Sitting Fair +   Static Standing Fair +   Dynamic Standing Fair +   Ambulatory Fair +   Endurance Deficit   Endurance Deficit No   Activity Tolerance   Activity Tolerance Patient tolerated treatment well   Nurse Made Aware yes   Exercises   TKR Supine;10 reps;AROM; Bilateral   Assessment   Prognosis Good   Problem List Decreased strength;Decreased endurance; Impaired balance;Decreased mobility; Decreased skin integrity   Assessment pt reports she is feeling better, able to amb in hallway using rollator rw  pt tolerated 10 reps ther ex ble supine all ranges  pt amb 300' using rollator rw modified indep    pt has 2 stairs to enter  no railing but will have spouse assist her  pt did not have any light headedness with ex or activity   will be able to return home, family assist   would like rollator rw for home use     Barriers to Discharge None   Goals   Patient Goals go home tomorrow   STG Expiration Date 02/04/21   PT Treatment Day 1   Plan   Treatment/Interventions Functional transfer training;LE strengthening/ROM; Elevations; Therapeutic exercise; Endurance training;Gait training;Spoke to nursing   Progress Improving as expected   PT Frequency   (3-5x/week)   Recommendation   PT Discharge Recommendation Return to previous environment with social support   PT - OK to Discharge No   AM-PAC Basic Mobility Inpatient   Turning in Bed Without Bedrails 3   Lying on Back to Sitting on Edge of Flat Bed 3   Moving Bed to Chair 4   Standing Up From Chair 4   Walk in Room 4   Climb 3-5 Stairs 2   Basic Mobility Inpatient Raw Score 20   Basic Mobility Standardized Score 43 99     Marcelo Belle, PT

## 2021-01-25 NOTE — PLAN OF CARE
Problem: PAIN - ADULT  Goal: Verbalizes/displays adequate comfort level or baseline comfort level  Description: Interventions:  - Encourage patient to monitor pain and request assistance  - Assess pain using appropriate pain scale  - Administer analgesics based on type and severity of pain and evaluate response  - Implement non-pharmacological measures as appropriate and evaluate response  - Consider cultural and social influences on pain and pain management  - Notify physician/advanced practitioner if interventions unsuccessful or patient reports new pain  Outcome: Progressing     Problem: INFECTION - ADULT  Goal: Absence or prevention of progression during hospitalization  Description: INTERVENTIONS:  - Assess and monitor for signs and symptoms of infection  - Monitor lab/diagnostic results  - Monitor all insertion sites, i e  indwelling lines, tubes, and drains  - Monitor endotracheal if appropriate and nasal secretions for changes in amount and color  - [place] appropriate cooling/warming therapies per order  - Administer medications as ordered  - Instruct and encourage patient and family to use good hand hygiene technique  - Identify and instruct in appropriate isolation precautions for identified infection/condition  Outcome: Progressing  Goal: Absence of fever/infection during neutropenic period  Description: INTERVENTIONS:  - Monitor WBC    Outcome: Progressing     Problem: SAFETY ADULT  Goal: Patient will remain free of falls  Description: INTERVENTIONS:  - Assess patient frequently for physical needs  -  Identify cognitive and physical deficits and behaviors that affect risk of falls    -  [place] fall precautions as indicated by assessment   - Educate patient/family on patient safety including physical limitations  - Instruct patient to call for assistance with activity based on assessment  - Modify environment to reduce risk of injury  - Consider OT/PT consult to assist with strengthening/mobility  Outcome: Progressing  Goal: Maintain or return to baseline ADL function  Description: INTERVENTIONS:  -  Assess patient's ability to carry out ADLs; assess patient's baseline for ADL function and identify physical deficits which impact ability to perform ADLs (bathing, care of mouth/teeth, toileting, grooming, dressing, etc )  - Assess/evaluate cause of self-care deficits   - Assess range of motion  - Assess patient's mobility; develop plan if impaired  - Assess patient's need for assistive devices and provide as appropriate  - Encourage maximum independence but intervene and supervise when necessary  - Involve family in performance of ADLs  - Assess for home care needs following discharge   - Consider OT consult to assist with ADL evaluation and planning for discharge  - Provide patient education as appropriate  Outcome: Progressing  Goal: Maintain or return mobility status to optimal level  Description: INTERVENTIONS:  - Assess patient's baseline mobility status (ambulation, transfers, stairs, etc )    - Identify cognitive and physical deficits and behaviors that affect mobility  - Identify mobility aids required to assist with transfers and/or ambulation (gait belt, sit-to-stand, lift, walker, cane, etc )  - [place] fall precautions as indicated by assessment  - Record patient progress and toleration of activity level on Mobility SBAR; progress patient to next Phase/Stage  - Instruct patient to call for assistance with activity based on assessment  - Consider rehabilitation consult to assist with strengthening/weightbearing, etc   Outcome: Progressing     Problem: DISCHARGE PLANNING  Goal: Discharge to home or other facility with appropriate resources  Description: INTERVENTIONS:  - Identify barriers to discharge w/patient and caregiver  - Arrange for needed discharge resources and transportation as appropriate  - Identify discharge learning needs (meds, wound care, etc )  - Arrange for interpretive services to assist at discharge as needed  - Refer to Case Management Department for coordinating discharge planning if the patient needs post-hospital services based on physician/advanced practitioner order or complex needs related to functional status, cognitive ability, or social support system  Outcome: Progressing     Problem: Knowledge Deficit  Goal: Patient/family/caregiver demonstrates understanding of disease process, treatment plan, medications, and discharge instructions  Description: Complete learning assessment and assess knowledge base  Interventions:  - Provide teaching at level of understanding  - Provide teaching via preferred learning methods  Outcome: Progressing     Problem: Potential for Falls  Goal: Patient will remain free of falls  Description: INTERVENTIONS:  - Assess patient frequently for physical needs  -  Identify cognitive and physical deficits and behaviors that affect risk of falls  -  [place] fall precautions as indicated by assessment   - Educate patient/family on patient safety including physical limitations  - Instruct patient to call for assistance with activity based on assessment  - Modify environment to reduce risk of injury  - Consider OT/PT consult to assist with strengthening/mobility  Outcome: Progressing     Problem: Nutrition/Hydration-ADULT  Goal: Nutrient/Hydration intake appropriate for improving, restoring or maintaining nutritional needs  Description: Monitor and assess patient's nutrition/hydration status for malnutrition  Collaborate with interdisciplinary team and initiate plan and interventions as ordered  Monitor patient's weight and dietary intake as ordered or per policy  Utilize nutrition screening tool and intervene as necessary  Determine patient's food preferences and provide high-protein, high-caloric foods as appropriate       INTERVENTIONS:  - Monitor oral intake, urinary output, labs, and treatment plans  - Assess nutrition and hydration status and recommend course of action  - Evaluate amount of meals eaten  - Assist patient with eating if necessary   - Allow adequate time for meals  - Recommend/ encourage appropriate diets, oral nutritional supplements, and vitamin/mineral supplements  - Order, calculate, and assess calorie counts as needed  - Recommend, monitor, and adjust tube feedings and TPN/PPN based on assessed needs  - Assess need for intravenous fluids  - Provide specific nutrition/hydration education as appropriate  - Include patient/family/caregiver in decisions related to nutrition  Outcome: Progressing

## 2021-01-26 ENCOUNTER — TRANSITIONAL CARE MANAGEMENT (OUTPATIENT)
Dept: FAMILY MEDICINE CLINIC | Facility: CLINIC | Age: 71
End: 2021-01-26

## 2021-01-26 VITALS
WEIGHT: 173 LBS | OXYGEN SATURATION: 93 % | BODY MASS INDEX: 31.83 KG/M2 | HEIGHT: 62 IN | DIASTOLIC BLOOD PRESSURE: 76 MMHG | RESPIRATION RATE: 18 BRPM | HEART RATE: 74 BPM | SYSTOLIC BLOOD PRESSURE: 147 MMHG | TEMPERATURE: 98 F

## 2021-01-26 LAB
ANION GAP SERPL CALCULATED.3IONS-SCNC: 4 MMOL/L (ref 4–13)
BASOPHILS # BLD AUTO: 0.07 THOUSANDS/ΜL (ref 0–0.1)
BASOPHILS NFR BLD AUTO: 1 % (ref 0–1)
BUN SERPL-MCNC: 13 MG/DL (ref 5–25)
CALCIUM SERPL-MCNC: 9.7 MG/DL (ref 8.3–10.1)
CHLORIDE SERPL-SCNC: 103 MMOL/L (ref 100–108)
CO2 SERPL-SCNC: 32 MMOL/L (ref 21–32)
CREAT SERPL-MCNC: 0.68 MG/DL (ref 0.6–1.3)
EOSINOPHIL # BLD AUTO: 0 THOUSAND/ΜL (ref 0–0.61)
EOSINOPHIL NFR BLD AUTO: 0 % (ref 0–6)
ERYTHROCYTE [DISTWIDTH] IN BLOOD BY AUTOMATED COUNT: 14 % (ref 11.6–15.1)
GFR SERPL CREATININE-BSD FRML MDRD: 90 ML/MIN/1.73SQ M
GLUCOSE SERPL-MCNC: 101 MG/DL (ref 65–140)
HCT VFR BLD AUTO: 32.7 % (ref 34.8–46.1)
HGB BLD-MCNC: 10.2 G/DL (ref 11.5–15.4)
IMM GRANULOCYTES # BLD AUTO: 0.02 THOUSAND/UL (ref 0–0.2)
IMM GRANULOCYTES NFR BLD AUTO: 0 % (ref 0–2)
LYMPHOCYTES # BLD AUTO: 1.76 THOUSANDS/ΜL (ref 0.6–4.47)
LYMPHOCYTES NFR BLD AUTO: 21 % (ref 14–44)
MCH RBC QN AUTO: 27.7 PG (ref 26.8–34.3)
MCHC RBC AUTO-ENTMCNC: 31.2 G/DL (ref 31.4–37.4)
MCV RBC AUTO: 89 FL (ref 82–98)
MONOCYTES # BLD AUTO: 1.07 THOUSAND/ΜL (ref 0.17–1.22)
MONOCYTES NFR BLD AUTO: 13 % (ref 4–12)
NEUTROPHILS # BLD AUTO: 5.51 THOUSANDS/ΜL (ref 1.85–7.62)
NEUTS SEG NFR BLD AUTO: 65 % (ref 43–75)
NRBC BLD AUTO-RTO: 0 /100 WBCS
PLATELET # BLD AUTO: 316 THOUSANDS/UL (ref 149–390)
PMV BLD AUTO: 10.9 FL (ref 8.9–12.7)
POTASSIUM SERPL-SCNC: 3.7 MMOL/L (ref 3.5–5.3)
RBC # BLD AUTO: 3.68 MILLION/UL (ref 3.81–5.12)
SODIUM SERPL-SCNC: 139 MMOL/L (ref 136–145)
WBC # BLD AUTO: 8.43 THOUSAND/UL (ref 4.31–10.16)

## 2021-01-26 PROCEDURE — 85025 COMPLETE CBC W/AUTO DIFF WBC: CPT | Performed by: SURGERY

## 2021-01-26 PROCEDURE — 99024 POSTOP FOLLOW-UP VISIT: CPT | Performed by: SURGERY

## 2021-01-26 PROCEDURE — 80048 BASIC METABOLIC PNL TOTAL CA: CPT | Performed by: SURGERY

## 2021-01-26 PROCEDURE — NC001 PR NO CHARGE: Performed by: SURGERY

## 2021-01-26 RX ORDER — PANTOPRAZOLE SODIUM 40 MG/1
40 TABLET, DELAYED RELEASE ORAL
Status: DISCONTINUED | OUTPATIENT
Start: 2021-01-26 | End: 2021-01-26 | Stop reason: HOSPADM

## 2021-01-26 RX ORDER — OXYCODONE HYDROCHLORIDE 5 MG/1
5 TABLET ORAL EVERY 4 HOURS PRN
Qty: 20 TABLET | Refills: 0 | Status: SHIPPED | OUTPATIENT
Start: 2021-01-26 | End: 2021-01-31

## 2021-01-26 RX ORDER — DOCUSATE SODIUM 100 MG/1
100 CAPSULE, LIQUID FILLED ORAL 2 TIMES DAILY
Qty: 10 CAPSULE | Refills: 0 | COMMUNITY
Start: 2021-01-26

## 2021-01-26 RX ORDER — POTASSIUM CHLORIDE 20 MEQ/1
20 TABLET, EXTENDED RELEASE ORAL ONCE
Status: COMPLETED | OUTPATIENT
Start: 2021-01-26 | End: 2021-01-26

## 2021-01-26 RX ADMIN — HEPARIN SODIUM 5000 UNITS: 5000 INJECTION INTRAVENOUS; SUBCUTANEOUS at 08:36

## 2021-01-26 RX ADMIN — FLUTICASONE FUROATE AND VILANTEROL TRIFENATATE 1 PUFF: 100; 25 POWDER RESPIRATORY (INHALATION) at 08:37

## 2021-01-26 RX ADMIN — PANTOPRAZOLE SODIUM 40 MG: 40 TABLET, DELAYED RELEASE ORAL at 08:36

## 2021-01-26 RX ADMIN — POTASSIUM CHLORIDE 20 MEQ: 1500 TABLET, EXTENDED RELEASE ORAL at 08:37

## 2021-01-26 NOTE — CASE MANAGEMENT
IMM reviewed with patient  patient agree with discharge determination    PT requested rollator, script uploaded in [email or web address]    Aware will be delivered to home and that [employer] will contact her with cost

## 2021-01-26 NOTE — DISCHARGE SUMMARY
Discharge Summary - Surgical Oncology   Mansi Baig 72 y o  female MRN: [id number]  [facility] Encounter: [id number]    Admission Date: 1/20/2021     Admitting Diagnosis: 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 [facility]) [C7B 8]    HPI:  James Chow is a 61-year-old woman known to my service for a well-differentiated neuroendocrine tumor  A PET scan in December of 2020 revealed 3 areas of small bowel along with 2 soft tissue nodules  There was uptake in the liver and in T11 vertebral body  An MRI was performed which revealed marrow replacing lesion at T11  She denies any flushing, no diarrhea, no palpitations or sweats  She does have some intermittent abdominal discomfort at times  There was no mid back pain  Patient is now being admitted for surgical intervention for the small bowel as well as liver  Procedures Performed:  January 20th, 2021 exploratory laparotomy, small-bowel resection, ablations of segment 7 and 8 of the liver - Dr Audrey Blanchard:  Patient has done well postoperatively  She did have an epidural catheter which controlled her pain  We removed the catheter on postop day 4 and patient had initially had pain issues, but now is pain controlled on the oxycodone  Patient's Teague was removed by postop day 1  And she was voiding well on her own  She has been out of bed and ambulating the halls well  There has been no nausea or vomiting  She has been tolerating a regular diet  Patient will be followed up with Dr Vianney Tiwari at the [facility] on February 2nd at 11:30 a m  In the morning  A script was sent to [facility] pharmacy on the [address] in [facility] for oxycodone 5 mg every 4 hours as needed for pain  Twenty pills were prescribed with no refills  Patient is not to do any heavy lifting pushing or pulling for the next 4-5 weeks  Patient is free to shower, no baths    She is not hesitate to call the office for any fevers of 101 5 or higher, increasing abdominal pain or vomiting  Pathology still pending    Complications:  None    Discharge Diagnosis:  Same    Discharge Date:  January 26th, 2021    Condition at Discharge:  Good     Discharge instructions/Information to patient and family:   See after visit summary for information provided to patient and family  Provisions for Follow-Up Care:  See after visit summary for information related to follow-up care and any pertinent home health orders  Disposition:  Home    Planned Readmission:  No    Discharge Statement   I spent 30 minutes discharging the patient  This time was spent on the day of discharge  I had direct contact with the patient on the day of discharge  Additional documentation is required if more than 30 minutes were spent on discharge  Discharge Medications:  See after visit summary for reconciled discharge medications provided to patient and family

## 2021-01-26 NOTE — PROGRESS NOTES
Progress Note - Surgical Oncology   Carly Cade 72 y o  female MRN: [id number]  Unit/Bed#: [facility] 915-01 Encounter: [id number]    Assessment:  71 y/o F p/w neuroendocrine tumor of small bowel, s/p ex-lap, small bowel resection and anastomosis, segment 7&8 liver ablation on 1/20    Plan:  --Regular diet  --Pain control with PO meds (Epidural d/c'd yesterday)  --OOB, ambulate  --Discharge planning    Subjective/Objective     Subjective:     No acute events overnight  Pt reports her pain is much better controlled this AM, not requiring any doses overnight  Tolerating PO  Denies any N/V  Passing flatus, no bowel movements  Objective:     Blood pressure 146/75, pulse 66, temperature 98 7 °F (37 1 °C), resp  rate 20, height 5' 2" (1 575 m), weight 78 5 kg (173 lb), SpO2 95 %  ,Body mass index is 31 64 kg/m²  Intake/Output Summary (Last 24 hours) at 1/26/2021 0550  Last data filed at 1/26/2021 0331  Gross per 24 hour   Intake 513 45 ml   Output 1950 ml   Net -1436 55 ml       Invasive Devices     Peripheral Intravenous Line            Peripheral IV 01/24/21 Left;Ventral (anterior) Forearm 1 day                Physical Exam:     GEN: NAD  HEENT: MMM  CV: RRR  Lung: normal effort  Ab: Soft, NT/ND, incision c/d/i  Extrem: No CCE  Neuro:  A+Ox3, motor and sensation grossly intact      Lab, Imaging and other studies:CBC: No results found for: WBC, HGB, HCT, MCV, PLT, ADJUSTEDWBC, MCH, MCHC, RDW, MPV, NRBC, CMP: No results found for: SODIUM, K, CL, CO2, ANIONGAP, BUN, CREATININE, GLUCOSE, CALCIUM, AST, ALT, ALKPHOS, PROT, BILITOT, EGFR, Coagulation: No results found for: PT, INR, APTT  VTE Pharmacologic Prophylaxis: Heparin  VTE Mechanical Prophylaxis: sequential compression device

## 2021-01-26 NOTE — PLAN OF CARE
Problem: PAIN - ADULT  Goal: Verbalizes/displays adequate comfort level or baseline comfort level  Description: Interventions:  - Encourage patient to monitor pain and request assistance  - Assess pain using appropriate pain scale  - Administer analgesics based on type and severity of pain and evaluate response  - Implement non-pharmacological measures as appropriate and evaluate response  - Consider cultural and social influences on pain and pain management  - Notify physician/advanced practitioner if interventions unsuccessful or patient reports new pain  1/26/2021 1039 by Shanika Fisher RN  Outcome: Completed  1/26/2021 0716 by Shanika Fisher RN  Outcome: Progressing     Problem: INFECTION - ADULT  Goal: Absence or prevention of progression during hospitalization  Description: INTERVENTIONS:  - Assess and monitor for signs and symptoms of infection  - Monitor lab/diagnostic results  - Monitor all insertion sites, i e  indwelling lines, tubes, and drains  - Monitor endotracheal if appropriate and nasal secretions for changes in amount and color  - [place] appropriate cooling/warming therapies per order  - Administer medications as ordered  - Instruct and encourage patient and family to use good hand hygiene technique  - Identify and instruct in appropriate isolation precautions for identified infection/condition  1/26/2021 1039 by Shanika Fisher RN  Outcome: Completed  1/26/2021 0716 by Shanika Fisher RN  Outcome: Progressing  Goal: Absence of fever/infection during neutropenic period  Description: INTERVENTIONS:  - Monitor WBC    1/26/2021 1039 by Shanika Fisher RN  Outcome: Completed  1/26/2021 0716 by Shanika Fisher RN  Outcome: Progressing     Problem: SAFETY ADULT  Goal: Patient will remain free of falls  Description: INTERVENTIONS:  - Assess patient frequently for physical needs  -  Identify cognitive and physical deficits and behaviors that affect risk of falls    -  [place] fall precautions as indicated by assessment   - Educate patient/family on patient safety including physical limitations  - Instruct patient to call for assistance with activity based on assessment  - Modify environment to reduce risk of injury  - Consider OT/PT consult to assist with strengthening/mobility  1/26/2021 1039 by Tyler Larkin RN  Outcome: Completed  1/26/2021 0716 by Tyler Larkin RN  Outcome: Progressing  Goal: Maintain or return to baseline ADL function  Description: INTERVENTIONS:  -  Assess patient's ability to carry out ADLs; assess patient's baseline for ADL function and identify physical deficits which impact ability to perform ADLs (bathing, care of mouth/teeth, toileting, grooming, dressing, etc )  - Assess/evaluate cause of self-care deficits   - Assess range of motion  - Assess patient's mobility; develop plan if impaired  - Assess patient's need for assistive devices and provide as appropriate  - Encourage maximum independence but intervene and supervise when necessary  - Involve family in performance of ADLs  - Assess for home care needs following discharge   - Consider OT consult to assist with ADL evaluation and planning for discharge  - Provide patient education as appropriate  1/26/2021 1039 by Tyler Larkin RN  Outcome: Completed  1/26/2021 0716 by Tyler Larkin RN  Outcome: Progressing  Goal: Maintain or return mobility status to optimal level  Description: INTERVENTIONS:  - Assess patient's baseline mobility status (ambulation, transfers, stairs, etc )    - Identify cognitive and physical deficits and behaviors that affect mobility  - Identify mobility aids required to assist with transfers and/or ambulation (gait belt, sit-to-stand, lift, walker, cane, etc )  - [place] fall precautions as indicated by assessment  - Record patient progress and toleration of activity level on Mobility SBAR; progress patient to next Phase/Stage  - Instruct patient to call for assistance with activity based on assessment  - Consider rehabilitation consult to assist with strengthening/weightbearing, etc   1/26/2021 1039 by Henrik Medrano RN  Outcome: Completed  1/26/2021 0716 by Henrik Medrano RN  Outcome: Progressing     Problem: DISCHARGE PLANNING  Goal: Discharge to home or other facility with appropriate resources  Description: INTERVENTIONS:  - Identify barriers to discharge w/patient and caregiver  - Arrange for needed discharge resources and transportation as appropriate  - Identify discharge learning needs (meds, wound care, etc )  - Arrange for interpretive services to assist at discharge as needed  - Refer to Case Management Department for coordinating discharge planning if the patient needs post-hospital services based on physician/advanced practitioner order or complex needs related to functional status, cognitive ability, or social support system  1/26/2021 1039 by Henrik Medrano RN  Outcome: Completed  1/26/2021 0716 by Henrik Medrano RN  Outcome: Progressing     Problem: Knowledge Deficit  Goal: Patient/family/caregiver demonstrates understanding of disease process, treatment plan, medications, and discharge instructions  Description: Complete learning assessment and assess knowledge base  Interventions:  - Provide teaching at level of understanding  - Provide teaching via preferred learning methods  1/26/2021 1039 by Henrik Medrano RN  Outcome: Completed  1/26/2021 0716 by Henrik Medrano RN  Outcome: Progressing     Problem: Potential for Falls  Goal: Patient will remain free of falls  Description: INTERVENTIONS:  - Assess patient frequently for physical needs  -  Identify cognitive and physical deficits and behaviors that affect risk of falls    -  [place] fall precautions as indicated by assessment   - Educate patient/family on patient safety including physical limitations  - Instruct patient to call for assistance with activity based on assessment  - Modify environment to reduce risk of injury  - Consider OT/PT consult to assist with strengthening/mobility  1/26/2021 1039 by Ashli Bingham, RN  Outcome: Completed  1/26/2021 0716 by Ashli Bingham, RN  Outcome: Progressing     Problem: Nutrition/Hydration-ADULT  Goal: Nutrient/Hydration intake appropriate for improving, restoring or maintaining nutritional needs  Description: Monitor and assess patient's nutrition/hydration status for malnutrition  Collaborate with interdisciplinary team and initiate plan and interventions as ordered  Monitor patient's weight and dietary intake as ordered or per policy  Utilize nutrition screening tool and intervene as necessary  Determine patient's food preferences and provide high-protein, high-caloric foods as appropriate       INTERVENTIONS:  - Monitor oral intake, urinary output, labs, and treatment plans  - Assess nutrition and hydration status and recommend course of action  - Evaluate amount of meals eaten  - Assist patient with eating if necessary   - Allow adequate time for meals  - Recommend/ encourage appropriate diets, oral nutritional supplements, and vitamin/mineral supplements  - Order, calculate, and assess calorie counts as needed  - Recommend, monitor, and adjust tube feedings and TPN/PPN based on assessed needs  - Assess need for intravenous fluids  - Provide specific nutrition/hydration education as appropriate  - Include patient/family/caregiver in decisions related to nutrition  1/26/2021 1039 by Ashli Bingham, RN  Outcome: Completed  1/26/2021 0716 by Ashli Bingham, RN  Outcome: Progressing

## 2021-01-26 NOTE — PLAN OF CARE
Problem: PAIN - ADULT  Goal: Verbalizes/displays adequate comfort level or baseline comfort level  Description: Interventions:  - Encourage patient to monitor pain and request assistance  - Assess pain using appropriate pain scale  - Administer analgesics based on type and severity of pain and evaluate response  - Implement non-pharmacological measures as appropriate and evaluate response  - Consider cultural and social influences on pain and pain management  - Notify physician/advanced practitioner if interventions unsuccessful or patient reports new pain  Outcome: Progressing     Problem: INFECTION - ADULT  Goal: Absence or prevention of progression during hospitalization  Description: INTERVENTIONS:  - Assess and monitor for signs and symptoms of infection  - Monitor lab/diagnostic results  - Monitor all insertion sites, i e  indwelling lines, tubes, and drains  - Monitor endotracheal if appropriate and nasal secretions for changes in amount and color  - [place] appropriate cooling/warming therapies per order  - Administer medications as ordered  - Instruct and encourage patient and family to use good hand hygiene technique  - Identify and instruct in appropriate isolation precautions for identified infection/condition  Outcome: Progressing  Goal: Absence of fever/infection during neutropenic period  Description: INTERVENTIONS:  - Monitor WBC    Outcome: Progressing     Problem: SAFETY ADULT  Goal: Patient will remain free of falls  Description: INTERVENTIONS:  - Assess patient frequently for physical needs  -  Identify cognitive and physical deficits and behaviors that affect risk of falls    -  [place] fall precautions as indicated by assessment   - Educate patient/family on patient safety including physical limitations  - Instruct patient to call for assistance with activity based on assessment  - Modify environment to reduce risk of injury  - Consider OT/PT consult to assist with strengthening/mobility  Outcome: Progressing  Goal: Maintain or return to baseline ADL function  Description: INTERVENTIONS:  -  Assess patient's ability to carry out ADLs; assess patient's baseline for ADL function and identify physical deficits which impact ability to perform ADLs (bathing, care of mouth/teeth, toileting, grooming, dressing, etc )  - Assess/evaluate cause of self-care deficits   - Assess range of motion  - Assess patient's mobility; develop plan if impaired  - Assess patient's need for assistive devices and provide as appropriate  - Encourage maximum independence but intervene and supervise when necessary  - Involve family in performance of ADLs  - Assess for home care needs following discharge   - Consider OT consult to assist with ADL evaluation and planning for discharge  - Provide patient education as appropriate  Outcome: Progressing  Goal: Maintain or return mobility status to optimal level  Description: INTERVENTIONS:  - Assess patient's baseline mobility status (ambulation, transfers, stairs, etc )    - Identify cognitive and physical deficits and behaviors that affect mobility  - Identify mobility aids required to assist with transfers and/or ambulation (gait belt, sit-to-stand, lift, walker, cane, etc )  - [place] fall precautions as indicated by assessment  - Record patient progress and toleration of activity level on Mobility SBAR; progress patient to next Phase/Stage  - Instruct patient to call for assistance with activity based on assessment  - Consider rehabilitation consult to assist with strengthening/weightbearing, etc   Outcome: Progressing     Problem: DISCHARGE PLANNING  Goal: Discharge to home or other facility with appropriate resources  Description: INTERVENTIONS:  - Identify barriers to discharge w/patient and caregiver  - Arrange for needed discharge resources and transportation as appropriate  - Identify discharge learning needs (meds, wound care, etc )  - Arrange for interpretive services to assist at discharge as needed  - Refer to Case Management Department for coordinating discharge planning if the patient needs post-hospital services based on physician/advanced practitioner order or complex needs related to functional status, cognitive ability, or social support system  Outcome: Progressing     Problem: Knowledge Deficit  Goal: Patient/family/caregiver demonstrates understanding of disease process, treatment plan, medications, and discharge instructions  Description: Complete learning assessment and assess knowledge base  Interventions:  - Provide teaching at level of understanding  - Provide teaching via preferred learning methods  Outcome: Progressing     Problem: Potential for Falls  Goal: Patient will remain free of falls  Description: INTERVENTIONS:  - Assess patient frequently for physical needs  -  Identify cognitive and physical deficits and behaviors that affect risk of falls  -  [place] fall precautions as indicated by assessment   - Educate patient/family on patient safety including physical limitations  - Instruct patient to call for assistance with activity based on assessment  - Modify environment to reduce risk of injury  - Consider OT/PT consult to assist with strengthening/mobility  Outcome: Progressing     Problem: Nutrition/Hydration-ADULT  Goal: Nutrient/Hydration intake appropriate for improving, restoring or maintaining nutritional needs  Description: Monitor and assess patient's nutrition/hydration status for malnutrition  Collaborate with interdisciplinary team and initiate plan and interventions as ordered  Monitor patient's weight and dietary intake as ordered or per policy  Utilize nutrition screening tool and intervene as necessary  Determine patient's food preferences and provide high-protein, high-caloric foods as appropriate       INTERVENTIONS:  - Monitor oral intake, urinary output, labs, and treatment plans  - Assess nutrition and hydration status and recommend course of action  - Evaluate amount of meals eaten  - Assist patient with eating if necessary   - Allow adequate time for meals  - Recommend/ encourage appropriate diets, oral nutritional supplements, and vitamin/mineral supplements  - Order, calculate, and assess calorie counts as needed  - Recommend, monitor, and adjust tube feedings and TPN/PPN based on assessed needs  - Assess need for intravenous fluids  - Provide specific nutrition/hydration education as appropriate  - Include patient/family/caregiver in decisions related to nutrition  Outcome: Progressing

## 2021-01-27 DIAGNOSIS — D86.0 SARCOIDOSIS, LUNG (HCC): ICD-10-CM

## 2021-01-27 RX ORDER — HYDROCHLOROTHIAZIDE 12.5 MG/1
TABLET ORAL
Qty: 90 TABLET | Refills: 1 | Status: SHIPPED | OUTPATIENT
Start: 2021-01-27 | End: 2021-08-04

## 2021-01-28 ENCOUNTER — RADIATION ONCOLOGY CONSULT (OUTPATIENT)
Dept: RADIATION ONCOLOGY | Facility: HOSPITAL | Age: 71
End: 2021-01-28
Attending: RADIOLOGY
Payer: COMMERCIAL

## 2021-01-28 VITALS
OXYGEN SATURATION: 98 % | SYSTOLIC BLOOD PRESSURE: 126 MMHG | HEART RATE: 75 BPM | WEIGHT: 172 LBS | RESPIRATION RATE: 16 BRPM | TEMPERATURE: 97.5 F | BODY MASS INDEX: 31.46 KG/M2 | DIASTOLIC BLOOD PRESSURE: 64 MMHG

## 2021-01-28 DIAGNOSIS — C79.51 BONE METASTASIS (HCC): Primary | ICD-10-CM

## 2021-01-28 DIAGNOSIS — C7B.8 METASTATIC MALIGNANT NEUROENDOCRINE TUMOR TO LIVER (HCC): Primary | ICD-10-CM

## 2021-01-28 DIAGNOSIS — C7B.8 METASTATIC MALIGNANT NEUROENDOCRINE TUMOR TO LIVER (HCC): ICD-10-CM

## 2021-01-28 PROCEDURE — 99211 OFF/OP EST MAY X REQ PHY/QHP: CPT | Performed by: RADIOLOGY

## 2021-01-28 NOTE — PROGRESS NOTES
Koki Rodriguez 1950 is a 72 y o  female    Oncology History Overview Note   Patient presents for palliative radiation consult for T11 spine metastases, followed by CT simulation  She is referred by Dr Arianna Amaya  72year old female with with a history of stable IPMN (intraductal papillary mucinous neoplasm) since 2013, 1 1 cm  She was on observation with Dr Karen Louise with repeat MRIs  MRI in November 2020 revealed an enlarging right hepatic lobe lesion measuring 1 6 x 1 5 cm, highly suspicious for malignancy  This was previously biopsied and benign  Recommended obtaining liver biopsy  11/5/20  MRI abdomen w wo contrast and mrcp  1  Progressively enlarging right hepatic lobe lesion in segment VII, currently measuring 1 6 x 1 5 cm (previously 1 1 x 1 0 cm in October 2019, and new since October 2016)  It demonstrates arterial phase hyperenhancement, washout and marked restricted diffusion  Although patient had a prior negative biopsy, the MRI features are highly suspicious for malignant lesion (either hepatocellular carcinoma or metastatic disease)  Repeat evaluation with biopsy is recommended  2   Additional 5 mm lesion in segment VIII with APHE and washout, unchanged from October 2019, but new from October 2018  This is also suspicious, but too small to adequately characterize due to small size  3   Stable scattered liver cysts  4   Stable numerous cystic pancreatic lesions, largest measuring 14 mm in the distal body  No development of for some imaging features  5   Stable mildly prominent mesenteric lymph node  12/8/20 IR biopsy liver mass  Impression: Successful percutaneous core biopsy of hepatic segment 7 mass yielding a specimen with lesional tissue present   Final report with metastatic neuroendocrine carcinoma  12/15/20 Dr Karen Louise follow-up  Reviewed biopsy result revealing well-differentiated neuroendocrine tumor  Long discussion on treatment options   She is scheduled for PET/CT and obtain chromogranin A level at this time  Also present at [facility] working group  Recommend surgical resection of the primary, resection/ablation or liver directed therapy to the liver  PRRT if there is progression of extrahepatic disease  12/15/20 Chromogranin A: elevated 468 2     12/22/20 PET/CT  1  At least 3 foci of radiotracer uptake within the small bowel suspicious for underlying neuroendocrine lesions  Consider follow-up with CT enterography or small bowel pill study  2  Increased radiotracer uptake in two mesenteric soft tissue nodules suspicious for metastasis  3  Focal radiotracer uptake in the right lobe of the liver posteriorly compatible with the known metastasis  Slight focal increased radiotracer uptake in the right lobe of the liver anteriorly corresponding to the 5 mm subcapsular lesion segment 8   lesion seen on MRI suspicious for additional metastasis  4  Focal radiotracer uptake at the T11 vertebral body suspicious for osseous metastasis  12/22/20 Dr Weston Parrish called pt with PET results, reveals a primary tumor to likely be in the small bowel as well as a single liver lesion and a bone lesion  plan for MRI of T-spine    12/31/20 Rectal/GI Multidisciplinary Case Review:  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  PHYSICIAN RECOMMENDED PLAN:   - Meet with Dr Weston Parrish to discuss surgical options   - MRI of T-spine     1/8/21 MRI thoracic spine w wo contrast  There is a small focal marrow replacing lesion within the posterior superior aspect of the T11 superior endplate which corresponds to the PET/CT abnormality, suggestive of osseous metastasis  No other definitive marrow replacing lesions are identified  Minor lower thoracic degenerative change  No canal stenosis or foraminal narrowing  No cord compression  Partially visualized T2 hyperintensity within the posterior segment of the right lobe of the liver on series 10 image 40 is incompletely evaluated on this examination  Previous imaging does report a liver metastasis including MRI dated 11/5/2020 1/12/21 Dr Weston Parrish follow-up post MRI  MRI from last week revealed marrow replacing lesion at T11  Discussed that there is potentially a survival advantage by resecting the primary  Recommend that she undergo exploratory laparotomy with resection of the small bowel, intraoperative ultrasound of the liver with resection/ablation of the liver lesion  Recommend she start octreotide prior to surgical intervention  Will arrange with Dr Arzate Parents  Would then treat the bone lesion with either EBRT or potentially PRRT if there is other disease seen at the time of surgery  1/12/21 Med Onc Consult, Dr Arzate Parents  Start lanreotide  Refer to Radiation Oncology  The plan is to debulk the tumor, radiate the spine and then consider PPRT depending on surgical outcome and final extent of disease  1/20 - 1/26/21 Hospital admission for surgery, [facility]  1/20/21 OR - Resection small bowel and anastomosis, resection small bowel nodule  Liver ablation segment 7, intraoperative U/S of liver, laparotomy exploratory  Operative Findings: Three primary site seen in the small bowel with associated mesenteric adenopathy  Two liver metastasis seen corresponding to the MRI and PET        2/2/21 Dr Weston Parrish follow-up  2/15/21 Infusion, [facility]  2/25/21 Dr Huey Frye follow-up     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)   12/8/2020 Initial Diagnosis    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 ([facility] [address] )     12/8/2020 Biopsy    IR Liver biopsy:  A   Liver mass, needle core biopsy:  - Metastatic well-differentiated neuroendocrine tumor, G2     1/18/2021 -  Chemotherapy    Lanreotide injections (monthly)     1/20/2021 Surgery    Resection of small bowel and anastomosis, segment 7 liver ablasion           Clinical Trial: no      Health Maintenance   Topic Date Due    OT PLAN OF CARE  1950    BMI: Followup Plan  10/16/1969    Annual Physical 10/16/1969    DTaP,Tdap,and Td Vaccines (1 - Tdap) 10/16/1972    Pneumococcal Vaccine: 65+ Years (2 of 2 - PPSV23) 02/11/2020    Influenza Vaccine (1) 09/01/2020    Fall Risk  09/10/2020    MAMMOGRAM  11/08/2020    Depression Screening PHQ  08/26/2021    BMI: Adult  01/20/2022    Colonoscopy Surveillance  09/19/2022    Colorectal Cancer Screening  09/19/2027    Hepatitis C Screening  Completed    HIB Vaccine  Aged Out    Hepatitis B Vaccine  Aged Out    IPV Vaccine  Aged Out    Hepatitis A Vaccine  Aged Out    Meningococcal ACWY Vaccine  Aged Out    HPV Vaccine  Aged Out       Past Medical History:   Diagnosis Date    Abdominal pain     Acute tonsillitis     Breast pain, left     Edema of both lower extremities     GERD without esophagitis     Kidney stone     " [place]"    Lesion of liver     Liver mass     Lymphadenopathy     Mediastinal lymphadenopathy     Neoplasm of digestive system     Orthostatic lightheadedness     Sarcoidosis     Vertigo        Past Surgical History:   Procedure Laterality Date    CARPAL TUNNEL RELEASE Bilateral     COLONOSCOPY      2017    IR BIOPSY LIVER MASS  11/6/2018    IR BIOPSY LIVER MASS  12/8/2020    KNEE ARTHROSCOPY Left     LAPAROTOMY N/A 1/20/2021    Procedure: LAPAROTOMY EXPLORATORY;  Surgeon: aMrgie Carter MD;  [facility];  Service: Surgical Oncology    LIVER LOBECTOMY N/A 1/20/2021    Procedure: LIVER ABLATION SEGMENT 7, INTRAOPERATIVE U/S OF LIVER;  Surgeon: Margie Carter MD;  [facility];  Service: Surgical Oncology    MEDIASTINOSCOPY N/A 11/27/2018    Procedure: MEDIASTINOSCOPY;  Surgeon: Gregorio Valdivia MD;  [facility];  Service: Thoracic    IN BRONCHOSCOPY NEEDLE BX TRACHEA MAIN STEM&/BRON N/A 11/27/2018    Procedure: EBUS with biopsy;  Surgeon: Gregorio Valdivia MD;  [facility];  Service: Thoracic    IN [address] N/A 11/27/2018    Procedure: BRONCHOSCOPY FLEXIBLE;  Surgeon: Ramos Delgado MD;  [facility];  Service: Thoracic    OR EDG US EXAM SURGICAL ALTER STOM DUODENUM/JEJUNUM N/A 10/29/2018    Procedure: LINEAR ENDOSCOPIC U/S;  Surgeon: Johnny Schaeffer MD;  [facility];   Service: Gastroenterology    SMALL INTESTINE SURGERY N/A 1/20/2021    Procedure: RESECTION SMALL BOWEL AND ANASTOMOSIS, RESECTION SMALL BOWEL NODULE;  Surgeon: Heath Machado MD;  [facility];  Service: Surgical Oncology    WISDOM TOOTH EXTRACTION         Family History   Problem Relation Age of Onset    Alzheimer's disease Mother     Parkinsonism Father     Colon cancer Maternal Grandfather        Social History     Tobacco Use    Smoking status: Never Smoker    Smokeless tobacco: Never Used   Substance Use Topics    Alcohol use: Yes     Frequency: Monthly or less    Drug use: No          Current Outpatient Medications:     aspirin 81 MG tablet, Take 1 tablet by mouth daily in the early morning , Disp: , Rfl:     Cholecalciferol (VITAMIN D) 2000 units CAPS, Take 1 tablet by mouth daily, Disp: , Rfl:     docusate sodium (COLACE) 100 mg capsule, Take 1 capsule (100 mg total) by mouth 2 (two) times a day, Disp: 10 capsule, Rfl: 0    hydrochlorothiazide (HYDRODIURIL) 12 5 mg tablet, TAKE 1 TABLET BY MOUTH EVERY DAY, Disp: 90 tablet, Rfl: 1    Multiple Vitamin (MULTIVITAMIN) tablet, Take 1 tablet by mouth daily, Disp: , Rfl:     omeprazole (PriLOSEC) 40 MG capsule, TAKE 1 CAPSULE BY MOUTH EVERY DAY BEFORE BREAKFAST, Disp: 90 capsule, Rfl: 1    oxyCODONE (ROXICODONE) 5 mg immediate release tablet, Take 1 tablet (5 mg total) by mouth every 4 (four) hours as needed for moderate pain for up to 5 daysMax Daily Amount: 30 mg, Disp: 20 tablet, Rfl: 0    Wixela Inhub 250-50 MCG/DOSE inhaler, INHALE 1 PUFF 2 (TWO) TIMES A DAY RINSE MOUTH AFTER USE  (Patient taking differently: Inhale 1 puff daily ), Disp: 1 Inhaler, Rfl: 5    Allergies   Allergen Reactions    Bactrim [Sulfamethoxazole-Trimethoprim] Hives    Clam Shell Vomiting        Review of Systems:  Review of Systems   Constitutional: Positive for appetite change (decreased, feels full quickly) and fatigue  HENT: Negative  Eyes: Negative  Respiratory: Negative  Cardiovascular: Negative  Gastrointestinal: Positive for abdominal distention ("bloating") and abdominal pain (incision tenderness, taking oxy BID)  Endocrine: Negative  Genitourinary: Negative  Musculoskeletal: Positive for back pain (lower back pain)  Skin: Negative  Allergic/Immunologic: Negative  Neurological: Positive for weakness (recovering from surgery)  Hematological: Negative  Psychiatric/Behavioral: Negative  Vitals:    01/28/21 0900   BP: 126/64   BP Location: Right arm   Pulse: 75   Resp: 16   Temp: 97 5 °F (36 4 °C)   TempSrc: Temporal   SpO2: 98%   Weight: 78 kg (172 lb)        PFT: n/a    Imaging:No images are attached to the encounter       Teaching: palliative radiation    MST completed    Implantable Devices (Port, Pacemaker, pain stimulator): no     Hip Replacement:  no

## 2021-01-28 NOTE — PROGRESS NOTES
Consultation - Radiation Oncology      [facility]:[id number] : 1950  Encounter: [id number]  Patient Information: [address]  Chief Complaint   Patient presents with    Consult     Radiation Oncology     Cancer Staging  No matching staging information was found for the patient  History of Present Illness       Patient presents for palliative radiation consult for T11 spine metastases, followed by CT simulation  She is referred by Dr Kimberly Ramesh      72year old female with with a history of stable IPMN (intraductal papillary mucinous neoplasm) since , 1 1 cm  She was on observation with Dr Norma Mccord with repeat MRIs  MRI in 2020 revealed an enlarging right hepatic lobe lesion measuring 1 6 x 1 5 cm, highly suspicious for malignancy  This was previously biopsied and benign  Recommended obtaining liver biopsy       20  MRI abdomen w wo contrast and mrcp  1   Progressively enlarging right hepatic lobe lesion in segment VII, currently measuring 1 6 x 1 5 cm (previously 1 1 x 1 0 cm in 2019, and new since 2016)   It demonstrates arterial phase hyperenhancement, washout and marked restricted diffusion  Although patient had a prior negative biopsy, the MRI features are highly suspicious for malignant lesion (either hepatocellular carcinoma or metastatic disease)    Repeat evaluation with biopsy is recommended    2   Additional 5 mm lesion in segment VIII with APHE and washout, unchanged from 2019, but new from 2018   This is also suspicious, but too small to adequately characterize due to small size    3   Stable scattered liver cysts    4   Stable numerous cystic pancreatic lesions, largest measuring 14 mm in the distal body   No development of for some imaging features    5   Stable mildly prominent mesenteric lymph node      20 IR biopsy liver mass  Impression: Successful percutaneous core biopsy of hepatic segment 7 mass yielding a specimen with lesional tissue present      Final report with metastatic neuroendocrine carcinoma      12/15/20 Dr Evaristo Ascencio follow-up  Reviewed biopsy result revealing well-differentiated neuroendocrine tumor  Long discussion on treatment options  She is scheduled for PET/CT and obtain chromogranin A level at this time  Also present at [facility] working group  Recommend surgical resection of the primary, resection/ablation or liver directed therapy to the liver  PRRT if there is progression of extrahepatic disease       12/15/20 Chromogranin A: elevated 468  2      12/22/20 PET/CT  1  At least 3 foci of radiotracer uptake within the small bowel suspicious for underlying neuroendocrine lesions   Consider follow-up with CT enterography or small bowel pill study  2  Increased radiotracer uptake in two mesenteric soft tissue nodules suspicious for metastasis  3  Focal radiotracer uptake in the right lobe of the liver posteriorly compatible with the known metastasis   Slight focal increased radiotracer uptake in the right lobe of the liver anteriorly corresponding to the 5 mm subcapsular lesion segment 8   lesion seen on MRI suspicious for additional metastasis  4  Focal radiotracer uptake at the T11 vertebral body suspicious for osseous metastasis      12/22/20 Dr Evaristo Ascencio called pt with PET results, reveals a primary tumor to likely be in the small bowel as well as a single liver lesion and a bone lesion    plan for MRI of T-spine     12/31/20 Rectal/GI Multidisciplinary Case Review:  Diagnosis: 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  PHYSICIAN RECOMMENDED PLAN:   - Meet with Dr Evaristo Ascencio to discuss surgical options   - MRI of T-spine      1/8/21 MRI thoracic spine w wo contrast  There is a small focal marrow replacing lesion within the posterior superior aspect of the T11 superior endplate which corresponds to the PET/CT abnormality, suggestive of osseous metastasis   No other definitive marrow replacing lesions are identified      Minor lower thoracic degenerative change   No canal stenosis or foraminal narrowing   No cord compression      Partially visualized T2 hyperintensity within the posterior segment of the right lobe of the liver on series 10 image 40 is incompletely evaluated on this examination   Previous imaging does report a liver metastasis including MRI dated 11/5/2020 1/12/21 Dr Danielito Mejia follow-up post MRI  MRI from last week revealed marrow replacing lesion at T11  Discussed that there is potentially a survival advantage by resecting the primary   Recommend that she undergo exploratory laparotomy with resection of the small bowel, intraoperative ultrasound of the liver with resection/ablation of the liver lesion     Recommend she start octreotide prior to surgical intervention  Will arrange with Dr Janell Argueta  Would then treat the bone lesion with either EBRT or potentially PRRT if there is other disease seen at the time of surgery       1/12/21 Med Onc Consult, Dr Janell Argueta  Start lanreotide  Refer to Radiation Oncology  The plan is to debulk the tumor, radiate the spine and then consider PPRT depending on surgical outcome and final extent of disease       1/20 - 1/26/21 Hospital admission for surgery, [facility]  1/20/21 OR - Resection small bowel and anastomosis, resection small bowel nodule  Liver ablation segment 7, intraoperative U/S of liver, laparotomy exploratory  Operative Findings: Three primary site seen in the small bowel with associated mesenteric adenopathy  Two liver metastasis seen corresponding to the MRI and PET        2/2/21 Dr Danielito Mejia follow-up  2/15/21 [facility] [place]  2/25/21 Dr Janell Argueta follow-up      She denies any back pain  She states her abdominal incision is still very sensitive and painful with movements        Historical Information   Oncology History   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)   12/8/2020 Initial Diagnosis    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 ([facility] [address] )     12/8/2020 Biopsy    IR Liver biopsy:  A  Liver mass, needle core biopsy:  - Metastatic well-differentiated neuroendocrine tumor, G2     1/18/2021 -  Chemotherapy    Lanreotide injections (monthly)     1/20/2021 Surgery    Resection of small bowel and anastomosis, segment 7 liver ablasion             Past Medical History:   Diagnosis Date    Abdominal pain     Acute tonsillitis     Breast pain, left     Edema of both lower extremities     GERD without esophagitis     Kidney stone     " [place]"    Lesion of liver     Liver mass     Lymphadenopathy     Mediastinal lymphadenopathy     Neoplasm of digestive system     Orthostatic lightheadedness     Sarcoidosis     Vertigo      Past Surgical History:   Procedure Laterality Date    CARPAL TUNNEL RELEASE Bilateral     COLONOSCOPY      2017    IR BIOPSY LIVER MASS  11/6/2018    IR BIOPSY LIVER MASS  12/8/2020    KNEE ARTHROSCOPY Left     LAPAROTOMY N/A 1/20/2021    Procedure: LAPAROTOMY EXPLORATORY;  Surgeon: Adilia Lovett MD;  [facility];  Service: Surgical Oncology    LIVER LOBECTOMY N/A 1/20/2021    Procedure: LIVER ABLATION SEGMENT 7, INTRAOPERATIVE U/S OF LIVER;  Surgeon: Adilia Lovett MD;  [facility];  Service: Surgical Oncology    MEDIASTINOSCOPY N/A 11/27/2018    Procedure: MEDIASTINOSCOPY;  Surgeon: Dedra Schumacher MD;  [facility];  Service: Thoracic    RI BRONCHOSCOPY NEEDLE BX TRACHEA MAIN STEM&/BRON N/A 11/27/2018    Procedure: EBUS with biopsy;  Surgeon: Dedra Schumacher MD;  [facility];  Service: Thoracic    RI [address] N/A 11/27/2018    Procedure: Audrey Ruby;  Surgeon: Dedra Schumacher MD;  [facility];  Service: Thoracic    RI EDG US EXAM SURGICAL ALTER STOM DUODENUM/JEJUNUM N/A 10/29/2018    Procedure: LINEAR ENDOSCOPIC U/S;  Surgeon: Nieves Vaz MD;  [facility];   Service: Gastroenterology    SMALL INTESTINE SURGERY N/A 1/20/2021    Procedure: RESECTION SMALL BOWEL AND ANASTOMOSIS, RESECTION SMALL BOWEL NODULE;  Surgeon: Azeb Gamez MD;  [facility];  Service: Surgical Oncology    WISDOM TOOTH EXTRACTION         Family History   Problem Relation Age of Onset    Alzheimer's disease Mother     Parkinsonism Father     Colon cancer Maternal Grandfather        Social History   Social History     Substance and Sexual Activity   Alcohol Use Yes    Frequency: Monthly or less     Social History     Substance and Sexual Activity   Drug Use No     Social History     Tobacco Use   Smoking Status Never Smoker   Smokeless Tobacco Never Used         Meds/Allergies     Current Outpatient Medications:     aspirin 81 MG tablet, Take 1 tablet by mouth daily in the early morning , Disp: , Rfl:     Cholecalciferol (VITAMIN D) 2000 units CAPS, Take 1 tablet by mouth daily, Disp: , Rfl:     docusate sodium (COLACE) 100 mg capsule, Take 1 capsule (100 mg total) by mouth 2 (two) times a day, Disp: 10 capsule, Rfl: 0    hydrochlorothiazide (HYDRODIURIL) 12 5 mg tablet, TAKE 1 TABLET BY MOUTH EVERY DAY, Disp: 90 tablet, Rfl: 1    Multiple Vitamin (MULTIVITAMIN) tablet, Take 1 tablet by mouth daily, Disp: , Rfl:     omeprazole (PriLOSEC) 40 MG capsule, TAKE 1 CAPSULE BY MOUTH EVERY DAY BEFORE BREAKFAST, Disp: 90 capsule, Rfl: 1    oxyCODONE (ROXICODONE) 5 mg immediate release tablet, Take 1 tablet (5 mg total) by mouth every 4 (four) hours as needed for moderate pain for up to 5 daysMax Daily Amount: 30 mg, Disp: 20 tablet, Rfl: 0    Wixela Inhub 250-50 MCG/DOSE inhaler, INHALE 1 PUFF 2 (TWO) TIMES A DAY RINSE MOUTH AFTER USE  (Patient taking differently: Inhale 1 puff daily ), Disp: 1 Inhaler, Rfl: 5  Allergies   Allergen Reactions    Bactrim [Sulfamethoxazole-Trimethoprim] Hives    Clam Shell Vomiting         Review of Systems   Constitutional: Positive for appetite change (decreased, feels full quickly) and fatigue  HENT: Negative  Eyes: Negative  Respiratory: Negative  Cardiovascular: Negative  Gastrointestinal: Positive for abdominal distention ("bloating") and abdominal pain (incision tenderness, taking oxy BID)  Endocrine: Negative  Genitourinary: Negative  Musculoskeletal: Positive for back pain (lower back pain)  Skin: Negative  Allergic/Immunologic: Negative  Neurological: Positive for weakness (recovering from surgery)  Hematological: Negative  Psychiatric/Behavioral: Negative               OBJECTIVE:   /64 (BP Location: Right arm)   Pulse 75   Temp 97 5 °F (36 4 °C) (Temporal)   Resp 16   Wt 78 kg (172 lb)   SpO2 98%   BMI 31 46 kg/m²   Pain Assessment:  0  Performance Status: ECOG/Zubrod/WHO: 1 - Symptomatic but completely ambulatory    Physical Exam        RESULTS  Lab Results    Chemistry        Component Value Date/Time    K 3 7 01/26/2021 0423     01/26/2021 0423    CO2 32 01/26/2021 0423    BUN 13 01/26/2021 0423    BUN 17 08/29/2015 0753    CREATININE 0 68 01/26/2021 0423    CREATININE 0 83 08/29/2015 0753        Component Value Date/Time    CALCIUM 9 7 01/26/2021 0423    ALKPHOS 98 01/12/2021 1015    AST 21 01/12/2021 1015    ALT 31 01/12/2021 1015            Lab Results   Component Value Date    WBC 8 43 01/26/2021    HGB 10 2 (L) 01/26/2021    HCT 32 7 (L) 01/26/2021    MCV 89 01/26/2021     01/26/2021         Imaging Studies  Mri Thoracic Spine W Wo Contrast    Result Date: 1/11/2021  Narrative: MRI THORACIC SPINE WITH AND WITHOUT CONTRAST INDICATION: C7B 8: Other secondary neuroendocrine tumors  COMPARISON:  CT chest dated 11/27/2020  TECHNIQUE:  Sagittal T1, sagittal T2, sagittal inversion recovery, axial T2,  axial 2D MERGE  Sagittal and axial T1 postcontrast  IV Contrast:  7 mL of Gadobutrol injection (SINGLE-DOSE) IMAGE QUALITY:  Diagnostic  FINDINGS: ALIGNMENT:  Normal alignment of the thoracic spine  No compression fracture  No subluxation  No evidence of scoliosis   MARROW SIGNAL:  Within the T11 superior endplate there is a small focal marrow replacing lesion best seen on T1-weighted imaging  No significant enhancement identified  This corresponds to the abnormality seen on recent PET/CT  No other focal marrow replacing lesions are identified  THORACIC CORD:  Normal signal within the thoracic cord  PREVERTEBRAL AND PARASPINAL SOFT TISSUES:   There is a partially visualized T2 hyperintensity within the posterior segment of the right lobe of the liver on series 10 image 40 incompletely evaluated on this examination  THORACIC DEGENERATIVE CHANGE:  Minor degenerative change of the lower thoracic spine  At T11-12 there is slight annular bulging without significant canal stenosis or foraminal narrowing  POSTCONTRAST:  No abnormal enhancement  Impression: There is a small focal marrow replacing lesion within the posterior superior aspect of the T11 superior endplate which corresponds to the PET/CT abnormality, suggestive of osseous metastasis  No other definitive marrow replacing lesions are identified  Minor lower thoracic degenerative change  No canal stenosis or foraminal narrowing  No cord compression  Partially visualized T2 hyperintensity within the posterior segment of the right lobe of the liver on series 10 image 40 is incompletely evaluated on this examination  Previous imaging does report a liver metastasis including MRI dated 11/5/2020  Workstation performed: [id number]         Pathology:  Final Diagnosis   A  Small intestine, resection:  - Well- differentiated neuroendocrine tumor (up to 2 1 cm), G2, at least three foci  - All margins are negative for tumor   - Three of thirteen lymph nodes are positive for tumor (3/13)  ASSESSMENT  1  Bone metastasis ([address] )     2   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 [facility])  Ambulatory referral to Radiation Oncology     Cancer Staging  No matching staging information was found for the patient  PLAN/DISCUSSION  No orders of the defined types were placed in this encounter  Louisa Martin is a 72y o  year old female with   Metastatic well-differentiated neuroendocrine tumor  She has recently undergone resection of her small bowel and liver ablation  She has a T11 metastatic lesion seen on both PET scan and MRI scan  I reviewed with the patient and her  a course of palliative radiation therapy to this site  We discussed CT simulation planning once she has recovered from her recent abdominal surgery  The plan will be to treat T11 to dose of 3000 cGy in 10 treatment fractions  Possible side effects were reviewed with her  This can include but not limited to fatigue, skin erythema, nausea, bloating sensation  She is agreeable to the above outlined plan  Tom Mariscal MD  1/28/2021,3:38 PM      Portions of the record may have been created with voice recognition software  Occasional wrong word or "sound a like" substitutions may have occurred due to the inherent limitations of voice recognition software  Read the chart carefully and recognize, using context, where substitutions have occurred

## 2021-02-08 ENCOUNTER — TELEPHONE (OUTPATIENT)
Dept: SURGICAL ONCOLOGY | Facility: CLINIC | Age: 71
End: 2021-02-08

## 2021-02-09 ENCOUNTER — OFFICE VISIT (OUTPATIENT)
Dept: SURGICAL ONCOLOGY | Facility: CLINIC | Age: 71
End: 2021-02-09

## 2021-02-09 VITALS
WEIGHT: 171 LBS | TEMPERATURE: 97.8 F | DIASTOLIC BLOOD PRESSURE: 78 MMHG | SYSTOLIC BLOOD PRESSURE: 122 MMHG | RESPIRATION RATE: 18 BRPM | HEIGHT: 62 IN | BODY MASS INDEX: 31.47 KG/M2

## 2021-02-09 DIAGNOSIS — C7B.8 METASTATIC MALIGNANT NEUROENDOCRINE TUMOR TO LIVER (HCC): Primary | ICD-10-CM

## 2021-02-09 PROCEDURE — 99024 POSTOP FOLLOW-UP VISIT: CPT | Performed by: SURGERY

## 2021-02-09 NOTE — PROGRESS NOTES
Surgical Oncology Follow Up       [address] [facility]  [facility] [place]  [address] [facility] [id number]    Henrry Johnson Murdock  1950  [id number]  [address] [place] [address]   [facility] [place]  [address] [address]    Diagnoses and all orders for this visit:    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 [facility])  -     CT chest abdomen pelvis w contrast; Future  -     BUN; Future  -     Chromogranin A; Future  -     Creatinine, serum; Future        No chief complaint on file  Return in about 3 months (around 5/9/2021) for Office Visit, Imaging - See orders  Oncology History   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 [facility])   12/8/2020 Initial Diagnosis    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 ([facility] [address] )     12/8/2020 Biopsy    IR Liver biopsy:  A  Liver mass, needle core biopsy:  - Metastatic well-differentiated neuroendocrine tumor, G2     1/18/2021 -  Chemotherapy    Lanreotide injections (monthly)     1/20/2021 Surgery    Resection of small bowel and anastomosis, segment 7 liver ablation    A  Small intestine, resection:  - Well- differentiated neuroendocrine tumor (up to 2 1 cm), G2, at least three foci  - All margins are negative for tumor   - Three of thirteen lymph nodes are positive for tumor (3/13)  B  Small bowel nodule, excision:  - Gastrointestinal stromal tumor (GIST), spindle cell type, low grade, 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  Diagnosis and Staging: Side branch IPMN 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 2013, 1 2cm   Metastatic neuroendocrine tumor, T3N1M1, December 2020  Treatment history: small-bowel resection and microwave ablation of segment 7 liver lesions, January 2021  Current Therapy: Observation For the IPMN   Lanreotide   radiation to T11  Disease Status: Stable     History of Present Illness:   The patient returns in follow-up of her small-bowel resection and ablation of her liver lesions  The smaller liver lesion was somewhat difficult to see  She is feeling well  She does have some early satiety  No nausea or vomiting  No fevers or chills  She is maintaining her weight  Review of Systems  Complete ROS Surg Onc:   Complete ROS Surg Onc:   Constitutional: The patient denies new or recent history of general fatigue, no recent weight loss, no change in appetite  Eyes: No complaints of visual problems, no scleral icterus  ENT: no complaints of ear pain, no hoarseness, no difficulty swallowing,  no tinnitus and no new masses in head, oral cavity, or neck  Cardiovascular: No complaints of chest pain, no palpitations, no ankle edema  Respiratory: No complaints of shortness of breath, no cough  Gastrointestinal: No complaints of jaundice, no bloody stools, no pale stools  Genitourinary: No complaints of dysuria, no hematuria, no nocturia, no frequent urination, no urethral discharge  Musculoskeletal: No complaints of weakness, paralysis, joint stiffness or arthralgias  Integumentary: No complaints of rash, no new lesions  Neurological: No complaints of convulsions, no seizures, no dizziness  Hematologic/Lymphatic: No complaints of easy bruising  Endocrine:  No hot or cold intolerance  No polydipsia, polyphagia, or polyuria  Allergy/immunology:  No environmental allergies  No food allergies  Not immunocompromised  Skin:  No pallor or rash  Surgical wound          Patient Active Problem List   Diagnosis    Positional vertigo    Corn of toe    Focal nodular hyperplasia of liver    IPMN (intraductal papillary mucinous neoplasm)    Abnormal MRI of abdomen    Lymphadenopathy    Liver mass    Mediastinal adenopathy    Sarcoidosis, lung (HCC)    Granulomatous lymphadenitis    Edema of right lower extremity    Diastolic dysfunction    Complex tear of medial meniscus of right knee as current injury    Other tear of medial meniscus, current injury, right knee, initial encounter    Trigger middle finger of right hand    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 (HCC)    Bone metastasis ([address] )     Past Medical History:   Diagnosis Date    Abdominal pain     Acute tonsillitis     Breast pain, left     Edema of both lower extremities     GERD without esophagitis     Kidney stone     " [place]"    Lesion of liver     Liver mass     Lymphadenopathy     Mediastinal lymphadenopathy     Neoplasm of digestive system     Orthostatic lightheadedness     Sarcoidosis     Vertigo      Past Surgical History:   Procedure Laterality Date    CARPAL TUNNEL RELEASE Bilateral     COLONOSCOPY      2017    IR BIOPSY LIVER MASS  11/6/2018    IR BIOPSY LIVER MASS  12/8/2020    KNEE ARTHROSCOPY Left     LAPAROTOMY N/A 1/20/2021    Procedure: LAPAROTOMY EXPLORATORY;  Surgeon: Denver Romance, MD;  [facility];  Service: Surgical Oncology    LIVER LOBECTOMY N/A 1/20/2021    Procedure: LIVER ABLATION SEGMENT 7, INTRAOPERATIVE U/S OF LIVER;  Surgeon: Denver Romance, MD;  [facility];  Service: Surgical Oncology    MEDIASTINOSCOPY N/A 11/27/2018    Procedure: MEDIASTINOSCOPY;  Surgeon: Tacho Harper MD;  [facility];  Service: Thoracic    AL BRONCHOSCOPY NEEDLE BX TRACHEA MAIN STEM&/BRON N/A 11/27/2018    Procedure: EBUS with biopsy;  Surgeon: Tacho Harper MD;  [facility];  Service: Thoracic    AL [address] N/A 11/27/2018    Procedure: Elian Ordaz;  Surgeon: Tacho Harper MD;  [facility];  Service: Thoracic    AL EDG US EXAM SURGICAL ALTER STOM DUODENUM/JEJUNUM N/A 10/29/2018    Procedure: LINEAR ENDOSCOPIC U/S;  Surgeon: Rosa Rodriguez MD;  [facility];   Service: Gastroenterology    SMALL INTESTINE SURGERY N/A 1/20/2021    Procedure: RESECTION SMALL BOWEL AND ANASTOMOSIS, RESECTION SMALL BOWEL NODULE;  Surgeon: Denver Romance, MD;  [facility];  Service: Surgical Oncology    WISDOM TOOTH EXTRACTION       Family History   Problem Relation Age of Onset    Alzheimer's disease Mother     Parkinsonism Father     Colon cancer Maternal Grandfather      Social History     Socioeconomic History    Marital status: /Civil Union     Spouse name: Not on file    Number of children: Not on file    Years of education: Not on file    Highest education level: Not on file   Occupational History    Not on file   Social Needs    Financial resource strain: Not on file    Food insecurity     Worry: Not on file     Inability: Not on file   [employer] needs     Medical: Not on file     Non-medical: Not on file   Tobacco Use    Smoking status: Never Smoker    Smokeless tobacco: Never Used   Substance and Sexual Activity    Alcohol use: Yes     Frequency: Monthly or less    Drug use: No    Sexual activity: Not on file   Lifestyle    Physical activity     Days per week: Not on file     Minutes per session: Not on file    Stress: Not on file   Relationships    Social connections     Talks on phone: Not on file     Gets together: Not on file     Attends Christianity service: Not on file     Active member of club or organization: Not on file     Attends meetings of clubs or organizations: Not on file     Relationship status: Not on file    Intimate partner violence     Fear of current or ex partner: Not on file     Emotionally abused: Not on file     Physically abused: Not on file     Forced sexual activity: Not on file   Other Topics Concern    Not on file   Social History Narrative    Not on file       Current Outpatient Medications:     aspirin 81 MG tablet, Take 1 tablet by mouth daily in the early morning , Disp: , Rfl:     Cholecalciferol (VITAMIN D) 2000 units CAPS, Take 1 tablet by mouth daily, Disp: , Rfl:     docusate sodium (COLACE) 100 mg capsule, Take 1 capsule (100 mg total) by mouth 2 (two) times a day, Disp: 10 capsule, Rfl: 0    hydrochlorothiazide (HYDRODIURIL) 12 5 mg tablet, TAKE 1 TABLET BY MOUTH EVERY DAY, Disp: 90 tablet, Rfl: 1    Multiple Vitamin (MULTIVITAMIN) tablet, Take 1 tablet by mouth daily, Disp: , Rfl:     omeprazole (PriLOSEC) 40 MG capsule, TAKE 1 CAPSULE BY MOUTH EVERY DAY BEFORE BREAKFAST, Disp: 90 capsule, Rfl: 1    Wixela Inhub 250-50 MCG/DOSE inhaler, INHALE 1 PUFF 2 (TWO) TIMES A DAY RINSE MOUTH AFTER USE  (Patient taking differently: Inhale 1 puff daily ), Disp: 1 Inhaler, Rfl: 5  Allergies   Allergen Reactions    Bactrim [Sulfamethoxazole-Trimethoprim] Hives    Clam Shell Vomiting     Vitals:    02/09/21 0800   BP: 122/78   Resp: 18   Temp: 97 8 °F (36 6 °C)       Physical Exam  Constitutional: General appearance: The Patient is well-developed and well-nourished who appears the stated age in no acute distress  Patient is pleasant and talkative  HEENT:  Normocephalic  Sclerae are anicteric  Mucous membranes are moist    Abdomen: Abdomen is soft, non-tender, non-distended and without masses  Incision is C/ D/I  Extremities: There is no clubbing or cyanosis  There is no edema  Symmetric  Neuro: Grossly nonfocal  Gait is normal      Skin: Warm, anicteric  Psych:  Patient is pleasant and talkative  Breasts:        Pathology:  Final Diagnosis   A  Small intestine, resection:  - Well- differentiated neuroendocrine tumor (up to 2 1 cm), G2, at least three foci  - All margins are negative for tumor   - Three of thirteen lymph nodes are positive for tumor (3/13)     Comment: There are at least three separate foci of tumor present  It is difficut to acurately count for tumor foci given cluster growth pattern  Immunohistochemistry was performed on block A18  The tumor cells are positive for synaptophysin, chromogranin A, and negative for CD56, Ki-67 shows mitotic proliferative index of approximately 3-4%  D2-40 and CD31 are performed to help in the assessment of this case      B   Small bowel nodule, excision:  - Gastrointestinal stromal tumor (GIST), spindle cell type, low grade, 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  See note       NOTE: There is no mitotic figures or necrosis seen in the specimen  Immunohistochemistry was performed on block B1  The The tumor cells are positive for DOG-1,  and negative for S100, CD31, and desmin, supporting the above diagnosis      Intradepartmental consultation is in agreement  Dr Campbell Alcocer is notified of the diagnosis in EPIC via [address] on 1/26/2021  at 1pm        Electronically signed by Durga Pedro MD on 1/28/2021 at 11:32 AM   Additional Information    All reported additional testing was performed with appropriately reactive controls   These tests were developed and their performance characteristics determined by [facility] or appropriate performing facility, though some tests may be performed on tissues which have not been validated for performance characteristics (such as staining performed on alcohol exposed cell blocks and decalcified tissues)   Results should be interpreted with caution and in the context of the patients clinical condition  These tests may not be cleared or approved by the [place]  Food and Drug Administration, though the FDA has determined that such clearance or approval is not necessary  These tests are used for clinical purposes and they should not be regarded as investigational or for research  This laboratory has been approved by CLIA 88, designated as a high-complexity laboratory and is qualified to perform these tests        Synoptic Checklist   JEJUNUM AND ILEUM NEUROENDOCRINE TUMOR  JEJUNUM AND ILEUM NEUROENDOCRINE TUMOR - All Specimens  8th Edition - Protocol posted: 2/26/2020  SPECIMEN   Procedure  Segmental resection, small intestine    TUMOR   Tumor Site  Small intestine, not otherwise specified    Histologic Type and Grade  G2: Well-differentiated neuroendocrine tumor         Mitotic Rate  < 2 mitoses / 2 mm2         Ki-67 Labeling Index  3% to 20% Ki-67 Percentage  4 %   Tumor Size  Greatest Dimension (Centimeters): 2 1 cm   Tumor Focality  Cannot be determined: at least three    Tumor Extension  Tumor invades through the muscularis propria into subserosal tissue without penetration of overlying serosa    Lymphovascular Invasion  Present    Large Mesenteric Masses (> 2 cm)  Present    Number  1    MARGINS   Margins  All margins are uninvolved by tumor    Margins Examined  Proximal      Distal      Radial or mesenteric    Distance of Tumor from Closest Margin  Cannot be determined: at least 2 cm, multiple specimen    Closest Margin  Cannot be determined    LYMPH NODES   Number of Lymph Nodes Involved  3    Number of Lymph Nodes Examined  13    PATHOLOGIC STAGE CLASSIFICATION (pTNM, AJCC 8th Edition)   TNM Descriptors  m (multiple primary tumors)    Primary Tumor (pT)  pT3    Regional Lymph Nodes (pN)  pN1    Distant Metastasis (pM)  pM1c    Site(s)  mesenteric and liver      Labs:      Imaging  No results found  I reviewed the above laboratory and imaging data  Discussion/Summary: 60-year-old female with side branch IPMN that is essentially stable   This is less than 2 cm in size   This is being observed with her MRI  She then underwent small-bowel resection and microwave ablation segment 7 liver lesions    She is on Lanreotide  She is going to be following up with radiation oncology for radiation to T11  I will plan on seeing her again in 3 months with a repeat chromogranin level  Assuming this has normalized and all her lesions have been ablated in the liver we will likely observe her  If there is residual viable disease, we can consider Sir spheres or PRRT if there is extrahepatic disease  She is agreeable to this plan  All of her questions were answered

## 2021-02-09 NOTE — LETTER
February 9, 9891     [address] [place] [address]    Patient: Melanie Goetz   YOB: 1950   Date of Visit: 2/9/2021       Dear Dr Padmini August: Thank you for referring Agata Nix to me for evaluation  Below are my notes for this consultation  If you have questions, please do not hesitate to call me  I look forward to following your patient along with you  Sincerely,        Danielle Farrar MD        CC: MD Omari Diop MD Asim Truddie Cooter, MD Suzanne Curtis, MD  2/9/2021  8:28 AM  Sign when Signing Visit               Surgical Oncology Follow Up       [address] [facility] [address] [place] [address]  1950  [id number]  [address] [facility] SURGICAL ONCOLOGY [place]  [address] [facility] [address]    Diagnoses and all orders for this visit:    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 [facility])  -     CT chest abdomen pelvis w contrast; Future  -     BUN; Future  -     Chromogranin A; Future  -     Creatinine, serum; Future        No chief complaint on file  Return in about 3 months (around 5/9/2021) for Office Visit, Imaging - See orders  Oncology History   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 [facility])   12/8/2020 Initial Diagnosis    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 ([facility] [address] )     12/8/2020 Biopsy    IR Liver biopsy:  A  Liver mass, needle core biopsy:  - Metastatic well-differentiated neuroendocrine tumor, G2     1/18/2021 -  Chemotherapy    Lanreotide injections (monthly)     1/20/2021 Surgery    Resection of small bowel and anastomosis, segment 7 liver ablation    A  Small intestine, resection:  - Well- differentiated neuroendocrine tumor (up to 2 1 cm), G2, at least three foci  - All margins are negative for tumor   - Three of thirteen lymph nodes are positive for tumor (3/13)       Thaddeus November bowel nodule, excision:  - Gastrointestinal stromal tumor (GIST), spindle cell type, low grade, 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  Diagnosis and Staging: Side branch IPMN 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 2013, 1 2cm   Metastatic neuroendocrine tumor, T3N1M1, December 2020  Treatment history: small-bowel resection and microwave ablation of segment 7 liver lesions, January 2021  Current Therapy: Observation For the IPMN   Lanreotide   radiation to T11  Disease Status: Stable     History of Present Illness: The patient returns in follow-up of her small-bowel resection and ablation of her liver lesions  The smaller liver lesion was somewhat difficult to see  She is feeling well  She does have some early satiety  No nausea or vomiting  No fevers or chills  She is maintaining her weight  Review of Systems  Complete ROS Surg Onc:   Complete ROS Surg Onc:   Constitutional: The patient denies new or recent history of general fatigue, no recent weight loss, no change in appetite  Eyes: No complaints of visual problems, no scleral icterus  ENT: no complaints of ear pain, no hoarseness, no difficulty swallowing,  no tinnitus and no new masses in head, oral cavity, or neck  Cardiovascular: No complaints of chest pain, no palpitations, no ankle edema  Respiratory: No complaints of shortness of breath, no cough  Gastrointestinal: No complaints of jaundice, no bloody stools, no pale stools  Genitourinary: No complaints of dysuria, no hematuria, no nocturia, no frequent urination, no urethral discharge  Musculoskeletal: No complaints of weakness, paralysis, joint stiffness or arthralgias  Integumentary: No complaints of rash, no new lesions  Neurological: No complaints of convulsions, no seizures, no dizziness  Hematologic/Lymphatic: No complaints of easy bruising  Endocrine:  No hot or cold intolerance  No polydipsia, polyphagia, or polyuria  Allergy/immunology:  No environmental allergies  No food allergies    Not immunocompromised  Skin:  No pallor or rash  Surgical wound          Patient Active Problem List   Diagnosis    Positional vertigo    Corn of toe    Focal nodular hyperplasia of liver    IPMN (intraductal papillary mucinous neoplasm)    Abnormal MRI of abdomen    Lymphadenopathy    Liver mass    Mediastinal adenopathy    Sarcoidosis, lung (HCC)    Granulomatous lymphadenitis    Edema of right lower extremity    Diastolic dysfunction    Complex tear of medial meniscus of right knee as current injury    Other tear of medial meniscus, current injury, right knee, initial encounter    Trigger middle finger of right hand    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 ([address] )    Bone metastasis ([facility] [address] )     Past Medical History:   Diagnosis Date    Abdominal pain     Acute tonsillitis     Breast pain, left     Edema of both lower extremities     GERD without esophagitis     Kidney stone     " [place]"    Lesion of liver     Liver mass     Lymphadenopathy     Mediastinal lymphadenopathy     Neoplasm of digestive system     Orthostatic lightheadedness     Sarcoidosis     Vertigo      Past Surgical History:   Procedure Laterality Date    CARPAL TUNNEL RELEASE Bilateral     COLONOSCOPY      2017    IR BIOPSY LIVER MASS  11/6/2018    IR BIOPSY LIVER MASS  12/8/2020    KNEE ARTHROSCOPY Left     LAPAROTOMY N/A 1/20/2021    Procedure: LAPAROTOMY EXPLORATORY;  Surgeon: Mary Lozano MD;  [facility];  Service: Surgical Oncology    LIVER LOBECTOMY N/A 1/20/2021    Procedure: LIVER ABLATION SEGMENT 7, INTRAOPERATIVE U/S OF LIVER;  Surgeon: Mary Lozano MD;  [facility];  Service: Surgical Oncology    MEDIASTINOSCOPY N/A 11/27/2018    Procedure: MEDIASTINOSCOPY;  Surgeon: Jamia Daugherty MD;  [facility];  Service: Thoracic    HI BRONCHOSCOPY NEEDLE BX TRACHEA MAIN STEM&/BRON N/A 11/27/2018    Procedure: EBUS with biopsy;  Surgeon: Jamia Daugherty MD; [facility];  Service: Thoracic    VT BRONCHOSCOPY,DIAGNOSTIC N/A 11/27/2018    Procedure: Yoan Nam;  Surgeon: Erika Camejo MD;  [facility];  Service: Thoracic    VT EDG US EXAM SURGICAL ALTER STOM DUODENUM/JEJUNUM N/A 10/29/2018    Procedure: LINEAR ENDOSCOPIC U/S;  Surgeon: Nancy Donovan MD;  [facility];   Service: Gastroenterology    SMALL INTESTINE SURGERY N/A 1/20/2021    Procedure: RESECTION SMALL BOWEL AND ANASTOMOSIS, RESECTION SMALL BOWEL NODULE;  Surgeon: Ama Quiroz MD;  [facility];  Service: Surgical Oncology    WISDOM TOOTH EXTRACTION       Family History   Problem Relation Age of Onset    Alzheimer's disease Mother     Parkinsonism Father     Colon cancer Maternal Grandfather      Social History     Socioeconomic History    Marital status: /Civil Union     Spouse name: Not on file    Number of children: Not on file    Years of education: Not on file    Highest education level: Not on file   Occupational History    Not on file   Social Needs    Financial resource strain: Not on file    Food insecurity     Worry: Not on file     Inability: Not on file   [employer] needs     Medical: Not on file     Non-medical: Not on file   Tobacco Use    Smoking status: Never Smoker    Smokeless tobacco: Never Used   Substance and Sexual Activity    Alcohol use: Yes     Frequency: Monthly or less    Drug use: No    Sexual activity: Not on file   Lifestyle    Physical activity     Days per week: Not on file     Minutes per session: Not on file    Stress: Not on file   Relationships    Social connections     Talks on phone: Not on file     Gets together: Not on file     Attends Restorationism service: Not on file     Active member of club or organization: Not on file     Attends meetings of clubs or organizations: Not on file     Relationship status: Not on file    Intimate partner violence     Fear of current or ex partner: Not on file     Emotionally abused: Not on file     Physically abused: Not on file     Forced sexual activity: Not on file   Other Topics Concern    Not on file   Social History Narrative    Not on file       Current Outpatient Medications:     aspirin 81 MG tablet, Take 1 tablet by mouth daily in the early morning , Disp: , Rfl:     Cholecalciferol (VITAMIN D) 2000 units CAPS, Take 1 tablet by mouth daily, Disp: , Rfl:     docusate sodium (COLACE) 100 mg capsule, Take 1 capsule (100 mg total) by mouth 2 (two) times a day, Disp: 10 capsule, Rfl: 0    hydrochlorothiazide (HYDRODIURIL) 12 5 mg tablet, TAKE 1 TABLET BY MOUTH EVERY DAY, Disp: 90 tablet, Rfl: 1    Multiple Vitamin (MULTIVITAMIN) tablet, Take 1 tablet by mouth daily, Disp: , Rfl:     omeprazole (PriLOSEC) 40 MG capsule, TAKE 1 CAPSULE BY MOUTH EVERY DAY BEFORE BREAKFAST, Disp: 90 capsule, Rfl: 1    Wixela Inhub 250-50 MCG/DOSE inhaler, INHALE 1 PUFF 2 (TWO) TIMES A DAY RINSE MOUTH AFTER USE  (Patient taking differently: Inhale 1 puff daily ), Disp: 1 Inhaler, Rfl: 5  Allergies   Allergen Reactions    Bactrim [Sulfamethoxazole-Trimethoprim] Hives    Clam Shell Vomiting     Vitals:    02/09/21 0800   BP: 122/78   Resp: 18   Temp: 97 8 °F (36 6 °C)       Physical Exam  Constitutional: General appearance: The Patient is well-developed and well-nourished who appears the stated age in no acute distress  Patient is pleasant and talkative  HEENT:  Normocephalic  Sclerae are anicteric  Mucous membranes are moist    Abdomen: Abdomen is soft, non-tender, non-distended and without masses  Incision is C/ D/I  Extremities: There is no clubbing or cyanosis  There is no edema  Symmetric  Neuro: Grossly nonfocal  Gait is normal      Skin: Warm, anicteric  Psych:  Patient is pleasant and talkative  Breasts:        Pathology:  Final Diagnosis   A   Small intestine, resection:  - Well- differentiated neuroendocrine tumor (up to 2 1 cm), G2, at least three foci   - All margins are negative for tumor   - Three of thirteen lymph nodes are positive for tumor (3/13)     Comment: There are at least three separate foci of tumor present  It is difficut to acurately count for tumor foci given cluster growth pattern  Immunohistochemistry was performed on block A18  The tumor cells are positive for synaptophysin, chromogranin A, and negative for CD56, Ki-67 shows mitotic proliferative index of approximately 3-4%  D2-40 and CD31 are performed to help in the assessment of this case      B  Small bowel nodule, excision:  - Gastrointestinal stromal tumor (GIST), spindle cell type, low grade, 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  See note       NOTE: There is no mitotic figures or necrosis seen in the specimen  Immunohistochemistry was performed on block B1  The The tumor cells are positive for DOG-1,  and negative for S100, CD31, and desmin, supporting the above diagnosis      Intradepartmental consultation is in agreement  Dr Gianna Linn is notified of the diagnosis in EPIC via [address] on 1/26/2021  at 1pm        Electronically signed by Mirlande Hinojosa MD on 1/28/2021 at 11:32 AM   Additional Information    All reported additional testing was performed with appropriately reactive controls   These tests were developed and their performance characteristics determined by [facility] or appropriate performing facility, though some tests may be performed on tissues which have not been validated for performance characteristics (such as staining performed on alcohol exposed cell blocks and decalcified tissues)   Results should be interpreted with caution and in the context of the patients clinical condition  These tests may not be cleared or approved by the [place]  Food and Drug Administration, though the FDA has determined that such clearance or approval is not necessary  These tests are used for clinical purposes and they should not be regarded as investigational or for research   This laboratory has been approved by [place] [id number], designated as a high-complexity laboratory and is qualified to perform these tests      Synoptic Checklist   JEJUNUM AND ILEUM NEUROENDOCRINE TUMOR  JEJUNUM AND ILEUM NEUROENDOCRINE TUMOR - All Specimens  8th Edition - Protocol posted: 2/26/2020  SPECIMEN   Procedure  Segmental resection, small intestine    TUMOR   Tumor Site  Small intestine, not otherwise specified    Histologic Type and Grade  G2: Well-differentiated neuroendocrine tumor         Mitotic Rate  < 2 mitoses / 2 mm2         Ki-67 Labeling Index  3% to 20%    Ki-67 Percentage  4 %   Tumor Size  Greatest Dimension (Centimeters): 2 1 cm   Tumor Focality  Cannot be determined: at least three    Tumor Extension  Tumor invades through the muscularis propria into subserosal tissue without penetration of overlying serosa    Lymphovascular Invasion  Present    Large Mesenteric Masses (> 2 cm)  Present    Number  1    MARGINS   Margins  All margins are uninvolved by tumor    Margins Examined  Proximal      Distal      Radial or mesenteric    Distance of Tumor from Closest Margin  Cannot be determined: at least 2 cm, multiple specimen    Closest Margin  Cannot be determined    LYMPH NODES   Number of Lymph Nodes Involved  3    Number of Lymph Nodes Examined  13    PATHOLOGIC STAGE CLASSIFICATION (pTNM, AJCC 8th Edition)   TNM Descriptors  m (multiple primary tumors)    Primary Tumor (pT)  pT3    Regional Lymph Nodes (pN)  pN1    Distant Metastasis (pM)  pM1c    Site(s)  mesenteric and liver      Labs:      Imaging  No results found  I reviewed the above laboratory and imaging data  Discussion/Summary: 61-year-old female with side branch IPMN that is essentially stable   This is less than 2 cm in size   This is being observed with her MRI  She then underwent small-bowel resection and microwave ablation segment 7 liver lesions    She is on Lanreotide   She is going to be following up with radiation oncology for radiation to T11  I will plan on seeing her again in 3 months with a repeat chromogranin level  Assuming this has normalized and all her lesions have been ablated in the liver we will likely observe her  If there is residual viable disease, we can consider Sir spheres or PRRT if there is extrahepatic disease  She is agreeable to this plan  All of her questions were answered

## 2021-02-11 ENCOUNTER — RADIATION THERAPY TREATMENT (OUTPATIENT)
Dept: RADIATION ONCOLOGY | Facility: HOSPITAL | Age: 71
End: 2021-02-11
Attending: RADIOLOGY
Payer: COMMERCIAL

## 2021-02-11 PROCEDURE — 77290 THER RAD SIMULAJ FIELD CPLX: CPT | Performed by: RADIOLOGY

## 2021-02-11 PROCEDURE — 77334 RADIATION TREATMENT AID(S): CPT | Performed by: RADIOLOGY

## 2021-02-12 PROCEDURE — 77370 RADIATION PHYSICS CONSULT: CPT | Performed by: RADIOLOGY

## 2021-02-13 DIAGNOSIS — Z23 ENCOUNTER FOR IMMUNIZATION: ICD-10-CM

## 2021-02-15 ENCOUNTER — HOSPITAL ENCOUNTER (OUTPATIENT)
Dept: INFUSION CENTER | Facility: CLINIC | Age: 71
Discharge: HOME | End: 2021-02-15
Payer: COMMERCIAL

## 2021-02-15 VITALS — TEMPERATURE: 96.1 F

## 2021-02-15 DIAGNOSIS — C7B.8 METASTATIC MALIGNANT NEUROENDOCRINE TUMOR TO LIVER (HCC): Primary | ICD-10-CM

## 2021-02-15 PROCEDURE — 77300 RADIATION THERAPY DOSE PLAN: CPT | Performed by: RADIOLOGY

## 2021-02-15 PROCEDURE — 77295 3-D RADIOTHERAPY PLAN: CPT | Performed by: RADIOLOGY

## 2021-02-15 PROCEDURE — 77334 RADIATION TREATMENT AID(S): CPT | Performed by: RADIOLOGY

## 2021-02-15 PROCEDURE — 96372 THER/PROPH/DIAG INJ SC/IM: CPT

## 2021-02-15 RX ORDER — LANREOTIDE ACETATE 120 MG/.5ML
120 INJECTION SUBCUTANEOUS ONCE
Status: CANCELLED | OUTPATIENT
Start: 2021-03-15

## 2021-02-15 RX ORDER — LANREOTIDE ACETATE 120 MG/.5ML
120 INJECTION SUBCUTANEOUS ONCE
Status: COMPLETED | OUTPATIENT
Start: 2021-02-15 | End: 2021-02-15

## 2021-02-15 RX ADMIN — LANREOTIDE ACETATE 120 MG: 120 INJECTION SUBCUTANEOUS at 12:32

## 2021-02-15 NOTE — PROGRESS NOTES
Patient here for lanreotide  She offers no complaints at this time  Lanreotide given in left buttock and she tolerated it well   Next appointment confirmed and avs given

## 2021-02-17 ENCOUNTER — IMMUNIZATIONS (OUTPATIENT)
Dept: FAMILY MEDICINE CLINIC | Facility: HOSPITAL | Age: 71
End: 2021-02-17

## 2021-02-17 DIAGNOSIS — Z23 ENCOUNTER FOR IMMUNIZATION: Primary | ICD-10-CM

## 2021-02-18 ENCOUNTER — APPOINTMENT (OUTPATIENT)
Dept: RADIATION ONCOLOGY | Facility: HOSPITAL | Age: 71
End: 2021-02-18
Attending: RADIOLOGY
Payer: COMMERCIAL

## 2021-02-18 PROCEDURE — 77280 THER RAD SIMULAJ FIELD SMPL: CPT | Performed by: RADIOLOGY

## 2021-02-18 PROCEDURE — 77334 RADIATION TREATMENT AID(S): CPT | Performed by: RADIOLOGY

## 2021-02-18 PROCEDURE — 77412 RADIATION TX DELIVERY LVL 3: CPT | Performed by: RADIOLOGY

## 2021-02-19 ENCOUNTER — APPOINTMENT (OUTPATIENT)
Dept: RADIATION ONCOLOGY | Facility: HOSPITAL | Age: 71
End: 2021-02-19
Attending: RADIOLOGY
Payer: COMMERCIAL

## 2021-02-19 DIAGNOSIS — C79.51 BONE METASTASIS (HCC): Primary | ICD-10-CM

## 2021-02-19 PROCEDURE — 77331 SPECIAL RADIATION DOSIMETRY: CPT | Performed by: RADIOLOGY

## 2021-02-19 PROCEDURE — 77387 GUIDANCE FOR RADJ TX DLVR: CPT | Performed by: RADIOLOGY

## 2021-02-19 PROCEDURE — 77412 RADIATION TX DELIVERY LVL 3: CPT | Performed by: RADIOLOGY

## 2021-02-22 ENCOUNTER — APPOINTMENT (OUTPATIENT)
Dept: RADIATION ONCOLOGY | Facility: HOSPITAL | Age: 71
End: 2021-02-22
Attending: RADIOLOGY
Payer: COMMERCIAL

## 2021-02-22 PROCEDURE — 77387 GUIDANCE FOR RADJ TX DLVR: CPT | Performed by: RADIOLOGY

## 2021-02-22 PROCEDURE — 77412 RADIATION TX DELIVERY LVL 3: CPT | Performed by: RADIOLOGY

## 2021-02-23 ENCOUNTER — APPOINTMENT (OUTPATIENT)
Dept: RADIATION ONCOLOGY | Facility: HOSPITAL | Age: 71
End: 2021-02-23
Attending: RADIOLOGY
Payer: COMMERCIAL

## 2021-02-23 PROCEDURE — 77412 RADIATION TX DELIVERY LVL 3: CPT | Performed by: RADIOLOGY

## 2021-02-23 PROCEDURE — 77387 GUIDANCE FOR RADJ TX DLVR: CPT | Performed by: RADIOLOGY

## 2021-02-24 ENCOUNTER — APPOINTMENT (OUTPATIENT)
Dept: RADIATION ONCOLOGY | Facility: HOSPITAL | Age: 71
End: 2021-02-24
Attending: RADIOLOGY
Payer: COMMERCIAL

## 2021-02-24 PROCEDURE — 77387 GUIDANCE FOR RADJ TX DLVR: CPT | Performed by: RADIOLOGY

## 2021-02-24 PROCEDURE — 77336 RADIATION PHYSICS CONSULT: CPT | Performed by: RADIOLOGY

## 2021-02-24 PROCEDURE — 77412 RADIATION TX DELIVERY LVL 3: CPT | Performed by: RADIOLOGY

## 2021-02-25 ENCOUNTER — OFFICE VISIT (OUTPATIENT)
Dept: HEMATOLOGY ONCOLOGY | Facility: CLINIC | Age: 71
End: 2021-02-25
Payer: COMMERCIAL

## 2021-02-25 ENCOUNTER — APPOINTMENT (OUTPATIENT)
Dept: RADIATION ONCOLOGY | Facility: HOSPITAL | Age: 71
End: 2021-02-25
Attending: RADIOLOGY
Payer: COMMERCIAL

## 2021-02-25 VITALS
BODY MASS INDEX: 31.28 KG/M2 | OXYGEN SATURATION: 99 % | HEART RATE: 84 BPM | HEIGHT: 62 IN | RESPIRATION RATE: 16 BRPM | DIASTOLIC BLOOD PRESSURE: 76 MMHG | SYSTOLIC BLOOD PRESSURE: 126 MMHG | TEMPERATURE: 98 F | WEIGHT: 170 LBS

## 2021-02-25 DIAGNOSIS — C79.51 BONE METASTASIS (HCC): Primary | ICD-10-CM

## 2021-02-25 DIAGNOSIS — C7B.8 METASTATIC MALIGNANT NEUROENDOCRINE TUMOR TO LIVER (HCC): Primary | ICD-10-CM

## 2021-02-25 PROCEDURE — 1160F RVW MEDS BY RX/DR IN RCRD: CPT | Performed by: INTERNAL MEDICINE

## 2021-02-25 PROCEDURE — 3008F BODY MASS INDEX DOCD: CPT | Performed by: INTERNAL MEDICINE

## 2021-02-25 PROCEDURE — 99214 OFFICE O/P EST MOD 30 MIN: CPT | Performed by: INTERNAL MEDICINE

## 2021-02-25 PROCEDURE — 77417 THER RADIOLOGY PORT IMAGE(S): CPT | Performed by: RADIOLOGY

## 2021-02-25 PROCEDURE — 1036F TOBACCO NON-USER: CPT | Performed by: INTERNAL MEDICINE

## 2021-02-25 PROCEDURE — 77412 RADIATION TX DELIVERY LVL 3: CPT | Performed by: RADIOLOGY

## 2021-02-25 PROCEDURE — 77387 GUIDANCE FOR RADJ TX DLVR: CPT | Performed by: RADIOLOGY

## 2021-02-25 NOTE — PROGRESS NOTES
Hematology/Oncology Outpatient Follow- up Note  Mansi Baig 72 y o  female MRN: @ Encounter: [id number]        Date:  2/25/2021    Presenting Complaint/Diagnosis : Metastatic well-differentiated carcinoid tumor    HPI:    Mansi Baig is seen for initial consultation 1/12/2021 regarding a metastatic well-differentiated carcinoid tumor  The patient has seen our colleagues in surgery and is actually going for resection  Her PET-CT scan had showed     1  At least 3 foci of radiotracer uptake within the small bowel suspicious for underlying neuroendocrine lesions   Consider follow-up with CT enterography or small bowel pill study  2  Increased radiotracer uptake in two mesenteric soft tissue nodules suspicious for metastasis  3  Focal radiotracer uptake in the right lobe of the liver posteriorly compatible with the known metastasis   Slight focal increased radiotracer uptake in the right lobe of the liver anteriorly corresponding to the 5 mm subcapsular lesion segment 8   lesion seen on MRI suspicious for additional metastasis  4  Focal radiotracer uptake at the T11 vertebral body suspicious for osseous metastasis      MRI revealed the T11 vertebral body did have malignancy present      Again biopsy has confirmed well-differentiated neuroendocrine tumor  Previous Hematologic/ Oncologic History:    Oncology History   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 [facility])   12/8/2020 Initial Diagnosis    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 ([facility] [address] )     12/8/2020 Biopsy    IR Liver biopsy:  A  Liver mass, needle core biopsy:  - Metastatic well-differentiated neuroendocrine tumor, G2     1/18/2021 -  Chemotherapy    Lanreotide injections (monthly)     1/20/2021 Surgery    Resection of small bowel and anastomosis, segment 7 liver ablation    A  Small intestine, resection:  - Well- differentiated neuroendocrine tumor (up to 2 1 cm), G2, at least three foci    - All margins are negative for tumor   - Three of thirteen lymph nodes are positive for tumor (3/13)  B  Small bowel nodule, excision:  - Gastrointestinal stromal tumor (GIST), spindle cell type, low grade, 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  Surgery and ablation of liver lesions along with radiation to spine    Current Hematologic/ Oncologic Treatment:      The patient is here to discuss options    Interval History:     the patient returns for follow-up visit  When I had last seen her a plan was made to debulk her tumor, radiate the spine and consider PP RT depending on surgical outcome and final extent of disease  She had a small-bowel resection and microwave ablation of segment 7 liver lesions in January of 2021 along with radiation to T11  The plan is to keep her on lanreotide and consider PPRT  IF SHE HAS PROGRESSION OF EXTRAHEPATIC DISEASE  She is already set up for imaging through her surgeon  The patient herself is recovered from surgery  Denies any complaints today  Denies any nausea denies any vomiting denies any diarrhea  The rest of her 14 point review of systems today was negative  Test Results:    Imaging: No results found  Labs:   Lab Results   Component Value Date    WBC 8 43 01/26/2021    HGB 10 2 (L) 01/26/2021    HCT 32 7 (L) 01/26/2021    MCV 89 01/26/2021     01/26/2021     Lab Results   Component Value Date    K 3 7 01/26/2021     01/26/2021    CO2 32 01/26/2021    BUN 13 01/26/2021    CREATININE 0 68 01/26/2021    GLUF 105 (H) 12/08/2020    CALCIUM 9 7 01/26/2021    AST 21 01/12/2021    ALT 31 01/12/2021    ALKPHOS 98 01/12/2021    EGFR 90 01/26/2021     ROS: As stated in the history of present illness otherwise his 14 point review of systems today was negative        Active Problems:   Patient Active Problem List   Diagnosis    Positional vertigo    Corn of toe    Focal nodular hyperplasia of liver    IPMN (intraductal papillary mucinous neoplasm)    Abnormal MRI of abdomen    Lymphadenopathy    Liver mass    Mediastinal adenopathy    Sarcoidosis, lung (HCC)    Granulomatous lymphadenitis    Edema of right lower extremity    Diastolic dysfunction    Complex tear of medial meniscus of right knee as current injury    Other tear of medial meniscus, current injury, right knee, initial encounter    Trigger middle finger of right hand    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 ([facility] [address] )    Bone metastasis ([facility] [address] )       Past Medical History:   Past Medical History:   Diagnosis Date    Abdominal pain     Acute tonsillitis     Breast pain, left     Edema of both lower extremities     GERD without esophagitis     Kidney stone     " [place]"    Lesion of liver     Liver mass     Lymphadenopathy     Mediastinal lymphadenopathy     Neoplasm of digestive system     Orthostatic lightheadedness     Sarcoidosis     Vertigo        Surgical History:   Past Surgical History:   Procedure Laterality Date    CARPAL TUNNEL RELEASE Bilateral     COLONOSCOPY      2017    IR BIOPSY LIVER MASS  11/6/2018    IR BIOPSY LIVER MASS  12/8/2020    KNEE ARTHROSCOPY Left     LAPAROTOMY N/A 1/20/2021    Procedure: LAPAROTOMY EXPLORATORY;  Surgeon: Carlie Huntley MD;  [facility];  Service: Surgical Oncology    LIVER LOBECTOMY N/A 1/20/2021    Procedure: LIVER ABLATION SEGMENT 7, INTRAOPERATIVE U/S OF LIVER;  Surgeon: Carlie Huntley MD;  [facility];  Service: Surgical Oncology    MEDIASTINOSCOPY N/A 11/27/2018    Procedure: MEDIASTINOSCOPY;  Surgeon: Jocy Hammond MD;  [facility];  Service: Thoracic    NV BRONCHOSCOPY NEEDLE BX TRACHEA MAIN STEM&/BRON N/A 11/27/2018    Procedure: EBUS with biopsy;  Surgeon: Jocy Hammond MD;  [facility];  Service: Thoracic    NV [address] N/A 11/27/2018    Procedure: BRONCHOSCOPY FLEXIBLE;  Surgeon: Jocy Hammond MD;  [facility];  Service: Thoracic    NV EDG US EXAM SURGICAL ALTER STOM DUODENUM/JEJUNUM N/A 10/29/2018    Procedure: LINEAR ENDOSCOPIC U/S;  Surgeon: Tashi Sagastume MD;  [facility]; Service: Gastroenterology    SMALL INTESTINE SURGERY N/A 1/20/2021    Procedure: RESECTION SMALL BOWEL AND ANASTOMOSIS, RESECTION SMALL BOWEL NODULE;  Surgeon: Carri Merida MD;  [facility];  Service: Surgical Oncology    WISDOM TOOTH EXTRACTION         Family History:    Family History   Problem Relation Age of Onset    Alzheimer's disease Mother     Parkinsonism Father     Colon cancer Maternal Grandfather        Cancer-related family history includes Colon cancer in her maternal grandfather      Social History:   Social History     Socioeconomic History    Marital status: /Civil Union     Spouse name: Not on file    Number of children: Not on file    Years of education: Not on file    Highest education level: Not on file   Occupational History    Not on file   Social Needs    Financial resource strain: Not on file    Food insecurity     Worry: Not on file     Inability: Not on file   [employer] needs     Medical: Not on file     Non-medical: Not on file   Tobacco Use    Smoking status: Never Smoker    Smokeless tobacco: Never Used   Substance and Sexual Activity    Alcohol use: Yes     Frequency: Monthly or less    Drug use: No    Sexual activity: Not on file   Lifestyle    Physical activity     Days per week: Not on file     Minutes per session: Not on file    Stress: Not on file   Relationships    Social connections     Talks on phone: Not on file     Gets together: Not on file     Attends Yazidi service: Not on file     Active member of club or organization: Not on file     Attends meetings of clubs or organizations: Not on file     Relationship status: Not on file    Intimate partner violence     Fear of current or ex partner: Not on file     Emotionally abused: Not on file     Physically abused: Not on file     Forced sexual activity: Not on file   Other Topics Concern    Not on file   Social History Narrative    Not on file       Current Medications:   Current Outpatient Medications   Medication Sig Dispense Refill    aspirin 81 MG tablet Take 1 tablet by mouth daily in the early morning       Cholecalciferol (VITAMIN D) 2000 units CAPS Take 1 tablet by mouth daily      docusate sodium (COLACE) 100 mg capsule Take 1 capsule (100 mg total) by mouth 2 (two) times a day 10 capsule 0    hydrochlorothiazide (HYDRODIURIL) 12 5 mg tablet TAKE 1 TABLET BY MOUTH EVERY DAY 90 tablet 1    Multiple Vitamin (MULTIVITAMIN) tablet Take 1 tablet by mouth daily      omeprazole (PriLOSEC) 40 MG capsule TAKE 1 CAPSULE BY MOUTH EVERY DAY BEFORE BREAKFAST 90 capsule 1    Wixela Inhub 250-50 MCG/DOSE inhaler INHALE 1 PUFF 2 (TWO) TIMES A DAY RINSE MOUTH AFTER USE  (Patient taking differently: Inhale 1 puff daily ) 1 Inhaler 5     No current facility-administered medications for this visit  Allergies: Allergies   Allergen Reactions    Bactrim [Sulfamethoxazole-Trimethoprim] Hives    Clam Shell Vomiting       Physical Exam:    Body surface area is 1 78 meters squared  Wt Readings from Last 3 Encounters:   02/25/21 77 1 kg (170 lb)   02/09/21 77 6 kg (171 lb)   01/28/21 78 kg (172 lb)        Temp Readings from Last 3 Encounters:   02/25/21 98 °F (36 7 °C) (Temporal)   02/15/21 (!) 96 1 °F (35 6 °C) (Temporal)   02/09/21 97 8 °F (36 6 °C) (Temporal)        BP Readings from Last 3 Encounters:   02/25/21 126/76   02/09/21 122/78   01/28/21 126/64         Pulse Readings from Last 3 Encounters:   02/25/21 84   01/28/21 75   01/26/21 74        Physical Exam     Constitutional   General appearance: No acute distress, well appearing and well nourished  Eyes   Conjunctiva and lids: No swelling, erythema or discharge  Pupils and irises: Equal, round and reactive to light      Ears, Nose, Mouth, and Throat   External inspection of ears and nose: Normal     Nasal mucosa, septum, and turbinates: Normal without edema or erythema  Oropharynx: Normal with no erythema, edema, exudate or lesions  Pulmonary   Respiratory effort: No increased work of breathing or signs of respiratory distress  Auscultation of lungs: Clear to auscultation  Cardiovascular   Palpation of heart: Normal PMI, no thrills  Auscultation of heart: Normal rate and rhythm, normal S1 and S2, without murmurs  Examination of extremities for edema and/or varicosities: Normal     Carotid pulses: Normal     Abdomen   Abdomen: Non-tender, no masses  Liver and spleen: No hepatomegaly or splenomegaly  Lymphatic   Palpation of lymph nodes in neck: No lymphadenopathy  Musculoskeletal   Gait and station: Normal     Digits and nails: Normal without clubbing or cyanosis  Inspection/palpation of joints, bones, and muscles: Normal     Skin   Skin and subcutaneous tissue: Normal without rashes or lesions  Neurologic   Cranial nerves: Cranial nerves 2-12 intact  Sensation: No sensory loss  Psychiatric   Orientation to person, place, and time: Normal     Mood and affect: Normal         Assessment / Plan:    The patient is a pleasant 72-year-old female with metastatic well-differentiated neuroendocrine tumor  Who was seen in January and a plan was made to debulk her tumor, radiate the spine and consider PP RT depending on surgical outcome and final extent of disease  She had a small-bowel resection and microwave ablation of segment 7 liver lesions in January of 2021 along with radiation to T11  The plan is to keep her on lanreotide and consider PPRT  IF SHE HAS PROGRESSION OF EXTRAHEPATIC DISEASE  She is already set up for imaging through her surgeon  I will see her back in 3 months  She will stay on her current lanreotide with no changes  If she has any questions she will call our office  Goals and Barriers:  Current Goal:  Prolong Survival from   Well-differentiated neuroendocrine tumor  Barriers: None  Patient's Capacity to Self Care:  Patient able to self care  Portions of the record may have been created with voice recognition software   Occasional wrong word or "sound a like" substitutions may have occurred due to the inherent limitations of voice recognition software   Read the chart carefully and recognize, using context, where substitutions have occurred

## 2021-02-26 ENCOUNTER — APPOINTMENT (OUTPATIENT)
Dept: RADIATION ONCOLOGY | Facility: HOSPITAL | Age: 71
End: 2021-02-26
Attending: RADIOLOGY
Payer: COMMERCIAL

## 2021-02-26 PROCEDURE — 77412 RADIATION TX DELIVERY LVL 3: CPT | Performed by: RADIOLOGY

## 2021-02-26 PROCEDURE — 77387 GUIDANCE FOR RADJ TX DLVR: CPT | Performed by: RADIOLOGY

## 2021-02-26 RX ORDER — ONDANSETRON 4 MG/1
4 TABLET, FILM COATED ORAL EVERY 8 HOURS PRN
Qty: 20 TABLET | Refills: 1 | Status: SHIPPED | OUTPATIENT
Start: 2021-02-26 | End: 2021-07-28 | Stop reason: ALTCHOICE

## 2021-03-01 ENCOUNTER — APPOINTMENT (OUTPATIENT)
Dept: RADIATION ONCOLOGY | Facility: HOSPITAL | Age: 71
End: 2021-03-01
Attending: RADIOLOGY
Payer: COMMERCIAL

## 2021-03-01 PROCEDURE — 77387 GUIDANCE FOR RADJ TX DLVR: CPT | Performed by: RADIOLOGY

## 2021-03-01 PROCEDURE — 77412 RADIATION TX DELIVERY LVL 3: CPT | Performed by: RADIOLOGY

## 2021-03-02 ENCOUNTER — APPOINTMENT (OUTPATIENT)
Dept: RADIATION ONCOLOGY | Facility: HOSPITAL | Age: 71
End: 2021-03-02
Attending: RADIOLOGY
Payer: COMMERCIAL

## 2021-03-02 PROCEDURE — 77412 RADIATION TX DELIVERY LVL 3: CPT | Performed by: RADIOLOGY

## 2021-03-02 PROCEDURE — 77387 GUIDANCE FOR RADJ TX DLVR: CPT | Performed by: RADIOLOGY

## 2021-03-03 ENCOUNTER — APPOINTMENT (OUTPATIENT)
Dept: RADIATION ONCOLOGY | Facility: HOSPITAL | Age: 71
End: 2021-03-03
Attending: RADIOLOGY
Payer: COMMERCIAL

## 2021-03-03 PROCEDURE — 77387 GUIDANCE FOR RADJ TX DLVR: CPT | Performed by: RADIOLOGY

## 2021-03-03 PROCEDURE — 77336 RADIATION PHYSICS CONSULT: CPT | Performed by: RADIOLOGY

## 2021-03-03 PROCEDURE — 77412 RADIATION TX DELIVERY LVL 3: CPT | Performed by: RADIOLOGY

## 2021-03-04 ENCOUNTER — APPOINTMENT (OUTPATIENT)
Dept: RADIATION ONCOLOGY | Facility: HOSPITAL | Age: 71
End: 2021-03-04
Payer: COMMERCIAL

## 2021-03-08 ENCOUNTER — IMMUNIZATIONS (OUTPATIENT)
Dept: FAMILY MEDICINE CLINIC | Facility: HOSPITAL | Age: 71
End: 2021-03-08
Payer: COMMERCIAL

## 2021-03-08 ENCOUNTER — LAB (OUTPATIENT)
Dept: LAB | Facility: CLINIC | Age: 71
End: 2021-03-08
Payer: COMMERCIAL

## 2021-03-08 DIAGNOSIS — C7B.8 METASTATIC MALIGNANT NEUROENDOCRINE TUMOR TO LIVER (HCC): ICD-10-CM

## 2021-03-08 DIAGNOSIS — Z23 ENCOUNTER FOR IMMUNIZATION: Primary | ICD-10-CM

## 2021-03-08 LAB
ALBUMIN SERPL BCP-MCNC: 3.7 G/DL (ref 3.5–5)
ALP SERPL-CCNC: 97 U/L (ref 46–116)
ALT SERPL W P-5'-P-CCNC: 30 U/L (ref 12–78)
ANION GAP SERPL CALCULATED.3IONS-SCNC: 9 MMOL/L (ref 4–13)
AST SERPL W P-5'-P-CCNC: 23 U/L (ref 5–45)
BASOPHILS # BLD AUTO: 0.06 THOUSANDS/ΜL (ref 0–0.1)
BASOPHILS NFR BLD AUTO: 1 % (ref 0–1)
BILIRUB SERPL-MCNC: 0.87 MG/DL (ref 0.2–1)
BUN SERPL-MCNC: 24 MG/DL (ref 5–25)
CALCIUM SERPL-MCNC: 9.3 MG/DL (ref 8.3–10.1)
CHLORIDE SERPL-SCNC: 101 MMOL/L (ref 100–108)
CO2 SERPL-SCNC: 30 MMOL/L (ref 21–32)
CREAT SERPL-MCNC: 0.88 MG/DL (ref 0.6–1.3)
EOSINOPHIL # BLD AUTO: 0 THOUSAND/ΜL (ref 0–0.61)
EOSINOPHIL NFR BLD AUTO: 0 % (ref 0–6)
ERYTHROCYTE [DISTWIDTH] IN BLOOD BY AUTOMATED COUNT: 15.9 % (ref 11.6–15.1)
GFR SERPL CREATININE-BSD FRML MDRD: 67 ML/MIN/1.73SQ M
GLUCOSE SERPL-MCNC: 124 MG/DL (ref 65–140)
HCT VFR BLD AUTO: 38.1 % (ref 34.8–46.1)
HGB BLD-MCNC: 11.7 G/DL (ref 11.5–15.4)
IMM GRANULOCYTES # BLD AUTO: 0.01 THOUSAND/UL (ref 0–0.2)
IMM GRANULOCYTES NFR BLD AUTO: 0 % (ref 0–2)
LYMPHOCYTES # BLD AUTO: 1.12 THOUSANDS/ΜL (ref 0.6–4.47)
LYMPHOCYTES NFR BLD AUTO: 21 % (ref 14–44)
MCH RBC QN AUTO: 27.6 PG (ref 26.8–34.3)
MCHC RBC AUTO-ENTMCNC: 30.7 G/DL (ref 31.4–37.4)
MCV RBC AUTO: 90 FL (ref 82–98)
MONOCYTES # BLD AUTO: 0.72 THOUSAND/ΜL (ref 0.17–1.22)
MONOCYTES NFR BLD AUTO: 13 % (ref 4–12)
NEUTROPHILS # BLD AUTO: 3.51 THOUSANDS/ΜL (ref 1.85–7.62)
NEUTS SEG NFR BLD AUTO: 65 % (ref 43–75)
NRBC BLD AUTO-RTO: 0 /100 WBCS
PLATELET # BLD AUTO: 258 THOUSANDS/UL (ref 149–390)
PMV BLD AUTO: 11.3 FL (ref 8.9–12.7)
POTASSIUM SERPL-SCNC: 3.3 MMOL/L (ref 3.5–5.3)
PROT SERPL-MCNC: 7.8 G/DL (ref 6.4–8.2)
RBC # BLD AUTO: 4.24 MILLION/UL (ref 3.81–5.12)
SODIUM SERPL-SCNC: 140 MMOL/L (ref 136–145)
WBC # BLD AUTO: 5.42 THOUSAND/UL (ref 4.31–10.16)

## 2021-03-08 PROCEDURE — 36415 COLL VENOUS BLD VENIPUNCTURE: CPT

## 2021-03-08 PROCEDURE — 86316 IMMUNOASSAY TUMOR OTHER: CPT

## 2021-03-08 PROCEDURE — 0002A SARS-COV-2 / COVID-19 MRNA VACCINE (PFIZER-BIONTECH) 30 MCG: CPT

## 2021-03-08 PROCEDURE — 80053 COMPREHEN METABOLIC PANEL: CPT

## 2021-03-08 PROCEDURE — 85025 COMPLETE CBC W/AUTO DIFF WBC: CPT

## 2021-03-08 PROCEDURE — 91300 SARS-COV-2 / COVID-19 MRNA VACCINE (PFIZER-BIONTECH) 30 MCG: CPT

## 2021-03-10 LAB — CGA SERPL-MCNC: 62.9 NG/ML (ref 0–101.8)

## 2021-03-15 ENCOUNTER — HOSPITAL ENCOUNTER (OUTPATIENT)
Dept: INFUSION CENTER | Facility: CLINIC | Age: 71
Discharge: HOME | End: 2021-03-15
Payer: COMMERCIAL

## 2021-03-15 VITALS — TEMPERATURE: 96.6 F

## 2021-03-15 DIAGNOSIS — C7B.8 METASTATIC MALIGNANT NEUROENDOCRINE TUMOR TO LIVER (HCC): Primary | ICD-10-CM

## 2021-03-15 PROCEDURE — 96372 THER/PROPH/DIAG INJ SC/IM: CPT

## 2021-03-15 RX ORDER — LANREOTIDE ACETATE 120 MG/.5ML
120 INJECTION SUBCUTANEOUS ONCE
Status: COMPLETED | OUTPATIENT
Start: 2021-03-15 | End: 2021-03-15

## 2021-03-15 RX ORDER — LANREOTIDE ACETATE 120 MG/.5ML
120 INJECTION SUBCUTANEOUS ONCE
Status: CANCELLED | OUTPATIENT
Start: 2021-04-12

## 2021-03-15 RX ADMIN — LANREOTIDE ACETATE 120 MG: 120 INJECTION SUBCUTANEOUS at 11:30

## 2021-03-15 NOTE — PROGRESS NOTES
Patient arrived for Lanreotide injection  Offers no complaints  Tolerated injection into right upper outer quadrant of buttocks today without issues   Patient verified upcoming appointment and declined AVS

## 2021-04-01 ENCOUNTER — TELEMEDICINE (OUTPATIENT)
Dept: RADIATION ONCOLOGY | Facility: HOSPITAL | Age: 71
End: 2021-04-01
Attending: RADIOLOGY

## 2021-04-01 DIAGNOSIS — C79.51 BONE METASTASIS (HCC): Primary | ICD-10-CM

## 2021-04-01 NOTE — PROGRESS NOTES
Virtual Brief Visit    Assessment/Plan:    The patient is 1 month status post palliative radiation therapy to T10 through T12 for bone metastasis  She has tolerated treatment well without any significant side effects post treatment  In particular she no longer has nausea  She denies any skin changes  She states she has a follow-up CT scan scheduled for May  She will be seeing Dr Keny Rivera in [place] thereafter  She will return to our office in 6 months for follow-up  Problem List Items Addressed This Visit        Musculoskeletal and Integument    Bone metastasis ([facility] [address] ) - Primary                Reason for visit is No chief complaint on file  Encounter provider Terri Dow MD    Provider located at [address] [id number]    Recent Visits  No visits were found meeting these conditions  Showing recent visits within past 7 days and meeting all other requirements     Future Appointments  No visits were found meeting these conditions  Showing future appointments within next 150 days and meeting all other requirements        After connecting through telephone, the patient was identified by name and date of birth  Medina Nicole was informed that this is a telemedicine visit and that the visit is being conducted through telephone  My office door was closed  No one else was in the room  She acknowledged consent and understanding of privacy and security of the platform  The patient has agreed to participate and understands she can discontinue the visit at any time  Patient is aware this is a billable service       Subjective        Past Medical History:   Diagnosis Date    Abdominal pain     Acute tonsillitis     Breast pain, left     Edema of both lower extremities     GERD without esophagitis     Kidney stone     " [place]"    Lesion of liver     Liver mass     Lymphadenopathy     Mediastinal lymphadenopathy     Neoplasm of digestive system     Orthostatic lightheadedness     Sarcoidosis     Vertigo        Past Surgical History:   Procedure Laterality Date    CARPAL TUNNEL RELEASE Bilateral     COLONOSCOPY      2017    IR BIOPSY LIVER MASS  11/6/2018    IR BIOPSY LIVER MASS  12/8/2020    KNEE ARTHROSCOPY Left     LAPAROTOMY N/A 1/20/2021    Procedure: LAPAROTOMY EXPLORATORY;  Surgeon: Vale Bolaños MD;  [facility];  Service: Surgical Oncology    LIVER LOBECTOMY N/A 1/20/2021    Procedure: LIVER ABLATION SEGMENT 7, INTRAOPERATIVE U/S OF LIVER;  Surgeon: Vale Bolaños MD;  [facility];  Service: Surgical Oncology    MEDIASTINOSCOPY N/A 11/27/2018    Procedure: MEDIASTINOSCOPY;  Surgeon: Maribel Cuenca MD;  [facility];  Service: Thoracic    OK BRONCHOSCOPY NEEDLE BX TRACHEA MAIN STEM&/BRON N/A 11/27/2018    Procedure: EBUS with biopsy;  Surgeon: Maribel Cuenca MD;  [facility];  Service: Thoracic    [place] [address] N/A 11/27/2018    Procedure: Lenward Abts;  Surgeon: Maribel Cuenca MD;  [facility];  Service: Thoracic    OK EDG US EXAM SURGICAL ALTER STOM DUODENUM/JEJUNUM N/A 10/29/2018    Procedure: LINEAR ENDOSCOPIC U/S;  Surgeon: Antonio Amaya MD;  [facility];   Service: Gastroenterology    SMALL INTESTINE SURGERY N/A 1/20/2021    Procedure: RESECTION SMALL BOWEL AND ANASTOMOSIS, RESECTION SMALL BOWEL NODULE;  Surgeon: Vale Bolaños MD;  [facility];  Service: Surgical Oncology    WISDOM TOOTH EXTRACTION         Current Outpatient Medications   Medication Sig Dispense Refill    aspirin 81 MG tablet Take 1 tablet by mouth daily in the early morning       Cholecalciferol (VITAMIN D) 2000 units CAPS Take 1 tablet by mouth daily      docusate sodium (COLACE) 100 mg capsule Take 1 capsule (100 mg total) by mouth 2 (two) times a day 10 capsule 0    hydrochlorothiazide (HYDRODIURIL) 12 5 mg tablet TAKE 1 TABLET BY MOUTH EVERY DAY 90 tablet 1    Multiple Vitamin (MULTIVITAMIN) tablet Take 1 tablet by mouth daily      omeprazole (PriLOSEC) 40 MG capsule TAKE 1 CAPSULE BY MOUTH EVERY DAY BEFORE BREAKFAST 90 capsule 1    ondansetron (ZOFRAN) 4 mg tablet Take 1 tablet (4 mg total) by mouth every 8 (eight) hours as needed for nausea or vomiting 20 tablet 1    Wixela Inhub 250-50 MCG/DOSE inhaler INHALE 1 PUFF 2 (TWO) TIMES A DAY RINSE MOUTH AFTER USE  (Patient taking differently: Inhale 1 puff daily ) 1 Inhaler 5     No current facility-administered medications for this visit  Allergies   Allergen Reactions    Bactrim [Sulfamethoxazole-Trimethoprim] Hives    Clam Shell - Food Allergy Vomiting       Review of Systems    There were no vitals filed for this visit  I spent 15 minutes directly with the patient during this visit    [facility] acknowledges that she has consented to an online visit or consultation  She understands that the online visit is based solely on information provided by her, and that, in the absence of a face-to-face physical evaluation by the physician, the diagnosis she receives is both limited and provisional in terms of accuracy and completeness  This is not intended to replace a full medical face-to-face evaluation by the physician  Trisha Carranza understands and accepts these terms

## 2021-04-07 ENCOUNTER — TRANSCRIBE ORDERS (OUTPATIENT)
Dept: LAB | Facility: CLINIC | Age: 71
End: 2021-04-07

## 2021-04-07 ENCOUNTER — APPOINTMENT (OUTPATIENT)
Dept: LAB | Facility: CLINIC | Age: 71
End: 2021-04-07
Payer: COMMERCIAL

## 2021-04-07 DIAGNOSIS — C7B.8 METASTATIC MALIGNANT NEUROENDOCRINE TUMOR TO LIVER (HCC): ICD-10-CM

## 2021-04-07 LAB
ALBUMIN SERPL BCP-MCNC: 3.4 G/DL (ref 3.5–5)
ALP SERPL-CCNC: 103 U/L (ref 46–116)
ALT SERPL W P-5'-P-CCNC: 30 U/L (ref 12–78)
ANION GAP SERPL CALCULATED.3IONS-SCNC: 9 MMOL/L (ref 4–13)
AST SERPL W P-5'-P-CCNC: 26 U/L (ref 5–45)
BASOPHILS # BLD AUTO: 0.09 THOUSANDS/ΜL (ref 0–0.1)
BASOPHILS NFR BLD AUTO: 2 % (ref 0–1)
BILIRUB SERPL-MCNC: 0.74 MG/DL (ref 0.2–1)
BUN SERPL-MCNC: 19 MG/DL (ref 5–25)
CALCIUM ALBUM COR SERPL-MCNC: 9.7 MG/DL (ref 8.3–10.1)
CALCIUM SERPL-MCNC: 9.2 MG/DL (ref 8.3–10.1)
CHLORIDE SERPL-SCNC: 105 MMOL/L (ref 100–108)
CO2 SERPL-SCNC: 28 MMOL/L (ref 21–32)
CREAT SERPL-MCNC: 0.77 MG/DL (ref 0.6–1.3)
EOSINOPHIL # BLD AUTO: 0 THOUSAND/ΜL (ref 0–0.61)
EOSINOPHIL NFR BLD AUTO: 0 % (ref 0–6)
ERYTHROCYTE [DISTWIDTH] IN BLOOD BY AUTOMATED COUNT: 15.5 % (ref 11.6–15.1)
GFR SERPL CREATININE-BSD FRML MDRD: 79 ML/MIN/1.73SQ M
GLUCOSE P FAST SERPL-MCNC: 111 MG/DL (ref 65–99)
HCT VFR BLD AUTO: 37.4 % (ref 34.8–46.1)
HGB BLD-MCNC: 11.6 G/DL (ref 11.5–15.4)
IMM GRANULOCYTES # BLD AUTO: 0.01 THOUSAND/UL (ref 0–0.2)
IMM GRANULOCYTES NFR BLD AUTO: 0 % (ref 0–2)
LYMPHOCYTES # BLD AUTO: 1.56 THOUSANDS/ΜL (ref 0.6–4.47)
LYMPHOCYTES NFR BLD AUTO: 26 % (ref 14–44)
MCH RBC QN AUTO: 28.3 PG (ref 26.8–34.3)
MCHC RBC AUTO-ENTMCNC: 31 G/DL (ref 31.4–37.4)
MCV RBC AUTO: 91 FL (ref 82–98)
MONOCYTES # BLD AUTO: 0.74 THOUSAND/ΜL (ref 0.17–1.22)
MONOCYTES NFR BLD AUTO: 13 % (ref 4–12)
NEUTROPHILS # BLD AUTO: 3.54 THOUSANDS/ΜL (ref 1.85–7.62)
NEUTS SEG NFR BLD AUTO: 59 % (ref 43–75)
NRBC BLD AUTO-RTO: 0 /100 WBCS
PLATELET # BLD AUTO: 267 THOUSANDS/UL (ref 149–390)
PMV BLD AUTO: 10.3 FL (ref 8.9–12.7)
POTASSIUM SERPL-SCNC: 3.3 MMOL/L (ref 3.5–5.3)
PROT SERPL-MCNC: 7.6 G/DL (ref 6.4–8.2)
RBC # BLD AUTO: 4.1 MILLION/UL (ref 3.81–5.12)
SODIUM SERPL-SCNC: 142 MMOL/L (ref 136–145)
WBC # BLD AUTO: 5.94 THOUSAND/UL (ref 4.31–10.16)

## 2021-04-07 PROCEDURE — 85025 COMPLETE CBC W/AUTO DIFF WBC: CPT

## 2021-04-07 PROCEDURE — 36415 COLL VENOUS BLD VENIPUNCTURE: CPT

## 2021-04-07 PROCEDURE — 80053 COMPREHEN METABOLIC PANEL: CPT

## 2021-04-12 ENCOUNTER — HOSPITAL ENCOUNTER (OUTPATIENT)
Dept: INFUSION CENTER | Facility: CLINIC | Age: 71
Discharge: HOME | End: 2021-04-12
Payer: COMMERCIAL

## 2021-04-12 VITALS — TEMPERATURE: 97.1 F

## 2021-04-12 DIAGNOSIS — C7B.8 METASTATIC MALIGNANT NEUROENDOCRINE TUMOR TO LIVER (HCC): Primary | ICD-10-CM

## 2021-04-12 PROCEDURE — 96372 THER/PROPH/DIAG INJ SC/IM: CPT

## 2021-04-12 RX ORDER — LANREOTIDE ACETATE 120 MG/.5ML
120 INJECTION SUBCUTANEOUS ONCE
Status: COMPLETED | OUTPATIENT
Start: 2021-04-12 | End: 2021-04-12

## 2021-04-12 RX ORDER — LANREOTIDE ACETATE 120 MG/.5ML
120 INJECTION SUBCUTANEOUS ONCE
Status: CANCELLED | OUTPATIENT
Start: 2021-05-10

## 2021-04-12 RX ADMIN — LANREOTIDE ACETATE 120 MG: 120 INJECTION SUBCUTANEOUS at 15:25

## 2021-04-12 NOTE — PROGRESS NOTES
Pt at clinic for lanreotide injection  Pt tolerated injection in left buttocks  Aware of next appointment   Declined AVS

## 2021-04-13 ENCOUNTER — TELEPHONE (OUTPATIENT)
Dept: INFUSION CENTER | Facility: CLINIC | Age: 71
End: 2021-04-13

## 2021-04-13 ENCOUNTER — TELEPHONE (OUTPATIENT)
Dept: FAMILY MEDICINE CLINIC | Facility: CLINIC | Age: 71
End: 2021-04-13

## 2021-04-13 DIAGNOSIS — Z12.31 SCREENING MAMMOGRAM, ENCOUNTER FOR: Primary | ICD-10-CM

## 2021-04-13 NOTE — TELEPHONE ENCOUNTER
Spoke with patient regarding her questions about her lanreotide injection yesterday  Packaging has been changed recently so that they syringe does have a different appearance, the medication was scanned and is confirmed to be the correct medication and dose  Administration is a 20 second minimum injection but can take longer  Confirmed with treating RN that medication was administered below patient reported tender areas which may have allowed for deeper SQ administration and decreased patient  Patient satisfied with explanation as to why this injection may have been better tolerated

## 2021-04-19 ENCOUNTER — OFFICE VISIT (OUTPATIENT)
Dept: FAMILY MEDICINE CLINIC | Facility: CLINIC | Age: 71
End: 2021-04-19
Payer: COMMERCIAL

## 2021-04-19 VITALS
SYSTOLIC BLOOD PRESSURE: 138 MMHG | BODY MASS INDEX: 32.42 KG/M2 | OXYGEN SATURATION: 99 % | WEIGHT: 176.2 LBS | HEIGHT: 62 IN | RESPIRATION RATE: 15 BRPM | DIASTOLIC BLOOD PRESSURE: 84 MMHG | HEART RATE: 70 BPM | TEMPERATURE: 97.6 F

## 2021-04-19 DIAGNOSIS — L98.9 SKIN LESION: Primary | ICD-10-CM

## 2021-04-19 PROCEDURE — 99212 OFFICE O/P EST SF 10 MIN: CPT | Performed by: FAMILY MEDICINE

## 2021-04-19 NOTE — PROGRESS NOTES
FAMILY PRACTICE OFFICE VISIT       NAME: Sherman Ho  AGE: 72 y o  SEX: female       : 1950        MRN: [id number]    DATE: 2021  TIME: 8:16 AM    Assessment and Plan     Problem List Items Addressed This Visit        Musculoskeletal and Integument    Skin lesion - Primary      Skin lesion  Patient with suspected actinic keratoses of left ear  She was given referral to other dermatologist to see if she could find an earlier appointment however I explained that these lesions are  Pre cancerous any would be acceptable to wait until her appointment in May to have evaluation with [address] [place] [address] Dermatology                   Chief Complaint     Chief Complaint   Patient presents with    Pain     ear pain        History of Present Illness      Patient the office with complaint lesion on left outer ear lobe area that has become uncomfortable especially with sleeping on her left side  Patient states last year she had similar condition however symptoms disappeared when she was in the process of being treated in hospital for newly diagnosed carcinoid tumor  Patient had radiation therapy and infusion therapy as well as partial small-bowel resection  Over the past several weeks to months the lesion has begun to become more uncomfortable once again  She does have an appointment with a dermatologist in May 2021      Review of Systems   Review of Systems   Constitutional: Negative      Skin:        As per HPI       Active Problem List     Patient Active Problem List   Diagnosis    Positional vertigo    Corn of toe    Focal nodular hyperplasia of liver    IPMN (intraductal papillary mucinous neoplasm)    Abnormal MRI of abdomen    Lymphadenopathy    Liver mass    Mediastinal adenopathy    Sarcoidosis, lung (HCC)    Granulomatous lymphadenitis    Edema of right lower extremity    Diastolic dysfunction    Complex tear of medial meniscus of right knee as current injury    Other tear of medial meniscus, current injury, right knee, initial encounter    Trigger middle finger of right hand    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 ([address] )    Bone metastasis ([address] )    Skin lesion       Past Medical History:  Past Medical History:   Diagnosis Date    Abdominal pain     Acute tonsillitis     Breast pain, left     Edema of both lower extremities     GERD without esophagitis     Kidney stone     " [place]"    Lesion of liver     Liver mass     Lymphadenopathy     Mediastinal lymphadenopathy     Neoplasm of digestive system     Orthostatic lightheadedness     Sarcoidosis     Vertigo        Past Surgical History:  Past Surgical History:   Procedure Laterality Date    CARPAL TUNNEL RELEASE Bilateral     COLONOSCOPY      2017    IR BIOPSY LIVER MASS  11/6/2018    IR BIOPSY LIVER MASS  12/8/2020    KNEE ARTHROSCOPY Left     LAPAROTOMY N/A 1/20/2021    Procedure: LAPAROTOMY EXPLORATORY;  Surgeon: Yaa Win MD;  [facility];  Service: Surgical Oncology    LIVER LOBECTOMY N/A 1/20/2021    Procedure: LIVER ABLATION SEGMENT 7, INTRAOPERATIVE U/S OF LIVER;  Surgeon: Yaa Win MD;  [facility];  Service: Surgical Oncology    MEDIASTINOSCOPY N/A 11/27/2018    Procedure: MEDIASTINOSCOPY;  Surgeon: Simi Nelson MD;  [facility];  Service: Thoracic    MA BRONCHOSCOPY NEEDLE BX TRACHEA MAIN STEM&/BRON N/A 11/27/2018    Procedure: EBUS with biopsy;  Surgeon: Simi Nelson MD;  [facility];  Service: Thoracic    MA [address] N/A 11/27/2018    Procedure: BRONCHOSCOPY FLEXIBLE;  Surgeon: Simi Nelson MD;  [facility];  Service: Thoracic    MA EDG US EXAM SURGICAL ALTER STOM DUODENUM/JEJUNUM N/A 10/29/2018    Procedure: LINEAR ENDOSCOPIC U/S;  Surgeon: Karri Augustin MD;  [facility];   Service: Gastroenterology    SMALL INTESTINE SURGERY N/A 1/20/2021    Procedure: RESECTION SMALL BOWEL AND ANASTOMOSIS, RESECTION SMALL BOWEL NODULE;  Surgeon: Georgie Hashimoto, MD;  [facility];  Service: Surgical Oncology    WISDOM TOOTH EXTRACTION         Family History:  Family History   Problem Relation Age of Onset    Alzheimer's disease Mother     Parkinsonism Father     Colon cancer Maternal Grandfather        Social History:  Social History     Socioeconomic History    Marital status: /Civil Union     Spouse name: Not on file    Number of children: Not on file    Years of education: Not on file    Highest education level: Not on file   Occupational History    Not on file   Social Needs    Financial resource strain: Not on file    Food insecurity     Worry: Not on file     Inability: Not on file   [employer] needs     Medical: Not on file     Non-medical: Not on file   Tobacco Use    Smoking status: Never Smoker    Smokeless tobacco: Never Used   Substance and Sexual Activity    Alcohol use: Yes     Frequency: Monthly or less    Drug use: No    Sexual activity: Not on file   Lifestyle    Physical activity     Days per week: Not on file     Minutes per session: Not on file    Stress: Not on file   Relationships    Social connections     Talks on phone: Not on file     Gets together: Not on file     Attends [employer] service: Not on file     Active member of club or organization: Not on file     Attends meetings of clubs or organizations: Not on file     Relationship status: Not on file    Intimate partner violence     Fear of current or ex partner: Not on file     Emotionally abused: Not on file     Physically abused: Not on file     Forced sexual activity: Not on file   Other Topics Concern    Not on file   Social History Narrative    Not on file       Objective     Vitals:    04/19/21 0728   BP: 138/84   Pulse: 70   Resp: 15   Temp: 97 6 °F (36 4 °C)   SpO2: 99%     Wt Readings from Last 3 Encounters:   04/19/21 79 9 kg (176 lb 3 2 oz)   02/25/21 77 1 kg (170 lb)   02/09/21 77 6 kg (171 lb)       Physical Exam  Constitutional:       General: She is not in acute distress  Appearance: Normal appearance  She is not ill-appearing  Skin:     Comments: Patient with small 2 mm hard keratinized minimally tender lesion along left upper area of pinna  There is no significant redness or discharge from this area   Neurological:      Mental Status: She is alert           Pertinent Laboratory/Diagnostic Studies:  Lab Results   Component Value Date    BUN 19 04/07/2021    CREATININE 0 77 04/07/2021    CALCIUM 9 2 04/07/2021    K 3 3 (L) 04/07/2021    CO2 28 04/07/2021     04/07/2021     Lab Results   Component Value Date    ALT 30 04/07/2021    AST 26 04/07/2021    ALKPHOS 103 04/07/2021       Lab Results   Component Value Date    WBC 5 94 04/07/2021    HGB 11 6 04/07/2021    HCT 37 4 04/07/2021    MCV 91 04/07/2021     04/07/2021       No results found for: TSH    No results found for: CHOL  No results found for: TRIG  No results found for: HDL  No results found for: [facility]  Lab Results   Component Value Date    HGBA1C 6 1 (H) 01/12/2021       Results for orders placed or performed in visit on 04/07/21   CBC and differential   Result Value Ref Range    WBC 5 94 4 31 - 10 16 Thousand/uL    RBC 4 10 3 81 - 5 12 Million/uL    Hemoglobin 11 6 11 5 - 15 4 g/dL    Hematocrit 37 4 34 8 - 46 1 %    MCV 91 82 - 98 fL    MCH 28 3 26 8 - 34 3 pg    MCHC 31 0 (L) 31 4 - 37 4 g/dL    RDW 15 5 (H) 11 6 - 15 1 %    MPV 10 3 8 9 - 12 7 fL    Platelets 755 384 - 297 Thousands/uL    nRBC 0 /100 WBCs    Neutrophils Relative 59 43 - 75 %    Immat GRANS % 0 0 - 2 %    Lymphocytes Relative 26 14 - 44 %    Monocytes Relative 13 (H) 4 - 12 %    Eosinophils Relative 0 0 - 6 %    Basophils Relative 2 (H) 0 - 1 %    Neutrophils Absolute 3 54 1 85 - 7 62 Thousands/µL    Immature Grans Absolute 0 01 0 00 - 0 20 Thousand/uL    Lymphocytes Absolute 1 56 0 60 - 4 47 Thousands/µL    Monocytes Absolute 0 74 0 17 - 1 22 Thousand/µL    Eosinophils Absolute 0 00 0 00 - 0 61 Thousand/µL    Basophils Absolute 0 09 0 00 - 0 10 Thousands/µL   Comprehensive metabolic panel   Result Value Ref Range    Sodium 142 136 - 145 mmol/L    Potassium 3 3 (L) 3 5 - 5 3 mmol/L    Chloride 105 100 - 108 mmol/L    CO2 28 21 - 32 mmol/L    ANION GAP 9 4 - 13 mmol/L    BUN 19 5 - 25 mg/dL    Creatinine 0 77 0 60 - 1 30 mg/dL    Glucose, Fasting 111 (H) 65 - 99 mg/dL    Calcium 9 2 8 3 - 10 1 mg/dL    Corrected Calcium 9 7 8 3 - 10 1 mg/dL    AST 26 5 - 45 U/L    ALT 30 12 - 78 U/L    Alkaline Phosphatase 103 46 - 116 U/L    Total Protein 7 6 6 4 - 8 2 g/dL    Albumin 3 4 (L) 3 5 - 5 0 g/dL    Total Bilirubin 0 74 0 20 - 1 00 mg/dL    eGFR 79 ml/min/1 73sq m       No orders of the defined types were placed in this encounter  ALLERGIES:  Allergies   Allergen Reactions    Bactrim [Sulfamethoxazole-Trimethoprim] Hives    Clam Shell - Food Allergy Vomiting       Current Medications     Current Outpatient Medications   Medication Sig Dispense Refill    aspirin 81 MG tablet Take 1 tablet by mouth daily in the early morning       Cholecalciferol (VITAMIN D) 2000 units CAPS Take 1 tablet by mouth daily      docusate sodium (COLACE) 100 mg capsule Take 1 capsule (100 mg total) by mouth 2 (two) times a day 10 capsule 0    hydrochlorothiazide (HYDRODIURIL) 12 5 mg tablet TAKE 1 TABLET BY MOUTH EVERY DAY 90 tablet 1    Multiple Vitamin (MULTIVITAMIN) tablet Take 1 tablet by mouth daily      omeprazole (PriLOSEC) 40 MG capsule TAKE 1 CAPSULE BY MOUTH EVERY DAY BEFORE BREAKFAST 90 capsule 1    ondansetron (ZOFRAN) 4 mg tablet Take 1 tablet (4 mg total) by mouth every 8 (eight) hours as needed for nausea or vomiting 20 tablet 1    Wixela Inhub 250-50 MCG/DOSE inhaler INHALE 1 PUFF 2 (TWO) TIMES A DAY RINSE MOUTH AFTER USE  (Patient taking differently: Inhale 1 puff daily ) 1 Inhaler 5     No current facility-administered medications for this visit  Health Maintenance     Health Maintenance   Topic Date Due    OT PLAN OF CARE  Never done    BMI: Followup Plan  Never done    Annual Physical  Never done    DTaP,Tdap,and Td Vaccines (1 - Tdap) Never done    Pneumococcal Vaccine: 65+ Years (2 of 2 - PPSV23) 02/11/2020    Influenza Vaccine (1) 09/01/2020    Fall Risk  09/10/2020    MAMMOGRAM  11/08/2020    Depression Screening PHQ  08/26/2021    BMI: Adult  04/19/2022    Hepatitis C Screening  Completed    COVID-19 Vaccine  Completed    HIB Vaccine  Aged Out    Hepatitis B Vaccine  Aged Out    IPV Vaccine  Aged Out    Hepatitis A Vaccine  Aged Out    Meningococcal ACWY Vaccine  Aged Out    HPV Vaccine  Aged Out     Immunization History   Administered Date(s) Administered    INFLUENZA 12/14/2018, 10/01/2020    Influenza, high dose seasonal 0 7 mL 12/14/2018, 11/06/2019    Pneumococcal Conjugate 13-Valent 02/11/2019    SARS-CoV-2 / COVID-19 mRNA IM (Pfizer-BioNTech) 02/17/2021, 18/21/5555       Alexandre Beltran MD

## 2021-04-19 NOTE — ASSESSMENT & PLAN NOTE
Skin lesion  Patient with suspected actinic keratoses of left ear    She was given referral to other dermatologist to see if she could find an earlier appointment however I explained that these lesions are  Pre cancerous any would be acceptable to wait until her appointment in May to have evaluation with [facility]

## 2021-05-04 ENCOUNTER — APPOINTMENT (OUTPATIENT)
Dept: LAB | Facility: CLINIC | Age: 71
End: 2021-05-04
Payer: COMMERCIAL

## 2021-05-04 ENCOUNTER — HOSPITAL ENCOUNTER (OUTPATIENT)
Dept: MAMMOGRAPHY | Facility: HOSPITAL | Age: 71
Discharge: HOME | End: 2021-05-04
Payer: COMMERCIAL

## 2021-05-04 VITALS — BODY MASS INDEX: 32.39 KG/M2 | WEIGHT: 176 LBS | HEIGHT: 62 IN

## 2021-05-04 DIAGNOSIS — Z12.31 SCREENING MAMMOGRAM, ENCOUNTER FOR: ICD-10-CM

## 2021-05-04 DIAGNOSIS — C79.51 BONE METASTASIS (HCC): ICD-10-CM

## 2021-05-04 DIAGNOSIS — C7B.8 METASTATIC MALIGNANT NEUROENDOCRINE TUMOR TO LIVER (HCC): ICD-10-CM

## 2021-05-04 LAB
BASOPHILS # BLD AUTO: 0.07 THOUSANDS/ΜL (ref 0–0.1)
BASOPHILS NFR BLD AUTO: 1 % (ref 0–1)
BUN SERPL-MCNC: 20 MG/DL (ref 5–25)
CREAT SERPL-MCNC: 0.74 MG/DL (ref 0.6–1.3)
EOSINOPHIL # BLD AUTO: 0 THOUSAND/ΜL (ref 0–0.61)
EOSINOPHIL NFR BLD AUTO: 0 % (ref 0–6)
ERYTHROCYTE [DISTWIDTH] IN BLOOD BY AUTOMATED COUNT: 14.9 % (ref 11.6–15.1)
GFR SERPL CREATININE-BSD FRML MDRD: 83 ML/MIN/1.73SQ M
HCT VFR BLD AUTO: 35.5 % (ref 34.8–46.1)
HGB BLD-MCNC: 11.2 G/DL (ref 11.5–15.4)
IMM GRANULOCYTES # BLD AUTO: 0.01 THOUSAND/UL (ref 0–0.2)
IMM GRANULOCYTES NFR BLD AUTO: 0 % (ref 0–2)
LYMPHOCYTES # BLD AUTO: 1.42 THOUSANDS/ΜL (ref 0.6–4.47)
LYMPHOCYTES NFR BLD AUTO: 22 % (ref 14–44)
MCH RBC QN AUTO: 28.9 PG (ref 26.8–34.3)
MCHC RBC AUTO-ENTMCNC: 31.5 G/DL (ref 31.4–37.4)
MCV RBC AUTO: 92 FL (ref 82–98)
MONOCYTES # BLD AUTO: 0.86 THOUSAND/ΜL (ref 0.17–1.22)
MONOCYTES NFR BLD AUTO: 13 % (ref 4–12)
NEUTROPHILS # BLD AUTO: 4.22 THOUSANDS/ΜL (ref 1.85–7.62)
NEUTS SEG NFR BLD AUTO: 64 % (ref 43–75)
NRBC BLD AUTO-RTO: 0 /100 WBCS
PLATELET # BLD AUTO: 289 THOUSANDS/UL (ref 149–390)
PMV BLD AUTO: 10.6 FL (ref 8.9–12.7)
RBC # BLD AUTO: 3.87 MILLION/UL (ref 3.81–5.12)
WBC # BLD AUTO: 6.58 THOUSAND/UL (ref 4.31–10.16)

## 2021-05-04 PROCEDURE — 77063 BREAST TOMOSYNTHESIS BI: CPT

## 2021-05-04 PROCEDURE — 82565 ASSAY OF CREATININE: CPT

## 2021-05-04 PROCEDURE — 86316 IMMUNOASSAY TUMOR OTHER: CPT

## 2021-05-04 PROCEDURE — 77067 SCR MAMMO BI INCL CAD: CPT

## 2021-05-04 PROCEDURE — 85025 COMPLETE CBC W/AUTO DIFF WBC: CPT

## 2021-05-04 PROCEDURE — 84520 ASSAY OF UREA NITROGEN: CPT

## 2021-05-04 PROCEDURE — 36415 COLL VENOUS BLD VENIPUNCTURE: CPT

## 2021-05-06 LAB — CGA SERPL-MCNC: 79.7 NG/ML (ref 0–101.8)

## 2021-05-07 ENCOUNTER — VBI (OUTPATIENT)
Dept: ADMINISTRATIVE | Facility: OTHER | Age: 71
End: 2021-05-07

## 2021-05-10 ENCOUNTER — HOSPITAL ENCOUNTER (OUTPATIENT)
Dept: INFUSION CENTER | Facility: CLINIC | Age: 71
Discharge: HOME | End: 2021-05-10
Payer: COMMERCIAL

## 2021-05-10 ENCOUNTER — HOSPITAL ENCOUNTER (OUTPATIENT)
Dept: CT IMAGING | Facility: HOSPITAL | Age: 71
Discharge: HOME | End: 2021-05-10
Attending: SURGERY
Payer: COMMERCIAL

## 2021-05-10 VITALS — TEMPERATURE: 96.7 F

## 2021-05-10 DIAGNOSIS — C7B.8 METASTATIC MALIGNANT NEUROENDOCRINE TUMOR TO LIVER (HCC): Primary | ICD-10-CM

## 2021-05-10 DIAGNOSIS — C7B.8 METASTATIC MALIGNANT NEUROENDOCRINE TUMOR TO LIVER (HCC): ICD-10-CM

## 2021-05-10 PROCEDURE — 74177 CT ABD & PELVIS W/CONTRAST: CPT

## 2021-05-10 PROCEDURE — 96372 THER/PROPH/DIAG INJ SC/IM: CPT

## 2021-05-10 PROCEDURE — 71260 CT THORAX DX C+: CPT

## 2021-05-10 PROCEDURE — G1004 CDSM NDSC: HCPCS

## 2021-05-10 RX ORDER — LANREOTIDE ACETATE 120 MG/.5ML
120 INJECTION SUBCUTANEOUS ONCE
Status: COMPLETED | OUTPATIENT
Start: 2021-05-10 | End: 2021-05-10

## 2021-05-10 RX ORDER — LANREOTIDE ACETATE 120 MG/.5ML
120 INJECTION SUBCUTANEOUS ONCE
Status: CANCELLED | OUTPATIENT
Start: 2021-06-07

## 2021-05-10 RX ADMIN — LANREOTIDE ACETATE 120 MG: 120 INJECTION SUBCUTANEOUS at 09:50

## 2021-05-10 RX ADMIN — IOHEXOL 100 ML: 350 INJECTION, SOLUTION INTRAVENOUS at 09:11

## 2021-05-10 NOTE — PROGRESS NOTES
Patient to [facility] for Lanreotide: Offers no complaints at present time: Lab work ( 05/04/21 ) reviewed: Within parameters to treat: Injection given in Buttocks ( RUQ ) without incident: No adverse reactions noted: No further appt's scheduled:  Will make upon discharge: AVS offered and declined

## 2021-05-14 ENCOUNTER — OFFICE VISIT (OUTPATIENT)
Dept: SURGICAL ONCOLOGY | Facility: CLINIC | Age: 71
End: 2021-05-14
Payer: COMMERCIAL

## 2021-05-14 VITALS
BODY MASS INDEX: 32.57 KG/M2 | RESPIRATION RATE: 17 BRPM | SYSTOLIC BLOOD PRESSURE: 124 MMHG | DIASTOLIC BLOOD PRESSURE: 80 MMHG | TEMPERATURE: 97.6 F | HEART RATE: 67 BPM | WEIGHT: 177 LBS | HEIGHT: 62 IN

## 2021-05-14 DIAGNOSIS — C7B.8 METASTATIC MALIGNANT NEUROENDOCRINE TUMOR TO LIVER (HCC): Primary | ICD-10-CM

## 2021-05-14 PROCEDURE — 99213 OFFICE O/P EST LOW 20 MIN: CPT | Performed by: SURGERY

## 2021-05-14 NOTE — PROGRESS NOTES
Surgical Oncology Follow Up       [address] [facility]  [facility] [place]  [address] [facility] [address]    Veto Hussein Cable  1950  [id number]  [address]  [facility] [place]  [address]  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 [facility])  -     CT chest abdomen pelvis w contrast; Future  -     BUN; Future  -     Chromogranin A; Future  -     Creatinine, serum; Future        No chief complaint on file  Return in about 6 months (around 11/14/2021) for Office Visit, Imaging - See orders, Labs - See Treatment Plan  Oncology History Overview Note   Ede Hannah is a 72y o  year old female with   Metastatic well-differentiated neuroendocrine tumor  She has recently undergone resection of her small bowel and liver ablation  She has a T11 metastatic lesion seen on both PET scan and MRI scan  I reviewed with the patient and her  a course of palliative radiation therapy to this site  She began her radiation the therapy to the T10- T12 spine on 2/18/21  Her treatment was completed 3/3/21  During her tx she had reported mild nausea post tx  She was prescribed Zofran to be take pre tx  She has seen Dr Bobby Patiño, and will continue on Lanreotide  She received injection 3/15/21     4/12/21 next injection of Lanreotide  5/10/21 Ct scan  5/14/21 f/u Dr Zachariah Garcia  5/18/21 f/u Dr Thalia Kam     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 [facility])   12/8/2020 Initial Diagnosis    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 ([facility] [address] )     12/8/2020 Biopsy    IR Liver biopsy:  A   Liver mass, needle core biopsy:  - Metastatic well-differentiated neuroendocrine tumor, G2     1/18/2021 -  Chemotherapy    Lanreotide injections (monthly)     1/20/2021 Surgery    Resection of small bowel and anastomosis, segment 7 liver ablation    A  Small intestine, resection:  - Well- differentiated neuroendocrine tumor (up to 2 1 cm), G2, at least three foci  - All margins are negative for tumor   - Three of thirteen lymph nodes are positive for tumor (3/13)  B  Small bowel nodule, excision:  - Gastrointestinal stromal tumor (GIST), spindle cell type, low grade, 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  Bone metastasis ([facility] [address] )   1/28/2021 Initial Diagnosis    Bone metastasis ([facility] [address] )     2/18/2021 - 3/3/2021 Radiation    Treatment:  Course: C1    Plan ID Energy Fractions Dose per Fraction (cGy) Dose Correction (cGy) Total Dose Delivered (cGy) Elapsed Days   T10_T12 Spine 10X/6X 10 / 10 300 0 3,000 13                Diagnosis and Staging: Side branch IPMN 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 2013, 1 2cm   Metastatic neuroendocrine tumor, T3N1M1, December 2020  Treatment history: small-bowel resection and microwave ablation of segment 7 liver lesions, January 2021   Lanreotide   radiation to T11  Current Therapy: Observation For the IPMN   Lanreotide  Disease Status: Stable     History of Present Illness:  Patient returns in follow-up of her metastatic neuroendocrine tumor  She is doing well at this time with no complaints  She denies any fever, chills  No abdominal pain, nausea or vomiting  No flushing, diarrhea, palpitations, headaches, or sweats  CT from May 10, 2021 reveals no evidence of recurrence  The liver lesions were ablated with no obvious residual or recurrent mass  I personally reviewed the films  Her chromogranin level is normal     Review of Systems  Complete ROS Surg Onc:   Complete ROS Surg Onc:   Constitutional: The patient denies new or recent history of general fatigue, no recent weight loss, no change in appetite  Eyes: No complaints of visual problems, no scleral icterus  ENT: no complaints of ear pain, no hoarseness, no difficulty swallowing,  no tinnitus and no new masses in head, oral cavity, or neck     Cardiovascular: No complaints of chest pain, no palpitations, no ankle edema  Respiratory: No complaints of shortness of breath, no cough  Gastrointestinal: No complaints of jaundice, no bloody stools, no pale stools  Genitourinary: No complaints of dysuria, no hematuria, no nocturia, no frequent urination, no urethral discharge  Musculoskeletal: No complaints of weakness, paralysis, joint stiffness or arthralgias  Integumentary: No complaints of rash, no new lesions  Neurological: No complaints of convulsions, no seizures, no dizziness  Hematologic/Lymphatic: No complaints of easy bruising  Endocrine:  No hot or cold intolerance  No polydipsia, polyphagia, or polyuria  Allergy/immunology:  No environmental allergies  No food allergies  Not immunocompromised  Skin:  No pallor or rash  No wound          Patient Active Problem List   Diagnosis    Positional vertigo    Corn of toe    Focal nodular hyperplasia of liver    IPMN (intraductal papillary mucinous neoplasm)    Abnormal MRI of abdomen    Lymphadenopathy    Liver mass    Mediastinal adenopathy    Sarcoidosis, lung (HCC)    Granulomatous lymphadenitis    Edema of right lower extremity    Diastolic dysfunction    Complex tear of medial meniscus of right knee as current injury    Other tear of medial meniscus, current injury, right knee, initial encounter    Trigger middle finger of right hand    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 ([address] )    Bone metastasis ([address] )    Skin lesion     Past Medical History:   Diagnosis Date    Abdominal pain     Acute tonsillitis     Breast pain, left     Edema of both lower extremities     GERD without esophagitis     Kidney stone     " [place]"    Lesion of liver     Liver mass     Lymphadenopathy     Mediastinal lymphadenopathy     Neoplasm of digestive system     Orthostatic lightheadedness     Sarcoidosis     Vertigo      Past Surgical History:   Procedure Laterality Date    BREAST BIOPSY Left 1996    negative    CARPAL TUNNEL RELEASE Bilateral     COLONOSCOPY      2017    IR BIOPSY LIVER MASS  11/6/2018    IR BIOPSY LIVER MASS  12/8/2020    KNEE ARTHROSCOPY Left     LAPAROTOMY N/A 1/20/2021    Procedure: LAPAROTOMY EXPLORATORY;  Surgeon: Michel Victor MD;  [facility];  Service: Surgical Oncology    LIVER LOBECTOMY N/A 1/20/2021    Procedure: LIVER ABLATION SEGMENT 7, INTRAOPERATIVE U/S OF LIVER;  Surgeon: Michel Victor MD;  [facility];  Service: Surgical Oncology    MEDIASTINOSCOPY N/A 11/27/2018    Procedure: MEDIASTINOSCOPY;  Surgeon: Aram Yeager MD;  [facility];  Service: Thoracic    SD BRONCHOSCOPY NEEDLE BX TRACHEA MAIN STEM&/BRON N/A 11/27/2018    Procedure: EBUS with biopsy;  Surgeon: Aram Yeager MD;  [facility];  Service: Thoracic    SD [address] N/A 11/27/2018    Procedure: Pepe Spaniel;  Surgeon: Aram Yeager MD;  [facility];  Service: Thoracic    SD EDG US EXAM SURGICAL ALTER STOM DUODENUM/JEJUNUM N/A 10/29/2018    Procedure: LINEAR ENDOSCOPIC U/S;  Surgeon: Dariel Dumont MD;  Location[facility];   Service: Gastroenterology    SMALL INTESTINE SURGERY N/A 1/20/2021    Procedure: RESECTION SMALL BOWEL AND ANASTOMOSIS, RESECTION SMALL BOWEL NODULE;  Surgeon: Michel Victor MD;  [facility];  Service: Surgical Oncology    WISDOM TOOTH EXTRACTION       Family History   Problem Relation Age of Onset    Alzheimer's disease Mother     Parkinsonism Father     Colon cancer Maternal Grandfather     No Known Problems Sister     No Known Problems Daughter     No Known Problems Sister     No Known Problems Daughter      Social History     Socioeconomic History    Marital status: /Civil Union     Spouse name: Not on file    Number of children: Not on file    Years of education: Not on file    Highest education level: Not on file   Occupational History    Not on file   Social Needs    Financial resource strain: Not on file    Food insecurity     Worry: Not on file     Inability: Not on file    Transportation needs     Medical: Not on file     Non-medical: Not on file   Tobacco Use    Smoking status: Never Smoker    Smokeless tobacco: Never Used   Substance and Sexual Activity    Alcohol use: Yes     Frequency: Monthly or less    Drug use: No    Sexual activity: Not on file   Lifestyle    Physical activity     Days per week: Not on file     Minutes per session: Not on file    Stress: Not on file   Relationships    Social connections     Talks on phone: Not on file     Gets together: Not on file     Attends Mandaen service: Not on file     Active member of club or organization: Not on file     Attends meetings of clubs or organizations: Not on file     Relationship status: Not on file    Intimate partner violence     Fear of current or ex partner: Not on file     Emotionally abused: Not on file     Physically abused: Not on file     Forced sexual activity: Not on file   Other Topics Concern    Not on file   Social History Narrative    Not on file       Current Outpatient Medications:     aspirin 81 MG tablet, Take 1 tablet by mouth daily in the early morning , Disp: , Rfl:     Cholecalciferol (VITAMIN D) 2000 units CAPS, Take 1 tablet by mouth daily, Disp: , Rfl:     docusate sodium (COLACE) 100 mg capsule, Take 1 capsule (100 mg total) by mouth 2 (two) times a day, Disp: 10 capsule, Rfl: 0    hydrochlorothiazide (HYDRODIURIL) 12 5 mg tablet, TAKE 1 TABLET BY MOUTH EVERY DAY, Disp: 90 tablet, Rfl: 1    Multiple Vitamin (MULTIVITAMIN) tablet, Take 1 tablet by mouth daily, Disp: , Rfl:     omeprazole (PriLOSEC) 40 MG capsule, TAKE 1 CAPSULE BY MOUTH EVERY DAY BEFORE BREAKFAST, Disp: 90 capsule, Rfl: 1    ondansetron (ZOFRAN) 4 mg tablet, Take 1 tablet (4 mg total) by mouth every 8 (eight) hours as needed for nausea or vomiting, Disp: 20 tablet, Rfl: 1    Wixela Inhub 250-50 MCG/DOSE inhaler, INHALE 1 PUFF 2 (TWO) TIMES A DAY RINSE MOUTH AFTER USE  (Patient taking differently: Inhale 1 puff daily ), Disp: 1 Inhaler, Rfl: 5  Allergies   Allergen Reactions    Bactrim [Sulfamethoxazole-Trimethoprim] Hives    Clam Shell - Food Allergy Vomiting     Vitals:    05/14/21 0829   BP: 124/80   Pulse: 67   Resp: 17   Temp: 97 6 °F (36 4 °C)       Physical Exam  Constitutional: General appearance: The Patient is well-developed and well-nourished who appears the stated age in no acute distress  Patient is pleasant and talkative  HEENT:  Normocephalic  Sclerae are anicteric  Mucous membranes are moist  Neck is supple without adenopathy  No JVD  Chest: The lungs are clear to auscultation  Cardiac: Heart is regular rate  Abdomen: Abdomen is soft, non-tender, non-distended and without masses  Extremities: There is no clubbing or cyanosis  There is no edema  Symmetric  Neuro: Grossly nonfocal  Gait is normal      Lymphatic: No evidence of cervical adenopathy bilaterally  No evidence of axillary adenopathy bilaterally  No evidence of inguinal adenopathy bilaterally  Skin: Warm, anicteric  Psych:  Patient is pleasant and talkative  Breasts:        Pathology:  [unfilled]    Labs:   Ref Range & Units 5/4/21 6:15 AM   Chromogranin A 0 0 - 101 8 ng/mL 79 7          Imaging  Ct Chest Abdomen Pelvis W Contrast    Addendum Date: 5/13/2021 Addendum:   ADDENDUM: I spoke with Dr Lang Johnson from surgical oncology  The segment VI liver mass was ablated following the comparison MR from November 2020  This likely accounts for the increased size of the mass  On the current CT, no evidence of suspicious enhancement to suggest residual or recurrent mass  The area of suspected fibrosis in segment VI was probably also ablated and shows no suspicious features  No evidence of new hepatic malignancy      Result Date: 5/13/2021  Narrative: CT ABDOMEN AND PELVIS WITH IV CONTRAST INDICATION:   C7B 8: Other secondary neuroendocrine tumors  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  Follow-up  Also history of sarcoidosis  Liver lobectomy  COMPARISON:  11/5/2020 abdomen MR   11/27/2020 chest CT TECHNIQUE:  CT examination of the chest, abdomen and pelvis was performed  In addition to portal venous phase postcontrast scanning through the abdomen and pelvis, delayed phase postcontrast scanning was performed through the upper abdominal viscera  Axial, sagittal, and coronal 2D reformatted images were created from the source data and submitted for interpretation  Radiation dose length product (DLP) for this visit:  879 mGy-cm   This examination, like all CT scans performed in the [facility], was performed utilizing techniques to minimize radiation dose exposure, including the use of iterative reconstruction and automated exposure control  IV Contrast:  100 mL of iohexol (OMNIPAQUE) Enteric Contrast:  Enteric contrast was administered  FINDINGS: CHEST LUNGS:  Lung volumes preserved  Mild linear linear scarring and dependent atelectasis  Otherwise clear lungs and patent airways  PLEURA:  Within normal limits  HEART/GREAT VESSELS:  Within normal limits for patient's age  MEDIASTINUM AND BRIGIDA:  No enlarged lymph nodes by CT criteria  CHEST WALL AND LOWER NECK:   Within normal limits  ABDOMEN LIVER/BILIARY TREE:  Liver has normal morphology  Hepatic cysts in the left lobe are considered benign findings are similar to the prior MR  A 2 8 cm x 2 4 cm x 2 7 cm (length X depth X width, series 2/image 56)  right posterior segment, segment VII, hypoattenuating mass has increased in size (previously measured by this reader today, 1 5 cm x 1 0 cm x 1 4 cm)  Arterial hyper-enhancement described previously not demonstrated, only late phase and delayed phase contrast-enhanced images obtained on this CT  Evidence of mild later phase internal enhancement   A peripheral 2 4 cm x 1 5 cm x 1 4 cm masslike area with mild capsular retraction (2/61) has similar attenuation/enhancement to the above mass, appears to correlate with the area of chronic presumed fibrosis described on MR in segment VI  No new hepatic mass  No bile duct dilatation  GALLBLADDER:  Within normal limits  SPLEEN:  Within normal limits  PANCREAS:  Similar diffuse parenchymal volume loss and numerous tiny cysts  No acute findings  ADRENAL GLANDS:  Within normal limits  KIDNEYS/URETERS:  Within normal limits  STOMACH AND BOWEL:  Diverticulosis  No secondary signs of acute diverticulitis  Otherwise within normal limits  APPENDIX:  Within normal limits  ABDOMINOPELVIC CAVITY:  No abnormal air, fluid or enlarged lymph nodes  VESSELS:  No acute findings  Prominent pelvic vessels are nonspecific finding  PELVIS: REPRODUCTIVE ORGANS:  Uterus within normal limits  No adnexal mass  URINARY BLADDER:  Within normal limits  ABDOMINAL WALL/INGUINAL REGIONS:  Several subcutaneous soft tissue nodules in the buttocks have small areas compatible with internal fat, most compatible with benign fat necrosis  No acute findings  Anterior abdominal wall midline scar  OSSEOUS STRUCTURES:  No acute or suspicious findings  Significant findings sent by [address] [place] [address] [employer] system  Impression: Increased size of a known right lobe hepatic mass, segment VII compared to MR from 11/5/2020  Given the MR characteristics, repeat biopsy is again recommended  Otherwise no new findings suspicious for malignancy  Several other chronic findings above  Workstation performed: [id number]     Mammo Screening Bilateral W 3d & Cad    Result Date: 5/6/2021  Narrative: DIAGNOSIS: Screening mammogram, encounter for TECHNIQUE: Digital screening mammography was performed  Computer Aided Detection (CAD) analyzed all applicable images  COMPARISONS: Prior breast imaging dated: 11/08/2018, 06/11/2015, and 06/05/2014 RELEVANT HISTORY: Family Breast Cancer History: No known family history of breast cancer  Family Medical History: Family medical history includes colon cancer in maternal grandfather  Personal History: Hormone history includes birth control and hormone replacement therapy  Surgical history includes breast biopsy  No known relevant medical history  The patient is scheduled in a reminder system for screening mammography  8-10% of cancers will be missed on mammography  Management of a palpable abnormality must be based on clinical grounds  Patients will be notified of their results via letter from our facility  Accredited by [employer] of Radiology and FDA  RISK ASSESSMENT: 5 Year Tyrer-Cuzick: 1 74 % 10 Year Tyrer-Cuzick: 3 64 % Lifetime Tyrer-Cuzick: 6 2 % TISSUE DENSITY: The breasts are heterogeneously dense, which may obscure small masses  INDICATION: Elizabeth Abbott is a 72 y o  female presenting for screening mammography  FINDINGS:  Left breast 12:00 o'clock oval circumscribed mass is unchanged since at least 05/10/2008  There are no suspicious masses, grouped microcalcifications or areas of architectural distortion  The skin and nipple areolar complex are unremarkable  Impression: No mammographic evidence of malignancy  ASSESSMENT/BI-RADS CATEGORY: Left: 2 - Benign Right: 1 - Negative Overall: 2 - Benign RECOMMENDATION:      - Routine screening mammogram in 1 year for both breasts  Workstation ID: [id number]     I reviewed the above laboratory and imaging data  Discussion/Summary: 52-year-old female with side branch IPMN that is essentially stable   This is less than 2 cm in size   This is being observed with her MRI  She then underwent small-bowel resection and microwave ablation segment 7 liver lesions   She completed radiation to T11 for a metastasis  She is on Lanreotide  She is clinically GERARDO at 6 months  I have recommended observation    Since she is asymptomatic and her imaging shows no recurrence, I will plan on seeing her again in 6 months with a repeat CT of her chest, abdomen and pelvis and a chromogranin level  This CT should be able to survey the pancreas as well, to make sure the lesion is not becoming larger     If there is residual viable disease, we can consider Sir spheres or PRRT if there is extrahepatic disease  [place] is agreeable to this plan   All of her questions were answered

## 2021-05-14 NOTE — LETTER
May 14, 5001     [address] [place] [address]    Patient: Allie Howard   YOB: 1950   Date of Visit: 5/14/2021       Dear Dr Anna Mcgregor: Thank you for referring Tristian Quinn to me for evaluation  Below are my notes for this consultation  If you have questions, please do not hesitate to call me  I look forward to following your patient along with you  Sincerely,        Howie Sosa MD        CC: MD Rashi Ernst MD Bynum Justice, MD  5/14/2021  8:56 AM  Incomplete               Surgical Oncology Follow Up       [address] [facility] [place]  [address] [facility] [address]  1950  [id number]  [address]  [facility] [place]  [address] [facility] [address] [id number]    Diagnoses and all orders for this visit:    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 [facility])  -     CT chest abdomen pelvis w contrast; Future  -     BUN; Future  -     Chromogranin A; Future  -     Creatinine, serum; Future        No chief complaint on file  Return in about 6 months (around 11/14/2021) for Office Visit, Imaging - See orders, Labs - See Treatment Plan  Oncology History Overview Note   Allie Howard is a 72y o  year old female with   Metastatic well-differentiated neuroendocrine tumor  She has recently undergone resection of her small bowel and liver ablation  She has a T11 metastatic lesion seen on both PET scan and MRI scan  I reviewed with the patient and her  a course of palliative radiation therapy to this site  She began her radiation the therapy to the T10- T12 spine on 2/18/21  Her treatment was completed 3/3/21  During her tx she had reported mild nausea post tx  She was prescribed Zofran to be take pre tx  She has seen Dr Alexander Zamora, and will continue on Lanreotide   She received injection 3/15/21     4/12/21 next injection of Lanreotide  5/10/21 Ct scan  5/14/21 f/u Dr Horton [place]  5/18/21 f/u Dr Chrissy Rogers     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 [facility])   12/8/2020 Initial Diagnosis    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 ([facility] )     12/8/2020 Biopsy    IR Liver biopsy:  A  Liver mass, needle core biopsy:  - Metastatic well-differentiated neuroendocrine tumor, G2     1/18/2021 -  Chemotherapy    Lanreotide injections (monthly)     1/20/2021 Surgery    Resection of small bowel and anastomosis, segment 7 liver ablation    A  Small intestine, resection:  - Well- differentiated neuroendocrine tumor (up to 2 1 cm), G2, at least three foci  - All margins are negative for tumor   - Three of thirteen lymph nodes are positive for tumor (3/13)  B  Small bowel nodule, excision:  - Gastrointestinal stromal tumor (GIST), spindle cell type, low grade, 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  Bone metastasis ([facility] )   1/28/2021 Initial Diagnosis    Bone metastasis ([facility] )     2/18/2021 - 3/3/2021 Radiation    Treatment:  Course: C1    Plan ID Energy Fractions Dose per Fraction (cGy) Dose Correction (cGy) Total Dose Delivered (cGy) Elapsed Days   T10_T12 Spine 10X/6X 10 / 10 300 0 3,000 13                Diagnosis and Staging: Side branch IPMN 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 2013, 1 2cm   Metastatic neuroendocrine tumor, T3N1M1, December 2020  Treatment history: small-bowel resection and microwave ablation of segment 7 liver lesions, January 2021   Lanreotide   radiation to T11  Current Therapy: Observation For the IPMN   Lanreotide  Disease Status: Stable     History of Present Illness:  Patient returns in follow-up of her metastatic neuroendocrine tumor  She is doing well at this time with no complaints  She denies any fever, chills  No abdominal pain, nausea or vomiting  No flushing, diarrhea, palpitations, headaches, or sweats  CT from May 10, 2021 reveals no evidence of recurrence  The liver lesions were ablated with no obvious residual or recurrent mass  I personally reviewed the films  Her chromogranin level is normal     Review of Systems  Complete ROS Surg Onc:   Complete ROS Surg Onc:   Constitutional: The patient denies new or recent history of general fatigue, no recent weight loss, no change in appetite  Eyes: No complaints of visual problems, no scleral icterus  ENT: no complaints of ear pain, no hoarseness, no difficulty swallowing,  no tinnitus and no new masses in head, oral cavity, or neck  Cardiovascular: No complaints of chest pain, no palpitations, no ankle edema  Respiratory: No complaints of shortness of breath, no cough  Gastrointestinal: No complaints of jaundice, no bloody stools, no pale stools  Genitourinary: No complaints of dysuria, no hematuria, no nocturia, no frequent urination, no urethral discharge  Musculoskeletal: No complaints of weakness, paralysis, joint stiffness or arthralgias  Integumentary: No complaints of rash, no new lesions  Neurological: No complaints of convulsions, no seizures, no dizziness  Hematologic/Lymphatic: No complaints of easy bruising  Endocrine:  No hot or cold intolerance  No polydipsia, polyphagia, or polyuria  Allergy/immunology:  No environmental allergies  No food allergies  Not immunocompromised  Skin:  No pallor or rash  No wound          Patient Active Problem List   Diagnosis    Positional vertigo    Corn of toe    Focal nodular hyperplasia of liver    IPMN (intraductal papillary mucinous neoplasm)    Abnormal MRI of abdomen    Lymphadenopathy    Liver mass    Mediastinal adenopathy    Sarcoidosis, lung (HCC)    Granulomatous lymphadenitis    Edema of right lower extremity    Diastolic dysfunction    Complex tear of medial meniscus of right knee as current injury    Other tear of medial meniscus, current injury, right knee, initial encounter    Trigger middle finger of right hand    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 ([address] )    Bone metastasis ([address] )    Skin lesion     Past Medical History:   Diagnosis Date    Abdominal pain     Acute tonsillitis     Breast pain, left     Edema of both lower extremities     GERD without esophagitis     Kidney stone     " [place]"    Lesion of liver     Liver mass     Lymphadenopathy     Mediastinal lymphadenopathy     Neoplasm of digestive system     Orthostatic lightheadedness     Sarcoidosis     Vertigo      Past Surgical History:   Procedure Laterality Date    BREAST BIOPSY Left 1996    negative    CARPAL TUNNEL RELEASE Bilateral     COLONOSCOPY      2017    IR BIOPSY LIVER MASS  11/6/2018    IR BIOPSY LIVER MASS  12/8/2020    KNEE ARTHROSCOPY Left     LAPAROTOMY N/A 1/20/2021    Procedure: LAPAROTOMY EXPLORATORY;  Surgeon: Michel Victor MD;  [facility];  Service: Surgical Oncology    LIVER LOBECTOMY N/A 1/20/2021    Procedure: LIVER ABLATION SEGMENT 7, INTRAOPERATIVE U/S OF LIVER;  Surgeon: Michel Victor MD;  [facility];  Service: Surgical Oncology    MEDIASTINOSCOPY N/A 11/27/2018    Procedure: MEDIASTINOSCOPY;  Surgeon: Aram Yeager MD;  [facility];  Service: Thoracic    SD BRONCHOSCOPY NEEDLE BX TRACHEA MAIN STEM&/BRON N/A 11/27/2018    Procedure: EBUS with biopsy;  Surgeon: Aram Yeager MD;  [facility];  Service: Thoracic    SD [address] N/A 11/27/2018    Procedure: Pepe Spaniel;  Surgeon: Aram Yeager MD;  [facility];  Service: Thoracic    SD EDG US EXAM SURGICAL ALTER STOM DUODENUM/JEJUNUM N/A 10/29/2018    Procedure: LINEAR ENDOSCOPIC U/S;  Surgeon: Dariel Dumont MD;  [facility];   Service: Gastroenterology    SMALL INTESTINE SURGERY N/A 1/20/2021    Procedure: RESECTION SMALL BOWEL AND ANASTOMOSIS, RESECTION SMALL BOWEL NODULE;  Surgeon: Michel Victor MD;  [facility];  Service: Surgical Oncology    WISDOM TOOTH EXTRACTION       Family History   Problem Relation Age of Onset    Alzheimer's disease Mother     Parkinsonism Father     Colon cancer Maternal Grandfather     No Known Problems Sister     No Known Problems Daughter     No Known Problems Sister     No Known Problems Daughter      Social History     Socioeconomic History    Marital status: /Civil Union     Spouse name: Not on file    Number of children: Not on file    Years of education: Not on file    Highest education level: Not on file   Occupational History    Not on file   Social Needs    Financial resource strain: Not on file    Food insecurity     Worry: Not on file     Inability: Not on file   [employer] needs     Medical: Not on file     Non-medical: Not on file   Tobacco Use    Smoking status: Never Smoker    Smokeless tobacco: Never Used   Substance and Sexual Activity    Alcohol use: Yes     Frequency: Monthly or less    Drug use: No    Sexual activity: Not on file   Lifestyle    Physical activity     Days per week: Not on file     Minutes per session: Not on file    Stress: Not on file   Relationships    Social connections     Talks on phone: Not on file     Gets together: Not on file     Attends Islam service: Not on file     Active member of club or organization: Not on file     Attends meetings of clubs or organizations: Not on file     Relationship status: Not on file    Intimate partner violence     Fear of current or ex partner: Not on file     Emotionally abused: Not on file     Physically abused: Not on file     Forced sexual activity: Not on file   Other Topics Concern    Not on file   Social History Narrative    Not on file       Current Outpatient Medications:     aspirin 81 MG tablet, Take 1 tablet by mouth daily in the early morning , Disp: , Rfl:     Cholecalciferol (VITAMIN D) 2000 units CAPS, Take 1 tablet by mouth daily, Disp: , Rfl:     docusate sodium (COLACE) 100 mg capsule, Take 1 capsule (100 mg total) by mouth 2 (two) times a day, Disp: 10 capsule, Rfl: 0    hydrochlorothiazide (HYDRODIURIL) 12 5 mg tablet, TAKE 1 TABLET BY MOUTH EVERY DAY, Disp: 90 tablet, Rfl: 1    Multiple Vitamin (MULTIVITAMIN) tablet, Take 1 tablet by mouth daily, Disp: , Rfl:     omeprazole (PriLOSEC) 40 MG capsule, TAKE 1 CAPSULE BY MOUTH EVERY DAY BEFORE BREAKFAST, Disp: 90 capsule, Rfl: 1    ondansetron (ZOFRAN) 4 mg tablet, Take 1 tablet (4 mg total) by mouth every 8 (eight) hours as needed for nausea or vomiting, Disp: 20 tablet, Rfl: 1    Wixela Inhub 250-50 MCG/DOSE inhaler, INHALE 1 PUFF 2 (TWO) TIMES A DAY RINSE MOUTH AFTER USE  (Patient taking differently: Inhale 1 puff daily ), Disp: 1 Inhaler, Rfl: 5  Allergies   Allergen Reactions    Bactrim [Sulfamethoxazole-Trimethoprim] Hives    Clam Shell - Food Allergy Vomiting     Vitals:    05/14/21 0829   BP: 124/80   Pulse: 67   Resp: 17   Temp: 97 6 °F (36 4 °C)       Physical Exam  Constitutional: General appearance: The Patient is well-developed and well-nourished who appears the stated age in no acute distress  Patient is pleasant and talkative  HEENT:  Normocephalic  Sclerae are anicteric  Mucous membranes are moist  Neck is supple without adenopathy  No JVD  Chest: The lungs are clear to auscultation  Cardiac: Heart is regular rate  Abdomen: Abdomen is soft, non-tender, non-distended and without masses  Extremities: There is no clubbing or cyanosis  There is no edema  Symmetric  Neuro: Grossly nonfocal  Gait is normal      Lymphatic: No evidence of cervical adenopathy bilaterally  No evidence of axillary adenopathy bilaterally  No evidence of inguinal adenopathy bilaterally  Skin: Warm, anicteric  Psych:  Patient is pleasant and talkative    Breasts:        Pathology:  [unfilled]    Labs:   Ref Range & Units 5/4/21 6:15 AM   Chromogranin A 0 0 - 101 8 ng/mL 79 7          Imaging  Ct Chest Abdomen Pelvis W Contrast    Addendum Date: 5/13/2021 Addendum:   ADDENDUM: I spoke with Dr Óscar Swann from surgical oncology  The segment VI liver mass was ablated following the comparison MR from November 2020  This likely accounts for the increased size of the mass  On the current CT, no evidence of suspicious enhancement to suggest residual or recurrent mass  The area of suspected fibrosis in segment VI was probably also ablated and shows no suspicious features  No evidence of new hepatic malignancy  Result Date: 5/13/2021  Narrative: CT ABDOMEN AND PELVIS WITH IV CONTRAST INDICATION:   C7B 8: Other secondary neuroendocrine tumors  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  Follow-up  Also history of sarcoidosis  Liver lobectomy  COMPARISON:  11/5/2020 abdomen MR   11/27/2020 chest CT TECHNIQUE:  CT examination of the chest, abdomen and pelvis was performed  In addition to portal venous phase postcontrast scanning through the abdomen and pelvis, delayed phase postcontrast scanning was performed through the upper abdominal viscera  Axial, sagittal, and coronal 2D reformatted images were created from the source data and submitted for interpretation  Radiation dose length product (DLP) for this visit:  879 mGy-cm   This examination, like all CT scans performed in the [facility], was performed utilizing techniques to minimize radiation dose exposure, including the use of iterative reconstruction and automated exposure control  IV Contrast:  100 mL of iohexol (OMNIPAQUE) Enteric Contrast:  Enteric contrast was administered  FINDINGS: CHEST LUNGS:  Lung volumes preserved  Mild linear linear scarring and dependent atelectasis  Otherwise clear lungs and patent airways  PLEURA:  Within normal limits  HEART/GREAT VESSELS:  Within normal limits for patient's age  MEDIASTINUM AND BRIGIDA:  No enlarged lymph nodes by CT criteria  CHEST WALL AND LOWER NECK:   Within normal limits  ABDOMEN LIVER/BILIARY TREE:  Liver has normal morphology    Hepatic cysts in the left lobe are considered benign findings are similar to the prior MR  A 2 8 cm x 2 4 cm x 2 7 cm (length X depth X width, series 2/image 56)  right posterior segment, segment VII, hypoattenuating mass has increased in size (previously measured by this reader today, 1 5 cm x 1 0 cm x 1 4 cm)  Arterial hyper-enhancement described previously not demonstrated, only late phase and delayed phase contrast-enhanced images obtained on this CT  Evidence of mild later phase internal enhancement  A peripheral 2 4 cm x 1 5 cm x 1 4 cm masslike area with mild capsular retraction (2/61) has similar attenuation/enhancement to the above mass, appears to correlate with the area of chronic presumed fibrosis described on MR in segment VI  No new hepatic mass  No bile duct dilatation  GALLBLADDER:  Within normal limits  SPLEEN:  Within normal limits  PANCREAS:  Similar diffuse parenchymal volume loss and numerous tiny cysts  No acute findings  ADRENAL GLANDS:  Within normal limits  KIDNEYS/URETERS:  Within normal limits  STOMACH AND BOWEL:  Diverticulosis  No secondary signs of acute diverticulitis  Otherwise within normal limits  APPENDIX:  Within normal limits  ABDOMINOPELVIC CAVITY:  No abnormal air, fluid or enlarged lymph nodes  VESSELS:  No acute findings  Prominent pelvic vessels are nonspecific finding  PELVIS: REPRODUCTIVE ORGANS:  Uterus within normal limits  No adnexal mass  URINARY BLADDER:  Within normal limits  ABDOMINAL WALL/INGUINAL REGIONS:  Several subcutaneous soft tissue nodules in the buttocks have small areas compatible with internal fat, most compatible with benign fat necrosis  No acute findings  Anterior abdominal wall midline scar  OSSEOUS STRUCTURES:  No acute or suspicious findings  Significant findings sent by [address] [place] [address] [employer] system  Impression: Increased size of a known right lobe hepatic mass, segment VII compared to MR from 11/5/2020  Given the MR characteristics, repeat biopsy is again recommended   Otherwise no new findings suspicious for malignancy  Several other chronic findings above  Workstation performed: [id number]     Mammo Screening Bilateral W 3d & Cad    Result Date: 5/6/2021  Narrative: DIAGNOSIS: Screening mammogram, encounter for TECHNIQUE: Digital screening mammography was performed  Computer Aided Detection (CAD) analyzed all applicable images  COMPARISONS: Prior breast imaging dated: 11/08/2018, 06/11/2015, and 06/05/2014 RELEVANT HISTORY: Family Breast Cancer History: No known family history of breast cancer  Family Medical History: Family medical history includes colon cancer in maternal grandfather  Personal History: Hormone history includes birth control and hormone replacement therapy  Surgical history includes breast biopsy  No known relevant medical history  The patient is scheduled in a reminder system for screening mammography  8-10% of cancers will be missed on mammography  Management of a palpable abnormality must be based on clinical grounds  Patients will be notified of their results via letter from our facility  Accredited by [address] [place] [address] of Radiology and [facility]  RISK ASSESSMENT: 5 Year Tyrer-Cuzick: 1 74 % 10 Year Tyrer-Cuzick: 3 64 % Lifetime Tyrer-Cuzick: 6 2 % TISSUE DENSITY: The breasts are heterogeneously dense, which may obscure small masses  INDICATION: Allie Howard is a 72 y o  female presenting for screening mammography  FINDINGS:  Left breast 12:00 o'clock oval circumscribed mass is unchanged since at least 05/10/2008  There are no suspicious masses, grouped microcalcifications or areas of architectural distortion  The skin and nipple areolar complex are unremarkable  Impression: No mammographic evidence of malignancy  ASSESSMENT/BI-RADS CATEGORY: Left: 2 - Benign Right: 1 - Negative Overall: 2 - Benign RECOMMENDATION:      - Routine screening mammogram in 1 year for both breasts   Workstation ID: [id number]     I reviewed the above laboratory and imaging data     Discussion/Summary: 71-year-old female with side branch IPMN that is essentially stable   This is less than 2 cm in size   This is being observed with her MRI  She then underwent small-bowel resection and microwave ablation segment 7 liver lesions   She completed radiation to T11 for a metastasis  She is on Lanreotide  She is clinically GERARDO at 6 months  I have recommended observation  Since she is asymptomatic and her imaging shows no recurrence, I will plan on seeing her again in 6 months with a repeat CT of her chest, abdomen and pelvis and a chromogranin level  This CT should be able to survey the pancreas as well, to make sure the lesion is not becoming larger     If there is residual viable disease, we can consider Sir spheres or PRRT if there is extrahepatic disease  Nelson Braxton is agreeable to this plan   All of her questions were answered

## 2021-05-17 ENCOUNTER — OFFICE VISIT (OUTPATIENT)
Dept: OBGYN CLINIC | Facility: HOSPITAL | Age: 71
End: 2021-05-17
Payer: COMMERCIAL

## 2021-05-17 VITALS
WEIGHT: 177 LBS | SYSTOLIC BLOOD PRESSURE: 144 MMHG | BODY MASS INDEX: 32.57 KG/M2 | HEART RATE: 58 BPM | HEIGHT: 62 IN | DIASTOLIC BLOOD PRESSURE: 85 MMHG

## 2021-05-17 DIAGNOSIS — M65.331 TRIGGER MIDDLE FINGER OF RIGHT HAND: Primary | ICD-10-CM

## 2021-05-17 PROCEDURE — 99213 OFFICE O/P EST LOW 20 MIN: CPT | Performed by: PHYSICIAN ASSISTANT

## 2021-05-17 PROCEDURE — 20550 NJX 1 TENDON SHEATH/LIGAMENT: CPT | Performed by: PHYSICIAN ASSISTANT

## 2021-05-17 RX ORDER — LIDOCAINE HYDROCHLORIDE 10 MG/ML
0.5 INJECTION, SOLUTION INFILTRATION; PERINEURAL
Status: COMPLETED | OUTPATIENT
Start: 2021-05-17 | End: 2021-05-17

## 2021-05-17 RX ORDER — BETAMETHASONE SODIUM PHOSPHATE AND BETAMETHASONE ACETATE 3; 3 MG/ML; MG/ML
3 INJECTION, SUSPENSION INTRA-ARTICULAR; INTRALESIONAL; INTRAMUSCULAR; SOFT TISSUE
Status: COMPLETED | OUTPATIENT
Start: 2021-05-17 | End: 2021-05-17

## 2021-05-17 RX ADMIN — BETAMETHASONE SODIUM PHOSPHATE AND BETAMETHASONE ACETATE 3 MG: 3; 3 INJECTION, SUSPENSION INTRA-ARTICULAR; INTRALESIONAL; INTRAMUSCULAR; SOFT TISSUE at 08:31

## 2021-05-17 RX ADMIN — LIDOCAINE HYDROCHLORIDE 0.5 ML: 10 INJECTION, SOLUTION INFILTRATION; PERINEURAL at 08:31

## 2021-05-17 NOTE — PROGRESS NOTES
Assessment/Plan   Diagnoses and all orders for this visit:    Trigger middle finger of right hand  - Cortisone injection today  - Follow up in 6 weeks          Subjective   Patient ID: Laverne Zeng is a 72 y o  female  Vitals:    05/17/21 0801   BP: 144/85   Pulse: 62     71yo female comes in for an evaluation of her right long finger  She is a patient of Dr Pedro Pereyra who is being treated for trigger finger  She had a cortisone injection in September 2020 that helped for several months  Recently, she started having popping and locking again  The pain is dull in character, mild in severity, pain does not radiate and is not associated with numbness          The following portions of the patient's history were reviewed and updated as appropriate: allergies, current medications, past family history, past medical history, past social history, past surgical history and problem list     Review of Systems  Ortho Exam  Past Medical History:   Diagnosis Date    Abdominal pain     Acute tonsillitis     Breast pain, left     Cancer ([address] )     liver&small intestine    Edema of both lower extremities     GERD without esophagitis     Kidney stone     " [place]"    Lesion of liver     Liver mass     Lymphadenopathy     Mediastinal lymphadenopathy     Neoplasm of digestive system     Orthostatic lightheadedness     Sarcoidosis     Vertigo      Past Surgical History:   Procedure Laterality Date    BREAST BIOPSY Left 1996    negative    CARPAL TUNNEL RELEASE Bilateral     COLONOSCOPY      2017    IR BIOPSY LIVER MASS  11/6/2018    IR BIOPSY LIVER MASS  12/8/2020    KNEE ARTHROSCOPY Left     LAPAROTOMY N/A 1/20/2021    Procedure: LAPAROTOMY EXPLORATORY;  Surgeon: Rebeca Elam MD;  [facility];  Service: Surgical Oncology    LIVER LOBECTOMY N/A 1/20/2021    Procedure: LIVER ABLATION SEGMENT 7, INTRAOPERATIVE U/S OF LIVER;  Surgeon: Rebeca Elam MD;  [facility];  Service: Surgical Oncology    MEDIASTINOSCOPY N/A 11/27/2018    Procedure: MEDIASTINOSCOPY;  Surgeon: Lucian Horne MD;  [facility];  Service: Thoracic    DC BRONCHOSCOPY NEEDLE BX TRACHEA MAIN STEM&/BRON N/A 11/27/2018    Procedure: EBUS with biopsy;  Surgeon: Lucian Horne MD;  [facility];  Service: Thoracic    DC [address] N/A 11/27/2018    Procedure: Eve Sleek;  Surgeon: Lucian Horne MD;  [facility];  Service: Thoracic    DC EDG US EXAM SURGICAL ALTER STOM DUODENUM/JEJUNUM N/A 10/29/2018    Procedure: LINEAR ENDOSCOPIC U/S;  Surgeon: Fadumo Vance MD;  [facility]; Service: Gastroenterology    SMALL INTESTINE SURGERY N/A 1/20/2021    Procedure: RESECTION SMALL BOWEL AND ANASTOMOSIS, RESECTION SMALL BOWEL NODULE;  Surgeon: Ricky Mcdonough MD;  [facility];  Service: Surgical Oncology    WISDOM TOOTH EXTRACTION       Family History   Problem Relation Age of Onset    Alzheimer's disease Mother     Parkinsonism Father     Colon cancer Maternal Grandfather     No Known Problems Sister     No Known Problems Daughter     No Known Problems Sister     No Known Problems Daughter      Social History     Occupational History    Not on file   Tobacco Use    Smoking status: Never Smoker    Smokeless tobacco: Never Used   Substance and Sexual Activity    Alcohol use: Yes     Frequency: Monthly or less    Drug use: No    Sexual activity: Not on file       Review of Systems   Constitutional: Negative  HENT: Negative  Eyes: Negative  Respiratory: Negative  Cardiovascular: Negative  Gastrointestinal: Negative  Endocrine: Negative  Genitourinary: Negative  Musculoskeletal: As below      Allergic/Immunologic: Negative  Neurological: Negative  Hematological: Negative  Psychiatric/Behavioral: Negative          Objective   Physical Exam      · Constitutional: Awake, Alert, Oriented  · Eyes: EOMI  · Psych: Mood and affect appropriate  · Heart: regular rate and rhythm  · Lungs: No audible wheezing  · Abdomen: soft  · Lymph: no lymphedema   right long finger:  - Appearance   No swelling, discoloration, deformity, or ecchymosis  - Palpation  o + large nodule between the two palmar creases   - ROM  o Lacks the last few degrees of extension in the PIP joint  - Motor  o 5/5 flexion, 5/5 extension  - Special Tests  o Not triggering at this moment  - NVI distally      Hand/upper extremity injection  Universal Protocol:  Consent: Verbal consent obtained  Risks and benefits: risks, benefits and alternatives were discussed  Consent given by: patient  Time out: Immediately prior to procedure a "time out" was called to verify the correct patient, procedure, equipment, support staff and site/side marked as required    Timeout called at: 5/17/2021 8:28 AM   Patient understanding: patient states understanding of the procedure being performed  Site marked: the operative site was marked  Patient identity confirmed: verbally with patient    Supporting Documentation  Indications: tendon swelling   Procedure Details  Condition:trigger finger Needle size: 25 G  Ultrasound guidance: no  Approach: volar  Medications administered: 0 5 mL lidocaine 1 %; 3 mg betamethasone acetate-betamethasone sodium phosphate 6 (3-3) mg/mL    Patient tolerance: patient tolerated the procedure well with no immediate complications  Dressing:  Sterile dressing applied

## 2021-05-18 ENCOUNTER — OFFICE VISIT (OUTPATIENT)
Dept: HEMATOLOGY ONCOLOGY | Facility: CLINIC | Age: 71
End: 2021-05-18
Payer: COMMERCIAL

## 2021-05-18 ENCOUNTER — PREPPED CHART (OUTPATIENT)
Dept: URBAN - METROPOLITAN AREA CLINIC 6 | Facility: CLINIC | Age: 71
End: 2021-05-18

## 2021-05-18 VITALS
SYSTOLIC BLOOD PRESSURE: 114 MMHG | RESPIRATION RATE: 16 BRPM | HEART RATE: 90 BPM | DIASTOLIC BLOOD PRESSURE: 62 MMHG | BODY MASS INDEX: 32.39 KG/M2 | WEIGHT: 176 LBS | TEMPERATURE: 97.5 F | HEIGHT: 62 IN | OXYGEN SATURATION: 99 %

## 2021-05-18 DIAGNOSIS — C7B.8 METASTATIC MALIGNANT NEUROENDOCRINE TUMOR TO LIVER (HCC): Primary | ICD-10-CM

## 2021-05-18 PROCEDURE — 99214 OFFICE O/P EST MOD 30 MIN: CPT | Performed by: INTERNAL MEDICINE

## 2021-05-18 NOTE — PROGRESS NOTES
Hematology/Oncology Outpatient Follow- up Note  Maricarmen Vaz 72 y o  female MRN: @ Encounter: [id number]        Date:  5/18/2021    Presenting Complaint/Diagnosis : Metastatic well-differentiated carcinoid tumor    HPI:    Adrian Sanders seen for initial consultation 1/12/2021 regarding a metastatic well-differentiated carcinoid tumor   The patient has seen our colleagues in surgery and is actually going for resection  [facility] PET-CT scan had showed     1  At least 3 foci of radiotracer uptake within the small bowel suspicious for underlying neuroendocrine lesions   Consider follow-up with CT enterography or small bowel pill study  2  Increased radiotracer uptake in two mesenteric soft tissue nodules suspicious for metastasis  3  Focal radiotracer uptake in the right lobe of the liver posteriorly compatible with the known metastasis   Slight focal increased radiotracer uptake in the right lobe of the liver anteriorly corresponding to the 5 mm subcapsular lesion segment 8   lesion seen on MRI suspicious for additional metastasis  4  Focal radiotracer uptake at the T11 vertebral body suspicious for osseous metastasis      MRI revealed the T11 vertebral body did have malignancy present      Again biopsy has confirmed well-differentiated neuroendocrine tumor  Previous Hematologic/ Oncologic History:    Oncology History Overview Note   Maricarmen Vaz is a 72y o  year old female with   Metastatic well-differentiated neuroendocrine tumor  She has recently undergone resection of her small bowel and liver ablation  She has a T11 metastatic lesion seen on both PET scan and MRI scan  I reviewed with the patient and her  a course of palliative radiation therapy to this site  She began her radiation the therapy to the T10- T12 spine on 2/18/21  Her treatment was completed 3/3/21  During her tx she had reported mild nausea post tx  She was prescribed Zofran to be take pre tx       She has seen Dr Dexter Bamberger, and will continue on Lanreotide  She received injection 3/15/21     4/12/21 next injection of Lanreotide  5/10/21 Ct scan  5/14/21 f/u Dr Wanda Leyva  5/18/21 f/u Dr Melecio Warner     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 [facility])   12/8/2020 Initial Diagnosis    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 )     12/8/2020 Biopsy    IR Liver biopsy:  A  Liver mass, needle core biopsy:  - Metastatic well-differentiated neuroendocrine tumor, G2     1/18/2021 -  Chemotherapy    Lanreotide injections (monthly)     1/20/2021 Surgery    Resection of small bowel and anastomosis, segment 7 liver ablation    A  Small intestine, resection:  - Well- differentiated neuroendocrine tumor (up to 2 1 cm), G2, at least three foci  - All margins are negative for tumor   - Three of thirteen lymph nodes are positive for tumor (3/13)  B  Small bowel nodule, excision:  - Gastrointestinal stromal tumor (GIST), spindle cell type, low grade, 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 [address] )   1/28/2021 Initial Diagnosis    Bone metastasis ([facility] )     2/18/2021 - 3/3/2021 Radiation    Treatment:  Course: C1    Plan ID Energy Fractions Dose per Fraction (cGy) Dose Correction (cGy) Total Dose Delivered (cGy) Elapsed Days   T10_T12 Spine 10X/6X 10 / 10 300 0 3,000 13               Surgery and ablation of liver lesions along with radiation to spine    Current Hematologic/ Oncologic Treatment:     lanreotide    Interval History:       The patient returns for follow-up visit  Her most recent imaging is stable and she has no evidence of progression or extrahepatic disease progressing at this point  She is on lanreotide and doing well  The plan is to continue her on this  As far as her blood work is concerned  Liver function tests are within acceptable limits  CBC shows very mild anemia but is much improved compared to previously  As far symptoms are concerned denies any nausea denies any vomiting denies any diarrhea    The rest of her 14 point review of systems today was negative  Test Results:    Imaging: Ct Chest Abdomen Pelvis W Contrast    Addendum Date: 5/13/2021 Addendum:   ADDENDUM: I spoke with Dr Binta Stinson from surgical oncology  The segment VI liver mass was ablated following the comparison MR from November 2020  This likely accounts for the increased size of the mass  On the current CT, no evidence of suspicious enhancement to suggest residual or recurrent mass  The area of suspected fibrosis in segment VI was probably also ablated and shows no suspicious features  No evidence of new hepatic malignancy  Result Date: 5/13/2021  Narrative: CT ABDOMEN AND PELVIS WITH IV CONTRAST INDICATION:   C7B 8: Other secondary neuroendocrine tumors  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  Follow-up  Also history of sarcoidosis  Liver lobectomy  COMPARISON:  11/5/2020 abdomen MR   11/27/2020 chest CT TECHNIQUE:  CT examination of the chest, abdomen and pelvis was performed  In addition to portal venous phase postcontrast scanning through the abdomen and pelvis, delayed phase postcontrast scanning was performed through the upper abdominal viscera  Axial, sagittal, and coronal 2D reformatted images were created from the source data and submitted for interpretation  Radiation dose length product (DLP) for this visit:  879 mGy-cm   This examination, like all CT scans performed in the [facility], was performed utilizing techniques to minimize radiation dose exposure, including the use of iterative reconstruction and automated exposure control  IV Contrast:  100 mL of iohexol (OMNIPAQUE) Enteric Contrast:  Enteric contrast was administered  FINDINGS: CHEST LUNGS:  Lung volumes preserved  Mild linear linear scarring and dependent atelectasis  Otherwise clear lungs and patent airways  PLEURA:  Within normal limits  HEART/GREAT VESSELS:  Within normal limits for patient's age   MEDIASTINUM AND BRIGIDA:  No enlarged lymph nodes by CT criteria  CHEST WALL AND LOWER NECK:   Within normal limits  ABDOMEN LIVER/BILIARY TREE:  Liver has normal morphology  Hepatic cysts in the left lobe are considered benign findings are similar to the prior MR  A 2 8 cm x 2 4 cm x 2 7 cm (length X depth X width, series 2/image 56)  right posterior segment, segment VII, hypoattenuating mass has increased in size (previously measured by this reader today, 1 5 cm x 1 0 cm x 1 4 cm)  Arterial hyper-enhancement described previously not demonstrated, only late phase and delayed phase contrast-enhanced images obtained on this CT  Evidence of mild later phase internal enhancement  A peripheral 2 4 cm x 1 5 cm x 1 4 cm masslike area with mild capsular retraction (2/61) has similar attenuation/enhancement to the above mass, appears to correlate with the area of chronic presumed fibrosis described on MR in segment VI  No new hepatic mass  No bile duct dilatation  GALLBLADDER:  Within normal limits  SPLEEN:  Within normal limits  PANCREAS:  Similar diffuse parenchymal volume loss and numerous tiny cysts  No acute findings  ADRENAL GLANDS:  Within normal limits  KIDNEYS/URETERS:  Within normal limits  STOMACH AND BOWEL:  Diverticulosis  No secondary signs of acute diverticulitis  Otherwise within normal limits  APPENDIX:  Within normal limits  ABDOMINOPELVIC CAVITY:  No abnormal air, fluid or enlarged lymph nodes  VESSELS:  No acute findings  Prominent pelvic vessels are nonspecific finding  PELVIS: REPRODUCTIVE ORGANS:  Uterus within normal limits  No adnexal mass  URINARY BLADDER:  Within normal limits  ABDOMINAL WALL/INGUINAL REGIONS:  Several subcutaneous soft tissue nodules in the buttocks have small areas compatible with internal fat, most compatible with benign fat necrosis  No acute findings  Anterior abdominal wall midline scar  OSSEOUS STRUCTURES:  No acute or suspicious findings  Significant findings sent by [address] [place] [address] electronic health system       Impression: Increased size of a known right lobe hepatic mass, segment VII compared to MR from 11/5/2020  Given the MR characteristics, repeat biopsy is again recommended  Otherwise no new findings suspicious for malignancy  Several other chronic findings above  Workstation performed: [id number]     Mammo Screening Bilateral W 3d & Cad    Result Date: 5/6/2021  Narrative: DIAGNOSIS: Screening mammogram, encounter for TECHNIQUE: Digital screening mammography was performed  Computer Aided Detection (CAD) analyzed all applicable images  COMPARISONS: Prior breast imaging dated: 11/08/2018, 06/11/2015, and 06/05/2014 RELEVANT HISTORY: Family Breast Cancer History: No known family history of breast cancer  Family Medical History: Family medical history includes colon cancer in maternal grandfather  Personal History: Hormone history includes birth control and hormone replacement therapy  Surgical history includes breast biopsy  No known relevant medical history  The patient is scheduled in a reminder system for screening mammography  8-10% of cancers will be missed on mammography  Management of a palpable abnormality must be based on clinical grounds  Patients will be notified of their results via letter from our facility  Accredited by [employer] of [employer] and FDA  RISK ASSESSMENT: 5 Year Tyrer-Cuzick: 1 74 % 10 Year Tyrer-Cuzick: 3 64 % Lifetime Tyrer-Cuzick: 6 2 % TISSUE DENSITY: The breasts are heterogeneously dense, which may obscure small masses  INDICATION: Mariposa Bartlett is a 72 y o  female presenting for screening mammography  FINDINGS:  Left breast 12:00 o'clock oval circumscribed mass is unchanged since at least 05/10/2008  There are no suspicious masses, grouped microcalcifications or areas of architectural distortion  The skin and nipple areolar complex are unremarkable  Impression: No mammographic evidence of malignancy   ASSESSMENT/BI-RADS CATEGORY: Left: 2 - Benign Right: 1 - Negative Overall: 2 - Benign RECOMMENDATION:      - Routine screening mammogram in 1 year for both breasts  Workstation ID: [id number]       Labs:   Lab Results   Component Value Date    WBC 6 58 05/04/2021    HGB 11 2 (L) 05/04/2021    HCT 35 5 05/04/2021    MCV 92 05/04/2021     05/04/2021     Lab Results   Component Value Date    K 3 3 (L) 04/07/2021     04/07/2021    CO2 28 04/07/2021    BUN 20 05/04/2021    CREATININE 0 74 05/04/2021    GLUF 111 (H) 04/07/2021    CALCIUM 9 2 04/07/2021    CORRECTEDCA 9 7 04/07/2021    AST 26 04/07/2021    ALT 30 04/07/2021    ALKPHOS 103 04/07/2021    EGFR 83 05/04/2021         ROS: As stated in the history of present illness otherwise his 14 point review of systems today was negative        Active Problems:   Patient Active Problem List   Diagnosis    Positional vertigo    Corn of toe    Focal nodular hyperplasia of liver    IPMN (intraductal papillary mucinous neoplasm)    Abnormal MRI of abdomen    Lymphadenopathy    Liver mass    Mediastinal adenopathy    Sarcoidosis, lung (HCC)    Granulomatous lymphadenitis    Edema of right lower extremity    Diastolic dysfunction    Complex tear of medial meniscus of right knee as current injury    Other tear of medial meniscus, current injury, right knee, initial encounter    Trigger middle finger of right hand    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 ([address] )    Bone metastasis ([address] )    Skin lesion       Past Medical History:   Past Medical History:   Diagnosis Date    Abdominal pain     Acute tonsillitis     Breast pain, left     Cancer ([address] )     liver&small intestine    Edema of both lower extremities     GERD without esophagitis     Kidney stone     " [place]"    Lesion of liver     Liver mass     Lymphadenopathy     Mediastinal lymphadenopathy     Neoplasm of digestive system     Orthostatic lightheadedness     Sarcoidosis     Vertigo        Surgical History:   Past Surgical History:   Procedure Laterality Date    BREAST BIOPSY Left 1996    negative    CARPAL TUNNEL RELEASE Bilateral     COLONOSCOPY      2017    IR BIOPSY LIVER MASS  11/6/2018    IR BIOPSY LIVER MASS  12/8/2020    KNEE ARTHROSCOPY Left     LAPAROTOMY N/A 1/20/2021    Procedure: LAPAROTOMY EXPLORATORY;  Surgeon: Ministerio Pinto MD;  [facility];  Service: Surgical Oncology    LIVER LOBECTOMY N/A 1/20/2021    Procedure: LIVER ABLATION SEGMENT 7, INTRAOPERATIVE U/S OF LIVER;  Surgeon: Ministerio Pinto MD;  [facility];  Service: Surgical Oncology    MEDIASTINOSCOPY N/A 11/27/2018    Procedure: MEDIASTINOSCOPY;  Surgeon: Ej Garcia MD;  [facility];  Service: Thoracic    OH BRONCHOSCOPY NEEDLE BX TRACHEA MAIN STEM&/BRON N/A 11/27/2018    Procedure: EBUS with biopsy;  Surgeon: Ej Garcia MD;  [facility];  Service: Thoracic    OH [address] N/A 11/27/2018    Procedure: Kori Arce;  Surgeon: Ej Garcia MD;  [facility];  Service: Thoracic    OH EDG US EXAM SURGICAL ALTER STOM DUODENUM/JEJUNUM N/A 10/29/2018    Procedure: LINEAR ENDOSCOPIC U/S;  Surgeon: Cristina Sykes MD;  [facility]; Service: Gastroenterology    SMALL INTESTINE SURGERY N/A 1/20/2021    Procedure: RESECTION SMALL BOWEL AND ANASTOMOSIS, RESECTION SMALL BOWEL NODULE;  Surgeon: Ministerio Pinto MD;  [facility];  Service: Surgical Oncology    WISDOM TOOTH EXTRACTION         Family History:    Family History   Problem Relation Age of Onset    Alzheimer's disease Mother     Parkinsonism Father     Colon cancer Maternal Grandfather     No Known Problems Sister     No Known Problems Daughter     No Known Problems Sister     No Known Problems Daughter        Cancer-related family history includes Colon cancer in her maternal grandfather      Social History:   Social History     Socioeconomic History    Marital status: /Civil Union     Spouse name: Not on file  Number of children: Not on file    Years of education: Not on file    Highest education level: Not on file   Occupational History    Not on file   Social Needs    Financial resource strain: Not on file    Food insecurity     Worry: Not on file     Inability: Not on file    Transportation needs     Medical: Not on file     Non-medical: Not on file   Tobacco Use    Smoking status: Never Smoker    Smokeless tobacco: Never Used   Substance and Sexual Activity    Alcohol use: Yes     Frequency: Monthly or less    Drug use: No    Sexual activity: Not on file   Lifestyle    Physical activity     Days per week: Not on file     Minutes per session: Not on file    Stress: Not on file   Relationships    Social connections     Talks on phone: Not on file     Gets together: Not on file     Attends [employer] service: Not on file     Active member of club or organization: Not on file     Attends meetings of clubs or organizations: Not on file     Relationship status: Not on file    Intimate partner violence     Fear of current or ex partner: Not on file     Emotionally abused: Not on file     Physically abused: Not on file     Forced sexual activity: Not on file   Other Topics Concern    Not on file   Social History Narrative    Not on file       Current Medications:   Current Outpatient Medications   Medication Sig Dispense Refill    aspirin 81 MG tablet Take 1 tablet by mouth daily in the early morning       Cholecalciferol (VITAMIN D) 2000 units CAPS Take 1 tablet by mouth daily      docusate sodium (COLACE) 100 mg capsule Take 1 capsule (100 mg total) by mouth 2 (two) times a day 10 capsule 0    hydrochlorothiazide (HYDRODIURIL) 12 5 mg tablet TAKE 1 TABLET BY MOUTH EVERY DAY 90 tablet 1    Multiple Vitamin (MULTIVITAMIN) tablet Take 1 tablet by mouth daily      omeprazole (PriLOSEC) 40 MG capsule TAKE 1 CAPSULE BY MOUTH EVERY DAY BEFORE BREAKFAST 90 capsule 1    ondansetron (ZOFRAN) 4 mg tablet Take 1 tablet (4 mg total) by mouth every 8 (eight) hours as needed for nausea or vomiting 20 tablet 1    Wixela Inhub 250-50 MCG/DOSE inhaler INHALE 1 PUFF 2 (TWO) TIMES A DAY RINSE MOUTH AFTER USE  (Patient taking differently: Inhale 1 puff daily ) 1 Inhaler 5     No current facility-administered medications for this visit  Allergies: Allergies   Allergen Reactions    Bactrim [Sulfamethoxazole-Trimethoprim] Hives    Clam Shell - Food Allergy Vomiting       Physical Exam:    Body surface area is 1 81 meters squared  Wt Readings from Last 3 Encounters:   05/18/21 79 8 kg (176 lb)   05/17/21 80 3 kg (177 lb)   05/14/21 80 3 kg (177 lb)        Temp Readings from Last 3 Encounters:   05/18/21 97 5 °F (36 4 °C) (Temporal)   05/14/21 97 6 °F (36 4 °C) (Temporal)   05/10/21 (!) 96 7 °F (35 9 °C) (Temporal)        BP Readings from Last 3 Encounters:   05/18/21 114/62   05/17/21 144/85   05/14/21 124/80         Pulse Readings from Last 3 Encounters:   05/18/21 90   05/17/21 58   05/14/21 67        Physical Exam     Constitutional   General appearance: No acute distress, well appearing and well nourished  Eyes   Conjunctiva and lids: No swelling, erythema or discharge  Pupils and irises: Equal, round and reactive to light  Ears, Nose, Mouth, and Throat   External inspection of ears and nose: Normal     Nasal mucosa, septum, and turbinates: Normal without edema or erythema  Oropharynx: Normal with no erythema, edema, exudate or lesions  Pulmonary   Respiratory effort: No increased work of breathing or signs of respiratory distress  Auscultation of lungs: Clear to auscultation  Cardiovascular   Palpation of heart: Normal PMI, no thrills  Auscultation of heart: Normal rate and rhythm, normal S1 and S2, without murmurs  Examination of extremities for edema and/or varicosities: Normal     Carotid pulses: Normal     Abdomen   Abdomen: Non-tender, no masses      Liver and spleen: No hepatomegaly or splenomegaly  Lymphatic   Palpation of lymph nodes in neck: No lymphadenopathy  Musculoskeletal   Gait and station: Normal     Digits and nails: Normal without clubbing or cyanosis  Inspection/palpation of joints, bones, and muscles: Normal     Skin   Skin and subcutaneous tissue: Normal without rashes or lesions  Neurologic   Cranial nerves: Cranial nerves 2-12 intact  Sensation: No sensory loss  Psychiatric   Orientation to person, place, and time: Normal     Mood and affect: Normal         Assessment / Plan:       23-year-old female with  A history of well-differentiated neuroendocrine tumor  She also has side branch IPMN that is essentially stable   This is less than 2 cm in size   This is being observed with her MRI    She then underwent small-bowel resection and microwave ablation segment 7 liver lesions   She completed radiation to T11 for a metastasis  For her well-differentiated neuroendocrine tumor   Ayan Gallegos is on Lanreotide  Since she is asymptomatic and her imaging shows no recurrence, I will plan on seeing her again in  3 months with repeat blood work  Her surgeon has already scheduled her scan for 6 months   If there is residual viable disease, we can consider Sir spheres or PRRT if there is extrahepatic disease   She is agreeable to this plan   All of her questions were answered         Goals and Barriers:  Current Goal:  Prolong Survival from   Well-differentiated neuroendocrine tumor  Barriers: None  Patient's Capacity to Self Care:  Patient  able to self care  Portions of the record may have been created with voice recognition software   Occasional wrong word or "sound a like" substitutions may have occurred due to the inherent limitations of voice recognition software   Read the chart carefully and recognize, using context, where substitutions have occurred

## 2021-05-20 ENCOUNTER — OFFICE VISIT (OUTPATIENT)
Dept: DERMATOLOGY | Facility: CLINIC | Age: 71
End: 2021-05-20
Payer: COMMERCIAL

## 2021-05-20 VITALS — TEMPERATURE: 97.2 F | HEIGHT: 62 IN | WEIGHT: 176 LBS | BODY MASS INDEX: 32.39 KG/M2

## 2021-05-20 DIAGNOSIS — H61.002 CHONDRODERMATITIS NODULARIS HELICIS OF LEFT EAR: Primary | ICD-10-CM

## 2021-05-20 PROCEDURE — 99203 OFFICE O/P NEW LOW 30 MIN: CPT | Performed by: DERMATOLOGY

## 2021-05-20 PROCEDURE — 1036F TOBACCO NON-USER: CPT | Performed by: DERMATOLOGY

## 2021-05-20 PROCEDURE — 1160F RVW MEDS BY RX/DR IN RCRD: CPT | Performed by: DERMATOLOGY

## 2021-05-20 NOTE — PATIENT INSTRUCTIONS
CHONDRODERMATITIS NODULARIS HELICIS ("CNH")    Assessment and Plan:  Based on a thorough discussion of this condition and the management approach to it (including a comprehensive discussion of the known risks, side effects and potential benefits of treatment), the patient (family) agrees to implement the following specific plan:   Scheduled excision on Wed; June 9 th at 3:45 pm    What is chondrodermatitis nodularis helicis? Chondrodermatitis nodularis helices is a common inflammatory condition which affects the skin and cartilage of the helix or antihelix of the ear  Chondrodermatitis nodularis occurs more frequently in fair-skinned and middle-aged older males, with 10-35% of the cases reported in women  It is rarely reported in children  What causes chondrodermatitis nodularis helicis? The exact cause of chondrodermatitis nodularis is still unknown but there are several factors that contribute to its development   Repeated pressure and compromised blood supply to the ear (predominantly in people who sleep on one side)   Exposure to cold and sun   Trauma from headphones, mobile devices or hearing aids   Connective tissue disease (eg, lupus erythematosus, dermatomyositis, and scleroderma)    Chondrodermatitis nodularis is a solitary, firm, and oval-shaped nodule, 4-6 mm in diameter, with central crust and surrounding erythema   In men, the most common site for [facility] is the helix, while in women it is more often found on the antihelix   It is typically unilateral, located on the sleeping side, but can be bilateral    CNH is painful and tender  Pain at night may prevent sleeping on the affected side   The lesion can bleed or discharge a small amount of scaly material      How do we diagnose chrondrodermatitis nodularis helices?   In most cases, the diagnosis of chondrodermatitis nodularis is made clinically, based on the characteristic location on the helix or antihelix, and a typical history of pain and tenderness  Sometimes an excisional biopsy with tissue exam may be necessary to confirm the diagnosis  Other diagnoses to consider are squamous cell carcinoma, keratoacanthoma, viral wart, gouty tophus, calcinosis cutis, basal cell carcinoma, and actinic damage  How do we treat chondrodermatitis nodularis helices? Protective padding at night can relieve pressure on the area affected by [facility]   Use a soft pillow and try to avoid sleeping only on one side   Foam rubber or a bath sponge can be used to make a chondrodermatitis nodularis ear protector to wear at night, held in place with an elastic headband (or purchase an ear protector from a medical supply company)   Wear a warm hat over the ears when outside in the cold and wind   Apply petroleum jelly or an antibacterial ointment under a light dressing, especially if the chondrodermatitis nodularis is ulcerated or infected  Other treatments include:   Steroid injection (triamcinolone acetonide) to reduce local inflammation   Filler injections of collagen or hyaluronic acid to provide a cushioning layer   Topical nitroglycerin ointment (1-2%) for increased blood flow to affected segments of the ear  This is applied twice daily with potential side effects of mild headache and skin irritation  If these options fail, surgery may be required to remove affected skin and cartilage  Curettage can also help remove the affected tissue  Generally, chondrodermatitis nodularis helices resolves within a few months, but can recur

## 2021-05-20 NOTE — PROGRESS NOTES
[facility] Dermatology Clinic Note     Patient Name: Sohail Mclaughlin  Encounter Date: 05/20/21     Have you been cared for by a [facility] Dermatologist in the last 3 years and, if so, which one? No    · Have you traveled outside of the [address] [place] [address] in the past 3 months or outside of the [facility] area in the last 2 weeks? No     May we call your Preferred Phone number to discuss your specific medical information? Yes     May we leave a detailed message that includes your specific medical information? Yes      Today's Chief Concerns:   Concern #1:  Growth on left ear      Past Medical History:  Have you personally ever had or currently have any of the following? · Skin cancer (such as Melanoma, Basal Cell Carcinoma, Squamous Cell Carcinoma? 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      What are your hobbies/past-times? puzzles    Family History:  Have any of your "first degree relatives" (parent, brother, sister, or child) had any of the following       · Skin cancer such as Melanoma or Merkel Cell Carcinoma or Pancreatic Cancer? No  · Eczema, Asthma, Hay Fever or Seasonal Allergies: No  · Psoriasis or Psoriatic Arthritis: YES, both sisters and father  · Do any other medical conditions seem to run in your family? If Yes, what condition and which relatives?   No    Current Medications:         Current Outpatient Medications:     aspirin 81 MG tablet, Take 1 tablet by mouth daily in the early morning , Disp: , Rfl:     Cholecalciferol (VITAMIN D) 2000 units CAPS, Take 1 tablet by mouth daily, Disp: , Rfl:     docusate sodium (COLACE) 100 mg capsule, Take 1 capsule (100 mg total) by mouth 2 (two) times a day, Disp: 10 capsule, Rfl: 0    hydrochlorothiazide (HYDRODIURIL) 12 5 mg tablet, TAKE 1 TABLET BY MOUTH EVERY DAY, Disp: 90 tablet, Rfl: 1    Multiple Vitamin (MULTIVITAMIN) tablet, Take 1 tablet by mouth daily, Disp: , Rfl:     omeprazole (PriLOSEC) 40 MG capsule, TAKE 1 CAPSULE BY MOUTH EVERY DAY BEFORE BREAKFAST, Disp: 90 capsule, Rfl: 1    Wixela Inhub 250-50 MCG/DOSE inhaler, INHALE 1 PUFF 2 (TWO) TIMES A DAY RINSE MOUTH AFTER USE  (Patient taking differently: Inhale 1 puff daily ), Disp: 1 Inhaler, Rfl: 5    ondansetron (ZOFRAN) 4 mg tablet, Take 1 tablet (4 mg total) by mouth every 8 (eight) hours as needed for nausea or vomiting (Patient not taking: Reported on 5/20/2021), Disp: 20 tablet, Rfl: 1      Review of Systems:  Have you recently had or currently have any of the following? If YES, what are you doing for the problem? · Fever, chills or unintended weight loss: No  · Sudden loss or change in your vision: No  · Nausea, vomiting or blood in your stool: No  · Painful or swollen joints: No  · Wheezing or cough: No  · Changing mole or non-healing wound: No  · Nosebleeds: No  · Excessive sweating: No  · Easy or prolonged bleeding? No  · Over the last 2 weeks, how often have you been bothered by the following problems? · Taking little interest or pleasure in doing things: 1 - Not at All  · Feeling down, depressed, or hopeless: 1 - Not at All  · Rapid heartbeat with epinephrine:  No    · FEMALES ONLY:    · Are you pregnant or planning to become pregnant? No  · Are you currently or planning to be nursing or breast feeding? No    · Any known allergies?       Allergies   Allergen Reactions    Bactrim [Sulfamethoxazole-Trimethoprim] Hives    Clam Shell - Food Allergy Vomiting   ·       Physical Exam:     Was a chaperone (Derm Clinical Assistant) present throughout the entire Physical Exam? Yes     Did the Dermatology Team specifically  the patient on the importance of a Full Skin Exam to be sure that nothing is missed clinically?  Yes}  o Did the patient ultimately request or accept a Full Skin Exam?  NO  o Did the patient specifically refuse to have the areas "under-the-bra" examined by the Dermatologist? No  o Did the patient specifically refuse to have the areas "under-the-underwear" examined by the Dermatologist? No    CONSTITUTIONAL:   Vitals:    05/20/21 1053   Temp: (!) 97 2 °F (36 2 °C)   TempSrc: Temporal   Weight: 79 8 kg (176 lb)   Height: 5' 2" (1 575 m)       PSYCH: Normal mood and affect  EYES: Normal conjunctiva  ENT: Normal lips and oral mucosa  CARDIOVASCULAR: No edema  RESPIRATORY: Normal respirations  HEME/LYMPH/IMMUNO:  No regional lymphadenopathy except as noted below in "ASSESSMENT AND PLAN BY DIAGNOSIS"    FULL BODIED EXAM  Hair, Scalp, Ears, Face Normal except as noted below in Assessment   Neck, Cervical Chain Nodes Normal except as noted below in Assessment   Right Arm/Hand/Fingers Normal except as noted below in Assessment   Left Arm/Hand/Fingers Normal except as noted below in Assessment   Chest/Breasts/Axillae Viewed areas Normal except as noted below in Assessment   Abdomen, Umbilicus Normal except as noted below in Assessment   Back/Spine Normal except as noted below in Assessment   Groin/Genitalia/Buttocks    Right Leg, Foot, Toes Normal except as noted below in Assessment   Left Leg, Foot, Toes Normal except as noted below in Assessment        Assessment and Plan by Diagnosis:    History of Present Condition:     Duration:  How long has this been an issue for you?    o   Had almost a year   Location Affected:  Where on the body is this affecting you?    o  left ear   Quality:  Is there any bleeding, pain, itch, burning/irritation, or redness associated with the skin lesion? o  pain when sleeps on side   Severity:  Describe any bleeding, pain, itch, burning/irritation, or redness on a scale of 1 to 10 (with 10 being the worst)  o  4   Timing:  Does this condition seem to be there pretty constantly or do you notice it more at specific times throughout the day?    o  denies   Context:  Have you ever noticed that this condition seems to be associated with specific activities you do?    o  denies   Modifying Factors:    o Anything that seems to make the condition worse?    -  sleeps on side  o What have you tried to do to make the condition better?    -  lotion   Associated Signs and Symptoms:  Does this skin lesion seem to be associated with any of the following:        CHONDRODERMATITIS NODULARIS HELICIS ("CNH")    Physical Exam:   Anatomic Location Affected:  Left ear   Morphological Description:  Scaly brownish round papule   Pertinent Positives:   Pertinent Negatives: Additional History of Present Condition:  Patient had since November; hurts when sleeps on that side    Assessment and Plan:  Based on a thorough discussion of this condition and the management approach to it (including a comprehensive discussion of the known risks, side effects and potential benefits of treatment), the patient (family) agrees to implement the following specific plan:   Scheduled excision on Wed; June 9 th at 3:45 pm   Purpose is to treat and to definitively rule out SCC that may mimic CNH both clinically and hisotologically if superficial biopsy  What is chondrodermatitis nodularis helicis? Chondrodermatitis nodularis helices is a common inflammatory condition which affects the skin and cartilage of the helix or antihelix of the ear   Chondrodermatitis nodularis occurs more frequently in fair-skinned and middle-aged older males, with 10-35% of the cases reported in women  It is rarely reported in children  What causes chondrodermatitis nodularis helicis? The exact cause of chondrodermatitis nodularis is still unknown but there are several factors that contribute to its development   Repeated pressure and compromised blood supply to the ear (predominantly in people who sleep on one side)   Exposure to cold and sun   Trauma from headphones, mobile devices or hearing aids   Connective tissue disease (eg, lupus erythematosus, dermatomyositis, and scleroderma)    Chondrodermatitis nodularis is a solitary, firm, and oval-shaped nodule, 4-6 mm in diameter, with central crust and surrounding erythema   In men, the most common site for [facility] is the helix, while in women it is more often found on the antihelix   It is typically unilateral, located on the sleeping side, but can be bilateral    CNH is painful and tender  Pain at night may prevent sleeping on the affected side   The lesion can bleed or discharge a small amount of scaly material      How do we diagnose chrondrodermatitis nodularis helices? In most cases, the diagnosis of chondrodermatitis nodularis is made clinically, based on the characteristic location on the helix or antihelix, and a typical history of pain and tenderness  Sometimes an excisional biopsy with tissue exam may be necessary to confirm the diagnosis  Other diagnoses to consider are squamous cell carcinoma, keratoacanthoma, viral wart, gouty tophus, calcinosis cutis, basal cell carcinoma, and actinic damage  How do we treat chondrodermatitis nodularis helices? Protective padding at night can relieve pressure on the area affected by [facility]   Use a soft pillow and try to avoid sleeping only on one side   Foam rubber or a bath sponge can be used to make a chondrodermatitis nodularis ear protector to wear at night, held in place with an elastic headband (or purchase an ear protector from a medical supply company)     Wear a warm hat over the ears when outside in the cold and wind   Apply petroleum jelly or an antibacterial ointment under a light dressing, especially if the chondrodermatitis nodularis is ulcerated or infected  Other treatments include:   Steroid injection (triamcinolone acetonide) to reduce local inflammation   Filler injections of collagen or hyaluronic acid to provide a cushioning layer   Topical nitroglycerin ointment (1-2%) for increased blood flow to affected segments of the ear  This is applied twice daily with potential side effects of mild headache and skin irritation  If these options fail, surgery may be required to remove affected skin and cartilage  Curettage can also help remove the affected tissue  Generally, chondrodermatitis nodularis helices resolves within a few months, but can recur         Scribe Attestation    I,:  Jesus Dove am acting as a scribe while in the presence of the attending physician :       I,:  Ravi Bhardwaj MD personally performed the services described in this documentation    as scribed in my presence :

## 2021-06-01 ENCOUNTER — TRANSCRIBE ORDERS (OUTPATIENT)
Dept: LAB | Facility: CLINIC | Age: 71
End: 2021-06-01

## 2021-06-01 ENCOUNTER — APPOINTMENT (OUTPATIENT)
Dept: LAB | Facility: CLINIC | Age: 71
End: 2021-06-01
Payer: COMMERCIAL

## 2021-06-01 LAB
ALBUMIN SERPL BCP-MCNC: 3.5 G/DL (ref 3.5–5)
ALP SERPL-CCNC: 108 U/L (ref 46–116)
ALT SERPL W P-5'-P-CCNC: 25 U/L (ref 12–78)
ANION GAP SERPL CALCULATED.3IONS-SCNC: 8 MMOL/L (ref 4–13)
AST SERPL W P-5'-P-CCNC: 24 U/L (ref 5–45)
BASOPHILS # BLD AUTO: 0.05 THOUSANDS/ΜL (ref 0–0.1)
BASOPHILS NFR BLD AUTO: 1 % (ref 0–1)
BILIRUB SERPL-MCNC: 0.77 MG/DL (ref 0.2–1)
BUN SERPL-MCNC: 19 MG/DL (ref 5–25)
CALCIUM SERPL-MCNC: 8.8 MG/DL (ref 8.3–10.1)
CHLORIDE SERPL-SCNC: 105 MMOL/L (ref 100–108)
CO2 SERPL-SCNC: 31 MMOL/L (ref 21–32)
CREAT SERPL-MCNC: 0.82 MG/DL (ref 0.6–1.3)
EOSINOPHIL # BLD AUTO: 0 THOUSAND/ΜL (ref 0–0.61)
EOSINOPHIL NFR BLD AUTO: 0 % (ref 0–6)
ERYTHROCYTE [DISTWIDTH] IN BLOOD BY AUTOMATED COUNT: 13.6 % (ref 11.6–15.1)
GFR SERPL CREATININE-BSD FRML MDRD: 73 ML/MIN/1.73SQ M
GLUCOSE P FAST SERPL-MCNC: 110 MG/DL (ref 65–99)
HCT VFR BLD AUTO: 33.9 % (ref 34.8–46.1)
HGB BLD-MCNC: 10.8 G/DL (ref 11.5–15.4)
IMM GRANULOCYTES # BLD AUTO: 0.02 THOUSAND/UL (ref 0–0.2)
IMM GRANULOCYTES NFR BLD AUTO: 0 % (ref 0–2)
LYMPHOCYTES # BLD AUTO: 1.57 THOUSANDS/ΜL (ref 0.6–4.47)
LYMPHOCYTES NFR BLD AUTO: 25 % (ref 14–44)
MCH RBC QN AUTO: 29.2 PG (ref 26.8–34.3)
MCHC RBC AUTO-ENTMCNC: 31.9 G/DL (ref 31.4–37.4)
MCV RBC AUTO: 92 FL (ref 82–98)
MONOCYTES # BLD AUTO: 0.72 THOUSAND/ΜL (ref 0.17–1.22)
MONOCYTES NFR BLD AUTO: 12 % (ref 4–12)
NEUTROPHILS # BLD AUTO: 3.81 THOUSANDS/ΜL (ref 1.85–7.62)
NEUTS SEG NFR BLD AUTO: 62 % (ref 43–75)
NRBC BLD AUTO-RTO: 0 /100 WBCS
PLATELET # BLD AUTO: 261 THOUSANDS/UL (ref 149–390)
PMV BLD AUTO: 10.3 FL (ref 8.9–12.7)
POTASSIUM SERPL-SCNC: 3.5 MMOL/L (ref 3.5–5.3)
PROT SERPL-MCNC: 7.5 G/DL (ref 6.4–8.2)
RBC # BLD AUTO: 3.7 MILLION/UL (ref 3.81–5.12)
SODIUM SERPL-SCNC: 144 MMOL/L (ref 136–145)
WBC # BLD AUTO: 6.17 THOUSAND/UL (ref 4.31–10.16)

## 2021-06-01 PROCEDURE — 85025 COMPLETE CBC W/AUTO DIFF WBC: CPT | Performed by: INTERNAL MEDICINE

## 2021-06-01 PROCEDURE — 80053 COMPREHEN METABOLIC PANEL: CPT | Performed by: INTERNAL MEDICINE

## 2021-06-01 PROCEDURE — 36415 COLL VENOUS BLD VENIPUNCTURE: CPT | Performed by: INTERNAL MEDICINE

## 2021-06-01 PROCEDURE — 86316 IMMUNOASSAY TUMOR OTHER: CPT | Performed by: INTERNAL MEDICINE

## 2021-06-03 LAB — CGA SERPL-MCNC: 60.8 NG/ML (ref 0–101.8)

## 2021-06-07 ENCOUNTER — HOSPITAL ENCOUNTER (OUTPATIENT)
Dept: INFUSION CENTER | Facility: CLINIC | Age: 71
Discharge: HOME | End: 2021-06-07
Payer: COMMERCIAL

## 2021-06-07 VITALS — TEMPERATURE: 96.9 F

## 2021-06-07 DIAGNOSIS — C7B.8 METASTATIC MALIGNANT NEUROENDOCRINE TUMOR TO LIVER (HCC): Primary | ICD-10-CM

## 2021-06-07 PROCEDURE — 96372 THER/PROPH/DIAG INJ SC/IM: CPT

## 2021-06-07 RX ORDER — LANREOTIDE ACETATE 120 MG/.5ML
120 INJECTION SUBCUTANEOUS ONCE
Status: COMPLETED | OUTPATIENT
Start: 2021-06-07 | End: 2021-06-07

## 2021-06-07 RX ORDER — LANREOTIDE ACETATE 120 MG/.5ML
120 INJECTION SUBCUTANEOUS ONCE
Status: CANCELLED | OUTPATIENT
Start: 2021-07-06

## 2021-06-07 RX ADMIN — LANREOTIDE ACETATE 120 MG: 120 INJECTION SUBCUTANEOUS at 09:10

## 2021-06-07 NOTE — NURSING NOTE
Patient arrives to infusion center for Lanreotide injection  Patient denies any complaints at this time, states she feels well  Labs reviewed from 6/1 but not required for treatment today  Patient tolerated injection to LUQ buttock with issue, bandaid in place  Patient aware no additional appointments scheduled at this time, states she will stop at  to schedule upon discharge  Patient AAOx4 and ambulatory upon DC without gait disturbance

## 2021-06-09 ENCOUNTER — PROCEDURE VISIT (OUTPATIENT)
Dept: DERMATOLOGY | Facility: CLINIC | Age: 71
End: 2021-06-09
Payer: COMMERCIAL

## 2021-06-09 VITALS — WEIGHT: 176 LBS | HEIGHT: 62 IN | TEMPERATURE: 97.8 F | BODY MASS INDEX: 32.39 KG/M2

## 2021-06-09 DIAGNOSIS — H61.002 CHONDRODERMATITIS NODULARIS HELICIS OF LEFT EAR: Primary | ICD-10-CM

## 2021-06-09 PROCEDURE — 3008F BODY MASS INDEX DOCD: CPT | Performed by: DERMATOLOGY

## 2021-06-09 PROCEDURE — 88313 SPECIAL STAINS GROUP 2: CPT | Performed by: STUDENT IN AN ORGANIZED HEALTH CARE EDUCATION/TRAINING PROGRAM

## 2021-06-09 PROCEDURE — 11441 EXC FACE-MM B9+MARG 0.6-1 CM: CPT | Performed by: DERMATOLOGY

## 2021-06-09 PROCEDURE — 88305 TISSUE EXAM BY PATHOLOGIST: CPT | Performed by: STUDENT IN AN ORGANIZED HEALTH CARE EDUCATION/TRAINING PROGRAM

## 2021-06-09 NOTE — PATIENT INSTRUCTIONS
POSTOP DISCUSSION DISCUSSION AND INSTRUCTIONS FOR PATIENT      Rationale for Procedure  A skin excision allows the dermatologist to remove a lesion  The procedure involves a local numbing medication and removing the entire lesion  Typically, the lesion is being removed because it is atypical, traumatized, or for significant appearance reasons  The area will be open like a brush burn and allowed to heal    There will be no sutures  Tissue is sent to pathologist who will reconfirm diagnosis and verify completeness of lesion removal     Description of Procedure  We would like to perform a skin excision today  A local anesthetic, similar to the kind that a dentist uses when filling a cavity, will be injected with a very small needle into the skin area to be sampled  The injected skin and tissue underneath should go to sleep and become numbed so that no further pain should be felt  A scalpel will be used to cut around the lesion and tissue will be submitted to pathology for examination  Depending on the diagnosis the lesion will be excised with a certain amount of normal skin to help assure completeness of lesion removal   The physician will discuss in advance the anticipated size and extent of removal    Bleeding will occur, but it will stopped with direct pressure, sutures, and electrocautery  Surgical Vaseline-type ointment will also applied after the procedure to help create a barrier between the wound and the outside world  Risks and Potential Complications  The advantage of a skin excision is that it allows us to remove a problem lesion quickly  Although this usually permits the lesion to heal as soon as possible with the least scarring, there are some risks and potential complications that include but are not limited to the following:  - Some bleeding is normal at time of procedure and some bleeding on gauze is normal  the first few days after surgery    Profuse bleeding and bleeding with swelling and pain should be reported as detailed  below  - Infection is uncommon in skin surgery  Infection should be reported and is indicated by pain, redness, and discharge of purulent material   - Some dull to at time sharp pain could occur initially the day after surgery  Persistent pain or increasing pain days after surgery is not expected and should be reported  - Every effort is made to minimize scar, but location, size, and genetics do play a role in scar appearance  A surgical wound does not achieve its optimal appearance until 6 months  There are several treatments available if scarring would be problematic including scar creams, silicone pad, laser and scar revision   - Skin discoloration can occur especially in people of color  Its important to avoid sun on wound in first 6 months after surgery  Treatment is available if pigment is problematic   - Incomplete removal of the lesion or recurrence of lesion can occur and this would then require further treatment and more surgery   - Nerve Damage/Numbness/Loss of Function is very rare, but is most likely to occur if lesion is large or if it is in a high risk location  - Allergic Reaction to lidocaine is rare  More commonly,  epinephrine is used  with the lidocaine  Occasionally, epinephrine (aka adrenalin) may cause a brief  feeling of anxiety or jitteriness  - The person at the microscope  (pathologist) may provide additional information that was unexpected  This unexpected finding could provoke the need for additional treatment or evaluation  What You Will Need to Do After the Procedure  1  Keep the area clean and dry the first day  Try NOT to remove the bandage for the first day  2  Gently clean the area with soap and water and apply Vaseline ointment (this is over the counter and not a prescription) to the excision  site for up to 2 weeks  3  Apply a clean appropriately sized bandage to area    Gauze and paper tape are recommended for sensitive skin  4  Return for suture removal as instructed (generally 1 week for the face, 2 weeks for the body)  5  Take Acetaminophen (Tylenol) for discomfort, if no contraindications  Do NOT take Ibuprofen or aspirin unless specifically told to do so by your Dermatologist because these medications can make bleeding worse  6  Call our office immediately for signs of infection: fever, chills, increased redness, warmth, tenderness, discomfort/pain, or pus or foul smell coming from the wound  If bleeding is noticed, place a clean cloth over the area and apply firm pressure for thirty minutes  Check the wound ONLY after 30 minutes of direct pressure; do not cheat and sneak a peak, as that does not count  If bleeding persists after 30 minutes of legitimate direct pressure, then try one more round of direct pressure for an additional 10 minutes to the area  Should the bleeding become heavier or not stop after the second attempt, call [facility] Dermatology directly at [phone] (SKIN) or, if after hours, go to your local Emergency Room/Emergency Department

## 2021-06-09 NOTE — PROGRESS NOTES
PROCEDURE:  EXCISION WITH SIMPLE  CLOSURE    Attending: Dr Louis  Assistant: Meaghan Hauser    Pre-Op Diagnosis: Chondrodermatitis nodularis helicis  Post-Op Diagnosis: Same    Lesion Anatomic Location: left ear   Pre-op size: 0 7 centimeter  Size of defect:  0 9 cm x 0 9 cm (with 0 1 centimeter margins)  Final repaired wound length:  1 4 centimeter    Written and verbal, witnessed informed consent was obtained  I discussed that excision is a method of removing lesions both benign and malignant lesions  A portion of normal skin is often taken to ensure completeness of removal   I reviewed that procedure will include numbing the area,  cutting around and under defect, undermining tissue, and closing the wound with external sutures  These sutures are usually removed in 7 to 14 days  Risks (bleeding, pain, infection, scarring, recurrence) and benefits discussed  It was discussed with patient that every effort is made to minimize scar, but scarring is influenced also by extrinsic factor such as location, age and genetics  Time Out: performed:  yes  Correct patient: yes  Correct site per Clinic Report: yes  Correct site per Patient Report: yes    LOCAL ANESTHESIA: 1% xylocaine with epi     DESCRIPTION OF PROCEDURE: The patient was brought back into the procedure room, anesthetized locally, prepped and draped in the usual fashion  Using a #15 blade with a scalpel, the lesion was excised in elliptical fashion  Hemostasis was achieved with light electrocoagulation  Purpose of undermining was to decrease wound tension and facilitate closure  Epidermal edge closure was accomplished with superficial sutures as follows:    Superficial suture: 6-0 Ethilon  Superficial suture type: Interrupted     Estimated blood loss less than 3ml  The patient tolerated the procedure well without any complications   The wound was cleaned with sterile saline, dried off, surgical vaseline ointment was applied, and the wound was covered  A pressure dressing was applied for stabilization and light pressure  The patient was given detailed oral and written instructions on postoperative care as detailed in consent  The patient left in good medical condition  POSTOP DISCUSSION DISCUSSION AND INSTRUCTIONS FOR PATIENT      Rationale for Procedure  A skin excision allows the dermatologist to remove a lesion  The procedure involves a local numbing medication and removing the entire lesion  Typically, the lesion is being removed because it is atypical, traumatized, or for significant appearance reasons  The area will be open like a brush burn and allowed to heal    There will be no sutures  Tissue is sent to pathologist who will reconfirm diagnosis and verify completeness of lesion removal     Description of Procedure  We would like to perform a skin excision today  A local anesthetic, similar to the kind that a dentist uses when filling a cavity, will be injected with a very small needle into the skin area to be sampled  The injected skin and tissue underneath should go to sleep and become numbed so that no further pain should be felt  A scalpel will be used to cut around the lesion and tissue will be submitted to pathology for examination  Depending on the diagnosis the lesion will be excised with a certain amount of normal skin to help assure completeness of lesion removal   The physician will discuss in advance the anticipated size and extent of removal    Bleeding will occur, but it will stopped with direct pressure, sutures, and electrocautery  Surgical Vaseline-type ointment will also applied after the procedure to help create a barrier between the wound and the outside world  Risks and Potential Complications  The advantage of a skin excision is that it allows us to remove a problem lesion quickly    Although this usually permits the lesion to heal as soon as possible with the least scarring, there are some risks and potential complications that include but are not limited to the following:  - Some bleeding is normal at time of procedure and some bleeding on gauze is normal  the first few days after surgery  Profuse bleeding and bleeding with swelling and pain should be reported as detailed  below  - Infection is uncommon in skin surgery  Infection should be reported and is indicated by pain, redness, and discharge of purulent material   - Some dull to at time sharp pain could occur initially the day after surgery  Persistent pain or increasing pain days after surgery is not expected and should be reported  - Every effort is made to minimize scar, but location, size, and genetics do play a role in scar appearance  A surgical wound does not achieve its optimal appearance until 6 months  There are several treatments available if scarring would be problematic including scar creams, silicone pad, laser and scar revision   - Skin discoloration can occur especially in people of color  Its important to avoid sun on wound in first 6 months after surgery  Treatment is available if pigment is problematic   - Incomplete removal of the lesion or recurrence of lesion can occur and this would then require further treatment and more surgery   - Nerve Damage/Numbness/Loss of Function is very rare, but is most likely to occur if lesion is large or if it is in a high risk location  - Allergic Reaction to lidocaine is rare  More commonly,  epinephrine is used  with the lidocaine  Occasionally, epinephrine (aka adrenalin) may cause a brief  feeling of anxiety or jitteriness  - The person at the microscope  (pathologist) may provide additional information that was unexpected  This unexpected finding could provoke the need for additional treatment or evaluation  What You Will Need to Do After the Procedure  1  Keep the area clean and dry the first day  Try NOT to remove the bandage for the first day    2  Gently clean the area with soap and water and apply Vaseline ointment (this is over the counter and not a prescription) to the excision  site for up to 2 weeks  3  Apply a clean appropriately sized bandage to area  Gauze and paper tape are recommended for sensitive skin  4  Return for suture removal as instructed (generally 1 week for the face, 2 weeks for the body)  5  Take Acetaminophen (Tylenol) for discomfort, if no contraindications  Do NOT take Ibuprofen or aspirin unless specifically told to do so by your Dermatologist because these medications can make bleeding worse  6  Call our office immediately for signs of infection: fever, chills, increased redness, warmth, tenderness, discomfort/pain, or pus or foul smell coming from the wound  If bleeding is noticed, place a clean cloth over the area and apply firm pressure for thirty minutes  Check the wound ONLY after 30 minutes of direct pressure; do not cheat and sneak a peak, as that does not count  If bleeding persists after 30 minutes of legitimate direct pressure, then try one more round of direct pressure for an additional 10 minutes to the area  Should the bleeding become heavier or not stop after the second attempt, call [facility] Dermatology directly at [phone] (SKIN) or, if after hours, go to your local Emergency Room/Emergency Department      Scribe Attestation    I,:  Wili Wyatt MA am acting as a scribe while in the presence of the attending physician :       I,:  Maximino Huston MD personally performed the services described in this documentation    as scribed in my presence :

## 2021-06-10 NOTE — RESULT ENCOUNTER NOTE
DERMATOPATHOLOGY RESULT NOTE    Results reviewed by ordering physician  Called patient to personally discuss results  Discussed results with patient  Instructions for Clinical Derm Team:   (remember to route Result Note to appropriate staff):    None    Result & Plan by Specimen:    Specimen A: benign  Plan: reassured, benign      Final Diagnosis  A   Skin, left ear, excision:     Consistent with CHONDRODERMATITIS NODULARIS HELICIS

## 2021-06-15 ENCOUNTER — OFFICE VISIT (OUTPATIENT)
Dept: DERMATOLOGY | Facility: CLINIC | Age: 71
End: 2021-06-15

## 2021-06-15 DIAGNOSIS — Z48.02 ENCOUNTER FOR REMOVAL OF SUTURES: Primary | ICD-10-CM

## 2021-06-15 PROCEDURE — 99024 POSTOP FOLLOW-UP VISIT: CPT | Performed by: DERMATOLOGY

## 2021-06-15 NOTE — PROGRESS NOTES
Suture removal    Date/Time: 6/15/2021 8:01 AM  Performed by: Oscar Faust RN  Authorized by: Lanre Sidhu MD   Universal Protocol:  Consent: Verbal consent obtained  Consent given by: patient  Timeout called at: 6/15/2021 8:01 AM   Patient understanding: patient states understanding of the procedure being performed  Patient consent: the patient's understanding of the procedure matches consent given  Procedure consent: procedure consent matches procedure scheduled  Relevant documents: relevant documents present and verified  Test results: test results available and properly labeled  Site marked: the operative site was not marked  Radiology Images displayed and confirmed  If images not available, report reviewed: imaging studies not available  Patient identity confirmed: verbally with patient        Patient location:  Clinic  Location:     Laterality:  Left    [facility]  [address] [facility] location:  Ear    Ear location:  L ear  Procedure details: Tools used:  Suture removal kit    Wound appearance:  No sign(s) of infection and good wound healing    Number of sutures removed:  5  Post-procedure details:     Post-removal:  Band-Aid applied (Vaseline applied )    Patient tolerance of procedure:   Tolerated well, no immediate complications

## 2021-06-25 DIAGNOSIS — K21.9 GASTROESOPHAGEAL REFLUX DISEASE WITHOUT ESOPHAGITIS: ICD-10-CM

## 2021-06-25 RX ORDER — OMEPRAZOLE 40 MG/1
CAPSULE, DELAYED RELEASE ORAL
Qty: 90 CAPSULE | Refills: 1 | Status: SHIPPED | OUTPATIENT
Start: 2021-06-25 | End: 2022-01-14 | Stop reason: SDUPTHER

## 2021-06-29 ENCOUNTER — APPOINTMENT (OUTPATIENT)
Dept: LAB | Facility: CLINIC | Age: 71
End: 2021-06-29
Payer: COMMERCIAL

## 2021-07-01 ENCOUNTER — RA CDI HCC (OUTPATIENT)
Dept: OTHER | Facility: HOSPITAL | Age: 71
End: 2021-07-01

## 2021-07-01 NOTE — PROGRESS NOTES
Shelly [facility]  coding opportunities          Chart reviewed, no opportunity found: CHART REVIEWED, NO OPPORTUNITY FOUND                     Patients insurance company: [employer] (Medicare Advantage and Commercial)

## 2021-07-06 ENCOUNTER — HOSPITAL ENCOUNTER (OUTPATIENT)
Dept: INFUSION CENTER | Facility: CLINIC | Age: 71
Discharge: HOME | End: 2021-07-06
Payer: COMMERCIAL

## 2021-07-06 DIAGNOSIS — C7B.8 METASTATIC MALIGNANT NEUROENDOCRINE TUMOR TO LIVER (HCC): Primary | ICD-10-CM

## 2021-07-06 PROCEDURE — 96372 THER/PROPH/DIAG INJ SC/IM: CPT

## 2021-07-06 RX ORDER — LANREOTIDE ACETATE 120 MG/.5ML
120 INJECTION SUBCUTANEOUS ONCE
Status: CANCELLED | OUTPATIENT
Start: 2021-08-02

## 2021-07-06 RX ORDER — LANREOTIDE ACETATE 120 MG/.5ML
120 INJECTION SUBCUTANEOUS ONCE
Status: COMPLETED | OUTPATIENT
Start: 2021-07-06 | End: 2021-07-06

## 2021-07-06 RX ADMIN — LANREOTIDE ACETATE 120 MG: 120 INJECTION SUBCUTANEOUS at 09:25

## 2021-07-06 NOTE — PROGRESS NOTES
Patient here for lanreotide injection  Injection given in RUQ of buttock with incident  Patient declined AVS and is aware of next appointments

## 2021-07-19 ENCOUNTER — OFFICE VISIT (OUTPATIENT)
Dept: PULMONOLOGY | Facility: CLINIC | Age: 71
End: 2021-07-19
Payer: COMMERCIAL

## 2021-07-19 VITALS
OXYGEN SATURATION: 99 % | BODY MASS INDEX: 31.68 KG/M2 | HEIGHT: 63 IN | DIASTOLIC BLOOD PRESSURE: 90 MMHG | WEIGHT: 178.8 LBS | HEART RATE: 58 BPM | SYSTOLIC BLOOD PRESSURE: 150 MMHG

## 2021-07-19 DIAGNOSIS — I51.89 DIASTOLIC DYSFUNCTION: ICD-10-CM

## 2021-07-19 DIAGNOSIS — D86.0 SARCOIDOSIS, LUNG (HCC): ICD-10-CM

## 2021-07-19 DIAGNOSIS — C7B.8 METASTATIC MALIGNANT NEUROENDOCRINE TUMOR TO LIVER (HCC): Primary | ICD-10-CM

## 2021-07-19 PROBLEM — J45.909 REACTIVE AIRWAY DISEASE: Status: ACTIVE | Noted: 2021-07-19

## 2021-07-19 PROCEDURE — 99214 OFFICE O/P EST MOD 30 MIN: CPT | Performed by: INTERNAL MEDICINE

## 2021-07-19 RX ORDER — BROMFENAC SODIUM 0.7 MG/ML
SOLUTION/ DROPS OPHTHALMIC
COMMUNITY
Start: 2021-05-19 | End: 2021-07-28 | Stop reason: ALTCHOICE

## 2021-07-19 RX ORDER — LOTEPREDNOL ETABONATE 10 MG/ML
SUSPENSION TOPICAL
COMMUNITY
Start: 2021-05-19 | End: 2021-07-28 | Stop reason: ALTCHOICE

## 2021-07-19 RX ORDER — BESIFLOXACIN 6 MG/ML
SUSPENSION OPHTHALMIC
COMMUNITY
Start: 2021-05-19 | End: 2021-07-28 | Stop reason: ALTCHOICE

## 2021-07-19 NOTE — PROGRESS NOTES
Assessment/Plan:    Sarcoidosis, lung ([facility] )   Narendra Kaiser had a screening CT last month which showed no progression of her pulmonary sarcoidosis  Reactive airway disease   Likely due to her pulmonary sarcoid is well controlled on Wixela 1 puff daily  That being said she does have some chronic coarseness which could be related to her inhaler  I have switched her to Symbicort 2 puffs once a day to see if her symptoms resolved  Diagnoses and all orders for this visit:    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 ([facility] )    Sarcoidosis, lung ([facility] )    Diastolic dysfunction    Other orders  -     Besivance 0 6 % SUSP;  (Patient not taking: Reported on 7/19/2021)  -     Prolensa 0 07 % SOLN;  (Patient not taking: Reported on 7/19/2021)  -     Inveltys 1 % SUSP;  (Patient not taking: Reported on 7/19/2021)          Subjective:      Patient ID: Rogerio Siu is a 72 y o  female  Anca Velasquez is here for follow-up of her breathing  She has been stable and maintains on Wixela 1 puff daily without any respiratory complaints  She is grieving the loss of her  who is also my patient  She denies any chest pain cough or wheeze  She is in remission after treating her neuroendocrine cancer  primary symptoms  Pertinent negatives include no chest pain, coughing, fever, headaches, myalgias or sore throat  The following portions of the patient's history were reviewed and updated as appropriate: allergies, current medications, past family history, past medical history, past social history, past surgical history and problem list     Review of Systems   Constitutional: Negative  Negative for appetite change, fever and unexpected weight change  HENT: Negative  Negative for ear pain, postnasal drip, rhinorrhea, sneezing, sore throat and trouble swallowing  Eyes: Negative  Respiratory: Negative  Negative for cough, shortness of breath and wheezing  Cardiovascular: Negative    Negative for chest pain and leg swelling  Gastrointestinal: Negative  Endocrine: Negative  Genitourinary: Negative  Musculoskeletal: Negative  Negative for myalgias  Allergic/Immunologic: Negative  Neurological: Negative  Negative for headaches  Hematological: Negative  Objective:      /90 (BP Location: Left arm, Patient Position: Sitting, Cuff Size: Standard)   Pulse 58   Ht 5' 2 5" (1 588 m)   Wt 81 1 kg (178 lb 12 8 oz)   LMP  (LMP Unknown)   SpO2 99%   BMI 32 18 kg/m²          Physical Exam  Constitutional:       Appearance: She is well-developed  HENT:      Head: Normocephalic  Eyes:      Pupils: Pupils are equal, round, and reactive to light  Cardiovascular:      Rate and Rhythm: Normal rate  Heart sounds: No murmur heard  Pulmonary:      Effort: Pulmonary effort is normal  No respiratory distress  Breath sounds: Normal breath sounds  No wheezing or rales  Abdominal:      Palpations: Abdomen is soft  Musculoskeletal:         General: Normal range of motion  Cervical back: Normal range of motion and neck supple  Skin:     General: Skin is warm and dry  Neurological:      Mental Status: She is alert and oriented to person, place, and time           Answers for HPI/ROS submitted by the patient on 7/14/2021  Do you experience frequent throat clearing?: Yes  Do you have a hoarse voice?: Yes  Chronicity: chronic  When did you first notice your symptoms?: more than 1 year ago  How often do your symptoms occur?: constantly  Since you first noticed this problem, how has it changed?: gradually worsening  Do you have shortness of breath that occurs with effort or exertion?: Yes  Do you have ear congestion?: No  Do you have heartburn?: No  Do you have fatigue?: Yes  Do you have nasal congestion?: No  Do you have shortness of breath when lying flat?: No  Do you have shortness of breath when you wake up?: No  Do you have sweats?: No  Have you experienced weight loss?: No  Which of the following makes your symptoms worse?: strenuous activity  Which of the following makes your symptoms better?: nothing  Risk factors for lung disease: animal exposure

## 2021-07-19 NOTE — ASSESSMENT & PLAN NOTE
Likely due to her pulmonary sarcoid is well controlled on Wixela 1 puff daily  That being said she does have some chronic coarseness which could be related to her inhaler  I have switched her to Symbicort 2 puffs once a day to see if her symptoms resolved

## 2021-07-19 NOTE — ASSESSMENT & PLAN NOTE
Chan Aguilar had a screening CT last month which showed no progression of her pulmonary sarcoidosis

## 2021-07-26 ENCOUNTER — APPOINTMENT (OUTPATIENT)
Dept: LAB | Facility: AMBULARY SURGERY CENTER | Age: 71
End: 2021-07-26
Payer: COMMERCIAL

## 2021-07-28 ENCOUNTER — OFFICE VISIT (OUTPATIENT)
Dept: FAMILY MEDICINE CLINIC | Facility: CLINIC | Age: 71
End: 2021-07-28
Payer: COMMERCIAL

## 2021-07-28 VITALS
RESPIRATION RATE: 16 BRPM | BODY MASS INDEX: 31.54 KG/M2 | WEIGHT: 178 LBS | HEART RATE: 60 BPM | DIASTOLIC BLOOD PRESSURE: 80 MMHG | TEMPERATURE: 97.5 F | SYSTOLIC BLOOD PRESSURE: 106 MMHG | OXYGEN SATURATION: 99 % | HEIGHT: 63 IN

## 2021-07-28 DIAGNOSIS — Z01.818 PREOPERATIVE EVALUATION OF A MEDICAL CONDITION TO RULE OUT SURGICAL CONTRAINDICATIONS (TAR REQUIRED): Primary | ICD-10-CM

## 2021-07-28 PROCEDURE — 3725F SCREEN DEPRESSION PERFORMED: CPT | Performed by: FAMILY MEDICINE

## 2021-07-28 PROCEDURE — 99215 OFFICE O/P EST HI 40 MIN: CPT | Performed by: FAMILY MEDICINE

## 2021-07-28 PROCEDURE — 1160F RVW MEDS BY RX/DR IN RCRD: CPT | Performed by: FAMILY MEDICINE

## 2021-07-28 PROCEDURE — 1101F PT FALLS ASSESS-DOCD LE1/YR: CPT | Performed by: FAMILY MEDICINE

## 2021-07-28 PROCEDURE — 1036F TOBACCO NON-USER: CPT | Performed by: FAMILY MEDICINE

## 2021-07-28 PROCEDURE — 3008F BODY MASS INDEX DOCD: CPT | Performed by: FAMILY MEDICINE

## 2021-07-28 PROCEDURE — 3288F FALL RISK ASSESSMENT DOCD: CPT | Performed by: FAMILY MEDICINE

## 2021-07-28 NOTE — PROGRESS NOTES
FAMILY PRACTICE OFFICE VISIT       NAME: Obed Ford  AGE: 72 y o  SEX: female       : 1950        MRN: [id number]    DATE: 2021  TIME: 8:03 AM    Assessment and Plan     Problem List Items Addressed This Visit        Other    Preoperative evaluation of a medical condition to rule out surgical contraindications (TAR required) - Primary     Preoperative consultation  Overall the patient appears to be in stable health  She is medically cleared to have upcoming cataract surgery as described                   Chief Complaint     Chief Complaint   Patient presents with    Pre-op Exam     Bilateral Cataract Surgery-  Right 21-  Left 21       History of Present Illness     In the office for preoperative consultation  Patient scheduled to have bilateral cataract surgery  Procedures are to be performed by [facility]  She will have 1st cataract performed on 2021 followed by similar procedure on 2021  Patient denies any recent illness  Her COVID vaccination is up-to-date      Review of Systems   Review of Systems   Constitutional: Negative  HENT: Negative  Eyes: Positive for visual disturbance  Respiratory: Negative  Cardiovascular: Negative  Gastrointestinal: Negative  Genitourinary: Negative  Musculoskeletal: Negative  Psychiatric/Behavioral: Negative          Active Problem List     Patient Active Problem List   Diagnosis    Positional vertigo    Corn of toe    Focal nodular hyperplasia of liver    IPMN (intraductal papillary mucinous neoplasm)    Abnormal MRI of abdomen    Lymphadenopathy    Liver mass    Mediastinal adenopathy    Sarcoidosis, lung (HCC)    Granulomatous lymphadenitis    Edema of right lower extremity    Diastolic dysfunction    Complex tear of medial meniscus of right knee as current injury    Other tear of medial meniscus, current injury, right knee, initial encounter    Trigger middle finger of right hand    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 ([address] )    Bone metastasis ([facility] )    Skin lesion    Reactive airway disease    Preoperative evaluation of a medical condition to rule out surgical contraindications (TAR required)       Past Medical History:  Past Medical History:   Diagnosis Date    Abdominal pain     Acute tonsillitis     Breast pain, left     Cancer (HCC)     liver&small intestine    Edema of both lower extremities     GERD without esophagitis     Kidney stone     " [place]"    Lesion of liver     Liver mass     Lymphadenopathy     Mediastinal lymphadenopathy     Neoplasm of digestive system     Orthostatic lightheadedness     Sarcoidosis     Vertigo        Past Surgical History:  Past Surgical History:   Procedure Laterality Date    BREAST BIOPSY Left 1996    negative    CARPAL TUNNEL RELEASE Bilateral     COLONOSCOPY      2017    IR BIOPSY LIVER MASS  11/6/2018    IR BIOPSY LIVER MASS  12/8/2020    KNEE ARTHROSCOPY Left     LAPAROTOMY N/A 1/20/2021    Procedure: LAPAROTOMY EXPLORATORY;  Surgeon: Demetri Washington MD;  [facility];  Service: Surgical Oncology    LIVER LOBECTOMY N/A 1/20/2021    Procedure: LIVER ABLATION SEGMENT 7, INTRAOPERATIVE U/S OF LIVER;  Surgeon: Demetri Washington MD;  [facility];  Service: Surgical Oncology    MEDIASTINOSCOPY N/A 11/27/2018    Procedure: MEDIASTINOSCOPY;  Surgeon: Ivan Keyes MD;  [facility];  Service: Thoracic    MA BRONCHOSCOPY NEEDLE BX TRACHEA MAIN STEM&/BRON N/A 11/27/2018    Procedure: EBUS with biopsy;  Surgeon: Ivan Keyes MD;  [facility];  Service: Thoracic    MA [address] N/A 11/27/2018    Procedure: Shelly Yu;  Surgeon: Ivan Keyes MD;  [facility];  Service: Thoracic    MA EDG US EXAM SURGICAL ALTER STOM DUODENUM/JEJUNUM N/A 10/29/2018    Procedure: LINEAR ENDOSCOPIC U/S;  Surgeon: Marito Arriaga MD; [facility]; Service: Gastroenterology    SKIN BIOPSY      SMALL INTESTINE SURGERY N/A 1/20/2021    Procedure: RESECTION SMALL BOWEL AND ANASTOMOSIS, RESECTION SMALL BOWEL NODULE;  Surgeon: Patrick Burrell MD;  [facility];  Service: Surgical Oncology    WISDOM TOOTH EXTRACTION         Family History:  Family History   Problem Relation Age of Onset    Alzheimer's disease Mother     Parkinsonism Father     Psoriasis Father     Colon cancer Maternal Grandfather     Psoriasis Sister     No Known Problems Daughter     Psoriasis Sister     No Known Problems Daughter        Social History:  Social History     Socioeconomic History    Marital status:      Spouse name: Not on file    Number of children: Not on file    Years of education: Not on file    Highest education level: Not on file   Occupational History    Not on file   Tobacco Use    Smoking status: Never Smoker    Smokeless tobacco: Never Used   Vaping Use    Vaping Use: Never used   Substance and Sexual Activity    Alcohol use: Yes    Drug use: No    Sexual activity: Not on file   Other Topics Concern    Not on file   Social History Narrative    Not on file     Social Determinants of Health     Financial Resource Strain:     Difficulty of Paying Living Expenses:    Food Insecurity:     Worried About Running Out of Food in the Last Year:     [address] in the Last Year:    Transportation Needs:     Lack of Transportation (Medical):      Lack of Transportation (Non-Medical):    Physical Activity:     Days of Exercise per Week:     Minutes of Exercise per Session:    Stress:     Feeling of Stress :    Social Connections:     Frequency of Communication with Friends and Family:     Frequency of Social Gatherings with Friends and Family:     Attends Spiritism Services:     Active Member of Clubs or Organizations:     Attends Club or Organization Meetings:     Marital Status:    Intimate Partner Violence:  Fear of Current or Ex-Partner:     Emotionally Abused:     Physically Abused:     Sexually Abused:        Objective     Vitals:    07/28/21 0759   BP: 106/80   Pulse:    Resp:    Temp:    SpO2:      Wt Readings from Last 3 Encounters:   07/28/21 80 7 kg (178 lb)   07/19/21 81 1 kg (178 lb 12 8 oz)   06/09/21 79 8 kg (176 lb)       Physical Exam  Constitutional:       General: She is not in acute distress  Appearance: Normal appearance  She is not ill-appearing  HENT:      Head: Normocephalic and atraumatic  Eyes:      General:         Right eye: No discharge  Left eye: No discharge  Extraocular Movements: Extraocular movements intact  Conjunctiva/sclera: Conjunctivae normal       Pupils: Pupils are equal, round, and reactive to light  Neck:      Vascular: No carotid bruit  Cardiovascular:      Rate and Rhythm: Normal rate and regular rhythm  Heart sounds: Normal heart sounds  No murmur heard  Pulmonary:      Effort: Pulmonary effort is normal       Breath sounds: Normal breath sounds  No wheezing, rhonchi or rales  Abdominal:      General: Abdomen is flat  Bowel sounds are normal  There is no distension  Palpations: Abdomen is soft  Tenderness: There is no abdominal tenderness  There is no guarding or rebound  Musculoskeletal:      Right lower leg: No edema  Left lower leg: No edema  Lymphadenopathy:      Cervical: No cervical adenopathy  Skin:     Findings: No rash  Neurological:      General: No focal deficit present  Mental Status: She is alert and oriented to person, place, and time  Cranial Nerves: No cranial nerve deficit  Psychiatric:         Mood and Affect: Mood normal          Behavior: Behavior normal          Thought Content:  Thought content normal          Judgment: Judgment normal        EKG showed sinus bradycardia 59 beats per minute, horizontal axis, negative acute ischemic changes  Pertinent Laboratory/Diagnostic Studies:  Lab Results   Component Value Date    BUN 22 07/26/2021    CREATININE 0 66 07/26/2021    CALCIUM 9 0 07/26/2021    K 3 2 (L) 07/26/2021    CO2 28 07/26/2021     07/26/2021     Lab Results   Component Value Date    ALT 29 07/26/2021    AST 23 07/26/2021    ALKPHOS 105 07/26/2021       Lab Results   Component Value Date    WBC 6 50 07/26/2021    HGB 11 4 (L) 07/26/2021    HCT 36 3 07/26/2021    MCV 94 07/26/2021     07/26/2021       No results found for: TSH    No results found for: CHOL  No results found for: TRIG  No results found for: HDL  No results found for: [address]  Lab Results   Component Value Date    HGBA1C 6 1 (H) 01/12/2021       Results for orders placed or performed in visit on 07/09/21   CBC and differential   Result Value Ref Range    WBC 6 50 4 31 - 10 16 Thousand/uL    RBC 3 87 3 81 - 5 12 Million/uL    Hemoglobin 11 4 (L) 11 5 - 15 4 g/dL    Hematocrit 36 3 34 8 - 46 1 %    MCV 94 82 - 98 fL    MCH 29 5 26 8 - 34 3 pg    MCHC 31 4 31 4 - 37 4 g/dL    RDW 13 6 11 6 - 15 1 %    MPV 11 2 8 9 - 12 7 fL    Platelets 416 504 - 152 Thousands/uL    nRBC 0 /100 WBCs    Neutrophils Relative 54 43 - 75 %    Immat GRANS % 0 0 - 2 %    Lymphocytes Relative 33 14 - 44 %    Monocytes Relative 12 4 - 12 %    Eosinophils Relative 0 0 - 6 %    Basophils Relative 1 0 - 1 %    Neutrophils Absolute 3 50 1 85 - 7 62 Thousands/µL    Immature Grans Absolute 0 01 0 00 - 0 20 Thousand/uL    Lymphocytes Absolute 2 13 0 60 - 4 47 Thousands/µL    Monocytes Absolute 0 79 0 17 - 1 22 Thousand/µL    Eosinophils Absolute 0 00 0 00 - 0 61 Thousand/µL    Basophils Absolute 0 07 0 00 - 0 10 Thousands/µL   Comprehensive metabolic panel   Result Value Ref Range    Sodium 138 136 - 145 mmol/L    Potassium 3 2 (L) 3 5 - 5 3 mmol/L    Chloride 105 100 - 108 mmol/L    CO2 28 21 - 32 mmol/L    ANION GAP 5 4 - 13 mmol/L    BUN 22 5 - 25 mg/dL    Creatinine 0 66 0 60 - 1 30 mg/dL    Glucose 76 65 - 140 mg/dL    Calcium 9 0 8 3 - 10 1 mg/dL    AST 23 5 - 45 U/L    ALT 29 12 - 78 U/L    Alkaline Phosphatase 105 46 - 116 U/L    Total Protein 7 8 6 4 - 8 2 g/dL    Albumin 3 6 3 5 - 5 0 g/dL    Total Bilirubin 0 67 0 20 - 1 00 mg/dL    eGFR 90 ml/min/1 73sq m       No orders of the defined types were placed in this encounter  ALLERGIES:  Allergies   Allergen Reactions    Bactrim [Sulfamethoxazole-Trimethoprim] Hives    Clam Shell - Food Allergy Vomiting       Current Medications     Current Outpatient Medications   Medication Sig Dispense Refill    aspirin 81 MG tablet Take 1 tablet by mouth daily in the early morning       Cholecalciferol (VITAMIN D) 2000 units CAPS Take 1 tablet by mouth daily      docusate sodium (COLACE) 100 mg capsule Take 1 capsule (100 mg total) by mouth 2 (two) times a day 10 capsule 0    hydrochlorothiazide (HYDRODIURIL) 12 5 mg tablet TAKE 1 TABLET BY MOUTH EVERY DAY 90 tablet 1    LANREOTIDE ACETATE SC Inject under the skin every 30 (thirty) days       Multiple Vitamin (MULTIVITAMIN) tablet Take 1 tablet by mouth daily      omeprazole (PriLOSEC) 40 MG capsule TAKE 1 CAPSULE BY MOUTH EVERY DAY BEFORE BREAKFAST 90 capsule 1     No current facility-administered medications for this visit           Health Maintenance     Health Maintenance   Topic Date Due    OT PLAN OF CARE  Never done    BMI: Followup Plan  Never done    Annual Physical  Never done    DTaP,Tdap,and Td Vaccines (1 - Tdap) Never done    Pneumococcal Vaccine: 65+ Years (2 of 4 - PPSV23) 04/08/2019    Influenza Vaccine (1) 09/01/2021    Fall Risk  07/28/2022    Depression Screening PHQ  07/28/2022    BMI: Adult  07/28/2022    Breast Cancer Screening: Mammogram  05/04/2023    Colorectal Cancer Screening  09/19/2027    Hepatitis C Screening  Completed    COVID-19 Vaccine  Completed    HIB Vaccine  Aged Out    Hepatitis B Vaccine  Aged Out    IPV Vaccine  Aged Out    Hepatitis A Vaccine  Aged Out    Meningococcal ACWY Vaccine  Aged Out    HPV Vaccine  Aged Out     Immunization History   Administered Date(s) Administered    INFLUENZA 12/14/2018, 10/01/2020    Influenza, high dose seasonal 0 7 mL 12/14/2018, 11/06/2019    Pneumococcal Conjugate 13-Valent 02/11/2019    SARS-CoV-2 / COVID-19 mRNA IM (Pfizer-BioNTech) 02/17/2021, 21/51/4163       Araceli Vieira MD

## 2021-08-02 ENCOUNTER — HOSPITAL ENCOUNTER (OUTPATIENT)
Dept: INFUSION CENTER | Facility: CLINIC | Age: 71
Discharge: HOME | End: 2021-08-02
Payer: COMMERCIAL

## 2021-08-02 DIAGNOSIS — C7B.8 METASTATIC MALIGNANT NEUROENDOCRINE TUMOR TO LIVER (HCC): Primary | ICD-10-CM

## 2021-08-02 PROCEDURE — 96372 THER/PROPH/DIAG INJ SC/IM: CPT

## 2021-08-02 RX ORDER — LANREOTIDE ACETATE 120 MG/.5ML
120 INJECTION SUBCUTANEOUS ONCE
Status: CANCELLED | OUTPATIENT
Start: 2021-08-30

## 2021-08-02 RX ORDER — LANREOTIDE ACETATE 120 MG/.5ML
120 INJECTION SUBCUTANEOUS ONCE
Status: COMPLETED | OUTPATIENT
Start: 2021-08-02 | End: 2021-08-02

## 2021-08-02 RX ADMIN — LANREOTIDE ACETATE 120 MG: 120 INJECTION SUBCUTANEOUS at 08:15

## 2021-08-02 NOTE — PROGRESS NOTES
Pt here for Lanreotide  Injection given in left buttocks  Pt tolerated well  Pt aware of future appts   Declines AVS

## 2021-08-05 ENCOUNTER — SURGERY/PROCEDURE (OUTPATIENT)
Dept: URBAN - METROPOLITAN AREA SURGICAL CENTER 6 | Facility: SURGICAL CENTER | Age: 71
End: 2021-08-05

## 2021-08-05 DIAGNOSIS — H25.811: ICD-10-CM

## 2021-08-05 PROCEDURE — 66984 XCAPSL CTRC RMVL W/O ECP: CPT

## 2021-08-05 PROCEDURE — FEMTO FEMTO LASER

## 2021-08-05 PROCEDURE — MISCTIOL MISCTIOL

## 2021-08-06 ENCOUNTER — 1 DAY POST-OP (OUTPATIENT)
Dept: URBAN - METROPOLITAN AREA CLINIC 6 | Facility: CLINIC | Age: 71
End: 2021-08-06

## 2021-08-06 DIAGNOSIS — Z96.1: ICD-10-CM

## 2021-08-06 PROCEDURE — 99024 POSTOP FOLLOW-UP VISIT: CPT

## 2021-08-06 ASSESSMENT — TONOMETRY: OD_IOP_MMHG: 18

## 2021-08-06 ASSESSMENT — VISUAL ACUITY: OD_SC: 20/25-2

## 2021-08-12 ENCOUNTER — 1 WEEK POST-OP (OUTPATIENT)
Dept: URBAN - METROPOLITAN AREA CLINIC 6 | Facility: CLINIC | Age: 71
End: 2021-08-12

## 2021-08-12 DIAGNOSIS — H25.812: ICD-10-CM

## 2021-08-12 DIAGNOSIS — Z96.1: ICD-10-CM

## 2021-08-12 PROCEDURE — 92136 OPHTHALMIC BIOMETRY: CPT

## 2021-08-12 PROCEDURE — 92012 INTRM OPH EXAM EST PATIENT: CPT | Mod: 24

## 2021-08-12 ASSESSMENT — VISUAL ACUITY
OD_SC: 20/25+1
OS_GLARE: 20/60
OS_CC: 20/30+2

## 2021-08-12 ASSESSMENT — TONOMETRY
OS_IOP_MMHG: 16
OD_IOP_MMHG: 18

## 2021-08-12 ASSESSMENT — KERATOMETRY
OS_AXISANGLE2_DEGREES: 87
OS_AXISANGLE_DEGREES: 177
OS_K1POWER_DIOPTERS: 42.75
OS_K2POWER_DIOPTERS: 46.25

## 2021-08-17 NOTE — PROGRESS NOTES
Hematology/Oncology Outpatient Follow- up Note  Clau Pacheco, 1950, [id number]  2021        Chief Complaint   Patient presents with    Follow-up       HPI:  Josefina Mendoza is a 72year old female Metastatic well-differentiated carcinoid tumor  She also has side branch IPMN that is essentially stable   This is less than 2 cm in size  She has undergone resection of her small bowel and microwave ablation segment 7 liver lesions  She has a T11 metastatic lesion seen on both PET scan and MRI scan  She was treated with a course of palliative radiation therapy to the T10- T12 spine  This was completed on 3/3/21  She is on Lanreotide       Previous Hematologic/ Oncologic History:       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 [facility])   2020 Initial Diagnosis    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 ([facility] [address] )     2020 Biopsy    IR Liver biopsy:  A  Liver mass, needle core biopsy:  - Metastatic well-differentiated neuroendocrine tumor, G2     2021 -  Chemotherapy    Lanreotide injections (monthly)     2021 Surgery    Resection of small bowel and anastomosis, segment 7 liver ablation    A  Small intestine, resection:  - Well- differentiated neuroendocrine tumor (up to 2 1 cm), G2, at least three foci  - All margins are negative for tumor   - Three of thirteen lymph nodes are positive for tumor (3/)  B  Small bowel nodule, excision:  - Gastrointestinal stromal tumor (GIST), spindle cell type, low grade, 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          Bone metastasis ([facility] [address] )   2021 Initial Diagnosis    B[facility] )     2021 - 3/3/2021 Radiation    Treatment:  Course: C1    Plan ID Energy Fractions Dose per Fraction (cGy) Dose Correction (cGy) Total Dose Delivered (cGy) Elapsed Days   T10_T12 Spine 10X/6X 10 / 10 300 0 3,000 13                Current Hematologic/ Oncologic Treatment:    Lanreotide    ECO - Asymptomatic    Interval History:   The patient presents for routine follow up  She was last seen by Dr Juan Luis Orosco on 5/18/21  Most recent blood work completed on 7/26 was reviewed and remains in acceptable range, Chromogranin A is in normal range and stable 69 2  She is due for Her next dose of lanreotide on 8/30  She will have blood work repeated prior to this  Patient states she feels well  She denies any concerning symptoms today  She remains active continues to work full-time  She is planning to retire in the next 2 months  She denies any abdominal pain  Appetite is good  No nausea / vomiting  No diarrhea    She is scheduled for routine surveillance imaging in November  Cancer Staging:  Cancer Staging  No matching staging information was found for the patient  Molecular Testing:         Test Results:    Imaging: No results found  Labs:   Lab Results   Component Value Date    WBC 6 50 07/26/2021    HGB 11 4 (L) 07/26/2021    HCT 36 3 07/26/2021    MCV 94 07/26/2021     07/26/2021     Lab Results   Component Value Date    K 3 2 (L) 07/26/2021     07/26/2021    CO2 28 07/26/2021    BUN 22 07/26/2021    CREATININE 0 66 07/26/2021    GLUF 114 (H) 06/29/2021    CALCIUM 9 0 07/26/2021    CORRECTEDCA 9 7 04/07/2021    AST 23 07/26/2021    ALT 29 07/26/2021    ALKPHOS 105 07/26/2021    EGFR 90 07/26/2021           Review of Systems   All other systems reviewed and are negative          Active Problems:   Patient Active Problem List   Diagnosis    Positional vertigo    Corn of toe    Focal nodular hyperplasia of liver    IPMN (intraductal papillary mucinous neoplasm)    Abnormal MRI of abdomen    Lymphadenopathy    Liver mass    Mediastinal adenopathy    Sarcoidosis, lung (HCC)    Granulomatous lymphadenitis    Edema of right lower extremity    Diastolic dysfunction    Complex tear of medial meniscus of right knee as current injury    Other tear of medial meniscus, current injury, right knee, initial encounter    Trigger middle finger of right hand    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 ([facility] [address] )    Bone metastasis ([facility] [address] )    Skin lesion    Reactive airway disease    Preoperative evaluation of a medical condition to rule out surgical contraindications (TAR required)       Past Medical History:   Past Medical History:   Diagnosis Date    Abdominal pain     Acute tonsillitis     Breast pain, left     Cancer (HCC)     liver&small intestine    Edema of both lower extremities     GERD without esophagitis     Kidney stone     " [place]"    Lesion of liver     Liver mass     Lymphadenopathy     Mediastinal lymphadenopathy     Neoplasm of digestive system     Orthostatic lightheadedness     Sarcoidosis     Vertigo        Surgical History:   Past Surgical History:   Procedure Laterality Date    BREAST BIOPSY Left 1996    negative    CARPAL TUNNEL RELEASE Bilateral     COLONOSCOPY      2017    IR BIOPSY LIVER MASS  11/6/2018    IR BIOPSY LIVER MASS  12/8/2020    KNEE ARTHROSCOPY Left     LAPAROTOMY N/A 1/20/2021    Procedure: LAPAROTOMY EXPLORATORY;  Surgeon: Kishor Hanna MD;  [facility];  Service: Surgical Oncology    LIVER LOBECTOMY N/A 1/20/2021    Procedure: LIVER ABLATION SEGMENT 7, INTRAOPERATIVE U/S OF LIVER;  Surgeon: Kishor Hanna MD;  [facility];  Service: Surgical Oncology    MEDIASTINOSCOPY N/A 11/27/2018    Procedure: MEDIASTINOSCOPY;  Surgeon: Peter Cross MD;  [facility];  Service: Thoracic    NV BRONCHOSCOPY NEEDLE BX TRACHEA MAIN STEM&/BRON N/A 11/27/2018    Procedure: EBUS with biopsy;  Surgeon: Peter Cross MD;  [facility];  Service: Thoracic    NV [address] N/A 11/27/2018    Procedure: Arlean Paci;  Surgeon: Peter Cross MD;  [facility];  Service: Thoracic    NV EDG US EXAM SURGICAL ALTER STOM DUODENUM/JEJUNUM N/A 10/29/2018    Procedure: LINEAR ENDOSCOPIC U/S;  Surgeon: Jaz Allison MD;  [facility];   Service: Gastroenterology    SKIN BIOPSY      SMALL INTESTINE SURGERY N/A 1/20/2021    Procedure: RESECTION SMALL BOWEL AND ANASTOMOSIS, RESECTION SMALL BOWEL NODULE;  Surgeon: Umesh Alvarez MD;  [facility];  Service: Surgical Oncology    WISDOM TOOTH EXTRACTION         Family History:    Family History   Problem Relation Age of Onset    Alzheimer's disease Mother     Parkinsonism Father     Psoriasis Father     Colon cancer Maternal Grandfather     Psoriasis Sister     No Known Problems Daughter     Psoriasis Sister     No Known Problems Daughter        Cancer-related family history includes Colon cancer in her maternal grandfather  Social History:   Social History     Socioeconomic History    Marital status:      Spouse name: Not on file    Number of children: Not on file    Years of education: Not on file    Highest education level: Not on file   Occupational History    Not on file   Tobacco Use    Smoking status: Never Smoker    Smokeless tobacco: Never Used   Vaping Use    Vaping Use: Never used   Substance and Sexual Activity    Alcohol use: Yes    Drug use: No    Sexual activity: Not on file   Other Topics Concern    Not on file   Social History Narrative    Not on file     Social Determinants of Health     Financial Resource Strain:     Difficulty of Paying Living Expenses:    Food Insecurity:     Worried About Running Out of Food in the Last Year:     [address] in the Last Year:    Transportation Needs:     Lack of Transportation (Medical):      Lack of Transportation (Non-Medical):    Physical Activity:     Days of Exercise per Week:     Minutes of Exercise per Session:    Stress:     Feeling of Stress :    Social Connections:     Frequency of Communication with Friends and Family:     Frequency of Social Gatherings with Friends and Family:     Attends Baptism Services:     Active Member of Clubs or Organizations:     Attends Club or Organization Meetings:     Marital Status:    Intimate Partner Violence:     Fear of Current or Ex-Partner:     Emotionally Abused:     Physically Abused:     Sexually Abused:        Current Medications:   Current Outpatient Medications   Medication Sig Dispense Refill    aspirin 81 MG tablet Take 1 tablet by mouth daily in the early morning       Besivance 0 6 % SUSP       Cholecalciferol (VITAMIN D) 2000 units CAPS Take 1 tablet by mouth daily      docusate sodium (COLACE) 100 mg capsule Take 1 capsule (100 mg total) by mouth 2 (two) times a day 10 capsule 0    hydrochlorothiazide (HYDRODIURIL) 12 5 mg tablet TAKE 1 TABLET BY MOUTH EVERY DAY 90 tablet 0    Inveltys 1 % SUSP       LANREOTIDE ACETATE SC Inject under the skin every 30 (thirty) days       Multiple Vitamin (MULTIVITAMIN) tablet Take 1 tablet by mouth daily      omeprazole (PriLOSEC) 40 MG capsule TAKE 1 CAPSULE BY MOUTH EVERY DAY BEFORE BREAKFAST 90 capsule 1    Prolensa 0 07 % SOLN        No current facility-administered medications for this visit  Allergies: Allergies   Allergen Reactions    Bactrim [Sulfamethoxazole-Trimethoprim] Hives    Clam Shell - Food Allergy Vomiting       Physical Exam:  /80 (BP Location: Left arm, Patient Position: Sitting, Cuff Size: Standard)   Pulse 64   Temp 97 7 °F (36 5 °C) (Temporal)   Resp 16   Ht 5' 2 5" (1 588 m)   Wt 81 2 kg (179 lb)   LMP  (LMP Unknown)   SpO2 99%   BMI 32 22 kg/m²   Body surface area is 1 83 meters squared  Wt Readings from Last 3 Encounters:   08/19/21 81 2 kg (179 lb)   07/28/21 80 7 kg (178 lb)   07/19/21 81 1 kg (178 lb 12 8 oz)           Physical Exam  Constitutional:       Appearance: Normal appearance  HENT:      Head: Normocephalic and atraumatic  Eyes:      General: No scleral icterus  Right eye: No discharge  Left eye: No discharge  Extraocular Movements: Extraocular movements intact        Conjunctiva/sclera: Conjunctivae normal  Cardiovascular:      Rate and Rhythm: Normal rate and regular rhythm  Pulmonary:      Effort: Pulmonary effort is normal       Breath sounds: Normal breath sounds  Abdominal:      General: Bowel sounds are normal       Palpations: Abdomen is soft  Musculoskeletal:         General: Normal range of motion  Right lower leg: No edema  Left lower leg: No edema  Lymphadenopathy:      Cervical: No cervical adenopathy  Skin:     General: Skin is warm and dry  Neurological:      General: No focal deficit present  Mental Status: She is alert and oriented to person, place, and time  Psychiatric:         Mood and Affect: Mood normal          Behavior: Behavior normal          Assessment / Plan:    1  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 [facility])      The patient is a 72year old female Metastatic well-differentiated carcinoid tumor  She also has side branch IPMN that is essentially stable   This is less than 2 cm in size  She has undergone resection of her small bowel and microwave ablation segment 7 liver lesions  She has a T11 metastatic lesion seen on both PET scan and MRI scan  She was treated with a course of palliative radiation therapy to the T10- T12 spine  This was completed on 3/3/21  She is on Lanreotide  Patient continues to do well  She remains asymptomatic and her blood work is in acceptable range  She is due for her next dose of lanreotide on 08/30  She will have blood work prior and proceed as planned  She is already scheduled for her surveillance imaging in November  Per Dr Humera Bernabe last note  "If there is residual viable disease, we can consider Sir spheres or PRRT if there is extrahepatic disease"    She will continue on lanreotide every 28 days     Patient will return for a follow-up visit in 3 months with repeat blood work  Patient is in agreement with plan of care    They she is instructed to call at any time with questions or concerns prior to her next visit         Goals and Barriers:  Current Goal:  Prolong Survival from Well-differentiated neuroendocrine tumor  Barriers: None         Patient's Capacity to Self Care:  Patient able to self care  Portions of the record may have been created with voice recognition software  Occasional wrong word or "sound a like" substitutions may have occurred due to the inherent limitations of voice recognition software  Read the chart carefully and recognize, using context, where substitutions have occurred

## 2021-08-19 ENCOUNTER — OFFICE VISIT (OUTPATIENT)
Dept: HEMATOLOGY ONCOLOGY | Facility: CLINIC | Age: 71
End: 2021-08-19
Payer: COMMERCIAL

## 2021-08-19 VITALS
BODY MASS INDEX: 31.71 KG/M2 | HEIGHT: 63 IN | SYSTOLIC BLOOD PRESSURE: 130 MMHG | HEART RATE: 64 BPM | DIASTOLIC BLOOD PRESSURE: 80 MMHG | RESPIRATION RATE: 16 BRPM | WEIGHT: 179 LBS | TEMPERATURE: 97.7 F | OXYGEN SATURATION: 99 %

## 2021-08-19 DIAGNOSIS — C7B.8 METASTATIC MALIGNANT NEUROENDOCRINE TUMOR TO LIVER (HCC): Primary | ICD-10-CM

## 2021-08-19 PROCEDURE — 1160F RVW MEDS BY RX/DR IN RCRD: CPT | Performed by: NURSE PRACTITIONER

## 2021-08-19 PROCEDURE — 1036F TOBACCO NON-USER: CPT | Performed by: NURSE PRACTITIONER

## 2021-08-19 PROCEDURE — 99214 OFFICE O/P EST MOD 30 MIN: CPT | Performed by: NURSE PRACTITIONER

## 2021-08-19 RX ORDER — LOTEPREDNOL ETABONATE 10 MG/ML
SUSPENSION TOPICAL
COMMUNITY
Start: 2021-08-18 | End: 2022-01-31

## 2021-08-19 RX ORDER — BESIFLOXACIN 6 MG/ML
SUSPENSION OPHTHALMIC
COMMUNITY
Start: 2021-08-18 | End: 2022-01-31

## 2021-08-19 RX ORDER — BROMFENAC SODIUM 0.7 MG/ML
SOLUTION/ DROPS OPHTHALMIC
COMMUNITY
Start: 2021-08-18 | End: 2022-01-31

## 2021-08-20 ASSESSMENT — KERATOMETRY
OS_AXISANGLE_DEGREES: 177
OS_K1POWER_DIOPTERS: 42.75
OS_AXISANGLE2_DEGREES: 87
OS_K2POWER_DIOPTERS: 46.25

## 2021-08-23 ENCOUNTER — APPOINTMENT (OUTPATIENT)
Dept: LAB | Facility: CLINIC | Age: 71
End: 2021-08-23
Payer: COMMERCIAL

## 2021-08-24 ENCOUNTER — SURGERY/PROCEDURE (OUTPATIENT)
Dept: URBAN - METROPOLITAN AREA SURGICAL CENTER 6 | Facility: SURGICAL CENTER | Age: 71
End: 2021-08-24

## 2021-08-24 DIAGNOSIS — H25.812: ICD-10-CM

## 2021-08-24 PROCEDURE — 66984 XCAPSL CTRC RMVL W/O ECP: CPT | Mod: 79,LT

## 2021-08-24 PROCEDURE — FEMTO FEMTO LASER

## 2021-08-24 PROCEDURE — MISCTIOL MISCTIOL

## 2021-08-25 ENCOUNTER — 1 DAY POST-OP (OUTPATIENT)
Dept: URBAN - METROPOLITAN AREA CLINIC 6 | Facility: CLINIC | Age: 71
End: 2021-08-25

## 2021-08-25 DIAGNOSIS — Z96.1: ICD-10-CM

## 2021-08-25 PROCEDURE — 99024 POSTOP FOLLOW-UP VISIT: CPT

## 2021-08-25 ASSESSMENT — VISUAL ACUITY
OD_SC: 20/40
OS_SC: 20/25

## 2021-08-25 ASSESSMENT — KERATOMETRY
OS_AXISANGLE_DEGREES: 177
OS_K2POWER_DIOPTERS: 46.25
OS_AXISANGLE2_DEGREES: 87
OS_K1POWER_DIOPTERS: 42.75

## 2021-08-25 ASSESSMENT — TONOMETRY
OS_IOP_MMHG: 18
OD_IOP_MMHG: 15

## 2021-08-26 PROCEDURE — U0005 INFEC AGEN DETEC AMPLI PROBE: HCPCS | Performed by: FAMILY MEDICINE

## 2021-08-26 PROCEDURE — U0003 INFECTIOUS AGENT DETECTION BY NUCLEIC ACID (DNA OR RNA); SEVERE ACUTE RESPIRATORY SYNDROME CORONAVIRUS 2 (SARS-COV-2) (CORONAVIRUS DISEASE [COVID-19]), AMPLIFIED PROBE TECHNIQUE, MAKING USE OF HIGH THROUGHPUT TECHNOLOGIES AS DESCRIBED BY CMS-2020-01-R: HCPCS | Performed by: FAMILY MEDICINE

## 2021-08-30 ENCOUNTER — HOSPITAL ENCOUNTER (OUTPATIENT)
Dept: INFUSION CENTER | Facility: CLINIC | Age: 71
Discharge: HOME | End: 2021-08-30
Payer: COMMERCIAL

## 2021-08-30 DIAGNOSIS — C7B.8 METASTATIC MALIGNANT NEUROENDOCRINE TUMOR TO LIVER (HCC): Primary | ICD-10-CM

## 2021-08-30 PROCEDURE — 96372 THER/PROPH/DIAG INJ SC/IM: CPT

## 2021-08-30 RX ORDER — LANREOTIDE ACETATE 120 MG/.5ML
120 INJECTION SUBCUTANEOUS ONCE
Status: CANCELLED | OUTPATIENT
Start: 2021-09-27

## 2021-08-30 RX ORDER — LANREOTIDE ACETATE 120 MG/.5ML
120 INJECTION SUBCUTANEOUS ONCE
Status: COMPLETED | OUTPATIENT
Start: 2021-08-30 | End: 2021-08-30

## 2021-08-30 RX ADMIN — LANREOTIDE ACETATE 120 MG: 120 INJECTION SUBCUTANEOUS at 15:55

## 2021-08-30 NOTE — PROGRESS NOTES
Pt to clinic for lanreotide injection, pt tolerated injection in right buttocks   Pt making next appointment when exiting

## 2021-09-01 ENCOUNTER — 1 WEEK POST-OP (OUTPATIENT)
Dept: URBAN - METROPOLITAN AREA CLINIC 6 | Facility: CLINIC | Age: 71
End: 2021-09-01

## 2021-09-01 DIAGNOSIS — Z96.1: ICD-10-CM

## 2021-09-01 PROCEDURE — 99024 POSTOP FOLLOW-UP VISIT: CPT

## 2021-09-01 ASSESSMENT — TONOMETRY
OD_IOP_MMHG: 14
OS_IOP_MMHG: 17

## 2021-09-01 ASSESSMENT — KERATOMETRY
OS_K1POWER_DIOPTERS: 42.75
OS_K2POWER_DIOPTERS: 46.25
OS_AXISANGLE2_DEGREES: 87
OS_AXISANGLE_DEGREES: 177

## 2021-09-01 ASSESSMENT — VISUAL ACUITY
OS_SC: 20/20-1
OD_SC: 20/25-1

## 2021-09-21 ENCOUNTER — APPOINTMENT (OUTPATIENT)
Dept: LAB | Facility: CLINIC | Age: 71
End: 2021-09-21
Payer: COMMERCIAL

## 2021-09-28 ENCOUNTER — TELEPHONE (OUTPATIENT)
Dept: INFUSION CENTER | Facility: CLINIC | Age: 71
End: 2021-09-28

## 2021-09-29 ENCOUNTER — HOSPITAL ENCOUNTER (OUTPATIENT)
Dept: INFUSION CENTER | Facility: CLINIC | Age: 71
Discharge: HOME | End: 2021-09-29
Payer: COMMERCIAL

## 2021-09-29 VITALS
SYSTOLIC BLOOD PRESSURE: 138 MMHG | OXYGEN SATURATION: 100 % | DIASTOLIC BLOOD PRESSURE: 40 MMHG | TEMPERATURE: 96.3 F | RESPIRATION RATE: 18 BRPM | HEART RATE: 66 BPM

## 2021-09-29 DIAGNOSIS — C7B.8 METASTATIC MALIGNANT NEUROENDOCRINE TUMOR TO LIVER (HCC): Primary | ICD-10-CM

## 2021-09-29 PROCEDURE — 96372 THER/PROPH/DIAG INJ SC/IM: CPT

## 2021-09-29 RX ORDER — LANREOTIDE ACETATE 120 MG/.5ML
120 INJECTION SUBCUTANEOUS ONCE
Status: COMPLETED | OUTPATIENT
Start: 2021-09-29 | End: 2021-09-29

## 2021-09-29 RX ORDER — LANREOTIDE ACETATE 120 MG/.5ML
120 INJECTION SUBCUTANEOUS ONCE
Status: CANCELLED | OUTPATIENT
Start: 2021-10-27

## 2021-09-29 RX ADMIN — LANREOTIDE ACETATE 120 MG: 120 INJECTION SUBCUTANEOUS at 08:43

## 2021-10-06 ENCOUNTER — 1 MONTH POST-OP (OUTPATIENT)
Dept: URBAN - METROPOLITAN AREA CLINIC 6 | Facility: CLINIC | Age: 71
End: 2021-10-06

## 2021-10-06 DIAGNOSIS — Z96.1: ICD-10-CM

## 2021-10-06 PROCEDURE — 99024 POSTOP FOLLOW-UP VISIT: CPT

## 2021-10-06 PROCEDURE — 1036F TOBACCO NON-USER: CPT

## 2021-10-06 PROCEDURE — G8427 DOCREV CUR MEDS BY ELIG CLIN: HCPCS

## 2021-10-06 ASSESSMENT — VISUAL ACUITY
OS_SC: 20/25+2
OD_SC: 20/25+2

## 2021-10-06 ASSESSMENT — TONOMETRY
OS_IOP_MMHG: 12
OD_IOP_MMHG: 13

## 2021-10-06 ASSESSMENT — KERATOMETRY
OS_AXISANGLE_DEGREES: 177
OS_K2POWER_DIOPTERS: 46.25
OS_K1POWER_DIOPTERS: 42.75
OS_AXISANGLE2_DEGREES: 87

## 2021-10-07 ENCOUNTER — APPOINTMENT (OUTPATIENT)
Dept: RADIATION ONCOLOGY | Facility: HOSPITAL | Age: 71
End: 2021-10-07
Attending: RADIOLOGY
Payer: MEDICARE

## 2021-10-19 ENCOUNTER — APPOINTMENT (OUTPATIENT)
Dept: LAB | Facility: CLINIC | Age: 71
End: 2021-10-19
Payer: COMMERCIAL

## 2021-10-27 ENCOUNTER — HOSPITAL ENCOUNTER (OUTPATIENT)
Dept: INFUSION CENTER | Facility: CLINIC | Age: 71
Discharge: HOME | End: 2021-10-27
Payer: COMMERCIAL

## 2021-10-27 DIAGNOSIS — C7B.8 METASTATIC MALIGNANT NEUROENDOCRINE TUMOR TO LIVER (HCC): Primary | ICD-10-CM

## 2021-10-27 PROCEDURE — 96372 THER/PROPH/DIAG INJ SC/IM: CPT

## 2021-10-27 RX ORDER — LANREOTIDE ACETATE 120 MG/.5ML
120 INJECTION SUBCUTANEOUS ONCE
Status: CANCELLED | OUTPATIENT
Start: 2021-11-24

## 2021-10-27 RX ORDER — LANREOTIDE ACETATE 120 MG/.5ML
120 INJECTION SUBCUTANEOUS ONCE
Status: COMPLETED | OUTPATIENT
Start: 2021-10-27 | End: 2021-10-27

## 2021-10-27 RX ADMIN — LANREOTIDE ACETATE 120 MG: 120 INJECTION SUBCUTANEOUS at 08:20

## 2021-10-28 ENCOUNTER — TELEPHONE (OUTPATIENT)
Dept: SURGICAL ONCOLOGY | Facility: CLINIC | Age: 71
End: 2021-10-28

## 2021-11-03 ENCOUNTER — APPOINTMENT (OUTPATIENT)
Dept: LAB | Facility: CLINIC | Age: 71
End: 2021-11-03
Payer: MEDICARE

## 2021-11-03 DIAGNOSIS — C7B.8 METASTATIC MALIGNANT NEUROENDOCRINE TUMOR TO LIVER (HCC): ICD-10-CM

## 2021-11-03 LAB
ALBUMIN SERPL BCP-MCNC: 3.8 G/DL (ref 3.5–5)
ALP SERPL-CCNC: 122 U/L (ref 46–116)
ALT SERPL W P-5'-P-CCNC: 37 U/L (ref 12–78)
ANION GAP SERPL CALCULATED.3IONS-SCNC: 12 MMOL/L (ref 4–13)
AST SERPL W P-5'-P-CCNC: 31 U/L (ref 5–45)
BASOPHILS # BLD AUTO: 0.08 THOUSANDS/ΜL (ref 0–0.1)
BASOPHILS NFR BLD AUTO: 2 % (ref 0–1)
BILIRUB SERPL-MCNC: 0.98 MG/DL (ref 0.2–1)
BUN SERPL-MCNC: 13 MG/DL (ref 5–25)
CALCIUM SERPL-MCNC: 8.9 MG/DL (ref 8.3–10.1)
CHLORIDE SERPL-SCNC: 102 MMOL/L (ref 100–108)
CO2 SERPL-SCNC: 28 MMOL/L (ref 21–32)
CREAT SERPL-MCNC: 0.86 MG/DL (ref 0.6–1.3)
EOSINOPHIL # BLD AUTO: 0 THOUSAND/ΜL (ref 0–0.61)
EOSINOPHIL NFR BLD AUTO: 0 % (ref 0–6)
ERYTHROCYTE [DISTWIDTH] IN BLOOD BY AUTOMATED COUNT: 14.2 % (ref 11.6–15.1)
GFR SERPL CREATININE-BSD FRML MDRD: 69 ML/MIN/1.73SQ M
GLUCOSE P FAST SERPL-MCNC: 109 MG/DL (ref 65–99)
HCT VFR BLD AUTO: 37.6 % (ref 34.8–46.1)
HGB BLD-MCNC: 11.7 G/DL (ref 11.5–15.4)
IMM GRANULOCYTES # BLD AUTO: 0.01 THOUSAND/UL (ref 0–0.2)
IMM GRANULOCYTES NFR BLD AUTO: 0 % (ref 0–2)
LYMPHOCYTES # BLD AUTO: 1.43 THOUSANDS/ΜL (ref 0.6–4.47)
LYMPHOCYTES NFR BLD AUTO: 27 % (ref 14–44)
MCH RBC QN AUTO: 28.9 PG (ref 26.8–34.3)
MCHC RBC AUTO-ENTMCNC: 31.1 G/DL (ref 31.4–37.4)
MCV RBC AUTO: 93 FL (ref 82–98)
MONOCYTES # BLD AUTO: 0.76 THOUSAND/ΜL (ref 0.17–1.22)
MONOCYTES NFR BLD AUTO: 14 % (ref 4–12)
NEUTROPHILS # BLD AUTO: 2.98 THOUSANDS/ΜL (ref 1.85–7.62)
NEUTS SEG NFR BLD AUTO: 57 % (ref 43–75)
NRBC BLD AUTO-RTO: 0 /100 WBCS
PLATELET # BLD AUTO: 269 THOUSANDS/UL (ref 149–390)
PMV BLD AUTO: 10.5 FL (ref 8.9–12.7)
POTASSIUM SERPL-SCNC: 4 MMOL/L (ref 3.5–5.3)
PROT SERPL-MCNC: 7.8 G/DL (ref 6.4–8.2)
RBC # BLD AUTO: 4.05 MILLION/UL (ref 3.81–5.12)
SODIUM SERPL-SCNC: 142 MMOL/L (ref 136–145)
WBC # BLD AUTO: 5.26 THOUSAND/UL (ref 4.31–10.16)

## 2021-11-03 PROCEDURE — 86316 IMMUNOASSAY TUMOR OTHER: CPT

## 2021-11-03 PROCEDURE — 36415 COLL VENOUS BLD VENIPUNCTURE: CPT

## 2021-11-03 PROCEDURE — 80053 COMPREHEN METABOLIC PANEL: CPT

## 2021-11-03 PROCEDURE — 85025 COMPLETE CBC W/AUTO DIFF WBC: CPT

## 2021-11-04 LAB — CGA SERPL-MCNC: 44.7 NG/ML (ref 0–101.8)

## 2021-11-05 ENCOUNTER — TELEPHONE (OUTPATIENT)
Dept: SURGICAL ONCOLOGY | Facility: CLINIC | Age: 71
End: 2021-11-05

## 2021-11-05 ENCOUNTER — TELEPHONE (OUTPATIENT)
Dept: HEMATOLOGY ONCOLOGY | Facility: HOSPITAL | Age: 71
End: 2021-11-05

## 2021-11-15 ENCOUNTER — HOSPITAL ENCOUNTER (OUTPATIENT)
Dept: CT IMAGING | Facility: HOSPITAL | Age: 71
Discharge: HOME | End: 2021-11-15
Attending: SURGERY
Payer: MEDICARE

## 2021-11-15 DIAGNOSIS — C7B.8 METASTATIC MALIGNANT NEUROENDOCRINE TUMOR TO LIVER (HCC): ICD-10-CM

## 2021-11-15 PROCEDURE — 74177 CT ABD & PELVIS W/CONTRAST: CPT

## 2021-11-15 PROCEDURE — 71260 CT THORAX DX C+: CPT

## 2021-11-15 PROCEDURE — G1004 CDSM NDSC: HCPCS

## 2021-11-15 RX ADMIN — IOHEXOL 100 ML: 350 INJECTION, SOLUTION INTRAVENOUS at 08:12

## 2021-11-16 ENCOUNTER — APPOINTMENT (OUTPATIENT)
Dept: LAB | Facility: CLINIC | Age: 71
End: 2021-11-16
Payer: MEDICARE

## 2021-11-18 ENCOUNTER — RADIATION ONCOLOGY FOLLOW-UP (OUTPATIENT)
Dept: RADIATION ONCOLOGY | Facility: HOSPITAL | Age: 71
End: 2021-11-18
Attending: RADIOLOGY
Payer: MEDICARE

## 2021-11-18 VITALS
DIASTOLIC BLOOD PRESSURE: 62 MMHG | HEART RATE: 78 BPM | RESPIRATION RATE: 16 BRPM | SYSTOLIC BLOOD PRESSURE: 122 MMHG | WEIGHT: 181.4 LBS | BODY MASS INDEX: 32.14 KG/M2 | HEIGHT: 63 IN | TEMPERATURE: 97.2 F | OXYGEN SATURATION: 98 %

## 2021-11-18 DIAGNOSIS — C7B.8 METASTATIC MALIGNANT NEUROENDOCRINE TUMOR TO LIVER (HCC): Primary | ICD-10-CM

## 2021-11-18 DIAGNOSIS — C79.51 BONE METASTASIS (HCC): ICD-10-CM

## 2021-11-18 PROCEDURE — 99211 OFF/OP EST MAY X REQ PHY/QHP: CPT | Performed by: RADIOLOGY

## 2021-11-18 PROCEDURE — 99213 OFFICE O/P EST LOW 20 MIN: CPT | Performed by: RADIOLOGY

## 2021-11-20 ENCOUNTER — IMMUNIZATIONS (OUTPATIENT)
Dept: FAMILY MEDICINE CLINIC | Facility: HOSPITAL | Age: 71
End: 2021-11-20

## 2021-11-20 DIAGNOSIS — Z23 ENCOUNTER FOR IMMUNIZATION: Primary | ICD-10-CM

## 2021-11-20 PROCEDURE — 91300 COVID-19 PFIZER VACC 0.3 ML: CPT

## 2021-11-20 PROCEDURE — 0001A COVID-19 PFIZER VACC 0.3 ML: CPT

## 2021-11-24 ENCOUNTER — HOSPITAL ENCOUNTER (OUTPATIENT)
Dept: INFUSION CENTER | Facility: CLINIC | Age: 71
Discharge: HOME | End: 2021-11-24
Payer: MEDICARE

## 2021-11-24 ENCOUNTER — OFFICE VISIT (OUTPATIENT)
Dept: SURGICAL ONCOLOGY | Facility: CLINIC | Age: 71
End: 2021-11-24
Payer: MEDICARE

## 2021-11-24 VITALS
TEMPERATURE: 97.8 F | HEART RATE: 78 BPM | DIASTOLIC BLOOD PRESSURE: 70 MMHG | BODY MASS INDEX: 31.98 KG/M2 | OXYGEN SATURATION: 98 % | WEIGHT: 180.5 LBS | RESPIRATION RATE: 16 BRPM | SYSTOLIC BLOOD PRESSURE: 132 MMHG | HEIGHT: 63 IN

## 2021-11-24 DIAGNOSIS — D49.0 IPMN (INTRADUCTAL PAPILLARY MUCINOUS NEOPLASM): ICD-10-CM

## 2021-11-24 DIAGNOSIS — C7B.8 METASTATIC MALIGNANT NEUROENDOCRINE TUMOR TO LIVER (HCC): Primary | ICD-10-CM

## 2021-11-24 PROCEDURE — 1124F ACP DISCUSS-NO DSCNMKR DOCD: CPT | Performed by: SURGERY

## 2021-11-24 PROCEDURE — 96372 THER/PROPH/DIAG INJ SC/IM: CPT

## 2021-11-24 PROCEDURE — 99214 OFFICE O/P EST MOD 30 MIN: CPT | Performed by: SURGERY

## 2021-11-24 RX ORDER — LANREOTIDE ACETATE 120 MG/.5ML
120 INJECTION SUBCUTANEOUS ONCE
Status: COMPLETED | OUTPATIENT
Start: 2021-11-24 | End: 2021-11-24

## 2021-11-24 RX ORDER — LANREOTIDE ACETATE 120 MG/.5ML
120 INJECTION SUBCUTANEOUS ONCE
Status: CANCELLED | OUTPATIENT
Start: 2021-12-22

## 2021-11-24 RX ADMIN — LANREOTIDE ACETATE 120 MG: 120 INJECTION SUBCUTANEOUS at 09:15

## 2021-11-30 ENCOUNTER — TELEPHONE (OUTPATIENT)
Dept: HEMATOLOGY ONCOLOGY | Facility: HOSPITAL | Age: 71
End: 2021-11-30

## 2021-11-30 ENCOUNTER — OFFICE VISIT (OUTPATIENT)
Dept: HEMATOLOGY ONCOLOGY | Facility: CLINIC | Age: 71
End: 2021-11-30
Payer: MEDICARE

## 2021-11-30 VITALS
SYSTOLIC BLOOD PRESSURE: 140 MMHG | OXYGEN SATURATION: 99 % | BODY MASS INDEX: 32.07 KG/M2 | WEIGHT: 181 LBS | DIASTOLIC BLOOD PRESSURE: 84 MMHG | TEMPERATURE: 97.8 F | HEART RATE: 66 BPM | RESPIRATION RATE: 14 BRPM | HEIGHT: 63 IN

## 2021-11-30 DIAGNOSIS — D49.0 IPMN (INTRADUCTAL PAPILLARY MUCINOUS NEOPLASM): ICD-10-CM

## 2021-11-30 DIAGNOSIS — C7B.8 METASTATIC MALIGNANT NEUROENDOCRINE TUMOR TO LIVER (HCC): Primary | ICD-10-CM

## 2021-11-30 PROCEDURE — 99214 OFFICE O/P EST MOD 30 MIN: CPT | Performed by: NURSE PRACTITIONER

## 2021-12-15 ENCOUNTER — APPOINTMENT (OUTPATIENT)
Dept: LAB | Facility: CLINIC | Age: 71
End: 2021-12-15
Payer: MEDICARE

## 2021-12-23 ENCOUNTER — HOSPITAL ENCOUNTER (OUTPATIENT)
Dept: INFUSION CENTER | Facility: CLINIC | Age: 71
Discharge: HOME | End: 2021-12-23
Payer: MEDICARE

## 2021-12-23 DIAGNOSIS — C7B.8 METASTATIC MALIGNANT NEUROENDOCRINE TUMOR TO LIVER (HCC): Primary | ICD-10-CM

## 2021-12-23 PROCEDURE — 96372 THER/PROPH/DIAG INJ SC/IM: CPT

## 2021-12-23 RX ORDER — LANREOTIDE ACETATE 120 MG/.5ML
120 INJECTION SUBCUTANEOUS ONCE
Status: COMPLETED | OUTPATIENT
Start: 2021-12-23 | End: 2021-12-23

## 2021-12-23 RX ORDER — LANREOTIDE ACETATE 120 MG/.5ML
120 INJECTION SUBCUTANEOUS ONCE
Status: CANCELLED | OUTPATIENT
Start: 2022-01-20

## 2021-12-23 RX ADMIN — LANREOTIDE ACETATE 120 MG: 120 INJECTION SUBCUTANEOUS at 09:18

## 2022-01-14 ENCOUNTER — OFFICE VISIT (OUTPATIENT)
Dept: PULMONOLOGY | Facility: CLINIC | Age: 72
End: 2022-01-14
Payer: MEDICARE

## 2022-01-14 ENCOUNTER — APPOINTMENT (OUTPATIENT)
Dept: LAB | Facility: CLINIC | Age: 72
End: 2022-01-14
Payer: MEDICARE

## 2022-01-14 VITALS
WEIGHT: 179.6 LBS | BODY MASS INDEX: 31.82 KG/M2 | RESPIRATION RATE: 18 BRPM | HEIGHT: 63 IN | HEART RATE: 71 BPM | DIASTOLIC BLOOD PRESSURE: 80 MMHG | SYSTOLIC BLOOD PRESSURE: 138 MMHG | TEMPERATURE: 98.5 F | OXYGEN SATURATION: 99 %

## 2022-01-14 DIAGNOSIS — D86.0 SARCOIDOSIS, LUNG (HCC): Primary | ICD-10-CM

## 2022-01-14 DIAGNOSIS — K21.9 GASTROESOPHAGEAL REFLUX DISEASE WITHOUT ESOPHAGITIS: ICD-10-CM

## 2022-01-14 DIAGNOSIS — J45.20 MILD INTERMITTENT REACTIVE AIRWAY DISEASE WITHOUT COMPLICATION: ICD-10-CM

## 2022-01-14 DIAGNOSIS — R59.0 MEDIASTINAL ADENOPATHY: ICD-10-CM

## 2022-01-14 PROCEDURE — 99214 OFFICE O/P EST MOD 30 MIN: CPT | Performed by: INTERNAL MEDICINE

## 2022-01-14 RX ORDER — OMEPRAZOLE 40 MG/1
40 CAPSULE, DELAYED RELEASE ORAL
Qty: 90 CAPSULE | Refills: 3 | Status: SHIPPED | OUTPATIENT
Start: 2022-01-14

## 2022-01-14 RX ORDER — HYDROCHLOROTHIAZIDE 12.5 MG/1
12.5 TABLET ORAL DAILY
Qty: 90 TABLET | Refills: 0 | Status: SHIPPED | OUTPATIENT
Start: 2022-01-14 | End: 2022-07-19

## 2022-01-14 NOTE — PROGRESS NOTES
Assessment/Plan:    Sarcoidosis, lung ([facility] [address] )  I reviewed Olga's CT scan from November which shows no significant interstitial lung disease or enlarged lymph  Her abdominal CT scan is also stable without recurrence of cancer  Her breathing is stable and she will maintain on Wixela 1 puff daily is a controls her breathing and the hoarseness is tolerable  She did not tolerate Symbicort well  Reactive airway disease  Continue Wixela 1 puff daily       Diagnoses and all orders for this visit:    Sarcoidosis, lung ([facility])  -     fluticasone-salmeterol (Wixela Inhub) 100-50 mcg/dose inhaler; Inhale 1 puff 2 (two) times a day Rinse mouth after use  -     hydrochlorothiazide (HYDRODIURIL) 12 5 mg tablet; Take 1 tablet (12 5 mg total) by mouth daily    Gastroesophageal reflux disease without esophagitis  -     omeprazole (PriLOSEC) 40 MG capsule; Take 1 capsule (40 mg total) by mouth daily before breakfast    Mediastinal adenopathy    Mild intermittent reactive airway disease without complication          Subjective:      Patient ID: Michael Be is a 80 y o  female  Chica Waggoner is doing very well with regards to his breathing  She is using the Wixela 1 puff daily and has not needed a rescue inhaler  She feels her hoarseness is improved  primary symptoms  Associated symptoms include coughing and headaches  Pertinent negatives include no chest pain, fever, myalgias or sore throat  The following portions of the patient's history were reviewed and updated as appropriate: allergies, current medications, past family history, past medical history, past social history, past surgical history and problem list     Review of Systems   Constitutional: Negative for appetite change and fever  HENT: Positive for postnasal drip  Negative for ear pain, rhinorrhea, sneezing, sore throat and trouble swallowing  Respiratory: Positive for cough  Cardiovascular: Negative for chest pain     Musculoskeletal: Negative for myalgias  Neurological: Positive for headaches  Objective:      /80 (BP Location: Right arm, Patient Position: Sitting, Cuff Size: Adult)   Pulse 71   Temp 98 5 °F (36 9 °C) (Tympanic)   Resp 18   Ht 5' 2 5" (1 588 m)   Wt 81 5 kg (179 lb 9 6 oz)   LMP  (LMP Unknown)   SpO2 99%   BMI 32 33 kg/m²          Physical Exam  Constitutional:       Appearance: She is well-developed  HENT:      Head: Normocephalic  Eyes:      Pupils: Pupils are equal, round, and reactive to light  Cardiovascular:      Rate and Rhythm: Normal rate  Heart sounds: No murmur heard  Pulmonary:      Effort: Pulmonary effort is normal  No respiratory distress  Breath sounds: Normal breath sounds  No wheezing or rales  Abdominal:      Palpations: Abdomen is soft  Musculoskeletal:         General: Normal range of motion  Cervical back: Normal range of motion and neck supple  Skin:     General: Skin is warm and dry  Neurological:      Mental Status: She is alert and oriented to person, place, and time           Answers for HPI/ROS submitted by the patient on 1/8/2022  Do you experience frequent throat clearing?: Yes  Do you have a hoarse voice?: Yes  Chronicity: recurrent  When did you first notice your symptoms?: more than 1 year ago  How often do your symptoms occur?: daily  Since you first noticed this problem, how has it changed?: waxing and waning  Do you have shortness of breath that occurs with effort or exertion?: Yes  Do you have ear congestion?: No  Do you have heartburn?: No  Do you have fatigue?: Yes  Do you have nasal congestion?: No  Do you have shortness of breath when lying flat?: No  Do you have shortness of breath when you wake up?: No  Do you have sweats?: No  Have you experienced weight loss?: No  Which of the following makes your symptoms worse?: climbing stairs, exercise, strenuous activity  Which of the following makes your symptoms better?: rest

## 2022-01-14 NOTE — ASSESSMENT & PLAN NOTE
I reviewed Olga's CT scan from November which shows no significant interstitial lung disease or enlarged lymph  Her abdominal CT scan is also stable without recurrence of cancer  Her breathing is stable and she will maintain on Wixela 1 puff daily is a controls her breathing and the hoarseness is tolerable  She did not tolerate Symbicort well

## 2022-01-20 ENCOUNTER — HOSPITAL ENCOUNTER (OUTPATIENT)
Dept: INFUSION CENTER | Facility: CLINIC | Age: 72
Discharge: HOME | End: 2022-01-20
Payer: MEDICARE

## 2022-01-20 DIAGNOSIS — C7B.8 METASTATIC MALIGNANT NEUROENDOCRINE TUMOR TO LIVER (HCC): Primary | ICD-10-CM

## 2022-01-20 PROCEDURE — 96372 THER/PROPH/DIAG INJ SC/IM: CPT

## 2022-01-20 RX ORDER — LANREOTIDE ACETATE 120 MG/.5ML
120 INJECTION SUBCUTANEOUS ONCE
Status: COMPLETED | OUTPATIENT
Start: 2022-01-20 | End: 2022-01-20

## 2022-01-20 RX ORDER — LANREOTIDE ACETATE 120 MG/.5ML
120 INJECTION SUBCUTANEOUS ONCE
Status: CANCELLED | OUTPATIENT
Start: 2022-02-17

## 2022-01-20 RX ADMIN — LANREOTIDE ACETATE 120 MG: 120 INJECTION SUBCUTANEOUS at 09:35

## 2022-01-20 NOTE — PROGRESS NOTES
Patient to [facility] for Lanreotide: Offers no complaints at present time: Lab work ( 01/14/22 ) reviewed:  Within parameters to treat: Injection given in Buttocks ( LUQ ) without incident: No adverse reactions noted: Verified follow up appt with patient: AVS offered and declined

## 2022-01-28 ENCOUNTER — RA CDI HCC (OUTPATIENT)
Dept: OTHER | Facility: HOSPITAL | Age: 72
End: 2022-01-28

## 2022-02-03 ENCOUNTER — OFFICE VISIT (OUTPATIENT)
Dept: FAMILY MEDICINE CLINIC | Facility: CLINIC | Age: 72
End: 2022-02-03
Payer: MEDICARE

## 2022-02-03 VITALS
SYSTOLIC BLOOD PRESSURE: 144 MMHG | HEIGHT: 63 IN | WEIGHT: 181 LBS | OXYGEN SATURATION: 97 % | RESPIRATION RATE: 18 BRPM | DIASTOLIC BLOOD PRESSURE: 70 MMHG | BODY MASS INDEX: 32.07 KG/M2 | TEMPERATURE: 97.3 F | HEART RATE: 67 BPM

## 2022-02-03 DIAGNOSIS — Z00.00 PERIODIC HEALTH ASSESSMENT, GENERAL SCREENING, ADULT: ICD-10-CM

## 2022-02-03 DIAGNOSIS — Z23 NEED FOR PNEUMOCOCCAL VACCINE: Primary | ICD-10-CM

## 2022-02-03 DIAGNOSIS — Z13.820 OSTEOPOROSIS SCREENING: ICD-10-CM

## 2022-02-03 PROBLEM — Z01.818 PREOPERATIVE EVALUATION OF A MEDICAL CONDITION TO RULE OUT SURGICAL CONTRAINDICATIONS (TAR REQUIRED): Status: RESOLVED | Noted: 2021-07-28 | Resolved: 2022-02-03

## 2022-02-03 PROBLEM — R59.1 LYMPHADENOPATHY: Status: RESOLVED | Noted: 2018-11-13 | Resolved: 2022-02-03

## 2022-02-03 PROBLEM — L98.9 SKIN LESION: Status: RESOLVED | Noted: 2021-04-19 | Resolved: 2022-02-03

## 2022-02-03 PROBLEM — L84 CORN OF TOE: Status: RESOLVED | Noted: 2018-03-26 | Resolved: 2022-02-03

## 2022-02-03 PROCEDURE — G0402 INITIAL PREVENTIVE EXAM: HCPCS | Performed by: FAMILY MEDICINE

## 2022-02-03 PROCEDURE — G0009 ADMIN PNEUMOCOCCAL VACCINE: HCPCS

## 2022-02-03 PROCEDURE — 90732 PPSV23 VACC 2 YRS+ SUBQ/IM: CPT

## 2022-02-03 NOTE — PROGRESS NOTES
FAMILY PRACTICE OFFICE VISIT       NAME: Michael Be  AGE: 80 y o   SEX: female       : 1950        MRN: [id number]    DATE: 2/3/2022  TIME: 8:51 AM    Assessment and Plan     Problem List Items Addressed This Visit     None              Chief Complaint     Chief Complaint   Patient presents with    Medicare Wellness Visit       History of Present Illness     HPI    Review of Systems   Review of Systems    Active Problem List     Patient Active Problem List   Diagnosis    Positional vertigo    Corn of toe    Focal nodular hyperplasia of liver    IPMN (intraductal papillary mucinous neoplasm)    Abnormal MRI of abdomen    Lymphadenopathy    Liver mass    Mediastinal adenopathy    Sarcoidosis, lung ([address] )    Granulomatous lymphadenitis    Edema of right lower extremity    Diastolic dysfunction    Complex tear of medial meniscus of right knee as current injury    Other tear of medial meniscus, current injury, right knee, initial encounter    Trigger middle finger of right hand    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 ([address] )    Bone metastasis ([address] )    Skin lesion    Reactive airway disease    Preoperative evaluation of a medical condition to rule out surgical contraindications (TAR required)       Past Medical History:  Past Medical History:   Diagnosis Date    Abdominal pain     Acute tonsillitis     Breast pain, left     Cancer ([address] )     liver&small intestine    Edema of both lower extremities     GERD without esophagitis     Kidney stone     " [place]"    Lesion of liver     Liver mass     Lymphadenopathy     Mediastinal lymphadenopathy     Neoplasm of digestive system     Orthostatic lightheadedness     Sarcoidosis     Vertigo        Past Surgical History:  Past Surgical History:   Procedure Laterality Date    BREAST BIOPSY Left     negative    CARPAL TUNNEL RELEASE Bilateral     COLONOSCOPY      2017    IR BIOPSY LIVER MASS  2018    IR BIOPSY LIVER MASS  12/8/2020    KNEE ARTHROSCOPY Left     LAPAROTOMY N/A 1/20/2021    Procedure: LAPAROTOMY EXPLORATORY;  Surgeon: Romelia Vieira MD;  [facility];  Service: Surgical Oncology    LIVER LOBECTOMY N/A 1/20/2021    Procedure: LIVER ABLATION SEGMENT 7, INTRAOPERATIVE U/S OF LIVER;  Surgeon: Romelia Vieira MD;  [facility];  Service: Surgical Oncology    MEDIASTINOSCOPY N/A 11/27/2018    Procedure: MEDIASTINOSCOPY;  Surgeon: Katerin Huerta MD;  [facility];  Service: Thoracic    NE BRONCHOSCOPY NEEDLE BX TRACHEA MAIN STEM&/BRON N/A 11/27/2018    Procedure: EBUS with biopsy;  Surgeon: Katerin Huerta MD;  [facility];  Service: Thoracic    NE [address] N/A 11/27/2018    Procedure: [place];  Surgeon: Katerin Huerta MD;  [facility];  Service: Thoracic    NE EDG US EXAM SURGICAL ALTER STOM DUODENUM/JEJUNUM N/A 10/29/2018    Procedure: LINEAR ENDOSCOPIC U/S;  Surgeon: Lynn Larios MD;  Location[facility]; Service: Gastroenterology    SKIN BIOPSY      SMALL INTESTINE SURGERY N/A 1/20/2021    Procedure: RESECTION SMALL BOWEL AND ANASTOMOSIS, RESECTION SMALL BOWEL NODULE;  Surgeon: Romelia Vieira MD;  [facility];  Service: Surgical Oncology    WISDOM TOOTH EXTRACTION         Family History:  Family History   Problem Relation Age of Onset    Alzheimer's disease Mother     Parkinsonism Father     Psoriasis Father     Colon cancer Maternal Grandfather     Psoriasis Sister     No Known Problems Daughter     Psoriasis Sister     Stroke Sister     No Known Problems Daughter        Social History:  Social History     Socioeconomic History    Marital status:       Spouse name: Not on file    Number of children: Not on file    Years of education: Not on file    Highest education level: Not on file   Occupational History    Not on file   Tobacco Use    Smoking status: Never Smoker    Smokeless tobacco: Never Used   Vaping Use    Vaping Use: Never used   Substance and Sexual Activity    Alcohol use: Yes     Comment: occ wine    Drug use: No    Sexual activity: Not on file   Other Topics Concern    Not on file   Social History Narrative    Not on file     Social Determinants of Health     Financial Resource Strain: Not on file   Food Insecurity: Not on file   Transportation Needs: Not on file   Physical Activity: Not on file   Stress: Not on file   Social Connections: Not on file   Intimate Partner Violence: Not on file   Housing Stability: Not on file       Objective   There were no vitals filed for this visit    Wt Readings from Last 3 Encounters:   01/14/22 81 5 kg (179 lb 9 6 oz)   12/09/21 73 9 kg (163 lb)   11/30/21 82 1 kg (181 lb)       Physical Exam    Pertinent Laboratory/Diagnostic Studies:  Lab Results   Component Value Date    BUN 19 01/14/2022    CREATININE 0 79 01/14/2022    CALCIUM 9 4 01/14/2022    K 3 7 01/14/2022    CO2 28 01/14/2022     01/14/2022     Lab Results   Component Value Date    ALT 28 01/14/2022    AST 23 01/14/2022    ALKPHOS 98 01/14/2022       Lab Results   Component Value Date    WBC 5 92 01/14/2022    HGB 11 9 01/14/2022    HCT 37 4 01/14/2022    MCV 92 01/14/2022     01/14/2022       No results found for: TSH    No results found for: CHOL  No results found for: TRIG  No results found for: HDL  No results found for: [facility]  Lab Results   Component Value Date    HGBA1C 6 1 (H) 01/12/2021       Results for orders placed or performed in visit on 12/24/21   CBC and differential   Result Value Ref Range    WBC 5 92 4 31 - 10 16 Thousand/uL    RBC 4 08 3 81 - 5 12 Million/uL    Hemoglobin 11 9 11 5 - 15 4 g/dL    Hematocrit 37 4 34 8 - 46 1 %    MCV 92 82 - 98 fL    MCH 29 2 26 8 - 34 3 pg    MCHC 31 8 31 4 - 37 4 g/dL    RDW 14 1 11 6 - 15 1 %    MPV 10 4 8 9 - 12 7 fL    Platelets 729 783 - 298 Thousands/uL    nRBC 0 /100 WBCs    Neutrophils Relative 54 43 - 75 %    Immat GRANS % 0 0 - 2 %    Lymphocytes Relative 33 14 - 44 %    Monocytes Relative 12 4 - 12 %    Eosinophils Relative 0 0 - 6 %    Basophils Relative 1 0 - 1 %    Neutrophils Absolute 3 17 1 85 - 7 62 Thousands/µL    Immature Grans Absolute 0 02 0 00 - 0 20 Thousand/uL    Lymphocytes Absolute 1 93 0 60 - 4 47 Thousands/µL    Monocytes Absolute 0 72 0 17 - 1 22 Thousand/µL    Eosinophils Absolute 0 00 0 00 - 0 61 Thousand/µL    Basophils Absolute 0 08 0 00 - 0 10 Thousands/µL   Comprehensive metabolic panel   Result Value Ref Range    Sodium 139 136 - 145 mmol/L    Potassium 3 7 3 5 - 5 3 mmol/L    Chloride 103 100 - 108 mmol/L    CO2 28 21 - 32 mmol/L    ANION GAP 8 4 - 13 mmol/L    BUN 19 5 - 25 mg/dL    Creatinine 0 79 0 60 - 1 30 mg/dL    Glucose, Fasting 115 (H) 65 - 99 mg/dL    Calcium 9 4 8 3 - 10 1 mg/dL    AST 23 5 - 45 U/L    ALT 28 12 - 78 U/L    Alkaline Phosphatase 98 46 - 116 U/L    Total Protein 8 1 6 4 - 8 2 g/dL    Albumin 3 9 3 5 - 5 0 g/dL    Total Bilirubin 0 62 0 20 - 1 00 mg/dL    eGFR 76 ml/min/1 73sq m   Chromogranin A   Result Value Ref Range    Chromogranin A 61 6 0 0 - 101 8 ng/mL       No orders of the defined types were placed in this encounter  ALLERGIES:  Allergies   Allergen Reactions    Bactrim [Sulfamethoxazole-Trimethoprim] Hives    Clam Shell - Food Allergy Vomiting       Current Medications     Current Outpatient Medications   Medication Sig Dispense Refill    aspirin 81 MG tablet Take 1 tablet by mouth daily in the early morning       Cholecalciferol (VITAMIN D) 2000 units CAPS Take 1 tablet by mouth daily      docusate sodium (COLACE) 100 mg capsule Take 1 capsule (100 mg total) by mouth 2 (two) times a day (Patient taking differently: Take 100 mg by mouth daily  ) 10 capsule 0    fluticasone-salmeterol (Advair Diskus) 100-50 mcg/dose inhaler Inhale 1 puff daily Rinse mouth after use        fluticasone-salmeterol (Wixela Inhub) 100-50 mcg/dose inhaler Inhale 1 puff 2 (two) times a day Rinse mouth after use  60 blister 3    hydrochlorothiazide (HYDRODIURIL) 12 5 mg tablet Take 1 tablet (12 5 mg total) by mouth daily 90 tablet 0    LANREOTIDE ACETATE SC Inject under the skin every 30 (thirty) days       Multiple Vitamin (MULTIVITAMIN) tablet Take 1 tablet by mouth daily      omeprazole (PriLOSEC) 40 MG capsule Take 1 capsule (40 mg total) by mouth daily before breakfast 90 capsule 3     No current facility-administered medications for this visit           Health Maintenance     Health Maintenance   Topic Date Due    Medicare Annual Wellness Visit (AWV)  Never done    OT PLAN OF CARE  Never done    BMI: Followup Plan  Never done    DTaP,Tdap,and Td Vaccines (1 - Tdap) Never done    Osteoporosis Screening  Never done    Pneumococcal Vaccine: 65+ Years (2 of 4 - PPSV23) 04/08/2019    COVID-19 Vaccine (4 - Booster for Pfizer series) 04/20/2022    Fall Risk  07/28/2022    Depression Screening  11/18/2022    BMI: Adult  01/14/2023    Breast Cancer Screening: Mammogram  05/04/2023    Colorectal Cancer Screening  09/19/2027    Hepatitis C Screening  Completed    Influenza Vaccine  Completed    HIB Vaccine  Aged Out    Hepatitis B Vaccine  Aged Out    IPV Vaccine  Aged Out    Hepatitis A Vaccine  Aged Out    Meningococcal ACWY Vaccine  Aged Out    HPV Vaccine  Aged Out     Immunization History   Administered Date(s) Administered    COVID-19 PFIZER VACCINE 0 3 ML IM 02/17/2021, 03/08/2021, 11/20/2021    INFLUENZA 12/14/2018, 10/01/2020, 11/10/2021    Influenza, high dose seasonal 0 7 mL 12/14/2018, 11/06/2019    Pneumococcal Conjugate 13-Valent 82/07/8101       Tano Mike MD

## 2022-02-03 NOTE — PATIENT INSTRUCTIONS
Medicare Preventive Visit Patient Instructions  Thank you for completing your Welcome to Medicare Visit or Medicare Annual Wellness Visit today  Your next wellness visit will be due in one year (2/4/2023)  The screening/preventive services that you may require over the next 5-10 years are detailed below  Some tests may not apply to you based off risk factors and/or age  Screening tests ordered at today's visit but not completed yet may show as past due  Also, please note that scanned in results may not display below  Preventive Screenings:  Service Recommendations Previous Testing/Comments   Colorectal Cancer Screening  * Colonoscopy    * Fecal Occult Blood Test (FOBT)/Fecal Immunochemical Test (FIT)  * Fecal DNA/Cologuard Test  * Flexible Sigmoidoscopy Age: 54-65 years old   Colonoscopy: every 10 years (may be performed more frequently if at higher risk)  OR  FOBT/FIT: every 1 year  OR  Cologuard: every 3 years  OR  Sigmoidoscopy: every 5 years  Screening may be recommended earlier than age 48 if at higher risk for colorectal cancer  Also, an individualized decision between you and your healthcare provider will decide whether screening between the ages of 74-80 would be appropriate  Colonoscopy: 09/19/2017  FOBT/FIT: Not on file  Cologuard: Not on file  Sigmoidoscopy: Not on file    Screening Current     Breast Cancer Screening Age: 36 years old  Frequency: every 1-2 years  Not required if history of left and right mastectomy Mammogram: 05/04/2021    Screening Current   Cervical Cancer Screening Between the ages of 21-29, pap smear recommended once every 3 years  Between the ages of 33-67, can perform pap smear with HPV co-testing every 5 years     Recommendations may differ for women with a history of total hysterectomy, cervical cancer, or abnormal pap smears in past  Pap Smear: Not on file    Screening Not Indicated   Hepatitis C Screening Once for adults born between 1945 and 1965  More frequently in patients at high risk for Hepatitis C Hep C Antibody: Not on file    Screening Current   Diabetes Screening 1-2 times per year if you're at risk for diabetes or have pre-diabetes Fasting glucose: 115 mg/dL   A1C: 6 1 %    Screening Current   Cholesterol Screening Once every 5 years if you don't have a lipid disorder  May order more often based on risk factors  Lipid panel: Not on file          Other Preventive Screenings Covered by Medicare:  1  Abdominal Aortic Aneurysm (AAA) Screening: covered once if your at risk  You're considered to be at risk if you have a family history of AAA  2  Lung Cancer Screening: covers low dose CT scan once per year if you meet all of the following conditions: (1) Age 50-69; (2) No signs or symptoms of lung cancer; (3) Current smoker or have quit smoking within the last 15 years; (4) You have a tobacco smoking history of at least 30 pack years (packs per day multiplied by number of years you smoked); (5) You get a written order from a healthcare provider  3  Glaucoma Screening: covered annually if you're considered high risk: (1) You have diabetes OR (2) Family history of glaucoma OR (3)  aged 48 and older OR (3)  American aged 72 and older  3  Osteoporosis Screening: covered every 2 years if you meet one of the following conditions: (1) You're estrogen deficient and at risk for osteoporosis based off medical history and other findings; (2) Have a vertebral abnormality; (3) On glucocorticoid therapy for more than 3 months; (4) Have primary hyperparathyroidism; (5) On osteoporosis medications and need to assess response to drug therapy  · Last bone density test (DXA Scan): Not on file  5  HIV Screening: covered annually if you're between the age of 12-76  Also covered annually if you are younger than 13 and older than 72 with risk factors for HIV infection  For pregnant patients, it is covered up to 3 times per pregnancy      Immunizations:  Immunization Recommendations   Influenza Vaccine Annual influenza vaccination during flu season is recommended for all persons aged >= 6 months who do not have contraindications   Pneumococcal Vaccine (Prevnar and Pneumovax)  * Prevnar = PCV13  * Pneumovax = PPSV23   Adults 25-60 years old: 1-3 doses may be recommended based on certain risk factors  Adults 72 years old: Prevnar (PCV13) vaccine recommended followed by Pneumovax (PPSV23) vaccine  If already received PPSV23 since turning 65, then PCV13 recommended at least one year after PPSV23 dose  Hepatitis B Vaccine 3 dose series if at intermediate or high risk (ex: diabetes, end stage renal disease, liver disease)   Tetanus (Td) Vaccine - COST NOT COVERED BY MEDICARE PART B Following completion of primary series, a booster dose should be given every 10 years to maintain immunity against tetanus  Td may also be given as tetanus wound prophylaxis  Tdap Vaccine - COST NOT COVERED BY MEDICARE PART B Recommended at least once for all adults  For pregnant patients, recommended with each pregnancy  Shingles Vaccine (Shingrix) - COST NOT COVERED BY MEDICARE PART B  2 shot series recommended in those aged 48 and above     Health Maintenance Due:      Topic Date Due    Breast Cancer Screening: Mammogram  05/04/2023    Colorectal Cancer Screening  09/19/2027    Hepatitis C Screening  Completed     Immunizations Due:      Topic Date Due    DTaP,Tdap,and Td Vaccines (1 - Tdap) Never done    Pneumococcal Vaccine: 65+ Years (2 of 4 - PPSV23) 04/08/2019     Advance Directives   What are advance directives? Advance directives are legal documents that state your wishes and plans for medical care  These plans are made ahead of time in case you lose your ability to make decisions for yourself  Advance directives can apply to any medical decision, such as the treatments you want, and if you want to donate organs  What are the types of advance directives?   There are many types of advance directives, and each state has rules about how to use them  You may choose a combination of any of the following:  · Living will: This is a written record of the treatment you want  You can also choose which treatments you do not want, which to limit, and which to stop at a certain time  This includes surgery, medicine, IV fluid, and tube feedings  · Durable power of  for healthcare [facility]): This is a written record that states who you want to make healthcare choices for you when you are unable to make them for yourself  This person, called a proxy, is usually a family member or a friend  You may choose more than 1 proxy  · Do not resuscitate (DNR) order:  A DNR order is used in case your heart stops beating or you stop breathing  It is a request not to have certain forms of treatment, such as CPR  A DNR order may be included in other types of advance directives  · Medical directive: This covers the care that you want if you are in a coma, near death, or unable to make decisions for yourself  You can list the treatments you want for each condition  Treatment may include pain medicine, surgery, blood transfusions, dialysis, IV or tube feedings, and a ventilator (breathing machine)  · Values history: This document has questions about your views, beliefs, and how you feel and think about life  This information can help others choose the care that you would choose  Why are advance directives important? An advance directive helps you control your care  Although spoken wishes may be used, it is better to have your wishes written down  Spoken wishes can be misunderstood, or not followed  Treatments may be given even if you do not want them  An advance directive may make it easier for your family to make difficult choices about your care     Weight Management   Why it is important to manage your weight:  Being overweight increases your risk of health conditions such as heart disease, high blood pressure, type 2 diabetes, and certain types of cancer  It can also increase your risk for osteoarthritis, sleep apnea, and other respiratory problems  Aim for a slow, steady weight loss  Even a small amount of weight loss can lower your risk of health problems  How to lose weight safely:  A safe and healthy way to lose weight is to eat fewer calories and get regular exercise  You can lose up about 1 pound a week by decreasing the number of calories you eat by 500 calories each day  Healthy meal plan for weight management:  A healthy meal plan includes a variety of foods, contains fewer calories, and helps you stay healthy  A healthy meal plan includes the following:  · Eat whole-grain foods more often  A healthy meal plan should contain fiber  Fiber is the part of grains, fruits, and vegetables that is not broken down by your body  Whole-grain foods are healthy and provide extra fiber in your diet  Some examples of whole-grain foods are whole-wheat breads and pastas, oatmeal, brown rice, and bulgur  · Eat a variety of vegetables every day  Include dark, leafy greens such as spinach, kale, constantin greens, and mustard greens  Eat yellow and orange vegetables such as carrots, sweet potatoes, and winter squash  · Eat a variety of fruits every day  Choose fresh or canned fruit (canned in its own juice or light syrup) instead of juice  Fruit juice has very little or no fiber  · Eat low-fat dairy foods  Drink fat-free (skim) milk or 1% milk  Eat fat-free yogurt and low-fat cottage cheese  Try low-fat cheeses such as mozzarella and other reduced-fat cheeses  · Choose meat and other protein foods that are low in fat  Choose beans or other legumes such as split peas or lentils  Choose fish, skinless poultry (chicken or turkey), or lean cuts of red meat (beef or pork)  Before you cook meat or poultry, cut off any visible fat  · Use less fat and oil  Try baking foods instead of frying them   Add less fat, such as margarine, sour cream, regular salad dressing and mayonnaise to foods  Eat fewer high-fat foods  Some examples of high-fat foods include french fries, doughnuts, ice cream, and cakes  · Eat fewer sweets  Limit foods and drinks that are high in sugar  This includes candy, cookies, regular soda, and sweetened drinks  Exercise:  Exercise at least 30 minutes per day on most days of the week  Some examples of exercise include walking, biking, dancing, and swimming  You can also fit in more physical activity by taking the stairs instead of the elevator or parking farther away from stores  Ask your healthcare provider about the best exercise plan for you  © Copyright [address] 2018 Information is for End User's use only and may not be sold, redistributed or otherwise used for commercial purposes   All illustrations and images included in CareNotes® are the copyrighted property of [employer]  or [address] [place] [address]

## 2022-02-03 NOTE — PROGRESS NOTES
Assessment and Plan:     Problem List Items Addressed This Visit     None      Visit Diagnoses     Periodic health assessment, general screening, adult    -  Primary    Osteoporosis screening               Preventive health issues were discussed with patient, and age appropriate screening tests were ordered as noted in patient's After Visit Summary  Personalized health advice and appropriate referrals for health education or preventive services given if needed, as noted in patient's After Visit Summary       History of Present Illness:     Patient presents for Medicare Annual Wellness visit    Patient Care Team:  Tano Mike MD as PCP - Chely Og MD as PCP - PCP-MD Karina Felder MD (Gastroenterology)  Hiram Light MD (Surgical Oncology)  Udell Pallas, MD (Radiation Oncology)     Problem List:     Patient Active Problem List   Diagnosis    Positional vertigo    Focal nodular hyperplasia of liver    IPMN (intraductal papillary mucinous neoplasm)    Abnormal MRI of abdomen    Liver mass    Mediastinal adenopathy    Sarcoidosis, lung ([address] )    Granulomatous lymphadenitis    Edema of right lower extremity    Diastolic dysfunction    Complex tear of medial meniscus of right knee as current injury    Other tear of medial meniscus, current injury, right knee, initial encounter    Trigger middle finger of right hand    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 ([address] )    Bone metastasis ([address] )    Reactive airway disease      Past Medical and Surgical History:     Past Medical History:   Diagnosis Date    Abdominal pain     Acute tonsillitis     Breast pain, left     Cancer ([address] )     liver&small intestine    Edema of both lower extremities     GERD without esophagitis     Kidney stone     " [place]"    Lesion of liver     Liver mass     Lymphadenopathy     Mediastinal lymphadenopathy     Neoplasm of digestive system     Orthostatic lightheadedness     Sarcoidosis     Vertigo      Past Surgical History:   Procedure Laterality Date    BREAST BIOPSY Left 1996    negative    CARPAL TUNNEL RELEASE Bilateral     COLONOSCOPY      2017    IR BIOPSY LIVER MASS  11/6/2018    IR BIOPSY LIVER MASS  12/8/2020    KNEE ARTHROSCOPY Left     LAPAROTOMY N/A 1/20/2021    Procedure: LAPAROTOMY EXPLORATORY;  Surgeon: Mayo Cross MD;  [facility];  Service: Surgical Oncology    LIVER LOBECTOMY N/A 1/20/2021    Procedure: LIVER ABLATION SEGMENT 7, INTRAOPERATIVE U/S OF LIVER;  Surgeon: Mayo Cross MD;  [facility];  Service: Surgical Oncology    MEDIASTINOSCOPY N/A 11/27/2018    Procedure: MEDIASTINOSCOPY;  Surgeon: Irvin Jones MD;  [facility];  Service: Thoracic    AR BRONCHOSCOPY NEEDLE BX TRACHEA MAIN STEM&/BRON N/A 11/27/2018    Procedure: EBUS with biopsy;  Surgeon: Irvin Jones MD;  [facility];  Service: Thoracic    AR [address] N/A 11/27/2018    Procedure: Trever Query;  Surgeon: Irvin Jones MD;  [facility];  Service: Thoracic    AR EDG US EXAM SURGICAL ALTER STOM DUODENUM/JEJUNUM N/A 10/29/2018    Procedure: LINEAR ENDOSCOPIC U/S;  Surgeon: Ceferino Whitfield MD;  [facility];   Service: Gastroenterology    SKIN BIOPSY      SMALL INTESTINE SURGERY N/A 1/20/2021    Procedure: RESECTION SMALL BOWEL AND ANASTOMOSIS, RESECTION SMALL BOWEL NODULE;  Surgeon: Mayo Cross MD;  [facility];  Service: Surgical Oncology    WISDOM TOOTH EXTRACTION        Family History:     Family History   Problem Relation Age of Onset    Alzheimer's disease Mother     Parkinsonism Father     Psoriasis Father     Colon cancer Maternal Grandfather     Psoriasis Sister     No Known Problems Daughter     Psoriasis Sister     Stroke Sister     No Known Problems Daughter       Social History:     Social History     Socioeconomic History    Marital status:      Spouse name: None    Number of children: None    Years of education: None    Highest education level: None   Occupational History    None   Tobacco Use    Smoking status: Never Smoker    Smokeless tobacco: Never Used   Vaping Use    Vaping Use: Never used   Substance and Sexual Activity    Alcohol use: Yes     Comment: occ wine    Drug use: No    Sexual activity: None   Other Topics Concern    None   Social History Narrative    None     Social Determinants of Health     Financial Resource Strain: Not on file   Food Insecurity: Not on file   Transportation Needs: Not on file   Physical Activity: Not on file   Stress: Not on file   Social Connections: Not on file   Intimate Partner Violence: Not on file   Housing Stability: Not on file      Medications and Allergies:     Current Outpatient Medications   Medication Sig Dispense Refill    aspirin 81 MG tablet Take 1 tablet by mouth daily in the early morning       Cholecalciferol (VITAMIN D) 2000 units CAPS Take 1 tablet by mouth daily      docusate sodium (COLACE) 100 mg capsule Take 1 capsule (100 mg total) by mouth 2 (two) times a day (Patient taking differently: Take 100 mg by mouth daily  ) 10 capsule 0    fluticasone-salmeterol (Advair Diskus) 100-50 mcg/dose inhaler Inhale 1 puff daily Rinse mouth after use   fluticasone-salmeterol (Wixela Inhub) 100-50 mcg/dose inhaler Inhale 1 puff 2 (two) times a day Rinse mouth after use  60 blister 3    hydrochlorothiazide (HYDRODIURIL) 12 5 mg tablet Take 1 tablet (12 5 mg total) by mouth daily 90 tablet 0    LANREOTIDE ACETATE SC Inject under the skin every 30 (thirty) days       Multiple Vitamin (MULTIVITAMIN) tablet Take 1 tablet by mouth daily      omeprazole (PriLOSEC) 40 MG capsule Take 1 capsule (40 mg total) by mouth daily before breakfast 90 capsule 3     No current facility-administered medications for this visit       Allergies   Allergen Reactions    Bactrim [Sulfamethoxazole-Trimethoprim] Hives    Clam Shell - Food Allergy Vomiting      Immunizations:     Immunization History   Administered Date(s) Administered    COVID-19 PFIZER VACCINE 0 3 ML IM 02/17/2021, 03/08/2021, 11/20/2021    INFLUENZA 12/14/2018, 10/01/2020, 11/10/2021    Influenza, high dose seasonal 0 7 mL 12/14/2018, 11/06/2019    Pneumococcal Conjugate 13-Valent 02/11/2019      Health Maintenance:         Topic Date Due    Breast Cancer Screening: Mammogram  05/04/2023    Colorectal Cancer Screening  09/19/2027    Hepatitis C Screening  Completed         Topic Date Due    DTaP,Tdap,and Td Vaccines (1 - Tdap) Never done    Pneumococcal Vaccine: 65+ Years (2 of 4 - PPSV23) 04/08/2019      Medicare Health Risk Assessment:     /70   Pulse 67   Temp (!) 97 3 °F (36 3 °C)   Resp 18   Ht 5' 2 5" (1 588 m)   Wt 82 1 kg (181 lb)   LMP  (LMP Unknown)   SpO2 97%   BMI 32 58 kg/m²        Physical exam:    HEENT-EOMI,PERRLA  COR-RRR,NL S1,S2, Neg M  LUNGS-CTA, Neg W/R/R  ABD-Nl BS, Neg HSM, Soft, NT  EXT- Neg edema        Health Risk Assessment:   Patient rates overall health as good  Patient feels that their physical health rating is much better  Patient is satisfied with their life  Eyesight was rated as same  Hearing was rated as same  Patient feels that their emotional and mental health rating is same  Patients states they are never, rarely angry  Patient states they are often unusually tired/fatigued  Pain experienced in the last 7 days has been some  Patient's pain rating has been 1/10  Patient states that she has experienced no weight loss or gain in last 6 months  Fall Risk Screening: In the past year, patient has experienced: no history of falling in past year      Urinary Incontinence Screening:   Patient has not leaked urine accidently in the last six months  Home Safety:  Patient does not have trouble with stairs inside or outside of their home   Patient has working smoke alarms and has no working carbon monoxide detector  Home safety hazards include: none  Nutrition:   Current diet is Regular and Limited junk food  Medications:   Patient is currently taking over-the-counter supplements  OTC medications include: Multi vitamin, vitamin D, stool softener , 81 mg aspirin  Patient is able to manage medications  Activities of Daily Living (ADLs)/Instrumental Activities of Daily Living (IADLs):   Walk and transfer into and out of bed and chair?: Yes  Dress and groom yourself?: Yes    Bathe or shower yourself?: Yes    Feed yourself? Yes  Do your laundry/housekeeping?: Yes  Manage your money, pay your bills and track your expenses?: Yes  Make your own meals?: Yes    Do your own shopping?: Yes    Previous Hospitalizations:   Any hospitalizations or ED visits within the last 12 months?: Yes    How many hospitalizations have you had in the last year?: 1-2    Advance Care Planning:   Living will: Yes    Durable POA for healthcare: Yes    Advanced directive: Yes      PREVENTIVE SCREENINGS      Cardiovascular Screening:    General: Screening Not Indicated      Diabetes Screening:     General: Screening Current      Colorectal Cancer Screening:     General: Screening Current      Breast Cancer Screening:     General: Screening Current      Cervical Cancer Screening:    General: Screening Not Indicated      Osteoporosis Screening:    General: Risks and Benefits Discussed    Due for: DXA Axial      Lung Cancer Screening:     General: Screening Not Indicated      Hepatitis C Screening:    General: Screening Current      Preventive Screening Comments: Patient will inquire as to the coverage for DEXA scans with her insurance    Screening, Brief Intervention, and Referral to Treatment (SBIRT)    Screening  Typical number of drinks in a day: 0  Typical number of drinks in a week: 0  Interpretation: Low risk drinking behavior      AUDIT-C Screenin) How often did you have a drink containing alcohol in the past year? monthly or less  2) How many drinks did you have on a typical day when you were drinking in the past year?  1 to 2  3) How often did you have 6 or more drinks on one occasion in the past year? never    AUDIT-C Score: 1  Interpretation: Score 0-2 (female): Negative screen for alcohol misuse    Single Item Drug Screening:  How often have you used an illegal drug (including marijuana) or a prescription medication for non-medical reasons in the past year? never    Single Item Drug Screen Score: 0  Interpretation: Negative screen for possible drug use disorder      Yue Colon MD

## 2022-02-10 ENCOUNTER — APPOINTMENT (OUTPATIENT)
Dept: LAB | Facility: CLINIC | Age: 72
End: 2022-02-10
Payer: MEDICARE

## 2022-02-17 ENCOUNTER — HOSPITAL ENCOUNTER (OUTPATIENT)
Dept: INFUSION CENTER | Facility: CLINIC | Age: 72
Discharge: HOME | End: 2022-02-17
Payer: MEDICARE

## 2022-02-17 DIAGNOSIS — C7B.8 METASTATIC MALIGNANT NEUROENDOCRINE TUMOR TO LIVER (HCC): Primary | ICD-10-CM

## 2022-02-17 PROCEDURE — 96372 THER/PROPH/DIAG INJ SC/IM: CPT

## 2022-02-17 RX ORDER — LANREOTIDE ACETATE 120 MG/.5ML
120 INJECTION SUBCUTANEOUS ONCE
Status: CANCELLED | OUTPATIENT
Start: 2022-03-17

## 2022-02-17 RX ORDER — LANREOTIDE ACETATE 120 MG/.5ML
120 INJECTION SUBCUTANEOUS ONCE
Status: COMPLETED | OUTPATIENT
Start: 2022-02-17 | End: 2022-02-17

## 2022-02-17 RX ADMIN — LANREOTIDE ACETATE 120 MG: 120 INJECTION SUBCUTANEOUS at 09:25

## 2022-02-17 NOTE — PROGRESS NOTES
Pt to clinic for lanreotide injection, pt tolerated injection in Right buttocks   Pt aware of next appointment, declined avs

## 2022-03-02 NOTE — PROGRESS NOTES
Hematology/Oncology Outpatient Follow- up Note  Jesus Narayan, 1950, [id number]  3/3/2022        Chief Complaint   Patient presents with    Follow-up       HPI:  Dimitry Partida is a 72year old female Metastatic well-differentiated carcinoid tumor  She also has side branch IPMN that is essentially stable   This is less than 2 cm in size  She has undergone resection of her small bowel and microwave ablation segment 7 liver lesions  She has a T11 metastatic lesion seen on both PET scan and MRI scan  She was treated with a course of palliative radiation therapy to the T10- T12 spine  This was completed on 3/3/21  She is on Lanreotide       Previous Hematologic/ Oncologic History:       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 [facility])   2020 Initial Diagnosis    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 ([facility] [address] )     2020 Biopsy    IR Liver biopsy:  A  Liver mass, needle core biopsy:  - Metastatic well-differentiated neuroendocrine tumor, G2     2021 -  Chemotherapy    Lanreotide injections (monthly)     2021 Surgery    Resection of small bowel and anastomosis, segment 7 liver ablation    A  Small intestine, resection:  - Well- differentiated neuroendocrine tumor (up to 2 1 cm), G2, at least three foci  - All margins are negative for tumor   - Three of thirteen lymph nodes are positive for tumor (3/)  B  Small bowel nodule, excision:  - Gastrointestinal stromal tumor (GIST), spindle cell type, low grade, 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          Bone metastasis ([facility] [address] )   2021 Initial Diagnosis    Bone metastasis ([facility] )     2021 - 3/3/2021 Radiation    Treatment:  Course: C1    Plan ID Energy Fractions Dose per Fraction (cGy) Dose Correction (cGy) Total Dose Delivered (cGy) Elapsed Days   T10_T12 Spine 10X/6X 10 / 10 300 0 3,000 13                Current Hematologic/ Oncologic Treatment:    Lanreotide    ECO - Asymptomatic    Interval History:   The patient presents for routine follow up  She was last seen on 11/30  Most recent blood work completed on 2/10 was reviewed and remains in acceptable range, Chromogranin A is in normal 27 5  She is due for Her next dose of lanreotide on 3/17  She will have blood work repeated prior to this  Patient states she feels well  Denies any abdominal pain,  persistent/severe diarrhea, skin flushing   She denies any concerning symptoms today  Cancer Staging:  Cancer Staging  No matching staging information was found for the patient  Molecular Testing:         Test Results:    Imaging: No results found  Labs:   Lab Results   Component Value Date    WBC 5 89 02/10/2022    HGB 11 4 (L) 02/10/2022    HCT 36 3 02/10/2022    MCV 92 02/10/2022     02/10/2022     Lab Results   Component Value Date    K 3 8 02/10/2022     02/10/2022    CO2 28 02/10/2022    BUN 19 02/10/2022    CREATININE 0 85 02/10/2022    GLUF 115 (H) 01/14/2022    CALCIUM 9 4 02/10/2022    CORRECTEDCA 9 7 04/07/2021    AST 26 02/10/2022    ALT 31 02/10/2022    ALKPHOS 103 02/10/2022    EGFR 69 02/10/2022           Review of Systems   All other systems reviewed and are negative          Active Problems:   Patient Active Problem List   Diagnosis    Positional vertigo    Focal nodular hyperplasia of liver    IPMN (intraductal papillary mucinous neoplasm)    Abnormal MRI of abdomen    Liver mass    Mediastinal adenopathy    Sarcoidosis, lung ([facility])    Granulomatous lymphadenitis    Edema of right lower extremity    Diastolic dysfunction    Complex tear of medial meniscus of right knee as current injury    Other tear of medial meniscus, current injury, right knee, initial encounter    Trigger middle finger of right hand    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 ([address] )    Bone metastasis ([address] )    Reactive airway disease       Past Medical History:   Past Medical History:   Diagnosis Date    Abdominal pain     Acute tonsillitis     Breast pain, left  Cancer (HCC)     liver&small intestine    Edema of both lower extremities     GERD without esophagitis     Kidney stone     " [place]"    Lesion of liver     Liver mass     Lymphadenopathy     Mediastinal lymphadenopathy     Neoplasm of digestive system     Orthostatic lightheadedness     Sarcoidosis     Vertigo        Surgical History:   Past Surgical History:   Procedure Laterality Date    BREAST BIOPSY Left 1996    negative    CARPAL TUNNEL RELEASE Bilateral     COLONOSCOPY      2017    IR BIOPSY LIVER MASS  11/6/2018    IR BIOPSY LIVER MASS  12/8/2020    KNEE ARTHROSCOPY Left     LAPAROTOMY N/A 1/20/2021    Procedure: LAPAROTOMY EXPLORATORY;  Surgeon: Vincent Cagle MD;  [facility];  Service: Surgical Oncology    LIVER LOBECTOMY N/A 1/20/2021    Procedure: LIVER ABLATION SEGMENT 7, INTRAOPERATIVE U/S OF LIVER;  Surgeon: Vincent Cagle MD;  [facility];  Service: Surgical Oncology    MEDIASTINOSCOPY N/A 11/27/2018    Procedure: MEDIASTINOSCOPY;  Surgeon: Vania Acosta MD;  [facility];  Service: Thoracic    AZ BRONCHOSCOPY NEEDLE BX TRACHEA MAIN STEM&/BRON N/A 11/27/2018    Procedure: EBUS with biopsy;  Surgeon: Vania Acosta MD;  [facility];  Service: Thoracic    [place] [address] N/A 11/27/2018    Procedure: Chirag Mcleod;  Surgeon: Vania Acosta MD;  [facility];  Service: Thoracic    AZ EDG US EXAM SURGICAL ALTER STOM DUODENUM/JEJUNUM N/A 10/29/2018    Procedure: LINEAR ENDOSCOPIC U/S;  Surgeon: Sylvie Trimble MD;  [facility];   Service: Gastroenterology    SKIN BIOPSY      SMALL INTESTINE SURGERY N/A 1/20/2021    Procedure: RESECTION SMALL BOWEL AND ANASTOMOSIS, RESECTION SMALL BOWEL NODULE;  Surgeon: Vincent Cagle MD;  [facility];  Service: Surgical Oncology    WISDOM TOOTH EXTRACTION         Family History:    Family History   Problem Relation Age of Onset    Alzheimer's disease Mother  Parkinsonism Father     Psoriasis Father     Colon cancer Maternal Grandfather     Psoriasis Sister     No Known Problems Daughter     Psoriasis Sister     Stroke Sister     No Known Problems Daughter        Cancer-related family history includes Colon cancer in her maternal grandfather  Social History:   Social History     Socioeconomic History    Marital status:      Spouse name: Not on file    Number of children: Not on file    Years of education: Not on file    Highest education level: Not on file   Occupational History    Not on file   Tobacco Use    Smoking status: Never Smoker    Smokeless tobacco: Never Used   Vaping Use    Vaping Use: Never used   Substance and Sexual Activity    Alcohol use: Yes     Comment: occ wine    Drug use: No    Sexual activity: Not on file   Other Topics Concern    Not on file   Social History Narrative    Not on file     Social Determinants of Health     Financial Resource Strain: Not on file   Food Insecurity: Not on file   Transportation Needs: Not on file   Physical Activity: Not on file   Stress: Not on file   Social Connections: Not on file   Intimate Partner Violence: Not on file   Housing Stability: Not on file       Current Medications:   Current Outpatient Medications   Medication Sig Dispense Refill    aspirin 81 MG tablet Take 1 tablet by mouth daily in the early morning       Cholecalciferol (VITAMIN D) 2000 units CAPS Take 1 tablet by mouth daily      docusate sodium (COLACE) 100 mg capsule Take 1 capsule (100 mg total) by mouth 2 (two) times a day (Patient taking differently: Take 100 mg by mouth daily  ) 10 capsule 0    fluticasone-salmeterol (Advair Diskus) 100-50 mcg/dose inhaler Inhale 1 puff daily Rinse mouth after use   fluticasone-salmeterol (Wixela Inhub) 100-50 mcg/dose inhaler Inhale 1 puff 2 (two) times a day Rinse mouth after use   60 blister 3    hydrochlorothiazide (HYDRODIURIL) 12 5 mg tablet Take 1 tablet (12 5 mg total) by mouth daily 90 tablet 0    LANREOTIDE ACETATE SC Inject under the skin every 30 (thirty) days       Multiple Vitamin (MULTIVITAMIN) tablet Take 1 tablet by mouth daily      omeprazole (PriLOSEC) 40 MG capsule Take 1 capsule (40 mg total) by mouth daily before breakfast 90 capsule 3     No current facility-administered medications for this visit  Allergies: Allergies   Allergen Reactions    Bactrim [Sulfamethoxazole-Trimethoprim] Hives    Clam Shell - Food Allergy Vomiting       Physical Exam:  /60 (BP Location: Left arm, Patient Position: Sitting, Cuff Size: Adult)   Pulse 64   Temp (!) 97 2 °F (36 2 °C) (Temporal)   Resp 14   Ht 5' 2 5" (1 588 m)   Wt 82 1 kg (181 lb)   LMP  (LMP Unknown)   SpO2 98%   BMI 32 58 kg/m²   Body surface area is 1 84 meters squared  Wt Readings from Last 3 Encounters:   03/03/22 82 1 kg (181 lb)   02/03/22 82 1 kg (181 lb)   01/14/22 81 5 kg (179 lb 9 6 oz)           Physical Exam  Constitutional:       General: She is not in acute distress  Appearance: Normal appearance  HENT:      Head: Normocephalic and atraumatic  Eyes:      General: No scleral icterus  Right eye: No discharge  Left eye: No discharge  Conjunctiva/sclera: Conjunctivae normal    Cardiovascular:      Rate and Rhythm: Normal rate and regular rhythm  Pulmonary:      Effort: Pulmonary effort is normal  No respiratory distress  Breath sounds: Normal breath sounds  Chest:   Breasts:      Right: No axillary adenopathy or supraclavicular adenopathy  Left: No axillary adenopathy or supraclavicular adenopathy  Abdominal:      General: Bowel sounds are normal  There is no distension  Palpations: Abdomen is soft  There is no mass  Tenderness: There is no abdominal tenderness  Musculoskeletal:         General: Normal range of motion  Lymphadenopathy:      Cervical: No cervical adenopathy        Upper Body:      Right upper body: No supraclavicular, axillary or pectoral adenopathy  Left upper body: No supraclavicular, axillary or pectoral adenopathy  Skin:     General: Skin is warm and dry  Neurological:      General: No focal deficit present  Mental Status: She is alert and oriented to person, place, and time  Psychiatric:         Mood and Affect: Mood normal          Behavior: Behavior normal          Assessment / Plan:    1  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 ([facility] [address] )    2  Bone metastasis ([facility] [address] )      The patient is a 80year old female with a history of Metastatic well-differentiated carcinoid tumor  She also has side branch IPMN that is essentially stable   This is less than 2 cm in size  She has undergone resection of her small bowel and microwave ablation segment 7 liver lesions  She has a T11 metastatic lesion seen on both PET scan and MRI scan  She was treated with a course of palliative radiation therapy to the T10- T12 spine  This was completed on 3/3/21  She is on Lanreotide every 28 days  Patient continues to do well  She remains asymptomatic and her blood work is in acceptable range  Chromogranin A is normal   Most recent imagiang was negative for any evidence of recurrent/metastatic disease  She is due for her next dose of lanreotide on 3/17  She will have blood work prior and proceed as planned  She is already scheduled for her surveillance imaging in May as ordered by her surgeon  Per Dr Keith Liriano last note  "If there is residual viable disease, we can consider Sir spheres or PRRT if there is extrahepatic disease"    She will continue on lanreotide every 28 days     Patient will return for a follow-up visit in 3 months with repeat blood work and imaging to see Dr Chuck Pallas  Patient is in agreement with plan of care  They she is instructed to call at any time with questions or concerns prior to her next visit          Goals and Barriers:  Current Goal:  Prolong Survival from Well-differentiated neuroendocrine tumor  Barriers: None         Patient's Capacity to Self Care:  Patient able to self care  Portions of the record may have been created with voice recognition software  Occasional wrong word or "sound a like" substitutions may have occurred due to the inherent limitations of voice recognition software  Read the chart carefully and recognize, using context, where substitutions have occurred

## 2022-03-03 ENCOUNTER — OFFICE VISIT (OUTPATIENT)
Dept: HEMATOLOGY ONCOLOGY | Facility: CLINIC | Age: 72
End: 2022-03-03
Payer: MEDICARE

## 2022-03-03 VITALS
SYSTOLIC BLOOD PRESSURE: 130 MMHG | OXYGEN SATURATION: 98 % | BODY MASS INDEX: 32.07 KG/M2 | HEART RATE: 64 BPM | RESPIRATION RATE: 14 BRPM | HEIGHT: 63 IN | TEMPERATURE: 97.2 F | DIASTOLIC BLOOD PRESSURE: 60 MMHG | WEIGHT: 181 LBS

## 2022-03-03 DIAGNOSIS — C79.51 BONE METASTASIS (HCC): ICD-10-CM

## 2022-03-03 DIAGNOSIS — C7B.8 METASTATIC MALIGNANT NEUROENDOCRINE TUMOR TO LIVER (HCC): Primary | ICD-10-CM

## 2022-03-03 PROCEDURE — 99214 OFFICE O/P EST MOD 30 MIN: CPT | Performed by: NURSE PRACTITIONER

## 2022-03-10 ENCOUNTER — APPOINTMENT (OUTPATIENT)
Dept: LAB | Facility: CLINIC | Age: 72
End: 2022-03-10
Payer: MEDICARE

## 2022-03-10 DIAGNOSIS — C7B.8 METASTATIC MALIGNANT NEUROENDOCRINE TUMOR TO LIVER (HCC): ICD-10-CM

## 2022-03-16 ENCOUNTER — DOCUMENTATION (OUTPATIENT)
Dept: HEMATOLOGY ONCOLOGY | Facility: CLINIC | Age: 72
End: 2022-03-16

## 2022-03-16 NOTE — PROGRESS NOTES
Pt has active medicare A & B  Pt also has an active supplemental plan G thru [employer]  Pt is resp for the part B deduct  Call ed the medicare IVR & pt has met the $233 part B deduct  The supplemental plan will pay the 25% that medicare doesn't pay  Insurance education & Financial assistance not needed at this time

## 2022-03-17 ENCOUNTER — OFFICE VISIT (OUTPATIENT)
Dept: DERMATOLOGY | Facility: CLINIC | Age: 72
End: 2022-03-17
Payer: MEDICARE

## 2022-03-17 ENCOUNTER — HOSPITAL ENCOUNTER (OUTPATIENT)
Dept: INFUSION CENTER | Facility: CLINIC | Age: 72
Discharge: HOME | End: 2022-03-17
Payer: MEDICARE

## 2022-03-17 VITALS — WEIGHT: 181 LBS | BODY MASS INDEX: 33.31 KG/M2 | HEIGHT: 62 IN | TEMPERATURE: 97.5 F

## 2022-03-17 DIAGNOSIS — C7B.8 METASTATIC MALIGNANT NEUROENDOCRINE TUMOR TO LIVER (HCC): Primary | ICD-10-CM

## 2022-03-17 DIAGNOSIS — L30.9 ECZEMA, UNSPECIFIED TYPE: Primary | ICD-10-CM

## 2022-03-17 PROCEDURE — 99213 OFFICE O/P EST LOW 20 MIN: CPT | Performed by: DERMATOLOGY

## 2022-03-17 PROCEDURE — 96372 THER/PROPH/DIAG INJ SC/IM: CPT

## 2022-03-17 RX ORDER — LANREOTIDE ACETATE 120 MG/.5ML
120 INJECTION SUBCUTANEOUS ONCE
Status: CANCELLED | OUTPATIENT
Start: 2022-04-14

## 2022-03-17 RX ORDER — LANREOTIDE ACETATE 120 MG/.5ML
120 INJECTION SUBCUTANEOUS ONCE
Status: COMPLETED | OUTPATIENT
Start: 2022-03-17 | End: 2022-03-17

## 2022-03-17 RX ADMIN — LANREOTIDE ACETATE 120 MG: 120 INJECTION SUBCUTANEOUS at 09:24

## 2022-03-17 NOTE — PATIENT INSTRUCTIONS
ECZEMA    Assessment and Plan:  Based on a thorough discussion of this condition and the management approach to it (including a comprehensive discussion of the known risks, side effects and potential benefits of treatment), the patient (family) agrees to implement the following specific plan:   Apply Triamcinolone 1% ointment apply 2-4 times daily for up to 2 weeks if rash goes away can stop medication    ACROCHORDON ("SKIN TAG")    Assessment and Plan:  Based on a thorough discussion of this condition and the management approach to it (including a comprehensive discussion of the known risks, side effects and potential benefits of treatment), the patient (family) agrees to implement the following specific plan:     Not covered under insurance; considered cosmetic; cost is $150 form 1-10 skin tags removed and then additional $20 for each     Skin tags are common, soft, harmless skin lesions that are also called, in the appropriate settings, papillomas, fibroepithelial polyps, and soft fibromas  They are made up of loosely arranged collagen fibers and blood vessels surrounded by a thickened or thinned-out epidermis  Skin tags tend to develop in both men and women as we grow older  They are usually found on the skin folds (neck, armpits, groin)  It is not known what specifically causes skin tags  Certain factors, though, do appear to play a role:   Chaffing and irritation from skin rubbing together   High levels of growth factors (as seen, for example, in pregnancy or in acromegaly/gigantism)   Insulin resistance   Human papillomavirus (wart virus)    We discussed that most skin tags do not need to be treated unless they are specifically causing the patient physical distress or limitation or pose a risk for a larger problem such as an infection that forms secondary to excoriation or chronic irritation      We had a thorough discussion of treatment options and specific risks (including that any procedural treatment may not be covered by insurance and would then be the patient's responsibility) and benefits/alternatives including but not limited to the following:   Cryotherapy (freezing)   Shave removal   Surgical excision (snip excision with scissors)   Electrosurgery   Ligation (we do not do this procedure and counseled against it due to risk of tissue necrosis and infection)

## 2022-03-17 NOTE — PROGRESS NOTES
[facility] Follow Up Note    Patient Name: Herbert Mckeon  Encounter Date: 03/17/22    Today's Chief Concerns:  Liseth Cousin Concern #1:  Bharati Friday #2: Les Holding      Current Medications:    Current Outpatient Medications:     aspirin 81 MG tablet, Take 1 tablet by mouth daily in the early morning , Disp: , Rfl:     Cholecalciferol (VITAMIN D) 2000 units CAPS, Take 1 tablet by mouth daily, Disp: , Rfl:     docusate sodium (COLACE) 100 mg capsule, Take 1 capsule (100 mg total) by mouth 2 (two) times a day (Patient taking differently: Take 100 mg by mouth daily  ), Disp: 10 capsule, Rfl: 0    fluticasone-salmeterol (Advair Diskus) 100-50 mcg/dose inhaler, Inhale 1 puff daily Rinse mouth after use , Disp: , Rfl:     fluticasone-salmeterol (Wixela Inhub) 100-50 mcg/dose inhaler, Inhale 1 puff 2 (two) times a day Rinse mouth after use , Disp: 60 blister, Rfl: 3    hydrochlorothiazide (HYDRODIURIL) 12 5 mg tablet, Take 1 tablet (12 5 mg total) by mouth daily, Disp: 90 tablet, Rfl: 0    LANREOTIDE ACETATE SC, Inject under the skin every 30 (thirty) days , Disp: , Rfl:     Multiple Vitamin (MULTIVITAMIN) tablet, Take 1 tablet by mouth daily, Disp: , Rfl:     omeprazole (PriLOSEC) 40 MG capsule, Take 1 capsule (40 mg total) by mouth daily before breakfast, Disp: 90 capsule, Rfl: 3  No current facility-administered medications for this visit  CONSTITUTIONAL:   Vitals:    03/17/22 1112   Temp: 97 5 °F (36 4 °C)   TempSrc: Temporal   Weight: 82 1 kg (181 lb)   Height: 5' 2" (1 575 m)     Specific Alerts:    Have you been seen by a [facility]atologist in the last 3 years? YES    Are you pregnant or planning to become pregnant? No    Are you currently or planning to be nursing or breast feeding?  No    Allergies   Allergen Reactions    Bactrim [Sulfamethoxazole-Trimethoprim] Hives    Clam Shell - Food Allergy Vomiting       May we call your Preferred Phone number to discuss your specific medical information? YES    May we leave a detailed message that includes your specific medical information? YES    Have you traveled outside of the [facility] in the past 3 months? YES    Do you currently have a pacemaker or defibrillator? No    Do you have any artificial heart valves, joints, plates, screws, rods, stents, pins, etc? No   - If Yes, were any placed within the last 2 years? Do you require any medications prior to a surgical procedure? No     Are you taking any medications that cause you to bleed more easily ("blood thinners") YES    Have you ever experienced a rapid heartbeat with epinephrine? YES    Have you ever been treated with "gold" (gold sodium thiomalate) therapy? No    [facility] can help with wrinkles, "laugh lines," facial volume loss, "double chin," "love handles," age spots, and more  Are you interested in learning today about some of the skin enhancement procedures that we offer? (If Yes, please provide more detail) No    Review of Systems:  Have you recently had or currently have any of the following?     · Fever or chills: No  · Night Sweats: No  · Headaches: No  · Weight Gain: No  · Weight Loss: No  · Blurry Vision: No  · Nausea: No  · Vomiting: No  · Diarrhea: No  · Blood in Stool: No  · Abdominal Pain: No  · Itchy Skin: YES  · Painful Joints: No  · Swollen Joints: No  · Muscle Pain: No  · Irregular Mole: No  · Sun Burn: No  · Dry Skin: YES  · Skin Color Changes: No  · Scar or Keloid: No  · Cold Sores/Fever Blisters: No  · Bacterial Infections/MRSA: No  · Anxiety: No  · Depression: No  · Suicidal or Homicidal Thoughts: No      PSYCH: Normal mood and affect  EYES: Normal conjunctiva  ENT: Normal lips and oral mucosa  CARDIOVASCULAR: No upper-limb edema  RESPIRATORY: Normal respirations    FOCUSED ORGAN SYSTEM SKIN EXAM (SKIN)  Neck, Cervical Chain Nodes Normal except as noted below in Assessment     ECZEMATOUS DERMATITIS  Physical Exam:   Anatomic Location Affected:  NECK   Morphological Description:  Eczematous erythematous patches without scale    Pertinent Positives:   Pertinent Negatives: Additional History of Present Condition: Admits that the areas of concern are very itchy  Denies any new medications or hygiene products  Assessment and Plan:  Based on a thorough discussion of this condition and the management approach to it (including a comprehensive discussion of the known risks, side effects and potential benefits of treatment), the patient (family) agrees to implement the following specific plan:   Apply Triamcinolone 1% ointment apply 2-4 times daily for up to 2 weeks  If the rash resolves, please discontinue medication and use only for flares as needed  ACROCHORDON ("SKIN TAG")    Physical Exam:   Anatomic Location Affected:  neck   Morphological Description:  Skin colored pedunculated papules    Pertinent Positives:   Pertinent Negatives: Additional History of Present Condition:  Present for years    Assessment and Plan:  Based on a thorough discussion of this condition and the management approach to it (including a comprehensive discussion of the known risks, side effects and potential benefits of treatment), the patient (family) agrees to implement the following specific plan:     Not covered under insurance; considered cosmetic; cost is $150 form 1-10 skin tags removed and then additional $20 for each     Skin tags are common, soft, harmless skin lesions that are also called, in the appropriate settings, papillomas, fibroepithelial polyps, and soft fibromas  They are made up of loosely arranged collagen fibers and blood vessels surrounded by a thickened or thinned-out epidermis  Skin tags tend to develop in both men and women as we grow older  They are usually found on the skin folds (neck, armpits, groin)  It is not known what specifically causes skin tags    Certain factors, though, do appear to play a role:   Chaffing and irritation from skin rubbing together   High levels of growth factors (as seen, for example, in pregnancy or in acromegaly/gigantism)   Insulin resistance   Human papillomavirus (wart virus)    We discussed that most skin tags do not need to be treated unless they are specifically causing the patient physical distress or limitation or pose a risk for a larger problem such as an infection that forms secondary to excoriation or chronic irritation      We had a thorough discussion of treatment options and specific risks (including that any procedural treatment may not be covered by insurance and would then be the patient's responsibility) and benefits/alternatives including but not limited to the following:   Cryotherapy (freezing)   Shave removal   Surgical excision (snip excision with scissors)   Electrosurgery   Ligation (we do not do this procedure and counseled against it due to risk of tissue necrosis and infection)    Scribe Attestation    I,:  Colten Pena am acting as a scribe while in the presence of the attending physician :       I,:  Ligia Gunn MD personally performed the services described in this documentation    as scribed in my presence :         Jennyfer Nova, DO

## 2022-03-17 NOTE — PROGRESS NOTES
Pt resting with no complaints  Lanreotide given in L buttocks without issue  Pt aware she has no further appts at [facility]; will schedule more on her way out

## 2022-04-06 ENCOUNTER — 6 MONTH FOLLOW UP (OUTPATIENT)
Dept: URBAN - METROPOLITAN AREA CLINIC 6 | Facility: CLINIC | Age: 72
End: 2022-04-06

## 2022-04-06 ENCOUNTER — APPOINTMENT (OUTPATIENT)
Dept: LAB | Facility: CLINIC | Age: 72
End: 2022-04-06
Payer: MEDICARE

## 2022-04-06 DIAGNOSIS — H04.123: ICD-10-CM

## 2022-04-06 DIAGNOSIS — Z96.1: ICD-10-CM

## 2022-04-06 PROCEDURE — 92014 COMPRE OPH EXAM EST PT 1/>: CPT

## 2022-04-06 ASSESSMENT — KERATOMETRY
OS_AXISANGLE2_DEGREES: 87
OS_K1POWER_DIOPTERS: 42.75
OS_K2POWER_DIOPTERS: 46.25
OS_AXISANGLE_DEGREES: 177

## 2022-04-06 ASSESSMENT — VISUAL ACUITY
OD_SC: 20/25+2
OS_SC: 20/25+1
OU_CC: J1
OU_OTHER: OTC +1.25

## 2022-04-06 ASSESSMENT — TONOMETRY
OD_IOP_MMHG: 12
OS_IOP_MMHG: 13

## 2022-04-14 ENCOUNTER — HOSPITAL ENCOUNTER (OUTPATIENT)
Dept: INFUSION CENTER | Facility: CLINIC | Age: 72
Discharge: HOME | End: 2022-04-14
Payer: MEDICARE

## 2022-04-14 DIAGNOSIS — C7B.8 METASTATIC MALIGNANT NEUROENDOCRINE TUMOR TO LIVER (HCC): Primary | ICD-10-CM

## 2022-04-14 PROCEDURE — 96372 THER/PROPH/DIAG INJ SC/IM: CPT

## 2022-04-14 RX ORDER — LANREOTIDE ACETATE 120 MG/.5ML
120 INJECTION SUBCUTANEOUS ONCE
Status: COMPLETED | OUTPATIENT
Start: 2022-04-14 | End: 2022-04-14

## 2022-04-14 RX ORDER — LANREOTIDE ACETATE 120 MG/.5ML
120 INJECTION SUBCUTANEOUS ONCE
Status: CANCELLED | OUTPATIENT
Start: 2022-05-12

## 2022-04-14 RX ADMIN — LANREOTIDE ACETATE 120 MG: 120 INJECTION SUBCUTANEOUS at 09:25

## 2022-04-14 NOTE — PROGRESS NOTES
Patient to [facility] for Lanreotide: Offers no complaints at present time: Lab work ( 04/06/22 ) reviewed: Within parameters to treat: Injection given in Buttocks ( RUQ ) without incident: No adverse reactions noted: No further appt's scheduled:  Will make upon discharge: AVS offered and declined

## 2022-05-04 ENCOUNTER — TELEPHONE (OUTPATIENT)
Dept: HEMATOLOGY ONCOLOGY | Facility: CLINIC | Age: 72
End: 2022-05-04

## 2022-05-04 DIAGNOSIS — C79.51 BONE METASTASIS (HCC): ICD-10-CM

## 2022-05-04 DIAGNOSIS — C7B.8 METASTATIC MALIGNANT NEUROENDOCRINE TUMOR TO LIVER (HCC): Primary | ICD-10-CM

## 2022-05-04 NOTE — TELEPHONE ENCOUNTER
Spoke with patient, notified her additional standing labs have been ordered  She will have them completed tomorrow  Reviewed with her no additional labs are needed for CT scan ordered by Dr Pappas Batch  She was appreciative of my call

## 2022-05-05 ENCOUNTER — APPOINTMENT (OUTPATIENT)
Dept: LAB | Facility: CLINIC | Age: 72
End: 2022-05-05
Payer: MEDICARE

## 2022-05-05 DIAGNOSIS — C7B.8 METASTATIC MALIGNANT NEUROENDOCRINE TUMOR TO LIVER (HCC): ICD-10-CM

## 2022-05-05 LAB
ALBUMIN SERPL BCP-MCNC: 4 G/DL (ref 3.5–5)
ALP SERPL-CCNC: 98 U/L (ref 34–104)
ALT SERPL W P-5'-P-CCNC: 18 U/L (ref 7–52)
ANION GAP SERPL CALCULATED.3IONS-SCNC: 8 MMOL/L (ref 4–13)
AST SERPL W P-5'-P-CCNC: 24 U/L (ref 13–39)
BASOPHILS # BLD AUTO: 0.11 THOUSANDS/ΜL (ref 0–0.1)
BASOPHILS NFR BLD AUTO: 2 % (ref 0–1)
BILIRUB SERPL-MCNC: 0.73 MG/DL (ref 0.2–1)
BUN SERPL-MCNC: 17 MG/DL (ref 5–25)
CALCIUM SERPL-MCNC: 9.4 MG/DL (ref 8.4–10.2)
CHLORIDE SERPL-SCNC: 102 MMOL/L (ref 96–108)
CO2 SERPL-SCNC: 29 MMOL/L (ref 21–32)
CREAT SERPL-MCNC: 0.79 MG/DL (ref 0.6–1.3)
EOSINOPHIL # BLD AUTO: 0.01 THOUSAND/ΜL (ref 0–0.61)
EOSINOPHIL NFR BLD AUTO: 0 % (ref 0–6)
ERYTHROCYTE [DISTWIDTH] IN BLOOD BY AUTOMATED COUNT: 13.1 % (ref 11.6–15.1)
GFR SERPL CREATININE-BSD FRML MDRD: 76 ML/MIN/1.73SQ M
GLUCOSE P FAST SERPL-MCNC: 107 MG/DL (ref 65–99)
HCT VFR BLD AUTO: 38.3 % (ref 34.8–46.1)
HGB BLD-MCNC: 12.1 G/DL (ref 11.5–15.4)
IMM GRANULOCYTES # BLD AUTO: 0.01 THOUSAND/UL (ref 0–0.2)
IMM GRANULOCYTES NFR BLD AUTO: 0 % (ref 0–2)
LYMPHOCYTES # BLD AUTO: 1.85 THOUSANDS/ΜL (ref 0.6–4.47)
LYMPHOCYTES NFR BLD AUTO: 29 % (ref 14–44)
MCH RBC QN AUTO: 28.8 PG (ref 26.8–34.3)
MCHC RBC AUTO-ENTMCNC: 31.6 G/DL (ref 31.4–37.4)
MCV RBC AUTO: 91 FL (ref 82–98)
MONOCYTES # BLD AUTO: 0.78 THOUSAND/ΜL (ref 0.17–1.22)
MONOCYTES NFR BLD AUTO: 12 % (ref 4–12)
NEUTROPHILS # BLD AUTO: 3.63 THOUSANDS/ΜL (ref 1.85–7.62)
NEUTS SEG NFR BLD AUTO: 57 % (ref 43–75)
NRBC BLD AUTO-RTO: 0 /100 WBCS
PLATELET # BLD AUTO: 294 THOUSANDS/UL (ref 149–390)
PMV BLD AUTO: 9.9 FL (ref 8.9–12.7)
POTASSIUM SERPL-SCNC: 3.4 MMOL/L (ref 3.5–5.3)
PROT SERPL-MCNC: 7.7 G/DL (ref 6.4–8.4)
RBC # BLD AUTO: 4.2 MILLION/UL (ref 3.81–5.12)
SODIUM SERPL-SCNC: 139 MMOL/L (ref 135–147)
WBC # BLD AUTO: 6.39 THOUSAND/UL (ref 4.31–10.16)

## 2022-05-05 PROCEDURE — 36415 COLL VENOUS BLD VENIPUNCTURE: CPT

## 2022-05-05 PROCEDURE — 86316 IMMUNOASSAY TUMOR OTHER: CPT

## 2022-05-05 PROCEDURE — 85025 COMPLETE CBC W/AUTO DIFF WBC: CPT

## 2022-05-05 PROCEDURE — 80053 COMPREHEN METABOLIC PANEL: CPT

## 2022-05-10 LAB — CGA SERPL-MCNC: 51 NG/ML (ref 0–101.8)

## 2022-05-12 ENCOUNTER — HOSPITAL ENCOUNTER (OUTPATIENT)
Dept: INFUSION CENTER | Facility: CLINIC | Age: 72
Discharge: HOME | End: 2022-05-12
Payer: MEDICARE

## 2022-05-12 VITALS
SYSTOLIC BLOOD PRESSURE: 136 MMHG | HEART RATE: 56 BPM | TEMPERATURE: 97 F | RESPIRATION RATE: 18 BRPM | DIASTOLIC BLOOD PRESSURE: 81 MMHG | OXYGEN SATURATION: 98 %

## 2022-05-12 DIAGNOSIS — C7B.8 METASTATIC MALIGNANT NEUROENDOCRINE TUMOR TO LIVER (HCC): Primary | ICD-10-CM

## 2022-05-12 PROCEDURE — 96372 THER/PROPH/DIAG INJ SC/IM: CPT

## 2022-05-12 RX ORDER — LANREOTIDE ACETATE 120 MG/.5ML
120 INJECTION SUBCUTANEOUS ONCE
Status: COMPLETED | OUTPATIENT
Start: 2022-05-12 | End: 2022-05-12

## 2022-05-12 RX ORDER — LANREOTIDE ACETATE 120 MG/.5ML
120 INJECTION SUBCUTANEOUS ONCE
Status: CANCELLED | OUTPATIENT
Start: 2022-06-06

## 2022-05-12 RX ADMIN — LANREOTIDE ACETATE 120 MG: 120 INJECTION SUBCUTANEOUS at 09:37

## 2022-05-12 NOTE — PROGRESS NOTES
Patient offers no complaints, lanreotide tolerated in LUQ buttocks patient to  to schedule next visit

## 2022-05-20 ENCOUNTER — TELEPHONE (OUTPATIENT)
Dept: SURGICAL ONCOLOGY | Facility: CLINIC | Age: 72
End: 2022-05-20

## 2022-05-20 NOTE — TELEPHONE ENCOUNTER
Left message for patient try to reschedule follow up visit with Dr Whit Kraus for after her CT in July

## 2022-05-31 ENCOUNTER — APPOINTMENT (OUTPATIENT)
Dept: LAB | Facility: CLINIC | Age: 72
End: 2022-05-31
Payer: MEDICARE

## 2022-06-01 ENCOUNTER — OFFICE VISIT (OUTPATIENT)
Dept: HEMATOLOGY ONCOLOGY | Facility: CLINIC | Age: 72
End: 2022-06-01
Payer: MEDICARE

## 2022-06-01 VITALS
SYSTOLIC BLOOD PRESSURE: 118 MMHG | OXYGEN SATURATION: 96 % | WEIGHT: 180.5 LBS | TEMPERATURE: 96.5 F | HEIGHT: 62 IN | RESPIRATION RATE: 18 BRPM | HEART RATE: 71 BPM | BODY MASS INDEX: 33.21 KG/M2 | DIASTOLIC BLOOD PRESSURE: 76 MMHG

## 2022-06-01 DIAGNOSIS — C7B.8 METASTATIC MALIGNANT NEUROENDOCRINE TUMOR TO LIVER (HCC): Primary | ICD-10-CM

## 2022-06-01 PROCEDURE — 99214 OFFICE O/P EST MOD 30 MIN: CPT | Performed by: INTERNAL MEDICINE

## 2022-06-01 NOTE — PROGRESS NOTES
Hematology/Oncology Outpatient Follow- up Note  Aleksey Beatty 80 y o  female MRN: @ Encounter: [id number]        Date:  6/1/2022    Presenting Complaint/Diagnosis : Metastatic well-differentiated carcinoid tumor    HPI:    Maryse Garcia seen for initial consultation 1/12/2021 regarding a metastatic well-differentiated carcinoid tumor   The patient has seen our colleagues in surgery and is actually going for resection  [facility] PET-CT scan had showed     1  At least 3 foci of radiotracer uptake within the small bowel suspicious for underlying neuroendocrine lesions   Consider follow-up with CT enterography or small bowel pill study  2  Increased radiotracer uptake in two mesenteric soft tissue nodules suspicious for metastasis  3  Focal radiotracer uptake in the right lobe of the liver posteriorly compatible with the known metastasis   Slight focal increased radiotracer uptake in the right lobe of the liver anteriorly corresponding to the 5 mm subcapsular lesion segment 8   lesion seen on MRI suspicious for additional metastasis  4  Focal radiotracer uptake at the T11 vertebral body suspicious for osseous metastasis      MRI revealed the T11 vertebral body did have malignancy present      Again biopsy has confirmed well-differentiated neuroendocrine tumor         Previous Hematologic/ Oncologic History:    Oncology History   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 ([facility] [address] )   12/8/2020 Initial Diagnosis    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 ([facility] [address] )     12/8/2020 Biopsy    IR Liver biopsy:  A  Liver mass, needle core biopsy:  - Metastatic well-differentiated neuroendocrine tumor, G2     1/18/2021 -  Chemotherapy    Lanreotide injections (monthly)     1/20/2021 Surgery    Resection of small bowel and anastomosis, segment 7 liver ablation    A  Small intestine, resection:  - Well- differentiated neuroendocrine tumor (up to 2 1 cm), G2, at least three foci    - All margins are negative for tumor   - Three of thirteen lymph nodes are positive for tumor (3/13)  B  Small bowel nodule, excision:  - Gastrointestinal stromal tumor (GIST), spindle cell type, low grade, 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  Bone metastasis ([facility] [address] )   1/28/2021 Initial Diagnosis    Bone metastasis ([facility] [address] )     2/18/2021 - 3/3/2021 Radiation    Treatment:  Course: C1    Plan ID Energy Fractions Dose per Fraction (cGy) Dose Correction (cGy) Total Dose Delivered (cGy) Elapsed Days   T10_T12 Spine 10X/6X 10 / 10 300 0 3,000 13              Surgery and ablation of liver lesions along with radiation     Current Hematologic/ Oncologic Treatment:      lanreotide    Interval History:    Patient returns for follow-up visit  She is doing well  She is on lanreotide  Her scan was moved to July and she is seeing her surgeon with results in July secondary to the contrast shortage  Patient herself is at baseline  Blood work is within acceptable limits  Denies any nausea denies any vomiting denies any diarrhea  The rest of her 14 point review of systems today was negative  Test Results:    Imaging: No results found  Labs:   Lab Results   Component Value Date    WBC 6 99 05/31/2022    HGB 11 4 (L) 05/31/2022    HCT 36 0 05/31/2022    MCV 91 05/31/2022     05/31/2022     Lab Results   Component Value Date    K 3 9 05/31/2022     05/31/2022    CO2 29 05/31/2022    BUN 25 05/31/2022    CREATININE 0 80 05/31/2022    GLUF 118 (H) 05/31/2022    CALCIUM 9 3 05/31/2022    CORRECTEDCA 9 0 04/06/2022    AST 22 05/31/2022    ALT 18 05/31/2022    ALKPHOS 86 05/31/2022    EGFR 74 05/31/2022       ROS: As stated in the history of present illness otherwise his 14 point review of systems today was negative        Active Problems:   Patient Active Problem List   Diagnosis    Positional vertigo    Focal nodular hyperplasia of liver    IPMN (intraductal papillary mucinous neoplasm)    Abnormal MRI of abdomen    Liver mass    Mediastinal adenopathy    Sarcoidosis, lung (HCC)    Granulomatous lymphadenitis    Edema of right lower extremity    Diastolic dysfunction    Complex tear of medial meniscus of right knee as current injury    Other tear of medial meniscus, current injury, right knee, initial encounter    Trigger middle finger of right hand    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 ([address] )    Bone metastasis ([address] )    Reactive airway disease       Past Medical History:   Past Medical History:   Diagnosis Date    Abdominal pain     Acute tonsillitis     Breast pain, left     Cancer ([address] )     liver&small intestine    Edema of both lower extremities     GERD without esophagitis     Kidney stone     " [place]"    Lesion of liver     Liver mass     Lymphadenopathy     Mediastinal lymphadenopathy     Neoplasm of digestive system     Orthostatic lightheadedness     Sarcoidosis     Vertigo        Surgical History:   Past Surgical History:   Procedure Laterality Date    BREAST BIOPSY Left 1996    negative    CARPAL TUNNEL RELEASE Bilateral     COLONOSCOPY      2017    IR BIOPSY LIVER MASS  11/6/2018    IR BIOPSY LIVER MASS  12/8/2020    KNEE ARTHROSCOPY Left     LAPAROTOMY N/A 1/20/2021    Procedure: LAPAROTOMY EXPLORATORY;  Surgeon: Margie Carter MD;  [facility];  Service: Surgical Oncology    LIVER LOBECTOMY N/A 1/20/2021    Procedure: LIVER ABLATION SEGMENT 7, INTRAOPERATIVE U/S OF LIVER;  Surgeon: Margie Carter MD;  [facility];  Service: Surgical Oncology    MEDIASTINOSCOPY N/A 11/27/2018    Procedure: MEDIASTINOSCOPY;  Surgeon: Gregorio Valdivia MD;  [facility];  Service: Thoracic    TX BRONCHOSCOPY NEEDLE BX TRACHEA MAIN STEM&/BRON N/A 11/27/2018    Procedure: EBUS with biopsy;  Surgeon: Gregorio Valdivia MD;  [facility];  Service: Thoracic    TX [address] N/A 11/27/2018    Procedure: Karen Reveal;  Surgeon: Gregorio Valdivia MD;  [facility]; Service: Thoracic    WI EDG US EXAM SURGICAL ALTER STOM DUODENUM/JEJUNUM N/A 10/29/2018    Procedure: LINEAR ENDOSCOPIC U/S;  Surgeon: Fidel Alvarenga MD;  [facility]; Service: Gastroenterology    SKIN BIOPSY      SMALL INTESTINE SURGERY N/A 1/20/2021    Procedure: RESECTION SMALL BOWEL AND ANASTOMOSIS, RESECTION SMALL BOWEL NODULE;  Surgeon: Wilfrid Luna MD;  [facility];  Service: Surgical Oncology    WISDOM TOOTH EXTRACTION         Family History:    Family History   Problem Relation Age of Onset   Levon Cole Alzheimer's disease Mother     Parkinsonism Father     Psoriasis Father     Colon cancer Maternal Grandfather     Psoriasis Sister     No Known Problems Daughter     Psoriasis Sister     Stroke Sister     No Known Problems Daughter        Cancer-related family history includes Colon cancer in her maternal grandfather  Social History:   Social History     Socioeconomic History    Marital status:       Spouse name: Not on file    Number of children: Not on file    Years of education: Not on file    Highest education level: Not on file   Occupational History    Not on file   Tobacco Use    Smoking status: Never Smoker    Smokeless tobacco: Never Used   Vaping Use    Vaping Use: Never used   Substance and Sexual Activity    Alcohol use: Yes     Comment: occ wine    Drug use: No    Sexual activity: Not on file   Other Topics Concern    Not on file   Social History Narrative    Not on file     Social Determinants of Health     Financial Resource Strain: Not on file   Food Insecurity: Not on file   Transportation Needs: Not on file   Physical Activity: Not on file   Stress: Not on file   Social Connections: Not on file   Intimate Partner Violence: Not on file   Housing Stability: Not on file       Current Medications:   Current Outpatient Medications   Medication Sig Dispense Refill    aspirin 81 MG tablet Take 1 tablet by mouth daily in the early morning       Cholecalciferol (VITAMIN D) 2000 units CAPS Take 1 tablet by mouth daily      docusate sodium (COLACE) 100 mg capsule Take 1 capsule (100 mg total) by mouth 2 (two) times a day 10 capsule 0    fluticasone-salmeterol (Advair) 100-50 mcg/dose inhaler Inhale 1 puff daily Rinse mouth after use   hydrochlorothiazide (HYDRODIURIL) 12 5 mg tablet Take 1 tablet (12 5 mg total) by mouth daily 90 tablet 0    LANREOTIDE ACETATE SC Inject under the skin every 30 (thirty) days       Multiple Vitamin (MULTIVITAMIN) tablet Take 1 tablet by mouth daily      omeprazole (PriLOSEC) 40 MG capsule Take 1 capsule (40 mg total) by mouth daily before breakfast 90 capsule 3    triamcinolone (KENALOG) 0 1 % ointment Apply 2-4 times daily up to 2 weeks if rash goes away stop medication 30 g 1    fluticasone-salmeterol (Wixela Inhub) 100-50 mcg/dose inhaler Inhale 1 puff 2 (two) times a day Rinse mouth after use  (Patient not taking: Reported on 6/1/2022) 60 blister 3     No current facility-administered medications for this visit  Allergies: Allergies   Allergen Reactions    Bactrim [Sulfamethoxazole-Trimethoprim] Hives    Clam Shell - Food Allergy Vomiting       Physical Exam:    Body surface area is 1 83 meters squared  Wt Readings from Last 3 Encounters:   06/01/22 81 9 kg (180 lb 8 oz)   04/28/22 82 1 kg (181 lb)   03/17/22 82 1 kg (181 lb)        Temp Readings from Last 3 Encounters:   06/01/22 (!) 96 5 °F (35 8 °C)   05/12/22 (!) 97 °F (36 1 °C) (Temporal)   04/28/22 (!) 96 5 °F (35 8 °C)        BP Readings from Last 3 Encounters:   06/01/22 118/76   05/12/22 136/81   03/03/22 130/60         Pulse Readings from Last 3 Encounters:   06/01/22 71   05/12/22 56   03/03/22 64         Physical Exam     Constitutional   General appearance: No acute distress, well appearing and well nourished  Eyes   Conjunctiva and lids: No swelling, erythema or discharge  Pupils and irises: Equal, round and reactive to light  Ears, Nose, Mouth, and Throat   External inspection of ears and nose: Normal     Nasal mucosa, septum, and turbinates: Normal without edema or erythema  Oropharynx: Normal with no erythema, edema, exudate or lesions  Pulmonary   Respiratory effort: No increased work of breathing or signs of respiratory distress  Auscultation of lungs: Clear to auscultation  Cardiovascular   Palpation of heart: Normal PMI, no thrills  Auscultation of heart: Normal rate and rhythm, normal S1 and S2, without murmurs  Examination of extremities for edema and/or varicosities: Normal     Carotid pulses: Normal     Abdomen   Abdomen: Non-tender, no masses  Liver and spleen: No hepatomegaly or splenomegaly  Lymphatic   Palpation of lymph nodes in neck: No lymphadenopathy  Musculoskeletal   Gait and station: Normal     Digits and nails: Normal without clubbing or cyanosis  Inspection/palpation of joints, bones, and muscles: Normal     Skin   Skin and subcutaneous tissue: Normal without rashes or lesions  Neurologic   Cranial nerves: Cranial nerves 2-12 intact  Sensation: No sensory loss  Psychiatric   Orientation to person, place, and time: Normal     Mood and affect: Normal         Assessment / Plan:      71-year-old female with  A history of well-differentiated neuroendocrine tumor  She also has side branch IPMN that is essentially stable   This is less than 2 cm in size   This is being observed with her MRI    She then underwent small-bowel resection and microwave ablation segment 7 liver lesions   She completed radiation to T11 for a metastasis  For her well-differentiated neuroendocrine tumor    She is on Lanreotide    She is having imaging done in July and seeing her surgeon with results        If there is residual viable disease, we can consider Sir spheres or PRRT if there is extrahepatic disease   She is agreeable to this plan   All of her questions were answered         Goals and Barriers: Current Goal:  Prolong Survival from well-differentiated neuroendocrine tumor  Barriers: None  Patient's Capacity to Self Care:  Patient  able to self care  Portions of the record may have been created with voice recognition software  Occasional wrong word or "sound a like" substitutions may have occurred due to the inherent limitations of voice recognition software  Read the chart carefully and recognize, using context, where substitutions have occurred

## 2022-06-06 ENCOUNTER — HOSPITAL ENCOUNTER (OUTPATIENT)
Dept: INFUSION CENTER | Facility: CLINIC | Age: 72
Discharge: HOME | End: 2022-06-06
Payer: MEDICARE

## 2022-06-06 DIAGNOSIS — C7B.8 METASTATIC MALIGNANT NEUROENDOCRINE TUMOR TO LIVER (HCC): Primary | ICD-10-CM

## 2022-06-06 PROCEDURE — 96372 THER/PROPH/DIAG INJ SC/IM: CPT

## 2022-06-06 RX ORDER — LANREOTIDE ACETATE 120 MG/.5ML
120 INJECTION SUBCUTANEOUS ONCE
Status: COMPLETED | OUTPATIENT
Start: 2022-06-06 | End: 2022-06-06

## 2022-06-06 RX ORDER — LANREOTIDE ACETATE 120 MG/.5ML
120 INJECTION SUBCUTANEOUS ONCE
Status: CANCELLED | OUTPATIENT
Start: 2022-07-08

## 2022-06-06 RX ADMIN — LANREOTIDE ACETATE 120 MG: 120 INJECTION SUBCUTANEOUS at 08:37

## 2022-06-06 NOTE — PROGRESS NOTES
Patient here for lanreotide  She offers no complaints at this time time  Lanreotide given RUQ of buttock and she tolerated it well  She stated she would make her next appointment on the way out

## 2022-06-07 ENCOUNTER — HOSPITAL ENCOUNTER (OUTPATIENT)
Dept: INFUSION CENTER | Facility: CLINIC | Age: 72
Discharge: HOME | End: 2022-06-07

## 2022-07-01 ENCOUNTER — APPOINTMENT (OUTPATIENT)
Dept: LAB | Facility: CLINIC | Age: 72
End: 2022-07-01
Payer: MEDICARE

## 2022-07-01 LAB
ALBUMIN SERPL BCP-MCNC: 4 G/DL (ref 3.5–5)
ALP SERPL-CCNC: 88 U/L (ref 34–104)
ALT SERPL W P-5'-P-CCNC: 19 U/L (ref 7–52)
ANION GAP SERPL CALCULATED.3IONS-SCNC: 5 MMOL/L (ref 4–13)
AST SERPL W P-5'-P-CCNC: 24 U/L (ref 13–39)
BASOPHILS # BLD AUTO: 0.11 THOUSANDS/ΜL (ref 0–0.1)
BASOPHILS NFR BLD AUTO: 2 % (ref 0–1)
BILIRUB SERPL-MCNC: 0.83 MG/DL (ref 0.2–1)
BUN SERPL-MCNC: 20 MG/DL (ref 5–25)
CALCIUM SERPL-MCNC: 9.5 MG/DL (ref 8.4–10.2)
CHLORIDE SERPL-SCNC: 103 MMOL/L (ref 96–108)
CO2 SERPL-SCNC: 29 MMOL/L (ref 21–32)
CREAT SERPL-MCNC: 0.75 MG/DL (ref 0.6–1.3)
EOSINOPHIL # BLD AUTO: 0.43 THOUSAND/ΜL (ref 0–0.61)
EOSINOPHIL NFR BLD AUTO: 6 % (ref 0–6)
ERYTHROCYTE [DISTWIDTH] IN BLOOD BY AUTOMATED COUNT: 14.2 % (ref 11.6–15.1)
GFR SERPL CREATININE-BSD FRML MDRD: 81 ML/MIN/1.73SQ M
GLUCOSE P FAST SERPL-MCNC: 107 MG/DL (ref 65–99)
HCT VFR BLD AUTO: 36.2 % (ref 34.8–46.1)
HGB BLD-MCNC: 11.4 G/DL (ref 11.5–15.4)
IMM GRANULOCYTES # BLD AUTO: 0.02 THOUSAND/UL (ref 0–0.2)
IMM GRANULOCYTES NFR BLD AUTO: 0 % (ref 0–2)
LYMPHOCYTES # BLD AUTO: 2.01 THOUSANDS/ΜL (ref 0.6–4.47)
LYMPHOCYTES NFR BLD AUTO: 29 % (ref 14–44)
MCH RBC QN AUTO: 28.4 PG (ref 26.8–34.3)
MCHC RBC AUTO-ENTMCNC: 31.5 G/DL (ref 31.4–37.4)
MCV RBC AUTO: 90 FL (ref 82–98)
MONOCYTES # BLD AUTO: 0.91 THOUSAND/ΜL (ref 0.17–1.22)
MONOCYTES NFR BLD AUTO: 13 % (ref 4–12)
NEUTROPHILS # BLD AUTO: 3.45 THOUSANDS/ΜL (ref 1.85–7.62)
NEUTS SEG NFR BLD AUTO: 50 % (ref 43–75)
NRBC BLD AUTO-RTO: 0 /100 WBCS
PLATELET # BLD AUTO: 276 THOUSANDS/UL (ref 149–390)
PMV BLD AUTO: 10.4 FL (ref 8.9–12.7)
POTASSIUM SERPL-SCNC: 4 MMOL/L (ref 3.5–5.3)
PROT SERPL-MCNC: 7.5 G/DL (ref 6.4–8.4)
RBC # BLD AUTO: 4.02 MILLION/UL (ref 3.81–5.12)
SODIUM SERPL-SCNC: 137 MMOL/L (ref 135–147)
WBC # BLD AUTO: 6.93 THOUSAND/UL (ref 4.31–10.16)

## 2022-07-01 PROCEDURE — 85025 COMPLETE CBC W/AUTO DIFF WBC: CPT | Performed by: INTERNAL MEDICINE

## 2022-07-01 PROCEDURE — 36415 COLL VENOUS BLD VENIPUNCTURE: CPT | Performed by: INTERNAL MEDICINE

## 2022-07-01 PROCEDURE — 80053 COMPREHEN METABOLIC PANEL: CPT | Performed by: INTERNAL MEDICINE

## 2022-07-05 ENCOUNTER — HOSPITAL ENCOUNTER (OUTPATIENT)
Dept: CT IMAGING | Facility: HOSPITAL | Age: 72
Discharge: HOME | End: 2022-07-05
Attending: SURGERY
Payer: MEDICARE

## 2022-07-05 DIAGNOSIS — C7B.8 METASTATIC MALIGNANT NEUROENDOCRINE TUMOR TO LIVER (HCC): ICD-10-CM

## 2022-07-05 PROCEDURE — 71260 CT THORAX DX C+: CPT

## 2022-07-05 PROCEDURE — 74177 CT ABD & PELVIS W/CONTRAST: CPT

## 2022-07-05 PROCEDURE — G1004 CDSM NDSC: HCPCS

## 2022-07-05 RX ADMIN — IOHEXOL 68 ML: 350 INJECTION, SOLUTION INTRAVENOUS at 07:55

## 2022-07-08 ENCOUNTER — HOSPITAL ENCOUNTER (OUTPATIENT)
Dept: INFUSION CENTER | Facility: CLINIC | Age: 72
Discharge: HOME | End: 2022-07-08
Payer: MEDICARE

## 2022-07-08 DIAGNOSIS — C7B.8 METASTATIC MALIGNANT NEUROENDOCRINE TUMOR TO LIVER (HCC): Primary | ICD-10-CM

## 2022-07-08 PROCEDURE — 96372 THER/PROPH/DIAG INJ SC/IM: CPT

## 2022-07-08 RX ORDER — LANREOTIDE ACETATE 120 MG/.5ML
120 INJECTION SUBCUTANEOUS ONCE
Status: COMPLETED | OUTPATIENT
Start: 2022-07-08 | End: 2022-07-08

## 2022-07-08 RX ORDER — LANREOTIDE ACETATE 120 MG/.5ML
120 INJECTION SUBCUTANEOUS ONCE
Status: CANCELLED | OUTPATIENT
Start: 2022-08-08

## 2022-07-08 RX ADMIN — LANREOTIDE ACETATE 120 MG: 120 INJECTION SUBCUTANEOUS at 08:51

## 2022-07-08 NOTE — PROGRESS NOTES
Pt to clinic for lanreotide injection, pt tolerated in left buttocks, pt aware of next appointment, declined avs

## 2022-07-13 DIAGNOSIS — D86.0 SARCOIDOSIS, LUNG (HCC): ICD-10-CM

## 2022-07-19 RX ORDER — HYDROCHLOROTHIAZIDE 12.5 MG/1
TABLET ORAL
Qty: 90 TABLET | Refills: 0 | Status: SHIPPED | OUTPATIENT
Start: 2022-07-19 | End: 2022-07-20

## 2022-07-20 DIAGNOSIS — D86.0 SARCOIDOSIS, LUNG (HCC): ICD-10-CM

## 2022-07-20 RX ORDER — HYDROCHLOROTHIAZIDE 12.5 MG/1
TABLET ORAL
Qty: 90 TABLET | Refills: 0 | Status: SHIPPED | OUTPATIENT
Start: 2022-07-20

## 2022-07-29 ENCOUNTER — APPOINTMENT (OUTPATIENT)
Dept: LAB | Facility: CLINIC | Age: 72
End: 2022-07-29
Payer: MEDICARE

## 2022-07-29 ENCOUNTER — TELEPHONE (OUTPATIENT)
Dept: HEMATOLOGY ONCOLOGY | Facility: CLINIC | Age: 72
End: 2022-07-29

## 2022-07-29 DIAGNOSIS — D49.0 IPMN (INTRADUCTAL PAPILLARY MUCINOUS NEOPLASM): ICD-10-CM

## 2022-07-29 DIAGNOSIS — C7B.8 METASTATIC MALIGNANT NEUROENDOCRINE TUMOR TO LIVER (HCC): ICD-10-CM

## 2022-07-29 DIAGNOSIS — C7B.8 METASTATIC MALIGNANT NEUROENDOCRINE TUMOR TO LIVER (HCC): Primary | ICD-10-CM

## 2022-07-29 DIAGNOSIS — C79.51 BONE METASTASIS (HCC): ICD-10-CM

## 2022-07-29 PROCEDURE — 86316 IMMUNOASSAY TUMOR OTHER: CPT

## 2022-07-29 NOTE — TELEPHONE ENCOUNTER
Returned call to pt and advised we would order requested lab for her   Pt verbalized good understanding

## 2022-07-29 NOTE — TELEPHONE ENCOUNTER
CALL RETURN FORM   Reason for patient call? Patient calling requesting lab order for Chromogranin A  She went to lab this morning and was informed there was no order  Patient is leaving tomorrow for vacation  Patient's primary oncologist? Dr Clay De La O   Name of person the patient was calling for? Dr Fernando Conteh   Any additional information to add, if applicable? [phone]   Informed patient that the message will be forwarded to the team and someone will get back to them as soon as possible    Did you relay this information to the patient?  yes

## 2022-08-01 LAB — CGA SERPL-MCNC: 58.1 NG/ML (ref 0–101.8)

## 2022-08-08 ENCOUNTER — HOSPITAL ENCOUNTER (OUTPATIENT)
Dept: INFUSION CENTER | Facility: CLINIC | Age: 72
Discharge: HOME | End: 2022-08-08

## 2022-08-08 ENCOUNTER — TELEPHONE (OUTPATIENT)
Dept: FAMILY MEDICINE CLINIC | Facility: CLINIC | Age: 72
End: 2022-08-08

## 2022-08-08 ENCOUNTER — TELEPHONE (OUTPATIENT)
Dept: HEMATOLOGY ONCOLOGY | Facility: CLINIC | Age: 72
End: 2022-08-08

## 2022-08-08 NOTE — TELEPHONE ENCOUNTER
----- Message from Malik Ervin LPN sent at 5/7/3224 10:10 AM EDT -----  Regarding: FW: COVID/Paxlovid  Spoke with pt, Symptoms began 8/3/22, tested + 8/6/22  Virtual appt made for tomorrow at 2pm   ----- Message -----  From: Orlando Laird  Sent: 8/8/2022   9:58 AM EDT  To: [facility]  Subject: COVID/Paxlovid                                   Helelaine Yuan,    I tested positive for COVID on Saturday, August 6  Symptoms are mild, stuffy nose, coughing, shortness of breath  I am wondering if I can, or should take Paxlovid  I wonder if it would interact with any of my other medications  I was on a family vacation last week  My sister first tested positive  She got a prescription for Paxlovid, but when we picked it up for her the pharmacist said to check with her doctor because it interacted with something else she was taking  Turns out she could not take it  So now that I am positive, I am wondering what is best to do  I have my sisters Paxlovid so I was wondering if that was ok to take  Also, wondering if I would need to get my own script  I tried calling the office several times this morning but could not get through to talk to anyone      Thanks,     Orlando Laird   Birth date 1950  [phone]

## 2022-08-08 NOTE — TELEPHONE ENCOUNTER
Voicemail received:    Hello, this is [employer] calling  My birthday is October 16th, 1950  Phone number [phone]  I was trying to speak with either Juliette Abraham or Doctor Bria Will  I am a patient of doctor Bria Will and I do take infusion injections once a month of lanreotide  I am COVID positive right now  And I checked with my primary and they asked me to call to your office to find out if I can take the Paxil, [place] for the [place]  I don't  I have mild symptoms, I have shortness of breath and just tiredness and some stuffy nose kind of things  But it is technically day five and they tell me it's I can take it, it's OK with them, but they weren't sure about the cancer medication, if that would interact  So if you could give me a call back and let me know, I would appreciate it  Thank you  Jelani Palomares  So to give you the     Drug interaction checked with Paxlovid and Lanreotide  No interactions were found  Discussed with Dr Bria Will, pt can take paxlovid  Pt was made aware

## 2022-08-08 NOTE — TELEPHONE ENCOUNTER
Patient called, stated that she spoke with her oncologist and they stated that it is ok to take, patient stated that she was just going to take her sister's prescription since her sister can't take it, patient wanted to know to know what dose she needs to take, she stated that there is a 100 and 300 mg in the box

## 2022-08-08 NOTE — TELEPHONE ENCOUNTER
Called and spoke to patient - she wanted to clarify whether Paxlovid is indicated for her and ensure there are no medication interactions  Her symptoms are mild and include fatigue  She has chronic dyspnea which is minimally worsened  Symptoms have been constant over the last five days since onset  She is high risk for severe COVID given her age, active cancer, and chronic lung disease  I recommended she speak with her oncologist to ensure no medication interactions with anti-cancer agents she is receiving and she may proceed with Paxlovid if no further concerns  Most recent renal function is above threshold for reduced dose of Paxlovid  She will call if any further questions or concerns

## 2022-08-10 DIAGNOSIS — C7B.8 METASTATIC MALIGNANT NEUROENDOCRINE TUMOR TO LIVER (HCC): Primary | ICD-10-CM

## 2022-08-10 RX ORDER — LANREOTIDE ACETATE 120 MG/.5ML
120 INJECTION SUBCUTANEOUS ONCE
Status: CANCELLED | OUTPATIENT
Start: 2022-08-12

## 2022-08-12 ENCOUNTER — HOSPITAL ENCOUNTER (OUTPATIENT)
Dept: INFUSION CENTER | Facility: CLINIC | Age: 72
Discharge: HOME | End: 2022-08-12
Payer: MEDICARE

## 2022-08-12 DIAGNOSIS — C7B.8 METASTATIC MALIGNANT NEUROENDOCRINE TUMOR TO LIVER (HCC): Primary | ICD-10-CM

## 2022-08-12 PROCEDURE — 96372 THER/PROPH/DIAG INJ SC/IM: CPT

## 2022-08-12 RX ORDER — LANREOTIDE ACETATE 120 MG/.5ML
120 INJECTION SUBCUTANEOUS ONCE
Status: COMPLETED | OUTPATIENT
Start: 2022-08-12 | End: 2022-08-12

## 2022-08-12 RX ORDER — LANREOTIDE ACETATE 120 MG/.5ML
120 INJECTION SUBCUTANEOUS ONCE
Status: CANCELLED | OUTPATIENT
Start: 2022-09-09

## 2022-08-12 RX ADMIN — LANREOTIDE ACETATE 120 MG: 120 INJECTION SUBCUTANEOUS at 12:51

## 2022-08-12 NOTE — PROGRESS NOTES
Pt to clinic for lanreotide injection, pt tolerated in right buttocks, opt making next appointment when exiting, declined avs

## 2022-08-26 ENCOUNTER — OFFICE VISIT (OUTPATIENT)
Dept: SURGICAL ONCOLOGY | Facility: CLINIC | Age: 72
End: 2022-08-26
Payer: MEDICARE

## 2022-08-26 ENCOUNTER — TELEPHONE (OUTPATIENT)
Dept: HEMATOLOGY ONCOLOGY | Facility: CLINIC | Age: 72
End: 2022-08-26

## 2022-08-26 VITALS
SYSTOLIC BLOOD PRESSURE: 126 MMHG | BODY MASS INDEX: 33.31 KG/M2 | DIASTOLIC BLOOD PRESSURE: 80 MMHG | HEIGHT: 62 IN | TEMPERATURE: 96.8 F | HEART RATE: 79 BPM | OXYGEN SATURATION: 97 % | WEIGHT: 181 LBS | RESPIRATION RATE: 16 BRPM

## 2022-08-26 DIAGNOSIS — C7B.8 METASTATIC MALIGNANT NEUROENDOCRINE TUMOR TO LIVER (HCC): Primary | ICD-10-CM

## 2022-08-26 PROCEDURE — 99214 OFFICE O/P EST MOD 30 MIN: CPT | Performed by: SURGERY

## 2022-08-26 NOTE — TELEPHONE ENCOUNTER
Please schedule out tx  Looks like patient usually comes in on Thursday or Friday morning  Thank you!

## 2022-08-26 NOTE — LETTER
August 26, 2404     [address] [place] [id number]    Patient: Tono Faust   YOB: 1950   Date of Visit: 8/26/2022       Dear Dr Damion Doe: Thank you for referring Tono Faust to me for evaluation  Below are my notes for this consultation  If you have questions, please do not hesitate to call me  I look forward to following your patient along with you  Sincerely,        Norberto Terrazas MD        CC: MD River Leyva MD Lovina Maki, MD  8/26/2022  8:58 AM  Incomplete               Surgical Oncology Follow Up       [address] [facility] SURGICAL ONCOLOGY [place]  [address] [facility] [address] [place]  1950  [id number]  [address]  [facility] SURGICAL ONCOLOG[facility] [place]  [address] [facility] [address] [facility] [id number]    Diagnoses and all orders for this visit:    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 [facility])  -     CT chest abdomen pelvis w contrast; Future  -     BUN; Future  -     Creatinine, serum; Future  -     Chromogranin A; Future        Chief Complaint   Patient presents with    Follow-up     6 month f/u       Return in about 6 months (around 2/26/2023) for Office Visit, Imaging - See orders, Labs - See Treatment Plan, with Shonda  Oncology History   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 [facility])   12/8/2020 Initial Diagnosis    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 ([facility] [address] )     12/8/2020 Biopsy    IR Liver biopsy:  A  Liver mass, needle core biopsy:  - Metastatic well-differentiated neuroendocrine tumor, G2     1/18/2021 -  Chemotherapy    Lanreotide injections (monthly)     1/20/2021 Surgery    Resection of small bowel and anastomosis, segment 7 liver ablation    A  Small intestine, resection:  - Well- differentiated neuroendocrine tumor (up to 2 1 cm), G2, at least three foci    - All margins are negative for tumor   - Three of thirteen lymph nodes are positive for tumor (3/13)  B  Small bowel nodule, excision:  - Gastrointestinal stromal tumor (GIST), spindle cell type, low grade, 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  Bone metastasis ([address] )   1/28/2021 Initial Diagnosis    Bone metastasis ([address] )     2/18/2021 - 3/3/2021 Radiation    Treatment:  Course: C1    Plan ID Energy Fractions Dose per Fraction (cGy) Dose Correction (cGy) Total Dose Delivered (cGy) Elapsed Days   T10_T12 Spine 10X/6X 10 / 10 300 0 3,000 13                Diagnosis and Staging: Side branch IPMN 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 2013, 1 2cm   Metastatic neuroendocrine tumor, T3N1M1, December 2020  Treatment history: small-bowel resection and microwave ablation of segment 7 liver lesions, January 2021   Lanreotide   radiation to T11  Current Therapy: Observation For the IPMN   Lanreotide  Disease Status: Stable     History of Present Illness:  Patient returns in follow-up of her metastatic neuroendocrine tumor  She is doing well at this time with no complaints  She denies any abdominal pain, nausea or vomiting  No flushing, diarrhea, palpitations, headaches, or sweats  Chromogranin level is normal   CT from July 5, 2022 reveals a borderline hilar lymph node  Her liver ablation site appear stable  The there is no evidence of obvious metastasis  I personally reviewed the films  The pancreas cyst this is not well visualized on my review  Review of Systems  Complete ROS Surg Onc:   Complete ROS Surg Onc:   Constitutional: The patient denies new or recent history of general fatigue, no recent weight loss, no change in appetite  Eyes: No complaints of visual problems, no scleral icterus  ENT: no complaints of ear pain, no hoarseness, no difficulty swallowing,  no tinnitus and no new masses in head, oral cavity, or neck  Cardiovascular: No complaints of chest pain, no palpitations, no ankle edema  Respiratory: No complaints of shortness of breath, no cough     Gastrointestinal: No complaints of jaundice, no bloody stools, no pale stools  Genitourinary: No complaints of dysuria, no hematuria, no nocturia, no frequent urination, no urethral discharge  Musculoskeletal: No complaints of weakness, paralysis, joint stiffness or arthralgias  Integumentary: No complaints of rash, no new lesions  Neurological: No complaints of convulsions, no seizures, no dizziness  Hematologic/Lymphatic: No complaints of easy bruising  Endocrine:  No hot or cold intolerance  No polydipsia, polyphagia, or polyuria  Allergy/immunology:  No environmental allergies  No food allergies  Not immunocompromised  Skin:  No pallor or rash  No wound          Patient Active Problem List   Diagnosis    Positional vertigo    Focal nodular hyperplasia of liver    IPMN (intraductal papillary mucinous neoplasm)    Abnormal MRI of abdomen    Liver mass    Mediastinal adenopathy    Sarcoidosis, lung (HCC)    Granulomatous lymphadenitis    Edema of right lower extremity    Diastolic dysfunction    Complex tear of medial meniscus of right knee as current injury    Other tear of medial meniscus, current injury, right knee, initial encounter    Trigger middle finger of right hand    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 ([address] )    Bone metastasis ([address] )    Reactive airway disease     Past Medical History:   Diagnosis Date    Abdominal pain     Acute tonsillitis     Breast pain, left     Cancer ([address] )     liver&small intestine    Edema of both lower extremities     GERD without esophagitis     Kidney stone     " [place]"    Lesion of liver     Liver mass     Lymphadenopathy     Mediastinal lymphadenopathy     Neoplasm of digestive system     Orthostatic lightheadedness     Sarcoidosis     Vertigo      Past Surgical History:   Procedure Laterality Date    BREAST BIOPSY Left 1996    negative    CARPAL TUNNEL RELEASE Bilateral     COLONOSCOPY      2017    IR BIOPSY LIVER MASS  11/6/2018    IR BIOPSY LIVER MASS  12/8/2020    KNEE ARTHROSCOPY Left     LAPAROTOMY N/A 1/20/2021    Procedure: LAPAROTOMY EXPLORATORY;  Surgeon: Chloé Shelton MD;  [facility];  Service: Surgical Oncology    LIVER LOBECTOMY N/A 1/20/2021    Procedure: LIVER ABLATION SEGMENT 7, INTRAOPERATIVE U/S OF LIVER;  Surgeon: Chloé Shelton MD;  [facility];  Service: Surgical Oncology    MEDIASTINOSCOPY N/A 11/27/2018    Procedure: MEDIASTINOSCOPY;  Surgeon: Paz Cochran MD;  [facility];  Service: Thoracic    NM BRONCHOSCOPY NEEDLE BX TRACHEA MAIN STEM&/BRON N/A 11/27/2018    Procedure: EBUS with biopsy;  Surgeon: Paz Cochran MD;  [facility];  Service: Thoracic    NM [address] N/A 11/27/2018    Procedure: Cherry Mendozar;  Surgeon: Paz Cochran MD;  [facility];  Service: Thoracic    NM EDG US EXAM SURGICAL ALTER STOM DUODENUM/JEJUNUM N/A 10/29/2018    Procedure: LINEAR ENDOSCOPIC U/S;  Surgeon: Chacorta Borges MD;  [facility]; Service: Gastroenterology    SKIN BIOPSY      SMALL INTESTINE SURGERY N/A 1/20/2021    Procedure: RESECTION SMALL BOWEL AND ANASTOMOSIS, RESECTION SMALL BOWEL NODULE;  Surgeon: Chloé Shelton MD;  [facility];  Service: Surgical Oncology    WISDOM TOOTH EXTRACTION       Family History   Problem Relation Age of Onset    Alzheimer's disease Mother     Parkinsonism Father     Psoriasis Father     Colon cancer Maternal Grandfather     Psoriasis Sister     No Known Problems Daughter     Psoriasis Sister     Stroke Sister     No Known Problems Daughter      Social History     Socioeconomic History    Marital status:       Spouse name: Not on file    Number of children: Not on file    Years of education: Not on file    Highest education level: Not on file   Occupational History    Not on file   Tobacco Use    Smoking status: Never Smoker    Smokeless tobacco: Never Used   Vaping Use    Vaping Use: Never used   Substance and Sexual Activity    Alcohol use: Yes     Comment: occ wine    Drug use: No    Sexual activity: Not on file   Other Topics Concern    Not on file   Social History Narrative    Not on file     Social Determinants of Health     Financial Resource Strain: Not on file   Food Insecurity: Not on file   Transportation Needs: Not on file   Physical Activity: Not on file   Stress: Not on file   Social Connections: Not on file   Intimate Partner Violence: Not on file   Housing Stability: Not on file       Current Outpatient Medications:     aspirin 81 MG tablet, Take 1 tablet by mouth daily in the early morning , Disp: , Rfl:     Cholecalciferol (VITAMIN D) 2000 units CAPS, Take 1 tablet by mouth daily, Disp: , Rfl:     docusate sodium (COLACE) 100 mg capsule, Take 1 capsule (100 mg total) by mouth 2 (two) times a day, Disp: 10 capsule, Rfl: 0    fluticasone-salmeterol (Advair) 100-50 mcg/dose inhaler, Inhale 1 puff daily Rinse mouth after use , Disp: , Rfl:     hydrochlorothiazide (HYDRODIURIL) 12 5 mg tablet, Take 1 tablet by mouth once daily, Disp: 90 tablet, Rfl: 0    LANREOTIDE ACETATE SC, Inject under the skin every 30 (thirty) days , Disp: , Rfl:     Multiple Vitamin (MULTIVITAMIN) tablet, Take 1 tablet by mouth daily, Disp: , Rfl:     omeprazole (PriLOSEC) 40 MG capsule, Take 1 capsule (40 mg total) by mouth daily before breakfast, Disp: 90 capsule, Rfl: 3    triamcinolone (KENALOG) 0 1 % ointment, Apply 2-4 times daily up to 2 weeks if rash goes away stop medication, Disp: 30 g, Rfl: 1  Allergies   Allergen Reactions    Bactrim [Sulfamethoxazole-Trimethoprim] Hives    Clam Shell - Food Allergy Vomiting     Vitals:    08/26/22 0841   BP: 126/80   Pulse: 79   Resp: 16   Temp: (!) 96 8 °F (36 °C)   SpO2: 97%       Physical Exam  Constitutional: General appearance: The Patient is well-developed and well-nourished who appears the stated age in no acute distress   Patient is pleasant and talkative  HEENT:  Normocephalic  Sclerae are anicteric  Mucous membranes are moist  Neck is supple without adenopathy  No JVD  Chest: The lungs are clear to auscultation  Cardiac: Heart is regular rate  Abdomen: Abdomen is soft, non-tender, non-distended and without masses  Extremities: There is no clubbing or cyanosis  There is no edema  Symmetric  Neuro: Grossly nonfocal  Gait is normal      Lymphatic: No evidence of cervical adenopathy bilaterally  No evidence of axillary adenopathy bilaterally  No evidence of inguinal adenopathy bilaterally  Skin: Warm, anicteric  Psych:  Patient is pleasant and talkative  Breasts:        Pathology:  Component Ref Range & Units 7/29/22  8:50 AM 7/1/22  6:17 AM 5/31/22  6:03 AM 5/5/22  6:07 AM 4/6/22  7:05 AM 3/10/22  6:33 AM 2/10/22  7:13 AM   Chromogranin A 0 0 - 101 8 ng/mL 58 1  54 5 CM  59 7 CM  51 0 CM  72  1 CM            Labs:      Imaging  No results found  I reviewed the above laboratory and imaging data  Discussion/Summary: 81-year-old female with side branch IPMN that is essentially stable   This is less prominent on the most current CT   She then underwent small-bowel resection and microwave ablation of  segment 7 liver lesions   She completed radiation to T11 for a metastasis    She is on Lanreotide   She is clinically GERARDO at 1-1/2 years    Her imaging is stable and her chromogranin level is normal   I suspect the hilar node may be related to her sarcoid rather than neuroendocrine tumor   I have recommended observation  Julio Rockwell she is asymptomatic and her imaging shows no recurrence, I will plan on seeing her again in 6 months with a repeat CT of her chest, abdomen and pelvis and a chromogranin level   This CT should be able to survey the pancreas as well, to make sure the IPMN is not changing    If there is residual viable disease, we can consider Sir spheres or PRRT if there is extrahepatic disease   She is agreeable to this plan   All of her questions were answered

## 2022-08-26 NOTE — PROGRESS NOTES
Surgical Oncology Follow Up       [address] [facility]  [facility] [place]  [address]   [place] [address]    Al Ringer Cable  1950  [id number]  [address]  [facility] [place]  [address] [place] [address]    Diagnoses and all orders for this visit:    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 [facility])  -     CT chest abdomen pelvis w contrast; Future  -     BUN; Future  -     Creatinine, serum; Future  -     Chromogranin A; Future        Chief Complaint   Patient presents with    Follow-up     6 month f/u       Return in about 6 months (around 2/26/2023) for Office Visit, Imaging - See orders, Labs - See Treatment Plan, with Shonda  Oncology History   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 [facility])   12/8/2020 Initial Diagnosis    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 ([facility] [address] )     12/8/2020 Biopsy    IR Liver biopsy:  A  Liver mass, needle core biopsy:  - Metastatic well-differentiated neuroendocrine tumor, G2     1/18/2021 -  Chemotherapy    Lanreotide injections (monthly)     1/20/2021 Surgery    Resection of small bowel and anastomosis, segment 7 liver ablation    A  Small intestine, resection:  - Well- differentiated neuroendocrine tumor (up to 2 1 cm), G2, at least three foci  - All margins are negative for tumor   - Three of thirteen lymph nodes are positive for tumor (3/13)  B  Small bowel nodule, excision:  - Gastrointestinal stromal tumor (GIST), spindle cell type, low grade, 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          Bone metastasis ([facility] [address] )   1/28/2021 Initial Diagnosis    Bone metastasis ([facility] [address] )     2/18/2021 - 3/3/2021 Radiation    Treatment:  Course: C1    Plan ID Energy Fractions Dose per Fraction (cGy) Dose Correction (cGy) Total Dose Delivered (cGy) Elapsed Days   T10_T12 Spine 10X/6X 10 / 10 300 0 3,000 13                Diagnosis and Staging: Side branch IPMN 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 2013, 1 2cm   Metastatic neuroendocrine tumor, T3N1M1, December 2020  Treatment history: small-bowel resection and microwave ablation of segment 7 liver lesions, January 2021   Lanreotide   radiation to T11  Current Therapy: Observation For the IPMN   Lanreotide  Disease Status: Stable     History of Present Illness:  Patient returns in follow-up of her metastatic neuroendocrine tumor  She is doing well at this time with no complaints  She denies any abdominal pain, nausea or vomiting  No flushing, diarrhea, palpitations, headaches, or sweats  Chromogranin level is normal   CT from July 5, 2022 reveals a borderline hilar lymph node  Her liver ablation site appear stable  The there is no evidence of obvious metastasis  I personally reviewed the films  The pancreas cyst this is not well visualized on my review  Review of Systems  Complete ROS Surg Onc:   Complete ROS Surg Onc:   Constitutional: The patient denies new or recent history of general fatigue, no recent weight loss, no change in appetite  Eyes: No complaints of visual problems, no scleral icterus  ENT: no complaints of ear pain, no hoarseness, no difficulty swallowing,  no tinnitus and no new masses in head, oral cavity, or neck  Cardiovascular: No complaints of chest pain, no palpitations, no ankle edema  Respiratory: No complaints of shortness of breath, no cough  Gastrointestinal: No complaints of jaundice, no bloody stools, no pale stools  Genitourinary: No complaints of dysuria, no hematuria, no nocturia, no frequent urination, no urethral discharge  Musculoskeletal: No complaints of weakness, paralysis, joint stiffness or arthralgias  Integumentary: No complaints of rash, no new lesions  Neurological: No complaints of convulsions, no seizures, no dizziness  Hematologic/Lymphatic: No complaints of easy bruising  Endocrine:  No hot or cold intolerance  No polydipsia, polyphagia, or polyuria    Allergy/immunology:  No environmental allergies  No food allergies  Not immunocompromised  Skin:  No pallor or rash  No wound          Patient Active Problem List   Diagnosis    Positional vertigo    Focal nodular hyperplasia of liver    IPMN (intraductal papillary mucinous neoplasm)    Abnormal MRI of abdomen    Liver mass    Mediastinal adenopathy    Sarcoidosis, lung (HCC)    Granulomatous lymphadenitis    Edema of right lower extremity    Diastolic dysfunction    Complex tear of medial meniscus of right knee as current injury    Other tear of medial meniscus, current injury, right knee, initial encounter    Trigger middle finger of right hand    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 ([address] )    Bone metastasis ([address] )    Reactive airway disease     Past Medical History:   Diagnosis Date    Abdominal pain     Acute tonsillitis     Breast pain, left     Cancer (HCC)     liver&small intestine    Edema of both lower extremities     GERD without esophagitis     Kidney stone     " [place]"    Lesion of liver     Liver mass     Lymphadenopathy     Mediastinal lymphadenopathy     Neoplasm of digestive system     Orthostatic lightheadedness     Sarcoidosis     Vertigo      Past Surgical History:   Procedure Laterality Date    BREAST BIOPSY Left 1996    negative    CARPAL TUNNEL RELEASE Bilateral     COLONOSCOPY      2017    IR BIOPSY LIVER MASS  11/6/2018    IR BIOPSY LIVER MASS  12/8/2020    KNEE ARTHROSCOPY Left     LAPAROTOMY N/A 1/20/2021    Procedure: LAPAROTOMY EXPLORATORY;  Surgeon: Norberto Terrazas MD;  [facility];  Service: Surgical Oncology    LIVER LOBECTOMY N/A 1/20/2021    Procedure: LIVER ABLATION SEGMENT 7, INTRAOPERATIVE U/S OF LIVER;  Surgeon: Norberto Terrazas MD;  [facility];  Service: Surgical Oncology    MEDIASTINOSCOPY N/A 11/27/2018    Procedure: MEDIASTINOSCOPY;  Surgeon: Ramiro Shabazz MD;  [facility];  Service: Thoracic    MD BRONCHOSCOPY NEEDLE BX TRACHEA MAIN STEM&/BRON N/A 11/27/2018    Procedure: EBUS with biopsy;  Surgeon: Liseth Franklin MD;  [facility];  Service: Thoracic    [address] N/A 11/27/2018    Procedure: Rose Medina;  Surgeon: Liseth Franklin MD;  [facility];  Service: Thoracic    [place] ED[place] US EXAM SURGICAL ALTER STOM DUODENUM/JEJUNUM N/A 10/29/2018    Procedure: LINEAR ENDOSCOPIC U/S;  Surgeon: Vimal Tim MD;  Location[facility]; Service: Gastroenterology    SKIN BIOPSY      SMALL INTESTINE SURGERY N/A 1/20/2021    Procedure: RESECTION SMALL BOWEL AND ANASTOMOSIS, RESECTION SMALL BOWEL NODULE;  Surgeon: Juana Lee MD;  [facility];  Service: Surgical Oncology    WISDOM TOOTH EXTRACTION       Family History   Problem Relation Age of Onset    Alzheimer's disease Mother     Parkinsonism Father     Psoriasis Father     Colon cancer Maternal Grandfather     Psoriasis Sister     No Known Problems Daughter     Psoriasis Sister     Stroke Sister     No Known Problems Daughter      Social History     Socioeconomic History    Marital status:       Spouse name: Not on file    Number of children: Not on file    Years of education: Not on file    Highest education level: Not on file   Occupational History    Not on file   Tobacco Use    Smoking status: Never Smoker    Smokeless tobacco: Never Used   Vaping Use    Vaping Use: Never used   Substance and Sexual Activity    Alcohol use: Yes     Comment: occ wine    Drug use: No    Sexual activity: Not on file   Other Topics Concern    Not on file   Social History Narrative    Not on file     Social Determinants of Health     Financial Resource Strain: Not on file   Food Insecurity: Not on file   Transportation Needs: Not on file   Physical Activity: Not on file   Stress: Not on file   Social Connections: Not on file   Intimate Partner Violence: Not on file   Housing Stability: Not on file       Current Outpatient Medications:     aspirin 81 MG tablet, Take 1 tablet by mouth daily in the early morning , Disp: , Rfl:     Cholecalciferol (VITAMIN D) 2000 units CAPS, Take 1 tablet by mouth daily, Disp: , Rfl:     docusate sodium (COLACE) 100 mg capsule, Take 1 capsule (100 mg total) by mouth 2 (two) times a day, Disp: 10 capsule, Rfl: 0    fluticasone-salmeterol (Advair) 100-50 mcg/dose inhaler, Inhale 1 puff daily Rinse mouth after use , Disp: , Rfl:     hydrochlorothiazide (HYDRODIURIL) 12 5 mg tablet, Take 1 tablet by mouth once daily, Disp: 90 tablet, Rfl: 0    LANREOTIDE ACETATE SC, Inject under the skin every 30 (thirty) days , Disp: , Rfl:     Multiple Vitamin (MULTIVITAMIN) tablet, Take 1 tablet by mouth daily, Disp: , Rfl:     omeprazole (PriLOSEC) 40 MG capsule, Take 1 capsule (40 mg total) by mouth daily before breakfast, Disp: 90 capsule, Rfl: 3    triamcinolone (KENALOG) 0 1 % ointment, Apply 2-4 times daily up to 2 weeks if rash goes away stop medication, Disp: 30 g, Rfl: 1  Allergies   Allergen Reactions    Bactrim [Sulfamethoxazole-Trimethoprim] Hives    Clam Shell - Food Allergy Vomiting     Vitals:    08/26/22 0841   BP: 126/80   Pulse: 79   Resp: 16   Temp: (!) 96 8 °F (36 °C)   SpO2: 97%       Physical Exam  Constitutional: General appearance: The Patient is well-developed and well-nourished who appears the stated age in no acute distress  Patient is pleasant and talkative  HEENT:  Normocephalic  Sclerae are anicteric  Mucous membranes are moist  Neck is supple without adenopathy  No JVD  Chest: The lungs are clear to auscultation  Cardiac: Heart is regular rate  Abdomen: Abdomen is soft, non-tender, non-distended and without masses  Extremities: There is no clubbing or cyanosis  There is no edema  Symmetric  Neuro: Grossly nonfocal  Gait is normal      Lymphatic: No evidence of cervical adenopathy bilaterally  No evidence of axillary adenopathy bilaterally   No evidence of inguinal adenopathy bilaterally  Skin: Warm, anicteric  Psych:  Patient is pleasant and talkative  Breasts:        Pathology:  Component Ref Range & Units 7/29/22  8:50 AM 7/1/22  6:17 AM 5/31/22  6:03 AM 5/5/22  6:07 AM 4/6/22  7:05 AM 3/10/22  6:33 AM 2/10/22  7:13 AM   Chromogranin A 0 0 - 101 8 ng/mL 58 1  54 5 CM  59 7 CM  51 0 CM  72  1 CM            Labs:      Imaging  No results found  I reviewed the above laboratory and imaging data  Discussion/Summary: 70-year-old female with side branch IPMN that is essentially stable   This is less prominent on the most current CT   She then underwent small-bowel resection and microwave ablation of  segment 7 liver lesions   She completed radiation to T11 for a metastasis    She is on Lanreotide   She is clinically GERARDO at 1-1/2 years    Her imaging is stable and her chromogranin level is normal   I suspect the hilar node may be related to her sarcoid rather than neuroendocrine tumor   I have recommended observation  Martha Portillo she is asymptomatic and her imaging shows no recurrence, I will plan on seeing her again in 6 months with a repeat CT of her chest, abdomen and pelvis and a chromogranin level   This CT should be able to survey the pancreas as well, to make sure the IPMN is not changing    If there is residual viable disease, we can consider Sir spheres or PRRT if there is extrahepatic disease   She is agreeable to this plan   All of her questions were answered

## 2022-09-01 ENCOUNTER — TELEPHONE (OUTPATIENT)
Dept: HEMATOLOGY ONCOLOGY | Facility: CLINIC | Age: 72
End: 2022-09-01

## 2022-09-01 NOTE — TELEPHONE ENCOUNTER
Appointment Cancellation Or Reschedule     Person calling in patient   Provider Dr Hammad Burris   Office Visit Date and Time 9/7 @ 840   Office Visit Location [facility] [place]   Did patient want to reschedule their office appointment? If so, when was it scheduled to? Yes 10/25 @ 220   Did you have STAR scheduled for this appointment? no   Do you need STAR set up for your new appointment? If yes, please send to "PATIENT RIDESHARE" pool for STAR rescheduling no   If you are cancelling appointment, can we notify STAR to cancel ride? If yes, please send to "PATIENT RIDESHARE" pool for STAR to cancel service no   Is this patient calling to reschedule an infusion appointment? no   When is their next infusion appointment? 9/14    Is this patient a Chemo patient? no   Reason for Cancellation or Reschedule      If the patient is a treatment patient, please route this to the office nurse  If the patient is not on treatment, please route to the office MA  If the patient is a surgical oncology patient, please route to surg/onc clinical pool

## 2022-09-03 ENCOUNTER — HOSPITAL ENCOUNTER (OUTPATIENT)
Dept: MAMMOGRAPHY | Facility: HOSPITAL | Age: 72
Discharge: HOME | End: 2022-09-03
Payer: MEDICARE

## 2022-09-03 VITALS — HEIGHT: 62 IN | WEIGHT: 181 LBS | BODY MASS INDEX: 33.31 KG/M2

## 2022-09-03 DIAGNOSIS — Z12.31 SCREENING MAMMOGRAM FOR HIGH-RISK PATIENT: ICD-10-CM

## 2022-09-03 PROCEDURE — 77067 SCR MAMMO BI INCL CAD: CPT

## 2022-09-03 PROCEDURE — 77063 BREAST TOMOSYNTHESIS BI: CPT

## 2022-09-07 ENCOUNTER — TELEPHONE (OUTPATIENT)
Dept: HEMATOLOGY ONCOLOGY | Facility: CLINIC | Age: 72
End: 2022-09-07

## 2022-09-07 NOTE — TELEPHONE ENCOUNTER
09/07/22    RCVD VM from pt asking for the Chromogranin A script before her Lanreotide shot  I explained to her that I discussed with Dr Clay De La O months before that we stopped using chromogranin A to monitor carcinoid pts  She doesn't need to preform this test anymore  Only CBC/ CMP needed before her shot/ f/u with Dr Clay De LaO  Pt verbalized understanding and agreement

## 2022-09-08 ENCOUNTER — APPOINTMENT (OUTPATIENT)
Dept: LAB | Facility: CLINIC | Age: 72
End: 2022-09-08
Payer: MEDICARE

## 2022-09-09 ENCOUNTER — OFFICE VISIT (OUTPATIENT)
Dept: PULMONOLOGY | Facility: CLINIC | Age: 72
End: 2022-09-09
Payer: MEDICARE

## 2022-09-09 VITALS
TEMPERATURE: 97.2 F | DIASTOLIC BLOOD PRESSURE: 70 MMHG | WEIGHT: 182.6 LBS | HEIGHT: 62 IN | SYSTOLIC BLOOD PRESSURE: 114 MMHG | HEART RATE: 77 BPM | BODY MASS INDEX: 33.6 KG/M2 | OXYGEN SATURATION: 96 %

## 2022-09-09 DIAGNOSIS — D86.0 SARCOIDOSIS, LUNG (HCC): Primary | ICD-10-CM

## 2022-09-09 PROCEDURE — 99215 OFFICE O/P EST HI 40 MIN: CPT | Performed by: INTERNAL MEDICINE

## 2022-09-09 NOTE — ASSESSMENT & PLAN NOTE
Staci Coffey is stable with regards to her breathing  She did feel short of breath when she had COVID last month but starting to feel back to baseline  I reviewed her CT scan from July which showed no recurrence of her neuroendocrine tumors and stable lymphadenopathy  There is 1 lymph node that is slightly larger from 6-8 mm but will follow-up with a CT scan in 6 months  Would like to repeat PFTs with diffusion capacity to assure stability  She is up-to-date on her COVID vaccines and will get the flu shot this coming month

## 2022-09-09 NOTE — PROGRESS NOTES
Assessment/Plan:    Sarcoidosis, lung ([facility] [address] )  [facility] is stable with regards to her breathing  She did feel short of breath when she had COVID last month but starting to feel back to baseline  I reviewed her CT scan from July which showed no recurrence of her neuroendocrine tumors and stable lymphadenopathy  There is 1 lymph node that is slightly larger from 6-8 mm but will follow-up with a CT scan in 6 months  Would like to repeat PFTs with diffusion capacity to assure stability  She is up-to-date on her COVID vaccines and will get the flu shot this coming month  Diagnoses and all orders for this visit:    Sarcoidosis, lung ([facility] [address] )          Subjective:      Patient ID: Maria Elena Cadet is a 80 y o  female  Kelsey's stable with regards to her health  I reviewed her most recent CT scan  She did contract COVID in August and at that time was having more shortness of breath than usual   She is maintaining on her Mildred Gores and feels back to baseline  primary symptoms  Associated symptoms include coughing and headaches  Pertinent negatives include no chest pain, fever, myalgias or sore throat  The following portions of the patient's history were reviewed and updated as appropriate: allergies, current medications, past family history, past medical history, past social history, past surgical history and problem list     Review of Systems   Constitutional: Negative for appetite change and fever  HENT: Negative for ear pain, postnasal drip, rhinorrhea, sneezing, sore throat and trouble swallowing  Respiratory: Positive for cough, shortness of breath and wheezing  Cardiovascular: Negative for chest pain  Musculoskeletal: Negative for myalgias  Neurological: Positive for headaches           Objective:      /70 (BP Location: Left arm, Patient Position: Sitting)   Pulse 77   Temp (!) 97 2 °F (36 2 °C)   Ht 5' 2" (1 575 m)   Wt 82 8 kg (182 lb 9 6 oz)   LMP  (LMP Unknown)   SpO2 96%   BMI 33 40 kg/m²          Physical Exam  Constitutional:       Appearance: She is well-developed  HENT:      Head: Normocephalic  Eyes:      Pupils: Pupils are equal, round, and reactive to light  Cardiovascular:      Rate and Rhythm: Normal rate  Heart sounds: No murmur heard  Pulmonary:      Effort: Pulmonary effort is normal  No respiratory distress  Breath sounds: Normal breath sounds  No wheezing or rales  Abdominal:      Palpations: Abdomen is soft  Musculoskeletal:         General: Normal range of motion  Cervical back: Normal range of motion and neck supple  Skin:     General: Skin is warm and dry  Neurological:      Mental Status: She is alert and oriented to person, place, and time           Answers for HPI/ROS submitted by the patient on 9/2/2022  Chronicity: chronic  When did you first notice your symptoms?: more than 1 year ago  How often do your symptoms occur?: daily  Since you first noticed this problem, how has it changed?: waxing and waning  Do you have shortness of breath that occurs with effort or exertion?: Yes  Do you have ear congestion?: No  Do you have heartburn?: No  Do you have fatigue?: Yes  Do you have nasal congestion?: No  Do you have shortness of breath when lying flat?: No  Do you have shortness of breath when you wake up?: No  Do you have sweats?: No  Have you experienced weight loss?: No  Which of the following makes your symptoms worse?: any activity, climbing stairs, exercise, strenuous activity  Risk factors for lung disease: animal exposure

## 2022-09-12 DIAGNOSIS — C7B.8 METASTATIC MALIGNANT NEUROENDOCRINE TUMOR TO LIVER (HCC): Primary | ICD-10-CM

## 2022-09-12 RX ORDER — LANREOTIDE ACETATE 120 MG/.5ML
120 INJECTION SUBCUTANEOUS ONCE
Status: CANCELLED | OUTPATIENT
Start: 2022-09-14

## 2022-09-14 ENCOUNTER — HOSPITAL ENCOUNTER (OUTPATIENT)
Dept: INFUSION CENTER | Facility: CLINIC | Age: 72
Discharge: HOME | End: 2022-09-14
Payer: MEDICARE

## 2022-09-14 DIAGNOSIS — C7B.8 METASTATIC MALIGNANT NEUROENDOCRINE TUMOR TO LIVER (HCC): Primary | ICD-10-CM

## 2022-09-14 PROCEDURE — 96372 THER/PROPH/DIAG INJ SC/IM: CPT

## 2022-09-14 RX ORDER — LANREOTIDE ACETATE 120 MG/.5ML
120 INJECTION SUBCUTANEOUS ONCE
Status: CANCELLED | OUTPATIENT
Start: 2022-10-13

## 2022-09-14 RX ORDER — LANREOTIDE ACETATE 120 MG/.5ML
120 INJECTION SUBCUTANEOUS ONCE
Status: COMPLETED | OUTPATIENT
Start: 2022-09-14 | End: 2022-09-14

## 2022-09-14 RX ADMIN — LANREOTIDE ACETATE 120 MG: 120 INJECTION SUBCUTANEOUS at 09:52

## 2022-09-21 ENCOUNTER — HOSPITAL ENCOUNTER (OUTPATIENT)
Dept: PULMONOLOGY | Facility: HOSPITAL | Age: 72
Discharge: HOME | End: 2022-09-21
Attending: INTERNAL MEDICINE
Payer: MEDICARE

## 2022-09-21 ENCOUNTER — TELEPHONE (OUTPATIENT)
Dept: HEMATOLOGY ONCOLOGY | Facility: CLINIC | Age: 72
End: 2022-09-21

## 2022-09-21 DIAGNOSIS — D86.0 SARCOIDOSIS, LUNG (HCC): ICD-10-CM

## 2022-09-21 PROCEDURE — 94760 N-INVAS EAR/PLS OXIMETRY 1: CPT

## 2022-09-21 PROCEDURE — 94010 BREATHING CAPACITY TEST: CPT

## 2022-09-21 PROCEDURE — 94010 BREATHING CAPACITY TEST: CPT | Performed by: INTERNAL MEDICINE

## 2022-09-21 PROCEDURE — 94729 DIFFUSING CAPACITY: CPT

## 2022-09-21 PROCEDURE — 94726 PLETHYSMOGRAPHY LUNG VOLUMES: CPT

## 2022-09-21 PROCEDURE — 94726 PLETHYSMOGRAPHY LUNG VOLUMES: CPT | Performed by: INTERNAL MEDICINE

## 2022-09-21 PROCEDURE — 94729 DIFFUSING CAPACITY: CPT | Performed by: INTERNAL MEDICINE

## 2022-09-21 NOTE — TELEPHONE ENCOUNTER
CALL TRANSFER   Reason for patient call? Patient calling to reschedule infusion center appointment    Patient's primary physician? Dr Malia York   RN call was transferred to and time it was transferred? AN INF @ 3:25PM   Informed patient that the message will be forwarded to the team and someone will get back to them as soon as possible    Did you relay this information to the patient?  N/A

## 2022-10-06 ENCOUNTER — APPOINTMENT (OUTPATIENT)
Dept: LAB | Facility: CLINIC | Age: 72
End: 2022-10-06
Payer: MEDICARE

## 2022-10-07 ENCOUNTER — PATIENT MESSAGE (OUTPATIENT)
Dept: HEMATOLOGY ONCOLOGY | Facility: CLINIC | Age: 72
End: 2022-10-07

## 2022-10-11 ENCOUNTER — TELEPHONE (OUTPATIENT)
Dept: PULMONOLOGY | Facility: CLINIC | Age: 72
End: 2022-10-11

## 2022-10-11 DIAGNOSIS — D86.0 SARCOIDOSIS, LUNG (HCC): Primary | ICD-10-CM

## 2022-10-11 RX ORDER — FLUTICASONE PROPIONATE AND SALMETEROL 100; 50 UG/1; UG/1
1 POWDER RESPIRATORY (INHALATION) 2 TIMES DAILY
Qty: 60 BLISTER | Refills: 3 | Status: SHIPPED | OUTPATIENT
Start: 2022-10-11

## 2022-10-11 NOTE — TELEPHONE ENCOUNTER
Pt is calling stating pharmacy advised we cancelled generic advair   Please send to [address]

## 2022-10-11 NOTE — TELEPHONE ENCOUNTER
Pt needs a refill for generic advair  She was told the refills she had were cancelled   Please send to [address] [place] [address] Street

## 2022-10-13 ENCOUNTER — HOSPITAL ENCOUNTER (OUTPATIENT)
Dept: INFUSION CENTER | Facility: CLINIC | Age: 72
Discharge: HOME | End: 2022-10-13
Payer: MEDICARE

## 2022-10-13 DIAGNOSIS — C7B.8 METASTATIC MALIGNANT NEUROENDOCRINE TUMOR TO LIVER (HCC): Primary | ICD-10-CM

## 2022-10-13 PROCEDURE — 96372 THER/PROPH/DIAG INJ SC/IM: CPT

## 2022-10-13 RX ORDER — LANREOTIDE ACETATE 120 MG/.5ML
120 INJECTION SUBCUTANEOUS ONCE
Status: COMPLETED | OUTPATIENT
Start: 2022-10-13 | End: 2022-10-13

## 2022-10-13 RX ORDER — LANREOTIDE ACETATE 120 MG/.5ML
120 INJECTION SUBCUTANEOUS ONCE
OUTPATIENT
Start: 2022-11-09

## 2022-10-13 RX ADMIN — LANREOTIDE ACETATE 120 MG: 120 INJECTION SUBCUTANEOUS at 10:21

## 2022-10-19 ENCOUNTER — ANESTHESIA EVENT (OUTPATIENT)
Dept: GASTROENTEROLOGY | Facility: HOSPITAL | Age: 72
End: 2022-10-19

## 2022-10-19 ENCOUNTER — ANESTHESIA (OUTPATIENT)
Dept: GASTROENTEROLOGY | Facility: HOSPITAL | Age: 72
End: 2022-10-19

## 2022-10-19 ENCOUNTER — HOSPITAL ENCOUNTER (OUTPATIENT)
Dept: GASTROENTEROLOGY | Facility: HOSPITAL | Age: 72
Setting detail: OUTPATIENT SURGERY
Discharge: HOME | End: 2022-10-19
Attending: COLON & RECTAL SURGERY
Payer: MEDICARE

## 2022-10-19 VITALS
HEART RATE: 65 BPM | HEIGHT: 63 IN | OXYGEN SATURATION: 94 % | WEIGHT: 179 LBS | SYSTOLIC BLOOD PRESSURE: 110 MMHG | TEMPERATURE: 96.3 F | DIASTOLIC BLOOD PRESSURE: 59 MMHG | BODY MASS INDEX: 31.71 KG/M2 | RESPIRATION RATE: 18 BRPM

## 2022-10-19 DIAGNOSIS — D86.0 SARCOIDOSIS, LUNG (HCC): ICD-10-CM

## 2022-10-19 DIAGNOSIS — C7B.8 METASTATIC MALIGNANT NEUROENDOCRINE TUMOR TO LIVER (HCC): ICD-10-CM

## 2022-10-19 DIAGNOSIS — Z86.010 PERSONAL HISTORY OF COLONIC POLYPS: ICD-10-CM

## 2022-10-19 DIAGNOSIS — K57.90 DIVERTICULOSIS: ICD-10-CM

## 2022-10-19 DIAGNOSIS — K76.89 FOCAL NODULAR HYPERPLASIA OF LIVER: ICD-10-CM

## 2022-10-19 DIAGNOSIS — J45.20 MILD INTERMITTENT REACTIVE AIRWAY DISEASE WITHOUT COMPLICATION: ICD-10-CM

## 2022-10-19 PROCEDURE — 99213 OFFICE O/P EST LOW 20 MIN: CPT | Performed by: COLON & RECTAL SURGERY

## 2022-10-19 PROCEDURE — G0105 COLORECTAL SCRN; HI RISK IND: HCPCS | Performed by: COLON & RECTAL SURGERY

## 2022-10-19 RX ORDER — PROPOFOL 10 MG/ML
INJECTION, EMULSION INTRAVENOUS AS NEEDED
Status: DISCONTINUED | OUTPATIENT
Start: 2022-10-19 | End: 2022-10-19

## 2022-10-19 RX ORDER — LIDOCAINE HYDROCHLORIDE 10 MG/ML
INJECTION, SOLUTION EPIDURAL; INFILTRATION; INTRACAUDAL; PERINEURAL AS NEEDED
Status: DISCONTINUED | OUTPATIENT
Start: 2022-10-19 | End: 2022-10-19

## 2022-10-19 RX ORDER — SODIUM CHLORIDE 9 MG/ML
INJECTION, SOLUTION INTRAVENOUS CONTINUOUS PRN
Status: DISCONTINUED | OUTPATIENT
Start: 2022-10-19 | End: 2022-10-19

## 2022-10-19 RX ADMIN — PROPOFOL 50 MG: 10 INJECTION, EMULSION INTRAVENOUS at 08:55

## 2022-10-19 RX ADMIN — SODIUM CHLORIDE: 0.9 INJECTION, SOLUTION INTRAVENOUS at 08:42

## 2022-10-19 RX ADMIN — PROPOFOL 50 MG: 10 INJECTION, EMULSION INTRAVENOUS at 08:48

## 2022-10-19 RX ADMIN — LIDOCAINE HYDROCHLORIDE 50 MG: 10 INJECTION, SOLUTION EPIDURAL; INFILTRATION; INTRACAUDAL; PERINEURAL at 08:44

## 2022-10-19 RX ADMIN — PROPOFOL 50 MG: 10 INJECTION, EMULSION INTRAVENOUS at 08:46

## 2022-10-19 RX ADMIN — PROPOFOL 100 MG: 10 INJECTION, EMULSION INTRAVENOUS at 08:44

## 2022-10-19 RX ADMIN — PROPOFOL 50 MG: 10 INJECTION, EMULSION INTRAVENOUS at 08:52

## 2022-10-19 RX ADMIN — PROPOFOL 50 MG: 10 INJECTION, EMULSION INTRAVENOUS at 08:50

## 2022-10-19 NOTE — ANESTHESIA PREPROCEDURE EVALUATION
Procedure:  COLONOSCOPY    Relevant Problems   GI/HEPATIC   (+) Focal nodular hyperplasia of liver   (+) IPMN (intraductal papillary mucinous neoplasm)   (+) 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 (HCC)      Respiratory   (+) Sarcoidosis, lung (HCC)      Other   (+) Diastolic dysfunction   (+) Edema of right lower extremity   (+) Granulomatous lymphadenitis   (+) Mediastinal adenopathy        Carcinoid syndrome, receiving lanreotide injections, last received 10/13/22    7/5/22 Borderline prominent left hilar lymph node measuring 8 mm in short axis, previously 6 mm  The mild interval increase in size of this lymph node is of uncertain clinical significance and developing maykel metastatic disease cannot be excluded  Correlation is recommended  At minimum, continued short interval follow-up with contrast-enhanced CT of chest in 3 months is recommended to ensure stability  Consider further evaluation with NETSPOT PET/CT as clinically indicated    2   Stable hypodense hepatic lesions in this patient with history of prior ablation of hepatic metastatic disease  No new or enlarging hepatic lesions    3   Stable asymmetric sclerosis of the right iliac bone  Physical Exam    Airway    Mallampati score: II  TM Distance: >3 FB  Neck ROM: full     Dental   No notable dental hx     Cardiovascular      Pulmonary      Other Findings        Anesthesia Plan  ASA Score- 4     Anesthesia Type- IV sedation with anesthesia with ASA Monitors  Additional Monitors:   Airway Plan:           Plan Factors-    Chart reviewed  EKG reviewed  Imaging results reviewed  Existing labs reviewed  Patient summary reviewed  Patient is not a current smoker  Induction- intravenous  Postoperative Plan-     Informed Consent- Anesthetic plan and risks discussed with patient  I personally reviewed this patient with the CRNA  Discussed and agreed on the Anesthesia Plan with the CRNA  Ronald Louis

## 2022-10-19 NOTE — H&P
History and Physical   Colon and Rectal Surgery   Viraj Linn 71 y o  female MRN: [id number]  Unit/Bed#:  Encounter: [id number]  10/19/22   8:42 AM      CC:   History of polyps    History of Present Illness   HPI:  Viraj Linn is a 71 y o  female for screening, high risk    Historical Information   Past Medical History:   Diagnosis Date   • Abdominal pain    • Acute tonsillitis    • Breast pain, left    • Cancer (HCC)     liver&small intestine   • Edema of both lower extremities    • GERD without esophagitis    • Kidney stone     " [place]"   • Lesion of liver    • Liver mass    • Lymphadenopathy    • Mediastinal lymphadenopathy    • Neoplasm of digestive system    • Orthostatic lightheadedness    • Sarcoidosis    • Vertigo      Past Surgical History:   Procedure Laterality Date   • BREAST BIOPSY Left 1996    negative   • CARPAL TUNNEL RELEASE Bilateral    • COLONOSCOPY      2017   • IR BIOPSY LIVER MASS  11/6/2018   • IR BIOPSY LIVER MASS  12/8/2020   • KNEE ARTHROSCOPY Left    • LAPAROTOMY N/A 1/20/2021    Procedure: LAPAROTOMY EXPLORATORY;  Surgeon: Sheron Abbott MD;  [facility];  Service: Surgical Oncology   • LIVER LOBECTOMY N/A 1/20/2021    Procedure: LIVER ABLATION SEGMENT 7, INTRAOPERATIVE U/S OF LIVER;  Surgeon: Sheron Abbott MD;  [facility];  Service: Surgical Oncology   • MEDIASTINOSCOPY N/A 11/27/2018    Procedure: MEDIASTINOSCOPY;  Surgeon: Samira Chavez MD;  [facility];  Service: Thoracic   • NM BRONCHOSCOPY NEEDLE BX TRACHEA MAIN STEM&/BRON N/A 11/27/2018    Procedure: EBUS with biopsy;  Surgeon: Samira Chavez MD;  [facility];  Service: Thoracic   • NM BRONCHOSCOPY,DIAGNOSTIC N/A 11/27/2018    Procedure: BRONCHOSCOPY FLEXIBLE;  Surgeon: Samira Chavez MD;  [facility];  Service: Thoracic   • NM EDG US EXAM SURGICAL ALTER STOM DUODENUM/JEJUNUM N/A 10/29/2018    Procedure: LINEAR ENDOSCOPIC U/S;  Surgeon: Thien Rodgers MD;  [facility];   Service: Gastroenterology   • SKIN BIOPSY     • SMALL INTESTINE SURGERY N/A 1/20/2021    Procedure: RESECTION SMALL BOWEL AND ANASTOMOSIS, RESECTION SMALL BOWEL NODULE;  Surgeon: Rosa Isela Gallagher MD;  [facility];  Service: Surgical Oncology   • WISDOM TOOTH EXTRACTION         Meds/Allergies     (Not in a hospital admission)        Current Outpatient Medications:   •  aspirin 81 MG tablet, Take 1 tablet by mouth daily in the early morning , Disp: , Rfl:   •  Cholecalciferol (VITAMIN D) 2000 units CAPS, Take 1 tablet by mouth daily, Disp: , Rfl:   •  docusate sodium (COLACE) 100 mg capsule, Take 1 capsule (100 mg total) by mouth 2 (two) times a day, Disp: 10 capsule, Rfl: 0  •  Fluticasone-Salmeterol (Advair) 100-50 mcg/dose inhaler, Inhale 1 puff 2 (two) times a day Rinse mouth after use , Disp: 60 blister, Rfl: 3  •  hydrochlorothiazide (HYDRODIURIL) 12 5 mg tablet, Take 1 tablet by mouth once daily, Disp: 90 tablet, Rfl: 0  •  LANREOTIDE ACETATE SC, Inject under the skin every 30 (thirty) days , Disp: , Rfl:   •  Multiple Vitamin (MULTIVITAMIN) tablet, Take 1 tablet by mouth daily, Disp: , Rfl:   •  omeprazole (PriLOSEC) 40 MG capsule, Take 1 capsule (40 mg total) by mouth daily before breakfast, Disp: 90 capsule, Rfl: 3  •  triamcinolone (KENALOG) 0 1 % ointment, Apply 2-4 times daily up to 2 weeks if rash goes away stop medication, Disp: 30 g, Rfl: 1    Allergies   Allergen Reactions   • Bactrim [Sulfamethoxazole-Trimethoprim] Hives   • Clam Shell - Food Allergy Vomiting         Social History   Social History     Substance and Sexual Activity   Alcohol Use Yes    Comment: occ wine     Social History     Substance and Sexual Activity   Drug Use No     Social History     Tobacco Use   Smoking Status Never Smoker   Smokeless Tobacco Never Used         Family History:   Family History   Problem Relation Age of Onset   • Alzheimer's disease Mother    • Parkinsonism Father    • Psoriasis Father    • Colon cancer Maternal Grandfather    • Psoriasis Sister    • No Known Problems Daughter    • Psoriasis Sister    • Stroke Sister    • No Known Problems Daughter      Review of Systems - General ROS: negative  Respiratory ROS: negative  Cardiovascular ROS: negative     Objective     Current Vitals:   Blood Pressure: 146/69 (10/19/22 0745)  Pulse: 88 (10/19/22 0745)  Temperature: 97 6 °F (36 4 °C) (10/19/22 0745)  Temp Source: Tympanic (10/19/22 0745)  Respirations: 16 (10/19/22 0745)  Height: 5' 2 5" (158 8 cm) (10/19/22 0745)  Weight - Scale: 81 2 kg (179 lb) (10/19/22 0745)  SpO2: 97 % (10/19/22 0745)  No intake or output data in the 24 hours ending 10/19/22 0842    Physical Exam:  General:  Well nourished, no distress  Neuro: Alert and oriented  Eyes:Sclera anicteric, conjunctiva pink  Pulm: Clear to auscultation bilaterally  No respiratory Distress  CV:  Regular rate and rhythm  No murmurs  Abdomen:  Soft, flat, non-tender, without masses or hepatosplenomegaly  Lab Results:       ASSESSMENT:  Mahad Long is a 71 y o  female for screening  PLAN:  Colonoscopy  Risks , including, but not limited to, bleeding, perforation, missed lesions, and potential need for surgery, were reviewed  Alternatives to colonoscopy were discussed    Krysta Sim MD

## 2022-10-19 NOTE — ANESTHESIA POSTPROCEDURE EVALUATION
Post-Op Assessment Note    CV Status:  Stable  Pain Score: 0    Pain management: adequate     Mental Status:  Alert   Hydration Status:  Euvolemic   PONV Controlled:  None   Airway Patency:  Patent      Post Op Vitals Reviewed: Yes      Staff: CRNA         No complications documented      BP 99/51 (10/19/22 0903)    Temp (!) 96 3 °F (35 7 °C) (10/19/22 0903)    Pulse 69 (10/19/22 0903)   Resp 18 (10/19/22 0903)    SpO2 97 % (10/19/22 0903)

## 2022-10-25 ENCOUNTER — OFFICE VISIT (OUTPATIENT)
Dept: HEMATOLOGY ONCOLOGY | Facility: CLINIC | Age: 72
End: 2022-10-25
Payer: MEDICARE

## 2022-10-25 VITALS
HEART RATE: 74 BPM | TEMPERATURE: 98 F | HEIGHT: 62 IN | DIASTOLIC BLOOD PRESSURE: 76 MMHG | SYSTOLIC BLOOD PRESSURE: 126 MMHG | RESPIRATION RATE: 14 BRPM | WEIGHT: 186 LBS | OXYGEN SATURATION: 98 % | BODY MASS INDEX: 34.23 KG/M2

## 2022-10-25 DIAGNOSIS — C7B.8 METASTATIC MALIGNANT NEUROENDOCRINE TUMOR TO LIVER (HCC): Primary | ICD-10-CM

## 2022-10-25 DIAGNOSIS — C79.51 BONE METASTASIS (HCC): ICD-10-CM

## 2022-10-25 PROCEDURE — 99214 OFFICE O/P EST MOD 30 MIN: CPT | Performed by: INTERNAL MEDICINE

## 2022-10-25 NOTE — PROGRESS NOTES
Hematology/Oncology Outpatient Follow- up Note  Deysi Lu 71 y o  female MRN: @ Encounter: [id number]        Date:  10/25/2022    Presenting Complaint/Diagnosis : Metastatic well-differentiated carcinoid tumor    HPI:    José Luis Jones seen for initial consultation 1/12/2021 regarding a metastatic well-differentiated carcinoid tumor   The patient has seen our colleagues in surgery and is actually going for resection  Krysta Michel PET-CT scan had showed     1  At least 3 foci of radiotracer uptake within the small bowel suspicious for underlying neuroendocrine lesions   Consider follow-up with CT enterography or small bowel pill study  2  Increased radiotracer uptake in two mesenteric soft tissue nodules suspicious for metastasis  3  Focal radiotracer uptake in the right lobe of the liver posteriorly compatible with the known metastasis   Slight focal increased radiotracer uptake in the right lobe of the liver anteriorly corresponding to the 5 mm subcapsular lesion segment 8   lesion seen on MRI suspicious for additional metastasis  4  Focal radiotracer uptake at the T11 vertebral body suspicious for osseous metastasis      MRI revealed the T11 vertebral body did have malignancy present      Again biopsy has confirmed well-differentiated neuroendocrine tumor      Previous Hematologic/ Oncologic History:    Oncology History   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 ([facility] [address] )   12/8/2020 Initial Diagnosis    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 ([facility] [address] )     12/8/2020 Biopsy    IR Liver biopsy:  A  Liver mass, needle core biopsy:  - Metastatic well-differentiated neuroendocrine tumor, G2     1/18/2021 -  Chemotherapy    Lanreotide injections (monthly)     1/20/2021 Surgery    Resection of small bowel and anastomosis, segment 7 liver ablation    A  Small intestine, resection:  - Well- differentiated neuroendocrine tumor (up to 2 1 cm), G2, at least three foci    - All margins are negative for tumor   - Three of thirteen lymph nodes are positive for tumor (3/13)  B  Small bowel nodule, excision:  - Gastrointestinal stromal tumor (GIST), spindle cell type, low grade, 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  Bone metastasis ([facility] [address] )   1/28/2021 Initial Diagnosis    Bone metastasis ([facility] [address] )     2/18/2021 - 3/3/2021 Radiation    Treatment:  Course: C1    Plan ID Energy Fractions Dose per Fraction (cGy) Dose Correction (cGy) Total Dose Delivered (cGy) Elapsed Days   T10_T12 Spine 10X/6X 10 / 10 300 0 3,000 13              Surgery and ablation of liver lesions along with radiation     Current Hematologic/ Oncologic Treatment:      lanreotide    Interval History:      Patient returns for follow-up visit  She is doing well  She is on lanreotide  Her scan in July was mostly stable  There was a 2 mm fluctuation in the lymph node in the subcarinal region for which follow-up was recommended and she is having a scan done under the care of her surgeon in February  Patient herself is at baseline  Blood work is within acceptable limits  Denies any nausea denies any vomiting denies any diarrhea  The rest of her 14 point review of systems today was negative  Test Results:    Imaging: Colonoscopy    Result Date: 10/19/2022  Narrative: [address] [place] [address] Endoscopy [address] [place] [address] [phone] DATE OF SERVICE: 10/19/22 PHYSICIAN(S): Attending: Edison Alba MD Fellow: No Staff Documented INDICATION: Personal history of colonic polyps, Diverticulosis, Sarcoidosis, lung ([facility] [address] ), Focal nodular hyperplasia of liver, 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 [facility]), Mild intermittent reactive airway disease without complication POST-OP DIAGNOSIS: See the impression below  HISTORY: Prior colonoscopy: 5 years ago  BOWEL PREPARATION: Miralax/Dulcolax PREPROCEDURE: Informed consent was obtained for the procedure, including sedation   Risks including but not limited to bleeding, infection, perforation, adverse drug reaction and aspiration were explained in detail  Also explained about less than 100% sensitivity with the exam and other alternatives  The patient was placed in the left lateral decubitus position  DETAILS OF PROCEDURE: Patient was taken to the procedure room where a time out was performed to confirm correct patient and correct procedure  The patient underwent monitored anesthesia care, which was administered by an anesthesia professional  The patient's blood pressure, heart rate, level of consciousness, oxygen and respirations were monitored throughout the procedure  A digital rectal exam was performed  The scope was introduced through the anus and advanced to the cecum  The quality of bowel preparation was evaluated using the [facility] Bowel Preparation Scale with scores of: right colon = 2, transverse colon = 2, left colon = 2  The total BBPS score was 6  Bowel prep was adequate  The patient experienced no blood loss  The procedure was not difficult  The patient tolerated the procedure well  There were no apparent complications  Normal rectum exam  ANESTHESIA INFORMATION: ASA: IV Anesthesia Type: IV Sedation with Anesthesia MEDICATIONS: No administrations occurring from 0842 to 0902 on 10/19/22 FINDINGS: Few mild diverticula in the sigmoid colon Normal ileocecal valve and appendiceal orifice   EVENTS: Procedure Events Event Event Time ENDO CECUM REACHED 10/19/2022  8:52 AM ENDO SCOPE OUT TIME 10/19/2022  9:00 AM SPECIMENS: * No specimens in log * EQUIPMENT: Colonoscope -[id number]     Impression: Diverticulosis RECOMMENDATION: Repeat colonoscopy in 5 years due to a personal history of colon polyps  Danette Ritter MD       Labs:   Lab Results   Component Value Date    WBC 6 60 10/06/2022    HGB 11 8 10/06/2022    HCT 37 3 10/06/2022    MCV 92 10/06/2022     10/06/2022     Lab Results   Component Value Date    K 3 7 10/06/2022     10/06/2022    CO2 31 10/06/2022    BUN 21 10/06/2022 CREATININE 0 81 10/06/2022    GLUF 100 (H) 10/06/2022    CALCIUM 9 8 10/06/2022    CORRECTEDCA 9 0 04/06/2022    AST 23 10/06/2022    ALT 19 10/06/2022    ALKPHOS 97 10/06/2022    EGFR 73 10/06/2022       ROS: As stated in the history of present illness otherwise his 14 point review of systems today was negative        Active Problems:   Patient Active Problem List   Diagnosis   • Positional vertigo   • Focal nodular hyperplasia of liver   • IPMN (intraductal papillary mucinous neoplasm)   • Abnormal MRI of abdomen   • Liver mass   • Mediastinal adenopathy   • Sarcoidosis, lung (HCC)   • Granulomatous lymphadenitis   • Edema of right lower extremity   • Diastolic dysfunction   • Complex tear of medial meniscus of right knee as current injury   • Other tear of medial meniscus, current injury, right knee, initial encounter   • Trigger middle finger of right hand   • 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 ([address] )   • Bone metastasis ([address] )   • Reactive airway disease       Past Medical History:   Past Medical History:   Diagnosis Date   • Abdominal pain    • Acute tonsillitis    • Breast pain, left    • Cancer ([address] )     liver&small intestine   • Edema of both lower extremities    • GERD without esophagitis    • Kidney stone     " [place]"   • Lesion of liver    • Liver mass    • Lymphadenopathy    • Mediastinal lymphadenopathy    • Neoplasm of digestive system    • Orthostatic lightheadedness    • Sarcoidosis    • Vertigo        Surgical History:   Past Surgical History:   Procedure Laterality Date   • BREAST BIOPSY Left 1996    negative   • CARPAL TUNNEL RELEASE Bilateral    • COLONOSCOPY      2017   • IR BIOPSY LIVER MASS  11/6/2018   • IR BIOPSY LIVER MASS  12/8/2020   • KNEE ARTHROSCOPY Left    • LAPAROTOMY N/A 1/20/2021    Procedure: LAPAROTOMY EXPLORATORY;  Surgeon: Vivi Collier MD;  [facility];  Service: Surgical Oncology   • LIVER LOBECTOMY N/A 1/20/2021    Procedure: LIVER ABLATION SEGMENT 7, INTRAOPERATIVE U/S OF LIVER;  Surgeon: Enrique Gaines MD;  [facility];  Service: Surgical Oncology   • MEDIASTINOSCOPY N/A 11/27/2018    Procedure: Rakesh Almonte;  Surgeon: Gina Sheppard MD;  [facility];  Service: Thoracic   • CA BRONCHOSCOPY NEEDLE BX TRACHEA MAIN STEM&/BRON N/A 11/27/2018    Procedure: EBUS with biopsy;  Surgeon: Gina Sheppard MD;  [facility];  Service: Thoracic   • CA BRONCHOSCOPY,DIAGNOSTIC N/A 11/27/2018    Procedure: Malorie Lamb;  Surgeon: Gina Sheppard MD;  [facility];  Service: Thoracic   • CA EDG US EXAM SURGICAL ALTER STOM DUODENUM/JEJUNUM N/A 10/29/2018    Procedure: LINEAR ENDOSCOPIC U/S;  Surgeon: Sheree Lobato MD;  [facility]; Service: Gastroenterology   • SKIN BIOPSY     • SMALL INTESTINE SURGERY N/A 1/20/2021    Procedure: RESECTION SMALL BOWEL AND ANASTOMOSIS, RESECTION SMALL BOWEL NODULE;  Surgeon: Enrique Gaines MD;  [facility];  Service: Surgical Oncology   • WISDOM TOOTH EXTRACTION         Family History:    Family History   Problem Relation Age of Onset   • Alzheimer's disease Mother    • Parkinsonism Father    • Psoriasis Father    • Colon cancer Maternal Grandfather    • Psoriasis Sister    • No Known Problems Daughter    • Psoriasis Sister    • Stroke Sister    • No Known Problems Daughter        Cancer-related family history includes Colon cancer in her maternal grandfather  Social History:   Social History     Socioeconomic History   • Marital status:       Spouse name: Not on file   • Number of children: Not on file   • Years of education: Not on file   • Highest education level: Not on file   Occupational History   • Not on file   Tobacco Use   • Smoking status: Never Smoker   • Smokeless tobacco: Never Used   Vaping Use   • Vaping Use: Never used   Substance and Sexual Activity   • Alcohol use: Yes     Comment: occ wine   • Drug use: No   • Sexual activity: Not on file   Other Topics Concern   • Not on file   Social History Narrative   • Not on file     Social Determinants of Health     Financial Resource Strain: Not on file   Food Insecurity: Not on file   Transportation Needs: Not on file   Physical Activity: Not on file   Stress: Not on file   Social Connections: Not on file   Intimate Partner Violence: Not on file   Housing Stability: Not on file       Current Medications:   Current Outpatient Medications   Medication Sig Dispense Refill   • aspirin 81 MG tablet Take 1 tablet by mouth daily in the early morning      • Cholecalciferol (VITAMIN D) 2000 units CAPS Take 1 tablet by mouth daily     • docusate sodium (COLACE) 100 mg capsule Take 1 capsule (100 mg total) by mouth 2 (two) times a day 10 capsule 0   • Fluticasone-Salmeterol (Advair) 100-50 mcg/dose inhaler Inhale 1 puff 2 (two) times a day Rinse mouth after use  60 blister 3   • hydrochlorothiazide (HYDRODIURIL) 12 5 mg tablet Take 1 tablet by mouth once daily 90 tablet 0   • LANREOTIDE ACETATE SC Inject under the skin every 30 (thirty) days      • Multiple Vitamin (MULTIVITAMIN) tablet Take 1 tablet by mouth daily     • omeprazole (PriLOSEC) 40 MG capsule Take 1 capsule (40 mg total) by mouth daily before breakfast 90 capsule 3   • triamcinolone (KENALOG) 0 1 % ointment Apply 2-4 times daily up to 2 weeks if rash goes away stop medication 30 g 1     No current facility-administered medications for this visit  Allergies: Allergies   Allergen Reactions   • Bactrim [Sulfamethoxazole-Trimethoprim] Hives   • Clam Shell - Food Allergy Vomiting       Physical Exam:    Body surface area is 1 85 meters squared      Wt Readings from Last 3 Encounters:   10/25/22 84 4 kg (186 lb)   10/19/22 81 2 kg (179 lb)   09/09/22 82 8 kg (182 lb 9 6 oz)        Temp Readings from Last 3 Encounters:   10/25/22 98 °F (36 7 °C) (Temporal)   10/19/22 (!) 96 3 °F (35 7 °C) (Tympanic)   09/09/22 (!) 97 2 °F (36 2 °C)        BP Readings from Last 3 Encounters:   10/25/22 126/76   10/19/22 110/59   09/09/22 114/70         Pulse Readings from Last 3 Encounters:   10/25/22 74   10/19/22 65   09/09/22 77         Physical Exam     Constitutional   General appearance: No acute distress, well appearing and well nourished  Eyes   Conjunctiva and lids: No swelling, erythema or discharge  Pupils and irises: Equal, round and reactive to light  Ears, Nose, Mouth, and Throat   External inspection of ears and nose: Normal     Nasal mucosa, septum, and turbinates: Normal without edema or erythema  Oropharynx: Normal with no erythema, edema, exudate or lesions  Pulmonary   Respiratory effort: No increased work of breathing or signs of respiratory distress  Auscultation of lungs: Clear to auscultation  Cardiovascular   Palpation of heart: Normal PMI, no thrills  Auscultation of heart: Normal rate and rhythm, normal S1 and S2, without murmurs  Examination of extremities for edema and/or varicosities: Normal     Carotid pulses: Normal     Abdomen   Abdomen: Non-tender, no masses  Liver and spleen: No hepatomegaly or splenomegaly  Lymphatic   Palpation of lymph nodes in neck: No lymphadenopathy  Musculoskeletal   Gait and station: Normal     Digits and nails: Normal without clubbing or cyanosis  Inspection/palpation of joints, bones, and muscles: Normal     Skin   Skin and subcutaneous tissue: Normal without rashes or lesions  Neurologic   Cranial nerves: Cranial nerves 2-12 intact  Sensation: No sensory loss      Psychiatric   Orientation to person, place, and time: Normal     Mood and affect: Normal         Assessment / Plan:      78-year-old female with  A history of well-differentiated neuroendocrine tumor   She also has side branch IPMN that is essentially stable   This is less than 2 cm in size   This is being observed with her MRI    She then underwent small-bowel resection and microwave ablation segment 7 liver lesions   She completed radiation to T11 for a metastasis  For her well-differentiated neuroendocrine tumor  Selin Martin is on Lanreotide  Imaging in July had shown borderline prominent left hilar lymph node measuring 8 mm previously 6 mm port short interval follow-up was recommended at 3 months  There was stable hypodense hepatic lesions in this patient with history of prior ablation of hepatic metastatic disease with no new enlarging hepatic lesions and stable asymmetric sclerosis of the right iliac bone  She has reimaging scheduled with her surgeon  Most recent mammogram was stable  If she has progression depending on the site we can consider Sir spheres or PRRT if there is extrahepatic disease   She is agreeable to this plan   All of her questions were answered  I will see her back in 4 months          Goals and Barriers:  Current Goal:  Prolong Survival from neuroendocrine tumor  Barriers: None  Patient's Capacity to Self Care:  Patient able to self care  Portions of the record may have been created with voice recognition software  Occasional wrong word or "sound a like" substitutions may have occurred due to the inherent limitations of voice recognition software  Read the chart carefully and recognize, using context, where substitutions have occurred

## 2022-10-26 ENCOUNTER — TELEPHONE (OUTPATIENT)
Dept: HEMATOLOGY ONCOLOGY | Facility: CLINIC | Age: 72
End: 2022-10-26

## 2022-10-26 NOTE — TELEPHONE ENCOUNTER
Follow-up disposition: Return in about 6 months (around 4/25/2023)  Check out comments: See back in 6 months  Blood work every month  Lanreotide every month     6 month f/u scheduled

## 2022-11-01 ENCOUNTER — APPOINTMENT (OUTPATIENT)
Dept: LAB | Facility: CLINIC | Age: 72
End: 2022-11-01

## 2022-11-08 ENCOUNTER — HOSPITAL ENCOUNTER (OUTPATIENT)
Dept: INFUSION CENTER | Facility: CLINIC | Age: 72
Discharge: HOME | End: 2022-11-08

## 2022-11-08 DIAGNOSIS — C7B.8 METASTATIC MALIGNANT NEUROENDOCRINE TUMOR TO LIVER (HCC): Primary | ICD-10-CM

## 2022-11-08 RX ORDER — LANREOTIDE ACETATE 120 MG/.5ML
120 INJECTION SUBCUTANEOUS ONCE
Status: COMPLETED | OUTPATIENT
Start: 2022-11-08 | End: 2022-11-08

## 2022-11-08 RX ORDER — LANREOTIDE ACETATE 120 MG/.5ML
120 INJECTION SUBCUTANEOUS ONCE
OUTPATIENT
Start: 2022-11-10

## 2022-11-08 RX ADMIN — LANREOTIDE ACETATE 120 MG: 120 INJECTION SUBCUTANEOUS at 09:25

## 2022-11-08 NOTE — PROGRESS NOTES
Pt to clinic for lanreotide injection, pt tolerated injection in left buttocks   Pt aware of next appointment, declined avs

## 2022-11-29 ENCOUNTER — APPOINTMENT (OUTPATIENT)
Dept: LAB | Facility: CLINIC | Age: 72
End: 2022-11-29

## 2022-12-06 ENCOUNTER — HOSPITAL ENCOUNTER (OUTPATIENT)
Dept: INFUSION CENTER | Facility: CLINIC | Age: 72
Discharge: HOME | End: 2022-12-06

## 2022-12-06 DIAGNOSIS — C7B.8 METASTATIC MALIGNANT NEUROENDOCRINE TUMOR TO LIVER (HCC): Primary | ICD-10-CM

## 2022-12-06 RX ORDER — LANREOTIDE ACETATE 120 MG/.5ML
120 INJECTION SUBCUTANEOUS ONCE
OUTPATIENT
Start: 2023-01-03

## 2022-12-06 RX ORDER — LANREOTIDE ACETATE 120 MG/.5ML
120 INJECTION SUBCUTANEOUS ONCE
Status: COMPLETED | OUTPATIENT
Start: 2022-12-06 | End: 2022-12-06

## 2022-12-06 RX ADMIN — LANREOTIDE ACETATE 120 MG: 120 INJECTION SUBCUTANEOUS at 09:32

## 2022-12-06 NOTE — PROGRESS NOTES
Patient here for lanreotide injection, offers no complaints  Injection given SQ in right outer gluteus, bandaid applied   She is aware of her next appointment, declined AVS

## 2022-12-20 DIAGNOSIS — D86.0 SARCOIDOSIS, LUNG (HCC): ICD-10-CM

## 2022-12-20 RX ORDER — HYDROCHLOROTHIAZIDE 12.5 MG/1
TABLET ORAL
Qty: 90 TABLET | Refills: 0 | Status: SHIPPED | OUTPATIENT
Start: 2022-12-20

## 2022-12-27 ENCOUNTER — APPOINTMENT (OUTPATIENT)
Dept: LAB | Facility: CLINIC | Age: 72
End: 2022-12-27

## 2023-01-03 ENCOUNTER — HOSPITAL ENCOUNTER (OUTPATIENT)
Dept: INFUSION CENTER | Facility: CLINIC | Age: 73
Discharge: HOME | End: 2023-01-03

## 2023-01-03 DIAGNOSIS — C7B.8 METASTATIC MALIGNANT NEUROENDOCRINE TUMOR TO LIVER (HCC): Primary | ICD-10-CM

## 2023-01-03 RX ORDER — LANREOTIDE ACETATE 120 MG/.5ML
120 INJECTION SUBCUTANEOUS ONCE
Status: COMPLETED | OUTPATIENT
Start: 2023-01-03 | End: 2023-01-03

## 2023-01-03 RX ORDER — LANREOTIDE ACETATE 120 MG/.5ML
120 INJECTION SUBCUTANEOUS ONCE
OUTPATIENT
Start: 2023-01-31

## 2023-01-03 RX ADMIN — LANREOTIDE ACETATE 120 MG: 120 INJECTION SUBCUTANEOUS at 09:00

## 2023-01-03 NOTE — PROGRESS NOTES
Patient here for lanreotide injection  Injection given in L buttock without issue   Patient aware of next appointment, declined AVS

## 2023-01-23 ENCOUNTER — APPOINTMENT (OUTPATIENT)
Dept: LAB | Facility: CLINIC | Age: 73
End: 2023-01-23

## 2023-01-31 ENCOUNTER — HOSPITAL ENCOUNTER (OUTPATIENT)
Dept: INFUSION CENTER | Facility: CLINIC | Age: 73
Discharge: HOME | End: 2023-01-31

## 2023-01-31 DIAGNOSIS — C7B.8 METASTATIC MALIGNANT NEUROENDOCRINE TUMOR TO LIVER (HCC): Primary | ICD-10-CM

## 2023-01-31 RX ORDER — LANREOTIDE ACETATE 120 MG/.5ML
120 INJECTION SUBCUTANEOUS ONCE
Status: COMPLETED | OUTPATIENT
Start: 2023-01-31 | End: 2023-01-31

## 2023-01-31 RX ORDER — LANREOTIDE ACETATE 120 MG/.5ML
120 INJECTION SUBCUTANEOUS ONCE
OUTPATIENT
Start: 2023-02-28

## 2023-01-31 RX ADMIN — LANREOTIDE ACETATE 120 MG: 120 INJECTION SUBCUTANEOUS at 08:26

## 2023-01-31 NOTE — PROGRESS NOTES
Patient to infusion center for Lanreotide  She offers no complaints  Lanreotide Injection given in Right Buttocks without incident    Next appointment confirmed, she declined AVS

## 2023-02-08 ENCOUNTER — HOSPITAL ENCOUNTER (OUTPATIENT)
Dept: CT IMAGING | Facility: HOSPITAL | Age: 73
Discharge: HOME | End: 2023-02-08
Attending: SURGERY

## 2023-02-08 DIAGNOSIS — C7B.8 METASTATIC MALIGNANT NEUROENDOCRINE TUMOR TO LIVER (HCC): ICD-10-CM

## 2023-02-08 RX ADMIN — IOHEXOL 100 ML: 350 INJECTION, SOLUTION INTRAVENOUS at 07:59

## 2023-02-15 ENCOUNTER — TELEPHONE (OUTPATIENT)
Dept: HEMATOLOGY ONCOLOGY | Facility: CLINIC | Age: 73
End: 2023-02-15

## 2023-02-15 NOTE — TELEPHONE ENCOUNTER
Spoke to patient  She stated she took 2 covid tests and they were negative  Informed her to wear a mask if she is still coughing  Also called reading room to get scan read  Told her to keep appointment

## 2023-02-15 NOTE — TELEPHONE ENCOUNTER
Call Transfer   Reason for patient call? Patient would like to confirm that she may come to her appointment tomorrow with symptoms of cough, etc     She would also like to know if her CT results are in  If not, she would like to know if she needs to reschedule this visit  If someone other than patient is calling, are they listed on the communication consent? N/A   Patient's primary oncologist? Dr Nile Alaniz and ROSALIO Sanchez    Person/Department that the call was transferred to? Time that call was transferred? Hermilo Franks @ 8:09AM   Informed patient that the message will be forwarded to the team and someone will get back to them as soon as possible  Did you relay this information to the patient?  Yes

## 2023-02-16 ENCOUNTER — OFFICE VISIT (OUTPATIENT)
Dept: SURGICAL ONCOLOGY | Facility: CLINIC | Age: 73
End: 2023-02-16

## 2023-02-16 ENCOUNTER — OFFICE VISIT (OUTPATIENT)
Dept: FAMILY MEDICINE CLINIC | Facility: CLINIC | Age: 73
End: 2023-02-16

## 2023-02-16 ENCOUNTER — HOSPITAL ENCOUNTER (OUTPATIENT)
Dept: RADIOLOGY | Facility: HOSPITAL | Age: 73
Discharge: HOME | End: 2023-02-16

## 2023-02-16 VITALS
RESPIRATION RATE: 18 BRPM | HEIGHT: 62 IN | SYSTOLIC BLOOD PRESSURE: 125 MMHG | DIASTOLIC BLOOD PRESSURE: 75 MMHG | OXYGEN SATURATION: 99 % | HEART RATE: 87 BPM | BODY MASS INDEX: 32.76 KG/M2 | WEIGHT: 178 LBS

## 2023-02-16 VITALS
RESPIRATION RATE: 18 BRPM | BODY MASS INDEX: 32.78 KG/M2 | WEIGHT: 178.13 LBS | DIASTOLIC BLOOD PRESSURE: 80 MMHG | SYSTOLIC BLOOD PRESSURE: 118 MMHG | HEART RATE: 78 BPM | HEIGHT: 62 IN | OXYGEN SATURATION: 97 % | TEMPERATURE: 97.5 F

## 2023-02-16 DIAGNOSIS — J06.9 UPPER RESPIRATORY TRACT INFECTION, UNSPECIFIED TYPE: ICD-10-CM

## 2023-02-16 DIAGNOSIS — C7B.8 METASTATIC MALIGNANT NEUROENDOCRINE TUMOR TO LIVER (HCC): Primary | ICD-10-CM

## 2023-02-16 DIAGNOSIS — R05.9 COUGH, UNSPECIFIED TYPE: ICD-10-CM

## 2023-02-16 DIAGNOSIS — R05.9 COUGH, UNSPECIFIED TYPE: Primary | ICD-10-CM

## 2023-02-16 DIAGNOSIS — R59.0 HILAR ADENOPATHY: ICD-10-CM

## 2023-02-16 DIAGNOSIS — D49.0 IPMN (INTRADUCTAL PAPILLARY MUCINOUS NEOPLASM): ICD-10-CM

## 2023-02-16 RX ORDER — CEFUROXIME AXETIL 500 MG/1
500 TABLET ORAL 2 TIMES DAILY
Qty: 20 TABLET | Refills: 0 | Status: SHIPPED | OUTPATIENT
Start: 2023-02-16 | End: 2023-02-26

## 2023-02-16 NOTE — PROGRESS NOTES
Assessment and Plan:     Problem List Items Addressed This Visit        Respiratory    Upper respiratory tract infection     URI  Patient was given albuterol nebulizer treatment x1 in the office  She will start cefuroxime 500 mg 1 twice daily  She was given prescription to obtain chest x-ray for further evaluation  Patient was recommended to restart her Advair twice daily as directed  Patient will call if symptoms persist after medication completed         Relevant Medications    cefuroxime (CEFTIN) 500 mg tablet       Other    Cough - Primary    Relevant Medications    cefuroxime (CEFTIN) 500 mg tablet    Other Relevant Orders    XR chest pa & lateral        Preventive health issues were discussed with patient, and age appropriate screening tests were ordered as noted in patient's After Visit Summary  Personalized health advice and appropriate referrals for health education or preventive services given if needed, as noted in patient's After Visit Summary  History of Present Illness:     Patient presents for a Medicare Wellness Visit    Patient states 5 days ago she began with sore throat which has since resolved  She subsequently developed coughing with minimal shortness of breath  She denies any documented fevers  She does have a history of sarcoidosis  She had been around her grandchildren who were sick last week  Patient is under the care of pulmonologist for history of sarcoidosis  She does not use her Advair inhaler on a regular basis since long-term use causes throat irritation     Patient Care Team:  Felipa Garcia MD as PCP - James Goldman MD as PCP - PCP-MD Chinyere Randall MD (Gastroenterology)  Dc Hale MD (Surgical Oncology)  Trevon Gutierrez MD (Radiation Oncology)     Review of Systems:     Review of Systems   Constitutional: Positive for fatigue  HENT: Positive for sore throat      Respiratory: Positive for cough and chest tightness  Cardiovascular: Negative  Gastrointestinal: Negative           Problem List:     Patient Active Problem List   Diagnosis   • Positional vertigo   • Focal nodular hyperplasia of liver   • IPMN (intraductal papillary mucinous neoplasm)   • Abnormal MRI of abdomen   • Liver mass   • Mediastinal adenopathy   • Sarcoidosis, lung (HCC)   • Granulomatous lymphadenitis   • Edema of right lower extremity   • Diastolic dysfunction   • Complex tear of medial meniscus of right knee as current injury   • Other tear of medial meniscus, current injury, right knee, initial encounter   • Trigger middle finger of right hand   • 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 ([address] )   • Bone metastasis ([address] )   • Reactive airway disease   • Cough   • Upper respiratory tract infection      Past Medical and Surgical History:     Past Medical History:   Diagnosis Date   • Abdominal pain    • Acute tonsillitis    • Breast pain, left    • Cancer ([address] )     liver&small intestine   • Edema of both lower extremities    • GERD without esophagitis    • Kidney stone     " [place]"   • Lesion of liver    • Liver mass    • Lymphadenopathy    • Mediastinal lymphadenopathy    • Neoplasm of digestive system    • Orthostatic lightheadedness    • Sarcoidosis    • Vertigo      Past Surgical History:   Procedure Laterality Date   • BREAST BIOPSY Left 1996    negative   • CARPAL TUNNEL RELEASE Bilateral    • COLONOSCOPY      2017   • IR BIOPSY LIVER MASS  11/6/2018   • IR BIOPSY LIVER MASS  12/8/2020   • KNEE ARTHROSCOPY Left    • LAPAROTOMY N/A 1/20/2021    Procedure: LAPAROTOMY EXPLORATORY;  Surgeon: Kim Canseco MD;  [facility];  Service: Surgical Oncology   • LIVER LOBECTOMY N/A 1/20/2021    Procedure: LIVER ABLATION SEGMENT 7, INTRAOPERATIVE U/S OF LIVER;  Surgeon: Kim Canseco MD;  [facility];  Service: Surgical Oncology   • MEDIASTINOSCOPY N/A 11/27/2018    Procedure: MEDIASTINOSCOPY;  Surgeon: Nayana Olivares MD Silvia;  [facility];  Service: Thoracic   • DE [address] INCL FLUOR GDNCE DX W/CELL WASHG [facility] N/A 11/27/2018    Procedure: Moses Franz;  Surgeon: Vesta Erazo MD;  [facility];  Service: Thoracic   • DE BRONCHOSCOPY NEEDLE BX TRACHEA MAIN STEM&/BRON N/A 11/27/2018    Procedure: EBUS with biopsy;  Surgeon: Vesta Erazo MD;  [facility];  Service: Thoracic   • DE EDG US EXAM SURGICAL ALTER STOM DUODENUM/JEJUNUM N/A 10/29/2018    Procedure: LINEAR ENDOSCOPIC U/S;  Surgeon: Dm Patton MD;  [facility]: [facility]; Service: Gastroenterology   • SKIN BIOPSY     • SMALL INTESTINE SURGERY N/A 1/20/2021    Procedure: RESECTION SMALL BOWEL AND ANASTOMOSIS, RESECTION SMALL BOWEL NODULE;  Surgeon: Carolina Giraldo MD;  [facility];  Service: Surgical Oncology   • WISDOM TOOTH EXTRACTION        Family History:     Family History   Problem Relation Age of Onset   • Alzheimer's disease Mother    • Parkinsonism Father    • Psoriasis Father    • Colon cancer Maternal Grandfather    • Psoriasis Sister    • No Known Problems Daughter    • Psoriasis Sister    • Stroke Sister    • No Known Problems Daughter       Social History:     Social History     Socioeconomic History   • Marital status:       Spouse name: None   • Number of children: None   • Years of education: None   • Highest education level: None   Occupational History   • None   Tobacco Use   • Smoking status: Never   • Smokeless tobacco: Never   Vaping Use   • Vaping Use: Never used   Substance and Sexual Activity   • Alcohol use: Yes     Comment: occ wine   • Drug use: No   • Sexual activity: Not Currently   Other Topics Concern   • None   Social History Narrative   • None     Social Determinants of Health     Financial Resource Strain: Low Risk    • Difficulty of Paying Living Expenses: Not very hard   Food Insecurity: Not on file   Transportation Needs: No Transportation Needs   • Lack of Transportation (Medical): No   • Lack of Transportation (Non-Medical): No   Physical Activity: Not on file   Stress: Not on file   Social Connections: Not on file   Intimate Partner Violence: Not on file   Housing Stability: Not on file      Medications and Allergies:     Current Outpatient Medications   Medication Sig Dispense Refill   • aspirin 81 MG tablet Take 1 tablet by mouth daily in the early morning      • cefuroxime (CEFTIN) 500 mg tablet Take 1 tablet (500 mg total) by mouth 2 (two) times a day for 10 days 20 tablet 0   • Cholecalciferol (VITAMIN D) 2000 units CAPS Take 1 tablet by mouth daily     • docusate sodium (COLACE) 100 mg capsule Take 1 capsule (100 mg total) by mouth 2 (two) times a day 10 capsule 0   • Fluticasone-Salmeterol (Advair) 100-50 mcg/dose inhaler Inhale 1 puff 2 (two) times a day Rinse mouth after use  60 blister 3   • hydrochlorothiazide (HYDRODIURIL) 12 5 mg tablet Take 1 tablet by mouth once daily 90 tablet 0   • LANREOTIDE ACETATE SC Inject under the skin every 30 (thirty) days      • Multiple Vitamin (MULTIVITAMIN) tablet Take 1 tablet by mouth daily     • omeprazole (PriLOSEC) 40 MG capsule Take 1 capsule (40 mg total) by mouth daily before breakfast 90 capsule 3   • triamcinolone (KENALOG) 0 1 % ointment Apply 2-4 times daily up to 2 weeks if rash goes away stop medication (Patient not taking: Reported on 2/16/2023) 30 g 1     No current facility-administered medications for this visit       Allergies   Allergen Reactions   • Bactrim [Sulfamethoxazole-Trimethoprim] Hives   • Clam Shell - Food Allergy Vomiting      Immunizations:     Immunization History   Administered Date(s) Administered   • COVID-19 PFIZER VACCINE 0 3 ML IM 02/17/2021, 03/08/2021, 11/20/2021, 05/15/2022   • INFLUENZA 12/14/2018, 10/01/2020, 11/10/2021   • Influenza, high dose seasonal 0 7 mL 12/14/2018, 11/06/2019   • Pneumococcal Conjugate 13-Valent 02/11/2019   • Pneumococcal Polysaccharide PPV23 02/03/2022      Health Maintenance:         Topic Date Due   • Breast Cancer Screening: Mammogram  09/03/2024   • Colorectal Cancer Screening  10/18/2027   • Hepatitis C Screening  Completed         Topic Date Due   • COVID-19 Vaccine (5 - Booster for Aubrey Escobedo series) 07/10/2022      Medicare Screening Tests and Risk Assessments:         Health Risk Assessment:   Patient rates overall health as fair  Patient feels that their physical health rating is same  Patient is very satisfied with their life  Eyesight was rated as same  Hearing was rated as same  Patient feels that their emotional and mental health rating is same  Patients states they are never, rarely angry  Patient states they are never, rarely unusually tired/fatigued  Pain experienced in the last 7 days has been none  Patient states that she has experienced no weight loss or gain in last 6 months  Depression Screening:   PHQ-2 Score: 0      Fall Risk Screening: In the past year, patient has experienced: no history of falling in past year      Urinary Incontinence Screening:   Patient has not leaked urine accidently in the last six months  Home Safety:  Patient does not have trouble with stairs inside or outside of their home  Patient has working smoke alarms and has working carbon monoxide detector  Home safety hazards include: none  Nutrition:   Current diet is Regular  Medications:   Patient is currently taking over-the-counter supplements  OTC medications include: see medication list  Patient is able to manage medications  Activities of Daily Living (ADLs)/Instrumental Activities of Daily Living (IADLs):   Walk and transfer into and out of bed and chair?: Yes  Dress and groom yourself?: Yes    Bathe or shower yourself?: Yes    Feed yourself?  Yes  Do your laundry/housekeeping?: Yes  Manage your money, pay your bills and track your expenses?: Yes  Make your own meals?: Yes    Do your own shopping?: Yes    Previous Hospitalizations:   Any hospitalizations or ED visits within the last 12 months?: No      Advance Care Planning:   Living will: No      PREVENTIVE SCREENINGS      Cardiovascular Screening:    General: Screening Not Indicated      Diabetes Screening:     General: Screening Current      Colorectal Cancer Screening:     General: Screening Current      Breast Cancer Screening:     General: Screening Current      Cervical Cancer Screening:    General: Screening Not Indicated      Lung Cancer Screening:     General: Screening Not Indicated      Hepatitis C Screening:    General: Screening Current    Screening, Brief Intervention, and Referral to Treatment (SBIRT)    Screening    Typical number of drinks in a week: 0    Single Item Drug Screening:  How often have you used an illegal drug (including marijuana) or a prescription medication for non-medical reasons in the past year? never    Single Item Drug Screen Score: 0  Interpretation: Negative screen for possible drug use disorder    No results found  Physical Exam:     /80   Pulse 78   Temp 97 5 °F (36 4 °C)   Resp 18   Ht 5' 2" (1 575 m)   Wt 80 8 kg (178 lb 2 oz)   LMP  (LMP Unknown)   SpO2 97%   BMI 32 58 kg/m²     Physical Exam  Constitutional:       General: She is not in acute distress  Appearance: Normal appearance  She is not ill-appearing  HENT:      Right Ear: Tympanic membrane, ear canal and external ear normal  There is no impacted cerumen  Left Ear: Tympanic membrane, ear canal and external ear normal  There is no impacted cerumen  Mouth/Throat:      Mouth: Mucous membranes are moist       Pharynx: No oropharyngeal exudate or posterior oropharyngeal erythema  Eyes:      General:         Right eye: No discharge  Left eye: No discharge  Extraocular Movements: Extraocular movements intact  Conjunctiva/sclera: Conjunctivae normal       Pupils: Pupils are equal, round, and reactive to light     Cardiovascular:      Comments: Regular rate and rhythm with no murmurs  Pulmonary:      Effort: Pulmonary effort is normal  No respiratory distress  Breath sounds: Wheezing present  No rhonchi or rales  Lymphadenopathy:      Cervical: No cervical adenopathy  Neurological:      Mental Status: She is alert            Shira Iyer MD

## 2023-02-16 NOTE — PATIENT INSTRUCTIONS
Medicare Preventive Visit Patient Instructions  Thank you for completing your Welcome to Medicare Visit or Medicare Annual Wellness Visit today  Your next wellness visit will be due in one year (2/17/2024)  The screening/preventive services that you may require over the next 5-10 years are detailed below  Some tests may not apply to you based off risk factors and/or age  Screening tests ordered at today's visit but not completed yet may show as past due  Also, please note that scanned in results may not display below  Preventive Screenings:  Service Recommendations Previous Testing/Comments   Colorectal Cancer Screening  * Colonoscopy    * Fecal Occult Blood Test (FOBT)/Fecal Immunochemical Test (FIT)  * Fecal DNA/Cologuard Test  * Flexible Sigmoidoscopy Age: 39-70 years old   Colonoscopy: every 10 years (may be performed more frequently if at higher risk)  OR  FOBT/FIT: every 1 year  OR  Cologuard: every 3 years  OR  Sigmoidoscopy: every 5 years  Screening may be recommended earlier than age 39 if at higher risk for colorectal cancer  Also, an individualized decision between you and your healthcare provider will decide whether screening between the ages of 74-80 would be appropriate  Colonoscopy: 10/19/2022  FOBT/FIT: Not on file  Cologuard: Not on file  Sigmoidoscopy: Not on file    Screening Current     Breast Cancer Screening Age: 36 years old  Frequency: every 1-2 years  Not required if history of left and right mastectomy Mammogram: 09/03/2022    Screening Current   Cervical Cancer Screening Between the ages of 21-29, pap smear recommended once every 3 years  Between the ages of 33-67, can perform pap smear with HPV co-testing every 5 years     Recommendations may differ for women with a history of total hysterectomy, cervical cancer, or abnormal pap smears in past  Pap Smear: Not on file    Screening Not Indicated   Hepatitis C Screening Once for adults born between 1945 and 1965  More frequently in patients at high risk for Hepatitis C Hep C Antibody: Not on file    Screening Current   Diabetes Screening 1-2 times per year if you're at risk for diabetes or have pre-diabetes Fasting glucose: 115 mg/dL (1/23/2023)  A1C: 6 1 % (1/12/2021)  Screening Current   Cholesterol Screening Once every 5 years if you don't have a lipid disorder  May order more often based on risk factors  Lipid panel: Not on file          Other Preventive Screenings Covered by Medicare:  1  Abdominal Aortic Aneurysm (AAA) Screening: covered once if your at risk  You're considered to be at risk if you have a family history of AAA  2  Lung Cancer Screening: covers low dose CT scan once per year if you meet all of the following conditions: (1) Age 50-69; (2) No signs or symptoms of lung cancer; (3) Current smoker or have quit smoking within the last 15 years; (4) You have a tobacco smoking history of at least 20 pack years (packs per day multiplied by number of years you smoked); (5) You get a written order from a healthcare provider  3  Glaucoma Screening: covered annually if you're considered high risk: (1) You have diabetes OR (2) Family history of glaucoma OR (3)  aged 48 and older OR (3)  American aged 72 and older  3  Osteoporosis Screening: covered every 2 years if you meet one of the following conditions: (1) You're estrogen deficient and at risk for osteoporosis based off medical history and other findings; (2) Have a vertebral abnormality; (3) On glucocorticoid therapy for more than 3 months; (4) Have primary hyperparathyroidism; (5) On osteoporosis medications and need to assess response to drug therapy  · Last bone density test (DXA Scan): Not on file  5  HIV Screening: covered annually if you're between the age of 12-76  Also covered annually if you are younger than 13 and older than 72 with risk factors for HIV infection   For pregnant patients, it is covered up to 3 times per pregnancy  Immunizations:  Immunization Recommendations   Influenza Vaccine Annual influenza vaccination during flu season is recommended for all persons aged >= 6 months who do not have contraindications   Pneumococcal Vaccine   * Pneumococcal conjugate vaccine = PCV13 (Prevnar 13), PCV15 (Vaxneuvance), PCV20 (Prevnar 20)  * Pneumococcal polysaccharide vaccine = PPSV23 (Pneumovax) Adults 25-60 years old: 1-3 doses may be recommended based on certain risk factors  Adults 72 years old: 1-2 doses may be recommended based off what pneumonia vaccine you previously received   Hepatitis B Vaccine 3 dose series if at intermediate or high risk (ex: diabetes, end stage renal disease, liver disease)   Tetanus (Td) Vaccine - COST NOT COVERED BY MEDICARE PART B Following completion of primary series, a booster dose should be given every 10 years to maintain immunity against tetanus  Td may also be given as tetanus wound prophylaxis  Tdap Vaccine - COST NOT COVERED BY MEDICARE PART B Recommended at least once for all adults  For pregnant patients, recommended with each pregnancy  Shingles Vaccine (Shingrix) - COST NOT COVERED BY MEDICARE PART B  2 shot series recommended in those aged 48 and above     Health Maintenance Due:      Topic Date Due   • Breast Cancer Screening: Mammogram  09/03/2024   • Colorectal Cancer Screening  10/18/2027   • Hepatitis C Screening  Completed     Immunizations Due:      Topic Date Due   • COVID-19 Vaccine (5 - Booster for Burgos Peter series) 07/10/2022     Advance Directives   What are advance directives? Advance directives are legal documents that state your wishes and plans for medical care  These plans are made ahead of time in case you lose your ability to make decisions for yourself  Advance directives can apply to any medical decision, such as the treatments you want, and if you want to donate organs  What are the types of advance directives?   There are many types of advance directives, and each state has rules about how to use them  You may choose a combination of any of the following:  · Living will: This is a written record of the treatment you want  You can also choose which treatments you do not want, which to limit, and which to stop at a certain time  This includes surgery, medicine, IV fluid, and tube feedings  · Durable power of  for healthcare [facility]): This is a written record that states who you want to make healthcare choices for you when you are unable to make them for yourself  This person, called a proxy, is usually a family member or a friend  You may choose more than 1 proxy  · Do not resuscitate (DNR) order:  A DNR order is used in case your heart stops beating or you stop breathing  It is a request not to have certain forms of treatment, such as CPR  A DNR order may be included in other types of advance directives  · Medical directive: This covers the care that you want if you are in a coma, near death, or unable to make decisions for yourself  You can list the treatments you want for each condition  Treatment may include pain medicine, surgery, blood transfusions, dialysis, IV or tube feedings, and a ventilator (breathing machine)  · Values history: This document has questions about your views, beliefs, and how you feel and think about life  This information can help others choose the care that you would choose  Why are advance directives important? An advance directive helps you control your care  Although spoken wishes may be used, it is better to have your wishes written down  Spoken wishes can be misunderstood, or not followed  Treatments may be given even if you do not want them  An advance directive may make it easier for your family to make difficult choices about your care     Weight Management   Why it is important to manage your weight:  Being overweight increases your risk of health conditions such as heart disease, high blood pressure, type 2 diabetes, and certain types of cancer  It can also increase your risk for osteoarthritis, sleep apnea, and other respiratory problems  Aim for a slow, steady weight loss  Even a small amount of weight loss can lower your risk of health problems  How to lose weight safely:  A safe and healthy way to lose weight is to eat fewer calories and get regular exercise  You can lose up about 1 pound a week by decreasing the number of calories you eat by 500 calories each day  Healthy meal plan for weight management:  A healthy meal plan includes a variety of foods, contains fewer calories, and helps you stay healthy  A healthy meal plan includes the following:  · Eat whole-grain foods more often  A healthy meal plan should contain fiber  Fiber is the part of grains, fruits, and vegetables that is not broken down by your body  Whole-grain foods are healthy and provide extra fiber in your diet  Some examples of whole-grain foods are whole-wheat breads and pastas, oatmeal, brown rice, and bulgur  · Eat a variety of vegetables every day  Include dark, leafy greens such as spinach, kale, constantin greens, and mustard greens  Eat yellow and orange vegetables such as carrots, sweet potatoes, and winter squash  · Eat a variety of fruits every day  Choose fresh or canned fruit (canned in its own juice or light syrup) instead of juice  Fruit juice has very little or no fiber  · Eat low-fat dairy foods  Drink fat-free (skim) milk or 1% milk  Eat fat-free yogurt and low-fat cottage cheese  Try low-fat cheeses such as mozzarella and other reduced-fat cheeses  · Choose meat and other protein foods that are low in fat  Choose beans or other legumes such as split peas or lentils  Choose fish, skinless poultry (chicken or turkey), or lean cuts of red meat (beef or pork)  Before you cook meat or poultry, cut off any visible fat  · Use less fat and oil  Try baking foods instead of frying them   Add less fat, such as margarine, sour cream, regular salad dressing and mayonnaise to foods  Eat fewer high-fat foods  Some examples of high-fat foods include french fries, doughnuts, ice cream, and cakes  · Eat fewer sweets  Limit foods and drinks that are high in sugar  This includes candy, cookies, regular soda, and sweetened drinks  Exercise:  Exercise at least 30 minutes per day on most days of the week  Some examples of exercise include walking, biking, dancing, and swimming  You can also fit in more physical activity by taking the stairs instead of the elevator or parking farther away from stores  Ask your healthcare provider about the best exercise plan for you  © Copyright [employer] 2018 Information is for End User's use only and may not be sold, redistributed or otherwise used for commercial purposes   All illustrations and images included in CareNotes® are the copyrighted property of [employer]  or [address] [place] [address]

## 2023-02-16 NOTE — PROGRESS NOTES
Surgical Oncology Follow Up       [facility]  [facility] [place]  [address] [facility] [address]    Taina Lloyd Cable  1950  [id number]  [address]  [facility] [place]  [address]  [facility] [address]    Diagnoses and all orders for this visit:    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 [facility])  -     Cancel: Ambulatory Referral to Thoracic Surgery; Future  -     Chromogranin A; Future  -     Ambulatory Referral to Thoracic Surgery; Future    Hilar adenopathy  -     Cancel: Ambulatory Referral to Thoracic Surgery; Future  -     Ambulatory Referral to Thoracic Surgery; Future    IPMN (intraductal papillary mucinous neoplasm)        Chief Complaint   Patient presents with   • Follow-up       No follow-ups on file  Oncology History   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 [facility])   12/8/2020 Initial Diagnosis    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 ([facility] )     12/8/2020 Biopsy    IR Liver biopsy:  A  Liver mass, needle core biopsy:  - Metastatic well-differentiated neuroendocrine tumor, G2     1/18/2021 -  Chemotherapy    Lanreotide injections (monthly)     1/20/2021 Surgery    Resection of small bowel and anastomosis, segment 7 liver ablation    A  Small intestine, resection:  - Well- differentiated neuroendocrine tumor (up to 2 1 cm), G2, at least three foci  - All margins are negative for tumor   - Three of thirteen lymph nodes are positive for tumor (3/13)  B  Small bowel nodule, excision:  - Gastrointestinal stromal tumor (GIST), spindle cell type, low grade, 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          Bone metastasis ([facility] )   1/28/2021 Initial Diagnosis    Bone metastasis ([facility] )     2/18/2021 - 3/3/2021 Radiation    Treatment:  Course: C1    Plan ID Energy Fractions Dose per Fraction (cGy) Dose Correction (cGy) Total Dose Delivered (cGy) Elapsed Days   T10_T12 Spine 10X/6X 10 / 10 300 0 3,000 13                Diagnosis and Staging: Side branch IPMN 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 2013, 1 2cm   Metastatic neuroendocrine tumor, T3N1M1, December 2020  Treatment history: small-bowel resection and microwave ablation of segment 7 liver lesions, January 2021   Lanreotide   radiation to T11  Current Therapy: Observation For the IPMN   Lanreotide  Disease Status: Enlarging hilar and mediastinal adenopathy    History of Present Illness: The patient returns to the office today in follow-up for her history of metastatic neuroendocrine tumor and IPMN  She is 2 years s/p surgery for small bowel NET with liver metastases, and is being maintained on Lanreotide  She reports she feels well, but continues with chronic SOB due to her sarcoidosis  She currently has a cough and cold symptoms  She denies any abdominal pain, nausea, vomiting, or weight loss  She has not had a chromogranin A level drawn yet, but CT of the chest, abdomen and pelvis was performed on February 8, 2023  I have reviewed these results with Dr Floridalma Braga and discussed them with the patient today  Review of Systems   Constitutional: Negative for activity change, appetite change and unexpected weight change  HENT: Negative  Respiratory: Positive for cough, shortness of breath and wheezing  Cardiovascular: Negative for chest pain and palpitations  Gastrointestinal: Negative for abdominal distention, abdominal pain, diarrhea, nausea and vomiting  Musculoskeletal: Negative  Skin: Negative  Negative for color change  Neurological: Negative  Psychiatric/Behavioral: Negative                Patient Active Problem List   Diagnosis   • Positional vertigo   • Focal nodular hyperplasia of liver   • IPMN (intraductal papillary mucinous neoplasm)   • Abnormal MRI of abdomen   • Liver mass   • Mediastinal adenopathy   • Sarcoidosis, lung (HCC)   • Granulomatous lymphadenitis   • Edema of right lower extremity   • Diastolic dysfunction   • Complex tear of medial meniscus of right knee as current injury   • Other tear of medial meniscus, current injury, right knee, initial encounter   • Trigger middle finger of right hand   • 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 [facility])   • Bone metastasis (HCC)   • Reactive airway disease     Past Medical History:   Diagnosis Date   • Abdominal pain    • Acute tonsillitis    • Breast pain, left    • Cancer (HCC)     liver&small intestine   • Edema of both lower extremities    • GERD without esophagitis    • Kidney stone     " [place]"   • Lesion of liver    • Liver mass    • Lymphadenopathy    • Mediastinal lymphadenopathy    • Neoplasm of digestive system    • Orthostatic lightheadedness    • Sarcoidosis    • Vertigo      Past Surgical History:   Procedure Laterality Date   • BREAST BIOPSY Left 1996    negative   • CARPAL TUNNEL RELEASE Bilateral    • COLONOSCOPY      2017   • IR BIOPSY LIVER MASS  11/6/2018   • IR BIOPSY LIVER MASS  12/8/2020   • KNEE ARTHROSCOPY Left    • LAPAROTOMY N/A 1/20/2021    Procedure: LAPAROTOMY EXPLORATORY;  Surgeon: Kim Canseco MD;  [facility];  Service: Surgical Oncology   • LIVER LOBECTOMY N/A 1/20/2021    Procedure: LIVER ABLATION SEGMENT 7, INTRAOPERATIVE U/S OF LIVER;  Surgeon: Kim Canseco MD;  [facility];  Service: Surgical Oncology   • MEDIASTINOSCOPY N/A 11/27/2018    Procedure: MEDIASTINOSCOPY;  Surgeon: Shalom Jordan MD;  [facility];  Service: Thoracic   • NV [address] INCL FLUOR GDNCE DX W/CELL [place] SPX N/A 11/27/2018    Procedure: BRONCHOSCOPY FLEXIBLE;  Surgeon: Shalom Jordan MD;  [facility];  Service: Thoracic   • NV BRONCHOSCOPY NEEDLE BX TRACHEA MAIN STEM&/BRON N/A 11/27/2018    Procedure: EBUS with biopsy;  Surgeon: Shalom Jordan MD;  [facility];  Service: Thoracic   • NV EDG US EXAM SURGICAL ALTER STOM DUODENUM/JEJUNUM N/A 10/29/2018    Procedure: LINEAR ENDOSCOPIC U/S;  Surgeon: Joce Hall MD;  Location: [facility]; Service: Gastroenterology   • SKIN BIOPSY     • SMALL INTESTINE SURGERY N/A 1/20/2021    Procedure: RESECTION SMALL BOWEL AND ANASTOMOSIS, RESECTION SMALL BOWEL NODULE;  Surgeon: Dane Talley MD;  [facility];  Service: Surgical Oncology   • WISDOM TOOTH EXTRACTION       Family History   Problem Relation Age of Onset   • Alzheimer's disease Mother    • Parkinsonism Father    • Psoriasis Father    • Colon cancer Maternal Grandfather    • Psoriasis Sister    • No Known Problems Daughter    • Psoriasis Sister    • Stroke Sister    • No Known Problems Daughter      Social History     Socioeconomic History   • Marital status:       Spouse name: Not on file   • Number of children: Not on file   • Years of education: Not on file   • Highest education level: Not on file   Occupational History   • Not on file   Tobacco Use   • Smoking status: Never   • Smokeless tobacco: Never   Vaping Use   • Vaping Use: Never used   Substance and Sexual Activity   • Alcohol use: Yes     Comment: occ wine   • Drug use: No   • Sexual activity: Not on file   Other Topics Concern   • Not on file   Social History Narrative   • Not on file     Social Determinants of Health     Financial Resource Strain: Not on file   Food Insecurity: Not on file   Transportation Needs: Not on file   Physical Activity: Not on file   Stress: Not on file   Social Connections: Not on file   Intimate Partner Violence: Not on file   Housing Stability: Not on file       Current Outpatient Medications:   •  aspirin 81 MG tablet, Take 1 tablet by mouth daily in the early morning , Disp: , Rfl:   •  Cholecalciferol (VITAMIN D) 2000 units CAPS, Take 1 tablet by mouth daily, Disp: , Rfl:   •  docusate sodium (COLACE) 100 mg capsule, Take 1 capsule (100 mg total) by mouth 2 (two) times a day, Disp: 10 capsule, Rfl: 0  •  Fluticasone-Salmeterol (Advair) 100-50 mcg/dose inhaler, Inhale 1 puff 2 (two) times a day Rinse mouth after use , Disp: 60 blister, Rfl: 3  •  hydrochlorothiazide (HYDRODIURIL) 12 5 mg tablet, Take 1 tablet by mouth once daily, Disp: 90 tablet, Rfl: 0  •  LANREOTIDE ACETATE SC, Inject under the skin every 30 (thirty) days , Disp: , Rfl:   •  Multiple Vitamin (MULTIVITAMIN) tablet, Take 1 tablet by mouth daily, Disp: , Rfl:   •  omeprazole (PriLOSEC) 40 MG capsule, Take 1 capsule (40 mg total) by mouth daily before breakfast, Disp: 90 capsule, Rfl: 3  •  triamcinolone (KENALOG) 0 1 % ointment, Apply 2-4 times daily up to 2 weeks if rash goes away stop medication (Patient not taking: Reported on 2/16/2023), Disp: 30 g, Rfl: 1  Allergies   Allergen Reactions   • Bactrim [Sulfamethoxazole-Trimethoprim] Hives   • Clam Shell - Food Allergy Vomiting     Vitals:    02/16/23 0818   BP: 125/75   Pulse: 87   Resp: 18   SpO2: 99%       Physical Exam  Vitals reviewed  Constitutional:       General: She is not in acute distress  Appearance: Normal appearance  She is normal weight  She is not ill-appearing or toxic-appearing  HENT:      Head: Normocephalic and atraumatic  Eyes:      General: No scleral icterus  Extraocular Movements: Extraocular movements intact  Conjunctiva/sclera: Conjunctivae normal       Pupils: Pupils are equal, round, and reactive to light  Cardiovascular:      Rate and Rhythm: Normal rate and regular rhythm  Pulmonary:      Effort: No respiratory distress  Breath sounds: Wheezing present  Abdominal:      General: Abdomen is flat  There is no distension  Palpations: Abdomen is soft  There is no mass  Tenderness: There is no abdominal tenderness  Musculoskeletal:         General: Normal range of motion  Cervical back: Normal range of motion and neck supple  Lymphadenopathy:      Cervical: No cervical adenopathy  Upper Body:      Right upper body: No supraclavicular or axillary adenopathy  Left upper body: No supraclavicular or axillary adenopathy     Skin: General: Skin is warm and dry  Coloration: Skin is not jaundiced  Neurological:      General: No focal deficit present  Mental Status: She is alert and oriented to person, place, and time  Psychiatric:         Mood and Affect: Mood normal          Behavior: Behavior normal          Thought Content: Thought content normal          Judgment: Judgment normal            Imaging  CT chest abdomen pelvis w contrast    Result Date: 2/15/2023  Narrative: CT CHEST, ABDOMEN AND PELVIS WITH IV CONTRAST INDICATION:   C7B 8: Other secondary neuroendocrine tumors  COMPARISON:  CT chest abdomen pelvis 7/5/2022 TECHNIQUE: CT examination of the chest, abdomen and pelvis was performed  Axial, sagittal, and coronal 2D reformatted images were created from the source data and submitted for interpretation  Radiation dose length product (DLP) for this visit:  1432 79 mGy-cm   This examination, like all CT scans performed in the [facility], was performed utilizing techniques to minimize radiation dose exposure, including the use of iterative reconstruction and automated exposure control  IV Contrast:  100 mL of iohexol (OMNIPAQUE) Enteric Contrast: Enteric contrast was administered  FINDINGS: CHEST LUNGS:  Interval development of bilateral diffuse micronodularity throughout both lungs with a slight upper lung zone predominance  These tiny nodules measuring 1 to 2 mm each  Mild scarring or atelectasis in the right middle lobe and lingula  No focal consolidation  Central airways are clear  PLEURA:  Unremarkable  HEART/GREAT VESSELS: Heart size normal   No pericardial effusion  No thoracic aortic aneurysm  MEDIASTINUM AND BRIGIDA:  Left hilar lymph node has increased in size, measuring 11 mm in short axis on image 3/54 where it was previously 8 mm  There is new right hilar adenopathy measuring 13 mm in short axis on image 3/51    Several mediastinal nodes have also slightly increased in size in the interval   For example a low paratracheal node is 11 mm in short axis on image 3/43 and was previously 8 mm  CHEST WALL AND LOWER NECK:  Unremarkable  ABDOMEN LIVER/BILIARY TREE:  Stable hypodense liver lesions including previously ablated liver metastases  Sample right posterior hepatic lesion measures 2 7 cm on image 3/107, unchanged  Another representative lesion in the right lobe with associated capsular  retraction measures 1 4 cm on image 3/123, unchanged  No new or enlarging liver lesions  No biliary ductal dilatation  GALLBLADDER:  No calcified gallstones  No pericholecystic inflammatory change  SPLEEN:  Unremarkable  PANCREAS:  Unremarkable  ADRENAL GLANDS:  Unremarkable  KIDNEYS/URETERS:  Unremarkable  No hydronephrosis  STOMACH AND BOWEL:  Mildly thickened appearance in the proximal transverse colon may be due to underdistention  Colonic diverticulosis without evidence of acute diverticulitis  APPENDIX:  No findings to suggest appendicitis  ABDOMINOPELVIC CAVITY:  No ascites  No pneumoperitoneum  No lymphadenopathy  VESSELS:  Unremarkable for patient's age  PELVIS REPRODUCTIVE ORGANS:  Unremarkable for patient's age  URINARY BLADDER:  Decompressed, limiting evaluation  ABDOMINAL WALL/INGUINAL REGIONS:  Soft tissue nodular densities in the posterior subcutaneous fat bilaterally favored to represent injection sites  Correlate with patient's history  OSSEOUS STRUCTURES:  Stable appearance of sclerotic area in the right iliac bone has been present since at least 2013 and felt to be benign  Impression: Left hilar node has increased in size as have some small mediastinal lymph nodes  There is new right hilar adenopathy  This is suspicious for metastatic involvement  Interval development of fairly diffuse micronodularity throughout both lungs  Differential diagnosis includes metastatic disease as well as an infectious or inflammatory process  These are beneath the limits of PET resolution  Recommend attention on follow-up  Stable hypodense liver lesions including prior ablated hepatic metastases  No new or enlarging liver lesions  Mildly thickened appearance of the proximal transverse colon could be due to underdistention  Suggest attention to this area on follow-up  The study was marked in EPIC for significant notification  Workstation performed: [id number]     I reviewed the above imaging data  Discussion/Summary: This is a very pleasant 70 y/o female who presents today for continued metastatic NET and IPMN surveillance  Her most recent CT does not make any mention of the IPMN, and shows stability of the liver lesions  However, there is increased hilar, mediastinal and paratracheal adenopathy  This may be related to her sarcoidosis, or alternatively it could be progression of her neuroendocrine cancer  I will refer her to Dr Kapil Osorio for biopsy at this time  I have also asked her to have a chromogranin A level drawn now  I have explained that, if the biopsy shows neuroendocrine disease, we will refer her back to Medical Oncology for PRRT  I have provided her with written materials about this treatment  I will reach out to her after the biopsy results are in to discuss further treatment/surveillance  She is agreeable to the plan, all questions have been answered today

## 2023-02-20 ENCOUNTER — APPOINTMENT (OUTPATIENT)
Dept: LAB | Facility: CLINIC | Age: 73
End: 2023-02-20

## 2023-02-20 DIAGNOSIS — C7B.8 METASTATIC MALIGNANT NEUROENDOCRINE TUMOR TO LIVER (HCC): ICD-10-CM

## 2023-02-22 ENCOUNTER — OFFICE VISIT (OUTPATIENT)
Dept: CARDIAC SURGERY | Facility: CLINIC | Age: 73
End: 2023-02-22

## 2023-02-22 VITALS
DIASTOLIC BLOOD PRESSURE: 82 MMHG | HEIGHT: 62 IN | TEMPERATURE: 97.2 F | BODY MASS INDEX: 32.86 KG/M2 | HEART RATE: 83 BPM | OXYGEN SATURATION: 95 % | SYSTOLIC BLOOD PRESSURE: 125 MMHG | RESPIRATION RATE: 17 BRPM | WEIGHT: 178.57 LBS

## 2023-02-22 DIAGNOSIS — R79.1 ABNORMAL COAGULATION PROFILE: ICD-10-CM

## 2023-02-22 DIAGNOSIS — R59.0 MEDIASTINAL ADENOPATHY: Primary | ICD-10-CM

## 2023-02-22 DIAGNOSIS — R59.0 HILAR ADENOPATHY: ICD-10-CM

## 2023-02-22 DIAGNOSIS — D86.0 SARCOIDOSIS, LUNG (HCC): ICD-10-CM

## 2023-02-22 DIAGNOSIS — C7B.8 METASTATIC MALIGNANT NEUROENDOCRINE TUMOR TO LIVER (HCC): ICD-10-CM

## 2023-02-22 RX ORDER — CEFAZOLIN SODIUM 2 G/50ML
2000 SOLUTION INTRAVENOUS ONCE
OUTPATIENT
Start: 2023-02-22 | End: 2023-02-22

## 2023-02-22 NOTE — PROGRESS NOTES
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  She underwent surgery, chemotherapy, and radiation to bone metastasis for this in 2021  Recent CT CAP was reviewed in PACs and demonstrates multiple tiny pulmonary nodules, as well as enlarged hilar and mediastinal lymph nodes  What complicates this is the fact that she was found to have sarcoidosis in 2018 in levels 4R, 4L, and 7 lymph nodes via mediastinoscopy  Treatment with steroids improved the size of these; however, they are enlarged since then  Given her history of malignant neuroendocrine tumor, it is unclear if these lymph nodes represent metastatic tumor or sarcoid  We are recommending an EBUS to determine this  Consent for this was obtained today  She will need some blood work  Diagnoses and all orders for this visit:    Mediastinal adenopathy  -     Type and screen; Future  -     APTT; Future  -     Protime-INR; Future  -     Case request operating room: ENDOBRONCHIAL ULTRASOUND (EBUS), BRONCHOSCOPY FLEXIBLE; Standing  -     EKG 12 lead; Future  -     Case request operating room: ENDOBRONCHIAL ULTRASOUND (EBUS), BRONCHOSCOPY FLEXIBLE    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 [facility])  -     Ambulatory Referral to Thoracic Surgery    Hilar adenopathy  -     Ambulatory Referral to Thoracic Surgery  -     Case request operating room: ENDOBRONCHIAL ULTRASOUND (EBUS), BRONCHOSCOPY FLEXIBLE; Standing  -     Case request operating room: ENDOBRONCHIAL ULTRASOUND (EBUS), BRONCHOSCOPY FLEXIBLE    Sarcoidosis, lung ([facility] [address] )    Abnormal coagulation profile    Other orders  -     Diet NPO; Sips with meds; Standing  -     Void on call to OR; Standing  -     Insert peripheral IV;  Standing  -     Place sequential compression device; Standing  -     ceFAZolin (ANCEF) IVPB (premix in dextrose) 2,000 mg 50 mL          Thoracic History   Diagnosis: H/O neuroendocrine tumor of liver metastatic to bone, now with mediastinal/hilar lymphadenopathy    Procedures/Surgeries:    Pathology:    Adjuvant Therapy:       Subjective:    Patient ID: Elizabeth Abbott is a 71 y o  female  HPI   Ms Roman Kohli is a 71year old female referred to our office by Dr Theron Lux for evaluation of mediastinal lymphadenopathy  She was diagnosed with 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 in 12/2020  She underwent surgery and chemotherapy injections  She also complete radiation to bone metastasis T10-T12  We have seen her previously and performed an EBUS in 11/2018  We found sarcoidosis in level 4R, 4L and 7 at that time  She was treated with steroids for this under direction of Dr Marcano Fus  Recent CT CAP 2/8/2023 reveals interval development of diffuse macronodularity in the bilateral lungs  There is an 11mm left hilar lymph node, previously 8mm, as well as new right hilar adenopathy, 13mm  There are several mediastinal nodes as well including a low paratracheal node 11mm  On discussion, she reports some fatigue, which worsens intermittently  She also has INFANTE  She had an URI last week  Cough and fever have resolved  She has some back pain where she had radiation occasionally, and some left hip pain       The following portions of the patient's history were reviewed and updated as appropriate: allergies, current medications, past family history, past medical history, past social history, past surgical history and problem list        Past Medical History:   Diagnosis Date   • Abdominal pain    • Acute tonsillitis    • Breast pain, left    • Cancer ([address] )     liver&small intestine   • Edema of both lower extremities    • GERD without esophagitis    • Kidney stone     " [place]"   • Lesion of liver    • Liver mass    • Lymphadenopathy    • Mediastinal lymphadenopathy    • Neoplasm of digestive system    • Orthostatic lightheadedness    • Sarcoidosis    • Vertigo       Past Surgical History:   Procedure Laterality Date   • BREAST BIOPSY Left 1996    negative   • CARPAL TUNNEL RELEASE Bilateral    • COLONOSCOPY      2017   • IR BIOPSY LIVER MASS  11/6/2018   • IR BIOPSY LIVER MASS  12/8/2020   • KNEE ARTHROSCOPY Left    • LAPAROTOMY N/A 1/20/2021    Procedure: LAPAROTOMY EXPLORATORY;  Surgeon: Vale Bolaños MD;  [facility];  Service: Surgical Oncology   • LIVER LOBECTOMY N/A 1/20/2021    Procedure: LIVER ABLATION SEGMENT 7, INTRAOPERATIVE U/S OF LIVER;  Surgeon: Vale Bolaños MD;  [facility];  Service: Surgical Oncology   • MEDIASTINOSCOPY N/A 11/27/2018    Procedure: MEDIASTINOSCOPY;  Surgeon: Maribel Cuenca MD;  [facility];  Service: Thoracic   • [place] [address] INCL FLUOR GDNCE DX [address] [facility] N/A 11/27/2018    Procedure: BRONCHOSCOPY FLEXIBLE;  Surgeon: Maribel Cuenca MD;  [facility]: [facility];  Service: Thoracic   • [place] BRONCHOSCOPY NEEDLE BX TRACHEA MAIN STEM&/BRON N/A 11/27/2018    Procedure: EBUS with biopsy;  Surgeon: Maribel Cuenca MD;  [facility];  Service: Thoracic   • [place] EDG US EXAM SURGICAL ALTER STOM DUODENUM/JEJUNUM N/A 10/29/2018    Procedure: LINEAR ENDOSCOPIC U/S;  Surgeon: Antonio Amaya MD;  Location: [facility]; Service: Gastroenterology   • SKIN BIOPSY     • SMALL INTESTINE SURGERY N/A 1/20/2021    Procedure: RESECTION SMALL BOWEL AND ANASTOMOSIS, RESECTION SMALL BOWEL NODULE;  Surgeon: Vale Bolaños MD;  [facility];  Service: Surgical Oncology   • WISDOM TOOTH EXTRACTION        Family History   Problem Relation Age of Onset   • Alzheimer's disease Mother    • Parkinsonism Father    • Psoriasis Father    • Colon cancer Maternal Grandfather    • Psoriasis Sister    • No Known Problems Daughter    • Psoriasis Sister    • Stroke Sister    • No Known Problems Daughter       Social History     Socioeconomic History   • Marital status:       Spouse name: Not on file   • Number of children: Not on file   • Years of education: Not on file   • Highest education level: Not on file   Occupational History   • Not on file   Tobacco Use   • Smoking status: Never   • Smokeless tobacco: Never   Vaping Use   • Vaping Use: Never used   Substance and Sexual Activity   • Alcohol use: Yes     Comment: occ wine   • Drug use: No   • Sexual activity: Not Currently   Other Topics Concern   • Not on file   Social History Narrative   • Not on file     Social Determinants of Health     Financial Resource Strain: Low Risk    • Difficulty of Paying Living Expenses: Not very hard   Food Insecurity: Not on file   Transportation Needs: No Transportation Needs   • Lack of Transportation (Medical): No   • Lack of Transportation (Non-Medical): No   Physical Activity: Not on file   Stress: Not on file   Social Connections: Not on file   Intimate Partner Violence: Not on file   Housing Stability: Not on file      Review of Systems   Constitutional: Positive for appetite change (decreased appetite) and fatigue  Respiratory: Positive for shortness of breath  Musculoskeletal: Positive for arthralgias and back pain  All other systems reviewed and are negative  Objective:   Physical Exam  Constitutional:       General: She is not in acute distress  Appearance: Normal appearance  HENT:      Head: Normocephalic and atraumatic  Eyes:      General: No scleral icterus  Conjunctiva/sclera: Conjunctivae normal    Cardiovascular:      Rate and Rhythm: Normal rate and regular rhythm  Pulmonary:      Effort: Pulmonary effort is normal  No respiratory distress  Breath sounds: Rhonchi (LLL) present  Abdominal:      General: There is no distension  Palpations: Abdomen is soft  Musculoskeletal:      Cervical back: Neck supple  Right lower leg: No edema  Left lower leg: No edema  Lymphadenopathy:      Cervical: No cervical adenopathy     Skin:     General: Skin is warm and dry  Neurological:      General: No focal deficit present  Mental Status: She is alert and oriented to person, place, and time  Psychiatric:         Mood and Affect: Mood normal      /82 (BP Location: Left arm, Patient Position: Sitting, Cuff Size: Standard)   Pulse 83   Temp (!) 97 2 °F (36 2 °C) (Temporal)   Resp 17   Ht 5' 2" (1 575 m)   Wt 81 kg (178 lb 9 2 oz)   LMP  (LMP Unknown)   SpO2 95%   BMI 32 66 kg/m²     XR chest pa & lateral    Result Date: 2/18/2023  Impression Subtle nodularity throughout the lungs, see chest CT from 2/8/2023  No consolidation  Workstation performed: [id number]      No CT Chest results available for this patient  CT chest abdomen pelvis w contrast    Result Date: 2/15/2023  Narrative CT CHEST, ABDOMEN AND PELVIS WITH IV CONTRAST INDICATION:   C7B 8: Other secondary neuroendocrine tumors  COMPARISON:  CT chest abdomen pelvis 7/5/2022 TECHNIQUE: CT examination of the chest, abdomen and pelvis was performed  Axial, sagittal, and coronal 2D reformatted images were created from the source data and submitted for interpretation  Radiation dose length product (DLP) for this visit:  1432 79 mGy-cm   This examination, like all CT scans performed in the [facility], was performed utilizing techniques to minimize radiation dose exposure, including the use of iterative reconstruction and automated exposure control  IV Contrast:  100 mL of iohexol (OMNIPAQUE) Enteric Contrast: Enteric contrast was administered  FINDINGS: CHEST LUNGS:  Interval development of bilateral diffuse micronodularity throughout both lungs with a slight upper lung zone predominance  These tiny nodules measuring 1 to 2 mm each  Mild scarring or atelectasis in the right middle lobe and lingula  No focal consolidation  Central airways are clear  PLEURA:  Unremarkable  HEART/GREAT VESSELS: Heart size normal   No pericardial effusion  No thoracic aortic aneurysm  MEDIASTINUM AND BRIGIDA:  Left hilar lymph node has increased in size, measuring 11 mm in short axis on image 3/54 where it was previously 8 mm  There is new right hilar adenopathy measuring 13 mm in short axis on image 3/51  Several mediastinal nodes have also slightly increased in size in the interval   For example a low paratracheal node is 11 mm in short axis on image 3/43 and was previously 8 mm  CHEST WALL AND LOWER NECK:  Unremarkable  ABDOMEN LIVER/BILIARY TREE:  Stable hypodense liver lesions including previously ablated liver metastases  Sample right posterior hepatic lesion measures 2 7 cm on image 3/107, unchanged  Another representative lesion in the right lobe with associated capsular  retraction measures 1 4 cm on image 3/123, unchanged  No new or enlarging liver lesions  No biliary ductal dilatation  GALLBLADDER:  No calcified gallstones  No pericholecystic inflammatory change  SPLEEN:  Unremarkable  PANCREAS:  Unremarkable  ADRENAL GLANDS:  Unremarkable  KIDNEYS/URETERS:  Unremarkable  No hydronephrosis  STOMACH AND BOWEL:  Mildly thickened appearance in the proximal transverse colon may be due to underdistention  Colonic diverticulosis without evidence of acute diverticulitis  APPENDIX:  No findings to suggest appendicitis  ABDOMINOPELVIC CAVITY:  No ascites  No pneumoperitoneum  No lymphadenopathy  VESSELS:  Unremarkable for patient's age  PELVIS REPRODUCTIVE ORGANS:  Unremarkable for patient's age  URINARY BLADDER:  Decompressed, limiting evaluation  ABDOMINAL WALL/INGUINAL REGIONS:  Soft tissue nodular densities in the posterior subcutaneous fat bilaterally favored to represent injection sites  Correlate with patient's history  OSSEOUS STRUCTURES:  Stable appearance of sclerotic area in the right iliac bone has been present since at least 2013 and felt to be benign  Impression Left hilar node has increased in size as have some small mediastinal lymph nodes    There is new right hilar adenopathy  This is suspicious for metastatic involvement  Interval development of fairly diffuse micronodularity throughout both lungs  Differential diagnosis includes metastatic disease as well as an infectious or inflammatory process  These are beneath the limits of PET resolution  Recommend attention on follow-up  Stable hypodense liver lesions including prior ablated hepatic metastases  No new or enlarging liver lesions  Mildly thickened appearance of the proximal transverse colon could be due to underdistention  Suggest attention to this area on follow-up  The study was marked in EPIC for significant notification  Workstation performed: [id number]     CT chest abdomen pelvis w contrast    Result Date: 7/8/2022  Narrative CT CHEST, ABDOMEN AND PELVIS WITH IV CONTRAST INDICATION:   C7B 8: Other secondary neuroendocrine tumors  Follow-up  COMPARISON:  CT of chest, abdomen, and pelvis dated 11/15/2021 TECHNIQUE: CT examination of the chest, abdomen and pelvis was performed  Scanning through the abdomen was performed in arterial, venous and delayed phases according a protocol specially designed to evaluate upper abdominal viscera  Axial, sagittal, and  coronal 2D reformatted images were created from the source data and submitted for interpretation  Radiation dose length product (DLP) for this visit:  1386 mGy-cm   This examination, like all CT scans performed in the [facility], was performed utilizing techniques to minimize radiation dose exposure, including the use of iterative reconstruction and automated exposure control  IV Contrast:  68 mL of iohexol (OMNIPAQUE) Enteric Contrast: Enteric contrast was administered  FINDINGS: CHEST LUNGS: There is no tracheal or central endobronchial lesion  Linear atelectasis/scarring involving the right middle lobe and lingular regions  PLEURA:  Unremarkable  HEART/GREAT VESSELS: Heart is unremarkable for patient's age  No thoracic aortic aneurysm  Atherosclerotic vascular calcifications are noted  MEDIASTINUM AND BRIGIDA:  Prominent, borderline enlarged left hilar lymph node on image 27 of series 3 measures 8 mm in short axis, previously 6 mm;  the mild interval increase in size of this finding is of uncertain clinical significance and developing left hilar maykel metastatic disease cannot be excluded  Note is made of additional stable borderline prominent mediastinal and right hilar lymph nodes of uncertain clinical significance but within range of normal variation  CHEST WALL AND LOWER NECK:  Unremarkable  ABDOMEN LIVER/BILIARY TREE:  Multiple stable hypodense hepatic masses in this patient with history of previously ablated metastatic liver lesions, some of which are cystic in nature  Representative lesions include a posterior right hepatic lobe lesion on image 53 of series 3 measuring 2 7 cm without significant interval change when utilizing similar measurement technique  Additional representative lesion is noted involving the right hepatic lobe on image 59 of series 3 with associated capsular retraction measuring 1 4 cm, also without significant interval change since the prior exam when utilizing similar measurement technique  No definite new or enlarging hepatic lesions are noted  The biliary system is within normal limits  GALLBLADDER:  No calcified gallstones  No pericholecystic inflammatory change  SPLEEN:  Unremarkable  PANCREAS:  Unremarkable  ADRENAL GLANDS:  Unremarkable  KIDNEYS/URETERS:  Unremarkable  No hydronephrosis  STOMACH AND BOWEL:  Diverticulosis coli  There is no dilatation of the bowel suggest bowel obstruction  Evaluation for small bowel malignancy is limited on CT examination  APPENDIX:  No findings to suggest appendicitis  ABDOMINOPELVIC CAVITY:  No ascites  No pneumoperitoneum  No lymphadenopathy  VESSELS:  Atherosclerotic changes are present  No evidence of aneurysm  PELVIS REPRODUCTIVE ORGANS:  Unremarkable for patient's age  URINARY BLADDER:  Unremarkable  ABDOMINAL WALL/INGUINAL REGIONS:  Bilateral subcutaneous soft tissue nodularity involving the buttocks most consistent with subcutaneous injection sites with a metastatic process considered less likely  OSSEOUS STRUCTURES:  Stable asymmetric sclerosis involving the right iliac bone; osteoblastic metastatic disease cannot be excluded  Degenerative changes are noted involving the spine  Impression 1  Borderline prominent left hilar lymph node measuring 8 mm in short axis, previously 6 mm  The mild interval increase in size of this lymph node is of uncertain clinical significance and developing maykel metastatic disease cannot be excluded  Correlation is recommended  At minimum, continued short interval follow-up with contrast-enhanced CT of chest in 3 months is recommended to ensure stability  Consider further evaluation with NETSPOT PET/CT as clinically indicated  2   Stable hypodense hepatic lesions in this patient with history of prior ablation of hepatic metastatic disease  No new or enlarging hepatic lesions  3   Stable asymmetric sclerosis of the right iliac bone  The study was marked in EPIC for significant notification  Partial follow-up Workstation performed: [id number]      No NM PET CT results available for this patient  No Barium Swallow results available for this patient

## 2023-02-22 NOTE — ASSESSMENT & PLAN NOTE
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  She underwent surgery, chemotherapy, and radiation to bone metastasis for this in 2021  Recent CT CAP was reviewed in PACs and demonstrates multiple tiny pulmonary nodules, as well as enlarged hilar and mediastinal lymph nodes  What complicates this is the fact that she was found to have sarcoidosis in 2018 in levels 4R, 4L, and 7 lymph nodes via mediastinoscopy  Treatment with steroids improved the size of these; however, they are enlarged since then  Given her history of malignant neuroendocrine tumor, it is unclear if these lymph nodes represent metastatic tumor or sarcoid  We are recommending an EBUS to determine this  Consent for this was obtained today  She will need some blood work

## 2023-02-22 NOTE — H&P (VIEW-ONLY)
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  She underwent surgery, chemotherapy, and radiation to bone metastasis for this in 2021  Recent CT CAP was reviewed in PACs and demonstrates multiple tiny pulmonary nodules, as well as enlarged hilar and mediastinal lymph nodes  What complicates this is the fact that she was found to have sarcoidosis in 2018 in levels 4R, 4L, and 7 lymph nodes via mediastinoscopy  Treatment with steroids improved the size of these; however, they are enlarged since then  Given her history of malignant neuroendocrine tumor, it is unclear if these lymph nodes represent metastatic tumor or sarcoid  We are recommending an EBUS to determine this  Consent for this was obtained today  She will need some blood work  Diagnoses and all orders for this visit:    Mediastinal adenopathy  -     Type and screen; Future  -     APTT; Future  -     Protime-INR; Future  -     Case request operating room: ENDOBRONCHIAL ULTRASOUND (EBUS), BRONCHOSCOPY FLEXIBLE; Standing  -     EKG 12 lead; Future  -     Case request operating room: ENDOBRONCHIAL ULTRASOUND (EBUS), BRONCHOSCOPY FLEXIBLE    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 [facility])  -     Ambulatory Referral to Thoracic Surgery    Hilar adenopathy  -     Ambulatory Referral to Thoracic Surgery  -     Case request operating room: ENDOBRONCHIAL ULTRASOUND (EBUS), BRONCHOSCOPY FLEXIBLE; Standing  -     Case request operating room: ENDOBRONCHIAL ULTRASOUND (EBUS), BRONCHOSCOPY FLEXIBLE    Sarcoidosis, lung ([facility] [address] )    Abnormal coagulation profile    Other orders  -     Diet NPO; Sips with meds; Standing  -     Void on call to OR; Standing  -     Insert peripheral IV;  Standing  -     Place sequential compression device; Standing  -     ceFAZolin (ANCEF) IVPB (premix in dextrose) 2,000 mg 50 mL          Thoracic History   Diagnosis: H/O neuroendocrine tumor of liver metastatic to bone, now with mediastinal/hilar lymphadenopathy    Procedures/Surgeries:    Pathology:    Adjuvant Therapy:       Subjective:    Patient ID: Mason Chavez is a 71 y o  female  HPI   Ms Lee Ann David is a 71year old female referred to our office by Dr Anita Sotelo for evaluation of mediastinal lymphadenopathy  She was diagnosed with 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 in 12/2020  She underwent surgery and chemotherapy injections  She also complete radiation to bone metastasis T10-T12  We have seen her previously and performed an EBUS in 11/2018  We found sarcoidosis in level 4R, 4L and 7 at that time  She was treated with steroids for this under direction of Dr Jose Valenzuela  Recent CT CAP 2/8/2023 reveals interval development of diffuse macronodularity in the bilateral lungs  There is an 11mm left hilar lymph node, previously 8mm, as well as new right hilar adenopathy, 13mm  There are several mediastinal nodes as well including a low paratracheal node 11mm  On discussion, she reports some fatigue, which worsens intermittently  She also has INFANTE  She had an URI last week  Cough and fever have resolved  She has some back pain where she had radiation occasionally, and some left hip pain       The following portions of the patient's history were reviewed and updated as appropriate: allergies, current medications, past family history, past medical history, past social history, past surgical history and problem list        Past Medical History:   Diagnosis Date   • Abdominal pain    • Acute tonsillitis    • Breast pain, left    • Cancer ([address] )     liver&small intestine   • Edema of both lower extremities    • GERD without esophagitis    • Kidney stone     " [place]"   • Lesion of liver    • Liver mass    • Lymphadenopathy    • Mediastinal lymphadenopathy    • Neoplasm of digestive system    • Orthostatic lightheadedness    • Sarcoidosis    • Vertigo       Past Surgical History:   Procedure Laterality Date   • BREAST BIOPSY Left 1996    negative   • CARPAL TUNNEL RELEASE Bilateral    • COLONOSCOPY      2017   • IR BIOPSY LIVER MASS  11/6/2018   • IR BIOPSY LIVER MASS  12/8/2020   • KNEE ARTHROSCOPY Left    • LAPAROTOMY N/A 1/20/2021    Procedure: LAPAROTOMY EXPLORATORY;  Surgeon: Ariel Traylor MD;  [facility];  Service: Surgical Oncology   • LIVER LOBECTOMY N/A 1/20/2021    Procedure: LIVER ABLATION SEGMENT 7, INTRAOPERATIVE U/S OF LIVER;  Surgeon: Ariel Traylor MD;  [facility];  Service: Surgical Oncology   • MEDIASTINOSCOPY N/A 11/27/2018    Procedure: MEDIASTINOSCOPY;  Surgeon: Ramiro Mock MD;  [facility];  Service: Thoracic   • HI [address] INCL FLUOR GDNCE DX [address] N/A 11/27/2018    Procedure: BRONCHOSCOPY FLEXIBLE;  Surgeon: Ramiro Mock MD;  [facility];  Service: Thoracic   • HI BRONCHOSCOPY NEEDLE BX TRACHEA MAIN STEM&/BRON N/A 11/27/2018    Procedure: EBUS with biopsy;  Surgeon: Ramiro Mcok MD;  [facility];  Service: Thoracic   • HI EDG US EXAM SURGICAL ALTER STOM DUODENUM/JEJUNUM N/A 10/29/2018    Procedure: LINEAR ENDOSCOPIC U/S;  Surgeon: Yudy Aj MD;  Location: [facility]; Service: Gastroenterology   • SKIN BIOPSY     • SMALL INTESTINE SURGERY N/A 1/20/2021    Procedure: RESECTION SMALL BOWEL AND ANASTOMOSIS, RESECTION SMALL BOWEL NODULE;  Surgeon: Ariel Traylor MD;  [facility];  Service: Surgical Oncology   • WISDOM TOOTH EXTRACTION        Family History   Problem Relation Age of Onset   • Alzheimer's disease Mother    • Parkinsonism Father    • Psoriasis Father    • Colon cancer Maternal Grandfather    • Psoriasis Sister    • No Known Problems Daughter    • Psoriasis Sister    • Stroke Sister    • No Known Problems Daughter       Social History     Socioeconomic History   • Marital status:       Spouse name: Not on file   • Number of children: Not on file   • Years of education: Not on file   • Highest education level: Not on file   Occupational History   • Not on file   Tobacco Use   • Smoking status: Never   • Smokeless tobacco: Never   Vaping Use   • Vaping Use: Never used   Substance and Sexual Activity   • Alcohol use: Yes     Comment: occ wine   • Drug use: No   • Sexual activity: Not Currently   Other Topics Concern   • Not on file   Social History Narrative   • Not on file     Social Determinants of Health     Financial Resource Strain: Low Risk    • Difficulty of Paying Living Expenses: Not very hard   Food Insecurity: Not on file   Transportation Needs: No Transportation Needs   • Lack of Transportation (Medical): No   • Lack of Transportation (Non-Medical): No   Physical Activity: Not on file   Stress: Not on file   Social Connections: Not on file   Intimate Partner Violence: Not on file   Housing Stability: Not on file      Review of Systems   Constitutional: Positive for appetite change (decreased appetite) and fatigue  Respiratory: Positive for shortness of breath  Musculoskeletal: Positive for arthralgias and back pain  All other systems reviewed and are negative  Objective:   Physical Exam  Constitutional:       General: She is not in acute distress  Appearance: Normal appearance  HENT:      Head: Normocephalic and atraumatic  Eyes:      General: No scleral icterus  Conjunctiva/sclera: Conjunctivae normal    Cardiovascular:      Rate and Rhythm: Normal rate and regular rhythm  Pulmonary:      Effort: Pulmonary effort is normal  No respiratory distress  Breath sounds: Rhonchi (LLL) present  Abdominal:      General: There is no distension  Palpations: Abdomen is soft  Musculoskeletal:      Cervical back: Neck supple  Right lower leg: No edema  Left lower leg: No edema  Lymphadenopathy:      Cervical: No cervical adenopathy     Skin:     General: Skin is warm and dry  Neurological:      General: No focal deficit present  Mental Status: She is alert and oriented to person, place, and time  Psychiatric:         Mood and Affect: Mood normal      /82 (BP Location: Left arm, Patient Position: Sitting, Cuff Size: Standard)   Pulse 83   Temp (!) 97 2 °F (36 2 °C) (Temporal)   Resp 17   Ht 5' 2" (1 575 m)   Wt 81 kg (178 lb 9 2 oz)   LMP  (LMP Unknown)   SpO2 95%   BMI 32 66 kg/m²     XR chest pa & lateral    Result Date: 2/18/2023  Impression Subtle nodularity throughout the lungs, see chest CT from 2/8/2023  No consolidation  Workstation performed: [id number]      No CT Chest results available for this patient  CT chest abdomen pelvis w contrast    Result Date: 2/15/2023  Narrative CT CHEST, ABDOMEN AND PELVIS WITH IV CONTRAST INDICATION:   C7B 8: Other secondary neuroendocrine tumors  COMPARISON:  CT chest abdomen pelvis 7/5/2022 TECHNIQUE: CT examination of the chest, abdomen and pelvis was performed  Axial, sagittal, and coronal 2D reformatted images were created from the source data and submitted for interpretation  Radiation dose length product (DLP) for this visit:  1432 79 mGy-cm   This examination, like all CT scans performed in the [place], was performed utilizing techniques to minimize radiation dose exposure, including the use of iterative reconstruction and automated exposure control  IV Contrast:  100 mL of iohexol (OMNIPAQUE) Enteric Contrast: Enteric contrast was administered  FINDINGS: CHEST LUNGS:  Interval development of bilateral diffuse micronodularity throughout both lungs with a slight upper lung zone predominance  These tiny nodules measuring 1 to 2 mm each  Mild scarring or atelectasis in the right middle lobe and lingula  No focal consolidation  Central airways are clear  PLEURA:  Unremarkable  HEART/GREAT VESSELS: Heart size normal   No pericardial effusion  No thoracic aortic aneurysm  MEDIASTINUM AND BRIGIDA:  Left hilar lymph node has increased in size, measuring 11 mm in short axis on image 3/54 where it was previously 8 mm  There is new right hilar adenopathy measuring 13 mm in short axis on image 3/51  Several mediastinal nodes have also slightly increased in size in the interval   For example a low paratracheal node is 11 mm in short axis on image 3/43 and was previously 8 mm  CHEST WALL AND LOWER NECK:  Unremarkable  ABDOMEN LIVER/BILIARY TREE:  Stable hypodense liver lesions including previously ablated liver metastases  Sample right posterior hepatic lesion measures 2 7 cm on image 3/107, unchanged  Another representative lesion in the right lobe with associated capsular  retraction measures 1 4 cm on image 3/123, unchanged  No new or enlarging liver lesions  No biliary ductal dilatation  GALLBLADDER:  No calcified gallstones  No pericholecystic inflammatory change  SPLEEN:  Unremarkable  PANCREAS:  Unremarkable  ADRENAL GLANDS:  Unremarkable  KIDNEYS/URETERS:  Unremarkable  No hydronephrosis  STOMACH AND BOWEL:  Mildly thickened appearance in the proximal transverse colon may be due to underdistention  Colonic diverticulosis without evidence of acute diverticulitis  APPENDIX:  No findings to suggest appendicitis  ABDOMINOPELVIC CAVITY:  No ascites  No pneumoperitoneum  No lymphadenopathy  VESSELS:  Unremarkable for patient's age  PELVIS REPRODUCTIVE ORGANS:  Unremarkable for patient's age  URINARY BLADDER:  Decompressed, limiting evaluation  ABDOMINAL WALL/INGUINAL REGIONS:  Soft tissue nodular densities in the posterior subcutaneous fat bilaterally favored to represent injection sites  Correlate with patient's history  OSSEOUS STRUCTURES:  Stable appearance of sclerotic area in the right iliac bone has been present since at least 2013 and felt to be benign  Impression Left hilar node has increased in size as have some small mediastinal lymph nodes    There is new right hilar adenopathy  This is suspicious for metastatic involvement  Interval development of fairly diffuse micronodularity throughout both lungs  Differential diagnosis includes metastatic disease as well as an infectious or inflammatory process  These are beneath the limits of PET resolution  Recommend attention on follow-up  Stable hypodense liver lesions including prior ablated hepatic metastases  No new or enlarging liver lesions  Mildly thickened appearance of the proximal transverse colon could be due to underdistention  Suggest attention to this area on follow-up  The study was marked in EPIC for significant notification  Workstation performed: [id number]     CT chest abdomen pelvis w contrast    Result Date: 7/8/2022  Narrative CT CHEST, ABDOMEN AND PELVIS WITH IV CONTRAST INDICATION:   C7B 8: Other secondary neuroendocrine tumors  Follow-up  COMPARISON:  CT of chest, abdomen, and pelvis dated 11/15/2021 TECHNIQUE: CT examination of the chest, abdomen and pelvis was performed  Scanning through the abdomen was performed in arterial, venous and delayed phases according a protocol specially designed to evaluate upper abdominal viscera  Axial, sagittal, and  coronal 2D reformatted images were created from the source data and submitted for interpretation  Radiation dose length product (DLP) for this visit:  1386 mGy-cm   This examination, like all CT scans performed in the [facility], was performed utilizing techniques to minimize radiation dose exposure, including the use of iterative reconstruction and automated exposure control  IV Contrast:  68 mL of iohexol (OMNIPAQUE) Enteric Contrast: Enteric contrast was administered  FINDINGS: CHEST LUNGS: There is no tracheal or central endobronchial lesion  Linear atelectasis/scarring involving the right middle lobe and lingular regions  PLEURA:  Unremarkable  HEART/GREAT VESSELS: Heart is unremarkable for patient's age  No thoracic aortic aneurysm  Atherosclerotic vascular calcifications are noted  MEDIASTINUM AND BRIGIDA:  Prominent, borderline enlarged left hilar lymph node on image 27 of series 3 measures 8 mm in short axis, previously 6 mm;  the mild interval increase in size of this finding is of uncertain clinical significance and developing left hilar maykel metastatic disease cannot be excluded  Note is made of additional stable borderline prominent mediastinal and right hilar lymph nodes of uncertain clinical significance but within range of normal variation  CHEST WALL AND LOWER NECK:  Unremarkable  ABDOMEN LIVER/BILIARY TREE:  Multiple stable hypodense hepatic masses in this patient with history of previously ablated metastatic liver lesions, some of which are cystic in nature  Representative lesions include a posterior right hepatic lobe lesion on image 53 of series 3 measuring 2 7 cm without significant interval change when utilizing similar measurement technique  Additional representative lesion is noted involving the right hepatic lobe on image 59 of series 3 with associated capsular retraction measuring 1 4 cm, also without significant interval change since the prior exam when utilizing similar measurement technique  No definite new or enlarging hepatic lesions are noted  The biliary system is within normal limits  GALLBLADDER:  No calcified gallstones  No pericholecystic inflammatory change  SPLEEN:  Unremarkable  PANCREAS:  Unremarkable  ADRENAL GLANDS:  Unremarkable  KIDNEYS/URETERS:  Unremarkable  No hydronephrosis  STOMACH AND BOWEL:  Diverticulosis coli  There is no dilatation of the bowel suggest bowel obstruction  Evaluation for small bowel malignancy is limited on CT examination  APPENDIX:  No findings to suggest appendicitis  ABDOMINOPELVIC CAVITY:  No ascites  No pneumoperitoneum  No lymphadenopathy  VESSELS:  Atherosclerotic changes are present  No evidence of aneurysm  PELVIS REPRODUCTIVE ORGANS:  Unremarkable for patient's age  URINARY BLADDER:  Unremarkable  ABDOMINAL WALL/INGUINAL REGIONS:  Bilateral subcutaneous soft tissue nodularity involving the buttocks most consistent with subcutaneous injection sites with a metastatic process considered less likely  OSSEOUS STRUCTURES:  Stable asymmetric sclerosis involving the right iliac bone; osteoblastic metastatic disease cannot be excluded  Degenerative changes are noted involving the spine  Impression 1  Borderline prominent left hilar lymph node measuring 8 mm in short axis, previously 6 mm  The mild interval increase in size of this lymph node is of uncertain clinical significance and developing maykel metastatic disease cannot be excluded  Correlation is recommended  At minimum, continued short interval follow-up with contrast-enhanced CT of chest in 3 months is recommended to ensure stability  Consider further evaluation with NETSPOT PET/CT as clinically indicated  2   Stable hypodense hepatic lesions in this patient with history of prior ablation of hepatic metastatic disease  No new or enlarging hepatic lesions  3   Stable asymmetric sclerosis of the right iliac bone  The study was marked in EPIC for significant notification  Partial follow-up Workstation performed: [id number]      No NM PET CT results available for this patient  No Barium Swallow results available for this patient

## 2023-02-23 ENCOUNTER — APPOINTMENT (OUTPATIENT)
Dept: LAB | Facility: CLINIC | Age: 73
End: 2023-02-23

## 2023-02-23 ENCOUNTER — OFFICE VISIT (OUTPATIENT)
Dept: LAB | Facility: CLINIC | Age: 73
End: 2023-02-23

## 2023-02-23 ENCOUNTER — LAB REQUISITION (OUTPATIENT)
Dept: LAB | Facility: HOSPITAL | Age: 73
End: 2023-02-23

## 2023-02-23 DIAGNOSIS — R59.0 LOCALIZED ENLARGED LYMPH NODES: ICD-10-CM

## 2023-02-23 DIAGNOSIS — R59.0 MEDIASTINAL ADENOPATHY: ICD-10-CM

## 2023-02-23 LAB
ABO GROUP BLD: NORMAL
APTT PPP: 27 SECONDS (ref 23–37)
ATRIAL RATE: 70 BPM
BLD GP AB SCN SERPL QL: NEGATIVE
CGA SERPL-MCNC: 50.4 NG/ML (ref 0–101.8)
INR PPP: 0.99 (ref 0.84–1.19)
P AXIS: 51 DEGREES
PR INTERVAL: 148 MS
PROTHROMBIN TIME: 13.3 SECONDS (ref 11.6–14.5)
QRS AXIS: -5 DEGREES
QRSD INTERVAL: 88 MS
QT INTERVAL: 396 MS
QTC INTERVAL: 427 MS
RH BLD: POSITIVE
SPECIMEN EXPIRATION DATE: NORMAL
T WAVE AXIS: 40 DEGREES
VENTRICULAR RATE: 70 BPM

## 2023-02-24 ENCOUNTER — TELEPHONE (OUTPATIENT)
Dept: HEMATOLOGY ONCOLOGY | Facility: CLINIC | Age: 73
End: 2023-02-24

## 2023-02-24 NOTE — TELEPHONE ENCOUNTER
Call Transfer   Reason for patient call? Patient calling to discuss lab results   If someone other than patient is calling, are they listed on the communication consent? self   Patient's primary oncologist? Wanda Leyva   Person/Department that the call was transferred to? Time that call was transferred? [place] DACOSTA 8:19am 2/24   Informed patient that the message will be forwarded to the team and someone will get back to them as soon as possible  Did you relay this information to the patient?  yes

## 2023-02-24 NOTE — TELEPHONE ENCOUNTER
Spoke to patient and reviewed a normal Chrom A level  Encouraged patient to go forward with EUS by Dr Hutchins Cord

## 2023-02-27 ENCOUNTER — ANESTHESIA EVENT (OUTPATIENT)
Dept: PERIOP | Facility: HOSPITAL | Age: 73
End: 2023-02-27

## 2023-02-27 NOTE — PRE-PROCEDURE INSTRUCTIONS
Pre-Surgery Instructions:   Medication Instructions   • aspirin 81 MG tablet Stop taking 7 days prior to surgery  • Cholecalciferol (VITAMIN D) 2000 units CAPS Stop taking 7 days prior to surgery  • docusate sodium (COLACE) 100 mg capsule Hold day of surgery  • Fluticasone-Salmeterol (Advair) 100-50 mcg/dose inhaler Take day of surgery  • hydrochlorothiazide (HYDRODIURIL) 12 5 mg tablet Hold day of surgery  • LANREOTIDE ACETATE SC Messaged anesthesia for instructions; will follow up with patient   • Multiple Vitamin (MULTIVITAMIN) tablet Stop taking 7 days prior to surgery  • omeprazole (PriLOSEC) 40 MG capsule Take day of surgery  - Reviewed with patient, in detail, instructions from "My Surgical Experience"  - Instructed to avoid all OTC vitamins/supplements one week prior to surgery and NSAIDS for 3 days prior to surgery per anesthesia guidelines  Tylenol ok to take PRN  - Advised patient nothing eat or drink after midnight prior to surgery, except medications that he/she is to take morning DOS with only small sip of water     - Advised patient that Gigi Klein will call with surgery arrival time and hospital directions the business day prior to surgery  Patient verbalized understanding of current visitor restrictions/masking guidelines and advised that he/she can confirm these at time of arrival call with Gigi Klein      - Patient verbalized understanding and knows to call surgeon's office with any additional questions prior to surgery  Instructed to call surgeon's office in meantime with any new illnesses/exposure, patient verbalized understanding

## 2023-02-28 ENCOUNTER — HOSPITAL ENCOUNTER (OUTPATIENT)
Dept: INFUSION CENTER | Facility: CLINIC | Age: 73
Discharge: HOME | End: 2023-02-28

## 2023-02-28 DIAGNOSIS — C7B.8 METASTATIC MALIGNANT NEUROENDOCRINE TUMOR TO LIVER (HCC): Primary | ICD-10-CM

## 2023-02-28 RX ORDER — LANREOTIDE ACETATE 120 MG/.5ML
120 INJECTION SUBCUTANEOUS ONCE
Status: COMPLETED | OUTPATIENT
Start: 2023-02-28 | End: 2023-02-28

## 2023-02-28 RX ORDER — LANREOTIDE ACETATE 120 MG/.5ML
120 INJECTION SUBCUTANEOUS ONCE
OUTPATIENT
Start: 2023-03-28

## 2023-02-28 RX ADMIN — LANREOTIDE ACETATE 120 MG: 120 INJECTION SUBCUTANEOUS at 08:47

## 2023-02-28 NOTE — PROGRESS NOTES
Pt resting with no complaints  Lanreotide given in L buttock without issue  Pt declined AVS  Next appt reviewed c/ pt

## 2023-03-06 ENCOUNTER — HOSPITAL ENCOUNTER (OUTPATIENT)
Facility: HOSPITAL | Age: 73
Setting detail: OUTPATIENT SURGERY
Discharge: HOME | End: 2023-03-06
Attending: THORACIC SURGERY (CARDIOTHORACIC VASCULAR SURGERY) | Admitting: THORACIC SURGERY (CARDIOTHORACIC VASCULAR SURGERY)

## 2023-03-06 ENCOUNTER — ANESTHESIA (OUTPATIENT)
Dept: PERIOP | Facility: HOSPITAL | Age: 73
End: 2023-03-06

## 2023-03-06 VITALS
TEMPERATURE: 96.9 F | DIASTOLIC BLOOD PRESSURE: 86 MMHG | BODY MASS INDEX: 31.18 KG/M2 | HEIGHT: 63 IN | RESPIRATION RATE: 18 BRPM | HEART RATE: 70 BPM | WEIGHT: 176 LBS | OXYGEN SATURATION: 96 % | SYSTOLIC BLOOD PRESSURE: 154 MMHG

## 2023-03-06 DIAGNOSIS — R59.0 HILAR ADENOPATHY: ICD-10-CM

## 2023-03-06 DIAGNOSIS — R59.0 MEDIASTINAL ADENOPATHY: ICD-10-CM

## 2023-03-06 RX ORDER — PROPOFOL 10 MG/ML
INJECTION, EMULSION INTRAVENOUS CONTINUOUS PRN
Status: DISCONTINUED | OUTPATIENT
Start: 2023-03-06 | End: 2023-03-06

## 2023-03-06 RX ORDER — MIDAZOLAM HYDROCHLORIDE 2 MG/2ML
INJECTION, SOLUTION INTRAMUSCULAR; INTRAVENOUS AS NEEDED
Status: DISCONTINUED | OUTPATIENT
Start: 2023-03-06 | End: 2023-03-06

## 2023-03-06 RX ORDER — CEFAZOLIN SODIUM 2 G/50ML
2000 SOLUTION INTRAVENOUS ONCE
Status: DISCONTINUED | OUTPATIENT
Start: 2023-03-06 | End: 2023-03-06 | Stop reason: HOSPADM

## 2023-03-06 RX ORDER — DEXAMETHASONE SODIUM PHOSPHATE 10 MG/ML
INJECTION, SOLUTION INTRAMUSCULAR; INTRAVENOUS AS NEEDED
Status: DISCONTINUED | OUTPATIENT
Start: 2023-03-06 | End: 2023-03-06

## 2023-03-06 RX ORDER — FENTANYL CITRATE/PF 50 MCG/ML
25 SYRINGE (ML) INJECTION
Status: DISCONTINUED | OUTPATIENT
Start: 2023-03-06 | End: 2023-03-06 | Stop reason: HOSPADM

## 2023-03-06 RX ORDER — SODIUM CHLORIDE, SODIUM LACTATE, POTASSIUM CHLORIDE, CALCIUM CHLORIDE 600; 310; 30; 20 MG/100ML; MG/100ML; MG/100ML; MG/100ML
125 INJECTION, SOLUTION INTRAVENOUS CONTINUOUS
Status: DISCONTINUED | OUTPATIENT
Start: 2023-03-06 | End: 2023-03-06 | Stop reason: HOSPADM

## 2023-03-06 RX ORDER — DIPHENHYDRAMINE HYDROCHLORIDE 50 MG/ML
12.5 INJECTION INTRAMUSCULAR; INTRAVENOUS ONCE AS NEEDED
Status: DISCONTINUED | OUTPATIENT
Start: 2023-03-06 | End: 2023-03-06 | Stop reason: HOSPADM

## 2023-03-06 RX ORDER — ONDANSETRON 2 MG/ML
INJECTION INTRAMUSCULAR; INTRAVENOUS AS NEEDED
Status: DISCONTINUED | OUTPATIENT
Start: 2023-03-06 | End: 2023-03-06

## 2023-03-06 RX ORDER — PROPOFOL 10 MG/ML
INJECTION, EMULSION INTRAVENOUS AS NEEDED
Status: DISCONTINUED | OUTPATIENT
Start: 2023-03-06 | End: 2023-03-06

## 2023-03-06 RX ORDER — LIDOCAINE HYDROCHLORIDE 10 MG/ML
INJECTION, SOLUTION EPIDURAL; INFILTRATION; INTRACAUDAL; PERINEURAL AS NEEDED
Status: DISCONTINUED | OUTPATIENT
Start: 2023-03-06 | End: 2023-03-06

## 2023-03-06 RX ORDER — EPHEDRINE SULFATE 50 MG/ML
INJECTION INTRAVENOUS AS NEEDED
Status: DISCONTINUED | OUTPATIENT
Start: 2023-03-06 | End: 2023-03-06

## 2023-03-06 RX ORDER — FENTANYL CITRATE 50 UG/ML
INJECTION, SOLUTION INTRAMUSCULAR; INTRAVENOUS AS NEEDED
Status: DISCONTINUED | OUTPATIENT
Start: 2023-03-06 | End: 2023-03-06

## 2023-03-06 RX ORDER — ONDANSETRON 2 MG/ML
4 INJECTION INTRAMUSCULAR; INTRAVENOUS ONCE AS NEEDED
Status: DISCONTINUED | OUTPATIENT
Start: 2023-03-06 | End: 2023-03-06 | Stop reason: HOSPADM

## 2023-03-06 RX ORDER — LIDOCAINE HYDROCHLORIDE 20 MG/ML
INJECTION, SOLUTION INFILTRATION; PERINEURAL AS NEEDED
Status: DISCONTINUED | OUTPATIENT
Start: 2023-03-06 | End: 2023-03-06 | Stop reason: HOSPADM

## 2023-03-06 RX ADMIN — LIDOCAINE HYDROCHLORIDE 50 MG: 10 INJECTION, SOLUTION EPIDURAL; INFILTRATION; INTRACAUDAL; PERINEURAL at 09:18

## 2023-03-06 RX ADMIN — SODIUM CHLORIDE, SODIUM LACTATE, POTASSIUM CHLORIDE, AND CALCIUM CHLORIDE 125 ML/HR: .6; .31; .03; .02 INJECTION, SOLUTION INTRAVENOUS at 07:55

## 2023-03-06 RX ADMIN — EPHEDRINE SULFATE 10 MG: 50 INJECTION INTRAVENOUS at 09:52

## 2023-03-06 RX ADMIN — MIDAZOLAM 2 MG: 1 INJECTION INTRAMUSCULAR; INTRAVENOUS at 09:14

## 2023-03-06 RX ADMIN — DEXAMETHASONE SODIUM PHOSPHATE 10 MG: 10 INJECTION INTRAMUSCULAR; INTRAVENOUS at 09:18

## 2023-03-06 RX ADMIN — PROPOFOL 180 MG: 10 INJECTION, EMULSION INTRAVENOUS at 09:18

## 2023-03-06 RX ADMIN — PROPOFOL 150 MCG/KG/MIN: 10 INJECTION, EMULSION INTRAVENOUS at 09:23

## 2023-03-06 RX ADMIN — ONDANSETRON HYDROCHLORIDE 4 MG: 2 INJECTION, SOLUTION INTRAMUSCULAR; INTRAVENOUS at 10:08

## 2023-03-06 RX ADMIN — FENTANYL CITRATE 50 MCG: 50 INJECTION INTRAMUSCULAR; INTRAVENOUS at 09:18

## 2023-03-06 NOTE — ANESTHESIA POSTPROCEDURE EVALUATION
Post-Op Assessment Note    CV Status:  Stable  Pain Score: 0    Pain management: adequate     Mental Status:  Awake, alert and arousable   Hydration Status:  Euvolemic   PONV Controlled:  Controlled   Airway Patency:  Patent      Post Op Vitals Reviewed: Yes      Staff: Anesthesiologist         No notable events documented      BP   118/63   Temp  96 9   Pulse  80   Resp   14   SpO2   98

## 2023-03-06 NOTE — ANESTHESIA PREPROCEDURE EVALUATION
Procedure:  ENDOBRONCHIAL ULTRASOUND (EBUS) ([place])  BRONCHOSCOPY FLEXIBLE ([place])    Relevant Problems   GI/HEPATIC   (+) Focal nodular hyperplasia of liver   (+) IPMN (intraductal papillary mucinous neoplasm)   (+) 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 (HCC)      PULMONARY   (+) Upper respiratory tract infection      Other   (+) Granulomatous lymphadenitis   (+) Mediastinal adenopathy      Positional vertigo   Focal nodular hyperplasia of liver   IPMN (intraductal papillary mucinous neoplasm)   Abnormal MRI of abdomen   Liver mass   Mediastinal adenopathy   Sarcoidosis, lung (HCC)   Granulomatous lymphadenitis   Edema of right lower extremity   Diastolic dysfunction   Complex tear of medial meniscus of right knee as current injury   Other tear of medial meniscus, current injury, right knee, initial encounter   Trigger middle finger of right hand   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 (HCC)   Bone metastasis (HCC)   Reactive airway disease   Cough   Upper respiratory tract infection       Physical Exam    Airway    Mallampati score: III  TM Distance: >3 FB  Neck ROM: full     Dental       Cardiovascular  Cardiovascular exam normal    Pulmonary  Rhonchi,     Other Findings     Densi was previously known to our office  In 2018 she had mediastinal adenopathy  She underwent a mediastinoscopy on 11/27/2018 for what proved to be sarcoidosis  She was started on steroids for this  She also has a history of metastatic neuroendocrine cancer with liver test assist diagnosed in December 2020  This is further complicated by bone metastasis  She has undergone treatment for this  Surveillance imaging has showed increased paratracheal and hilar lymphadenopathy  She comes back to our office for this      Currently she denies any major complaints  She does have intermittent fatigue  She denies any cough or shortness of breath  No unintentional weight loss    No fevers chills or night levar       Anesthesia Plan  ASA Score- 3     Anesthesia Type- general with ASA Monitors  Additional Monitors:   Airway Plan: LMA  Plan Factors-Exercise tolerance (METS): <4 METS  Chart reviewed  EKG reviewed  Imaging results reviewed  Existing labs reviewed  Patient summary reviewed  Patient is not a current smoker  Induction- intravenous  Postoperative Plan- Plan for postoperative opioid use  Planned trial extubation    Informed Consent- Anesthetic plan and risks discussed with patient  I personally reviewed this patient with the CRNA  Discussed and agreed on the Anesthesia Plan with the CRNA  Michael Navarro

## 2023-03-06 NOTE — OP NOTE
OPERATIVE REPORT  PATIENT NAME: Kristy Brown    :  1950  MRN: [id number]  Pt Location: [facility] OR ROOM 08    SURGERY DATE: 3/6/2023    Surgeon(s) and Role:     * Celia Melchor MD - Primary     * Ranjit Sewell MD - Assisting    Preop Diagnosis:  Mediastinal adenopathy [R59 0]  Hilar adenopathy [R59 0]    Post-Op Diagnosis Codes:     * Mediastinal adenopathy [R59 0]     * Hilar adenopathy [R59 0]    Procedure(s):  ENDOBRONCHIAL ULTRASOUND (EBUS)  BRONCHOSCOPY FLEXIBLE  1  EBUS with biopsy of 3 lymph nodes  2  Bronchoscopy    Specimen(s):  ID Type Source Tests Collected by Time Destination   1 : level 11 L FNA Lymph Node FINE NEEDLE ASPIRATION Celia Melchor MD 3/6/2023 4129    2 : level 7 FNA Lymph Node FINE NEEDLE ASPIRATION Celia Melchor MD 3/6/2023 2368    3 : 4R FNA Lymph Node FINE NEEDLE ASPIRATION Celia Melchor MD 3/6/2023 6100        Estimated Blood Loss:   Minimal    Drains:  * No LDAs found *    Anesthesia Type:   General    Operative Indications:  Mediastinal adenopathy [R59 0]  Hilar adenopathy [R610]  60-year-old female with history of biopsy-proven sarcoidosis with mediastinal adenopathy as well as 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 now with increasing paratracheal and hilar adenopathy suspicious for metastatic disease    Operative Findings:  No definite evidence of malignancy on mediastinal lymph node biopsy    Complications:   None    Procedure and Technique:  After obtaining informed consent from the patient, they were transported to the operating room and placed supine on the OR table  Monitored anesthesia conditions were administered and an LMA airway was introduced into the oropharynx  A formal time-out was performed at this time including patient, date of birth, intended procedure, antibiotic usage as appropriate, beta-blocker usage as appropriate and plans for specimen handling      An adult bronchoscope was inserted into the LMA and the vocal cords were anesthetized with 10 mL of 2% lidocaine  The bronchoscope was then inserted into the main trachea and the carolina was again anesthetized with 10 mL of 2% lidocaine  A thorough bronchoscopy was then performed examining the trachea, mainstem bronchi, sigmoid segmental and subsegmental bronchi  There were no endo luminal masses or any other abnormalities identified  All mucus was suctioned out to improve visualization  There is no suspicion or identified risk for TB or other air board infectious disease  This bronchoscopy was being performed for diagnostic purposes only  The Olympus EBUS scope was then inserted and used to identify mediastinal lymph node stations  Mediastinal stations 7, 10 R, 4R, 11 L, and 4L were visualized using ultrasound guidance  Color Doppler was used as needed to identify and avoid surrounding vasculature  A 21 gauge EBUS needle was used to obtain node biopsies under direct visualization  Biopsies were obtained from stations 11 L, 7, and 4R  These were immediately analyzed by cytopathology showing no definite malignancy  We sent multiple additional passes for cell block  There was no significant bleeding seen from the airway  This portion of the procedure was completed at this time         I was present for the entire procedure    Patient Disposition:  PACU         SIGNATURE: Teresa Meyer MD  DATE: March 6, 2023  TIME: 12:40 PM

## 2023-03-06 NOTE — DISCHARGE INSTR - AVS FIRST PAGE
Please follow up once biopsy results are available  You may resume all normal medications  You may resume your aspirin on 3/7  You may resume a regular diet  Coughing up a small amount of blood after an endobronchial US and biopsy, however if you cough up more than a tablespoon please call the office for further advice

## 2023-03-09 ENCOUNTER — TELEPHONE (OUTPATIENT)
Dept: CARDIAC SURGERY | Facility: CLINIC | Age: 73
End: 2023-03-09

## 2023-03-09 NOTE — TELEPHONE ENCOUNTER
I called and spoke with the patient this afternoon  Her EBUS with biopsy of her enlarged mediastinal lymph nodes showed no evidence of malignancy  I think this is reassuring  I will message her surgical oncologist and medical oncologist to update them  From my standpoint I would just resume normal surveillance imaging  She voiced understanding and appreciation      [employer]

## 2023-03-13 ENCOUNTER — TELEPHONE (OUTPATIENT)
Dept: CARDIAC SURGERY | Facility: CLINIC | Age: 73
End: 2023-03-13

## 2023-03-21 ENCOUNTER — APPOINTMENT (OUTPATIENT)
Dept: LAB | Facility: CLINIC | Age: 73
End: 2023-03-21

## 2023-03-28 ENCOUNTER — HOSPITAL ENCOUNTER (OUTPATIENT)
Dept: INFUSION CENTER | Facility: CLINIC | Age: 73
Discharge: HOME | End: 2023-03-28

## 2023-03-28 DIAGNOSIS — C7B.8 METASTATIC MALIGNANT NEUROENDOCRINE TUMOR TO LIVER (HCC): Primary | ICD-10-CM

## 2023-03-28 RX ORDER — LANREOTIDE ACETATE 120 MG/.5ML
120 INJECTION SUBCUTANEOUS ONCE
Status: COMPLETED | OUTPATIENT
Start: 2023-03-28 | End: 2023-03-28

## 2023-03-28 RX ORDER — LANREOTIDE ACETATE 120 MG/.5ML
120 INJECTION SUBCUTANEOUS ONCE
OUTPATIENT
Start: 2023-04-25

## 2023-03-28 RX ADMIN — LANREOTIDE ACETATE 120 MG: 120 INJECTION SUBCUTANEOUS at 08:25

## 2023-03-28 NOTE — PROGRESS NOTES
Pt resting with no complaints  Lanreotide given in R buttock without issue  Pt declined AVS  Next appt reviewed c/ pt

## 2023-04-04 ENCOUNTER — OFFICE VISIT (OUTPATIENT)
Dept: SURGICAL ONCOLOGY | Facility: CLINIC | Age: 73
End: 2023-04-04

## 2023-04-04 VITALS
BODY MASS INDEX: 31.63 KG/M2 | SYSTOLIC BLOOD PRESSURE: 124 MMHG | WEIGHT: 178.5 LBS | DIASTOLIC BLOOD PRESSURE: 80 MMHG | RESPIRATION RATE: 16 BRPM | TEMPERATURE: 97.9 F | HEIGHT: 63 IN | HEART RATE: 90 BPM | OXYGEN SATURATION: 96 %

## 2023-04-04 DIAGNOSIS — D49.0 IPMN (INTRADUCTAL PAPILLARY MUCINOUS NEOPLASM): ICD-10-CM

## 2023-04-04 DIAGNOSIS — C7B.8 METASTATIC MALIGNANT NEUROENDOCRINE TUMOR TO LIVER (HCC): Primary | ICD-10-CM

## 2023-04-04 NOTE — LETTER
April 4, 6004     Alex Fairbanks, [address] [place] [address]    Patient: Malachi Ocampo   YOB: 1950   Date of Visit: 4/4/2023       Dear Dr Enrico Lyles: Thank you for referring Malachi Ocampo to me for evaluation  Below are my notes for this consultation  If you have questions, please do not hesitate to call me  I look forward to following your patient along with you  Sincerely,        Donna Davison MD        CC: MD Wu Francois MD Paula Duel, MD  4/4/2023 11:30 AM  Incomplete               Surgical Oncology Follow Up       [address] [facility] SURGICAL ONCOL[facility] [place]  [address] [facility] [address] [place]  1950  [id number]  [address]  [facility] [place]  [address]  [facility] [id number]    Diagnoses and all orders for this visit:    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 [facility])  -     CT chest abdomen pelvis w contrast; Future  -     BUN; Future  -     Creatinine, serum; Future  -     Chromogranin A; Future    IPMN (intraductal papillary mucinous neoplasm)        Chief Complaint   Patient presents with   • Follow-up       Return in about 19 weeks (around 8/15/2023) for Office Visit, Imaging - See orders, Labs - See Treatment Plan, with Shonda  Oncology History   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 [facility])   12/8/2020 Initial Diagnosis    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 ([facility] [address] )     12/8/2020 Biopsy    IR Liver biopsy:  A  Liver mass, needle core biopsy:  - Metastatic well-differentiated neuroendocrine tumor, G2     1/18/2021 -  Chemotherapy    Lanreotide injections (monthly)     1/20/2021 Surgery    Resection of small bowel and anastomosis, segment 7 liver ablation    A  Small intestine, resection:  - Well- differentiated neuroendocrine tumor (up to 2 1 cm), G2, at least three foci    - All margins are negative for tumor   - Three of thirteen lymph nodes are positive for tumor (3/13)  B  Small bowel nodule, excision:  - Gastrointestinal stromal tumor (GIST), spindle cell type, low grade, 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  Bone metastasis   1/28/2021 Initial Diagnosis    Bone metastasis ([address] )     2/18/2021 - 3/3/2021 Radiation    Treatment:  Course: C1    Plan ID Energy Fractions Dose per Fraction (cGy) Dose Correction (cGy) Total Dose Delivered (cGy) Elapsed Days   T10_T12 Spine 10X/6X 10 / 10 300 0 3,000 13                Diagnosis and Staging: Side branch IPMN 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 2013, 1 2cm   Metastatic neuroendocrine tumor, T3N1M1, December 2020  Treatment history: small-bowel resection and microwave ablation of segment 7 liver lesions, January 2021   Lanreotide   radiation to T11  Mediastinal and hilar adenopathy biopsies were benign  Current Therapy: Observation For the IPMN   Lanreotide  Disease Status:     History of Present Illness: Patient returns in follow-up  She underwent biopsy of her mediastinal and hilar lymph nodes that were benign  CT from February 8, 2023 shows the hilar head mediastinal adenopathy that is slightly increased in size, but biopsies were benign  I did review her films  Chromogranin level was normal at 50 4  She denies any abdominal pain, nausea or vomiting  No flushing, diarrhea, palpitations  She does have some occasional headaches that come and go  Review of Systems  Complete ROS Surg Onc:   Complete ROS Surg Onc:   Constitutional: The patient denies new or recent history of general fatigue, no recent weight loss, no change in appetite  Eyes: No complaints of visual problems, no scleral icterus  ENT: no complaints of ear pain, no hoarseness, no difficulty swallowing,  no tinnitus and no new masses in head, oral cavity, or neck  Cardiovascular: No complaints of chest pain, no palpitations, no ankle edema  Respiratory: No complaints of shortness of breath, no cough  "  Gastrointestinal: No complaints of jaundice, no bloody stools, no pale stools  Genitourinary: No complaints of dysuria, no hematuria, no nocturia, no frequent urination, no urethral discharge  Musculoskeletal: No complaints of weakness, paralysis, joint stiffness or arthralgias  Integumentary: No complaints of rash, no new lesions  Neurological: No complaints of convulsions, no seizures, no dizziness  Hematologic/Lymphatic: No complaints of easy bruising  Endocrine:  No hot or cold intolerance  No polydipsia, polyphagia, or polyuria  Allergy/immunology:  No environmental allergies  No food allergies  Not immunocompromised  Skin:  No pallor or rash  No wound          Patient Active Problem List   Diagnosis   • Positional vertigo   • Focal nodular hyperplasia of liver   • IPMN (intraductal papillary mucinous neoplasm)   • Abnormal MRI of abdomen   • Liver mass   • Mediastinal adenopathy   • Sarcoidosis, lung (HCC)   • Granulomatous lymphadenitis   • Edema of right lower extremity   • Diastolic dysfunction   • Complex tear of medial meniscus of right knee as current injury   • Other tear of medial meniscus, current injury, right knee, initial encounter   • Trigger middle finger of right hand   • 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 [facility])   • Bone metastasis   • Reactive airway disease   • Cough   • Upper respiratory tract infection     Past Medical History:   Diagnosis Date   • Abdominal pain    • Acute tonsillitis    • Breast pain, left    • Cancer (HCC)     liver&small intestine   • COVID-19 07/2022   • Edema of both lower extremities    • GERD (gastroesophageal reflux disease)    • GERD without esophagitis    • Kidney stone     \" [place]\"   • Lesion of liver    • Liver mass    • Lymphadenopathy    • Mediastinal lymphadenopathy    • Neoplasm of digestive system    • Orthostatic lightheadedness    • Sarcoidosis    • Vertigo      Past Surgical History:   Procedure Laterality Date   • " BREAST BIOPSY Left 1996    negative   • CARPAL TUNNEL RELEASE Bilateral    • COLONOSCOPY      2017   • ENDOBRONCHIAL ULTRASOUND (EBUS) N/A 3/6/2023    Procedure: ENDOBRONCHIAL ULTRASOUND (EBUS); Surgeon: Taurus Constantino MD;  Location: [facility];  Service: Thoracic   • IR BIOPSY LIVER MASS  11/6/2018   • IR BIOPSY LIVER MASS  12/8/2020   • KNEE ARTHROSCOPY Left    • LAPAROTOMY N/A 1/20/2021    Procedure: LAPAROTOMY EXPLORATORY;  Surgeon: Maria Eugenia El MD;  Location: [facility];  Service: Surgical Oncology   • LIVER LOBECTOMY N/A 1/20/2021    Procedure: LIVER ABLATION SEGMENT 7, INTRAOPERATIVE U/S OF LIVER;  Surgeon: Maria Eugenia El MD;  Location: [facility];  Service: Surgical Oncology   • MEDIASTINOSCOPY N/A 11/27/2018    Procedure: MEDIASTINOSCOPY;  Surgeon: Taurus Constantino MD;  Location: [facility];  Service: Thoracic   • UT [address] INCL FLUOR GDNCE DX [address] Pollie Sl[address] [facility] N/A 11/27/2018    Procedure: Braulio Trinity;  Surgeon: Taurus Constantino MD;  Location: [facility];  Service: Thoracic   • UT [address] INCL FLUOR GDNCE DX [address] [facility] N/A 3/6/2023    Procedure: BRONCHOSCOPY FLEXIBLE;  Surgeon: Taurus Constantino MD;  Location: [facility];  Service: Thoracic   • UT BRONCHOSCOPY NEEDLE BX TRACHEA MAIN STEM&/BRON N/A 11/27/2018    Procedure: EBUS with biopsy;  Surgeon: Taurus Constantino MD;  Location: [facility];  Service: Thoracic   • UT EDG US EXAM SURGICAL ALTER STOM DUODENUM/JEJUNUM N/A 10/29/2018    Procedure: LINEAR ENDOSCOPIC U/S;  Surgeon: Keo Luo MD;  Location: [facility];   Service: Gastroenterology   • SKIN BIOPSY     • SMALL INTESTINE SURGERY N/A 1/20/2021    Procedure: RESECTION SMALL BOWEL AND ANASTOMOSIS, RESECTION SMALL BOWEL NODULE;  Surgeon: Maria Eugenia El MD;  Location: [facility];  Service: Surgical Oncology   • WISDOM TOOTH EXTRACTION       Family History   Problem Relation Age of Onset   • Alzheimer's disease Mother    • Parkinsonism Father    • Psoriasis Father    • Colon cancer Maternal Grandfather    • Psoriasis Sister    • No Known Problems Daughter    • Psoriasis Sister    • Stroke Sister    • No Known Problems Daughter      Social History     Socioeconomic History   • Marital status:      Spouse name: Not on file   • Number of children: Not on file   • Years of education: Not on file   • Highest education level: Not on file   Occupational History   • Not on file   Tobacco Use   • Smoking status: Never   • Smokeless tobacco: Never   Vaping Use   • Vaping Use: Never used   Substance and Sexual Activity   • Alcohol use: Yes     Comment: occ wine- very seldom   • Drug use: No   • Sexual activity: Not Currently   Other Topics Concern   • Not on file   Social History Narrative   • Not on file     Social Determinants of Health     Financial Resource Strain: Low Risk    • Difficulty of Paying Living Expenses: Not very hard   Food Insecurity: Not on file   Transportation Needs: No Transportation Needs   • Lack of Transportation (Medical): No   • Lack of Transportation (Non-Medical):  No   Physical Activity: Not on file   Stress: Not on file   Social Connections: Not on file   Intimate Partner Violence: Not on file   Housing Stability: Not on file       Current Outpatient Medications:   •  aspirin 81 MG tablet, Take 1 tablet by mouth daily in the early morning , Disp: , Rfl:   •  Cholecalciferol (VITAMIN D) 2000 units CAPS, Take 1 tablet by mouth daily, Disp: , Rfl:   •  docusate sodium (COLACE) 100 mg capsule, Take 1 capsule (100 mg total) by mouth 2 (two) times a day, Disp: 10 capsule, Rfl: 0  •  Fluticasone-Salmeterol (Advair) 100-50 mcg/dose inhaler, Inhale 1 puff 2 (two) times a day Rinse mouth after use , Disp: 60 blister, Rfl: 3  •  hydrochlorothiazide (HYDRODIURIL) 12 5 mg tablet, Take 1 tablet by mouth once daily, Disp: 90 tablet, Rfl: 0  •  LANREOTIDE ACETATE SC, Inject under the skin every 30 (thirty) days , Disp: , Rfl:   •  Multiple Vitamin (MULTIVITAMIN) tablet, Take 1 tablet by mouth daily, Disp: , Rfl:   •  omeprazole (PriLOSEC) 40 MG capsule, Take 1 capsule (40 mg total) by mouth daily before breakfast, Disp: 90 capsule, Rfl: 3  •  triamcinolone (KENALOG) 0 1 % ointment, Apply 2-4 times daily up to 2 weeks if rash goes away stop medication, Disp: 30 g, Rfl: 1  Allergies   Allergen Reactions   • Bactrim [Sulfamethoxazole-Trimethoprim] Hives   • Clam Shell - Food Allergy Vomiting     Vitals:    04/04/23 1109   BP: 124/80   Pulse: 90   Resp: 16   Temp: 97 9 °F (36 6 °C)   SpO2: 96%       Physical Exam  Constitutional: General appearance: The Patient is well-developed and well-nourished who appears the stated age in no acute distress  Patient is pleasant and talkative  HEENT:  Normocephalic  Sclerae are anicteric  Mucous membranes are moist  Neck is supple without adenopathy  No JVD  Chest: The lungs are clear to auscultation  Cardiac: Heart is regular rate  Abdomen: Abdomen is soft, non-tender, non-distended and without masses  Extremities: There is no clubbing or cyanosis  There is no edema  Symmetric  Neuro: Grossly nonfocal  Gait is normal      Lymphatic: No evidence of cervical adenopathy bilaterally  No evidence of axillary adenopathy bilaterally  No evidence of inguinal adenopathy bilaterally  Skin: Warm, anicteric  Psych:  Patient is pleasant and talkative  Breasts:        Pathology:  [unfilled]    Labs:      Imaging  No results found  I reviewed the above laboratory and imaging data  Discussion/Summary: 44-year-old female with side branch IPMN that is essentially stable  From her metastatic neuroendocrine tumor, she is doing well   She then underwent small-bowel resection and microwave ablation of  segment 7 liver lesions   She completed radiation to T11 for a metastasis    She is on Lanreotide   She is clinically GERARDO at 2 years  Her imaging is stable and her chromogranin level is normal   The hilar lymph nodes were benign    I will plan on seeing her again in 6 months with a repeat CT of her chest, abdomen and pelvis and a chromogranin level   This CT should be able to survey the pancreas as well, to make sure the IPMN is not changing    If there is residual viable disease, we can consider Sir spheres or PRRT if there is extrahepatic disease   She is agreeable to this plan   All of her questions were answered

## 2023-04-04 NOTE — PROGRESS NOTES
Surgical Oncology Follow Up       [address] [facility]  [facility] [place]  [address]   [place] [address]    Malcolm Lancaster Cable  1950  [id number]  [address]  [facility] [place]  [address] [place] [address]    Diagnoses and all orders for this visit:    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 [facility])  -     CT chest abdomen pelvis w contrast; Future  -     BUN; Future  -     Creatinine, serum; Future  -     Chromogranin A; Future    IPMN (intraductal papillary mucinous neoplasm)        Chief Complaint   Patient presents with   • Follow-up       Return in about 19 weeks (around 8/15/2023) for Office Visit, Imaging - See orders, Labs - See Treatment Plan, with Shonda  Oncology History   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 [facility])   12/8/2020 Initial Diagnosis    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 ([facility] [address] )     12/8/2020 Biopsy    IR Liver biopsy:  A  Liver mass, needle core biopsy:  - Metastatic well-differentiated neuroendocrine tumor, G2     1/18/2021 -  Chemotherapy    Lanreotide injections (monthly)     1/20/2021 Surgery    Resection of small bowel and anastomosis, segment 7 liver ablation    A  Small intestine, resection:  - Well- differentiated neuroendocrine tumor (up to 2 1 cm), G2, at least three foci  - All margins are negative for tumor   - Three of thirteen lymph nodes are positive for tumor (3/13)  B  Small bowel nodule, excision:  - Gastrointestinal stromal tumor (GIST), spindle cell type, low grade, 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          Bone metastasis   1/28/2021 Initial Diagnosis    Bone metastasis ([facility] [address] )     2/18/2021 - 3/3/2021 Radiation    Treatment:  Course: C1    Plan ID Energy Fractions Dose per Fraction (cGy) Dose Correction (cGy) Total Dose Delivered (cGy) Elapsed Days   T10_T12 Spine 10X/6X 10 / 10 300 0 3,000 13                Diagnosis and Staging: Side branch IPMN 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 2013, 1 2cm   Metastatic neuroendocrine tumor, T3N1M1, December 2020  Treatment history: small-bowel resection and microwave ablation of segment 7 liver lesions, January 2021   Lanreotide   radiation to T11  Mediastinal and hilar adenopathy biopsies were benign  Current Therapy: Observation For the IPMN   Lanreotide  Disease Status:     History of Present Illness: Patient returns in follow-up  She underwent biopsy of her mediastinal and hilar lymph nodes that were benign  CT from February 8, 2023 shows the hilar head mediastinal adenopathy that is slightly increased in size, but biopsies were benign  I did review her films  Chromogranin level was normal at 50 4  She denies any abdominal pain, nausea or vomiting  No flushing, diarrhea, palpitations  She does have some occasional headaches that come and go  Review of Systems  Complete ROS Surg Onc:   Complete ROS Surg Onc:   Constitutional: The patient denies new or recent history of general fatigue, no recent weight loss, no change in appetite  Eyes: No complaints of visual problems, no scleral icterus  ENT: no complaints of ear pain, no hoarseness, no difficulty swallowing,  no tinnitus and no new masses in head, oral cavity, or neck  Cardiovascular: No complaints of chest pain, no palpitations, no ankle edema  Respiratory: No complaints of shortness of breath, no cough  Gastrointestinal: No complaints of jaundice, no bloody stools, no pale stools  Genitourinary: No complaints of dysuria, no hematuria, no nocturia, no frequent urination, no urethral discharge  Musculoskeletal: No complaints of weakness, paralysis, joint stiffness or arthralgias  Integumentary: No complaints of rash, no new lesions  Neurological: No complaints of convulsions, no seizures, no dizziness  Hematologic/Lymphatic: No complaints of easy bruising  Endocrine:  No hot or cold intolerance    No polydipsia, polyphagia, or "polyuria  Allergy/immunology:  No environmental allergies  No food allergies  Not immunocompromised  Skin:  No pallor or rash  No wound  Patient Active Problem List   Diagnosis   • Positional vertigo   • Focal nodular hyperplasia of liver   • IPMN (intraductal papillary mucinous neoplasm)   • Abnormal MRI of abdomen   • Liver mass   • Mediastinal adenopathy   • Sarcoidosis, lung (HCC)   • Granulomatous lymphadenitis   • Edema of right lower extremity   • Diastolic dysfunction   • Complex tear of medial meniscus of right knee as current injury   • Other tear of medial meniscus, current injury, right knee, initial encounter   • Trigger middle finger of right hand   • 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 [facility])   • Bone metastasis   • Reactive airway disease   • Cough   • Upper respiratory tract infection     Past Medical History:   Diagnosis Date   • Abdominal pain    • Acute tonsillitis    • Breast pain, left    • Cancer (HCC)     liver&small intestine   • COVID-19 07/2022   • Edema of both lower extremities    • GERD (gastroesophageal reflux disease)    • GERD without esophagitis    • Kidney stone     \" [place]\"   • Lesion of liver    • Liver mass    • Lymphadenopathy    • Mediastinal lymphadenopathy    • Neoplasm of digestive system    • Orthostatic lightheadedness    • Sarcoidosis    • Vertigo      Past Surgical History:   Procedure Laterality Date   • BREAST BIOPSY Left 1996    negative   • CARPAL TUNNEL RELEASE Bilateral    • COLONOSCOPY      2017   • ENDOBRONCHIAL ULTRASOUND (EBUS) N/A 3/6/2023    Procedure: ENDOBRONCHIAL ULTRASOUND (EBUS);   Surgeon: Gasper Kirkpatrick MD;  [facility];  Service: Thoracic   • IR BIOPSY LIVER MASS  11/6/2018   • IR BIOPSY LIVER MASS  12/8/2020   • KNEE ARTHROSCOPY Left    • LAPAROTOMY N/A 1/20/2021    Procedure: LAPAROTOMY EXPLORATORY;  Surgeon: Marilee Kwok MD;  [facility];  Service: Surgical Oncology   • LIVER LOBECTOMY N/A 1/20/2021 " Procedure: LIVER ABLATION SEGMENT 7, INTRAOPERATIVE U/S OF LIVER;  Surgeon: Farhad Alarcon MD;  [facility];  Service: Surgical Oncology   • MEDIASTINOSCOPY N/A 11/27/2018    Procedure: Rutaudi Escort;  Surgeon: Ramon Meadows MD;  [facility];  Service: Thoracic   • MT [address] INCL FLUOR GDNCE DX [address] N/A 11/27/2018    Procedure: Duane Tatum;  Surgeon: Ramon Meadows MD;  [facility];  Service: Thoracic   • MT [address] INCL FLUOR [address]CE DX [address] N/A 3/6/2023    Procedure: BRONCHOSCOPY FLEXIBLE;  Surgeon: Ramon Meadows MD;  [facility];  Service: Thoracic   • MT BRONCHOSCOPY NEEDLE BX TRACHEA MAIN STEM&/BRON N/A 11/27/2018    Procedure: EBUS with biopsy;  Surgeon: Ramon Meadows MD;  [facility];  Service: Thoracic   • MT EDG US EXAM SURGICAL ALTER STOM DUODENUM/JEJUNUM N/A 10/29/2018    Procedure: LINEAR ENDOSCOPIC U/S;  Surgeon: Jennifer Rose MD;  Location[facility]; Service: Gastroenterology   • SKIN BIOPSY     • SMALL INTESTINE SURGERY N/A 1/20/2021    Procedure: RESECTION SMALL BOWEL AND ANASTOMOSIS, RESECTION SMALL BOWEL NODULE;  Surgeon: Farhad Alarcon MD;  [facility];  Service: Surgical Oncology   • WISDOM TOOTH EXTRACTION       Family History   Problem Relation Age of Onset   • Alzheimer's disease Mother    • Parkinsonism Father    • Psoriasis Father    • Colon cancer Maternal Grandfather    • Psoriasis Sister    • No Known Problems Daughter    • Psoriasis Sister    • Stroke Sister    • No Known Problems Daughter      Social History     Socioeconomic History   • Marital status:       Spouse name: Not on file   • Number of children: Not on file   • Years of education: Not on file   • Highest education level: Not on file   Occupational History   • Not on file   Tobacco Use   • Smoking status: Never   • Smokeless tobacco: Never   Vaping Use   • Vaping Use: Never used   Substance and Sexual Activity • Alcohol use: Yes     Comment: occ wine- very seldom   • Drug use: No   • Sexual activity: Not Currently   Other Topics Concern   • Not on file   Social History Narrative   • Not on file     Social Determinants of Health     Financial Resource Strain: Low Risk    • Difficulty of Paying Living Expenses: Not very hard   Food Insecurity: Not on file   Transportation Needs: No Transportation Needs   • Lack of Transportation (Medical): No   • Lack of Transportation (Non-Medical):  No   Physical Activity: Not on file   Stress: Not on file   Social Connections: Not on file   Intimate Partner Violence: Not on file   Housing Stability: Not on file       Current Outpatient Medications:   •  aspirin 81 MG tablet, Take 1 tablet by mouth daily in the early morning , Disp: , Rfl:   •  Cholecalciferol (VITAMIN D) 2000 units CAPS, Take 1 tablet by mouth daily, Disp: , Rfl:   •  docusate sodium (COLACE) 100 mg capsule, Take 1 capsule (100 mg total) by mouth 2 (two) times a day, Disp: 10 capsule, Rfl: 0  •  Fluticasone-Salmeterol (Advair) 100-50 mcg/dose inhaler, Inhale 1 puff 2 (two) times a day Rinse mouth after use , Disp: 60 blister, Rfl: 3  •  hydrochlorothiazide (HYDRODIURIL) 12 5 mg tablet, Take 1 tablet by mouth once daily, Disp: 90 tablet, Rfl: 0  •  LANREOTIDE ACETATE SC, Inject under the skin every 30 (thirty) days , Disp: , Rfl:   •  Multiple Vitamin (MULTIVITAMIN) tablet, Take 1 tablet by mouth daily, Disp: , Rfl:   •  omeprazole (PriLOSEC) 40 MG capsule, Take 1 capsule (40 mg total) by mouth daily before breakfast, Disp: 90 capsule, Rfl: 3  •  triamcinolone (KENALOG) 0 1 % ointment, Apply 2-4 times daily up to 2 weeks if rash goes away stop medication, Disp: 30 g, Rfl: 1  Allergies   Allergen Reactions   • Bactrim [Sulfamethoxazole-Trimethoprim] Hives   • Clam Shell - Food Allergy Vomiting     Vitals:    04/04/23 1109   BP: 124/80   Pulse: 90   Resp: 16   Temp: 97 9 °F (36 6 °C)   SpO2: 96%       Physical Exam  Constitutional: General appearance: The Patient is well-developed and well-nourished who appears the stated age in no acute distress  Patient is pleasant and talkative  HEENT:  Normocephalic  Sclerae are anicteric  Mucous membranes are moist  Neck is supple without adenopathy  No JVD  Chest: The lungs are clear to auscultation  Cardiac: Heart is regular rate  Abdomen: Abdomen is soft, non-tender, non-distended and without masses  Extremities: There is no clubbing or cyanosis  There is no edema  Symmetric  Neuro: Grossly nonfocal  Gait is normal      Lymphatic: No evidence of cervical adenopathy bilaterally  No evidence of axillary adenopathy bilaterally  No evidence of inguinal adenopathy bilaterally  Skin: Warm, anicteric  Psych:  Patient is pleasant and talkative  Breasts:        Pathology:  [unfilled]    Labs:      Imaging  No results found  I reviewed the above laboratory and imaging data  Discussion/Summary: 62-year-old female with side branch IPMN that is essentially stable  From her metastatic neuroendocrine tumor, she is doing well   She then underwent small-bowel resection and microwave ablation of  segment 7 liver lesions   She completed radiation to T11 for a metastasis    She is on Lanreotide   She is clinically GERARDO at 2 years  Her imaging is stable and her chromogranin level is normal   The hilar lymph nodes were benign    I will plan on seeing her again in 6 months with a repeat CT of her chest, abdomen and pelvis and a chromogranin level   This CT should be able to survey the pancreas as well, to make sure the IPMN is not changing    If there is residual viable disease, we can consider Sir spheres or PRRT if there is extrahepatic disease   She is agreeable to this plan   All of her questions were answered

## 2023-04-05 ENCOUNTER — ESTABLISHED COMPREHENSIVE EXAM (OUTPATIENT)
Dept: URBAN - METROPOLITAN AREA CLINIC 6 | Facility: CLINIC | Age: 73
End: 2023-04-05

## 2023-04-05 DIAGNOSIS — H35.372: ICD-10-CM

## 2023-04-05 DIAGNOSIS — Z96.1: ICD-10-CM

## 2023-04-05 DIAGNOSIS — H43.813: ICD-10-CM

## 2023-04-05 DIAGNOSIS — H04.123: ICD-10-CM

## 2023-04-05 DIAGNOSIS — H35.362: ICD-10-CM

## 2023-04-05 PROCEDURE — 92014 COMPRE OPH EXAM EST PT 1/>: CPT

## 2023-04-05 ASSESSMENT — VISUAL ACUITY
OD_SC: 20/20-2
OS_SC: 20/20-2

## 2023-04-05 ASSESSMENT — KERATOMETRY
OS_K2POWER_DIOPTERS: 46.25
OS_AXISANGLE2_DEGREES: 87
OS_K1POWER_DIOPTERS: 42.75
OS_AXISANGLE_DEGREES: 177

## 2023-04-05 ASSESSMENT — TONOMETRY
OD_IOP_MMHG: 14
OS_IOP_MMHG: 12

## 2023-04-17 PROBLEM — R05.9 COUGH: Status: RESOLVED | Noted: 2023-02-16 | Resolved: 2023-04-17

## 2023-04-25 ENCOUNTER — OFFICE VISIT (OUTPATIENT)
Dept: HEMATOLOGY ONCOLOGY | Facility: CLINIC | Age: 73
End: 2023-04-25

## 2023-04-25 ENCOUNTER — HOSPITAL ENCOUNTER (OUTPATIENT)
Dept: INFUSION CENTER | Facility: CLINIC | Age: 73
Discharge: HOME | End: 2023-04-25

## 2023-04-25 VITALS
TEMPERATURE: 98 F | OXYGEN SATURATION: 97 % | HEIGHT: 63 IN | WEIGHT: 177.5 LBS | SYSTOLIC BLOOD PRESSURE: 124 MMHG | DIASTOLIC BLOOD PRESSURE: 78 MMHG | HEART RATE: 71 BPM | BODY MASS INDEX: 31.45 KG/M2 | RESPIRATION RATE: 18 BRPM

## 2023-04-25 DIAGNOSIS — C7B.8 METASTATIC MALIGNANT NEUROENDOCRINE TUMOR TO LIVER (HCC): ICD-10-CM

## 2023-04-25 DIAGNOSIS — D49.0 IPMN (INTRADUCTAL PAPILLARY MUCINOUS NEOPLASM): Primary | ICD-10-CM

## 2023-04-25 DIAGNOSIS — C7B.8 METASTATIC MALIGNANT NEUROENDOCRINE TUMOR TO LIVER (HCC): Primary | ICD-10-CM

## 2023-04-25 DIAGNOSIS — D86.0 SARCOIDOSIS, LUNG (HCC): ICD-10-CM

## 2023-04-25 RX ORDER — LANREOTIDE ACETATE 120 MG/.5ML
120 INJECTION SUBCUTANEOUS ONCE
Status: COMPLETED | OUTPATIENT
Start: 2023-04-25 | End: 2023-04-25

## 2023-04-25 RX ORDER — LANREOTIDE ACETATE 120 MG/.5ML
120 INJECTION SUBCUTANEOUS ONCE
OUTPATIENT
Start: 2023-05-23

## 2023-04-25 RX ADMIN — LANREOTIDE ACETATE 120 MG: 120 INJECTION SUBCUTANEOUS at 08:55

## 2023-04-25 NOTE — PROGRESS NOTES
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  Mrs Brooks feels well and clinically there are no concerning findings  Recent blood work was good/acceptable  The most recent CAT scans did not demonstrate disease progression in the liver  There are pulmonary lesions, 1 to 2 mm, etiology is unclear  Chromogranin A level is relatively stable  We rediscussed options  Patient has follow-up scans in August 2023  Patient will return here afterwards with blood work before  Mrs Iliana Dillard will continue with the lanreotide for the time being  If there is evidence of disease progression within the liver, liver directed treatment options will be discussed  If there is evidence of progression systemically, other options such as Cristina Main will need to be discussed  The recent CAT scan demonstrated enlarging hilar lymph nodes  Patient has a history of sarcoidosis  Patient was seen/evaluated by Dr Kezia Potts in March 2023 and underwent mediastinal lymph node sampling  All lymph nodes were negative for metastatic disease  Possibly a gallium 68 PET/CT will be needed in the future to better clarify  Patient has a pending appointment with pulmonary for next week  Patient is to return in 4 months  Patient knows to call the hematology/oncology office if there are any other questions or concerns  Carefully review your medication list and verify that the list is accurate and up-to-date  Please call the hematology/oncology office if there are medications missing from the list, medications on the list that you are not currently taking or if there is a dosage or instruction that is different from how you're taking that medication      Patient goals and areas of care: Lanreotide, repeat scans in August  Barriers to care: None  Patient is able to self-care  _____________________________________________________________________________________    Chief Complaint   Patient presents with   • Follow-up     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      Oncology History   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 [facility])   12/8/2020 Initial Diagnosis    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 ([facility] [address] )     12/8/2020 Biopsy    IR Liver biopsy:  A  Liver mass, needle core biopsy:  - Metastatic well-differentiated neuroendocrine tumor, G2     1/18/2021 -  Chemotherapy    Lanreotide injections (monthly)     1/20/2021 Surgery    Resection of small bowel and anastomosis, segment 7 liver ablation    A  Small intestine, resection:  - Well- differentiated neuroendocrine tumor (up to 2 1 cm), G2, at least three foci  - All margins are negative for tumor   - Three of thirteen lymph nodes are positive for tumor (3/13)  B  Small bowel nodule, excision:  - Gastrointestinal stromal tumor (GIST), spindle cell type, low grade, 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  Bone metastasis   1/28/2021 Initial Diagnosis    Bone metastasis ([facility] [address] )     2/18/2021 - 3/3/2021 Radiation    Treatment:  Course: C1    Plan ID Energy Fractions Dose per Fraction (cGy) Dose Correction (cGy) Total Dose Delivered (cGy) Elapsed Days   T10_T12 Spine 10X/6X 10 / 10 300 0 3,000 13              History of Present Illness: 78-year-old female previously diagnosed with metastatic well differentiated carcinoid tumor  Patient was first seen by Dr Geofm Lesch in January 2021  Patient underwent resection of a number of lesions within the small bowel  Patient also had lesions in the liver and T11 vertebral body  Patient is on lanreotide  Mrs  Cable states feeling okay, baseline  No fevers, chills or sweats  No nausea or vomiting, bowel movements are normal   No GI bleeding  Appetite is good, weight is stable  No  or GYN problems  No respiratory issues  "Patient also with sarcoidosis, no recent pulmonary issues otherwise  Review of Systems   Constitutional: Negative  HENT: Negative  Eyes: Negative  Respiratory: Negative  Cardiovascular: Negative  Gastrointestinal: Negative  Endocrine: Negative  Genitourinary: Negative  Musculoskeletal: Negative  Skin: Negative  Allergic/Immunologic: Negative  Neurological: Negative  Hematological: Negative  Psychiatric/Behavioral: Negative  All other systems reviewed and are negative      Patient Active Problem List   Diagnosis   • Positional vertigo   • Focal nodular hyperplasia of liver   • IPMN (intraductal papillary mucinous neoplasm)   • Abnormal MRI of abdomen   • Liver mass   • Mediastinal adenopathy   • Sarcoidosis, lung (HCC)   • Granulomatous lymphadenitis   • Edema of right lower extremity   • Diastolic dysfunction   • Complex tear of medial meniscus of right knee as current injury   • Other tear of medial meniscus, current injury, right knee, initial encounter   • Trigger middle finger of right hand   • 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 [facility])   • Bone metastasis   • Reactive airway disease   • Upper respiratory tract infection     Past Medical History:   Diagnosis Date   • Abdominal pain    • Acute tonsillitis    • Breast pain, left    • Cancer ([address] )     liver&small intestine   • COVID-19 07/2022   • Edema of both lower extremities    • GERD (gastroesophageal reflux disease)    • GERD without esophagitis    • Kidney stone     \" [place]\"   • Lesion of liver    • Liver mass    • Lymphadenopathy    • Mediastinal lymphadenopathy    • Neoplasm of digestive system    • Orthostatic lightheadedness    • Sarcoidosis    • Vertigo      Past Surgical History:   Procedure Laterality Date   • BREAST BIOPSY Left 1996    negative   • CARPAL TUNNEL RELEASE Bilateral    • COLONOSCOPY      2017   • ENDOBRONCHIAL ULTRASOUND (EBUS) N/A 3/6/2023    Procedure: ENDOBRONCHIAL ULTRASOUND " (EBUS); Surgeon: Luís Parra MD;  [facility];  Service: Thoracic   • IR BIOPSY LIVER MASS  11/6/2018   • IR BIOPSY LIVER MASS  12/8/2020   • KNEE ARTHROSCOPY Left    • LAPAROTOMY N/A 1/20/2021    Procedure: LAPAROTOMY EXPLORATORY;  Surgeon: Lo Maurer MD;  [facility];  Service: Surgical Oncology   • LIVER LOBECTOMY N/A 1/20/2021    Procedure: LIVER ABLATION SEGMENT 7, INTRAOPERATIVE U/S OF LIVER;  Surgeon: Lo Maurer MD;  [facility];  Service: Surgical Oncology   • MEDIASTINOSCOPY N/A 11/27/2018    Procedure: MEDIASTINOSCOPY;  Surgeon: Luís Parra MD;  [facility];  Service: Thoracic   • MS [address] INCL FLUOR GDNCE DX [address] Caralee Maribel [address] [place] [address] N/A 11/27/2018    Procedure: Drema Guitar;  Surgeon: Luís Parra MD;  [facility];  Service: Thoracic   • MS [address] INCL FLUOR GDNCE DX [address] WASHG [address] [place] [address] N/A 3/6/2023    Procedure: BRONCHOSCOPY FLEXIBLE;  Surgeon: Luís Parra MD;  [facility];  Service: Thoracic   • MS BRONCHOSCOPY NEEDLE BX TRACHEA MAIN STEM&/BRON N/A 11/27/2018    Procedure: EBUS with biopsy;  Surgeon: Luís Parra MD;  [facility];  Service: Thoracic   • MS EDG US EXAM SURGICAL ALTER STOM DUODENUM/JEJUNUM N/A 10/29/2018    Procedure: LINEAR ENDOSCOPIC U/S;  Surgeon: Glenn Mclaughlin MD;  [facility];   Service: Gastroenterology   • SKIN BIOPSY     • SMALL INTESTINE SURGERY N/A 1/20/2021    Procedure: RESECTION SMALL BOWEL AND ANASTOMOSIS, RESECTION SMALL BOWEL NODULE;  Surgeon: Lo Maurer MD;  [facility];  Service: Surgical Oncology   • WISDOM TOOTH EXTRACTION       Family History   Problem Relation Age of Onset   • Alzheimer's disease Mother    • Parkinsonism Father    • Psoriasis Father    • Colon cancer Maternal Grandfather    • Psoriasis Sister    • No Known Problems Daughter    • Psoriasis Sister    • Stroke Sister    • No Known Problems Daughter      Social History Socioeconomic History   • Marital status:      Spouse name: Not on file   • Number of children: Not on file   • Years of education: Not on file   • Highest education level: Not on file   Occupational History   • Not on file   Tobacco Use   • Smoking status: Never   • Smokeless tobacco: Never   Vaping Use   • Vaping Use: Never used   Substance and Sexual Activity   • Alcohol use: Yes     Comment: occ wine- very seldom   • Drug use: No   • Sexual activity: Not Currently   Other Topics Concern   • Not on file   Social History Narrative   • Not on file     Social Determinants of Health     Financial Resource Strain: Low Risk    • Difficulty of Paying Living Expenses: Not very hard   Food Insecurity: Not on file   Transportation Needs: No Transportation Needs   • Lack of Transportation (Medical): No   • Lack of Transportation (Non-Medical):  No   Physical Activity: Not on file   Stress: Not on file   Social Connections: Not on file   Intimate Partner Violence: Not on file   Housing Stability: Not on file       Current Outpatient Medications:   •  aspirin 81 MG tablet, Take 1 tablet by mouth daily in the early morning , Disp: , Rfl:   •  Cholecalciferol (VITAMIN D) 2000 units CAPS, Take 1 tablet by mouth daily, Disp: , Rfl:   •  docusate sodium (COLACE) 100 mg capsule, Take 1 capsule (100 mg total) by mouth 2 (two) times a day, Disp: 10 capsule, Rfl: 0  •  Fluticasone-Salmeterol (Advair) 100-50 mcg/dose inhaler, Inhale 1 puff 2 (two) times a day Rinse mouth after use , Disp: 60 blister, Rfl: 3  •  hydrochlorothiazide (HYDRODIURIL) 12 5 mg tablet, Take 1 tablet by mouth once daily, Disp: 90 tablet, Rfl: 0  •  LANREOTIDE ACETATE SC, Inject under the skin every 30 (thirty) days , Disp: , Rfl:   •  Multiple Vitamin (MULTIVITAMIN) tablet, Take 1 tablet by mouth daily, Disp: , Rfl:   •  omeprazole (PriLOSEC) 40 MG capsule, Take 1 capsule (40 mg total) by mouth daily before breakfast, Disp: 90 capsule, Rfl: 3  • triamcinolone (KENALOG) 0 1 % ointment, Apply 2-4 times daily up to 2 weeks if rash goes away stop medication (Patient not taking: Reported on 4/25/2023), Disp: 30 g, Rfl: 1    Allergies   Allergen Reactions   • Bactrim [Sulfamethoxazole-Trimethoprim] Hives   • Clam Shell - Food Allergy Vomiting       Vitals:    04/25/23 0816   BP: 124/78   Pulse: 71   Resp: 18   Temp: 98 °F (36 7 °C)   SpO2: 97%     Physical Exam  Constitutional:       Appearance: She is well-developed  HENT:      Head: Normocephalic and atraumatic  Right Ear: External ear normal       Left Ear: External ear normal    Eyes:      Conjunctiva/sclera: Conjunctivae normal       Pupils: Pupils are equal, round, and reactive to light  Cardiovascular:      Rate and Rhythm: Normal rate and regular rhythm  Heart sounds: Normal heart sounds  Pulmonary:      Effort: Pulmonary effort is normal       Breath sounds: Normal breath sounds  Abdominal:      General: Bowel sounds are normal       Palpations: Abdomen is soft  Musculoskeletal:         General: Normal range of motion  Cervical back: Normal range of motion and neck supple  Skin:     General: Skin is warm  Neurological:      Mental Status: She is alert and oriented to person, place, and time  Deep Tendon Reflexes: Reflexes are normal and symmetric  Psychiatric:         Behavior: Behavior normal          Thought Content:  Thought content normal          Judgment: Judgment normal      Extremities: No lower extreme edema, no cords, pulses are 1+  Lymphatics: No adenopathy in the neck, supraclavicular region, axilla bilaterally    Performance Status: 0 - Asymptomatic    Labs    4/17/2023 WBC = 6 52 hemoglobin = 13 1 hematocrit = 40 platelet = 901 neutrophil = 62% BUN = 15 creatinine = 0 68 calcium = 9 6 LFTs WNL total bilirubin = 1 10    2/20/2023 chromogranin A = 50 4    Imaging    2/8/2023 CAT scan chest abdomen pelvis with contrast    IMPRESSION:     Left hilar node has increased in size as have some small mediastinal lymph nodes  There is new right hilar adenopathy  This is suspicious for metastatic involvement      Interval development of fairly diffuse micronodularity throughout both lungs  Differential diagnosis includes metastatic disease as well as an infectious or inflammatory process  These are beneath the limits of PET resolution  Recommend attention on follow-up      Stable hypodense liver lesions including prior ablated hepatic metastases  No new or enlarging liver lesions      Mildly thickened appearance of the proximal transverse colon could be due to underdistention  Suggest attention to this area on follow-up      Pathology    Case Report   Non-gynecologic Cytology                          Case: [id number]                                   Authorizing Provider: Kevin Augustin MD      Collected:           03/06/2023 0931               Ordering Location:     [address]      Received:            03/06/2023 1030                                      [facility] Operating Room                                                       Pathologist:           Ramiro Mays MD                                                          Specimens:   A) - Lymph Node, level 11 L                                                                          B) - Lymph Node, level 11 L                                                                          C) - Lymph Node, Cell Block, level 11 L                                                              D) - Lymph Node, L7                                                                                  E) - Lymph Node, L7                                                                                  F) - Lymph Node, Cell Block, L7                                                                      G) - Lymph Node, Cell Block, 4R                                                                      H) - Lymph Node, 4R                                                                        Addendum   Special stains for fungus (GMS) and acid fast bacilli (Kinyoun) performed on cell blocks F & G are negative for organisms  A copy of the addendum is sent to Dr Ana Velez on 3/9/23 @ 0900 hours  Addendum electronically signed by Kristie Santiago MD on 3/9/2023 at  9:00 AM       Case Report   Surgical Pathology Report                         Case: [id number]                                    Authorizing Provider: Rayray Dillard MD           Collected:           01/20/2021 1013               Ordering Location:     [facility]      Received:            01/20/2021 [facility]                                      Hospital Operating Room                                                       Pathologist:           Tonya Dorado MD                                                                  Specimens:   A) - Small Bowel, NOS, SMALL BOWEL RESECTION WITH MESENTERIC LYMPH NODES                             B) - Small Bowel, NOS, SMALL BOWEL NODULE                                                  Final Diagnosis   A  Small intestine, resection:  - Well- differentiated neuroendocrine tumor (up to 2 1 cm), G2, at least three foci  - All margins are negative for tumor   - Three of thirteen lymph nodes are positive for tumor (3/13)     Comment: There are at least three separate foci of tumor present  It is difficut to acurately count for tumor foci given cluster growth pattern  Immunohistochemistry was performed on block A18  The tumor cells are positive for synaptophysin, chromogranin A, and negative for CD56, Ki-67 shows mitotic proliferative index of approximately 3-4%  D2-40 and CD31 are performed to help in the assessment of this case      B  Small bowel nodule, excision:  - Gastrointestinal stromal tumor (GIST), spindle cell type, low grade, 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

## 2023-04-25 NOTE — PROGRESS NOTES
Patient tolerated Lanreotide injection to LEFT buttock without issue - gauze and bandaid in place  Patient aware of upcoming appointments, declines AVS  Patient ambulatory upon DC without gait disturbance noted

## 2023-05-02 ENCOUNTER — OFFICE VISIT (OUTPATIENT)
Dept: PULMONOLOGY | Facility: CLINIC | Age: 73
End: 2023-05-02

## 2023-05-02 VITALS
HEIGHT: 63 IN | SYSTOLIC BLOOD PRESSURE: 110 MMHG | TEMPERATURE: 97.6 F | HEART RATE: 78 BPM | OXYGEN SATURATION: 97 % | WEIGHT: 177 LBS | BODY MASS INDEX: 31.36 KG/M2 | DIASTOLIC BLOOD PRESSURE: 70 MMHG | RESPIRATION RATE: 18 BRPM

## 2023-05-02 DIAGNOSIS — R59.0 MEDIASTINAL ADENOPATHY: ICD-10-CM

## 2023-05-02 DIAGNOSIS — J44.9 CHRONIC OBSTRUCTIVE PULMONARY DISEASE, UNSPECIFIED COPD TYPE (HCC): Primary | ICD-10-CM

## 2023-05-02 DIAGNOSIS — D86.0 SARCOIDOSIS, LUNG (HCC): ICD-10-CM

## 2023-05-02 NOTE — PROGRESS NOTES
"Pulmonary Follow-Up Note   Amarilis Wilkins 71 y o  female MRN: [id number]  5/2/2023      Assessment/Plan:    Problem List Items Addressed This Visit        Respiratory    Sarcoidosis, lung ([facility] )       Cardiovascular and Mediastinum    Mediastinal adenopathy   Other Visit Diagnoses     Chronic obstructive pulmonary disease, unspecified COPD type ([facility] )    -  Primary    Relevant Orders    CT chest wo contrast    Ambulatory Referral to Pulmonary Rehabilitation          1  Sarcoidosis        -11/27/2018-biopsy-proven sarcoidosis        -Patient reports that she is at her respiratory baseline and actually she feels somewhat improved from her         -Patient reports no more shortness of breath with walking up stairs and hills         -9/2022-PFT showed normal spirometry         -Recommend repeating pulmonary function test 9/2023         -Patient reports using Advair only as needed         -She is not interested in obtaining albuterol MDI         -Pulmonary will see again in 1 years time    2  Enlarged mediastinal lymph node w/ H/O static neuroendocrine cancer with bone metastasis        -3/6/3197-[id number] for malignancy        -Surveillance imaging per oncology    No follow-ups on file  History of Present Illness   Reason for Visit: 6-month follow-up  Chief Complaint: \" I feel great\"  HPI: Amarilis Wilkins is a 71 y o  female who presents to the office today for 6-month follow-up  Patient reports that she was biopsy-proven sarcoidosis in 2018  Patient reports that she has recently undergone an EBUS of her mediastinal lymph nodes which showed no evidence of malignancy  Patient has surveillance imaging per oncology  Today patient reports that her breathing is at baseline and she has no shortness of breath  Patient reports that previously she was somewhat short of breath upon going up and down the stairs however that is resolved  Patient reports she uses her Advair only as needed    Patient reports that she " "does not need an albuterol MDI  Patient will follow-up in 1 years time  Review of Systems   Constitutional: Negative for appetite change and fever  HENT: Negative for ear pain, postnasal drip, rhinorrhea, sneezing, sore throat and trouble swallowing  Respiratory: Negative for apnea, cough, choking, chest tightness, shortness of breath, wheezing and stridor  Cardiovascular: Negative for chest pain  Gastrointestinal: Negative for abdominal distention and abdominal pain  Genitourinary: Negative for difficulty urinating and dyspareunia  Musculoskeletal: Negative for myalgias  Neurological: Positive for headaches  Psychiatric/Behavioral: Negative for agitation and behavioral problems  Historical Information   Past Medical History:   Diagnosis Date    Abdominal pain     Acute tonsillitis     Breast pain, left     Cancer (HCC)     liver&small intestine    COVID-19 07/2022    Edema of both lower extremities     GERD (gastroesophageal reflux disease)     GERD without esophagitis     Kidney stone     \" [place]\"    Lesion of liver     Liver mass     Lymphadenopathy     Mediastinal lymphadenopathy     Neoplasm of digestive system     Orthostatic lightheadedness     Sarcoidosis     Vertigo      Past Surgical History:   Procedure Laterality Date    BREAST BIOPSY Left 1996    negative    CARPAL TUNNEL RELEASE Bilateral     COLONOSCOPY      2017    ENDOBRONCHIAL ULTRASOUND (EBUS) N/A 3/6/2023    Procedure: ENDOBRONCHIAL ULTRASOUND (EBUS);   Surgeon: Marnie Nation MD;  [facility];  Service: Thoracic    IR BIOPSY LIVER MASS  11/6/2018    IR BIOPSY LIVER MASS  12/8/2020    KNEE ARTHROSCOPY Left     LAPAROTOMY N/A 1/20/2021    Procedure: LAPAROTOMY EXPLORATORY;  Surgeon: Carmen Mckinney MD;  [facility];  Service: Surgical Oncology    LIVER LOBECTOMY N/A 1/20/2021    Procedure: LIVER ABLATION SEGMENT 7, INTRAOPERATIVE U/S OF LIVER;  Surgeon: Carmen Mckinney MD;  " [facility];  Service: Surgical Oncology    MEDIASTINOSCOPY N/A 11/27/2018    Procedure: MEDIASTINOSCOPY;  Surgeon: Edmundo Dejesus MD;  [facility];  Service: Thoracic    ME [address] INCL FLUOR GDNCE DX W/CELL Her[address] [facility] N/A 11/27/2018    Procedure: Maricruz Hair;  Surgeon: Edmundo Dejesus MD;  [facility];  Service: Thoracic    ME [address] INCL FLUOR GDNCE DX W/CELL [address] [facility] N/A 3/6/2023    Procedure: Maricruz Hair;  Surgeon: Edmundo Dejesus MD;  [facility];  Service: Thoracic    ME BRONCHOSCOPY NEEDLE BX TRACHEA MAIN STEM&/BRON N/A 11/27/2018    Procedure: EBUS with biopsy;  Surgeon: Edmundo Dejesus MD;  [facility];  Service: Thoracic    ME EDG US EXAM SURGICAL ALTER STOM DUODENUM/JEJUNUM N/A 10/29/2018    Procedure: LINEAR ENDOSCOPIC U/S;  Surgeon: Serenity Encinas MD;  [facility];   Service: Gastroenterology    SKIN BIOPSY      SMALL INTESTINE SURGERY N/A 1/20/2021    Procedure: RESECTION SMALL BOWEL AND ANASTOMOSIS, RESECTION SMALL BOWEL NODULE;  Surgeon: Shea Clark MD;  [facility];  Service: Surgical Oncology    WISDOM TOOTH EXTRACTION       Family History   Problem Relation Age of Onset    Alzheimer's disease Mother     Parkinsonism Father     Psoriasis Father     Colon cancer Maternal Grandfather     Psoriasis Sister     No Known Problems Daughter     Psoriasis Sister     Stroke Sister     No Known Problems Daughter      Social History   Social History     Substance and Sexual Activity   Alcohol Use Yes    Comment: occ wine- very seldom     Social History     Substance and Sexual Activity   Drug Use No     Social History     Tobacco Use   Smoking Status Never   Smokeless Tobacco Never     E-Cigarette/Vaping    E-Cigarette Use Never User      E-Cigarette/Vaping Substances       Meds/Allergies     Current Outpatient Medications:     aspirin 81 MG tablet, Take 1 tablet by mouth daily in the early "morning , Disp: , Rfl:     Cholecalciferol (VITAMIN D) 2000 units CAPS, Take 1 tablet by mouth daily, Disp: , Rfl:     docusate sodium (COLACE) 100 mg capsule, Take 1 capsule (100 mg total) by mouth 2 (two) times a day, Disp: 10 capsule, Rfl: 0    Fluticasone-Salmeterol (Advair) 100-50 mcg/dose inhaler, Inhale 1 puff 2 (two) times a day Rinse mouth after use , Disp: 60 blister, Rfl: 3    hydrochlorothiazide (HYDRODIURIL) 12 5 mg tablet, Take 1 tablet by mouth once daily, Disp: 90 tablet, Rfl: 0    LANREOTIDE ACETATE SC, Inject under the skin every 30 (thirty) days , Disp: , Rfl:     Multiple Vitamin (MULTIVITAMIN) tablet, Take 1 tablet by mouth daily, Disp: , Rfl:     omeprazole (PriLOSEC) 40 MG capsule, Take 1 capsule (40 mg total) by mouth daily before breakfast, Disp: 90 capsule, Rfl: 3    triamcinolone (KENALOG) 0 1 % ointment, Apply 2-4 times daily up to 2 weeks if rash goes away stop medication (Patient not taking: Reported on 4/25/2023), Disp: 30 g, Rfl: 1  Allergies   Allergen Reactions    Bactrim [Sulfamethoxazole-Trimethoprim] Hives    Clam Shell - Food Allergy Vomiting       Vitals: Blood pressure 110/70, pulse 78, temperature 97 6 °F (36 4 °C), temperature source Tympanic, resp  rate 18, height 5' 2 5\" (1 588 m), weight 80 3 kg (177 lb), SpO2 97 %, not currently breastfeeding  Body mass index is 31 86 kg/m²  Oxygen Therapy  SpO2: 97 %  Oxygen Therapy: None (Room air)    Physical Exam:  Physical Exam  Constitutional:       General: She is not in acute distress  Appearance: Normal appearance  She is normal weight  She is not ill-appearing  HENT:      Head: Normocephalic and atraumatic  Nose: Nose normal  No congestion or rhinorrhea  Mouth/Throat:      Mouth: Mucous membranes are dry  Pharynx: No oropharyngeal exudate or posterior oropharyngeal erythema  Cardiovascular:      Rate and Rhythm: Normal rate and regular rhythm  Pulses: Normal pulses        Heart sounds: " "Normal heart sounds  No murmur heard  No friction rub  No gallop  Pulmonary:      Effort: Pulmonary effort is normal  No tachypnea, bradypnea, accessory muscle usage or respiratory distress  Breath sounds: Normal breath sounds  No stridor, decreased air movement or transmitted upper airway sounds  No decreased breath sounds, wheezing, rhonchi or rales  Comments: Clear breath sounds  Chest:      Chest wall: No tenderness  Abdominal:      General: Abdomen is flat  Bowel sounds are normal  There is no distension  Palpations: Abdomen is soft  There is no mass  Musculoskeletal:         General: No swelling or tenderness  Normal range of motion  Cervical back: Normal range of motion  No rigidity or tenderness  Skin:     General: Skin is warm and dry  Coloration: Skin is not jaundiced or pale  Neurological:      General: No focal deficit present  Mental Status: She is alert and oriented to person, place, and time  Mental status is at baseline  Psychiatric:         Mood and Affect: Mood normal          Behavior: Behavior normal              Imaging and other studies: I have personally reviewed pertinent films in PACS     Chest CT-tiny diffuse nodules       Pulmonary Results (PFTs, PSG): I have personally reviewed pertinent films in PACS       Results:  FEV1/FVC Ratio: 80 %  FEV1: 1 85 L     84 % predicted  Forced Vital Capacity: 2 32 L    85 % predicted  After administration of bronchodilator:  Not administered     Lung volumes: Total Lung Capacity 83 % predicted Residual volume 89 % predicted     DLCO corrected for patients hemoglobin level: 72 % predicted     Flow volume loop:  Normal     Interpretation:      Normal spirometry, lung volumes and diffusion capacity            ROSALIO Shah  [facility]        Portions of the record may have been created with voice recognition software    Occasional wrong word or \"sound a like\" " substitutions may have occurred due to the inherent limitations of voice recognition software  Read the chart carefully and recognize, using context, where substitutions have occurred or contact the dictating provider    Answers for HPI/ROS submitted by the patient on 4/25/2023  Chronicity: chronic  When did you first notice your symptoms?: more than 1 year ago  How often do your symptoms occur?: intermittently  Since you first noticed this problem, how has it changed?: unchanged  Do you have shortness of breath that occurs with effort or exertion?: Yes  Do you have ear congestion?: No  Do you have heartburn?: No  Do you have fatigue?: Yes  Do you have nasal congestion?: No  Do you have shortness of breath when lying flat?: No  Do you have shortness of breath when you wake up?: No  Do you have sweats?: No  Have you experienced weight loss?: No  Which of the following makes your symptoms worse?: climbing stairs, minimal activity, strenuous activity  Which of the following makes your symptoms better?: rest  Risk factors for lung disease: animal exposure

## 2023-05-05 DIAGNOSIS — K21.9 GASTROESOPHAGEAL REFLUX DISEASE WITHOUT ESOPHAGITIS: ICD-10-CM

## 2023-05-05 RX ORDER — OMEPRAZOLE 40 MG/1
40 CAPSULE, DELAYED RELEASE ORAL
Qty: 90 CAPSULE | Refills: 3 | Status: SHIPPED | OUTPATIENT
Start: 2023-05-05

## 2023-05-09 ENCOUNTER — TELEPHONE (OUTPATIENT)
Dept: PULMONOLOGY | Facility: CLINIC | Age: 73
End: 2023-05-09

## 2023-05-09 NOTE — TELEPHONE ENCOUNTER
"Patient is calling, she is a bit confused about her last office visit she had on 5/2/23  She was ordered a CT chest wo contrast to be done  She was also referred to Pulmonary Rehab with a diagnosis of \"COPD\"  She is wondering if she needs to have this CT chest wo contrast? She had just completed a CT chest abdomen pelvis w contrast on 2/8/23 and has a repeat of this scan on 8/15/23  She isn't sure if she really needs to get the CT wo contrast as well? She also said she is doing just fine with her lungs and never spoke about needing rehab? She said she doesn't have COPD, she has Sarcoidosis so she doesn't know why that was put in as her diagnosis  She would appreciate a return call to sort this out   Please advise    "

## 2023-05-16 ENCOUNTER — APPOINTMENT (OUTPATIENT)
Dept: LAB | Facility: CLINIC | Age: 73
End: 2023-05-16

## 2023-05-16 DIAGNOSIS — C7B.8 METASTATIC MALIGNANT NEUROENDOCRINE TUMOR TO LIVER (HCC): ICD-10-CM

## 2023-05-19 LAB — CGA SERPL-MCNC: 43.8 NG/ML (ref 0–101.8)

## 2023-05-24 ENCOUNTER — HOSPITAL ENCOUNTER (OUTPATIENT)
Dept: INFUSION CENTER | Facility: CLINIC | Age: 73
Discharge: HOME | End: 2023-05-24

## 2023-05-24 DIAGNOSIS — C7B.8 METASTATIC MALIGNANT NEUROENDOCRINE TUMOR TO LIVER (HCC): Primary | ICD-10-CM

## 2023-05-24 RX ORDER — LANREOTIDE ACETATE 120 MG/.5ML
120 INJECTION SUBCUTANEOUS ONCE
OUTPATIENT
Start: 2023-06-20

## 2023-05-24 RX ORDER — LANREOTIDE ACETATE 120 MG/.5ML
120 INJECTION SUBCUTANEOUS ONCE
Status: COMPLETED | OUTPATIENT
Start: 2023-05-24 | End: 2023-05-24

## 2023-05-24 RX ADMIN — LANREOTIDE ACETATE 120 MG: 120 INJECTION SUBCUTANEOUS at 09:48

## 2023-05-24 NOTE — PROGRESS NOTES
Patient presents today for Lanrotide injection  Patient offers no complaints  Lanreotide injection administered into right buttocks, tolerated without incident  Next appointment confirmed  AVS offered and declined

## 2023-05-31 DIAGNOSIS — D86.0 SARCOIDOSIS, LUNG (HCC): ICD-10-CM

## 2023-06-01 RX ORDER — HYDROCHLOROTHIAZIDE 12.5 MG/1
TABLET ORAL
Qty: 90 TABLET | Refills: 0 | Status: SHIPPED | OUTPATIENT
Start: 2023-06-01

## 2023-06-14 ENCOUNTER — APPOINTMENT (OUTPATIENT)
Dept: LAB | Facility: CLINIC | Age: 73
End: 2023-06-14
Payer: MEDICARE

## 2023-06-14 DIAGNOSIS — C7B.8 METASTATIC MALIGNANT NEUROENDOCRINE TUMOR TO LIVER (HCC): ICD-10-CM

## 2023-06-14 PROCEDURE — 86316 IMMUNOASSAY TUMOR OTHER: CPT

## 2023-06-17 LAB — CGA SERPL-MCNC: 62 NG/ML (ref 0–101.8)

## 2023-06-21 ENCOUNTER — HOSPITAL ENCOUNTER (OUTPATIENT)
Dept: INFUSION CENTER | Facility: CLINIC | Age: 73
Discharge: HOME | End: 2023-06-21
Payer: MEDICARE

## 2023-06-21 DIAGNOSIS — C7B.8 METASTATIC MALIGNANT NEUROENDOCRINE TUMOR TO LIVER (HCC): Primary | ICD-10-CM

## 2023-06-21 PROCEDURE — 96372 THER/PROPH/DIAG INJ SC/IM: CPT

## 2023-06-21 RX ORDER — LANREOTIDE ACETATE 120 MG/.5ML
120 INJECTION SUBCUTANEOUS ONCE
OUTPATIENT
Start: 2023-07-19

## 2023-06-21 RX ORDER — LANREOTIDE ACETATE 120 MG/.5ML
120 INJECTION SUBCUTANEOUS ONCE
Status: COMPLETED | OUTPATIENT
Start: 2023-06-21 | End: 2023-06-21

## 2023-06-21 RX ADMIN — LANREOTIDE ACETATE 120 MG: 120 INJECTION SUBCUTANEOUS at 09:48

## 2023-06-21 NOTE — PROGRESS NOTES
Pt to clinic for lanreotide injection, pt tolerated in left buttocks, pt to make next appointment when exiting, declined avs

## 2023-06-21 NOTE — PLAN OF CARE
Problem: Knowledge Deficit  Goal: Patient/family/caregiver demonstrates understanding of disease process, treatment plan, medications, and discharge instructions  Description: Complete learning assessment and assess knowledge base    Interventions:  - Provide teaching at level of understanding  - Provide teaching via preferred learning methods  Outcome: Completed

## 2023-07-12 ENCOUNTER — APPOINTMENT (OUTPATIENT)
Dept: LAB | Facility: CLINIC | Age: 73
End: 2023-07-12
Payer: MEDICARE

## 2023-07-20 ENCOUNTER — HOSPITAL ENCOUNTER (OUTPATIENT)
Dept: INFUSION CENTER | Facility: CLINIC | Age: 73
Discharge: HOME | End: 2023-07-20
Payer: MEDICARE

## 2023-07-20 DIAGNOSIS — C7B.8 METASTATIC MALIGNANT NEUROENDOCRINE TUMOR TO LIVER (HCC): Primary | ICD-10-CM

## 2023-07-20 PROCEDURE — 96372 THER/PROPH/DIAG INJ SC/IM: CPT

## 2023-07-20 RX ORDER — LANREOTIDE ACETATE 120 MG/.5ML
120 INJECTION SUBCUTANEOUS ONCE
OUTPATIENT
Start: 2023-08-16

## 2023-07-20 RX ORDER — LANREOTIDE ACETATE 120 MG/.5ML
120 INJECTION SUBCUTANEOUS ONCE
Status: COMPLETED | OUTPATIENT
Start: 2023-07-20 | End: 2023-07-20

## 2023-07-20 RX ADMIN — LANREOTIDE ACETATE 120 MG: 120 INJECTION SUBCUTANEOUS at 08:57

## 2023-07-20 NOTE — PROGRESS NOTES
Patient tolerated injection to RIGHT glute, bandaid in place. Patient aware of upcoming appt, declines AVS. Patient ambulatory upon DC without gait disturbance noted.

## 2023-08-09 ENCOUNTER — APPOINTMENT (OUTPATIENT)
Dept: LAB | Facility: CLINIC | Age: 73
End: 2023-08-09
Payer: MEDICARE

## 2023-08-09 DIAGNOSIS — C7B.8 METASTATIC MALIGNANT NEUROENDOCRINE TUMOR TO LIVER (HCC): ICD-10-CM

## 2023-08-09 PROCEDURE — 86316 IMMUNOASSAY TUMOR OTHER: CPT

## 2023-08-11 LAB — CGA SERPL-MCNC: 57.4 NG/ML (ref 0–101.8)

## 2023-08-15 ENCOUNTER — HOSPITAL ENCOUNTER (OUTPATIENT)
Dept: CT IMAGING | Facility: HOSPITAL | Age: 73
Discharge: HOME | End: 2023-08-15
Attending: SURGERY
Payer: MEDICARE

## 2023-08-15 DIAGNOSIS — C7B.8 METASTATIC MALIGNANT NEUROENDOCRINE TUMOR TO LIVER (HCC): ICD-10-CM

## 2023-08-15 PROCEDURE — G1004 CDSM NDSC: HCPCS

## 2023-08-15 PROCEDURE — 74177 CT ABD & PELVIS W/CONTRAST: CPT

## 2023-08-15 PROCEDURE — 71260 CT THORAX DX C+: CPT

## 2023-08-15 RX ADMIN — IOHEXOL 100 ML: 350 INJECTION, SOLUTION INTRAVENOUS at 07:28

## 2023-08-16 ENCOUNTER — TELEPHONE (OUTPATIENT)
Dept: HEMATOLOGY ONCOLOGY | Facility: CLINIC | Age: 73
End: 2023-08-16

## 2023-08-16 NOTE — TELEPHONE ENCOUNTER
Confirmed with patient appt. On 8/24/23 with Kashif Amis. This was changed from 8/23/23 due to a change in provider's schedule.

## 2023-08-17 ENCOUNTER — HOSPITAL ENCOUNTER (OUTPATIENT)
Dept: INFUSION CENTER | Facility: CLINIC | Age: 73
Discharge: HOME | End: 2023-08-17
Payer: MEDICARE

## 2023-08-17 DIAGNOSIS — C7B.8 METASTATIC MALIGNANT NEUROENDOCRINE TUMOR TO LIVER (HCC): Primary | ICD-10-CM

## 2023-08-17 PROCEDURE — 96372 THER/PROPH/DIAG INJ SC/IM: CPT

## 2023-08-17 RX ORDER — LANREOTIDE ACETATE 120 MG/.5ML
120 INJECTION SUBCUTANEOUS ONCE
Status: COMPLETED | OUTPATIENT
Start: 2023-08-17 | End: 2023-08-17

## 2023-08-17 RX ORDER — LANREOTIDE ACETATE 120 MG/.5ML
120 INJECTION SUBCUTANEOUS ONCE
OUTPATIENT
Start: 2023-09-14

## 2023-08-17 RX ADMIN — LANREOTIDE ACETATE 120 MG: 120 INJECTION SUBCUTANEOUS at 10:07

## 2023-08-17 NOTE — PROGRESS NOTES
Patient arrives for Lanreotide injection. Injection administered L upper outer quadrant without issue. Minor bleeding, bandaid in place. Patient offered no complaints. Aware that no follow up appointment scheduled and encourage to schedule prior to leaving. Declined AVS. Ambulatory from department.

## 2023-08-24 ENCOUNTER — OFFICE VISIT (OUTPATIENT)
Dept: SURGICAL ONCOLOGY | Facility: CLINIC | Age: 73
End: 2023-08-24
Payer: MEDICARE

## 2023-08-24 VITALS
BODY MASS INDEX: 32.25 KG/M2 | OXYGEN SATURATION: 99 % | HEIGHT: 63 IN | DIASTOLIC BLOOD PRESSURE: 76 MMHG | WEIGHT: 182 LBS | HEART RATE: 65 BPM | RESPIRATION RATE: 18 BRPM | SYSTOLIC BLOOD PRESSURE: 114 MMHG | TEMPERATURE: 96.4 F

## 2023-08-24 DIAGNOSIS — D49.0 IPMN (INTRADUCTAL PAPILLARY MUCINOUS NEOPLASM): ICD-10-CM

## 2023-08-24 DIAGNOSIS — C7B.8 METASTATIC MALIGNANT NEUROENDOCRINE TUMOR TO LIVER (HCC): Primary | ICD-10-CM

## 2023-08-24 DIAGNOSIS — C79.51 MALIGNANT NEOPLASM METASTATIC TO BONE (HCC): ICD-10-CM

## 2023-08-24 PROCEDURE — 99213 OFFICE O/P EST LOW 20 MIN: CPT

## 2023-08-24 NOTE — PROGRESS NOTES
Surgical Oncology Follow Up       [address] [facility]  [facility] [place]  [address] [place] [address]    Shae Lux Cable  1950  [id number]  [address] [facility]  [facility] [place]  [address] [place] [address]    Diagnoses and all orders for this visit:    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 [facility])  -     CT chest abdomen pelvis w contrast; Future  -     Chromogranin A; Future  -     BUN; Future  -     Creatinine, serum; Future    Bone metastasis    IPMN (intraductal papillary mucinous neoplasm)  -     CT chest abdomen pelvis w contrast; Future        Chief Complaint   Patient presents with   • Follow-up       Return in about 6 months (around 2/24/2024) for Office visit with Dr Milena Vanessa, Imaging - See orders, Labs - See Treatment Plan. Oncology History   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 [facility])   12/8/2020 Initial Diagnosis    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 ([address] [facility])     12/8/2020 Biopsy    IR Liver biopsy:  A. Liver mass, needle core biopsy:  - Metastatic well-differentiated neuroendocrine tumor, G2     1/18/2021 -  Chemotherapy    Lanreotide injections (monthly)     1/20/2021 Surgery    Resection of small bowel and anastomosis, segment 7 liver ablation    A. Small intestine, resection:  - Well- differentiated neuroendocrine tumor (up to 2.1 cm), G2, at least three foci. - All margins are negative for tumor.  - Three of thirteen lymph nodes are positive for tumor (3/13). B. 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         Bone metastasis   1/28/2021 Initial Diagnosis    Bone metastasis ([address] )     2/18/2021 - 3/3/2021 Radiation    Treatment:  Course: C1    Plan ID Energy Fractions Dose per Fraction (cGy) Dose Correction (cGy) Total Dose Delivered (cGy) Elapsed Days   [id number] Spine 10X/6X 10 / 10 300 0 3,000 13                Diagnosis and Staging: Side branch IPMN 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 2013, 1.2cm   Metastatic neuroendocrine tumor, T3N1M1, December 2020  Treatment history: Small bowel resection and microwave ablation of segment 7 liver lesions, January 2021   Lanreotide   radiation to T11  Mediastinal and hilar adenopathy biopsies were benign  Current Therapy: Observation For the IPMN   Lanreotide  Disease Status: GERARDO    History of Present Illness: The patient returns to the office today in follow-up for her history of metastatic neuroendocrine carcinoma. She is currently GERARDO at 2-1/2 years. She reports she feels well, but does continue to experience chronic shortness of breath. She denies any abdominal pain, weight loss, change in appetite or bowel habits, or new bone pain. She states she has been having more headaches than usual, but that Tylenol takes care of these. She also has chronic back pain, which is unchanged. She continues on lanreotide injections indefinitely. A chromogranin A level has been drawn, and CAT scan of the chest abdomen and pelvis was performed on August 15. I have reviewed these results myself and discussed them in detail with the patient today. Review of Systems   Constitutional: Negative for activity change, appetite change, fatigue and unexpected weight change. HENT: Negative. Respiratory: Positive for shortness of breath (chronic). Negative for cough. Cardiovascular: Negative. Negative for chest pain. Gastrointestinal: Negative. Negative for abdominal pain, diarrhea, nausea and vomiting. Musculoskeletal: Negative for arthralgias. Back pain: chronic. Skin: Negative. Negative for color change. Neurological: Positive for headaches. Negative for dizziness. Hematological: Negative. Negative for adenopathy. Psychiatric/Behavioral: Negative.               Patient Active Problem List   Diagnosis   • Positional vertigo   • Focal nodular hyperplasia of liver   • IPMN (intraductal papillary mucinous neoplasm)   • Abnormal MRI of abdomen   • Liver mass   • Mediastinal adenopathy   • Sarcoidosis, lung (HCC)   • Granulomatous lymphadenitis   • Edema of right lower extremity   • Diastolic dysfunction   • Complex tear of medial meniscus of right knee as current injury   • Other tear of medial meniscus, current injury, right knee, initial encounter   • Trigger middle finger of right hand   • 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 [facility])   • Bone metastasis   • Reactive airway disease   • Upper respiratory tract infection     Past Medical History:   Diagnosis Date   • Abdominal pain    • Acute tonsillitis    • Breast pain, left    • Cancer (HCC)     liver&small intestine   • COVID-19 07/2022   • Edema of both lower extremities    • GERD (gastroesophageal reflux disease)    • GERD without esophagitis    • Kidney stone     " [place]"   • Lesion of liver    • Liver mass    • Lymphadenopathy    • Mediastinal lymphadenopathy    • Neoplasm of digestive system    • Orthostatic lightheadedness    • Sarcoidosis    • Vertigo      Past Surgical History:   Procedure Laterality Date   • BREAST BIOPSY Left 1996    negative   • CARPAL TUNNEL RELEASE Bilateral    • COLONOSCOPY      2017   • ENDOBRONCHIAL ULTRASOUND (EBUS) N/A 3/6/2023    Procedure: ENDOBRONCHIAL ULTRASOUND (EBUS);   Surgeon: Diandra Ledbetter MD;  [facility];  Service: Thoracic   • IR BIOPSY LIVER MASS  11/6/2018   • IR BIOPSY LIVER MASS  12/8/2020   • KNEE ARTHROSCOPY Left    • LAPAROTOMY N/A 1/20/2021    Procedure: LAPAROTOMY EXPLORATORY;  Surgeon: Cathi Phillip MD;  [facility];  Service: Surgical Oncology   • LIVER LOBECTOMY N/A 1/20/2021    Procedure: LIVER ABLATION SEGMENT 7, INTRAOPERATIVE U/S OF LIVER;  Surgeon: Cathi Phillip MD;  [facility];  Service: Surgical Oncology   • MEDIASTINOSCOPY N/A 11/27/2018    Procedure: MEDIASTINOSCOPY;  Surgeon: Glen Quinnchester MD Silvia;  Location[facility];  Service: Thoracic   •  [address] INCL FLUOR GDNCE DX W/CELL WASHG [address] [facility] N/A 11/27/2018    Procedure: Christal Sultana;  Surgeon: Malissa Finch MD;  [facility];  Service: Thoracic   •  [address] INCL FLUOR GDNCE DX [address] W[address] N/A 3/6/2023    Procedure: BRONCHOSCOPY FLEXIBLE;  Surgeon: Malissa Finch MD;  [facility];  Service: Thoracic   • AK BRONCHOSCOPY NEEDLE BX TRACHEA MAIN STEM&/BRON N/A 11/27/2018    Procedure: EBUS with biopsy;  Surgeon: Malissa Finch MD;  [facility];  Service: Thoracic   • AK EDG US EXAM SURGICAL ALTER STOM DUODENUM/JEJUNUM N/A 10/29/2018    Procedure: LINEAR ENDOSCOPIC U/S;  Surgeon: Dotty Goodpasture, MD;  Location: [facility]; Service: Gastroenterology   • SKIN BIOPSY     • SMALL INTESTINE SURGERY N/A 1/20/2021    Procedure: RESECTION SMALL BOWEL AND ANASTOMOSIS, RESECTION SMALL BOWEL NODULE;  Surgeon: Talia Escobar MD;  [facility];  Service: Surgical Oncology   • WISDOM TOOTH EXTRACTION       Family History   Problem Relation Age of Onset   • Alzheimer's disease Mother    • Parkinsonism Father    • Psoriasis Father    • Colon cancer Maternal Grandfather    • Psoriasis Sister    • No Known Problems Daughter    • Psoriasis Sister    • Stroke Sister    • No Known Problems Daughter      Social History     Socioeconomic History   • Marital status:       Spouse name: Not on file   • Number of children: Not on file   • Years of education: Not on file   • Highest education level: Not on file   Occupational History   • Not on file   Tobacco Use   • Smoking status: Never   • Smokeless tobacco: Never   Vaping Use   • Vaping Use: Never used   Substance and Sexual Activity   • Alcohol use: Yes     Comment: occ wine- very seldom   • Drug use: No   • Sexual activity: Not Currently   Other Topics Concern   • Not on file   Social History Narrative   • Not on file     Social Determinants of Health Financial Resource Strain: Low Risk  (2/16/2023)    Overall Financial Resource Strain (CARDIA)    • Difficulty of Paying Living Expenses: Not very hard   Food Insecurity: Not on file   Transportation Needs: No Transportation Needs (2/16/2023)    PRAPARE - Transportation    • Lack of Transportation (Medical): No    • Lack of Transportation (Non-Medical): No   Physical Activity: Not on file   Stress: Not on file   Social Connections: Not on file   Intimate Partner Violence: Not on file   Housing Stability: Not on file       Current Outpatient Medications:   •  aspirin 81 MG tablet, Take 1 tablet by mouth daily in the early morning , Disp: , Rfl:   •  Cholecalciferol (VITAMIN D) 2000 units CAPS, Take 1 tablet by mouth daily, Disp: , Rfl:   •  docusate sodium (COLACE) 100 mg capsule, Take 1 capsule (100 mg total) by mouth 2 (two) times a day, Disp: 10 capsule, Rfl: 0  •  Fluticasone-Salmeterol (Advair) 100-50 mcg/dose inhaler, Inhale 1 puff 2 (two) times a day Rinse mouth after use., Disp: 60 blister, Rfl: 3  •  hydrochlorothiazide (HYDRODIURIL) 12.5 mg tablet, Take 1 tablet by mouth once daily, Disp: 90 tablet, Rfl: 0  •  LANREOTIDE ACETATE SC, Inject under the skin every 30 (thirty) days , Disp: , Rfl:   •  Multiple Vitamin (MULTIVITAMIN) tablet, Take 1 tablet by mouth daily, Disp: , Rfl:   •  omeprazole (PriLOSEC) 40 MG capsule, Take 1 capsule (40 mg total) by mouth daily before breakfast, Disp: 90 capsule, Rfl: 3  Allergies   Allergen Reactions   • Bactrim [Sulfamethoxazole-Trimethoprim] Hives   • Clam Shell - Food Allergy Vomiting     Vitals:    08/24/23 1050   BP: 114/76   Pulse: 65   Resp: 18   Temp: (!) 96.4 °F (35.8 °C)   SpO2: 99%       Physical Exam  Constitutional: General appearance: The Patient is well-developed and well-nourished who appears the stated age in no acute distress. Patient is pleasant and talkative. HEENT:  Normocephalic. Sclerae are anicteric.  Mucous membranes are moist. Neck is supple without adenopathy. No JVD. Chest: The lungs are clear to auscultation. Cardiac: Heart is regular rate. Abdomen: Abdomen is soft, non-tender, non-distended and without masses. Extremities: There is no clubbing or cyanosis. There is no edema. Symmetric. Neuro: Grossly nonfocal. Gait is normal.     Lymphatic: No evidence of cervical adenopathy bilaterally. No evidence of axillary adenopathy bilaterally. No evidence of inguinal adenopathy bilaterally. Skin: Warm, anicteric. Psych:  Patient is pleasant and talkative. Breasts:          Labs:   Collected 8/9/2023  8:16 AM          Component Ref Range & Units 2 wk ago   Chromogranin A 0.0 - 101.8 ng/mL 57.4             Imaging  CT chest abdomen pelvis w contrast    Result Date: 8/18/2023  Narrative: CT CHEST, ABDOMEN AND PELVIS WITH IV CONTRAST INDICATION:   C7B.8: Other secondary neuroendocrine tumors. Metastatic malignant neuroendocrine tumor to the liver COMPARISON: CT chest, abdomen and pelvis 2/8/2023 TECHNIQUE: CT examination of the chest, abdomen and pelvis was performed. Multiplanar 2D reformatted images were created from the source data. This examination, like all CT scans performed in the [facility], was performed utilizing techniques to minimize radiation dose exposure, including the use of iterative reconstruction and automated exposure control. Radiation dose length product (DLP) for this visit:  1401 mGy-cm IV Contrast:  100 mL of iohexol (OMNIPAQUE) Enteric Contrast: Enteric contrast was administered. FINDINGS: CHEST LUNGS: Stable bilateral diffuse micronodularity throughout both the lungs, greater on the right and with upper lung predominance. They measure up to 1 to 2 mm. Linear subsegmental atelectasis in the right middle lobe and lingula. .  There is no tracheal or endobronchial lesion. PLEURA:  Unremarkable. HEART/GREAT VESSELS: Heart is unremarkable for patient's age. No thoracic aortic aneurysm. MEDIASTINUM AND BRIGIDA: There is interval decrease in the size of mediastinal and bilateral hilar lymph nodes. Representative lymph nodes are described as seen on series 2 as below *  Pretracheal lymph node measuring 0.8 cm in short axis, previously 1.2 cm (image 38) *  Precarinal lymph node measuring 0.9 cm in short axis (image 43), previously 1.2 cm *  Right hilar lymphadenopathy measuring 2.2 x 1.1 cm (image 50), previously 2.6 x 1.3 cm *  Left hilar lymph node measuring 0.7 cm in short axis (image 56), previously 1.1 cm CHEST WALL AND LOWER NECK:  Unremarkable. ABDOMEN LIVER/BILIARY TREE: Stable hypodense multiple hepatic lesions, including previously ablated liver metastasis. Reference right hepatic lobe lesion measures 2.7 cm (series 2 image 105). GALLBLADDER:  No calcified gallstones. No pericholecystic inflammatory change. SPLEEN:  Unremarkable. PANCREAS: No main pancreatic ductal dilation. A few subcentimeter cystic pancreatic lesions measuring up to 0.9 cm (series 2 image 120) in proximal pancreatic body and grossly stable in size since MRI abdomen 11/5/2020 ADRENAL GLANDS:  Unremarkable. KIDNEYS/URETERS:  Unremarkable. No hydronephrosis. STOMACH AND BOWEL:  There is colonic diverticulosis without evidence of acute diverticulitis. There is long segment symmetric wall thickening of the transverse colon, concerning for colitis. APPENDIX:  No findings to suggest appendicitis. ABDOMINOPELVIC CAVITY:  No ascites. No pneumoperitoneum. No lymphadenopathy. VESSELS:  Unremarkable for patient's age. PELVIS REPRODUCTIVE ORGANS:  Unremarkable for patient's age. URINARY BLADDER: Underdistended, limiting evaluation ABDOMINAL WALL/INGUINAL REGIONS:  Unremarkable. OSSEOUS STRUCTURES:  No acute fracture or destructive osseous lesion. Stable focal sclerosis involving the right ilium, compared to 8/21/2013, representing benign etiology. Impression: Interval decrease in the size of mediastinal and hilar lymphadenopathy. Stable persistent right greater than left lung diffuse micronodularity with upper lobe predominance, which can be secondary to lymphangitic carcinomatosis, or patient's known sarcoidosis. Atypical pneumonitis is a less likely possibility. Clinical correlation is recommended. Stable multiple hypoattenuating hepatic lesions, including previously ablated liver metastasis. Long segment wall thickening of the transverse colon, which can represent colitis (infectious, inflammatory, posttreatment changes or ischemic). Clinical correlation is recommended. The study was marked in EPIC for significant notification. Workstation performed: [id number]     I personally reviewed and interpreted the above laboratory and imaging data. Discussion/Summary: This is a 69-year-old female who presents to the office today for continued surveillance following treatment for metastatic neuroendocrine carcinoma. At this time she is doing well, and there is no evidence of disease progression. Her chromogranin A level remains in normal range. CT scan shows stability of pulmonary nodules, as well as decrease in size of mediastinal and hilar lymph nodes. Previously treated liver lesions remain nonviable in appearance. With regard to the thickening seen in the colon, the patient did have a colonoscopy performed within the past year, and she is asymptomatic. Several small pancreatic cysts are unchanged. We will plan to repeat CT and chromogranin A level in 6 months, I will see her again for another clinical exam.  She is agreeable to the plan, all questions have been answered.

## 2023-08-29 ENCOUNTER — OFFICE VISIT (OUTPATIENT)
Dept: HEMATOLOGY ONCOLOGY | Facility: CLINIC | Age: 73
End: 2023-08-29
Payer: MEDICARE

## 2023-08-29 ENCOUNTER — TELEPHONE (OUTPATIENT)
Dept: HEMATOLOGY ONCOLOGY | Facility: CLINIC | Age: 73
End: 2023-08-29

## 2023-08-29 VITALS
DIASTOLIC BLOOD PRESSURE: 84 MMHG | TEMPERATURE: 98.2 F | WEIGHT: 183 LBS | HEART RATE: 60 BPM | OXYGEN SATURATION: 98 % | SYSTOLIC BLOOD PRESSURE: 134 MMHG | BODY MASS INDEX: 32.43 KG/M2 | RESPIRATION RATE: 16 BRPM | HEIGHT: 63 IN

## 2023-08-29 DIAGNOSIS — C7B.8 METASTATIC MALIGNANT NEUROENDOCRINE TUMOR TO LIVER (HCC): Primary | ICD-10-CM

## 2023-08-29 PROCEDURE — 99214 OFFICE O/P EST MOD 30 MIN: CPT | Performed by: INTERNAL MEDICINE

## 2023-08-29 NOTE — PROGRESS NOTES
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 Mrs. Brooks feels OK/stable and clinically there are no concerning findings. Recent blood work was good/acceptable. Follow-up CAT scans did not demonstrate any evidence of disease progression, chromogranin a levels have been stable. The plan is to continue with the lanreotide. In the future, if there is evidence of disease progression within the liver, liver directed treatment options will be discussed. If there is evidence of progression systemically, other options such as Ilaina Reyes will need to be discussed. The prior CAT scan demonstrated enlarging hilar lymph nodes. Patient has a history of sarcoidosis. Patient was seen/evaluated by Dr. Sierra Ruth in March 2023 and underwent mediastinal lymph node sampling. All lymph nodes were negative for metastatic disease. Possibly a gallium 68 PET/CT will be needed in the future to better clarify. As above, the most recent CAT scan demonstrated that the hilar lymph nodes were actually smaller than before. Patient stated feeling dizzy last week. Patient has a monitor at home that checks her blood pressure and heart rate. Mrs. Marvin Phillisp believes that her heart was irregular. Patient has not had cardiac issues previously. To be sure, patient has been referred to Dr. Thaddeus Lew, cardiology. Patient is to return in 3 months. Patient knows to call the hematology/oncology office if there are any other questions or concerns. Carefully review your medication list and verify that the list is accurate and up-to-date.  Please call the hematology/oncology office if there are medications missing from the list, medications on the list that you are not currently taking or if there is a dosage or instruction that is different from how you're taking that medication. Patient goals and areas of care: Lanreotide, see cardiology  Barriers to care: None  Patient is able to self-care  _____________________________________________________________________________________    Chief Complaint   Patient presents with   • Follow-up     IPMN (intraductal papillary mucinous neoplasm)   • Neuroendocrine tumor     Oncology History   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 ([address])   12/8/2020 Initial Diagnosis    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 ([address])     12/8/2020 Biopsy    IR Liver biopsy:  A. Liver mass, needle core biopsy:  - Metastatic well-differentiated neuroendocrine tumor, G2     1/18/2021 -  Chemotherapy    Lanreotide injections (monthly)     1/20/2021 Surgery    Resection of small bowel and anastomosis, segment 7 liver ablation    A. Small intestine, resection:  - Well- differentiated neuroendocrine tumor (up to 2.1 cm), G2, at least three foci. - All margins are negative for tumor.  - Three of thirteen lymph nodes are positive for tumor (3/13). B. 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 Bone metastasis   1/28/2021 Initial Diagnosis    Bone metastasis ([address])     2/18/2021 - 3/3/2021 Radiation    Treatment:  Course: C1    Plan ID Energy Fractions Dose per Fraction (cGy) Dose Correction (cGy) Total Dose Delivered (cGy) Elapsed Days   T10_T12 Spine 10X/6X 10 / 10 300 0 3,000 13              History of Present Illness: 73-year-old female previously diagnosed with metastatic well differentiated carcinoid tumor. Patient was first seen by Dr. Lawrence Sandra in January 2021. Patient underwent resection of a number of lesions within the small bowel. Patient also had lesions in the liver and T11 vertebral body. Patient is on lanreotide. Patient returns for follow-up. Mrs. Brooks okay, about the same as before.   Patient had 1 episode of dizziness last week, thought her heart rate was irregular. Appetite is good. No nausea or vomiting. No diarrhea. No hot flashes. Activities at baseline. No pain control issues. Patient also with sarcoidosis, no recent pulmonary issues otherwise. Review of Systems   Constitutional: Negative. HENT: Negative. Eyes: Negative. Respiratory: Negative. Cardiovascular: Negative. Gastrointestinal: Negative. Endocrine: Negative. Genitourinary: Negative. Musculoskeletal: Negative. Skin: Negative. Allergic/Immunologic: Negative. Neurological: Negative. Hematological: Negative. Psychiatric/Behavioral: Negative. All other systems reviewed and are negative.     Patient Active Problem List   Diagnosis   • Positional vertigo   • Focal nodular hyperplasia of liver   • IPMN (intraductal papillary mucinous neoplasm)   • Abnormal MRI of abdomen   • Liver mass   • Mediastinal adenopathy   • Sarcoidosis, lung (HCC)   • Granulomatous lymphadenitis   • Edema of right lower extremity   • Diastolic dysfunction   • Complex tear of medial meniscus of right knee as current injury   • Other tear of medial meniscus, current injury, right knee, initial encounter   • Trigger middle finger of right hand   • 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 [facility])   • Bone metastasis   • Reactive airway disease   • Upper respiratory tract infection     Past Medical History:   Diagnosis Date   • Abdominal pain    • Acute tonsillitis    • Breast pain, left    • Cancer ([address])     liver&small intestine   • COVID-19 07/2022   • Edema of both lower extremities    • GERD (gastroesophageal reflux disease)    • GERD without esophagitis    • Kidney stone     " [place]"   • Lesion of liver    • Liver mass    • Lymphadenopathy    • Mediastinal lymphadenopathy    • Neoplasm of digestive system    • Orthostatic lightheadedness    • Sarcoidosis    • Vertigo      Past Surgical History:   Procedure Laterality Date   • BREAST BIOPSY Left 1996    negative   • CARPAL TUNNEL RELEASE Bilateral    • COLONOSCOPY      2017   • ENDOBRONCHIAL ULTRASOUND (EBUS) N/A 3/6/2023    Procedure: ENDOBRONCHIAL ULTRASOUND (EBUS); Surgeon: Pearl Walter MD;  [facility];  Service: Thoracic   • IR BIOPSY LIVER MASS  11/6/2018   • IR BIOPSY LIVER MASS  12/8/2020   • KNEE ARTHROSCOPY Left    • LAPAROTOMY N/A 1/20/2021    Procedure: LAPAROTOMY EXPLORATORY;  Surgeon: Machelle Jones MD;  [facility];  Service: Surgical Oncology   • LIVER LOBECTOMY N/A 1/20/2021    Procedure: LIVER ABLATION SEGMENT 7, INTRAOPERATIVE U/S OF LIVER;  Surgeon: Machelle Jones MD;  [facility];  Service: Surgical Oncology   • MEDIASTINOSCOPY N/A 11/27/2018    Procedure: MEDIASTINOSCOPY;  Surgeon: Pearl Walter MD;  [facility];  Service: Thoracic   •  [address] INCL FLUOR GDNCE DX [address] Johnye Search [address] N/A 11/27/2018    Procedure: [place] Six;  Surgeon: Pearl Walter MD;  [facility];  Service: Thoracic   •  [address] INCL FLUOR GDNCE DX [address] WASHG [address] N/A 3/6/2023    Procedure: BRONCHOSCOPY FLEXIBLE;  Surgeon: Pearl Walter MD;  [facility];  Service: Thoracic   • ID BRONCHOSCOPY NEEDLE BX TRACHEA MAIN STEM&/BRON N/A 11/27/2018    Procedure: EBUS with biopsy;  Surgeon: Pearl Walter MD;  [facility];  Service: Thoracic   • ID EDG US EXAM SURGICAL ALTER STOM DUODENUM/JEJUNUM N/A 10/29/2018    Procedure: LINEAR ENDOSCOPIC U/S;  Surgeon: James Granados MD;  [facility];   Service: Gastroenterology   • SKIN BIOPSY     • SMALL INTESTINE SURGERY N/A 1/20/2021    Procedure: RESECTION SMALL BOWEL AND ANASTOMOSIS, RESECTION SMALL BOWEL NODULE;  Surgeon: Machelle Jones MD;  [facility];  Service: Surgical Oncology   • WISDOM TOOTH EXTRACTION       Family History   Problem Relation Age of Onset   • Alzheimer's disease Mother    • Parkinsonism Father    • Psoriasis Father    • Colon cancer Maternal Grandfather    • Psoriasis Sister    • No Known Problems Daughter    • Psoriasis Sister    • Stroke Sister    • No Known Problems Daughter      Social History     Socioeconomic History   • Marital status:      Spouse name: Not on file   • Number of children: Not on file   • Years of education: Not on file   • Highest education level: Not on file   Occupational History   • Not on file   Tobacco Use   • Smoking status: Never   • Smokeless tobacco: Never   Vaping Use   • Vaping Use: Never used   Substance and Sexual Activity   • Alcohol use: Yes     Comment: occ wine- very seldom   • Drug use: No   • Sexual activity: Not Currently   Other Topics Concern   • Not on file   Social History Narrative   • Not on file     Social Determinants of Health     Financial Resource Strain: Low Risk  (2/16/2023)    Overall Financial Resource Strain (CARDIA)    • Difficulty of Paying Living Expenses: Not very hard   Food Insecurity: Not on file   Transportation Needs: No Transportation Needs (2/16/2023)    PRAPARE - Transportation    • Lack of Transportation (Medical): No    • Lack of Transportation (Non-Medical):  No   Physical Activity: Not on file   Stress: Not on file   Social Connections: Not on file   Intimate Partner Violence: Not on file   Housing Stability: Not on file       Current Outpatient Medications:   •  aspirin 81 MG tablet, Take 1 tablet by mouth daily in the early morning , Disp: , Rfl:   •  Cholecalciferol (VITAMIN D) 2000 units CAPS, Take 1 tablet by mouth daily, Disp: , Rfl:   •  docusate sodium (COLACE) 100 mg capsule, Take 1 capsule (100 mg total) by mouth 2 (two) times a day, Disp: 10 capsule, Rfl: 0  •  Fluticasone-Salmeterol (Advair) 100-50 mcg/dose inhaler, Inhale 1 puff 2 (two) times a day Rinse mouth after use., Disp: 60 blister, Rfl: 3  •  hydrochlorothiazide (HYDRODIURIL) 12.5 mg tablet, Take 1 tablet by mouth once daily, Disp: 90 tablet, Rfl: 0  •  LANREOTIDE ACETATE SC, Inject under the skin every 30 (thirty) days , Disp: , Rfl:   •  Multiple Vitamin (MULTIVITAMIN) tablet, Take 1 tablet by mouth daily, Disp: , Rfl:   •  omeprazole (PriLOSEC) 40 MG capsule, Take 1 capsule (40 mg total) by mouth daily before breakfast, Disp: 90 capsule, Rfl: 3    Allergies   Allergen Reactions   • Bactrim [Sulfamethoxazole-Trimethoprim] Hives   • Clam Shell - Food Allergy Vomiting       Vitals:    08/29/23 0907   BP: 134/84   Pulse: 60   Resp: 16   Temp: 98.2 °F (36.8 °C)   SpO2: 98%     Physical Exam  Constitutional:       Appearance: She is well-developed. HENT:      Head: Normocephalic and atraumatic. Right Ear: External ear normal.      Left Ear: External ear normal.   Eyes:      Conjunctiva/sclera: Conjunctivae normal.      Pupils: Pupils are equal, round, and reactive to light. Cardiovascular:      Rate and Rhythm: Normal rate and regular rhythm. Heart sounds: Normal heart sounds. Pulmonary:      Effort: Pulmonary effort is normal.      Breath sounds: Normal breath sounds. Abdominal:      General: Bowel sounds are normal.      Palpations: Abdomen is soft. Musculoskeletal:         General: Normal range of motion. Cervical back: Normal range of motion and neck supple. Skin:     General: Skin is warm. Neurological:      Mental Status: She is alert and oriented to person, place, and time. Deep Tendon Reflexes: Reflexes are normal and symmetric. Psychiatric:         Behavior: Behavior normal.         Thought Content:  Thought content normal.         Judgment: Judgment normal.     Extremities: No lower extreme edema, no cords, pulses are 1+  Lymphatics: No adenopathy in the neck, supraclavicular region, axilla bilaterally    Performance Status: 0 - Asymptomatic    Labs        8/9/2023 WBC = 6.77 hemoglobin = 13.3 hematocrit = 40.3 platelet = 121 neutrophil = 56% BUN = 17 creatinine = 0.74 calcium = 9.4 LFTs WNL    Imaging    8/15/2023 CAT scan chest abdomen pelvis with contrast    IMPRESSION:     Interval decrease in the size of mediastinal and hilar lymphadenopathy.     Stable persistent right greater than left lung diffuse micronodularity with upper lobe predominance, which can be secondary to lymphangitic carcinomatosis, or patient's known sarcoidosis. Atypical pneumonitis is a less likely possibility. Clinical correlation is recommended.     Stable multiple hypoattenuating hepatic lesions, including previously ablated liver metastasis.     Long segment wall thickening of the transverse colon, which can represent colitis (infectious, inflammatory, posttreatment changes or ischemic). Clinical correlation is recommended. 2/8/2023 CAT scan chest abdomen pelvis with contrast    IMPRESSION:     Left hilar node has increased in size as have some small mediastinal lymph nodes. There is new right hilar adenopathy. This is suspicious for metastatic involvement.     Interval development of fairly diffuse micronodularity throughout both lungs. Differential diagnosis includes metastatic disease as well as an infectious or inflammatory process. These are beneath the limits of PET resolution. Recommend attention on follow-up.     Stable hypodense liver lesions including prior ablated hepatic metastases. No new or enlarging liver lesions.     Mildly thickened appearance of the proximal transverse colon could be due to underdistention. Suggest attention to this area on follow-up.     Pathology    Case Report   Non-gynecologic Cytology                          Case: [id number]                                   Authorizing Provider: Darlene Moreau MD      Collected:           03/06/2023 0931               Ordering Location:     [address]      Received:            03/06/2023 1030                                      [facility]                                                       Pathologist:           Dexter Marie MD                                                          Specimens:   A) - Lymph Node, level 11 L                                                                          B) - Lymph Node, level 11 L                                                                          C) - Lymph Node, Cell Block, level 11 L                                                              D) - Lymph Node, L7                                                                                  E) - Lymph Node, L7                                                                                  F) - Lymph Node, Cell Block, L7                                                                      G) - Lymph Node, Cell Block, 4R                                                                      H) - Lymph Node, 4R                                                                        Addendum   Special stains for fungus (GMS) and acid fast bacilli (Kinyoun) performed on cell blocks F & G are negative for organisms. A copy of the addendum is sent to Dr. Opal Abrams on 3/9/23 @ 0900 hours. Addendum electronically signed by Van Nava MD on 3/9/2023 at  9:00 AM       Case Report   Surgical Pathology Report                         Case: [id number]                                    Authorizing Provider: Rafat Tripp MD           Collected:           01/20/2021 1013               Ordering Location:     [facility]      Received:            01/20/2021 [facility]                                      Hospital Operating Room                                                       Pathologist:           Radha Choe MD                                                                  Specimens:   A) - Small Bowel, NOS, SMALL BOWEL RESECTION WITH MESENTERIC LYMPH NODES                             B) - Small Bowel, NOS, SMALL BOWEL NODULE                                                  Final Diagnosis   A.  Small intestine, resection:  - Well- differentiated neuroendocrine tumor (up to 2.1 cm), G2, at least three foci. - All margins are negative for tumor.  - Three of thirteen lymph nodes are positive for tumor (3/13).    Comment: There are at least three separate foci of tumor present. It is difficut to acurately count for tumor foci given cluster growth pattern. Immunohistochemistry was performed on block A18. The tumor cells are positive for synaptophysin, chromogranin A, and negative for CD56, Ki-67 shows mitotic proliferative index of approximately 3-4%. D2-40 and CD31 are performed to help in the assessment of this case.     B. 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 See note.      NOTE: There is no mitotic figures or necrosis seen in the specimen. Immunohistochemistry was performed on block B1. The The tumor cells are positive for DOG-1,  and negative for S100, CD31, and desmin, supporting the above diagnosis.     Intradepartmental consultation is in agreement.   Dr. Doris Lerma is notified of the diagnosis in [facility] via [address] [place] [address] on 1/26/2021  at 1pm .      Electronically signed by Argenis Clements MD on 1/28/2021 at 11:32 AM

## 2023-08-29 NOTE — TELEPHONE ENCOUNTER
Patient Call    Who are you speaking with? Patient    If it is not the patient, are they listed on an active communication consent form? N/A   What is the reason for this call? Patient calling in for the number of Dr Army Bosch. I was able to search and find for the patient [phone]. Does this require a call back? No   If a call back is required, please list best call back number N/A   If a call back is required, advise that a message will be forwarded to their care team and someone will return their call as soon as possible. Did you relay this information to the patient?  No

## 2023-08-30 ENCOUNTER — TELEPHONE (OUTPATIENT)
Dept: CARDIOLOGY CLINIC | Facility: CLINIC | Age: 73
End: 2023-08-30

## 2023-08-30 NOTE — TELEPHONE ENCOUNTER
P/C she scheduled with Dr Mago Connor and is asking if needs an EKG prior to appt, advised he can order at your appt if is needed.     Verbally understood

## 2023-08-31 NOTE — PROGRESS NOTES
Cardio-Oncology Clinic Note    Ely Fonseca 71 y.o. female   MRN: [id number]  Encounter: [id number]        Assessment / Plan:    # Cardio-Oncology Pertinent History   Neuroendocrine tumor  Dx 2020  Metastatic to liver and spine  Had surgery (small bowel) and radiation (to back) and ablation (liver)   on lanreotide    # Neuroendocrine tumor - cardiac screening  There is a risk with metastatic neuroendocrine tumor for carcinoid heart disease. Recommend echocardiogram.    # Lung sarcoid  bx proven  Follows with pulm  Needs screening for cardiac involvement:   EKG done today. Check holter, echo  Any abnl findings on screening would prompt cardiac MRI    # Dizziness episodes (new in the past month or two), concern she had irregular heart rate  EKG today - Sinus with sinus arrhythmia.  1 PVC. Possible PACs. Check holter  Could ultimately consider BB    # HTN  On HCTZ 12.5  (started years ago for mild unilateral leg swelling and on it ever since)  BP elevated today but reports normal at home. # Prevention  recommend lipids. # Diastolic dysfunction  On problem list.  Last echo showed grade 1 diastolic dysfunction. As above, started on HCTZ in the past for mild diuretic effect  On exam - no volume overload. Today's Plan Summary:  See above assessment/plan for full details of today's plan. Briefly,     holter  Echo  Fasting lipids            Reason For Visit / Chief Complaint:  Referred by Dr. Zayda Mohamud for a new patient visit for neuroendocrine tumor. HPI:   Ely Fonseca is a 71 y.o.  female with history as noted in the problem list and further detailed in the above assessment and plan. Referred by Dr. Zayda Mohamud for a new patient visit for neuroendocrine tumor. As above, the patient has a history of a neuroendocrine tumor diagnosed in 2020. She had surgery and radiation and is currently on lanreotide. She also has biopsy-proven lung sarcoid.   At her last oncology visit she reported some dizziness and possible irregular heartbeat. In this setting (neuroendocrine tumor and pulmonary sarcoid) she was referred to cardio oncology. Today, the patient reports - feeling overall pretty well. No chest pain. No SOB at rest.  Does get INFANTE. Gets lightheadedness episodes. Can occur while walking. Can occur while sitting. Usually lasts for seconds and then goes away. During 1 episode recently - BP cuff told her the HR may be irregular. No syncope. No leg swelling. No orthopnea or PND. Retired. Did office work. . 2 children. No smoking. Rare ETOH. Cardiac Imaging personally reviewed:  EKG 2-23-23  NSR    9-1-23  Sinus rhythm with sinus arrhythmia.  1 PVC. Possible PACs. PRWP. Holter or event monitor    Echo 2016  Ef 60%.  Grade 1 diastolic dysf  Mild-mod TR  RVSP 35       KOMAL    Cardiac MRI    Stress testing    Coronary CTA or C    RHC    CPET              Patient Active Problem List    Diagnosis Date Noted   • Upper respiratory tract infection 02/16/2023   • Reactive airway disease 07/19/2021   • Bone metastasis 01/28/2021   • 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 ([address]) 12/11/2020   • Trigger middle finger of right hand 08/26/2020   • Other tear of medial meniscus, current injury, right knee, initial encounter 08/21/2019   • Complex tear of medial meniscus of right knee as current injury 65/27/3094   • Diastolic dysfunction 78/48/2524   • Edema of right lower extremity 06/02/2019   • 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   • Sarcoidosis, lung ([address]) 12/20/2018   • Mediastinal adenopathy 11/21/2018   • Liver mass 11/13/2018   • Abnormal MRI of abdomen 10/24/2018   • Positional vertigo 03/26/2018   • IPMN (intraductal papillary mucinous neoplasm) 10/08/2015   • Focal nodular hyperplasia of liver 03/19/2015       Past Medical History:   Diagnosis Date   • Abdominal pain    • Acute tonsillitis    • Breast pain, left    • Cancer (HCC)     liver&small intestine • COVID-19 07/2022   • Edema of both lower extremities    • GERD (gastroesophageal reflux disease)    • GERD without esophagitis    • Kidney stone     " Gravel"   • Lesion of liver    • Liver mass    • Lymphadenopathy    • Mediastinal lymphadenopathy    • Neoplasm of digestive system    • Orthostatic lightheadedness    • Sarcoidosis    • Vertigo        Review of Systems   Constitutional: Negative for chills and fever. HENT: Negative for nosebleeds. Gastrointestinal: Negative for abdominal distention and blood in stool. Neurological: Positive for dizziness. Negative for syncope. Psychiatric/Behavioral: Negative for confusion. 14-point ROS completed and negative except as stated above and/or in the HPI. Allergies   Allergen Reactions   • Bactrim [Sulfamethoxazole-Trimethoprim] Hives   • Clam Shell - Food Allergy Vomiting       Current Outpatient Medications   Medication Instructions   • aspirin 81 MG tablet 1 tablet, Oral, Daily (early morning)   • Cholecalciferol (VITAMIN D) 2000 units CAPS 1 tablet, Oral, Daily   • docusate sodium (COLACE) 100 mg, Oral, 2 times daily   • Fluticasone-Salmeterol (Advair) 100-50 mcg/dose inhaler 1 puff, Inhalation, 2 times daily, Rinse mouth after use. • hydrochlorothiazide (HYDRODIURIL) 12.5 mg tablet Take 1 tablet by mouth once daily   • LANREOTIDE ACETATE SC Subcutaneous, Every 30 days   • Multiple Vitamin (MULTIVITAMIN) tablet 1 tablet, Oral, Daily   • omeprazole (PRILOSEC) 40 mg, Oral, Daily before breakfast       Social History     Socioeconomic History   • Marital status:       Spouse name: Not on file   • Number of children: Not on file   • Years of education: Not on file   • Highest education level: Not on file   Occupational History   • Not on file   Tobacco Use   • Smoking status: Never   • Smokeless tobacco: Never   Vaping Use   • Vaping Use: Never used   Substance and Sexual Activity   • Alcohol use: Yes     Comment: occ wine- very seldom   • Drug use: No   • Sexual activity: Not Currently   Other Topics Concern   • Not on file   Social History Narrative   • Not on file     Social Determinants of Health     Financial Resource Strain: Low Risk  (2/16/2023)    Overall Financial Resource Strain (CARDIA)    • Difficulty of Paying Living Expenses: Not very hard   Food Insecurity: Not on file   Transportation Needs: No Transportation Needs (2/16/2023)    PRAPARE - Transportation    • Lack of Transportation (Medical): No    • Lack of Transportation (Non-Medical): No   Physical Activity: Not on file   Stress: Not on file   Social Connections: Not on file   Intimate Partner Violence: Not on file   Housing Stability: Not on file       Family History   Problem Relation Age of Onset   • Alzheimer's disease Mother    • Parkinsonism Father    • Psoriasis Father    • Heart failure Father         at older age   • Psoriasis Sister    • Psoriasis Sister    • Stroke Sister    • Colon cancer Maternal Grandfather    • No Known Problems Daughter    • No Known Problems Daughter        Physical Exam:  Blood pressure 138/80, pulse 60, weight 82.9 kg (182 lb 12.8 oz), not currently breastfeeding. Body mass index is 32.9 kg/m². Wt Readings from Last 3 Encounters:   09/01/23 82.9 kg (182 lb 12.8 oz)   08/29/23 83 kg (183 lb)   08/24/23 82.6 kg (182 lb)     Physical Exam  Vitals reviewed. Constitutional:       General: She is not in acute distress. Appearance: She is not toxic-appearing. HENT:      Head: Normocephalic and atraumatic. Eyes:      General: No scleral icterus. Conjunctiva/sclera: Conjunctivae normal.   Neck:      Vascular: No carotid bruit. Comments: JVP borderline, est 6-7 cm h20  Cardiovascular:      Rate and Rhythm: Normal rate and regular rhythm. Heart sounds: No murmur heard. No friction rub. No gallop. Pulmonary:      Breath sounds: Normal breath sounds. No wheezing, rhonchi or rales. Abdominal:      General: There is no distension. Palpations: Abdomen is soft. Tenderness: There is no abdominal tenderness. There is no guarding. Musculoskeletal:      Right lower leg: No edema. Left lower leg: No edema. Skin:     Coloration: Skin is not jaundiced or pale. Findings: No erythema. Neurological:      Mental Status: She is alert. Mental status is at baseline. Psychiatric:         Mood and Affect: Mood normal.         Behavior: Behavior normal.         Labs & Results:  Lab Results   Component Value Date    SODIUM 140 08/09/2023    K 4.1 08/09/2023     08/09/2023    CO2 29 08/09/2023    BUN 17 08/09/2023    CREATININE 0.74 08/09/2023    GLUC 106 04/06/2022    CALCIUM 9.4 08/09/2023     No results found for: "NTBNP"       Thank you for the opportunity to participate in the care of this patient.     Barry Huerta MD, [facility]  Staff Cardiologist  Director of [address] [place] [address]

## 2023-09-01 ENCOUNTER — OFFICE VISIT (OUTPATIENT)
Dept: CARDIOLOGY CLINIC | Facility: CLINIC | Age: 73
End: 2023-09-01
Payer: MEDICARE

## 2023-09-01 VITALS
BODY MASS INDEX: 32.9 KG/M2 | DIASTOLIC BLOOD PRESSURE: 80 MMHG | SYSTOLIC BLOOD PRESSURE: 138 MMHG | WEIGHT: 182.8 LBS | HEART RATE: 60 BPM

## 2023-09-01 DIAGNOSIS — R42 DIZZINESS: ICD-10-CM

## 2023-09-01 DIAGNOSIS — Z82.49 FAMILY HISTORY OF CHF (CONGESTIVE HEART FAILURE): ICD-10-CM

## 2023-09-01 DIAGNOSIS — D86.0 PULMONARY SARCOIDOSIS (HCC): ICD-10-CM

## 2023-09-01 DIAGNOSIS — I49.3 PVC (PREMATURE VENTRICULAR CONTRACTION): ICD-10-CM

## 2023-09-01 DIAGNOSIS — D3A.8 NEUROENDOCRINE TUMOR: ICD-10-CM

## 2023-09-01 DIAGNOSIS — I10 PRIMARY HYPERTENSION: ICD-10-CM

## 2023-09-01 DIAGNOSIS — E78.5 HYPERLIPIDEMIA, UNSPECIFIED HYPERLIPIDEMIA TYPE: ICD-10-CM

## 2023-09-01 DIAGNOSIS — R00.2 PALPITATIONS: Primary | ICD-10-CM

## 2023-09-01 DIAGNOSIS — C7B.8 METASTATIC MALIGNANT NEUROENDOCRINE TUMOR TO LIVER (HCC): ICD-10-CM

## 2023-09-01 PROCEDURE — 99204 OFFICE O/P NEW MOD 45 MIN: CPT | Performed by: INTERNAL MEDICINE

## 2023-09-01 PROCEDURE — 93000 ELECTROCARDIOGRAM COMPLETE: CPT | Performed by: INTERNAL MEDICINE

## 2023-09-01 NOTE — Clinical Note
Hubert Horn,  I just saw your patient. There are 3 issues here. The first is that she is having the dizziness and possible palpitations. The second is she has pulmonary sarcoid which can occasionally involve the heart and should have screening. And lastly she has a neuroendocrine tumor which we should screen for heart valve disease. To summarize -- I will check an echo, Holter, and lipid panel.   Thanks Francesco Shaikh

## 2023-09-06 ENCOUNTER — LAB (OUTPATIENT)
Dept: LAB | Facility: CLINIC | Age: 73
End: 2023-09-06
Payer: MEDICARE

## 2023-09-06 DIAGNOSIS — C7B.8 METASTATIC MALIGNANT NEUROENDOCRINE TUMOR TO LIVER (HCC): ICD-10-CM

## 2023-09-06 DIAGNOSIS — I49.3 PVC (PREMATURE VENTRICULAR CONTRACTION): ICD-10-CM

## 2023-09-06 DIAGNOSIS — E78.5 HYPERLIPIDEMIA, UNSPECIFIED HYPERLIPIDEMIA TYPE: ICD-10-CM

## 2023-09-06 DIAGNOSIS — D86.0 PULMONARY SARCOIDOSIS (HCC): ICD-10-CM

## 2023-09-06 DIAGNOSIS — D3A.8 NEUROENDOCRINE TUMOR: ICD-10-CM

## 2023-09-06 DIAGNOSIS — Z82.49 FAMILY HISTORY OF CHF (CONGESTIVE HEART FAILURE): ICD-10-CM

## 2023-09-06 LAB
CHOLEST SERPL-MCNC: 206 MG/DL
HDLC SERPL-MCNC: 58 MG/DL
LDLC SERPL CALC-MCNC: 129 MG/DL (ref 0–100)
NONHDLC SERPL-MCNC: 148 MG/DL
TRIGL SERPL-MCNC: 96 MG/DL

## 2023-09-06 PROCEDURE — 80061 LIPID PANEL: CPT

## 2023-09-14 ENCOUNTER — HOSPITAL ENCOUNTER (OUTPATIENT)
Dept: INFUSION CENTER | Facility: CLINIC | Age: 73
Discharge: HOME | End: 2023-09-14
Payer: MEDICARE

## 2023-09-14 DIAGNOSIS — C7B.8 METASTATIC MALIGNANT NEUROENDOCRINE TUMOR TO LIVER (HCC): Primary | ICD-10-CM

## 2023-09-14 PROCEDURE — 96372 THER/PROPH/DIAG INJ SC/IM: CPT

## 2023-09-14 RX ORDER — LANREOTIDE ACETATE 120 MG/.5ML
120 INJECTION SUBCUTANEOUS ONCE
OUTPATIENT
Start: 2023-10-12

## 2023-09-14 RX ORDER — LANREOTIDE ACETATE 120 MG/.5ML
120 INJECTION SUBCUTANEOUS ONCE
Status: COMPLETED | OUTPATIENT
Start: 2023-09-14 | End: 2023-09-14

## 2023-09-14 RX ADMIN — LANREOTIDE ACETATE 120 MG: 120 INJECTION SUBCUTANEOUS at 10:21

## 2023-09-14 NOTE — PROGRESS NOTES
Patient here for lanreotide injection. Injection given in R buttock without issue.  Patient aware of next appointment, declined AVS.

## 2023-09-16 DIAGNOSIS — D86.0 SARCOIDOSIS, LUNG (HCC): ICD-10-CM

## 2023-09-18 RX ORDER — HYDROCHLOROTHIAZIDE 12.5 MG/1
TABLET ORAL
Qty: 90 TABLET | Refills: 0 | Status: SHIPPED | OUTPATIENT
Start: 2023-09-18

## 2023-10-04 ENCOUNTER — TELEPHONE (OUTPATIENT)
Dept: HEMATOLOGY ONCOLOGY | Facility: MEDICAL CENTER | Age: 73
End: 2023-10-04

## 2023-10-04 DIAGNOSIS — C7B.8 METASTATIC MALIGNANT NEUROENDOCRINE TUMOR TO LIVER (HCC): Primary | ICD-10-CM

## 2023-10-06 ENCOUNTER — APPOINTMENT (OUTPATIENT)
Dept: LAB | Facility: CLINIC | Age: 73
End: 2023-10-06
Payer: MEDICARE

## 2023-10-06 LAB
ALBUMIN SERPL BCP-MCNC: 4.2 G/DL (ref 3.5–5)
ALP SERPL-CCNC: 80 U/L (ref 34–104)
ALT SERPL W P-5'-P-CCNC: 22 U/L (ref 7–52)
ANION GAP SERPL CALCULATED.3IONS-SCNC: 8 MMOL/L
AST SERPL W P-5'-P-CCNC: 27 U/L (ref 13–39)
BASOPHILS # BLD AUTO: 0.06 THOUSANDS/ÂΜL (ref 0–0.1)
BASOPHILS NFR BLD AUTO: 1 % (ref 0–1)
BILIRUB SERPL-MCNC: 1.09 MG/DL (ref 0.2–1)
BUN SERPL-MCNC: 20 MG/DL (ref 5–25)
CALCIUM SERPL-MCNC: 9.4 MG/DL (ref 8.4–10.2)
CHLORIDE SERPL-SCNC: 101 MMOL/L (ref 96–108)
CO2 SERPL-SCNC: 29 MMOL/L (ref 21–32)
CREAT SERPL-MCNC: 0.74 MG/DL (ref 0.6–1.3)
EOSINOPHIL # BLD AUTO: 0.32 THOUSAND/ÂΜL (ref 0–0.61)
EOSINOPHIL NFR BLD AUTO: 5 % (ref 0–6)
ERYTHROCYTE [DISTWIDTH] IN BLOOD BY AUTOMATED COUNT: 12.3 % (ref 11.6–15.1)
GFR SERPL CREATININE-BSD FRML MDRD: 81 ML/MIN/1.73SQ M
GLUCOSE P FAST SERPL-MCNC: 115 MG/DL (ref 65–99)
HCT VFR BLD AUTO: 38.1 % (ref 34.8–46.1)
HGB BLD-MCNC: 12.6 G/DL (ref 11.5–15.4)
IMM GRANULOCYTES # BLD AUTO: 0.02 THOUSAND/UL (ref 0–0.2)
IMM GRANULOCYTES NFR BLD AUTO: 0 % (ref 0–2)
LYMPHOCYTES # BLD AUTO: 2.27 THOUSANDS/ÂΜL (ref 0.6–4.47)
LYMPHOCYTES NFR BLD AUTO: 33 % (ref 14–44)
MCH RBC QN AUTO: 31.8 PG (ref 26.8–34.3)
MCHC RBC AUTO-ENTMCNC: 33.1 G/DL (ref 31.4–37.4)
MCV RBC AUTO: 96 FL (ref 82–98)
MONOCYTES # BLD AUTO: 0.69 THOUSAND/ÂΜL (ref 0.17–1.22)
MONOCYTES NFR BLD AUTO: 10 % (ref 4–12)
NEUTROPHILS # BLD AUTO: 3.63 THOUSANDS/ÂΜL (ref 1.85–7.62)
NEUTS SEG NFR BLD AUTO: 51 % (ref 43–75)
NRBC BLD AUTO-RTO: 0 /100 WBCS
PLATELET # BLD AUTO: 266 THOUSANDS/UL (ref 149–390)
PMV BLD AUTO: 9.9 FL (ref 8.9–12.7)
POTASSIUM SERPL-SCNC: 3.6 MMOL/L (ref 3.5–5.3)
PROT SERPL-MCNC: 7.9 G/DL (ref 6.4–8.4)
RBC # BLD AUTO: 3.96 MILLION/UL (ref 3.81–5.12)
SODIUM SERPL-SCNC: 138 MMOL/L (ref 135–147)
WBC # BLD AUTO: 6.99 THOUSAND/UL (ref 4.31–10.16)

## 2023-10-06 PROCEDURE — 85025 COMPLETE CBC W/AUTO DIFF WBC: CPT

## 2023-10-06 PROCEDURE — 86316 IMMUNOASSAY TUMOR OTHER: CPT

## 2023-10-06 PROCEDURE — 36415 COLL VENOUS BLD VENIPUNCTURE: CPT

## 2023-10-06 PROCEDURE — 80053 COMPREHEN METABOLIC PANEL: CPT

## 2023-10-11 LAB — CGA SERPL-MCNC: 46.5 NG/ML (ref 0–101.8)

## 2023-10-12 ENCOUNTER — HOSPITAL ENCOUNTER (OUTPATIENT)
Dept: INFUSION CENTER | Facility: CLINIC | Age: 73
Discharge: HOME | End: 2023-10-12
Payer: MEDICARE

## 2023-10-12 DIAGNOSIS — C7B.8 METASTATIC MALIGNANT NEUROENDOCRINE TUMOR TO LIVER (HCC): Primary | ICD-10-CM

## 2023-10-12 PROCEDURE — 96372 THER/PROPH/DIAG INJ SC/IM: CPT

## 2023-10-12 RX ORDER — LANREOTIDE ACETATE 120 MG/.5ML
120 INJECTION SUBCUTANEOUS ONCE
Status: COMPLETED | OUTPATIENT
Start: 2023-10-12 | End: 2023-10-12

## 2023-10-12 RX ORDER — LANREOTIDE ACETATE 120 MG/.5ML
120 INJECTION SUBCUTANEOUS ONCE
OUTPATIENT
Start: 2023-11-09

## 2023-10-12 RX ADMIN — LANREOTIDE ACETATE 120 MG: 120 INJECTION SUBCUTANEOUS at 10:38

## 2023-10-12 NOTE — PROGRESS NOTES
Pt resting comfortably with no complaints. Shot administered as ordered. Pt tolerated well. Declines AVS, aware of next appointment. Pt has mychart.

## 2023-10-16 ENCOUNTER — HOSPITAL ENCOUNTER (OUTPATIENT)
Dept: NON INVASIVE DIAGNOSTICS | Facility: CLINIC | Age: 73
Discharge: HOME | End: 2023-10-16
Payer: MEDICARE

## 2023-10-16 VITALS
HEART RATE: 70 BPM | SYSTOLIC BLOOD PRESSURE: 136 MMHG | DIASTOLIC BLOOD PRESSURE: 72 MMHG | BODY MASS INDEX: 33.49 KG/M2 | WEIGHT: 182 LBS | HEIGHT: 62 IN

## 2023-10-16 DIAGNOSIS — R00.2 PALPITATIONS: ICD-10-CM

## 2023-10-16 DIAGNOSIS — I49.3 PVC (PREMATURE VENTRICULAR CONTRACTION): ICD-10-CM

## 2023-10-16 DIAGNOSIS — D86.0 PULMONARY SARCOIDOSIS (HCC): ICD-10-CM

## 2023-10-16 DIAGNOSIS — R42 DIZZINESS: ICD-10-CM

## 2023-10-16 LAB
AORTIC ROOT: 2.8 CM
APICAL FOUR CHAMBER EJECTION FRACTION: 74 %
ASCENDING AORTA: 2.5 CM
E WAVE DECELERATION TIME: 194 MS
E/A RATIO: 0.83
FRACTIONAL SHORTENING: 47 (ref 28–44)
INTERVENTRICULAR SEPTUM IN DIASTOLE (PARASTERNAL SHORT AXIS VIEW): 1.1 CM
INTERVENTRICULAR SEPTUM: 1.1 CM (ref 0.6–1.1)
LAAS-AP2: 15.9 CM2
LAAS-AP4: 17.7 CM2
LEFT ATRIUM SIZE: 4.1 CM
LEFT ATRIUM VOLUME (MOD BIPLANE): 44 ML
LEFT ATRIUM VOLUME INDEX (MOD BIPLANE): 23.8 ML/M2
LEFT INTERNAL DIMENSION IN SYSTOLE: 2.4 CM (ref 2.1–4)
LEFT VENTRICULAR INTERNAL DIMENSION IN DIASTOLE: 4.5 CM (ref 3.5–6)
LEFT VENTRICULAR POSTERIOR WALL IN END DIASTOLE: 1.2 CM
LEFT VENTRICULAR STROKE VOLUME: 72 ML
LVSV (TEICH): 72 ML
MV E'TISSUE VEL-SEP: 8 CM/S
MV PEAK A VEL: 0.78 M/S
MV PEAK E VEL: 65 CM/S
MV STENOSIS PRESSURE HALF TIME: 56 MS
MV VALVE AREA P 1/2 METHOD: 3.93
RA PRESSURE ESTIMATED: 8 MMHG
RIGHT ATRIUM AREA SYSTOLE A4C: 16.2 CM2
RIGHT VENTRICLE ID DIMENSION: 3.8 CM
RV PSP: 40 MMHG
SL CV LEFT ATRIUM LENGTH A2C: 4.9 CM
SL CV LV EF: 60
SL CV PED ECHO LEFT VENTRICLE DIASTOLIC VOLUME (MOD BIPLANE) 2D: 93 ML
SL CV PED ECHO LEFT VENTRICLE SYSTOLIC VOLUME (MOD BIPLANE) 2D: 21 ML
TR MAX PG: 32 MMHG
TR PEAK VELOCITY: 2.8 M/S
TRICUSPID ANNULAR PLANE SYSTOLIC EXCURSION: 2 CM
TRICUSPID VALVE PEAK REGURGITATION VELOCITY: 2.83 M/S

## 2023-10-16 PROCEDURE — 93306 TTE W/DOPPLER COMPLETE: CPT | Performed by: INTERNAL MEDICINE

## 2023-10-16 PROCEDURE — 93306 TTE W/DOPPLER COMPLETE: CPT

## 2023-10-16 PROCEDURE — 93225 XTRNL ECG REC<48 HRS REC: CPT

## 2023-10-16 PROCEDURE — 93226 XTRNL ECG REC<48 HR SCAN A/R: CPT

## 2023-10-16 NOTE — RESULT ENCOUNTER NOTE
Echo - 10/16/23   borderline LVH. EF 60%. Abnormal relaxation. Normal RV size and function. Mild-moderate TR on my review (the valve looks structurally normal). RVSP 40 mmHg.

## 2023-10-18 NOTE — RESULT ENCOUNTER NOTE
Holter - 10/18/23   Sinus rhythm.   Avg HR 71 ()  PAC's - 0.3%  PVC's - 0.7%  4 atrial runs (longest 13 beats)

## 2023-10-19 ENCOUNTER — OFFICE VISIT (OUTPATIENT)
Dept: URGENT CARE | Age: 73
End: 2023-10-19
Payer: MEDICARE

## 2023-10-19 VITALS
HEART RATE: 67 BPM | SYSTOLIC BLOOD PRESSURE: 127 MMHG | OXYGEN SATURATION: 96 % | RESPIRATION RATE: 18 BRPM | DIASTOLIC BLOOD PRESSURE: 59 MMHG | TEMPERATURE: 97.2 F

## 2023-10-19 DIAGNOSIS — J02.9 SORE THROAT: Primary | ICD-10-CM

## 2023-10-19 LAB — S PYO AG THROAT QL: NEGATIVE

## 2023-10-19 PROCEDURE — G0463 HOSPITAL OUTPT CLINIC VISIT: HCPCS | Performed by: NURSE PRACTITIONER

## 2023-10-19 PROCEDURE — 99213 OFFICE O/P EST LOW 20 MIN: CPT | Performed by: NURSE PRACTITIONER

## 2023-10-19 PROCEDURE — 87880 STREP A ASSAY W/OPTIC: CPT | Performed by: NURSE PRACTITIONER

## 2023-10-19 PROCEDURE — 87070 CULTURE OTHR SPECIMN AEROBIC: CPT | Performed by: NURSE PRACTITIONER

## 2023-10-19 NOTE — PROGRESS NOTES
[facility]        NAME: Columba Moreno is a 68 y.o. female  : 1950    MRN: [id number]  DATE: 2023  TIME: 1:17 PM    Assessment and Plan   Sore throat [J02.9]  1. Sore throat  POCT rapid strepA    Throat culture            Patient Instructions     Rapid strep is negative  Will send for culture  Mucinex or robitussin OTC prn for congestion  Tylenol prn for aches and pain  Follow up with PCP in 3-5 days. Proceed to  ER if symptoms worsen. Chief Complaint     Chief Complaint   Patient presents with    Cough    Sore Throat     Since  patient has had a sore throat and a cough. History of Present Illness       Sore Throat   Associated symptoms include congestion and coughing. Pertinent negatives include no abdominal pain or ear pain. Presents to clinic with complaint of sore throat for 2 days. Also cough. States when she lays on her back, she feels some gurgling sound in her throat. No fever. COVID test at home was negative x2    Review of Systems   Review of Systems   Constitutional:  Negative for chills and fever. HENT:  Positive for congestion and sore throat. Negative for ear pain, rhinorrhea and sneezing. Respiratory:  Positive for cough. Negative for wheezing. Cardiovascular:  Negative for chest pain. Gastrointestinal:  Negative for abdominal pain.          Current Medications       Current Outpatient Medications:     Cholecalciferol (VITAMIN D) 2000 units CAPS, Take 1 tablet by mouth daily, Disp: , Rfl:     docusate sodium (COLACE) 100 mg capsule, Take 1 capsule (100 mg total) by mouth 2 (two) times a day, Disp: 10 capsule, Rfl: 0    Fluticasone-Salmeterol (Advair) 100-50 mcg/dose inhaler, Inhale 1 puff 2 (two) times a day Rinse mouth after use., Disp: 60 blister, Rfl: 3    hydrochlorothiazide (HYDRODIURIL) 12.5 mg tablet, Take 1 tablet by mouth once daily, Disp: 90 tablet, Rfl: 0    Multiple Vitamin (MULTIVITAMIN) tablet, Take 1 tablet by mouth daily, Disp: , Rfl:     omeprazole (PriLOSEC) 40 MG capsule, Take 1 capsule (40 mg total) by mouth daily before breakfast, Disp: 90 capsule, Rfl: 3    LANREOTIDE ACETATE SC, Inject under the skin every 30 (thirty) days  (Patient not taking: Reported on 10/19/2023), Disp: , Rfl:     Current Allergies     Allergies as of 10/19/2023 - Reviewed 10/19/2023   Allergen Reaction Noted    Bactrim [sulfamethoxazole-trimethoprim] Hives 10/08/2016    Clam shell - food allergy Vomiting 03/18/2018            The following portions of the patient's history were reviewed and updated as appropriate: allergies, current medications, past family history, past medical history, past social history, past surgical history and problem list.     Past Medical History:   Diagnosis Date    Abdominal pain     Acute tonsillitis     Breast pain, left     Cancer ([address])     liver&small intestine    COVID-19 07/2022    Edema of both lower extremities     GERD (gastroesophageal reflux disease)     GERD without esophagitis     Kidney stone     " Gravel"    Lesion of liver     Liver mass     Lymphadenopathy     Mediastinal lymphadenopathy     Neoplasm of digestive system     Orthostatic lightheadedness     Sarcoidosis     Vertigo        Past Surgical History:   Procedure Laterality Date    BREAST BIOPSY Left 1996    negative    CARPAL TUNNEL RELEASE Bilateral     COLONOSCOPY      2017    ENDOBRONCHIAL ULTRASOUND (EBUS) N/A 3/6/2023    Procedure: ENDOBRONCHIAL ULTRASOUND (EBUS);   Surgeon: Diandra Ledbetter MD;  [facility]: [facility];  Service: Thoracic    IR BIOPSY LIVER MASS  11/6/2018    IR BIOPSY LIVER MASS  12/8/2020    KNEE ARTHROSCOPY Left     LAPAROTOMY N/A 1/20/2021    Procedure: LAPAROTOMY EXPLORATORY;  Surgeon: Cathi Phillip MD;  [facility];  Service: Surgical Oncology    LIVER LOBECTOMY N/A 1/20/2021    Procedure: LIVER ABLATION SEGMENT 7, INTRAOPERATIVE U/S OF LIVER;  Surgeon: Cathi Phillip MD;  [facility];  Service: Surgical Oncology    MEDIASTINOSCOPY N/A 11/27/2018    Procedure: MEDIASTINOSCOPY;  Surgeon: Walter Grayson MD;  [facility];  Service: Thoracic     [address] INCL FLUOR GDNCE DX W/CELL [address] N/A 11/27/2018    Procedure: Neetu Gu;  Surgeon: Walter Grayson MD;  [facility];  Service: Thoracic     [address] INCL FLUOR GDNCE DX W/CELL [address] N/A 3/6/2023    Procedure: Neetu Gu;  Surgeon: Walter Grayson MD;  [facility];  Service: Thoracic    CO BRONCHOSCOPY NEEDLE BX TRACHEA MAIN STEM&/BRON N/A 11/27/2018    Procedure: EBUS with biopsy;  Surgeon: Walter Grayson MD;  [facility];  Service: Thoracic    CO EDG US EXAM SURGICAL ALTER STOM DUODENUM/JEJUNUM N/A 10/29/2018    Procedure: LINEAR ENDOSCOPIC U/S;  Surgeon: Sonya Bass MD;  Location: [facility]; Service: Gastroenterology    SKIN BIOPSY      SMALL INTESTINE SURGERY N/A 1/20/2021    Procedure: RESECTION SMALL BOWEL AND ANASTOMOSIS, RESECTION SMALL BOWEL NODULE;  Surgeon: Doris Lerma MD;  [facility];  Service: Surgical Oncology    WISDOM TOOTH EXTRACTION         Family History   Problem Relation Age of Onset    Alzheimer's disease Mother     Parkinsonism Father     Psoriasis Father     Heart failure Father         at older age    Psoriasis Sister     Psoriasis Sister     Stroke Sister     Colon cancer Maternal Grandfather     No Known Problems Daughter     No Known Problems Daughter          Medications have been verified. Objective   /59   Pulse 67   Temp (!) 97.2 °F (36.2 °C)   Resp 18   LMP  (LMP Unknown)   SpO2 96%   No LMP recorded (lmp unknown). Patient is postmenopausal.       Physical Exam     Physical Exam  Constitutional:       Appearance: She is not ill-appearing.    HENT:      Right Ear: Tympanic membrane normal.      Left Ear: Tympanic membrane normal.      Mouth/Throat:      Mouth: Mucous membranes are moist.      Pharynx: No posterior oropharyngeal erythema. Tonsils: No tonsillar exudate. 0 on the right. 0 on the left. Cardiovascular:      Rate and Rhythm: Regular rhythm. Pulmonary:      Breath sounds: Normal breath sounds.    Abdominal:      General: Bowel sounds are normal.

## 2023-10-21 LAB — BACTERIA THROAT CULT: NORMAL

## 2023-10-31 ENCOUNTER — OFFICE VISIT (OUTPATIENT)
Dept: CARDIOLOGY CLINIC | Facility: CLINIC | Age: 73
End: 2023-10-31
Payer: MEDICARE

## 2023-10-31 VITALS
OXYGEN SATURATION: 96 % | HEIGHT: 62 IN | BODY MASS INDEX: 34.04 KG/M2 | SYSTOLIC BLOOD PRESSURE: 110 MMHG | HEART RATE: 85 BPM | WEIGHT: 185 LBS | DIASTOLIC BLOOD PRESSURE: 70 MMHG

## 2023-10-31 DIAGNOSIS — C7B.8 METASTATIC MALIGNANT NEUROENDOCRINE TUMOR TO LIVER (HCC): Primary | ICD-10-CM

## 2023-10-31 DIAGNOSIS — R42 DIZZINESS: ICD-10-CM

## 2023-10-31 DIAGNOSIS — I10 PRIMARY HYPERTENSION: ICD-10-CM

## 2023-10-31 DIAGNOSIS — D86.0 PULMONARY SARCOIDOSIS (HCC): ICD-10-CM

## 2023-10-31 PROCEDURE — 99214 OFFICE O/P EST MOD 30 MIN: CPT | Performed by: INTERNAL MEDICINE

## 2023-10-31 NOTE — PROGRESS NOTES
Cardio-Oncology Clinic Note    Anne Mahoney 68 y.o. female   MRN: [id number]  Encounter: [id number]        Assessment / Plan:    # Cardio-Oncology Pertinent History   Neuroendocrine tumor  Dx 2020  Metastatic to liver and spine  Had surgery (small bowel) and radiation (to back) and ablation (liver)   on lanreotide    # Neuroendocrine tumor - cardiac screening  # Tricuspid regurgitation  There is a risk with metastatic neuroendocrine tumor for carcinoid heart disease. We checked an echocardiogram and there is mild to moderate TR. However the valve looks structurally normal so this may be TR from diastolic dysfunction and not necessarily valve pathology from the neuroendocrine tumor. In any event we will need to follow this closely. I recommend repeating an echo in October 2024. # Pulmonary sarcoid  bx proven. Follows with pulm. We performed screening for cardiac involvement. There was an EKG done last visit. Echo shows normal EF. Holter shows no concerning findings. # Dizziness episodes   They are very brief lasting only for seconds. Can occur with walking or with sitting. Checked echo - normal EF  Checked holter - no concerning findings. Sx's improving. # HTN  On HCTZ 12.5  (started years ago for mild unilateral leg swelling and on it ever since)  BP at goal    # Prevention  Last visit we checked lipids and the LDL was 129. ASCVD risk score is 15%. We discussed could consider statin in the setting, particularly if risk enhancing features. There is no family history of CAD. We reviewed the prior PET scan and there is no evidence of coronary calcification. After a shared decision-making process the patient will hold off on statin and would like to repeat lipids next year. # Diastolic dysfunction  Abnormal relaxation on echo  On HCTZ in the past for mild diuretic effect  On exam - no volume overload. Today's Plan Summary:  See above assessment/plan for full details of today's plan. Briefly,     No med changes. F/u in 6 months to discuss above issues. Reason For Visit / Chief Complaint:  F/u neuroendocrine tumor, dizziness, HTN    HPI:   Estrella Patel is a 68 y.o.  female with history as noted in the problem list and further detailed in the above assessment and plan. Initial:    Sept 202  Referred by Dr. Raghavendra Juarez for a new patient visit for neuroendocrine tumor. As above, the patient has a history of a neuroendocrine tumor diagnosed in 2020. She had surgery and radiation and is currently on lanreotide. She also has biopsy-proven lung sarcoid. At her last oncology visit she reported some dizziness and possible irregular heartbeat. In this setting (neuroendocrine tumor and pulmonary sarcoid) she was referred to cardio oncology. Today, the patient reports - feeling overall pretty well. No chest pain. No SOB at rest.  Does get INFANTE. Gets lightheadedness episodes. Can occur while walking. Can occur while sitting. Usually lasts for seconds and then goes away. During 1 episode recently - BP cuff told her the HR may be irregular. No syncope. Retired. Did office work. . 2 children. Interval:  Last visit was initial.   Plan was:  holter  Echo  Fasting lipids    Lipids -   TG 96. . Echo - 10/16/23   borderline LVH. EF 60%. Abnormal relaxation. Normal RV size and function. Mild-moderate TR on my review (the valve looks structurally normal). RVSP 40 mmHg. Holter - 10/18/23   Sinus rhythm. Avg HR 71 ()  PAC's - 0.3%  PVC's - 0.7%  4 atrial runs (longest 13 beats)    Today - feeling reasonably well. No chest pain. No syncope. Continues to have fatigue. Continues to have rare lightheadedness episodes which are very brief. Cardiac Imaging personally reviewed:  EKG 2-23-23  NSR    9-1-23  Sinus rhythm with sinus arrhythmia.  1 PVC. Possible PACs. PRWP. Holter or event monitor Holter - 10/18/23   Sinus rhythm.   Avg HR 71 ()  PAC's - 0.3%  PVC's - 0.7%  4 atrial runs (longest 13 beats)       Echo 2016  Ef 60%. Grade 1 diastolic dysf  Mild-mod TR  RVSP 35    Echo - 10/16/23   borderline LVH. EF 60%. Abnormal relaxation. Normal RV size and function. Mild-moderate TR on my review (the valve looks structurally normal). RVSP 40 mmHg.        KOMAL    Cardiac MRI    Stress testing    Coronary CTA or [facility]    RHC    CPET              Patient Active Problem List    Diagnosis Date Noted   • Upper respiratory tract infection 02/16/2023   • Reactive airway disease 07/19/2021   • Bone metastasis 01/28/2021   • 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 ([address]) 12/11/2020   • Trigger middle finger of right hand 08/26/2020   • Other tear of medial meniscus, current injury, right knee, initial encounter 08/21/2019   • Complex tear of medial meniscus of right knee as current injury 15/78/0087   • Diastolic dysfunction 16/93/2077   • Edema of right lower extremity 06/02/2019   • 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   • Sarcoidosis, lung ([address]) 12/20/2018   • Mediastinal adenopathy 11/21/2018   • Liver mass 11/13/2018   • Abnormal MRI of abdomen 10/24/2018   • Positional vertigo 03/26/2018   • IPMN (intraductal papillary mucinous neoplasm) 10/08/2015   • Focal nodular hyperplasia of liver 03/19/2015       Past Medical History:   Diagnosis Date   • Abdominal pain    • Acute tonsillitis    • Breast pain, left    • Cancer (HCC)     liver&small intestine   • COVID-19 07/2022   • Edema of both lower extremities    • GERD (gastroesophageal reflux disease)    • GERD without esophagitis    • Kidney stone     " [place]"   • Lesion of liver    • Liver mass    • Lymphadenopathy    • Mediastinal lymphadenopathy    • Neoplasm of digestive system    • Orthostatic lightheadedness    • Sarcoidosis    • Vertigo        Allergies   Allergen Reactions   • Bactrim [Sulfamethoxazole-Trimethoprim] Hives   • Clam Shell - Food Allergy Vomiting       Current Outpatient Medications   Medication Instructions   • Cholecalciferol (VITAMIN D) 2000 units CAPS 1 tablet, Oral, Daily   • docusate sodium (COLACE) 100 mg, Oral, 2 times daily   • Fluticasone-Salmeterol (Advair) 100-50 mcg/dose inhaler 1 puff, Inhalation, 2 times daily, Rinse mouth after use. • hydrochlorothiazide (HYDRODIURIL) 12.5 mg tablet Take 1 tablet by mouth once daily   • LANREOTIDE ACETATE SC Subcutaneous, Every 30 days   • Multiple Vitamin (MULTIVITAMIN) tablet 1 tablet, Oral, Daily   • omeprazole (PRILOSEC) 40 mg, Oral, Daily before breakfast       Social History     Socioeconomic History   • Marital status:      Spouse name: Not on file   • Number of children: Not on file   • Years of education: Not on file   • Highest education level: Not on file   Occupational History   • Not on file   Tobacco Use   • Smoking status: Never   • Smokeless tobacco: Never   Vaping Use   • Vaping Use: Never used   Substance and Sexual Activity   • Alcohol use: Yes     Comment: occ wine- very seldom   • Drug use: No   • Sexual activity: Not Currently   Other Topics Concern   • Not on file   Social History Narrative   • Not on file     Social Determinants of Health     Financial Resource Strain: Low Risk  (2/16/2023)    Overall Financial Resource Strain (CARDIA)    • Difficulty of Paying Living Expenses: Not very hard   Food Insecurity: Not on file   Transportation Needs: No Transportation Needs (2/16/2023)    PRAPARE - Transportation    • Lack of Transportation (Medical): No    • Lack of Transportation (Non-Medical):  No   Physical Activity: Not on file   Stress: Not on file   Social Connections: Not on file   Intimate Partner Violence: Not on file   Housing Stability: Not on file       Family History   Problem Relation Age of Onset   • Alzheimer's disease Mother    • Parkinsonism Father    • Psoriasis Father    • Heart failure Father         at older age   • Psoriasis Sister    • Psoriasis Sister    • Stroke Sister • Colon cancer Maternal Grandfather    • No Known Problems Daughter    • No Known Problems Daughter        Physical Exam:  Blood pressure 110/70, pulse 85, height 5' 2" (1.575 m), weight 83.9 kg (185 lb), SpO2 96 %, not currently breastfeeding. Body mass index is 33.84 kg/m². Wt Readings from Last 3 Encounters:   10/31/23 83.9 kg (185 lb)   10/16/23 82.6 kg (182 lb)   09/01/23 82.9 kg (182 lb 12.8 oz)     Physical Exam  Vitals reviewed. Constitutional:       General: She is not in acute distress. Appearance: She is not toxic-appearing. Neck:      Vascular: No carotid bruit. Comments: JVP  est 6 cm h20  Cardiovascular:      Rate and Rhythm: Normal rate and regular rhythm. Heart sounds: No murmur heard. No friction rub. No gallop. Pulmonary:      Breath sounds: Normal breath sounds. No wheezing, rhonchi or rales. Musculoskeletal:      Right lower leg: No edema. Left lower leg: No edema. Neurological:      Mental Status: She is alert. Labs & Results:  Lab Results   Component Value Date    SODIUM 138 10/06/2023    K 3.6 10/06/2023     10/06/2023    CO2 29 10/06/2023    BUN 20 10/06/2023    CREATININE 0.74 10/06/2023    GLUC 106 04/06/2022    CALCIUM 9.4 10/06/2023     No results found for: "NTBNP"       Thank you for the opportunity to participate in the care of this patient.     Veronica Anderson MD, [facility] [place]  Staff Cardiologist  Director of [address] [place] [address]

## 2023-11-01 ENCOUNTER — APPOINTMENT (OUTPATIENT)
Dept: LAB | Facility: CLINIC | Age: 73
End: 2023-11-01
Payer: MEDICARE

## 2023-11-01 DIAGNOSIS — C7B.8 METASTATIC MALIGNANT NEUROENDOCRINE TUMOR TO LIVER (HCC): ICD-10-CM

## 2023-11-01 PROCEDURE — 86316 IMMUNOASSAY TUMOR OTHER: CPT

## 2023-11-06 LAB — CGA SERPL-MCNC: 48.8 NG/ML (ref 0–101.8)

## 2023-11-09 ENCOUNTER — HOSPITAL ENCOUNTER (OUTPATIENT)
Dept: INFUSION CENTER | Facility: CLINIC | Age: 73
Discharge: HOME | End: 2023-11-09
Payer: MEDICARE

## 2023-11-09 DIAGNOSIS — C7B.8 METASTATIC MALIGNANT NEUROENDOCRINE TUMOR TO LIVER (HCC): Primary | ICD-10-CM

## 2023-11-09 PROCEDURE — 96372 THER/PROPH/DIAG INJ SC/IM: CPT

## 2023-11-09 RX ORDER — LANREOTIDE ACETATE 120 MG/.5ML
120 INJECTION SUBCUTANEOUS ONCE
OUTPATIENT
Start: 2023-12-07

## 2023-11-09 RX ORDER — LANREOTIDE ACETATE 120 MG/.5ML
120 INJECTION SUBCUTANEOUS ONCE
Status: COMPLETED | OUTPATIENT
Start: 2023-11-09 | End: 2023-11-09

## 2023-11-09 RX ADMIN — LANREOTIDE ACETATE 120 MG: 120 INJECTION SUBCUTANEOUS at 08:02

## 2023-11-09 NOTE — PROGRESS NOTES
Patient presents today for Lanreotide injection. Patient offers no complaints. Injection administered into left outer gluteal area, tolerated without incident. Patient to make additional appointments upon exit. AVS declined.

## 2023-11-30 ENCOUNTER — APPOINTMENT (OUTPATIENT)
Dept: LAB | Facility: CLINIC | Age: 73
End: 2023-11-30
Payer: MEDICARE

## 2023-11-30 ENCOUNTER — OFFICE VISIT (OUTPATIENT)
Dept: HEMATOLOGY ONCOLOGY | Facility: CLINIC | Age: 73
End: 2023-11-30
Payer: MEDICARE

## 2023-11-30 VITALS
TEMPERATURE: 97.6 F | OXYGEN SATURATION: 98 % | DIASTOLIC BLOOD PRESSURE: 66 MMHG | HEIGHT: 62 IN | WEIGHT: 182 LBS | SYSTOLIC BLOOD PRESSURE: 124 MMHG | BODY MASS INDEX: 33.49 KG/M2 | RESPIRATION RATE: 17 BRPM | HEART RATE: 75 BPM

## 2023-11-30 DIAGNOSIS — C7B.8 METASTATIC MALIGNANT NEUROENDOCRINE TUMOR TO LIVER (HCC): Primary | ICD-10-CM

## 2023-11-30 DIAGNOSIS — C7B.8 METASTATIC MALIGNANT NEUROENDOCRINE TUMOR TO LIVER (HCC): ICD-10-CM

## 2023-11-30 PROCEDURE — 86316 IMMUNOASSAY TUMOR OTHER: CPT

## 2023-11-30 PROCEDURE — 99214 OFFICE O/P EST MOD 30 MIN: CPT | Performed by: INTERNAL MEDICINE

## 2023-11-30 RX ORDER — ONDANSETRON 8 MG/1
8 TABLET, ORALLY DISINTEGRATING ORAL EVERY 8 HOURS PRN
Qty: 20 TABLET | Refills: 5 | Status: SHIPPED | OUTPATIENT
Start: 2023-11-30

## 2023-11-30 NOTE — PROGRESS NOTES
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 Mrs. Brooks feels OK/stable and clinically there are no concerning findings. Recent blood work was good/acceptable; chromogranin A level from this morning is still in process. Prior CAT scans in August 2023 did not demonstrate any evidence of disease progression. The plan is to continue with the lanreotide. In the future, if there is evidence of disease progression within the liver, liver directed treatment options will be discussed. If there is evidence of progression systemically, other options such as Jo-Ann Harjinder will need to be discussed. The prior CAT scan demonstrated enlarging hilar lymph nodes. Patient has a history of sarcoidosis. Patient was seen/evaluated by Dr. Cal Arciniega in March 2023 and underwent mediastinal lymph node sampling. All lymph nodes were negative for metastatic disease. Possibly a gallium 68 PET/CT will be needed in the future to better clarify. As above, the most recent CAT scan demonstrated that the hilar lymph nodes were actually smaller than before. Reason for the occasional a.m. queasiness is not clear. Patient was given a prescription for Zofran ODT.  Mrs. Raymundo Schneider will call if the issue becomes more pronounced. Patient is to return in 6 months (patient staggers office visits between medical and surgical oncology, seen every 3 months). Patient knows to call the hematology/oncology office if there are any other questions or concerns. Carefully review your medication list and verify that the list is accurate and up-to-date.  Please call the hematology/oncology office if there are medications missing from the list, medications on the list that you are not currently taking or if there is a dosage or instruction that is different from how you're taking that medication. Patient goals and areas of care: Lanreotide, Zofran ODT  Barriers to care: None  Patient is able to self-care  _____________________________________________________________________________________    Chief Complaint   Patient presents with    Follow-up    Well differentiated neuroendocrine on lanreotide     Oncology History   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 [facility])   12/8/2020 Initial Diagnosis    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 ([address])     12/8/2020 Biopsy    IR Liver biopsy:  A. Liver mass, needle core biopsy:  - Metastatic well-differentiated neuroendocrine tumor, G2     1/18/2021 -  Chemotherapy    Lanreotide injections (monthly)     1/20/2021 Surgery    Resection of small bowel and anastomosis, segment 7 liver ablation    A. Small intestine, resection:  - Well- differentiated neuroendocrine tumor (up to 2.1 cm), G2, at least three foci. - All margins are negative for tumor.  - Three of thirteen lymph nodes are positive for tumor (3/13). B. 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 Bone metastasis   1/28/2021 Initial Diagnosis    Bone metastasis ([address])     2/18/2021 - 3/3/2021 Radiation    Treatment:  Course: C1    Plan ID Energy Fractions Dose per Fraction (cGy) Dose Correction (cGy) Total Dose Delivered (cGy) Elapsed Days   T10_T12 Spine 10X/6X 10 / 10 300 0 3,000 13            History of Present Illness: 52-year-old female previously diagnosed with metastatic well differentiated carcinoid tumor. Patient was first seen by Dr. Tabby Billingsley in January 2021. Patient underwent resection of a number of lesions within the small bowel. Patient also had lesions in the liver and T11 vertebral body. Patient is on lanreotide. Patient returns for follow-up. Mrs. Brooks states feeling more or less okay, same as before.   Patient states having occasional queasiness but not really nausea in the morning. No vomiting. Appetite is otherwise okay, weight is stable. No abdominal pain. Bowel movements are normal, no  or GYN problems. No fevers or infections. Activities are baseline. No pain control issues. Patient also with sarcoidosis, no recent pulmonary issues. Review of Systems   Constitutional: Negative. HENT: Negative. Eyes: Negative. Respiratory: Negative. Cardiovascular: Negative. Gastrointestinal: Negative. Endocrine: Negative. Genitourinary: Negative. Musculoskeletal: Negative. Skin: Negative. Allergic/Immunologic: Negative. Neurological: Negative. Hematological: Negative. Psychiatric/Behavioral: Negative. All other systems reviewed and are negative.     Patient Active Problem List   Diagnosis    Positional vertigo    Focal nodular hyperplasia of liver    IPMN (intraductal papillary mucinous neoplasm)    Abnormal MRI of abdomen    Liver mass    Mediastinal adenopathy    Sarcoidosis, lung (HCC)    Granulomatous lymphadenitis    Edema of right lower extremity    Diastolic dysfunction    Complex tear of medial meniscus of right knee as current injury    Other tear of medial meniscus, current injury, right knee, initial encounter    Trigger middle finger of right hand    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 [facility])    Bone metastasis    Reactive airway disease    Upper respiratory tract infection     Past Medical History:   Diagnosis Date    Abdominal pain     Acute tonsillitis     Breast pain, left     Cancer ([address])     liver&small intestine    COVID-19 07/2022    Edema of both lower extremities     GERD (gastroesophageal reflux disease)     GERD without esophagitis     Kidney stone     " [place]"    Lesion of liver     Liver mass     Lymphadenopathy     Mediastinal lymphadenopathy     Neoplasm of digestive system     Orthostatic lightheadedness     Sarcoidosis     Vertigo      Past Surgical History:   Procedure Laterality Date    BREAST BIOPSY Left 1996    negative    CARPAL TUNNEL RELEASE Bilateral     COLONOSCOPY      2017    ENDOBRONCHIAL ULTRASOUND (EBUS) N/A 3/6/2023    Procedure: ENDOBRONCHIAL ULTRASOUND (EBUS); Surgeon: Joshua Vasquez MD;  [facility];  Service: Thoracic    IR BIOPSY LIVER MASS  11/6/2018    IR BIOPSY LIVER MASS  12/8/2020    KNEE ARTHROSCOPY Left     LAPAROTOMY N/A 1/20/2021    Procedure: LAPAROTOMY EXPLORATORY;  Surgeon: Katie Martin MD;  [facility];  Service: Surgical Oncology    LIVER LOBECTOMY N/A 1/20/2021    Procedure: LIVER ABLATION SEGMENT 7, INTRAOPERATIVE U/S OF LIVER;  Surgeon: Katie Martin MD;  [facility];  Service: Surgical Oncology    MEDIASTINOSCOPY N/A 11/27/2018    Procedure: MEDIASTINOSCOPY;  Surgeon: Joshua Vasquez MD;  [facility];  Service: Thoracic     [address] INCL FLUOR GDNCE DX W/CELL Faustine [address] N/A 11/27/2018    Procedure: Murphy Diana;  Surgeon: Joshua Vasquez MD;  [facility];  Service: Thoracic     [address] INCL FLUOR GDNCE DX W/CELL Faustine [address] N/A 3/6/2023    Procedure: BRONCHOSCOPY FLEXIBLE;  Surgeon: Joshua Vasquez MD;  [facility];  Service: Thoracic    [place] BRONCHOSCOPY NEEDLE BX TRACHEA MAIN STEM&/BRON N/A 11/27/2018    Procedure: EBUS with biopsy;  Surgeon: Joshua Vasquez MD;  [facility];  Service: Thoracic    [place] EDG US EXAM SURGICAL ALTER STOM DUODENUM/JEJUNUM N/A 10/29/2018    Procedure: LINEAR ENDOSCOPIC U/S;  Surgeon: Mingo Kruse MD;  Location: [facility];   Service: Gastroenterology    SKIN BIOPSY      SMALL INTESTINE SURGERY N/A 1/20/2021    Procedure: RESECTION SMALL BOWEL AND ANASTOMOSIS, RESECTION SMALL BOWEL NODULE;  Surgeon: Katie Martin MD;  [facility];  Service: Surgical Oncology    WISDOM TOOTH EXTRACTION       Family History   Problem Relation Age of Onset    Alzheimer's disease Mother     Parkinsonism Father Psoriasis Father     Heart failure Father         at older age    Psoriasis Sister     Psoriasis Sister     Stroke Sister     Colon cancer Maternal Grandfather     No Known Problems Daughter     No Known Problems Daughter      Social History     Socioeconomic History    Marital status:      Spouse name: Not on file    Number of children: Not on file    Years of education: Not on file    Highest education level: Not on file   Occupational History    Not on file   Tobacco Use    Smoking status: Never    Smokeless tobacco: Never   Vaping Use    Vaping Use: Never used   Substance and Sexual Activity    Alcohol use: Yes     Comment: occ wine- very seldom    Drug use: No    Sexual activity: Not Currently   Other Topics Concern    Not on file   Social History Narrative    Not on file     Social Determinants of Health     Financial Resource Strain: Low Risk  (2/16/2023)    Overall Financial Resource Strain (CARDIA)     Difficulty of Paying Living Expenses: Not very hard   Food Insecurity: Not on file   Transportation Needs: No Transportation Needs (2/16/2023)    PRAPARE - Transportation     Lack of Transportation (Medical): No     Lack of Transportation (Non-Medical):  No   Physical Activity: Not on file   Stress: Not on file   Social Connections: Not on file   Intimate Partner Violence: Not on file   Housing Stability: Not on file       Current Outpatient Medications:     Cholecalciferol (VITAMIN D) 2000 units CAPS, Take 1 tablet by mouth daily, Disp: , Rfl:     docusate sodium (COLACE) 100 mg capsule, Take 1 capsule (100 mg total) by mouth 2 (two) times a day (Patient taking differently: Take 100 mg by mouth daily), Disp: 10 capsule, Rfl: 0    Fluticasone-Salmeterol (Advair) 100-50 mcg/dose inhaler, Inhale 1 puff 2 (two) times a day Rinse mouth after use., Disp: 60 blister, Rfl: 3    hydrochlorothiazide (HYDRODIURIL) 12.5 mg tablet, Take 1 tablet by mouth once daily, Disp: 90 tablet, Rfl: 0    LANREOTIDE ACETATE SC, Inject under the skin every 30 (thirty) days, Disp: , Rfl:     Multiple Vitamin (MULTIVITAMIN) tablet, Take 1 tablet by mouth daily, Disp: , Rfl:     omeprazole (PriLOSEC) 40 MG capsule, Take 1 capsule (40 mg total) by mouth daily before breakfast, Disp: 90 capsule, Rfl: 3    Allergies   Allergen Reactions    Bactrim [Sulfamethoxazole-Trimethoprim] Hives    Clam Shell - Food Allergy Vomiting       Vitals:    11/30/23 0757   BP: 124/66   Pulse: 75   Resp: 17   Temp: 97.6 °F (36.4 °C)   SpO2: 98%     Physical Exam  Constitutional:       Appearance: She is well-developed. HENT:      Head: Normocephalic and atraumatic. Right Ear: External ear normal.      Left Ear: External ear normal.   Eyes:      Conjunctiva/sclera: Conjunctivae normal.      Pupils: Pupils are equal, round, and reactive to light. Cardiovascular:      Rate and Rhythm: Normal rate and regular rhythm. Heart sounds: Normal heart sounds. Pulmonary:      Effort: Pulmonary effort is normal.      Breath sounds: Normal breath sounds. Abdominal:      General: Bowel sounds are normal.      Palpations: Abdomen is soft. Musculoskeletal:         General: Normal range of motion. Cervical back: Normal range of motion and neck supple. Skin:     General: Skin is warm. Neurological:      Mental Status: She is alert and oriented to person, place, and time. Deep Tendon Reflexes: Reflexes are normal and symmetric. Psychiatric:         Behavior: Behavior normal.         Thought Content:  Thought content normal.         Judgment: Judgment normal.     Extremities: No lower extreme edema, no cords, pulses are 1+  Lymphatics: No adenopathy in the neck, supraclavicular region, axilla bilaterally    Performance Status: 0 - Asymptomatic    Labs    11/30/2023 chromogranin A in process    11/30/2023 WBC = 6.08 hemoglobin = 13.5 hematocrit = 40.2 platelet = 293 neutrophil = 49% BUN = 17 creatinine = 0.80 calcium = 9.6 total bilirubin = 1.25 AST = 26 ALT = 21 alkaline phosphatase = 88 total protein = 7.7        8/9/2023 WBC = 6.77 hemoglobin = 13.3 hematocrit = 40.3 platelet = 215 neutrophil = 56% BUN = 17 creatinine = 0.74 calcium = 9.4 LFTs WNL    Imaging    8/15/2023 CAT scan chest abdomen pelvis with contrast    IMPRESSION:     Interval decrease in the size of mediastinal and hilar lymphadenopathy. Stable persistent right greater than left lung diffuse micronodularity with upper lobe predominance, which can be secondary to lymphangitic carcinomatosis, or patient's known sarcoidosis. Atypical pneumonitis is a less likely possibility. Clinical correlation is recommended. Stable multiple hypoattenuating hepatic lesions, including previously ablated liver metastasis. Long segment wall thickening of the transverse colon, which can represent colitis (infectious, inflammatory, posttreatment changes or ischemic). Clinical correlation is recommended. 2/8/2023 CAT scan chest abdomen pelvis with contrast    IMPRESSION:     Left hilar node has increased in size as have some small mediastinal lymph nodes. There is new right hilar adenopathy. This is suspicious for metastatic involvement. Interval development of fairly diffuse micronodularity throughout both lungs. Differential diagnosis includes metastatic disease as well as an infectious or inflammatory process. These are beneath the limits of PET resolution. Recommend attention on follow-up. Stable hypodense liver lesions including prior ablated hepatic metastases. No new or enlarging liver lesions. Mildly thickened appearance of the proximal transverse colon could be due to underdistention. Suggest attention to this area on follow-up.     Pathology    Case Report   Non-gynecologic Cytology                          Case: [id number]                                   Authorizing Provider:  Walter Grayson MD      Collected:           03/06/2023 [id number]               [facility]      Received:            03/06/2023 [address] [facility] Operating Room                                                       Pathologist:           Rand Ramos MD                                                          Specimens:   A) - Lymph Node, level 11 L                                                                          B) - Lymph Node, level 11 L                                                                          C) - Lymph Node, Cell Block, level 11 L                                                              D) - Lymph Node, L7                                                                                  E) - Lymph Node, L7                                                                                  F) - Lymph Node, Cell Block, L7                                                                      G) - Lymph Node, Cell Block, 4R                                                                      H) - Lymph Node, 4R                                                                        Addendum   Special stains for fungus (GMS) and acid fast bacilli (Kinyoun) performed on cell blocks F & G are negative for organisms. A copy of the addendum is sent to Dr. Janelle Saeed on 3/9/23 @ 0900 hours.     Addendum electronically signed by Rand Ramos MD on 3/9/2023 at  9:00 AM       Case Report   Surgical Pathology Report                         Case: [id number]                                    Authorizing Provider:  Ashwini Ruiz MD           Collected:           01/20/2021 [id number]               Ordering Location:     [facility]      Received:            01/20/2021 [address] [place] [address]                                                       Pathologist:           Beto Galindo MD                                                                  Specimens:   A) - Small Bowel, NOS, SMALL BOWEL RESECTION WITH MESENTERIC LYMPH NODES                             B) - Small Bowel, NOS, SMALL BOWEL NODULE                                                  Final Diagnosis   A. Small intestine, resection:  - Well- differentiated neuroendocrine tumor (up to 2.1 cm), G2, at least three foci. - All margins are negative for tumor.  - Three of thirteen lymph nodes are positive for tumor (3/13). Comment: There are at least three separate foci of tumor present. It is difficut to acurately count for tumor foci given cluster growth pattern. Immunohistochemistry was performed on block A18. The tumor cells are positive for synaptophysin, chromogranin A, and negative for CD56, Ki-67 shows mitotic proliferative index of approximately 3-4%. D2-40 and CD31 are performed to help in the assessment of this case. B. 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 See note. NOTE: There is no mitotic figures or necrosis seen in the specimen. Immunohistochemistry was performed on block B1. The The tumor cells are positive for DOG-1,  and negative for S100, CD31, and desmin, supporting the above diagnosis. Intradepartmental consultation is in agreement.   Dr. Desiree Ramirez is notified of the diagnosis in [facility] via [address] [place] [address] on 1/26/2021  at 1pm .      Electronically signed by Fariha Buenrostro MD on 1/28/2021 at 11:32 AM

## 2023-12-04 LAB — CGA SERPL-MCNC: 44.3 NG/ML (ref 0–101.8)

## 2023-12-07 ENCOUNTER — HOSPITAL ENCOUNTER (OUTPATIENT)
Dept: INFUSION CENTER | Facility: CLINIC | Age: 73
Discharge: HOME | End: 2023-12-07
Payer: MEDICARE

## 2023-12-07 DIAGNOSIS — C7B.8 METASTATIC MALIGNANT NEUROENDOCRINE TUMOR TO LIVER (HCC): Primary | ICD-10-CM

## 2023-12-07 PROCEDURE — 96372 THER/PROPH/DIAG INJ SC/IM: CPT

## 2023-12-07 RX ORDER — LANREOTIDE ACETATE 120 MG/.5ML
120 INJECTION SUBCUTANEOUS ONCE
OUTPATIENT
Start: 2024-01-04

## 2023-12-07 RX ORDER — LANREOTIDE ACETATE 120 MG/.5ML
120 INJECTION SUBCUTANEOUS ONCE
Status: COMPLETED | OUTPATIENT
Start: 2023-12-07 | End: 2023-12-07

## 2023-12-07 RX ADMIN — LANREOTIDE ACETATE 120 MG: 120 INJECTION SUBCUTANEOUS at 09:36

## 2023-12-07 NOTE — PROGRESS NOTES
Patient presents for Lanreotide injection. Administered R glute per patient request without issue, bandaid applied. Patient confirmed follow up appointment, declined AVS, ambulatory from department.

## 2023-12-12 DIAGNOSIS — D86.0 SARCOIDOSIS, LUNG (HCC): ICD-10-CM

## 2023-12-12 RX ORDER — HYDROCHLOROTHIAZIDE 12.5 MG/1
TABLET ORAL
Qty: 90 TABLET | Refills: 0 | Status: SHIPPED | OUTPATIENT
Start: 2023-12-12

## 2023-12-28 ENCOUNTER — APPOINTMENT (OUTPATIENT)
Dept: LAB | Facility: CLINIC | Age: 73
End: 2023-12-28
Payer: MEDICARE

## 2023-12-28 DIAGNOSIS — C7B.8 METASTATIC MALIGNANT NEUROENDOCRINE TUMOR TO LIVER (HCC): ICD-10-CM

## 2023-12-28 PROCEDURE — 86316 IMMUNOASSAY TUMOR OTHER: CPT

## 2024-01-02 LAB — CGA SERPL-MCNC: 52.9 NG/ML (ref 0–101.8)

## 2024-01-04 ENCOUNTER — HOSPITAL ENCOUNTER (OUTPATIENT)
Dept: INFUSION CENTER | Facility: CLINIC | Age: 74
Discharge: HOME | End: 2024-01-04
Payer: MEDICARE

## 2024-01-04 DIAGNOSIS — C7B.8 METASTATIC MALIGNANT NEUROENDOCRINE TUMOR TO LIVER (HCC): Primary | ICD-10-CM

## 2024-01-04 PROCEDURE — 96372 THER/PROPH/DIAG INJ SC/IM: CPT

## 2024-01-04 RX ORDER — LANREOTIDE ACETATE 120 MG/.5ML
120 INJECTION SUBCUTANEOUS ONCE
Status: COMPLETED | OUTPATIENT
Start: 2024-01-04 | End: 2024-01-04

## 2024-01-04 RX ORDER — LANREOTIDE ACETATE 120 MG/.5ML
120 INJECTION SUBCUTANEOUS ONCE
OUTPATIENT
Start: 2024-02-01

## 2024-01-04 RX ADMIN — LANREOTIDE ACETATE 120 MG: 120 INJECTION SUBCUTANEOUS at 09:46

## 2024-01-04 NOTE — PROGRESS NOTES
Patient presents today for lanreotide injection. Patient offers no complaints. Lanreotide injection administered into left outer buttocks area, tolerated without incident. Next appointment for lanreotide injection confirmed for 2/1/2024. AVS offered and declined.

## 2024-01-08 ENCOUNTER — HOSPITAL ENCOUNTER (OUTPATIENT)
Dept: MAMMOGRAPHY | Facility: HOSPITAL | Age: 74
Discharge: HOME | End: 2024-01-08
Payer: MEDICARE

## 2024-01-08 VITALS — WEIGHT: 190.04 LBS | HEIGHT: 62 IN | BODY MASS INDEX: 34.97 KG/M2

## 2024-01-08 DIAGNOSIS — Z12.31 ENCOUNTER FOR SCREENING MAMMOGRAM FOR MALIGNANT NEOPLASM OF BREAST: ICD-10-CM

## 2024-01-08 PROCEDURE — 77063 BREAST TOMOSYNTHESIS BI: CPT

## 2024-01-08 PROCEDURE — 77067 SCR MAMMO BI INCL CAD: CPT

## 2024-01-15 NOTE — PROGRESS NOTES
Daily Note     Today's date: 10/14/2019  Patient name: Khang Tuttle  : 1950  MRN: [id number]  Referring provider: Rm Jones PA-C  Dx:   Encounter Diagnosis     ICD-10-CM    1  Complex tear of medial meniscus of right knee as current injury, subsequent encounter S83 231D    2  Complex tear of medial meniscus of right knee as current injury, initial encounter S83 231A                   Subjective: Patient still feels the steroids are helping quite a bit and she has no pain  She states she will be taking it for another week  Objective: See treatment diary below      Assessment: Tolerated treatment well and had no difficulty throughout session  Will follow up with MD tomorrow and may need RE at next week  Plan: Progress treatment as tolerated         Precautions: Sarcoidosis    Manual  9/10 9/16 9/23 9/30         PROM  6' 8' DC                                                                 Exercise Diary  9/10 9/16 9/23 9/30 10/7 10/14       HEP 10'                         Bike  6' 6' 6' 5' 6'       TKE  2x10 BTB S 20 :03 S :03 20 20x5" 20x "05       Leg Press SL #50  2x10 50# 20 50# 20 60#, SL 20x #60 20x       Total Gym more SL  2x10 L21 L 21 20 L22 20 L22, 20x w/ eccentric lower L22 20x       Stand Hip 3 way  2x10 20 1 5# 20 1 5# 2#, 20x ea #2 20x       SLR  2x10 20 20 1#, 20x #1 20x       LAQ  2x10  2# 20 3#, 20x, 5" lower #3 20x       Sidestepping  5 laps 5 laps R G 5 laps B 5 laps B 5 laps       Q Stretch  3x :20 :20/6 :20/6 hep        Q sets             SLR Abd   20 20 1#, 20x 20x #1                                                                                                      Modalities Pt currently resting comfortably in bed, in no acute distress, PRN pain med given with positive outcome, pt refused heparin, and  midnight dose of  Rifadine, call light within reach.

## 2024-01-24 ENCOUNTER — APPOINTMENT (OUTPATIENT)
Dept: LAB | Facility: CLINIC | Age: 74
End: 2024-01-24
Payer: MEDICARE

## 2024-02-01 ENCOUNTER — HOSPITAL ENCOUNTER (OUTPATIENT)
Dept: INFUSION CENTER | Facility: CLINIC | Age: 74
Discharge: HOME | End: 2024-02-01
Payer: MEDICARE

## 2024-02-01 DIAGNOSIS — C7B.8 METASTATIC MALIGNANT NEUROENDOCRINE TUMOR TO LIVER (HCC): Primary | ICD-10-CM

## 2024-02-01 PROCEDURE — 96372 THER/PROPH/DIAG INJ SC/IM: CPT

## 2024-02-01 RX ORDER — LANREOTIDE ACETATE 120 MG/.5ML
120 INJECTION SUBCUTANEOUS ONCE
OUTPATIENT
Start: 2024-02-29

## 2024-02-01 RX ORDER — LANREOTIDE ACETATE 120 MG/.5ML
120 INJECTION SUBCUTANEOUS ONCE
Status: COMPLETED | OUTPATIENT
Start: 2024-02-01 | End: 2024-02-01

## 2024-02-01 RX ADMIN — LANREOTIDE ACETATE 120 MG: 120 INJECTION SUBCUTANEOUS at 09:37

## 2024-02-01 NOTE — PATIENT INSTRUCTIONS
February 2024 Sunday Monday Tuesday Wednesday Thursday Friday Saturday 1    INF THERAPY PLAN   9:30 AM   (30 min.)   AN INF QUICK CHAIR   [facility] 2     3           Cycle 1, Treatment 40     4     5     6     7     8     9     10                11     12     13     14     15     16     17                18     19     20     21     22     23    CT CHEST ABDOMEN PELVIS W CON   7:00 AM   (30 min.)   AN CT 1   [facility] CAT Scan 24                25     26     27     28    FOLLOW UP PG   7:45 AM   (15 min.)   Eric Pierre MD   [facility] [place]    INF THERAPY PLAN   9:00 AM   (30 min.)   AN INF QUICK CHAIR   [facility] 29                                 Treatment Details         2/1/2024 - Cycle 1, Treatment 40      Supportive Care: lanreotide (SOMATULINE)

## 2024-02-01 NOTE — PROGRESS NOTES
Patient here for lanreotide dosing and is well, no c/o or changes to report.  She tolerated injection as ordered to right gluteus, dry dressing applied, patient will RTO for same 2/28/24, appt confirmed.

## 2024-02-20 ENCOUNTER — APPOINTMENT (OUTPATIENT)
Dept: LAB | Facility: CLINIC | Age: 74
End: 2024-02-20
Payer: MEDICARE

## 2024-02-20 DIAGNOSIS — C7B.8 METASTATIC MALIGNANT NEUROENDOCRINE TUMOR TO LIVER (HCC): ICD-10-CM

## 2024-02-20 PROCEDURE — 86316 IMMUNOASSAY TUMOR OTHER: CPT

## 2024-02-22 LAB — CGA SERPL-MCNC: 69.8 NG/ML (ref 0–101.8)

## 2024-02-23 ENCOUNTER — HOSPITAL ENCOUNTER (OUTPATIENT)
Dept: CT IMAGING | Facility: HOSPITAL | Age: 74
Discharge: HOME | End: 2024-02-23
Payer: MEDICARE

## 2024-02-23 DIAGNOSIS — D49.0 IPMN (INTRADUCTAL PAPILLARY MUCINOUS NEOPLASM): ICD-10-CM

## 2024-02-23 DIAGNOSIS — C7B.8 METASTATIC MALIGNANT NEUROENDOCRINE TUMOR TO LIVER (HCC): ICD-10-CM

## 2024-02-23 PROCEDURE — 71260 CT THORAX DX C+: CPT

## 2024-02-23 PROCEDURE — 74177 CT ABD & PELVIS W/CONTRAST: CPT

## 2024-02-23 PROCEDURE — G1004 CDSM NDSC: HCPCS

## 2024-02-23 RX ADMIN — IOHEXOL 85 ML: 350 INJECTION, SOLUTION INTRAVENOUS at 07:09

## 2024-02-28 ENCOUNTER — OFFICE VISIT (OUTPATIENT)
Dept: SURGICAL ONCOLOGY | Facility: CLINIC | Age: 74
End: 2024-02-28
Payer: MEDICARE

## 2024-02-28 ENCOUNTER — HOSPITAL ENCOUNTER (OUTPATIENT)
Dept: INFUSION CENTER | Facility: CLINIC | Age: 74
Discharge: HOME | End: 2024-02-28
Payer: MEDICARE

## 2024-02-28 VITALS
TEMPERATURE: 97.5 F | SYSTOLIC BLOOD PRESSURE: 124 MMHG | HEART RATE: 74 BPM | RESPIRATION RATE: 18 BRPM | WEIGHT: 184 LBS | DIASTOLIC BLOOD PRESSURE: 68 MMHG | OXYGEN SATURATION: 96 % | BODY MASS INDEX: 33.86 KG/M2 | HEIGHT: 62 IN

## 2024-02-28 DIAGNOSIS — D49.0 IPMN (INTRADUCTAL PAPILLARY MUCINOUS NEOPLASM): ICD-10-CM

## 2024-02-28 DIAGNOSIS — C7B.8 METASTATIC MALIGNANT NEUROENDOCRINE TUMOR TO LIVER (HCC): Primary | ICD-10-CM

## 2024-02-28 PROCEDURE — 96372 THER/PROPH/DIAG INJ SC/IM: CPT

## 2024-02-28 PROCEDURE — 99214 OFFICE O/P EST MOD 30 MIN: CPT | Performed by: SURGERY

## 2024-02-28 RX ORDER — LANREOTIDE ACETATE 120 MG/.5ML
120 INJECTION SUBCUTANEOUS ONCE
OUTPATIENT
Start: 2024-02-29

## 2024-02-28 RX ORDER — LANREOTIDE ACETATE 120 MG/.5ML
120 INJECTION SUBCUTANEOUS ONCE
Status: COMPLETED | OUTPATIENT
Start: 2024-02-28 | End: 2024-02-28

## 2024-02-28 RX ADMIN — LANREOTIDE ACETATE 120 MG: 120 INJECTION SUBCUTANEOUS at 08:56

## 2024-02-28 NOTE — PROGRESS NOTES
Surgical Oncology Follow Up       [facility] [place]  [address]  [place] [address]    Kelsey Brooks  1950  [id number]  [facility] [place]  [address]  [place] [address]    Diagnoses and all orders for this visit:    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 (HCC)  -     CT chest abdomen pelvis w contrast; Future  -     BUN; Future  -     Chromogranin A; Future  -     Creatinine, serum; Future    IPMN (intraductal papillary mucinous neoplasm)        Chief Complaint   Patient presents with    Follow-up       Return in about 6 months (around 8/28/2024) for Office Visit, Imaging - See orders, Labs - See Treatment Plan, with Shonda.      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 Liver mass, needle core biopsy:  - Metastatic well-differentiated neuroendocrine tumor, G2     1/18/2021 -  Chemotherapy    Lanreotide injections (monthly)     1/20/2021 Surgery    Resection of small bowel and anastomosis, segment 7 liver ablation    A. Small intestine, resection:  - Well- differentiated neuroendocrine tumor (up to 2.1 cm), G2, at least three foci.  - All margins are negative for tumor.  - Three of thirteen lymph nodes are positive for tumor (3/13).     B. 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        2/28/2024 -  Cancer Staged    Staging form: Liver (Excluding Intrahepatic Bile Ducts), AJCC 8th Edition  - Pathologic: Stage IVB (pT3, pN1, cM1) - Signed by Eric Pierre MD on 2/28/2024       Bone metastasis   1/28/2021 Initial Diagnosis    Bone metastasis (HCC)     2/18/2021 - 3/3/2021 Radiation    Treatment:  Course: C1    Plan ID  Energy Fractions Dose per Fraction (cGy) Dose Correction (cGy) Total Dose Delivered (cGy) Elapsed Days   T10_T12 Spine 10X/6X 10 / 10 300 0 3,000 13              Diagnosis and Staging: Side branch IPMN 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 2013, 1.2cm   Metastatic neuroendocrine tumor, T3N1M1, December 2020  Treatment history: Small bowel resection and microwave ablation of segment 7 liver lesions, January 2021   Lanreotide   radiation to T11  Mediastinal and hilar adenopathy biopsies were benign  Current Therapy: Observation For the IPMN   Lanreotide  Disease Status: GERARDO      History of Present Illness: Patient returns in follow-up of her metastatic neuroendocrine tumor.  She is doing well at this time with no complaints.  She denies any new flushing, diarrhea, palpitations, headaches or sweats.  She does have some occasional palpitations that come and go.  No evidence of back pain or discomfort.  CT from February 23, 2024 shows low attenuating lesions throughout the liver.  These are stable to improved.  No evidence of any new metastatic disease.  The pancreas cyst appears stable.  The previously seen mediastinal nodes are not pathologically enlarged.  I personally reviewed the films.  Her chromogranin A level is normal.    Review of Systems  Complete ROS Surg Onc:   Complete ROS Surg Onc:   Constitutional: The patient denies new or recent history of general fatigue, no recent weight loss, no change in appetite.   Eyes: No complaints of visual problems, no scleral icterus.   ENT: no complaints of ear pain, no hoarseness, no difficulty swallowing,  no tinnitus and no new masses in head, oral cavity, or neck.   Cardiovascular: No complaints of chest pain, no palpitations, no ankle edema.   Respiratory: No complaints of shortness of breath, no cough.   Gastrointestinal: No complaints of jaundice, no bloody stools, no pale stools.   Genitourinary: No complaints of dysuria, no hematuria, no nocturia, no frequent urination, no urethral  "discharge.   Musculoskeletal: No complaints of weakness, paralysis, joint stiffness or arthralgias.  Integumentary: No complaints of rash, no new lesions.   Neurological: No complaints of convulsions, no seizures, no dizziness.   Hematologic/Lymphatic: No complaints of easy bruising.   Endocrine:  No hot or cold intolerance.  No polydipsia, polyphagia, or polyuria.  Allergy/immunology:  No environmental allergies.  No food allergies.  Not immunocompromised.  Skin:  No pallor or rash.  No wound.        Patient Active Problem List   Diagnosis    Positional vertigo    Focal nodular hyperplasia of liver    IPMN (intraductal papillary mucinous neoplasm)    Abnormal MRI of abdomen    Liver mass    Mediastinal adenopathy    Sarcoidosis, lung (HCC)    Granulomatous lymphadenitis    Edema of right lower extremity    Diastolic dysfunction    Complex tear of medial meniscus of right knee as current injury    Other tear of medial meniscus, current injury, right knee, initial encounter    Trigger middle finger of right hand    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 (HCC)    Bone metastasis    Reactive airway disease    Upper respiratory tract infection     Past Medical History:   Diagnosis Date    Abdominal pain     Acute tonsillitis     Breast pain, left     Cancer (HCC)     liver&small intestine    COVID-19 07/2022    Edema of both lower extremities     GERD (gastroesophageal reflux disease)     GERD without esophagitis     Kidney stone     \" [place]\"    Lesion of liver     Liver mass     Lymphadenopathy     Mediastinal lymphadenopathy     Neoplasm of digestive system     Orthostatic lightheadedness     Sarcoidosis     Vertigo      Past Surgical History:   Procedure Laterality Date    BREAST BIOPSY Left 1996    negative    CARPAL TUNNEL RELEASE Bilateral     COLONOSCOPY      2017    ENDOBRONCHIAL ULTRASOUND (EBUS) N/A 3/6/2023    Procedure: ENDOBRONCHIAL ULTRASOUND (EBUS);  Surgeon: Ricardo Vega MD;  [facility]: " [facility];  Service: Thoracic    IR BIOPSY LIVER MASS  11/6/2018    IR BIOPSY LIVER MASS  12/8/2020    KNEE ARTHROSCOPY Left     LAPAROTOMY N/A 1/20/2021    Procedure: LAPAROTOMY EXPLORATORY;  Surgeon: Eric Pierre MD;  [facility];  Service: Surgical Oncology    LIVER LOBECTOMY N/A 1/20/2021    Procedure: LIVER ABLATION SEGMENT 7, INTRAOPERATIVE U/S OF LIVER;  Surgeon: Eric Pierre MD;  [facility];  Service: Surgical Oncology    MEDIASTINOSCOPY N/A 11/27/2018    Procedure: MEDIASTINOSCOPY;  Surgeon: Ricardo Vega MD;  [facility];  Service: Thoracic    ND [facility] INCL FLUOR GDNCE DX W/CELL WASHG SPX N/A 11/27/2018    Procedure: BRONCHOSCOPY FLEXIBLE;  Surgeon: Ricardo Vega MD;  [facility];  Service: Thoracic    ND [facility] INCL FLUOR GDNCE DX W/CELL WASHG SPX N/A 3/6/2023    Procedure: BRONCHOSCOPY FLEXIBLE;  Surgeon: Ricardo Vega MD;  [facility];  Service: Thoracic    ND BRONCHOSCOPY NEEDLE BX TRACHEA MAIN STEM&/BRON N/A 11/27/2018    Procedure: EBUS with biopsy;  Surgeon: Ricardo Vega MD;  [facility];  Service: Thoracic    ND EDG US EXAM SURGICAL ALTER STOM DUODENUM/JEJUNUM N/A 10/29/2018    Procedure: LINEAR ENDOSCOPIC U/S;  Surgeon: Oseas Villagran MD;  [facility];  Service: Gastroenterology    SKIN BIOPSY      SMALL INTESTINE SURGERY N/A 1/20/2021    Procedure: RESECTION SMALL BOWEL AND ANASTOMOSIS, RESECTION SMALL BOWEL NODULE;  Surgeon: Eric Pierre MD;  [facility];  Service: Surgical Oncology    WISDOM TOOTH EXTRACTION       Family History   Problem Relation Age of Onset    Alzheimer's disease Mother     Parkinsonism Father     Psoriasis Father     Heart failure Father         at older age    Psoriasis Sister     Psoriasis Sister     Stroke Sister     Colon cancer Maternal Grandfather     No Known Problems Daughter     No Known Problems Daughter      Social History     Socioeconomic History    Marital status:       Spouse name: Not on file    Number of children: Not on file    Years of education: Not on file    Highest education level: Not on file   Occupational History    Not on file   Tobacco Use    Smoking status: Never    Smokeless tobacco: Never   Vaping Use    Vaping status: Never Used   Substance and Sexual Activity    Alcohol use: Yes     Comment: occ wine- very seldom    Drug use: No    Sexual activity: Not Currently   Other Topics Concern    Not on file   Social History Narrative    Not on file     Social Determinants of Health     Financial Resource Strain: Low Risk  (2/16/2023)    Overall Financial Resource Strain (CARDIA)     Difficulty of Paying Living Expenses: Not very hard   Food Insecurity: Not on file   Transportation Needs: No Transportation Needs (2/16/2023)    PRAPARE - Transportation     Lack of Transportation (Medical): No     Lack of Transportation (Non-Medical): No   Physical Activity: Not on file   Stress: Not on file   Social Connections: Not on file   Intimate Partner Violence: Not on file   Housing Stability: Not on file       Current Outpatient Medications:     Cholecalciferol (VITAMIN D) 2000 units CAPS, Take 1 tablet by mouth daily, Disp: , Rfl:     docusate sodium (COLACE) 100 mg capsule, Take 1 capsule (100 mg total) by mouth 2 (two) times a day (Patient taking differently: Take 100 mg by mouth daily), Disp: 10 capsule, Rfl: 0    Fluticasone-Salmeterol (Advair) 100-50 mcg/dose inhaler, Inhale 1 puff 2 (two) times a day Rinse mouth after use., Disp: 60 blister, Rfl: 3    hydrochlorothiazide (HYDRODIURIL) 12.5 mg tablet, Take 1 tablet by mouth once daily, Disp: 90 tablet, Rfl: 0    LANREOTIDE ACETATE SC, Inject under the skin every 30 (thirty) days, Disp: , Rfl:     Multiple Vitamin (MULTIVITAMIN) tablet, Take 1 tablet by mouth daily, Disp: , Rfl:     omeprazole (PriLOSEC) 40 MG capsule, Take 1 capsule (40 mg total) by mouth daily before breakfast, Disp: 90 capsule, Rfl: 3     ondansetron (ZOFRAN-ODT) 8 mg disintegrating tablet, Take 1 tablet (8 mg total) by mouth every 8 (eight) hours as needed for nausea or vomiting, Disp: 20 tablet, Rfl: 5  Allergies   Allergen Reactions    Bactrim [Sulfamethoxazole-Trimethoprim] Hives    Clam Shell - Food Allergy Vomiting     Vitals:    02/28/24 0753   BP: 124/68   Pulse: 74   Resp: 18   Temp: 97.5 °F (36.4 °C)   SpO2: 96%       Physical Exam  Constitutional: General appearance: The Patient is well-developed and well-nourished who appears the stated age in no acute distress. Patient is pleasant and talkative.     HEENT:  Normocephalic.  Sclerae are anicteric. Mucous membranes are moist. Neck is supple without adenopathy. No JVD.     Chest: The lungs are clear to auscultation.     Cardiac: Heart is regular rate.     Abdomen: Abdomen is soft, non-tender, non-distended and without masses.     Extremities: There is no clubbing or cyanosis. There is no edema.  Symmetric.  Neuro: Grossly nonfocal. Gait is normal.     Lymphatic: No evidence of cervical adenopathy bilaterally.   No evidence of axillary adenopathy bilaterally.   Skin: Warm, anicteric.    Psych:  Patient is pleasant and talkative.  Breasts:        Pathology:  Component  Ref Range & Units 2/20/24  6:54 AM 12/28/23  7:10 AM 11/30/23  6:40 AM 11/1/23  6:47 AM 10/6/23  9:05 AM 8/9/23  8:16 AM 6/14/23  6:05 AM   Chromogranin A  0.0 - 101.8 ng/mL 69.8 52.9 CM 44.3 CM 48.8 CM 46.5 CM 57.4 CM 62.0 C       Labs:      Imaging  CT chest abdomen pelvis w contrast    Result Date: 2/23/2024  Narrative: CT CHEST, ABDOMEN AND PELVIS WITH IV CONTRAST INDICATION: C7B.8: Other secondary neuroendocrine tumors D49.0: Neoplasm of unspecified behavior of digestive system. Patient also has a history of sarcoidosis. COMPARISON: Multiple prior studies, most recent CT scan is dated August 15, 2023. TECHNIQUE: CT examination of the chest, abdomen and pelvis was performed. Multiplanar 2D reformatted images were created  from the source data. This examination, like all CT scans performed in the [facility], was performed utilizing techniques to minimize radiation dose exposure, including the use of iterative reconstruction and automated exposure control. Radiation dose length product (DLP) for this visit: 759.03 mGy-cm IV Contrast: 85 mL of iohexol (OMNIPAQUE) Enteric Contrast: Administered. FINDINGS: CHEST LUNGS: No suspicious pulmonary nodules. Central airways are patent. PLEURA: Unremarkable. HEART/GREAT VESSELS: Heart is unremarkable for patient's age. No thoracic aortic aneurysm. MEDIASTINUM AND BRIGIDA: No pathologically enlarged mediastinal or hilar lymph nodes. CHEST WALL AND LOWER NECK: Unremarkable. ABDOMEN LIVER/BILIARY TREE: Again shown are scattered low-attenuation lesions throughout the liver representing a combination of benign simple cysts and treated tumor. There are no new or enlarging lesions. Index ablated lesion in segment 7 of the liver measures  approximately 2.3 cm on series 2 image 109, previously measuring 2.7 cm. Additional peripheral segment 6 lesion with overlying capsular retraction is stable suspect previously treated tumor. Intrahepatic vessels are patent. No bowel wall thickening. GALLBLADDER: No calcified gallstones. No pericholecystic inflammatory change. SPLEEN: Unremarkable. PANCREAS: Cysts in the pancreas appear overall unchanged in size and appearance. No pancreatic ductal dilatation or evidence of a solid mass. ADRENAL GLANDS: Unremarkable. KIDNEYS/URETERS: Nephrograms are symmetric. 3 mm nonobstructing calculus interpolar region of the right kidney. No collecting system dilatation. STOMACH AND BOWEL: Multiple diverticula mostly in the sigmoid colon with thickening of the sigmoid colon likely due to chronic diverticular disease. APPENDIX: No findings to suggest appendicitis. ABDOMINOPELVIC CAVITY: No ascites. No pneumoperitoneum. No lymphadenopathy. VESSELS: Unremarkable for  patient's age. PELVIS REPRODUCTIVE ORGANS: Unremarkable for patient's age. URINARY BLADDER: Collapsed and not well evaluated but grossly unremarkable. ABDOMINAL WALL/INGUINAL REGIONS: Small fat-containing umbilical hernia and adjacent fat-containing periumbilical hernia. BONES: Stable appearance of sclerotic lesion in the right iliac bone. No destructive skeletal lesions.     Impression: Stable appearance of treated tumor within the liver with no findings to suggest viable tumor. No new findings in the chest abdomen or pelvis. Workstation performed: [id number]     I personally reviewed and interpreted the above laboratory and imaging data.    Discussion/Summary: 73 year-old female with metastatic neuroendocrine tumor, she is doing well.  She then underwent small-bowel resection and microwave ablation of  segment 7 liver lesions.  She completed radiation to T11 for a metastasis.   She is on Lanreotide.  She is clinically GERARDO at 4 years.  Her imaging is stable and her chromogranin level is normal.  The hilar lymph nodes were benign.  I will plan on seeing her again in 6 months with a repeat CT of her chest, abdomen and pelvis and a chromogranin level.  This CT should be able to survey the pancreas as well, to make sure the IPMN is not changing.  IPMN is currently stable.  If there is residual viable disease, we can consider Sir spheres or PRRT if there is extrahepatic disease.  She is agreeable to this plan.  All of her questions were answered.

## 2024-02-28 NOTE — LETTER
February 28, 2024     Jah Cuenca MD  [address] [place] [id number]    Patient: Kelsey Brooks   YOB: 1950   Date of Visit: 2/28/2024       Dear Dr. Cuenca:    Thank you for referring Kelsey Brooks to me for evaluation. Below are my notes for this consultation.    If you have questions, please do not hesitate to call me. I look forward to following your patient along with you.         Sincerely,        Eric Pierre MD        CC: MD Eric French MD  2/28/2024  8:11 AM  Sign when Signing Visit               Surgical Oncology Follow Up       [address] [place] [address] SURGICAL ONCOLOGY [address] [id number]    Kelsey Brooks  1950  [id number]  [facility] SURGICAL ONCOLOGY [address] [id number]    Diagnoses and all orders for this visit:    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 (HCC)  -     CT chest abdomen pelvis w contrast; Future  -     BUN; Future  -     Chromogranin A; Future  -     Creatinine, serum; Future    IPMN (intraductal papillary mucinous neoplasm)        Chief Complaint   Patient presents with   • Follow-up       Return in about 6 months (around 8/28/2024) for Office Visit, Imaging - See orders, Labs - See Treatment Plan, with Shonda.      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 Liver mass, needle core biopsy:  - Metastatic well-differentiated neuroendocrine tumor, G2     1/18/2021 -  Chemotherapy    Lanreotide injections (monthly)     1/20/2021 Surgery    Resection of small bowel and anastomosis, segment 7 liver ablation    A. Small intestine, resection:  - Well- differentiated neuroendocrine tumor (up to 2.1 cm), G2, at least three foci.  - All margins are  negative for tumor.  - Three of thirteen lymph nodes are positive for tumor (3/13).     B. 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        2/28/2024 -  Cancer Staged    Staging form: Liver (Excluding Intrahepatic Bile Ducts), AJCC 8th Edition  - Pathologic: Stage IVB (pT3, pN1, cM1) - Signed by Eric Pierre MD on 2/28/2024       Bone metastasis   1/28/2021 Initial Diagnosis    Bone metastasis (HCC)     2/18/2021 - 3/3/2021 Radiation    Treatment:  Course: C1    Plan ID Energy Fractions Dose per Fraction (cGy) Dose Correction (cGy) Total Dose Delivered (cGy) Elapsed Days   T10_T12 Spine 10X/6X 10 / 10 300 0 3,000 13              Diagnosis and Staging: Side branch IPMN 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 2013, 1.2cm   Metastatic neuroendocrine tumor, T3N1M1, December 2020  Treatment history: Small bowel resection and microwave ablation of segment 7 liver lesions, January 2021   Lanreotide   radiation to T11  Mediastinal and hilar adenopathy biopsies were benign  Current Therapy: Observation For the IPMN   Lanreotide  Disease Status: GERARDO      History of Present Illness: Patient returns in follow-up of her metastatic neuroendocrine tumor.  She is doing well at this time with no complaints.  She denies any new flushing, diarrhea, palpitations, headaches or sweats.  She does have some occasional palpitations that come and go.  No evidence of back pain or discomfort.  CT from February 23, 2024 shows low attenuating lesions throughout the liver.  These are stable to improved.  No evidence of any new metastatic disease.  The pancreas cyst appears stable.  The previously seen mediastinal nodes are not pathologically enlarged.  I personally reviewed the films.  Her chromogranin A level is normal.    Review of Systems  Complete ROS Surg Onc:   Complete ROS Surg Onc:   Constitutional: The patient denies new or recent history of general fatigue, no recent weight loss, no change  in appetite.   Eyes: No complaints of visual problems, no scleral icterus.   ENT: no complaints of ear pain, no hoarseness, no difficulty swallowing,  no tinnitus and no new masses in head, oral cavity, or neck.   Cardiovascular: No complaints of chest pain, no palpitations, no ankle edema.   Respiratory: No complaints of shortness of breath, no cough.   Gastrointestinal: No complaints of jaundice, no bloody stools, no pale stools.   Genitourinary: No complaints of dysuria, no hematuria, no nocturia, no frequent urination, no urethral discharge.   Musculoskeletal: No complaints of weakness, paralysis, joint stiffness or arthralgias.  Integumentary: No complaints of rash, no new lesions.   Neurological: No complaints of convulsions, no seizures, no dizziness.   Hematologic/Lymphatic: No complaints of easy bruising.   Endocrine:  No hot or cold intolerance.  No polydipsia, polyphagia, or polyuria.  Allergy/immunology:  No environmental allergies.  No food allergies.  Not immunocompromised.  Skin:  No pallor or rash.  No wound.        Patient Active Problem List   Diagnosis   • Positional vertigo   • Focal nodular hyperplasia of liver   • IPMN (intraductal papillary mucinous neoplasm)   • Abnormal MRI of abdomen   • Liver mass   • Mediastinal adenopathy   • Sarcoidosis, lung (HCC)   • Granulomatous lymphadenitis   • Edema of right lower extremity   • Diastolic dysfunction   • Complex tear of medial meniscus of right knee as current injury   • Other tear of medial meniscus, current injury, right knee, initial encounter   • Trigger middle finger of right hand   • 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 (HCC)   • Bone metastasis   • Reactive airway disease   • Upper respiratory tract infection     Past Medical History:   Diagnosis Date   • Abdominal pain    • Acute tonsillitis    • Breast pain, left    • Cancer (HCC)     liver&small intestine   • COVID-19 07/2022   • Edema of both lower extremities    • GERD  "(gastroesophageal reflux disease)    • GERD without esophagitis    • Kidney stone     \" [place]\"   • Lesion of liver    • Liver mass    • Lymphadenopathy    • Mediastinal lymphadenopathy    • Neoplasm of digestive system    • Orthostatic lightheadedness    • Sarcoidosis    • Vertigo      Past Surgical History:   Procedure Laterality Date   • BREAST BIOPSY Left 1996    negative   • CARPAL TUNNEL RELEASE Bilateral    • COLONOSCOPY      2017   • ENDOBRONCHIAL ULTRASOUND (EBUS) N/A 3/6/2023    Procedure: ENDOBRONCHIAL ULTRASOUND (EBUS);  Surgeon: Ricardo Vega MD;  Location: [facility];  Service: Thoracic   • IR BIOPSY LIVER MASS  11/6/2018   • IR BIOPSY LIVER MASS  12/8/2020   • KNEE ARTHROSCOPY Left    • LAPAROTOMY N/A 1/20/2021    Procedure: LAPAROTOMY EXPLORATORY;  Surgeon: Eric Pierre MD;  Location[facility];  Service: Surgical Oncology   • LIVER LOBECTOMY N/A 1/20/2021    Procedure: LIVER ABLATION SEGMENT 7, INTRAOPERATIVE U/S OF LIVER;  Surgeon: Eric Pierre MD;  [facility];  Service: Surgical Oncology   • MEDIASTINOSCOPY N/A 11/27/2018    Procedure: MEDIASTINOSCOPY;  Surgeon: Ricardo Vega MD;  [facility];  Service: Thoracic   • DE BRNCHSC INCL FLUOR GDNCE DX W/CELL WASHG SPX N/A 11/27/2018    Procedure: BRONCHOSCOPY FLEXIBLE;  Surgeon: Ricardo Vega MD;  [facility];  Service: Thoracic   • DE BRNCHSC INCL FLUOR GDNCE DX W/CELL WASHG SPX N/A 3/6/2023    Procedure: BRONCHOSCOPY FLEXIBLE;  Surgeon: Ricardo Vega MD;  [facility];  Service: Thoracic   • DE BRONCHOSCOPY NEEDLE BX TRACHEA MAIN STEM&/BRON N/A 11/27/2018    Procedure: EBUS with biopsy;  Surgeon: Ricardo Vega MD;  [facility];  Service: Thoracic   • DE EDG US EXAM SURGICAL ALTER STOM DUODENUM/JEJUNUM N/A 10/29/2018    Procedure: LINEAR ENDOSCOPIC U/S;  Surgeon: Oseas Villagran MD;  Location: [facility];  Service: Gastroenterology   • SKIN BIOPSY     • SMALL INTESTINE SURGERY N/A " 1/20/2021    Procedure: RESECTION SMALL BOWEL AND ANASTOMOSIS, RESECTION SMALL BOWEL NODULE;  Surgeon: Eric Pierre MD;  [facility];  Service: Surgical Oncology   • WISDOM TOOTH EXTRACTION       Family History   Problem Relation Age of Onset   • Alzheimer's disease Mother    • Parkinsonism Father    • Psoriasis Father    • Heart failure Father         at older age   • Psoriasis Sister    • Psoriasis Sister    • Stroke Sister    • Colon cancer Maternal Grandfather    • No Known Problems Daughter    • No Known Problems Daughter      Social History     Socioeconomic History   • Marital status:      Spouse name: Not on file   • Number of children: Not on file   • Years of education: Not on file   • Highest education level: Not on file   Occupational History   • Not on file   Tobacco Use   • Smoking status: Never   • Smokeless tobacco: Never   Vaping Use   • Vaping status: Never Used   Substance and Sexual Activity   • Alcohol use: Yes     Comment: occ wine- very seldom   • Drug use: No   • Sexual activity: Not Currently   Other Topics Concern   • Not on file   Social History Narrative   • Not on file     Social Determinants of Health     Financial Resource Strain: Low Risk  (2/16/2023)    Overall Financial Resource Strain (CARDIA)    • Difficulty of Paying Living Expenses: Not very hard   Food Insecurity: Not on file   Transportation Needs: No Transportation Needs (2/16/2023)    PRAPARE - Transportation    • Lack of Transportation (Medical): No    • Lack of Transportation (Non-Medical): No   Physical Activity: Not on file   Stress: Not on file   Social Connections: Not on file   Intimate Partner Violence: Not on file   Housing Stability: Not on file       Current Outpatient Medications:   •  Cholecalciferol (VITAMIN D) 2000 units CAPS, Take 1 tablet by mouth daily, Disp: , Rfl:   •  docusate sodium (COLACE) 100 mg capsule, Take 1 capsule (100 mg total) by mouth 2 (two) times a day (Patient taking  differently: Take 100 mg by mouth daily), Disp: 10 capsule, Rfl: 0  •  Fluticasone-Salmeterol (Advair) 100-50 mcg/dose inhaler, Inhale 1 puff 2 (two) times a day Rinse mouth after use., Disp: 60 blister, Rfl: 3  •  hydrochlorothiazide (HYDRODIURIL) 12.5 mg tablet, Take 1 tablet by mouth once daily, Disp: 90 tablet, Rfl: 0  •  LANREOTIDE ACETATE SC, Inject under the skin every 30 (thirty) days, Disp: , Rfl:   •  Multiple Vitamin (MULTIVITAMIN) tablet, Take 1 tablet by mouth daily, Disp: , Rfl:   •  omeprazole (PriLOSEC) 40 MG capsule, Take 1 capsule (40 mg total) by mouth daily before breakfast, Disp: 90 capsule, Rfl: 3  •  ondansetron (ZOFRAN-ODT) 8 mg disintegrating tablet, Take 1 tablet (8 mg total) by mouth every 8 (eight) hours as needed for nausea or vomiting, Disp: 20 tablet, Rfl: 5  Allergies   Allergen Reactions   • Bactrim [Sulfamethoxazole-Trimethoprim] Hives   • Clam Shell - Food Allergy Vomiting     Vitals:    02/28/24 0753   BP: 124/68   Pulse: 74   Resp: 18   Temp: 97.5 °F (36.4 °C)   SpO2: 96%       Physical Exam  Constitutional: General appearance: The Patient is well-developed and well-nourished who appears the stated age in no acute distress. Patient is pleasant and talkative.     HEENT:  Normocephalic.  Sclerae are anicteric. Mucous membranes are moist. Neck is supple without adenopathy. No JVD.     Chest: The lungs are clear to auscultation.     Cardiac: Heart is regular rate.     Abdomen: Abdomen is soft, non-tender, non-distended and without masses.     Extremities: There is no clubbing or cyanosis. There is no edema.  Symmetric.  Neuro: Grossly nonfocal. Gait is normal.     Lymphatic: No evidence of cervical adenopathy bilaterally.   No evidence of axillary adenopathy bilaterally.   Skin: Warm, anicteric.    Psych:  Patient is pleasant and talkative.  Breasts:        Pathology:  Component  Ref Range & Units 2/20/24  6:54 AM 12/28/23  7:10 AM 11/30/23  6:40 AM 11/1/23  6:47 AM 10/6/23  9:05 AM  8/9/23  8:16 AM 6/14/23  6:05 AM   Chromogranin A  0.0 - 101.8 ng/mL 69.8 52.9 CM 44.3 CM 48.8 CM 46.5 CM 57.4 CM 62.0 C       Labs:      Imaging  CT chest abdomen pelvis w contrast    Result Date: 2/23/2024  Narrative: CT CHEST, ABDOMEN AND PELVIS WITH IV CONTRAST INDICATION: C7B.8: Other secondary neuroendocrine tumors D49.0: Neoplasm of unspecified behavior of digestive system. Patient also has a history of sarcoidosis. COMPARISON: Multiple prior studies, most recent CT scan is dated August 15, 2023. TECHNIQUE: CT examination of the chest, abdomen and pelvis was performed. Multiplanar 2D reformatted images were created from the source data. This examination, like all CT scans performed in the [facility], was performed utilizing techniques to minimize radiation dose exposure, including the use of iterative reconstruction and automated exposure control. Radiation dose length product (DLP) for this visit: 759.03 mGy-cm IV Contrast: 85 mL of iohexol (OMNIPAQUE) Enteric Contrast: Administered. FINDINGS: CHEST LUNGS: No suspicious pulmonary nodules. Central airways are patent. PLEURA: Unremarkable. HEART/GREAT VESSELS: Heart is unremarkable for patient's age. No thoracic aortic aneurysm. MEDIASTINUM AND BRIGIDA: No pathologically enlarged mediastinal or hilar lymph nodes. CHEST WALL AND LOWER NECK: Unremarkable. ABDOMEN LIVER/BILIARY TREE: Again shown are scattered low-attenuation lesions throughout the liver representing a combination of benign simple cysts and treated tumor. There are no new or enlarging lesions. Index ablated lesion in segment 7 of the liver measures  approximately 2.3 cm on series 2 image 109, previously measuring 2.7 cm. Additional peripheral segment 6 lesion with overlying capsular retraction is stable suspect previously treated tumor. Intrahepatic vessels are patent. No bowel wall thickening. GALLBLADDER: No calcified gallstones. No pericholecystic inflammatory change. SPLEEN:  Unremarkable. PANCREAS: Cysts in the pancreas appear overall unchanged in size and appearance. No pancreatic ductal dilatation or evidence of a solid mass. ADRENAL GLANDS: Unremarkable. KIDNEYS/URETERS: Nephrograms are symmetric. 3 mm nonobstructing calculus interpolar region of the right kidney. No collecting system dilatation. STOMACH AND BOWEL: Multiple diverticula mostly in the sigmoid colon with thickening of the sigmoid colon likely due to chronic diverticular disease. APPENDIX: No findings to suggest appendicitis. ABDOMINOPELVIC CAVITY: No ascites. No pneumoperitoneum. No lymphadenopathy. VESSELS: Unremarkable for patient's age. PELVIS REPRODUCTIVE ORGANS: Unremarkable for patient's age. URINARY BLADDER: Collapsed and not well evaluated but grossly unremarkable. ABDOMINAL WALL/INGUINAL REGIONS: Small fat-containing umbilical hernia and adjacent fat-containing periumbilical hernia. BONES: Stable appearance of sclerotic lesion in the right iliac bone. No destructive skeletal lesions.     Impression: Stable appearance of treated tumor within the liver with no findings to suggest viable tumor. No new findings in the chest abdomen or pelvis. Workstation performed: [id number]     I personally reviewed and interpreted the above laboratory and imaging data.    Discussion/Summary: 73 year-old female with metastatic neuroendocrine tumor, she is doing well.  She then underwent small-bowel resection and microwave ablation of  segment 7 liver lesions.  She completed radiation to T11 for a metastasis.   She is on Lanreotide.  She is clinically GERARDO at 3 years.  Her imaging is stable and her chromogranin level is normal.  The hilar lymph nodes were benign.  I will plan on seeing her again in 6 months with a repeat CT of her chest, abdomen and pelvis and a chromogranin level.  This CT should be able to survey the pancreas as well, to make sure the IPMN is not changing.  IPMN is currently stable.  If there is residual viable  disease, we can consider Sir spheres or PRRT if there is extrahepatic disease.  She is agreeable to this plan.  All of her questions were answered.

## 2024-02-28 NOTE — PROGRESS NOTES
Pt here for lanreotide injection-offers no new complaints.  Injection given in L dorsogluteal site without complications.  Pt is aware of next infusion appointment scheduled for 3/27 @ 0930am. AVS declined.

## 2024-03-13 ENCOUNTER — TELEPHONE (OUTPATIENT)
Dept: CARDIOLOGY CLINIC | Facility: CLINIC | Age: 74
End: 2024-03-13

## 2024-03-13 NOTE — TELEPHONE ENCOUNTER
LMOM for patient to call back to schedule their followup appointment in cardio-onc. (Recall is in appt desk) Direct extension and main number left.

## 2024-03-20 ENCOUNTER — APPOINTMENT (OUTPATIENT)
Dept: LAB | Facility: CLINIC | Age: 74
End: 2024-03-20
Payer: MEDICARE

## 2024-03-27 ENCOUNTER — HOSPITAL ENCOUNTER (OUTPATIENT)
Dept: INFUSION CENTER | Facility: CLINIC | Age: 74
Discharge: HOME | End: 2024-03-27
Payer: MEDICARE

## 2024-03-27 DIAGNOSIS — C7B.8 METASTATIC MALIGNANT NEUROENDOCRINE TUMOR TO LIVER (HCC): Primary | ICD-10-CM

## 2024-03-27 PROCEDURE — 96372 THER/PROPH/DIAG INJ SC/IM: CPT

## 2024-03-27 RX ORDER — LANREOTIDE ACETATE 120 MG/.5ML
120 INJECTION SUBCUTANEOUS ONCE
OUTPATIENT
Start: 2024-04-24

## 2024-03-27 RX ORDER — LANREOTIDE ACETATE 120 MG/.5ML
120 INJECTION SUBCUTANEOUS ONCE
Status: COMPLETED | OUTPATIENT
Start: 2024-03-27 | End: 2024-03-27

## 2024-03-27 RX ADMIN — LANREOTIDE ACETATE 120 MG: 120 INJECTION SUBCUTANEOUS at 09:23

## 2024-03-27 NOTE — PROGRESS NOTES
Received for lanreotide. No complaints offered. Injection given per md order in Right gluteal per pt request. Pt to return on 4/24 at 830am. AVS declined.

## 2024-04-11 ENCOUNTER — ESTABLISHED COMPREHENSIVE EXAM (OUTPATIENT)
Dept: URBAN - METROPOLITAN AREA CLINIC 6 | Facility: CLINIC | Age: 74
End: 2024-04-11

## 2024-04-11 DIAGNOSIS — H35.362: ICD-10-CM

## 2024-04-11 DIAGNOSIS — H35.372: ICD-10-CM

## 2024-04-11 DIAGNOSIS — H35.342: ICD-10-CM

## 2024-04-11 DIAGNOSIS — H04.123: ICD-10-CM

## 2024-04-11 DIAGNOSIS — H43.813: ICD-10-CM

## 2024-04-11 PROCEDURE — 92014 COMPRE OPH EXAM EST PT 1/>: CPT

## 2024-04-11 PROCEDURE — 92134 CPTRZ OPH DX IMG PST SGM RTA: CPT

## 2024-04-11 ASSESSMENT — VISUAL ACUITY
OS_SC: 20/25
OD_SC: 20/20-2

## 2024-04-11 ASSESSMENT — TONOMETRY
OS_IOP_MMHG: 14
OD_IOP_MMHG: 16

## 2024-04-14 NOTE — PROGRESS NOTES
Cardio-Oncology Clinic Note    Kelsey Brooks 73 y.o. 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    We checked an echocardiogram and there is mild to moderate TR.  However the valve looks structurally normal so this may be TR from diastolic dysfunction and not necessarily valve pathology from the neuroendocrine tumor.  In any event we will need to follow this closely.    Plan:   I recommend repeating an echo in October 2024.    # Pulmonary sarcoid  bx proven.  Follows with pulm.  We performed screening for cardiac involvement.  Echo shows normal EF.  Holter shows no concerning findings.    # Dizziness episodes   They are very brief lasting only for seconds.  Can occur with walking or with sitting.  Checked echo - normal EF  Checked holter - no concerning findings.  Sx's resolved.     # HTN  On HCTZ 12.5 (started years ago for mild unilateral leg swelling and on it ever since)  BP reasonable    # Prevention  we checked lipids and the LDL was 129.  ASCVD risk score is 15%.  We discussed could consider statin in the setting, particularly if risk enhancing features.  There is no family history of CAD.  We reviewed the prior PET scan and there is no evidence of coronary calcification.  After a shared decision-making process the patient will hold off on statin and would like to repeat lipids next year.  If dx with prediabetes or DM then I would definitely favor statin.    # elevated fasting glucose  Today it was 127.  Last month 128.   Concerned for prediabetes or diabetes diagnosis.  Check A1c.      # Diastolic dysfunction  Abnormal relaxation on  "echo  On HCTZ in the past for mild diuretic effect  On exam - no volume overload.      Today's Plan Summary:  See above assessment/plan for full details of today's plan.  Briefly,     Check a1c            Reason For Visit / Chief Complaint:  F/u neuroendocrine tumor, dizziness, HTN    HPI:   Kelsey Brooks is a 73 y.o.  female with history as noted in the problem list and further detailed in the above assessment and plan.    Initial:    Sept 202  Referred by Dr. Luke for a new patient visit for neuroendocrine tumor.  As above, the patient has a history of a neuroendocrine tumor diagnosed in 2020.  She had surgery and radiation and is currently on lanreotide.  She also has biopsy-proven lung sarcoid.  At her last oncology visit she reported some dizziness and possible irregular heartbeat.  In this setting (neuroendocrine tumor and pulmonary sarcoid) she was referred to cardio oncology.  Today, the patient reports - feeling overall pretty well.  No chest pain.  No SOB at rest.  Does get INFANTE.   Gets lightheadedness episodes.  Can occur while walking.  Can occur while sitting.   Usually lasts for seconds and then goes away.   During 1 episode recently - BP cuff told her the HR may be irregular.   No syncope.   Retired.  Did office work.  .  2 children.    Interval:  Last visit -->  Continues to have fatigue.  Continues to have rare lightheadedness episodes which are very brief.     Plan last visit -->   No med changes.  F/u in 6 months to discuss above issues.    Labs this AM -  CMP stable (except elevated fasting glucose)    Today - feeling stable.  Feeling \"pretty good\".  No SOB at rest.  Mild INFANTE with significant activity (stairs, hills).  No leg edema.         Cardiac Imaging personally reviewed:  EKG 2-23-23  NSR    9-1-23  Sinus rhythm with sinus arrhythmia.  1 PVC.  Possible PACs.  PRWP.        Holter or event monitor Holter - 10/18/23   Sinus rhythm.  Avg HR 71 ()  PAC's - 0.3%  PVC's - 0.7%  4 " "atrial runs (longest 13 beats)       Echo 2016  Ef 60%. Grade 1 diastolic dysf  Mild-mod TR  RVSP 35    Echo - 10/16/23   borderline LVH.   EF 60%.  Abnormal relaxation.  Normal RV size and function.  Mild-moderate TR on my review (the valve looks structurally normal).  RVSP 40 mmHg.       KOMAL    Cardiac MRI    Stress testing    Coronary CTA or [facility]    RHC    CPET              Patient Active Problem List    Diagnosis Date Noted   • Elevated fasting glucose 04/16/2024   • Primary hypertension 04/16/2024   • Nonrheumatic tricuspid valve regurgitation 04/16/2024   • Upper respiratory tract infection 02/16/2023   • Reactive airway disease 07/19/2021   • Bone metastasis 01/28/2021   • 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 (HCC) 12/11/2020   • Trigger middle finger of right hand 08/26/2020   • Other tear of medial meniscus, current injury, right knee, initial encounter 08/21/2019   • Complex tear of medial meniscus of right knee as current injury 07/12/2019   • Diastolic dysfunction 06/04/2019   • Edema of right lower extremity 06/02/2019   • 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   • Sarcoidosis, lung (HCC) 12/20/2018   • Mediastinal adenopathy 11/21/2018   • Liver mass 11/13/2018   • Abnormal MRI of abdomen 10/24/2018   • Positional vertigo 03/26/2018   • IPMN (intraductal papillary mucinous neoplasm) 10/08/2015   • Focal nodular hyperplasia of liver 03/19/2015       Past Medical History:   Diagnosis Date   • Abdominal pain    • Acute tonsillitis    • Breast pain, left    • Cancer (HCC)     liver&small intestine   • COVID-19 07/2022   • Edema of both lower extremities    • GERD (gastroesophageal reflux disease)    • GERD without esophagitis    • Kidney stone     \" [place]\"   • Lesion of liver    • Liver mass    • Lymphadenopathy    • Mediastinal lymphadenopathy    • Neoplasm of digestive system    • Orthostatic lightheadedness    • Sarcoidosis    • Vertigo        Allergies   Allergen Reactions   • Bactrim " [Sulfamethoxazole-Trimethoprim] Hives   • Clam Shell - Food Allergy Vomiting       Current Outpatient Medications   Medication Instructions   • Cholecalciferol (VITAMIN D) 2000 units CAPS 1 tablet, Oral, Daily   • docusate sodium (COLACE) 100 mg, Oral, 2 times daily   • Fluticasone-Salmeterol (Advair) 100-50 mcg/dose inhaler 1 puff, Inhalation, 2 times daily, Rinse mouth after use.   • hydrochlorothiazide (HYDRODIURIL) 12.5 mg tablet Take 1 tablet by mouth once daily   • LANREOTIDE ACETATE SC Subcutaneous, Every 30 days   • Multiple Vitamin (MULTIVITAMIN) tablet 1 tablet, Oral, Daily   • omeprazole (PRILOSEC) 40 mg, Oral, Daily before breakfast   • ondansetron (ZOFRAN-ODT) 8 mg, Oral, Every 8 hours PRN       Social History     Socioeconomic History   • Marital status:      Spouse name: Not on file   • Number of children: Not on file   • Years of education: Not on file   • Highest education level: Not on file   Occupational History   • Not on file   Tobacco Use   • Smoking status: Never   • Smokeless tobacco: Never   Vaping Use   • Vaping status: Never Used   Substance and Sexual Activity   • Alcohol use: Yes     Comment: occ wine- very seldom   • Drug use: No   • Sexual activity: Not Currently   Other Topics Concern   • Not on file   Social History Narrative   • Not on file     Social Determinants of Health     Financial Resource Strain: Low Risk  (2/16/2023)    Overall Financial Resource Strain (CARDIA)    • Difficulty of Paying Living Expenses: Not very hard   Food Insecurity: Not on file   Transportation Needs: No Transportation Needs (2/16/2023)    PRAPARE - Transportation    • Lack of Transportation (Medical): No    • Lack of Transportation (Non-Medical): No   Physical Activity: Not on file   Stress: Not on file   Social Connections: Not on file   Intimate Partner Violence: Not on file   Housing Stability: Not on file       Family History   Problem Relation Age of Onset   • Alzheimer's disease Mother   "  • Parkinsonism Father    • Psoriasis Father    • Heart failure Father         at older age   • Psoriasis Sister    • Psoriasis Sister    • Stroke Sister    • Colon cancer Maternal Grandfather    • No Known Problems Daughter    • No Known Problems Daughter        Physical Exam:  Blood pressure 130/70, pulse 57, height 5' 2\" (1.575 m), weight 82.6 kg (182 lb), SpO2 97%, not currently breastfeeding.  Body mass index is 33.29 kg/m².  Wt Readings from Last 3 Encounters:   04/16/24 82.6 kg (182 lb)   02/28/24 83.5 kg (184 lb)   01/08/24 86.2 kg (190 lb 0.6 oz)     Physical Exam  Vitals reviewed.   Constitutional:       General: She is not in acute distress.     Appearance: She is not toxic-appearing.   Neck:      Comments: JVP  est 6 cm h20  Cardiovascular:      Rate and Rhythm: Normal rate and regular rhythm.      Heart sounds: No murmur heard.     No friction rub. No gallop.   Pulmonary:      Breath sounds: Normal breath sounds. No wheezing, rhonchi or rales.   Musculoskeletal:      Comments: No LE edema   Neurological:      Mental Status: She is alert.         Labs & Results:  Lab Results   Component Value Date    SODIUM 138 04/16/2024    K 3.6 04/16/2024     04/16/2024    CO2 30 04/16/2024    BUN 16 04/16/2024    CREATININE 0.83 04/16/2024    GLUC 106 04/06/2022    CALCIUM 9.6 04/16/2024     No results found for: \"NTBNP\"       Thank you for the opportunity to participate in the care of this patient.    Can Huertas MD, [facility]  Staff Cardiologist  Director of Cardio-Oncology  [facility]  "

## 2024-04-16 ENCOUNTER — LAB (OUTPATIENT)
Dept: LAB | Facility: CLINIC | Age: 74
End: 2024-04-16
Payer: MEDICARE

## 2024-04-16 ENCOUNTER — OFFICE VISIT (OUTPATIENT)
Dept: CARDIOLOGY CLINIC | Facility: CLINIC | Age: 74
End: 2024-04-16
Payer: MEDICARE

## 2024-04-16 ENCOUNTER — APPOINTMENT (OUTPATIENT)
Dept: LAB | Facility: CLINIC | Age: 74
End: 2024-04-16
Payer: MEDICARE

## 2024-04-16 VITALS
OXYGEN SATURATION: 97 % | DIASTOLIC BLOOD PRESSURE: 70 MMHG | HEART RATE: 57 BPM | SYSTOLIC BLOOD PRESSURE: 130 MMHG | BODY MASS INDEX: 33.49 KG/M2 | HEIGHT: 62 IN | WEIGHT: 182 LBS

## 2024-04-16 DIAGNOSIS — R42 DIZZINESS: ICD-10-CM

## 2024-04-16 DIAGNOSIS — R73.01 ELEVATED FASTING GLUCOSE: Primary | ICD-10-CM

## 2024-04-16 DIAGNOSIS — C7B.8 METASTATIC MALIGNANT NEUROENDOCRINE TUMOR TO LIVER (HCC): ICD-10-CM

## 2024-04-16 DIAGNOSIS — R73.01 ELEVATED FASTING GLUCOSE: ICD-10-CM

## 2024-04-16 DIAGNOSIS — I10 PRIMARY HYPERTENSION: ICD-10-CM

## 2024-04-16 DIAGNOSIS — I36.1 NONRHEUMATIC TRICUSPID VALVE REGURGITATION: ICD-10-CM

## 2024-04-16 DIAGNOSIS — D86.0 PULMONARY SARCOIDOSIS (HCC): ICD-10-CM

## 2024-04-16 LAB
EST. AVERAGE GLUCOSE BLD GHB EST-MCNC: 143 MG/DL
HBA1C MFR BLD: 6.6 %

## 2024-04-16 PROCEDURE — 83036 HEMOGLOBIN GLYCOSYLATED A1C: CPT

## 2024-04-16 PROCEDURE — 99214 OFFICE O/P EST MOD 30 MIN: CPT | Performed by: INTERNAL MEDICINE

## 2024-04-16 PROCEDURE — 86316 IMMUNOASSAY TUMOR OTHER: CPT

## 2024-04-16 NOTE — RESULT ENCOUNTER NOTE
A1c - 6.6%    I did call the patient and we discussed this result by phone.  Technically this is in the diabetic range.  Recommend dietary changes, exercise, weight loss.  She will follow-up with her primary care doctor about this A1c result as well.

## 2024-04-18 LAB — CGA SERPL-MCNC: 69.9 NG/ML (ref 0–101.8)

## 2024-04-22 DIAGNOSIS — D86.0 SARCOIDOSIS, LUNG (HCC): ICD-10-CM

## 2024-04-22 RX ORDER — HYDROCHLOROTHIAZIDE 12.5 MG/1
TABLET ORAL
Qty: 90 TABLET | Refills: 1 | Status: SHIPPED | OUTPATIENT
Start: 2024-04-22

## 2024-04-23 ENCOUNTER — RA CDI HCC (OUTPATIENT)
Dept: OTHER | Facility: HOSPITAL | Age: 74
End: 2024-04-23

## 2024-04-24 ENCOUNTER — HOSPITAL ENCOUNTER (OUTPATIENT)
Dept: INFUSION CENTER | Facility: CLINIC | Age: 74
Discharge: HOME | End: 2024-04-24
Payer: MEDICARE

## 2024-04-24 DIAGNOSIS — C7B.8 METASTATIC MALIGNANT NEUROENDOCRINE TUMOR TO LIVER (HCC): Primary | ICD-10-CM

## 2024-04-24 PROCEDURE — 96372 THER/PROPH/DIAG INJ SC/IM: CPT

## 2024-04-24 RX ORDER — LANREOTIDE ACETATE 120 MG/.5ML
120 INJECTION SUBCUTANEOUS ONCE
Status: COMPLETED | OUTPATIENT
Start: 2024-04-24 | End: 2024-04-24

## 2024-04-24 RX ORDER — LANREOTIDE ACETATE 120 MG/.5ML
120 INJECTION SUBCUTANEOUS ONCE
OUTPATIENT
Start: 2024-05-22

## 2024-04-24 RX ADMIN — LANREOTIDE ACETATE 120 MG: 120 INJECTION SUBCUTANEOUS at 08:25

## 2024-04-24 NOTE — PROGRESS NOTES
Received for Lanreotide. No complaints offered. Pt tolerated injection to Right gluteal area without issue. Pt to return 5/22 at 830am.AVS declined.

## 2024-04-25 ENCOUNTER — OFFICE VISIT (OUTPATIENT)
Dept: DERMATOLOGY | Facility: CLINIC | Age: 74
End: 2024-04-25
Payer: MEDICARE

## 2024-04-25 VITALS — TEMPERATURE: 96.5 F | WEIGHT: 179 LBS | BODY MASS INDEX: 32.74 KG/M2

## 2024-04-25 DIAGNOSIS — D22.72 MULTIPLE BENIGN MELANOCYTIC NEVI OF UPPER AND LOWER EXTREMITIES AND TRUNK: ICD-10-CM

## 2024-04-25 DIAGNOSIS — L57.0 ACTINIC KERATOSIS: ICD-10-CM

## 2024-04-25 DIAGNOSIS — D22.61 MULTIPLE BENIGN MELANOCYTIC NEVI OF UPPER AND LOWER EXTREMITIES AND TRUNK: ICD-10-CM

## 2024-04-25 DIAGNOSIS — L28.0 LICHEN SIMPLEX CHRONICUS: ICD-10-CM

## 2024-04-25 DIAGNOSIS — L91.8 ACROCHORDON: ICD-10-CM

## 2024-04-25 DIAGNOSIS — D22.62 MULTIPLE BENIGN MELANOCYTIC NEVI OF UPPER AND LOWER EXTREMITIES AND TRUNK: ICD-10-CM

## 2024-04-25 DIAGNOSIS — D22.5 MULTIPLE BENIGN MELANOCYTIC NEVI OF UPPER AND LOWER EXTREMITIES AND TRUNK: ICD-10-CM

## 2024-04-25 DIAGNOSIS — L81.4 LENTIGO: ICD-10-CM

## 2024-04-25 DIAGNOSIS — L30.9 ECZEMA, UNSPECIFIED TYPE: Primary | ICD-10-CM

## 2024-04-25 DIAGNOSIS — D18.01 CHERRY ANGIOMA: ICD-10-CM

## 2024-04-25 DIAGNOSIS — D22.71 MULTIPLE BENIGN MELANOCYTIC NEVI OF UPPER AND LOWER EXTREMITIES AND TRUNK: ICD-10-CM

## 2024-04-25 DIAGNOSIS — L85.3 XEROSIS OF SKIN: ICD-10-CM

## 2024-04-25 PROCEDURE — 17000 DESTRUCT PREMALG LESION: CPT | Performed by: NURSE PRACTITIONER

## 2024-04-25 PROCEDURE — 17003 DESTRUCT PREMALG LES 2-14: CPT | Performed by: NURSE PRACTITIONER

## 2024-04-25 PROCEDURE — 99214 OFFICE O/P EST MOD 30 MIN: CPT | Performed by: NURSE PRACTITIONER

## 2024-04-25 RX ORDER — TRIAMCINOLONE ACETONIDE 1 MG/G
OINTMENT TOPICAL 2 TIMES DAILY
Qty: 80 G | Refills: 1 | Status: SHIPPED | OUTPATIENT
Start: 2024-04-25

## 2024-04-25 NOTE — PROGRESS NOTES
"[facility] Dermatology Clinic Note     Patient Name: Kelsey Brooks  Encounter Date: 4/25/24     Have you been cared for by a [facility] Dermatologist in the last 3 years and, if so, which description applies to you?    Yes.  I have been here within the last 3 years, and my medical history has NOT changed since that time.  I am FEMALE/of child-bearing potential.    REVIEW OF SYSTEMS:  Have you recently had or currently have any of the following? No changes in my recent health.   PAST MEDICAL HISTORY:  Have you personally ever had or currently have any of the following?  If \"YES,\" then please provide more detail. No changes in my medical history.   HISTORY OF IMMUNOSUPPRESSION: Do you have a history of any of the following:  Systemic Immunosuppression such as Diabetes, Biologic or Immunotherapy, Chemotherapy, Organ Transplantation, Bone Marrow Transplantation?  YES, diabetes     Answering \"YES\" requires the addition of the dotphrase \"IMMUNOSUPPRESSED\" as the first diagnosis of the patient's visit.   FAMILY HISTORY:  Any \"first degree relatives\" (parent, brother, sister, or child) with the following?    No changes in my family's known health.   PATIENT EXPERIENCE:    Do you want the Dermatologist to perform a COMPLETE skin exam today including a clinical examination under the \"bra and underwear\" areas?  Yes  If necessary, do we have your permission to call and leave a detailed message on your Preferred Phone number that includes your specific medical information?  Yes      Allergies   Allergen Reactions    Bactrim [Sulfamethoxazole-Trimethoprim] Hives    Clam Shell - Food Allergy Vomiting      Current Outpatient Medications:     Cholecalciferol (VITAMIN D) 2000 units CAPS, Take 1 tablet by mouth daily, Disp: , Rfl:     docusate sodium (COLACE) 100 mg capsule, Take 1 capsule (100 mg total) by mouth 2 (two) times a day (Patient taking differently: Take 100 mg by mouth daily), Disp: 10 capsule, Rfl: 0    " Fluticasone-Salmeterol (Advair) 100-50 mcg/dose inhaler, Inhale 1 puff 2 (two) times a day Rinse mouth after use., Disp: 60 blister, Rfl: 3    hydroCHLOROthiazide 12.5 mg tablet, Take 1 tablet by mouth once daily, Disp: 90 tablet, Rfl: 1    LANREOTIDE ACETATE SC, Inject under the skin every 30 (thirty) days, Disp: , Rfl:     Multiple Vitamin (MULTIVITAMIN) tablet, Take 1 tablet by mouth daily, Disp: , Rfl:     omeprazole (PriLOSEC) 40 MG capsule, Take 1 capsule (40 mg total) by mouth daily before breakfast, Disp: 90 capsule, Rfl: 3    ondansetron (ZOFRAN-ODT) 8 mg disintegrating tablet, Take 1 tablet (8 mg total) by mouth every 8 (eight) hours as needed for nausea or vomiting, Disp: 20 tablet, Rfl: 5          Whom besides the patient is providing clinical information about today's encounter?   NO ADDITIONAL HISTORIAN (patient alone provided history)    Physical Exam and Assessment/Plan by Diagnosis:        ECZEMATOUS DERMATITIS    Physical Exam:  Anatomic Location Affected:  back, right anterior thigh  Morphological Description:  Eczematous erythematous patches without scale   Pertinent Positives:  Pertinent Negatives:     Additional History of Present Condition: Patient last seen evaluated by Dr. Milner in March 2022.  Reports occasional itchy patches.  Has longstanding history of eczema.  Patient previously prescribed triamcinolone, but requesting refill.     Assessment and Plan:  Based on a thorough discussion of this condition and the management approach to it (including a comprehensive discussion of the known risks, side effects and potential benefits of treatment), the patient (family) agrees to implement the following specific plan:  Continue Triamcinolone 1% ointment BID for up to 2 weeks, then as needed for flares    ACTINIC KERATOSIS    Physical Exam:  Anatomic Location Affected:  bilateral cheeks  Morphological Description:  scaly pink macule    Assessment and Plan:  Based on a thorough discussion of this  condition and the management approach to it (including a comprehensive discussion of the known risks, side effects and potential benefits of treatment), the patient (family) agrees to implement the following specific plan:    Discussed treatment options which include Efudex x 2 weeks BID versus cryotherapy.  Informed consent, risks and benefits were reviewed.  Patient opts to proceed with cryotherapy.  Liquid nitrogen was applied for 10-12 seconds to the skin lesion and the expected blistering or scabbing reaction explained. Do not pick at the area. Patient reminded to expect hypopigmented scars from the procedure.  Patient instructed to call office if lesion reoccurs in the next 1-2 months, would then proceed with shave biopsy      PROCEDURE:  DESTRUCTION OF PRE-MALIGNANT LESIONS  After a thorough discussion of treatment options and risk/benefits/alternatives (including but not limited to local pain, scarring, dyspigmentation, blistering, and possible superinfection), verbal and written consent were obtained and the aforementioned lesions were treated on with cryotherapy using liquid nitrogen x 1 cycle for 5-10 seconds.    TOTAL NUMBER of 2 pre-malignant lesions were treated today on the ANATOMIC LOCATION: bilateral cheeks     The patient tolerated the procedure well, and after-care instructions were provided.     LICHEN SIMPLEX CHRONICUS    Physical Exam:  Anatomic Location Affected:  labia  Morphological Description:  clear on exam  Pertinent Positives: pruritus   Pertinent Negatives:    Additional History of Present Condition:  patient reports intermittent itch on left side of vaginal labia for a few years.  She states itch can become intense and bilateral.  Itch worsens if scratched.  Patient denies any rash.  She states itch is not correlated with anything such as sweating.        Assessment and Plan:  Based on a thorough discussion of this condition and the management approach to it (including a comprehensive  "discussion of the known risks, side effects and potential benefits of treatment), the patient (family) agrees to implement the following specific plan:  Wear cotton underwear.  Avoid feminine products.  Use hypoallergenic detergents.  Wash with Dove bar soap    OK to apply thin layer of triamcinolone to affected area as needed, no longer than 3 days    MELANOCYTIC NEVI (\"Moles\")    Physical Exam:  Anatomic Location Affected:   Mostly on sun-exposed areas of the trunk and extremities  Morphological Description:  Scattered, 1-4mm round to ovoid, symmetrical-appearing, even bordered, skin colored to dark brown macules/papules, mostly in sun-exposed areas    Assessment and Plan:  Based on a thorough discussion of this condition and the management approach to it (including a comprehensive discussion of the known risks, side effects and potential benefits of treatment), the patient (family) agrees to implement the following specific plan:  When outside we recommend using a wide brim hat, sunglasses, long sleeve and pants, sunscreen with SPF 30+ with reapplication every 2 hours, or SPF specific clothing   Benign, reassured  Annual skin check    ACROCHORDON (\"SKIN TAG\")     Physical Exam:  Anatomic Location Affected:  neck  Morphological Description:  Skin colored pedunculated papules      Assessment and Plan:  Based on a thorough discussion of this condition and the management approach to it (including a comprehensive discussion of the known risks, side effects and potential benefits of treatment), the patient (family) agrees to implement the following specific plan:     Not covered under insurance; considered cosmetic; cost is $150 form 1-10 skin tags removed and then additional $20 for each.  Patient defers  LENTIGO    Physical Exam:  Anatomic Location Affected:  trunk, arms  Morphological Description:  Light brown macules    Assessment and Plan:  Based on a thorough discussion of this condition and the management " "approach to it (including a comprehensive discussion of the known risks, side effects and potential benefits of treatment), the patient (family) agrees to implement the following specific plan:  When outside we recommend using a wide brim hat, sunglasses, long sleeve and pants, sunscreen with SPF 30+ with reapplication every 2 hours, or SPF specific clothing     GAMEZ ANGIOMAS    Physical Exam:  Anatomic Location Affected:  trunk  Morphological Description:  Scattered cherry red, 1-4 mm papules.    Assessment and Plan:  Based on a thorough discussion of this condition and the management approach to it (including a comprehensive discussion of the known risks, side effects and potential benefits of treatment), the patient (family) agrees to implement the following specific plan:  Monitor for changes  Benign, reassured    SEBORRHEIC KERATOSIS; NON-INFLAMED    Physical Exam:  Anatomic Location Affected:  trunk  Morphological Description:  Flat and raised, waxy, smooth to warty textured, yellow to brownish-grey to dark brown to blackish, discrete, \"stuck-on\" appearing papules.    Assessment and Plan:  Based on a thorough discussion of this condition and the management approach to it (including a comprehensive discussion of the known risks, side effects and potential benefits of treatment), the patient (family) agrees to implement the following specific plan:  Monitor for changes  Benign, reassured    XEROSIS (\"DRY SKIN\")    Physical Exam:  Anatomic Location Affected:  diffuse  Morphological Description:  xerosis    Assessment and Plan:  Based on a thorough discussion of this condition and the management approach to it (including a comprehensive discussion of the known risks, side effects and potential benefits of treatment), the patient (family) agrees to implement the following specific plan:  Use moisturizer like Eucerin,Cerave or Aveeno Cream 3 times a day for the dry skin              Scribe Attestation      I,:  " Maria Del Carmen Weiss am acting as a scribe while in the presence of the attending physician.:       I,:  ROSALIO Swain personally performed the services described in this documentation    as scribed in my presence.:

## 2024-04-25 NOTE — PATIENT INSTRUCTIONS
"A[place]ON (\"SKIN TAG\")     Assessment and Plan:  Based on a thorough discussion of this condition and the management approach to it (including a comprehensive discussion of the known risks, side effects and potential benefits of treatment), the patient (family) agrees to implement the following specific plan:     Not covered under insurance; considered cosmetic; cost is $150 form 1-10 skin tags removed and then additional $20 for each      Skin tags are common, soft, harmless skin lesions that are also called, in the appropriate settings, papillomas, fibroepithelial polyps, and soft fibromas.  They are made up of loosely arranged collagen fibers and blood vessels surrounded by a thickened or thinned-out epidermis.     Skin tags tend to develop in both men and women as we grow older.  They are usually found on the skin folds (neck, armpits, groin).  It is not known what specifically causes skin tags.  Certain factors, though, do appear to play a role:  Chaffing and irritation from skin rubbing together  High levels of growth factors (as seen, for example, in pregnancy or in acromegaly/gigantism)  Insulin resistance  Human papillomavirus (wart virus)     We discussed that most skin tags do not need to be treated unless they are specifically causing the patient physical distress or limitation or pose a risk for a larger problem such as an infection that forms secondary to excoriation or chronic irritation.     We had a thorough discussion of treatment options and specific risks (including that any procedural treatment may not be covered by insurance and would then be the patient's responsibility) and benefits/alternatives including but not limited to the following:  Cryotherapy (freezing)  Shave removal  Surgical excision (snip excision with scissors)  Electrosurgery  Ligation (we do not do this procedure and counseled against it due to risk of tissue necrosis and infection)     ECZEMATOUS DERMATITIS    Assessment " and Plan:  Based on a thorough discussion of this condition and the management approach to it (including a comprehensive discussion of the known risks, side effects and potential benefits of treatment), the patient (family) agrees to implement the following specific plan:  Continue with applying the Triamcinolone 1% ointment apply 2-4 times daily for up to 2 weeks. If the rash resolves, please discontinue medication and use only for flares as needed.    ACTINIC KERATOSIS    Assessment and Plan:  Based on a thorough discussion of this condition and the management approach to it (including a comprehensive discussion of the known risks, side effects and potential benefits of treatment), the patient (family) agrees to implement the following specific plan:    Liquid nitrogen was applied for 10-12 seconds to the skin lesion and the expected blistering or scabbing reaction explained. Do not pick at the area. Patient reminded to expect hypopigmented scars from the procedure. Return if lesion fails to fully resolve.  If does not go away will do biopsy    Actinic keratoses are very common on sites repeatedly exposed to the sun, especially the backs of the hands and the face.  They are considered precancers and have a low risk of turning into squamous cell carcinoma. It is rare for a solitary actinic keratosis to evolve into a squamous cell carcinoma (SCC), but the risk is 10-15% when more than 10 actinic keratoses are present. A tender, thickened, ulcerated or enlarging actinic keratosis is suspicious of SCC.    Actinic keratoses may be prevented by strict sun protection. If already present, keratoses may improve with a very high sun protection factor (50+) broad-spectrum sunscreen applied at least daily to affected areas, year-round.  We recommend that sun protective clothing and hats and sunglasses be worn whenever possible.  Note that you can make you own UPF 30 rate clothing using just your own washing machine with a  "product called sun guard    There are several different options for treating actinic keratoses    Topical “medications such as 5-fluorouracil or Aldara  - good for field treatment ie treats what's seen and not seen    Cryotherapy - good for single spots but treats “only what we see” versus a field treatment    Photodynamic therapy - involves application of a light sensitizing medicine and then exposure to a special light, also a good field treatment    LICHEN SIMPLEX CHRONICUS    Assessment and Plan:  Based on a thorough discussion of this condition and the management approach to it (including a comprehensive discussion of the known risks, side effects and potential benefits of treatment), the patient (family) agrees to implement the following specific plan:  Apply the prescribed Triamcinolone 1 % ointment thin coat to area or only 3 days maximum   Do not use wipes in area    Lichen simplex chronicus is a localized area of thickened skin from repeated rubbing, itching, and scratching of the skin. There may be a single or multiple well-circumscribed plaques on the skin. It is also called \"neurodermatitis.\"    What causes lichen simplex chronicus?  Although the mechanism is not understood, lichen simplex chronicus follows repetitive scratching and rubbing, which arises because of chronic localized itch. Lichen simplex occurs in adult males and females. It is unusual in children and is more common in people with anxiety and/or obsessive compulsive disorder.  The primary itch can be due to:  Atopic eczema  Contact eczema  Venous eczema  Psoriasis  Lichen planus  Fungal infection  Insect bite  Neuropathy (radiculopathy) eg, brachioradial pruritus.    The itching may involve alterations in the way the nervous system perceives and processes itchy sensations. Skin that tends toward eczematous conditions (eg, atopic dermatitis) is more prone to lichenification.    What are the clinical features of lichen simplex?  A solitary " plaque of lichen simplex is circumscribed, somewhat linear or oval in shape, and markedly thickened. It is intensely itchy. Other features may include:  Exaggerated skin markings  Dry or scaly surface  Leathery induration  Broken-off hairs  Pigmentation  Scratch marks    Lichen simplex is often solitary and unilateral, usually affecting the patient's dominant side.    Multiple areas of skin can also be involved with symmetrical or asymmetrical distribution. It mainly involves easily reached sites, most commonly the legs, arms, neck, upper trunk, and anal region.    How do we diagnose lichen simplex chronicus?  Diagnosis is usually done by history and skin examination. At times, it may be helpful to do some investigations. These would include:  Skin scrapings for possible tinea (fungal) infection  Skin biopsy  In the absence of known underlying skin problem or infection, you doctor may look for clues that it is due to nerve damage. In severe cases, the following tests may be performed (although they are often unhelpful):  Spinal imaging: X-rays, CT scans, MRI scans  Electrophysiological nerve conduction studies.    It is important to understand that the itchy patch of skin is, at least in part, due to scratching and rubbing. Treatment addresses the symptoms and any underlying cause.  Treatment of the lichen simplex may include:  Potent topical steroids until the plaque is resolved (4-6 weeks) -- covering the affected area a few hours after application may enhance efficacy  Reduce potency or frequency of topical steroids once lichenification has resolved  Steroid injections every 4-6 weeks  Coal tar preparations  Moisturizers to relieve dryness and reduce desire to scratch  Cooling creams containing menthol  Antihistamine or tricyclic antidepressant medications (eg, amitriptyline or nortriptyline), to help sleep.    The primary condition needs treating; for example:  Antifungal agents for dermatophyte  "infection  Phototherapy and immunomodulatory medications for inflammatory skin conditions (oral corticosteroids, methotrexate, azathioprine or ciclosporin)  Tricyclic antidepressants (amitriptyline, nortriptyline, doxepin), other antidepressants (eg, duloxetine) or antiepileptic medications (valproate, lamotrigine, gabapentin) for nerve causes     MELANOCYTIC NEVI (\"Moles\")    Assessment and Plan:  Based on a thorough discussion of this condition and the management approach to it (including a comprehensive discussion of the known risks, side effects and potential benefits of treatment), the patient (family) agrees to implement the following specific plan:  When outside we recommend using a wide brim hat, sunglasses, long sleeve and pants, sunscreen with SPF 30+ with reapplication every 2 hours, or SPF specific clothing   Benign, reassured  Annual skin check     Melanocytic Nevi  Melanocytic nevi (\"moles\") are tan or brown, raised or flat areas of the skin which have an increased number of melanocytes. Melanocytes are the cells in our body which make pigment and account for skin color.    Some moles are present at birth (I.e., \"congenital nevi\"), while others come up later in life (i.e., \"acquired nevi\").  The sun can stimulate the body to make more moles.  Sunburns are not the only thing that triggers more moles.  Chronic sun exposure can do it too.     Clinically distinguishing a healthy mole from melanoma may be difficult, even for experienced dermatologists. The \"ABCDE's\" of moles have been suggested as a means of helping to alert a person to a suspicious mole and the possible increased risk of melanoma.  The suggestions for raising alert are as follows:    Asymmetry: Healthy moles tend to be symmetric, while melanomas are often asymmetric.  Asymmetry means if you draw a line through the mole, the two halves do not match in color, size, shape, or surface texture. Asymmetry can be a result of rapid enlargement of " "a mole, the development of a raised area on a previously flat lesion, scaling, ulceration, bleeding or scabbing within the mole.  Any mole that starts to demonstrate \"asymmetry\" should be examined promptly by a board certified dermatologist.     Border: Healthy moles tend to have discrete, even borders.  The border of a melanoma often blends into the normal skin and does not sharply delineate the mole from normal skin.  Any mole that starts to demonstrate \"uneven borders\" should be examined promptly by a board certified dermatologist.     Color: Healthy moles tend to be one color throughout.  Melanomas tend to be made up of different colors ranging from dark black, blue, white, or red.  Any mole that demonstrates a color change should be examined promptly by a board certified dermatologist.     Diameter: Healthy moles tend to be smaller than 0.6 cm in size; an exception are \"congenital nevi\" that can be larger.  Melanomas tend to grow and can often be greater than 0.6 cm (1/4 of an inch, or the size of a pencil eraser). This is only a guideline, and many normal moles may be larger than 0.6 cm without being unhealthy.  Any mole that starts to change in size (small to bigger or bigger to smaller) should be examined promptly by a board certified dermatologist.     Evolving: Healthy moles tend to \"stay the same.\"  Melanomas may often show signs of change or evolution such as a change in size, shape, color, or elevation.  Any mole that starts to itch, bleed, crust, burn, hurt, or ulcerate or demonstrate a change or evolution should be examined promptly by a board certified dermatologist.      LENTIGO    Assessment and Plan:  Based on a thorough discussion of this condition and the management approach to it (including a comprehensive discussion of the known risks, side effects and potential benefits of treatment), the patient (family) agrees to implement the following specific plan:  When outside we recommend using a wide " brim hat, sunglasses, long sleeve and pants, sunscreen with SPF 30+ with reapplication every 2 hours, or SPF specific clothing     What is a lentigo?  A lentigo is a pigmented flat or slightly raised lesion with a clearly defined edge. Unlike an ephelis (freckle), it does not fade in the winter months. There are several kinds of lentigo.  The name lentigo originally referred to its appearance resembling a small lentil. The plural of lentigo is lentigines, although “lentigos” is also in common use.    Who gets lentigines?  Lentigines can affect males and females of all ages and races. Solar lentigines are especially prevalent in fair skinned adults. Lentigines associated with syndromes are present at birth or arise during childhood.    What causes lentigines?  Common forms of lentigo are due to exposure to ultraviolet radiation:  Sun damage including sunburn   Indoor tanning   Phototherapy, especially photochemotherapy (PUVA)    Ionizing radiation, eg radiation therapy, can also cause lentigines.  Several familial syndromes associated with widespread lentigines originate from mutations in Rene-MAP kinase, mTOR signaling and PTEN pathways.    What is the treatment for lentigines?  Most lentigines are left alone. Attempts to lighten them may not be successful. The following approaches are used:  SPF 50+ broad-spectrum sunscreen   Hydroquinone bleaching cream   Alpha hydroxy acids   Vitamin C   Retinoids   Azelaic acid   Chemical peels  Individual lesions can be permanently removed using:  Cryotherapy   Intense pulsed light   Pigment lasers    How can lentigines be prevented?  Lentigines associated with exposure ultraviolet radiation can be prevented by very careful sun protection. Clothing is more successful at preventing new lentigines than are sunscreens.    What is the outlook for lentigines?  Lentigines usually persist. They may increase in number with age and sun exposure. Some in sun-protected sites may fade and  "disappear.    CHERRY ANGIOMAS    Assessment and Plan:  Based on a thorough discussion of this condition and the management approach to it (including a comprehensive discussion of the known risks, side effects and potential benefits of treatment), the patient (family) agrees to implement the following specific plan:  Monitor for changes  Benign, reassured    Assessment and Plan:    Cherry angioma, also known as Guzman de Vincenzo spots, are benign vascular skin lesions. A \"cherry angioma\" is a firm red, blue or purple papule, 0.1-1 cm in diameter. When thrombosed, they can appear black in colour until evaluated with a dermatoscope when the red or purple colour is more easily seen. Cherry angioma may develop on any part of the body but most often appear on the scalp, face, lips and trunk.  An angioma is due to proliferating endothelial cells; these are the cells that line the inside of a blood vessel.    Angiomas can arise in early life or later in life; the most common type of angioma is a cherry angioma.  Cherry angiomas are very common in males and females of any age or race. They are more noticeable in white skin than in skin of colour. They markedly increase in number from about the age of 40. There may be a family history of similar lesions. Eruptive cherry angiomas have been rarely reported to be associated with internal malignancy. The cause of angiomas is unknown. Genetic analysis of cherry angiomas has shown that they frequently carry specific somatic missense mutations in the GNAQ and GNA11 (Q209H) genes, which are involved in other vascular and melanocytic proliferations.    SEBORRHEIC KERATOSIS; NON-INFLAMED    Assessment and Plan:  Based on a thorough discussion of this condition and the management approach to it (including a comprehensive discussion of the known risks, side effects and potential benefits of treatment), the patient (family) agrees to implement the following specific plan:  Monitor for " "changes  Benign, reassured    Seborrheic Keratosis  A seborrheic keratosis is a harmless warty spot that appears during adult life as a common sign of skin aging.  Seborrheic keratoses can arise on any area of skin, covered or uncovered, with the usual exception of the palms and soles. They do not arise from mucous membranes. Seborrheic keratoses can have highly variable appearance.      Seborrheic keratoses are extremely common. It has been estimated that over 90% of adults over the age of 60 years have one or more of them. They occur in males and females of all races, typically beginning to erupt in the 30s or 40s. They are uncommon under the age of 20 years.  The precise cause of seborrhoeic keratoses is not known.  Seborrhoeic keratoses are considered degenerative in nature. As time goes by, seborrheic keratoses tend to become more numerous. Some people inherit a tendency to develop a very large number of them; some people may have hundreds of them.      There is no easy way to remove multiple lesions on a single occasion.  Unless a specific lesion is \"inflamed\" and is causing pain or stinging/burning or is bleeding, most insurance companies do not authorize treatment.    XEROSIS (\"DRY SKIN\")    Assessment and Plan:  Based on a thorough discussion of this condition and the management approach to it (including a comprehensive discussion of the known risks, side effects and potential benefits of treatment), the patient (family) agrees to implement the following specific plan:  Use moisturizer like Eucerin,Cerave or Aveeno Cream 3 times a day for the dry skin            Dry skin refers to skin that feels dry to touch. Dry skin has a dull surface with a rough, scaly quality. The skin is less pliable and cracked. When dryness is severe, the skin may become inflamed and fissured.  Although any body site can be dry, dry skin tends to affect the shins more than any other site.    Dry skin is lacking moisture in the " outer horny cell layer (stratum corneum) and this results in cracks in the skin surface.  Dry skin is also called xerosis, xeroderma or asteatosis (lack of fat).  It can affect males and females of all ages. There is some racial variability in water and lipid content of the skin.  Dry skin that starts in early childhood may be one of about 20 types of ichthyosis (fish-scale skin). There is often a family history of dry skin.   Dry skin is commonly seen in people with atopic dermatitis.  Nearly everyone > 60 years has dry skin.    Dry skin that begins later may be seen in people with certain diseases and conditions.  Postmenopausal women  Hypothyroidism  Chronic renal disease   Malnutrition and weight loss   Subclinical dermatitis   Treatment with certain drugs such as oral retinoids, diuretics and epidermal growth factor receptor inhibitors      What is the treatment for dry skin?  The mainstay of treatment of dry skin and ichthyosis is moisturisers/emollients. They should be applied liberally and often enough to:  Reduce itch   Improve the barrier function   Prevent entry of irritants, bacteria   Reduce transepidermal water loss.      How can dry skin be prevented?  Eliminate aggravating factors:  Reduce the frequency of bathing.   A humidifier in winter and air conditioner in summer   Compare having a short shower with a prolonged soak in a bath.   Use lukewarm, not hot, water.   Replace standard soap with a substitute such as a synthetic detergent cleanser, water-miscible emollient, bath oil, anti-pruritic tar oil, colloidal oatmeal etc.   Apply an emollient liberally and often, particularly shortly after bathing, and when itchy. The drier the skin, the thicker this should be, especially on the hands.    What is the outlook for dry skin?  A tendency to dry skin may persist life-long, or it may improve once contributing factors are controlled.

## 2024-04-29 ENCOUNTER — OFFICE VISIT (OUTPATIENT)
Dept: FAMILY MEDICINE CLINIC | Facility: CLINIC | Age: 74
End: 2024-04-29
Payer: MEDICARE

## 2024-04-29 VITALS
WEIGHT: 180 LBS | SYSTOLIC BLOOD PRESSURE: 140 MMHG | DIASTOLIC BLOOD PRESSURE: 60 MMHG | HEIGHT: 62 IN | RESPIRATION RATE: 18 BRPM | HEART RATE: 68 BPM | BODY MASS INDEX: 33.13 KG/M2 | OXYGEN SATURATION: 99 % | TEMPERATURE: 97.3 F

## 2024-04-29 DIAGNOSIS — I10 PRIMARY HYPERTENSION: ICD-10-CM

## 2024-04-29 DIAGNOSIS — C7B.8 METASTATIC MALIGNANT NEUROENDOCRINE TUMOR TO LIVER (HCC): ICD-10-CM

## 2024-04-29 DIAGNOSIS — R51.9 CHRONIC INTRACTABLE HEADACHE, UNSPECIFIED HEADACHE TYPE: ICD-10-CM

## 2024-04-29 DIAGNOSIS — Z00.00 MEDICARE ANNUAL WELLNESS VISIT, SUBSEQUENT: Primary | ICD-10-CM

## 2024-04-29 DIAGNOSIS — G89.29 CHRONIC INTRACTABLE HEADACHE, UNSPECIFIED HEADACHE TYPE: ICD-10-CM

## 2024-04-29 PROBLEM — J06.9 UPPER RESPIRATORY TRACT INFECTION: Status: RESOLVED | Noted: 2023-02-16 | Resolved: 2024-04-29

## 2024-04-29 PROBLEM — R93.5 ABNORMAL MRI OF ABDOMEN: Status: RESOLVED | Noted: 2018-10-24 | Resolved: 2024-04-29

## 2024-04-29 PROBLEM — J44.9 CHRONIC OBSTRUCTIVE PULMONARY DISEASE, UNSPECIFIED COPD TYPE (HCC): Status: ACTIVE | Noted: 2024-04-29

## 2024-04-29 PROCEDURE — G0439 PPPS, SUBSEQ VISIT: HCPCS | Performed by: FAMILY MEDICINE

## 2024-04-29 PROCEDURE — 99213 OFFICE O/P EST LOW 20 MIN: CPT | Performed by: FAMILY MEDICINE

## 2024-04-29 NOTE — ASSESSMENT & PLAN NOTE
History of metastatic neuroendocrine tumor.  Patient continues to see her oncologist for ongoing monitoring of this condition as well as receiving her therapeutic infusions on a monthly basis.

## 2024-04-29 NOTE — TELEPHONE ENCOUNTER
I called patient at this time after she "no show" her 0800 appointment time  Patient was under the impression that her appointment was for 9/29 instead of 9/28  Appointment rescheduled for tomorrow, 9/29  Patient happy with this appointment  Monitor: The problem is improving with treatment.  Evaluation: Reviewed recent labs/tests with the patient.  Assessment/Treatment:  Continue current treatment/monitoring regimen.  A1c 5.5 on 3/21/24  Reviewed with patient  Patient up-to-date with ophthalmology  He is due for microalbumin in 3 months  Continue glipizide 2.5 mg daily  Continue low-carb diet and healthy diet and exercise  Follow-up in 3 months

## 2024-04-29 NOTE — PATIENT INSTRUCTIONS
Medicare Preventive Visit Patient Instructions  Thank you for completing your Welcome to Medicare Visit or Medicare Annual Wellness Visit today. Your next wellness visit will be due in one year (4/30/2025).  The screening/preventive services that you may require over the next 5-10 years are detailed below. Some tests may not apply to you based off risk factors and/or age. Screening tests ordered at today's visit but not completed yet may show as past due. Also, please note that scanned in results may not display below.  Preventive Screenings:  Service Recommendations Previous Testing/Comments   Colorectal Cancer Screening  * Colonoscopy    * Fecal Occult Blood Test (FOBT)/Fecal Immunochemical Test (FIT)  * Fecal DNA/Cologuard Test  * Flexible Sigmoidoscopy Age: 45-75 years old   Colonoscopy: every 10 years (may be performed more frequently if at higher risk)  OR  FOBT/FIT: every 1 year  OR  Cologuard: every 3 years  OR  Sigmoidoscopy: every 5 years  Screening may be recommended earlier than age 45 if at higher risk for colorectal cancer. Also, an individualized decision between you and your healthcare provider will decide whether screening between the ages of 76-85 would be appropriate. Colonoscopy: 10/19/2022  FOBT/FIT: Not on file  Cologuard: Not on file  Sigmoidoscopy: Not on file    Screening Current     Breast Cancer Screening Age: 40+ years old  Frequency: every 1-2 years  Not required if history of left and right mastectomy Mammogram: 01/08/2024    Screening Current   Cervical Cancer Screening Between the ages of 21-29, pap smear recommended once every 3 years.   Between the ages of 30-65, can perform pap smear with HPV co-testing every 5 years.   Recommendations may differ for women with a history of total hysterectomy, cervical cancer, or abnormal pap smears in past. Pap Smear: Not on file    Screening Not Indicated   Hepatitis C Screening Once for adults born between 1945 and 1965  More frequently in  patients at high risk for Hepatitis C Hep C Antibody: Not on file    Screening Current   Diabetes Screening 1-2 times per year if you're at risk for diabetes or have pre-diabetes Fasting glucose: 127 mg/dL (4/16/2024)  A1C: 6.6 % (4/16/2024)  Screening Current   Cholesterol Screening Once every 5 years if you don't have a lipid disorder. May order more often based on risk factors. Lipid panel: 09/06/2023    Screening Not Indicated  History Lipid Disorder     Other Preventive Screenings Covered by Medicare:  Abdominal Aortic Aneurysm (AAA) Screening: covered once if your at risk. You're considered to be at risk if you have a family history of AAA.  Lung Cancer Screening: covers low dose CT scan once per year if you meet all of the following conditions: (1) Age 55-77; (2) No signs or symptoms of lung cancer; (3) Current smoker or have quit smoking within the last 15 years; (4) You have a tobacco smoking history of at least 20 pack years (packs per day multiplied by number of years you smoked); (5) You get a written order from a healthcare provider.  Glaucoma Screening: covered annually if you're considered high risk: (1) You have diabetes OR (2) Family history of glaucoma OR (3)  aged 50 and older OR (4)  American aged 65 and older  Osteoporosis Screening: covered every 2 years if you meet one of the following conditions: (1) You're estrogen deficient and at risk for osteoporosis based off medical history and other findings; (2) Have a vertebral abnormality; (3) On glucocorticoid therapy for more than 3 months; (4) Have primary hyperparathyroidism; (5) On osteoporosis medications and need to assess response to drug therapy.   Last bone density test (DXA Scan): Not on file.  HIV Screening: covered annually if you're between the age of 15-65. Also covered annually if you are younger than 15 and older than 65 with risk factors for HIV infection. For pregnant patients, it is covered up to 3 times  per pregnancy.    Immunizations:  Immunization Recommendations   Influenza Vaccine Annual influenza vaccination during flu season is recommended for all persons aged >= 6 months who do not have contraindications   Pneumococcal Vaccine   * Pneumococcal conjugate vaccine = PCV13 (Prevnar 13), PCV15 (Vaxneuvance), PCV20 (Prevnar 20)  * Pneumococcal polysaccharide vaccine = PPSV23 (Pneumovax) Adults 19-65 yo with certain risk factors or if 65+ yo  If never received any pneumonia vaccine: recommend Prevnar 20 (PCV20)  Give PCV20 if previously received 1 dose of PCV13 or PPSV23   Hepatitis B Vaccine 3 dose series if at intermediate or high risk (ex: diabetes, end stage renal disease, liver disease)   Respiratory syncytial virus (RSV) Vaccine - COVERED BY MEDICARE PART D  * RSVPreF3 (Arexvy) CDC recommends that adults 60 years of age and older may receive a single dose of RSV vaccine using shared clinical decision-making (SCDM)   Tetanus (Td) Vaccine - COST NOT COVERED BY MEDICARE PART B Following completion of primary series, a booster dose should be given every 10 years to maintain immunity against tetanus. Td may also be given as tetanus wound prophylaxis.   Tdap Vaccine - COST NOT COVERED BY MEDICARE PART B Recommended at least once for all adults. For pregnant patients, recommended with each pregnancy.   Shingles Vaccine (Shingrix) - COST NOT COVERED BY MEDICARE PART B  2 shot series recommended in those 19 years and older who have or will have weakened immune systems or those 50 years and older     Health Maintenance Due:      Topic Date Due   • Breast Cancer Screening: Mammogram  01/08/2026   • Colorectal Cancer Screening  10/18/2027   • Hepatitis C Screening  Completed     Immunizations Due:      Topic Date Due   • COVID-19 Vaccine (6 - 2023-24 season) 11/21/2023     Advance Directives   What are advance directives?  Advance directives are legal documents that state your wishes and plans for medical care. These  plans are made ahead of time in case you lose your ability to make decisions for yourself. Advance directives can apply to any medical decision, such as the treatments you want, and if you want to donate organs.   What are the types of advance directives?  There are many types of advance directives, and each state has rules about how to use them. You may choose a combination of any of the following:  Living will:  This is a written record of the treatment you want. You can also choose which treatments you do not want, which to limit, and which to stop at a certain time. This includes surgery, medicine, IV fluid, and tube feedings.   Durable power of  for healthcare (DPAHC):  This is a written record that states who you want to make healthcare choices for you when you are unable to make them for yourself. This person, called a proxy, is usually a family member or a friend. You may choose more than 1 proxy.  Do not resuscitate (DNR) order:  A DNR order is used in case your heart stops beating or you stop breathing. It is a request not to have certain forms of treatment, such as CPR. A DNR order may be included in other types of advance directives.  Medical directive:  This covers the care that you want if you are in a coma, near death, or unable to make decisions for yourself. You can list the treatments you want for each condition. Treatment may include pain medicine, surgery, blood transfusions, dialysis, IV or tube feedings, and a ventilator (breathing machine).  Values history:  This document has questions about your views, beliefs, and how you feel and think about life. This information can help others choose the care that you would choose.  Why are advance directives important?  An advance directive helps you control your care. Although spoken wishes may be used, it is better to have your wishes written down. Spoken wishes can be misunderstood, or not followed. Treatments may be given even if you do not  want them. An advance directive may make it easier for your family to make difficult choices about your care.   Weight Management   Why it is important to manage your weight:  Being overweight increases your risk of health conditions such as heart disease, high blood pressure, type 2 diabetes, and certain types of cancer. It can also increase your risk for osteoarthritis, sleep apnea, and other respiratory problems. Aim for a slow, steady weight loss. Even a small amount of weight loss can lower your risk of health problems.  How to lose weight safely:  A safe and healthy way to lose weight is to eat fewer calories and get regular exercise. You can lose up about 1 pound a week by decreasing the number of calories you eat by 500 calories each day.   Healthy meal plan for weight management:  A healthy meal plan includes a variety of foods, contains fewer calories, and helps you stay healthy. A healthy meal plan includes the following:  Eat whole-grain foods more often.  A healthy meal plan should contain fiber. Fiber is the part of grains, fruits, and vegetables that is not broken down by your body. Whole-grain foods are healthy and provide extra fiber in your diet. Some examples of whole-grain foods are whole-wheat breads and pastas, oatmeal, brown rice, and bulgur.  Eat a variety of vegetables every day.  Include dark, leafy greens such as spinach, kale, constantin greens, and mustard greens. Eat yellow and orange vegetables such as carrots, sweet potatoes, and winter squash.   Eat a variety of fruits every day.  Choose fresh or canned fruit (canned in its own juice or light syrup) instead of juice. Fruit juice has very little or no fiber.  Eat low-fat dairy foods.  Drink fat-free (skim) milk or 1% milk. Eat fat-free yogurt and low-fat cottage cheese. Try low-fat cheeses such as mozzarella and other reduced-fat cheeses.  Choose meat and other protein foods that are low in fat.  Choose beans or other legumes such as  split peas or lentils. Choose fish, skinless poultry (chicken or turkey), or lean cuts of red meat (beef or pork). Before you cook meat or poultry, cut off any visible fat.   Use less fat and oil.  Try baking foods instead of frying them. Add less fat, such as margarine, sour cream, regular salad dressing and mayonnaise to foods. Eat fewer high-fat foods. Some examples of high-fat foods include french fries, doughnuts, ice cream, and cakes.  Eat fewer sweets.  Limit foods and drinks that are high in sugar. This includes candy, cookies, regular soda, and sweetened drinks.  Exercise:  Exercise at least 30 minutes per day on most days of the week. Some examples of exercise include walking, biking, dancing, and swimming. You can also fit in more physical activity by taking the stairs instead of the elevator or parking farther away from stores. Ask your healthcare provider about the best exercise plan for you.      © Copyright [employer] 2018 Information is for End User's use only and may not be sold, redistributed or otherwise used for commercial purposes. All illustrations and images included in CareNotes® are the copyrighted property of [employer] or [employer]

## 2024-04-29 NOTE — PROGRESS NOTES
Assessment and Plan:     Problem List Items Addressed This Visit       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 (HCC)     History of metastatic neuroendocrine tumor.  Patient continues to see her oncologist for ongoing monitoring of this condition as well as receiving her therapeutic infusions on a monthly basis.         Relevant Orders    CT head wo contrast    Primary hypertension     Hypertension.  The patient's blood pressure is stable at this time and he will continue current regimen of medications         Medicare annual wellness visit, subsequent - Primary    Chronic intractable headache     Headache.  Patient will obtain CT scan of head for further evaluation.  We will make further recommendations pending results of test.         Relevant Orders    CT head wo contrast        Preventive health issues were discussed with patient, and age appropriate screening tests were ordered as noted in patient's After Visit Summary.  Personalized health advice and appropriate referrals for health education or preventive services given if needed, as noted in patient's After Visit Summary.     History of Present Illness:     Patient presents for a Medicare Wellness Visit    Patient in the office to discuss recent blood test results.  She does receive monthly infusion therapy for the past 2 to 3 years for her history of metastatic neuroendocrine tumor.  Her A1c has risen to 6.6 although patient lives alone since her  passed away last year and she admits to poor dietary choices and a craving for chocolates and sweets.  Since hearing the news of her elevated A1c she has been trying to be more mindful of her carbohydrate intake as well as her sweets.  She does not exercise on a regular basis other than occasional walking.    Patient does describe several weeks to months history of chronic intermittent head pressure that may begin or along her occiput area and feels like a tightening around her head.  She denies  any changes in vision, nausea, or vomiting.  Symptoms may occur approximately 3 times a week and resolve spontaneously over a period of time throughout the day with or without any other medication.       Patient Care Team:  Jah Cuenca MD as PCP - General  Jah Cuenca MD as PCP - PCP-[facility] Attributed-[facility]  MD Oseas Thompson MD (Gastroenterology)  Eric Pierre MD (Surgical Oncology)  Sarah Nelson MD (Radiation Oncology)     Review of Systems:     Review of Systems   Constitutional: Negative.    HENT: Negative.     Eyes: Negative.    Respiratory: Negative.     Cardiovascular: Negative.    Gastrointestinal: Negative.    Musculoskeletal: Negative.    Neurological:  Positive for headaches.   Psychiatric/Behavioral: Negative.          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)    Bone metastasis    Reactive airway disease    Elevated fasting glucose    Primary hypertension    Nonrheumatic tricuspid valve regurgitation    Chronic obstructive pulmonary disease, unspecified COPD type (HCC)    Medicare annual wellness visit, subsequent    Chronic intractable headache      Past Medical and Surgical History:     Past Medical History:   Diagnosis Date    Abdominal pain     Acute tonsillitis     Breast pain, left     Cancer (HCC)     liver&small intestine    COVID-19 07/2022    Edema of both lower extremities     GERD (gastroesophageal reflux disease)     GERD without esophagitis  "    Kidney stone     \" [place]\"    Lesion of liver     Liver mass     Lymphadenopathy     Mediastinal lymphadenopathy     Neoplasm of digestive system     Orthostatic lightheadedness     Sarcoidosis     Vertigo      Past Surgical History:   Procedure Laterality Date    BREAST BIOPSY Left 1996    negative    CARPAL TUNNEL RELEASE Bilateral     COLONOSCOPY      2017    ENDOBRONCHIAL ULTRASOUND (EBUS) N/A 3/6/2023    Procedure: ENDOBRONCHIAL ULTRASOUND (EBUS);  Surgeon: Ricardo Vega MD;  [facility];  Service: Thoracic    IR BIOPSY LIVER MASS  11/6/2018    IR BIOPSY LIVER MASS  12/8/2020    KNEE ARTHROSCOPY Left     LAPAROTOMY N/A 1/20/2021    Procedure: LAPAROTOMY EXPLORATORY;  Surgeon: Eric Pierre MD;  [facility];  Service: Surgical Oncology    LIVER LOBECTOMY N/A 1/20/2021    Procedure: LIVER ABLATION SEGMENT 7, INTRAOPERATIVE U/S OF LIVER;  Surgeon: Eric Pierre MD;  [facility];  Service: Surgical Oncology    MEDIASTINOSCOPY N/A 11/27/2018    Procedure: MEDIASTINOSCOPY;  Surgeon: Ricardo Vega MD;  [facility];  Service: Thoracic    SD BRNCC INCL FLUOR GDNCE DX W/CELL WASHG SPX N/A 11/27/2018    Procedure: BRONCHOSCOPY FLEXIBLE;  Surgeon: Ricardo Vega MD;  [facility];  Service: Thoracic    [facility] W/CELL WASHG SPX N/A 3/6/2023    Procedure: BRONCHOSCOPY FLEXIBLE;  Surgeon: Ricardo Vega MD;  [facility];  Service: Thoracic    SD BRONCHOSCOPY NEEDLE BX TRACHEA MAIN STEM&/BRON N/A 11/27/2018    Procedure: EBUS with biopsy;  Surgeon: Ricardo Vega MD;  [facility];  Service: Thoracic    SD EDG US EXAM SURGICAL ALTER STOM DUODENUM/JEJUNUM N/A 10/29/2018    Procedure: LINEAR ENDOSCOPIC U/S;  Surgeon: Oseas Villagran MD;  [facility];  Service: Gastroenterology    SKIN BIOPSY      SMALL INTESTINE SURGERY N/A 1/20/2021    Procedure: RESECTION SMALL BOWEL AND ANASTOMOSIS, RESECTION SMALL BOWEL " NODULE;  Surgeon: Eric Pierre MD;  [facility];  Service: Surgical Oncology    WISDOM TOOTH EXTRACTION        Family History:     Family History   Problem Relation Age of Onset    Alzheimer's disease Mother     Parkinsonism Father     Psoriasis Father     Heart failure Father         at older age    Psoriasis Sister     Psoriasis Sister     Stroke Sister     Colon cancer Maternal Grandfather     No Known Problems Daughter     No Known Problems Daughter       Social History:     Social History     Socioeconomic History    Marital status:      Spouse name: None    Number of children: None    Years of education: None    Highest education level: None   Occupational History    None   Tobacco Use    Smoking status: Never    Smokeless tobacco: Never   Vaping Use    Vaping status: Never Used   Substance and Sexual Activity    Alcohol use: Yes     Comment: occ wine- very seldom    Drug use: No    Sexual activity: Not Currently   Other Topics Concern    None   Social History Narrative    None     Social Determinants of Health     Financial Resource Strain: Low Risk  (2/16/2023)    Overall Financial Resource Strain (CARDIA)     Difficulty of Paying Living Expenses: Not very hard   Food Insecurity: Not on file   Transportation Needs: No Transportation Needs (2/16/2023)    PRAPARE - Transportation     Lack of Transportation (Medical): No     Lack of Transportation (Non-Medical): No   Physical Activity: Not on file   Stress: Not on file   Social Connections: Not on file   Intimate Partner Violence: Not on file   Housing Stability: Not on file      Medications and Allergies:     Current Outpatient Medications   Medication Sig Dispense Refill    Cholecalciferol (VITAMIN D) 2000 units CAPS Take 1 tablet by mouth daily      docusate sodium (COLACE) 100 mg capsule Take 1 capsule (100 mg total) by mouth 2 (two) times a day (Patient taking differently: Take 100 mg by mouth daily) 10 capsule 0    Fluticasone-Salmeterol  (Advair) 100-50 mcg/dose inhaler Inhale 1 puff 2 (two) times a day Rinse mouth after use. 60 blister 3    hydroCHLOROthiazide 12.5 mg tablet Take 1 tablet by mouth once daily 90 tablet 1    LANREOTIDE ACETATE SC Inject under the skin every 30 (thirty) days      Multiple Vitamin (MULTIVITAMIN) tablet Take 1 tablet by mouth daily      omeprazole (PriLOSEC) 40 MG capsule Take 1 capsule (40 mg total) by mouth daily before breakfast 90 capsule 3    ondansetron (ZOFRAN-ODT) 8 mg disintegrating tablet Take 1 tablet (8 mg total) by mouth every 8 (eight) hours as needed for nausea or vomiting 20 tablet 5    triamcinolone (KENALOG) 0.1 % ointment Apply topically 2 (two) times a day Apply to areas of itch, Apply thin coat to groin area for maximum of 3 days 80 g 1     No current facility-administered medications for this visit.     Allergies   Allergen Reactions    Bactrim [Sulfamethoxazole-Trimethoprim] Hives    Clam Shell - Food Allergy Vomiting      Immunizations:     Immunization History   Administered Date(s) Administered    COVID-19 Moderna mRNA Vaccine 12 Yr+ 50 mcg/0.5 mL (Spikevax) 09/26/2023    COVID-19 PFIZER VACCINE 0.3 ML IM 02/17/2021, 03/08/2021, 11/20/2021, 05/15/2022    COVID-19 Pfizer Vac BIVALENT Rafael-sucrose 12 Yr+ IM 12/12/2022    INFLUENZA 12/14/2018, 10/01/2020, 11/10/2021, 10/25/2022    Influenza, high dose seasonal 0.7 mL 12/14/2018, 11/06/2019    Pneumococcal Conjugate 13-Valent 02/11/2019    Pneumococcal Polysaccharide PPV23 02/03/2022    Zoster Vaccine Recombinant 06/01/2023, 09/15/2023      Health Maintenance:         Topic Date Due    Breast Cancer Screening: Mammogram  01/08/2026    Colorectal Cancer Screening  10/18/2027    Hepatitis C Screening  Completed         Topic Date Due    COVID-19 Vaccine (6 - 2023-24 season) 11/21/2023      Medicare Screening Tests and Risk Assessments:         Health Risk Assessment:   Patient rates overall health as good. Patient feels that their physical health  rating is same. Patient is very satisfied with their life. Eyesight was rated as same. Hearing was rated as same. Patient feels that their emotional and mental health rating is same. Patients states they are never, rarely angry. Patient states they are never, rarely unusually tired/fatigued. Pain experienced in the last 7 days has been none. Patient states that she has experienced no weight loss or gain in last 6 months.     Depression Screening:   PHQ-2 Score: 0      Fall Risk Screening:   In the past year, patient has experienced: no history of falling in past year      Urinary Incontinence Screening:   Patient has not leaked urine accidently in the last six months.     Home Safety:  Patient does not have trouble with stairs inside or outside of their home. Patient has working smoke alarms and has working carbon monoxide detector. Home safety hazards include: none.     Nutrition:   Current diet is Regular.     Medications:   Patient is currently taking over-the-counter supplements. OTC medications include: see medication list. Patient is able to manage medications.     Activities of Daily Living (ADLs)/Instrumental Activities of Daily Living (IADLs):   Walk and transfer into and out of bed and chair?: Yes  Dress and groom yourself?: Yes    Bathe or shower yourself?: Yes    Feed yourself? Yes  Do your laundry/housekeeping?: Yes  Manage your money, pay your bills and track your expenses?: Yes  Make your own meals?: Yes    Do your own shopping?: Yes    Previous Hospitalizations:   Any hospitalizations or ED visits within the last 12 months?: No      Advance Care Planning:   Living will: No      PREVENTIVE SCREENINGS      Cardiovascular Screening:    General: Screening Not Indicated and History Lipid Disorder      Diabetes Screening:     General: Screening Current      Colorectal Cancer Screening:     General: Screening Current      Breast Cancer Screening:     General: Screening Current      Cervical Cancer  "Screening:    General: Screening Not Indicated      Lung Cancer Screening:     General: Screening Not Indicated      Hepatitis C Screening:    General: Screening Current    Screening, Brief Intervention, and Referral to Treatment (SBIRT)    Screening    Typical number of drinks in a week: 0    Single Item Drug Screening:  How often have you used an illegal drug (including marijuana) or a prescription medication for non-medical reasons in the past year? never    Single Item Drug Screen Score: 0  Interpretation: Negative screen for possible drug use disorder    No results found.     Physical Exam:     /60   Pulse 68   Temp (!) 97.3 °F (36.3 °C)   Resp 18   Ht 5' 2\" (1.575 m)   Wt 81.6 kg (180 lb)   LMP  (LMP Unknown)   SpO2 99%   BMI 32.92 kg/m²     Physical Exam  Vitals and nursing note reviewed.   Constitutional:       General: She is not in acute distress.     Appearance: Normal appearance. She is well-developed. She is not ill-appearing.   HENT:      Head: Normocephalic and atraumatic.      Right Ear: Tympanic membrane, ear canal and external ear normal. There is no impacted cerumen.      Left Ear: Tympanic membrane, ear canal and external ear normal. There is no impacted cerumen.   Eyes:      General:         Right eye: No discharge.         Left eye: No discharge.      Extraocular Movements: Extraocular movements intact.      Conjunctiva/sclera: Conjunctivae normal.      Pupils: Pupils are equal, round, and reactive to light.   Neck:      Vascular: No carotid bruit.   Cardiovascular:      Rate and Rhythm: Normal rate and regular rhythm.      Heart sounds: No murmur heard.  Pulmonary:      Effort: Pulmonary effort is normal. No respiratory distress.      Breath sounds: Normal breath sounds.   Abdominal:      Tenderness: There is no abdominal tenderness.   Musculoskeletal:      Right lower leg: No edema.      Left lower leg: No edema.   Lymphadenopathy:      Cervical: No cervical adenopathy. "   Skin:     General: Skin is warm and dry.      Capillary Refill: Capillary refill takes less than 2 seconds.   Neurological:      Mental Status: She is alert. Mental status is at baseline.   Psychiatric:         Mood and Affect: Mood normal.         Behavior: Behavior normal.         Thought Content: Thought content normal.         Judgment: Judgment normal.          Jah Cuenca MD

## 2024-04-29 NOTE — ASSESSMENT & PLAN NOTE
Headache.  Patient will obtain CT scan of head for further evaluation.  We will make further recommendations pending results of test.

## 2024-05-06 ENCOUNTER — HOSPITAL ENCOUNTER (OUTPATIENT)
Dept: CT IMAGING | Facility: HOSPITAL | Age: 74
Discharge: HOME | End: 2024-05-06
Payer: MEDICARE

## 2024-05-06 DIAGNOSIS — R51.9 CHRONIC INTRACTABLE HEADACHE, UNSPECIFIED HEADACHE TYPE: ICD-10-CM

## 2024-05-06 DIAGNOSIS — G89.29 CHRONIC INTRACTABLE HEADACHE, UNSPECIFIED HEADACHE TYPE: ICD-10-CM

## 2024-05-06 DIAGNOSIS — C7B.8 METASTATIC MALIGNANT NEUROENDOCRINE TUMOR TO LIVER (HCC): ICD-10-CM

## 2024-05-06 PROCEDURE — 70450 CT HEAD/BRAIN W/O DYE: CPT

## 2024-05-14 ENCOUNTER — APPOINTMENT (OUTPATIENT)
Dept: LAB | Facility: CLINIC | Age: 74
End: 2024-05-14
Payer: MEDICARE

## 2024-05-20 DIAGNOSIS — C7B.8 METASTATIC MALIGNANT NEUROENDOCRINE TUMOR TO LIVER (HCC): Primary | ICD-10-CM

## 2024-05-22 ENCOUNTER — HOSPITAL ENCOUNTER (OUTPATIENT)
Dept: INFUSION CENTER | Facility: CLINIC | Age: 74
Discharge: HOME | End: 2024-05-22
Payer: MEDICARE

## 2024-05-22 DIAGNOSIS — C7B.8 METASTATIC MALIGNANT NEUROENDOCRINE TUMOR TO LIVER (HCC): Primary | ICD-10-CM

## 2024-05-22 PROCEDURE — 96372 THER/PROPH/DIAG INJ SC/IM: CPT

## 2024-05-22 RX ORDER — LANREOTIDE ACETATE 120 MG/.5ML
120 INJECTION SUBCUTANEOUS ONCE
Status: COMPLETED | OUTPATIENT
Start: 2024-05-22 | End: 2024-05-22

## 2024-05-22 RX ORDER — LANREOTIDE ACETATE 120 MG/.5ML
120 INJECTION SUBCUTANEOUS ONCE
OUTPATIENT
Start: 2024-06-19

## 2024-05-22 RX ADMIN — LANREOTIDE ACETATE 120 MG: 120 INJECTION SUBCUTANEOUS at 08:27

## 2024-05-22 NOTE — PROGRESS NOTES
Patient arrives for Lanreotide injection. Patient tolerated injection to LEFT glute without issue, bandaid in place. Patient confirms next appt 6/19 at 0800, declines AVS.

## 2024-05-29 PROBLEM — Z00.00 MEDICARE ANNUAL WELLNESS VISIT, SUBSEQUENT: Status: RESOLVED | Noted: 2024-04-29 | Resolved: 2024-05-29

## 2024-05-30 ENCOUNTER — OFFICE VISIT (OUTPATIENT)
Dept: HEMATOLOGY ONCOLOGY | Facility: CLINIC | Age: 74
End: 2024-05-30
Payer: MEDICARE

## 2024-05-30 VITALS
SYSTOLIC BLOOD PRESSURE: 114 MMHG | HEART RATE: 72 BPM | DIASTOLIC BLOOD PRESSURE: 78 MMHG | WEIGHT: 172 LBS | HEIGHT: 62 IN | OXYGEN SATURATION: 98 % | BODY MASS INDEX: 31.65 KG/M2 | TEMPERATURE: 97.4 F | RESPIRATION RATE: 17 BRPM

## 2024-05-30 DIAGNOSIS — C7B.8 METASTATIC MALIGNANT NEUROENDOCRINE TUMOR TO LIVER (HCC): Primary | ICD-10-CM

## 2024-05-30 PROCEDURE — 99214 OFFICE O/P EST MOD 30 MIN: CPT | Performed by: INTERNAL MEDICINE

## 2024-05-30 NOTE — PROGRESS NOTES
Kelsey Brooks  1950  [address] [facility] [place]  [address] [facility]  [place] [address]    DISCUSSION/SUMMARY:    73-year-old female with history of metastatic (liver, bone) well differentiated neuroendocrine tumor on lanreotide.  Mrs. Brooks feels OK/stable and clinically there are no concerning findings.  Recent blood work was good/acceptable; chromogranin A level is the same as before.  Recent follow-up scans do not demonstrate any evidence of progression.  The plan is to continue with the lanreotide.    In the future, if there is evidence of disease progression within the liver, liver directed treatment options will be discussed.  If there is evidence of progression systemically, other options such as Lutathera will need to be discussed.    Marais/brain is pending.  Patient will follow-up with PCP as far as the elevated A1c.    Patient is to return in 6 months (patient staggers office visits between medical and surgical oncology, seen every 3 months).  Patient knows to call the hematology/oncology office if there are any other questions or concerns.    Carefully review your medication list and verify that the list is accurate and up-to-date. Please call the hematology/oncology office if there are medications missing from the list, medications on the list that you are not currently taking or if there is a dosage or instruction that is different from how you're taking that medication.    Patient goals and areas of care: Lanreotide  Barriers to care: None  Patient is able to self-care  _____________________________________________________________________________________    Chief Complaint   Patient presents with    Follow-up    Neuroendocrine tumor on lanreotide     Oncology History   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 (HCC)   12/8/2020 Initial Diagnosis    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 (HCC)     12/8/2020 Biopsy    IR  Liver biopsy:  A. Liver mass, needle core biopsy:  - Metastatic well-differentiated neuroendocrine tumor, G2     1/18/2021 -  Chemotherapy    Lanreotide injections (monthly)     1/20/2021 Surgery    Resection of small bowel and anastomosis, segment 7 liver ablation    A. Small intestine, resection:  - Well- differentiated neuroendocrine tumor (up to 2.1 cm), G2, at least three foci.  - All margins are negative for tumor.  - Three of thirteen lymph nodes are positive for tumor (3/13).     B. 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        2/28/2024 -  Cancer Staged    Staging form: Liver (Excluding Intrahepatic Bile Ducts), AJCC 8th Edition  - Pathologic: Stage IVB (pT3, pN1, cM1) - Signed by Eric Pierre MD on 2/28/2024       Bone metastasis   1/28/2021 Initial Diagnosis    Bone metastasis (HCC)     2/18/2021 - 3/3/2021 Radiation    Treatment:  Course: C1    Plan ID Energy Fractions Dose per Fraction (cGy) Dose Correction (cGy) Total Dose Delivered (cGy) Elapsed Days   T10_T12 Spine 10X/6X 10 / 10 300 0 3,000 13              History of Present Illness: 73-year-old female previously diagnosed with metastatic well differentiated carcinoid tumor.  Patient was first seen by Dr. Mares in January 2021.  Patient underwent resection of a number of lesions within the small bowel.  Patient also had lesions in the liver and T11 vertebral body.  Patient is on lanreotide.  Patient returns for follow-up.    Mrs. Brooks states feeling okay, baseline.  Prior queasiness seems to be gone.  Patient with occasional head tightness but not headaches, no other neurologic symptoms.  MRI/brain is pending.  Patient also with an elevated A1c on diet control and weight reduction.  No other GI problems.  No pain control issues.  No respiratory issues, history of sarcoid.    Review of Systems   Constitutional: Negative.    HENT: Negative.     Eyes: Negative.    Respiratory:  "Negative.     Cardiovascular: Negative.    Gastrointestinal: Negative.    Endocrine: Negative.    Genitourinary: Negative.    Musculoskeletal: Negative.    Skin: Negative.    Allergic/Immunologic: Negative.    Neurological: Negative.    Hematological: Negative.    Psychiatric/Behavioral: Negative.     All other systems reviewed and are negative.    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)    Bone metastasis    Reactive airway disease    Elevated fasting glucose    Primary hypertension    Nonrheumatic tricuspid valve regurgitation    Chronic obstructive pulmonary disease, unspecified COPD type (HCC)    Chronic intractable headache     Past Medical History:   Diagnosis Date    Abdominal pain     Acute tonsillitis     Breast pain, left     Cancer (HCC)     liver&small intestine    COVID-19 07/2022    Edema of both lower extremities     GERD (gastroesophageal reflux disease)     GERD without esophagitis     Kidney stone     \" [place]\"    Lesion of liver     Liver mass     Lymphadenopathy     Mediastinal lymphadenopathy     Neoplasm of digestive system     Orthostatic lightheadedness     Sarcoidosis     Vertigo      Past Surgical History:   Procedure Laterality Date    BREAST BIOPSY Left 1996    negative    CARPAL TUNNEL RELEASE Bilateral     COLONOSCOPY      2017    ENDOBRONCHIAL ULTRASOUND (EBUS) N/A 3/6/2023    Procedure: ENDOBRONCHIAL ULTRASOUND (EBUS);  Surgeon: Ricardo Vega MD;  [facility];  " Service: Thoracic    IR BIOPSY LIVER MASS  11/6/2018    IR BIOPSY LIVER MASS  12/8/2020    KNEE ARTHROSCOPY Left     LAPAROTOMY N/A 1/20/2021    Procedure: LAPAROTOMY EXPLORATORY;  Surgeon: Eric Pierre MD;  [facility];  Service: Surgical Oncology    LIVER LOBECTOMY N/A 1/20/2021    Procedure: LIVER ABLATION SEGMENT 7, INTRAOPERATIVE U/S OF LIVER;  Surgeon: rEic Pierre MD;  [facility];  Service: Surgical Oncology    MEDIASTINOSCOPY N/A 11/27/2018    Procedure: MEDIASTINOSCOPY;  Surgeon: Ricardo Vega MD;  [facility]: [facility];  Service: Thoracic    RI [facility] INCL FLUOR GDNCE DX W/CELL WASHG SPX N/A 11/27/2018    Procedure: BRONCHOSCOPY FLEXIBLE;  Surgeon: Ricardo Vega MD;  [facility];  Service: [facility]acic    [facility] W/CELL WASHG SPX N/A 3/6/2023    Procedure: BRONCHOSCOPY FLEXIBLE;  Surgeon: Ricardo Vega MD;  [facility];  Service: Thoracic    RI BRONCHOSCOPY NEEDLE BX TRACHEA MAIN STEM&/BRON N/A 11/27/2018    Procedure: EBUS with biopsy;  Surgeon: Ricardo Vega MD;  [facility];  Service: Thoracic    RI EDG US EXAM SURGICAL ALTER STOM DUODENUM/JEJUNUM N/A 10/29/2018    Procedure: LINEAR ENDOSCOPIC U/S;  Surgeon: Oseas Villagran MD;  Location: [facility];  Service: Gastroenterology    SKIN BIOPSY      SMALL INTESTINE SURGERY N/A 1/20/2021    Procedure: RESECTION SMALL BOWEL AND ANASTOMOSIS, RESECTION SMALL BOWEL NODULE;  Surgeon: Eric Pierre MD;  [facility];  Service: Surgical Oncology    WISDOM TOOTH EXTRACTION       Family History   Problem Relation Age of Onset    Alzheimer's disease Mother     Parkinsonism Father     Psoriasis Father     Heart failure Father         at older age    Psoriasis Sister     Psoriasis Sister     Stroke Sister     Colon cancer Maternal Grandfather     No Known Problems Daughter     No Known Problems Daughter      Social History     Socioeconomic History    Marital status:       Spouse name: Not on file    Number of children: Not on file    Years of education: Not on file    Highest education level: Not on file   Occupational History    Not on file   Tobacco Use    Smoking status: Never    Smokeless tobacco: Never   Vaping Use    Vaping status: Never Used   Substance and Sexual Activity    Alcohol use: Yes     Comment: occ wine- very seldom    Drug use: No    Sexual activity: Not Currently   Other Topics Concern    Not on file   Social History Narrative    Not on file     Social Determinants of Health     Financial Resource Strain: Low Risk  (2/16/2023)    Overall Financial Resource Strain (CARDIA)     Difficulty of Paying Living Expenses: Not very hard   Food Insecurity: Not on file   Transportation Needs: No Transportation Needs (2/16/2023)    PRAPARE - Transportation     Lack of Transportation (Medical): No     Lack of Transportation (Non-Medical): No   Physical Activity: Not on file   Stress: Not on file   Social Connections: Not on file   Intimate Partner Violence: Not on file   Housing Stability: Not on file       Current Outpatient Medications:     Cholecalciferol (VITAMIN D) 2000 units CAPS, Take 1 tablet by mouth daily, Disp: , Rfl:     docusate sodium (COLACE) 100 mg capsule, Take 1 capsule (100 mg total) by mouth 2 (two) times a day (Patient taking differently: Take 100 mg by mouth daily), Disp: 10 capsule, Rfl: 0    Fluticasone-Salmeterol (Advair) 100-50 mcg/dose inhaler, Inhale 1 puff 2 (two) times a day Rinse mouth after use., Disp: 60 blister, Rfl: 3    hydroCHLOROthiazide 12.5 mg tablet, Take 1 tablet by mouth once daily, Disp: 90 tablet, Rfl: 1    LANREOTIDE ACETATE SC, Inject under the skin every 30 (thirty) days, Disp: , Rfl:     Multiple Vitamin (MULTIVITAMIN) tablet, Take 1 tablet by mouth daily, Disp: , Rfl:     omeprazole (PriLOSEC) 40 MG capsule, Take 1 capsule (40 mg total) by mouth daily before breakfast, Disp: 90 capsule, Rfl: 3    ondansetron (ZOFRAN-ODT) 8 mg  disintegrating tablet, Take 1 tablet (8 mg total) by mouth every 8 (eight) hours as needed for nausea or vomiting, Disp: 20 tablet, Rfl: 5    triamcinolone (KENALOG) 0.1 % ointment, Apply topically 2 (two) times a day Apply to areas of itch, Apply thin coat to groin area for maximum of 3 days, Disp: 80 g, Rfl: 1    Allergies   Allergen Reactions    Bactrim [Sulfamethoxazole-Trimethoprim] Hives    Clam Shell - Food Allergy Vomiting     Vitals:    05/30/24 0806   BP: 114/78   Pulse: 72   Resp: 17   Temp: (!) 97.4 °F (36.3 °C)   SpO2: 98%     Physical Exam  Constitutional:       Appearance: She is well-developed.   HENT:      Head: Normocephalic and atraumatic.      Right Ear: External ear normal.      Left Ear: External ear normal.   Eyes:      Conjunctiva/sclera: Conjunctivae normal.      Pupils: Pupils are equal, round, and reactive to light.   Cardiovascular:      Rate and Rhythm: Normal rate and regular rhythm.      Heart sounds: Normal heart sounds.   Pulmonary:      Effort: Pulmonary effort is normal.      Breath sounds: Normal breath sounds.   Abdominal:      General: Bowel sounds are normal.      Palpations: Abdomen is soft.   Musculoskeletal:         General: Normal range of motion.      Cervical back: Normal range of motion and neck supple.   Skin:     General: Skin is warm.   Neurological:      Mental Status: She is alert and oriented to person, place, and time.      Deep Tendon Reflexes: Reflexes are normal and symmetric.   Psychiatric:         Behavior: Behavior normal.         Thought Content: Thought content normal.         Judgment: Judgment normal.     Extremities: No lower extreme edema, no cords, pulses are 1+  Lymphatics: No adenopathy in the neck, supraclavicular region, axilla bilaterally    Performance Status: 0 - Asymptomatic    Labs    5/14/2024 WBC = 5.4 hemoglobin = 13 hematocrit = 39.6 platelet = 228 neutrophil = 50% BUN = 23 creatinine = 0.84 calcium = 9.2 LFTs WNL total bilirubin =  1.25        11/30/2023 WBC = 6.08 hemoglobin = 13.5 hematocrit = 40.2 platelet = 235 neutrophil = 49% BUN = 17 creatinine = 0.80 calcium = 9.6 total bilirubin = 1.25 AST = 26 ALT = 21 alkaline phosphatase = 88 total protein = 7.7    Imaging    2/23/2024 CAT scan chest abdomen pelvis with contrast    IMPRESSION:     Stable appearance of treated tumor within the liver with no findings to suggest viable tumor. No new findings in the chest abdomen or pelvis.    8/15/2023 CAT scan chest abdomen pelvis with contrast    IMPRESSION:     Interval decrease in the size of mediastinal and hilar lymphadenopathy.     Stable persistent right greater than left lung diffuse micronodularity with upper lobe predominance, which can be secondary to lymphangitic carcinomatosis, or patient's known sarcoidosis. Atypical pneumonitis is a less likely possibility. Clinical correlation is recommended.     Stable multiple hypoattenuating hepatic lesions, including previously ablated liver metastasis.     Long segment wall thickening of the transverse colon, which can represent colitis (infectious, inflammatory, posttreatment changes or ischemic). Clinical correlation is recommended.    2/8/2023 CAT scan chest abdomen pelvis with contrast    IMPRESSION:     Left hilar node has increased in size as have some small mediastinal lymph nodes.  There is new right hilar adenopathy.    This is suspicious for metastatic involvement.     Interval development of fairly diffuse micronodularity throughout both lungs.  Differential diagnosis includes metastatic disease as well as an infectious or inflammatory process.  These are beneath the limits of PET resolution.  Recommend attention on follow-up.     Stable hypodense liver lesions including prior ablated hepatic metastases.  No new or enlarging liver lesions.     Mildly thickened appearance of the proximal transverse colon could be due to underdistention.  Suggest attention to this area on  follow-up.    Pathology    Case Report   Non-gynecologic Cytology                          Case: [id number]                                   Authorizing Provider:  Ricardo Vega MD      Collected:           03/06/2023 0931               Ordering Location:     [facility]      Received:            03/06/2023 1030                                      [facility] Operating Room                                                       Pathologist:           Mis Marcano MD                                                          Specimens:   A) - Lymph Node, level 11 L                                                                          B) - Lymph Node, level 11 L                                                                          C) - Lymph Node, Cell Block, level 11 L                                                              D) - Lymph Node, L7                                                                                  E) - Lymph Node, L7                                                                                  F) - Lymph Node, Cell Block, L7                                                                      G) - Lymph Node, Cell Block, 4R                                                                      H) - Lymph Node, 4R                                                                        Addendum   Special stains for fungus (GMS) and acid fast bacilli (Kinyoun) performed on cell blocks F & G are negative for organisms. A copy of the addendum is sent to Dr. MICHELLE Vega on 3/9/23 @ 0900 hours.    Addendum electronically signed by Mis Marcano MD on 3/9/2023 at  9:00 AM       Case Report   Surgical Pathology Report                         Case: [id number]                                    Authorizing Provider:  Eric Pierre MD           Collected:           01/20/2021 1013               Ordering Location:     [facility]      Received:             01/20/2021 [address] [place] [address] Operating Room                                                       Pathologist:           Sg Wong MD                                                                  Specimens:   A) - Small Bowel, NOS, SMALL BOWEL RESECTION WITH MESENTERIC LYMPH NODES                             B) - Small Bowel, NOS, SMALL BOWEL NODULE                                                  Final Diagnosis   A. Small intestine, resection:  - Well- differentiated neuroendocrine tumor (up to 2.1 cm), G2, at least three foci.  - All margins are negative for tumor.  - Three of thirteen lymph nodes are positive for tumor (3/13).     Comment: There are at least three separate foci of tumor present. It is difficut to acurately count for tumor foci given cluster growth pattern. Immunohistochemistry was performed on block A18. The tumor cells are positive for synaptophysin, chromogranin A, and negative for CD56, Ki-67 shows mitotic proliferative index of approximately 3-4%. D2-40 and CD31 are performed to help in the assessment of this case.     B. 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 See note.      NOTE: There is no mitotic figures or necrosis seen in the specimen. Immunohistochemistry was performed on block B1. The The tumor cells are positive for DOG-1,  and negative for S100, CD31, and desmin, supporting the above diagnosis.     Intradepartmental consultation is in agreement.  Dr. Eric Pierre is notified of the diagnosis in EPIC via [facility] on 1/26/2021  at 1pm .      Electronically signed by Sg Wong MD on 1/28/2021 at 11:32 AM

## 2024-06-12 ENCOUNTER — APPOINTMENT (OUTPATIENT)
Dept: LAB | Facility: CLINIC | Age: 74
End: 2024-06-12
Payer: MEDICARE

## 2024-06-16 ENCOUNTER — HOSPITAL ENCOUNTER (OUTPATIENT)
Dept: MRI IMAGING | Facility: HOSPITAL | Age: 74
Discharge: HOME | End: 2024-06-16
Payer: MEDICARE

## 2024-06-16 DIAGNOSIS — C7B.8 METASTATIC MALIGNANT NEUROENDOCRINE TUMOR TO LIVER (HCC): ICD-10-CM

## 2024-06-16 PROCEDURE — 70553 MRI BRAIN STEM W/O & W/DYE: CPT

## 2024-06-16 PROCEDURE — A9585 GADOBUTROL INJECTION: HCPCS | Performed by: FAMILY MEDICINE

## 2024-06-16 RX ORDER — GADOBUTROL 604.72 MG/ML
8 INJECTION INTRAVENOUS
Status: COMPLETED | OUTPATIENT
Start: 2024-06-16 | End: 2024-06-16

## 2024-06-16 RX ADMIN — GADOBUTROL 8 ML: 604.72 INJECTION INTRAVENOUS at 08:12

## 2024-06-19 ENCOUNTER — HOSPITAL ENCOUNTER (OUTPATIENT)
Dept: INFUSION CENTER | Facility: CLINIC | Age: 74
Discharge: HOME | End: 2024-06-19
Payer: MEDICARE

## 2024-06-19 DIAGNOSIS — C7B.8 METASTATIC MALIGNANT NEUROENDOCRINE TUMOR TO LIVER (HCC): Primary | ICD-10-CM

## 2024-06-19 RX ORDER — LANREOTIDE ACETATE 120 MG/.5ML
120 INJECTION SUBCUTANEOUS ONCE
OUTPATIENT
Start: 2024-07-17

## 2024-06-19 RX ORDER — LANREOTIDE ACETATE 120 MG/.5ML
120 INJECTION SUBCUTANEOUS ONCE
Status: COMPLETED | OUTPATIENT
Start: 2024-06-19 | End: 2024-06-19

## 2024-06-19 RX ADMIN — LANREOTIDE ACETATE 120 MG: 120 INJECTION SUBCUTANEOUS at 07:51

## 2024-06-19 NOTE — PROGRESS NOTES
Pt tolerated tx well. No complaints at this time. Shot given on RIGHT buttock. Next appt scheduled for 7/17/24 at 0730 at [facility].

## 2024-06-24 ENCOUNTER — TELEPHONE (OUTPATIENT)
Dept: CARDIOLOGY CLINIC | Facility: CLINIC | Age: 74
End: 2024-06-24

## 2024-06-24 NOTE — TELEPHONE ENCOUNTER
LMOM to have patient call back to schedule their cardio-oncology follow-up appointment. Anyone may assist by scheduling from the recall.

## 2024-06-26 ENCOUNTER — TELEPHONE (OUTPATIENT)
Dept: FAMILY MEDICINE CLINIC | Facility: CLINIC | Age: 74
End: 2024-06-26

## 2024-06-26 NOTE — TELEPHONE ENCOUNTER
----- Message from Jah Cuenca MD sent at 6/25/2024  4:55 PM EDT -----  No evidence of metastatic disease on recent MRI of the head.

## 2024-07-01 DIAGNOSIS — E11.69 TYPE 2 DIABETES MELLITUS WITH OTHER SPECIFIED COMPLICATION, WITHOUT LONG-TERM CURRENT USE OF INSULIN (HCC): Primary | ICD-10-CM

## 2024-07-03 ENCOUNTER — TELEPHONE (OUTPATIENT)
Age: 74
End: 2024-07-03

## 2024-07-08 DIAGNOSIS — K21.9 GASTROESOPHAGEAL REFLUX DISEASE WITHOUT ESOPHAGITIS: ICD-10-CM

## 2024-07-08 RX ORDER — OMEPRAZOLE 40 MG/1
40 CAPSULE, DELAYED RELEASE ORAL
Qty: 90 CAPSULE | Refills: 0 | Status: SHIPPED | OUTPATIENT
Start: 2024-07-08

## 2024-07-09 ENCOUNTER — APPOINTMENT (OUTPATIENT)
Dept: LAB | Facility: CLINIC | Age: 74
End: 2024-07-09
Payer: MEDICARE

## 2024-07-09 DIAGNOSIS — E11.69 TYPE 2 DIABETES MELLITUS WITH OTHER SPECIFIED COMPLICATION, WITHOUT LONG-TERM CURRENT USE OF INSULIN (HCC): ICD-10-CM

## 2024-07-09 LAB
EST. AVERAGE GLUCOSE BLD GHB EST-MCNC: 134 MG/DL
HBA1C MFR BLD: 6.3 %

## 2024-07-09 PROCEDURE — 83036 HEMOGLOBIN GLYCOSYLATED A1C: CPT

## 2024-07-17 ENCOUNTER — HOSPITAL ENCOUNTER (OUTPATIENT)
Dept: INFUSION CENTER | Facility: CLINIC | Age: 74
Discharge: HOME | End: 2024-07-17
Payer: MEDICARE

## 2024-07-17 DIAGNOSIS — C7B.8 METASTATIC MALIGNANT NEUROENDOCRINE TUMOR TO LIVER (HCC): Primary | ICD-10-CM

## 2024-07-17 PROCEDURE — 96372 THER/PROPH/DIAG INJ SC/IM: CPT

## 2024-07-17 RX ORDER — LANREOTIDE ACETATE 120 MG/.5ML
120 INJECTION SUBCUTANEOUS ONCE
OUTPATIENT
Start: 2024-08-14

## 2024-07-17 RX ORDER — LANREOTIDE ACETATE 120 MG/.5ML
120 INJECTION SUBCUTANEOUS ONCE
Status: COMPLETED | OUTPATIENT
Start: 2024-07-17 | End: 2024-07-17

## 2024-07-17 RX ADMIN — LANREOTIDE ACETATE 120 MG: 120 INJECTION SUBCUTANEOUS at 07:54

## 2024-07-17 NOTE — PROGRESS NOTES
[facility] injection. Patient offers no complaints at this time. Lanreotide administered into R buttocks. Patient tolerated without incident. Next appt confirmed on 8/14 at 0800. Patient declined AVS.

## 2024-07-22 NOTE — ASSESSMENT & PLAN NOTE
Preoperative consultation  Overall the patient appears to be in stable health    She is medically cleared to have upcoming cataract surgery as described [FreeTextEntry1] :   I, Morgan molina acting as scribe for Dr. Sepulveda. 07/22/2024

## 2024-08-06 ENCOUNTER — APPOINTMENT (OUTPATIENT)
Dept: LAB | Facility: CLINIC | Age: 74
End: 2024-08-06
Payer: MEDICARE

## 2024-08-06 DIAGNOSIS — C7B.8 METASTATIC MALIGNANT NEUROENDOCRINE TUMOR TO LIVER (HCC): ICD-10-CM

## 2024-08-06 PROCEDURE — 86316 IMMUNOASSAY TUMOR OTHER: CPT

## 2024-08-08 LAB — CGA SERPL-MCNC: 65.4 NG/ML (ref 0–101.8)

## 2024-08-14 ENCOUNTER — HOSPITAL ENCOUNTER (OUTPATIENT)
Dept: INFUSION CENTER | Facility: CLINIC | Age: 74
Discharge: HOME | End: 2024-08-14
Payer: MEDICARE

## 2024-08-14 DIAGNOSIS — C7B.8 METASTATIC MALIGNANT NEUROENDOCRINE TUMOR TO LIVER (HCC): Primary | ICD-10-CM

## 2024-08-14 PROCEDURE — 96372 THER/PROPH/DIAG INJ SC/IM: CPT

## 2024-08-14 RX ORDER — LANREOTIDE ACETATE 120 MG/.5ML
120 INJECTION SUBCUTANEOUS ONCE
OUTPATIENT
Start: 2024-09-11

## 2024-08-14 RX ORDER — LANREOTIDE ACETATE 120 MG/.5ML
120 INJECTION SUBCUTANEOUS ONCE
Status: COMPLETED | OUTPATIENT
Start: 2024-08-14 | End: 2024-08-14

## 2024-08-14 RX ADMIN — LANREOTIDE ACETATE 120 MG: 120 INJECTION SUBCUTANEOUS at 07:52

## 2024-08-14 NOTE — PROGRESS NOTES
Pt. offers no complaints. Lanreotide admin. in left buttock without incident.  AVS declined. Pt. states she will make her next appt. on her way out/

## 2024-08-21 ENCOUNTER — HOSPITAL ENCOUNTER (OUTPATIENT)
Dept: CT IMAGING | Facility: HOSPITAL | Age: 74
Discharge: HOME | End: 2024-08-21
Attending: SURGERY
Payer: MEDICARE

## 2024-08-21 DIAGNOSIS — C7B.8 METASTATIC MALIGNANT NEUROENDOCRINE TUMOR TO LIVER (HCC): ICD-10-CM

## 2024-08-21 PROCEDURE — 74177 CT ABD & PELVIS W/CONTRAST: CPT

## 2024-08-21 PROCEDURE — 71260 CT THORAX DX C+: CPT

## 2024-08-21 RX ADMIN — IOHEXOL 100 ML: 350 INJECTION, SOLUTION INTRAVENOUS at 08:03

## 2024-08-22 ENCOUNTER — HOSPITAL ENCOUNTER (EMERGENCY)
Facility: HOSPITAL | Age: 74
Discharge: HOME | End: 2024-08-22
Attending: EMERGENCY MEDICINE
Payer: MEDICARE

## 2024-08-22 ENCOUNTER — APPOINTMENT (EMERGENCY)
Dept: VASCULAR ULTRASOUND | Facility: HOSPITAL | Age: 74
End: 2024-08-22
Payer: MEDICARE

## 2024-08-22 VITALS
TEMPERATURE: 97.3 F | DIASTOLIC BLOOD PRESSURE: 70 MMHG | HEART RATE: 63 BPM | SYSTOLIC BLOOD PRESSURE: 156 MMHG | RESPIRATION RATE: 18 BRPM | OXYGEN SATURATION: 97 %

## 2024-08-22 DIAGNOSIS — M79.605 LEFT LEG PAIN: Primary | ICD-10-CM

## 2024-08-22 LAB
ALBUMIN SERPL BCG-MCNC: 4.2 G/DL (ref 3.5–5)
ALP SERPL-CCNC: 72 U/L (ref 34–104)
ALT SERPL W P-5'-P-CCNC: 16 U/L (ref 7–52)
ANION GAP SERPL CALCULATED.3IONS-SCNC: 6 MMOL/L (ref 4–13)
APTT PPP: 29 SECONDS (ref 23–34)
AST SERPL W P-5'-P-CCNC: 23 U/L (ref 13–39)
BASOPHILS # BLD MANUAL: 0.07 THOUSAND/UL (ref 0–0.1)
BASOPHILS NFR MAR MANUAL: 1 % (ref 0–1)
BILIRUB SERPL-MCNC: 1.06 MG/DL (ref 0.2–1)
BUN SERPL-MCNC: 22 MG/DL (ref 5–25)
CALCIUM SERPL-MCNC: 9.3 MG/DL (ref 8.4–10.2)
CHLORIDE SERPL-SCNC: 102 MMOL/L (ref 96–108)
CO2 SERPL-SCNC: 32 MMOL/L (ref 21–32)
CREAT SERPL-MCNC: 0.73 MG/DL (ref 0.6–1.3)
EOSINOPHIL # BLD MANUAL: 0.39 THOUSAND/UL (ref 0–0.4)
EOSINOPHIL NFR BLD MANUAL: 6 % (ref 0–6)
ERYTHROCYTE [DISTWIDTH] IN BLOOD BY AUTOMATED COUNT: 12.6 % (ref 11.6–15.1)
GFR SERPL CREATININE-BSD FRML MDRD: 82 ML/MIN/1.73SQ M
GLUCOSE SERPL-MCNC: 147 MG/DL (ref 65–140)
HCT VFR BLD AUTO: 37.6 % (ref 34.8–46.1)
HGB BLD-MCNC: 12.8 G/DL (ref 11.5–15.4)
INR PPP: 0.96 (ref 0.85–1.19)
LYMPHOCYTES # BLD AUTO: 2.29 THOUSAND/UL (ref 0.6–4.47)
LYMPHOCYTES # BLD AUTO: 34 % (ref 14–44)
MCH RBC QN AUTO: 32.6 PG (ref 26.8–34.3)
MCHC RBC AUTO-ENTMCNC: 34 G/DL (ref 31.4–37.4)
MCV RBC AUTO: 96 FL (ref 82–98)
MONOCYTES # BLD AUTO: 0.46 THOUSAND/UL (ref 0–1.22)
MONOCYTES NFR BLD: 7 % (ref 4–12)
NEUTROPHILS # BLD MANUAL: 3.33 THOUSAND/UL (ref 1.85–7.62)
NEUTS SEG NFR BLD AUTO: 51 % (ref 43–75)
PLATELET # BLD AUTO: 198 THOUSANDS/UL (ref 149–390)
PLATELET BLD QL SMEAR: ADEQUATE
PMV BLD AUTO: 10.6 FL (ref 8.9–12.7)
POTASSIUM SERPL-SCNC: 3.6 MMOL/L (ref 3.5–5.3)
PROT SERPL-MCNC: 7.4 G/DL (ref 6.4–8.4)
PROTHROMBIN TIME: 13.5 SECONDS (ref 12.3–15)
RBC # BLD AUTO: 3.93 MILLION/UL (ref 3.81–5.12)
RBC MORPH BLD: NORMAL
SODIUM SERPL-SCNC: 140 MMOL/L (ref 135–147)
VARIANT LYMPHS # BLD AUTO: 1 %
WBC # BLD AUTO: 6.53 THOUSAND/UL (ref 4.31–10.16)

## 2024-08-22 PROCEDURE — 93971 EXTREMITY STUDY: CPT

## 2024-08-22 PROCEDURE — 85730 THROMBOPLASTIN TIME PARTIAL: CPT | Performed by: EMERGENCY MEDICINE

## 2024-08-22 PROCEDURE — 85027 COMPLETE CBC AUTOMATED: CPT | Performed by: EMERGENCY MEDICINE

## 2024-08-22 PROCEDURE — 36415 COLL VENOUS BLD VENIPUNCTURE: CPT | Performed by: EMERGENCY MEDICINE

## 2024-08-22 PROCEDURE — 99284 EMERGENCY DEPT VISIT MOD MDM: CPT | Performed by: EMERGENCY MEDICINE

## 2024-08-22 PROCEDURE — 85610 PROTHROMBIN TIME: CPT | Performed by: EMERGENCY MEDICINE

## 2024-08-22 PROCEDURE — 85007 BL SMEAR W/DIFF WBC COUNT: CPT | Performed by: EMERGENCY MEDICINE

## 2024-08-22 PROCEDURE — 80053 COMPREHEN METABOLIC PANEL: CPT | Performed by: EMERGENCY MEDICINE

## 2024-08-22 PROCEDURE — 99284 EMERGENCY DEPT VISIT MOD MDM: CPT

## 2024-08-22 NOTE — ED ATTENDING ATTESTATION
8/22/2024  IRubi MD, saw and evaluated the patient. I have discussed the patient with the resident/non-physician practitioner and agree with the resident's/non-physician practitioner's findings, Plan of Care, and MDM as documented in the resident's/non-physician practitioner's note, except where noted. All available labs and Radiology studies were reviewed.  I was present for key portions of any procedure(s) performed by the resident/non-physician practitioner and I was immediately available to provide assistance.       At this point I agree with the current assessment done in the Emergency Department.  I have conducted an independent evaluation of this patient a history and physical is as follows:    This is a 73-year-old female with a relevant past medical history of neuroendocrine tumor, sarcoidosis, heart failure preserved ejection fraction, COPD presenting to the ED for complaint of left leg pain.  Patient states that her symptoms started approximately 2 hours ago, she felt some fullness in her leg, and became concerned so she went to urgent care.  Upon being evaluated at urgent care patient was sent to the ED for ultrasound and further evaluation.  Upon arrival to the ED she is neurovascularly intact, and may have some slight swelling, however no overt significant abnormality on her exam.  Her differential diagnosis includes: DVT versus muscular strain versus peripheral edema versus other.  Patient had ultrasound showing no evidence for VTE, most likely symptoms related to muscular strain.  The management plan was discussed in detail with the patient at bedside and all questions were answered. Strict ED return instructions were discussed at bedside. Prior to discharge, both verbal and written instructions were provided. We discussed the signs and symptoms that should prompt the patient to return to the ED. All questions were answered and the patient was comfortable with the plan of care and  "discharged home. The patient agrees to return to the Emergency Department for concerns and/or progression of illness.    Portions of the above record have been created with voice recognition software.  Occasional wrong word or \"sound alike\" substitutions may have occurred due to the inherent limitations of voice recognition software.  Read the chart carefully and recognize, using context, where substitutions may have occurred.    ED Course         Critical Care Time  Procedures      "

## 2024-08-22 NOTE — ED PROVIDER NOTES
"History  Chief Complaint   Patient presents with    Leg Pain     Pt presents stating her left leg is throbbing. Denies pain/swelling/redness. Sent by pt first.     (Kelsey Brooks) Kelsey Brooks is a 73 y.o. female     They presented to the emergency department on August 22, 2024. Patient presents with:  Leg Pain: Pt presents stating her left leg is throbbing. Denies pain/swelling/redness. Sent by pt first.      The patient states that she has a history of metastatic neuroendocrine tumor, is currently receiving Lanreotide infusions.  Patient states that roughly 2 hours ago she was sitting on a stool at which point she began experiencing sharp pain on the anterior aspect of her distal left lower extremity, since that time she has been experiencing throbbing.  Patient notes that she went to urgent care, and was referred to the emergency department for possible ultrasound study.  Patient denies notable swelling however describes her left lower extremity as \"tight.\"  Patient denies history of DVT or PE, however notes that her sister has had a stroke in the past and is concerned and wanted to get checked out.  Patient denies headache, chest pain, difficulty breathing, abdominal pain, nausea, vomiting, change in bowel habits, change in urination, difficulty ambulating, or any other complaint at this time.              Prior to Admission Medications   Prescriptions Last Dose Informant Patient Reported? Taking?   Cholecalciferol (VITAMIN D) 2000 units CAPS  Self Yes No   Sig: Take 1 tablet by mouth daily   Fluticasone-Salmeterol (Advair) 100-50 mcg/dose inhaler  Self No No   Sig: Inhale 1 puff 2 (two) times a day Rinse mouth after use.   LANREOTIDE ACETATE SC  Self Yes No   Sig: Inject under the skin every 30 (thirty) days   Multiple Vitamin (MULTIVITAMIN) tablet  Self Yes No   Sig: Take 1 tablet by mouth daily   docusate sodium (COLACE) 100 mg capsule  Self No No   Sig: Take 1 capsule (100 mg total) by mouth 2 (two) " "times a day   Patient taking differently: Take 100 mg by mouth daily   hydroCHLOROthiazide 12.5 mg tablet   No No   Sig: Take 1 tablet by mouth once daily   omeprazole (PriLOSEC) 40 MG capsule   No No   Sig: Take 1 capsule (40 mg total) by mouth daily before breakfast   ondansetron (ZOFRAN-ODT) 8 mg disintegrating tablet   No No   Sig: Take 1 tablet (8 mg total) by mouth every 8 (eight) hours as needed for nausea or vomiting   triamcinolone (KENALOG) 0.1 % ointment   No No   Sig: Apply topically 2 (two) times a day Apply to areas of itch, Apply thin coat to groin area for maximum of 3 days      Facility-Administered Medications: None       Past Medical History:   Diagnosis Date    Abdominal pain     Acute tonsillitis     Breast pain, left     Cancer (HCC)     liver&small intestine    COVID-19 07/2022    Edema of both lower extremities     GERD (gastroesophageal reflux disease)     GERD without esophagitis     Kidney stone     \" [place]\"    Lesion of liver     Liver mass     Lymphadenopathy     Mediastinal lymphadenopathy     Neoplasm of digestive system     Orthostatic lightheadedness     Sarcoidosis     Vertigo        Past Surgical History:   Procedure Laterality Date    BREAST BIOPSY Left 1996    negative    CARPAL TUNNEL RELEASE Bilateral     COLONOSCOPY      2017    ENDOBRONCHIAL ULTRASOUND (EBUS) N/A 3/6/2023    Procedure: ENDOBRONCHIAL ULTRASOUND (EBUS);  Surgeon: Ricardo Vega MD;  Location: [facility];  Service: Thoracic    IR BIOPSY LIVER MASS  11/6/2018    IR BIOPSY LIVER MASS  12/8/2020    KNEE ARTHROSCOPY Left     LAPAROTOMY N/A 1/20/2021    Procedure: LAPAROTOMY EXPLORATORY;  Surgeon: Eric Pierre MD;  Location: [facility];  Service: Surgical Oncology    LIVER LOBECTOMY N/A 1/20/2021    Procedure: LIVER ABLATION SEGMENT 7, INTRAOPERATIVE U/S OF LIVER;  Surgeon: Eric Pierre MD;  [facility];  Service: Surgical Oncology    MEDIASTINOSCOPY N/A 11/27/2018    Procedure: MEDIASTINOSCOPY;  " Surgeon: Ricardo Vega MD;  [facility]: [facility];  Service: Thoracic    [facility] INCL FLUOR GDNCE DX W/CELL WASHG SPX N/A 11/27/2018    Procedure: BRONCHOSCOPY FLEXIBLE;  Surgeon: Ricardo Vega MD;  [facility];  Service: Thoracic    [facility] INCL FLUOR GDNCE DX W/CELL WASHG SPX N/A 3/6/2023    Procedure: BRONCHOSCOPY FLEXIBLE;  Surgeon: Ricardo Vega MD;  [facility];  Service: Thoracic    MI BRONCHOSCOPY NEEDLE BX TRACHEA MAIN STEM&/BRON N/A 11/27/2018    Procedure: EBUS with biopsy;  Surgeon: Ricardo Vega MD;  [facility];  Service: Thoracic    MI [facility] US EXAM SURGICAL ALTER STOM DUODENUM/JEJUNUM N/A 10/29/2018    Procedure: LINEAR ENDOSCOPIC U/S;  Surgeon: Oseas Villagran MD;  Location: [facility];  Service: Gastroenterology    SKIN BIOPSY      SMALL INTESTINE SURGERY N/A 1/20/2021    Procedure: RESECTION SMALL BOWEL AND ANASTOMOSIS, RESECTION SMALL BOWEL NODULE;  Surgeon: Eric Pierre MD;  [facility];  Service: Surgical Oncology    WISDOM TOOTH EXTRACTION         Family History   Problem Relation Age of Onset    Alzheimer's disease Mother     Parkinsonism Father     Psoriasis Father     Heart failure Father         at older age    Psoriasis Sister     Psoriasis Sister     Stroke Sister     Colon cancer Maternal Grandfather     No Known Problems Daughter     No Known Problems Daughter      I have reviewed and agree with the history as documented.    E-Cigarette/Vaping    E-Cigarette Use Never User      E-Cigarette/Vaping Substances    Nicotine No     THC No     CBD No     Flavoring No     Other No     Unknown No      Social History     Tobacco Use    Smoking status: Never    Smokeless tobacco: Never   Vaping Use    Vaping status: Never Used   Substance Use Topics    Alcohol use: Yes     Comment: occ wine- very seldom    Drug use: No        Review of Systems   Constitutional:  Negative for chills and fever.   HENT:  Negative for ear pain and sore throat.     Eyes:  Negative for pain and visual disturbance.   Respiratory:  Negative for cough and shortness of breath.    Cardiovascular:  Negative for chest pain and palpitations.   Gastrointestinal:  Negative for abdominal pain and vomiting.   Genitourinary:  Negative for dysuria and hematuria.   Musculoskeletal:  Negative for arthralgias and back pain.        Left lower extremity throbbing   Skin:  Negative for color change and rash.   Neurological:  Negative for seizures and syncope.   All other systems reviewed and are negative.      Physical Exam  ED Triage Vitals [08/22/24 1432]   Temperature Pulse Respirations Blood Pressure SpO2   (!) 97.3 °F (36.3 °C) 63 18 156/70 97 %      Temp Source Heart Rate Source Patient Position - Orthostatic VS BP Location FiO2 (%)   Oral Monitor Sitting Right arm --      Pain Score       No Pain             Orthostatic Vital Signs  Vitals:    08/22/24 1432   BP: 156/70   Pulse: 63   Patient Position - Orthostatic VS: Sitting       Physical Exam  Vitals and nursing note reviewed.   Constitutional:       General: She is not in acute distress.     Appearance: Normal appearance.   HENT:      Head: Normocephalic and atraumatic.      Right Ear: External ear normal.      Left Ear: External ear normal.      Nose: Nose normal.      Mouth/Throat:      Mouth: Mucous membranes are moist.   Eyes:      Conjunctiva/sclera: Conjunctivae normal.   Cardiovascular:      Rate and Rhythm: Normal rate and regular rhythm.      Pulses: Normal pulses.   Pulmonary:      Effort: Pulmonary effort is normal. No respiratory distress.      Breath sounds: Normal breath sounds.   Abdominal:      General: Abdomen is flat. Bowel sounds are normal.      Tenderness: There is no abdominal tenderness. There is no guarding or rebound.   Musculoskeletal:         General: No swelling, tenderness, deformity or signs of injury. Normal range of motion.      Cervical back: Normal range of motion.      Comments: Symmetric bilateral  lower extremities in size   Skin:     General: Skin is warm and dry.      Capillary Refill: Capillary refill takes less than 2 seconds.   Neurological:      Mental Status: She is alert. Mental status is at baseline.   Psychiatric:         Mood and Affect: Mood normal.         ED Medications  Medications - No data to display    Diagnostic Studies  Results Reviewed       Procedure Component Value Units Date/Time    RBC Morphology Reflex Test [[id number]] Collected: 08/22/24 1453    Lab Status: Final result Specimen: Blood from Arm, Left Updated: 08/22/24 1601    CBC and differential [[id number]]  (Normal) Collected: 08/22/24 1453    Lab Status: Final result Specimen: Blood from Arm, Left Updated: 08/22/24 1541     WBC 6.53 Thousand/uL      RBC 3.93 Million/uL      Hemoglobin 12.8 g/dL      Hematocrit 37.6 %      MCV 96 fL      MCH 32.6 pg      MCHC 34.0 g/dL      RDW 12.6 %      MPV 10.6 fL      Platelets 198 Thousands/uL     Narrative:      This is an appended report.  These results have been appended to a previously verified report.    Manual Differential(PHLEBS Do Not Order) [[id number]]  (Abnormal) Collected: 08/22/24 1453    Lab Status: Final result Specimen: Blood from Arm, Left Updated: 08/22/24 1541     Segmented % 51 %      Lymphocytes % 34 %      Monocytes % 7 %      Eosinophils % 6 %      Basophils % 1 %      Atypical Lymphocytes % 1 %      Absolute Neutrophils 3.33 Thousand/uL      Absolute Lymphocytes 2.29 Thousand/uL      Absolute Monocytes 0.46 Thousand/uL      Absolute Eosinophils 0.39 Thousand/uL      Absolute Basophils 0.07 Thousand/uL      Total Counted --     RBC Morphology Normal     Platelet Estimate Adequate    Protime-INR [[id number]]  (Normal) Collected: 08/22/24 1453    Lab Status: Final result Specimen: Blood from Arm, Left Updated: 08/22/24 1519     Protime 13.5 seconds      INR 0.96    Narrative:      INR Therapeutic Range    Indication                                             INR  Range      Atrial Fibrillation                                               2.0-3.0  Hypercoagulable State                                    2.0.2.3  Left Ventricular Asist Device                            2.0-3.0  Mechanical Heart Valve                                  -    Aortic(with afib, MI, embolism, HF, LA enlargement,    and/or coagulopathy)                                     2.0-3.0 (2.5-3.5)     Mitral                                                             2.5-3.5  Prosthetic/Bioprosthetic Heart Valve               2.0-3.0  Venous thromboembolism (VTE: VT, PE        2.0-3.0    APTT [[id number]]  (Normal) Collected: 08/22/24 1453    Lab Status: Final result Specimen: Blood from Arm, Left Updated: 08/22/24 1519     PTT 29 seconds     Comprehensive metabolic panel [[id number]]  (Abnormal) Collected: 08/22/24 1453    Lab Status: Final result Specimen: Blood from Arm, Left Updated: 08/22/24 1519     Sodium 140 mmol/L      Potassium 3.6 mmol/L      Chloride 102 mmol/L      CO2 32 mmol/L      ANION GAP 6 mmol/L      BUN 22 mg/dL      Creatinine 0.73 mg/dL      Glucose 147 mg/dL      Calcium 9.3 mg/dL      AST 23 U/L      ALT 16 U/L      Alkaline Phosphatase 72 U/L      Total Protein 7.4 g/dL      Albumin 4.2 g/dL      Total Bilirubin 1.06 mg/dL      eGFR 82 ml/min/1.73sq m     Narrative:      National Kid[employer] guidelines for Chronic Kidney Disease (CKD):     Stage 1 with normal or high GFR (GFR > 90 mL/min/1.73 square meters)    Stage 2 Mild CKD (GFR = 60-89 mL/min/1.73 square meters)    Stage 3A Moderate CKD (GFR = 45-59 mL/min/1.73 square meters)    Stage 3B Moderate CKD (GFR = 30-44 mL/min/1.73 square meters)    Stage 4 Severe CKD (GFR = 15-29 mL/min/1.73 square meters)    Stage 5 End Stage CKD (GFR <15 mL/min/1.73 square meters)  Note: GFR calculation is accurate only with a steady state creatinine                   VAS VENOUS DUPLEX -LOWER LIMB UNILATERAL    (Results Pending)          Procedures  Procedures      ED Course                             SBIRT 22yo+      Flowsheet Row Most Recent Value   Initial Alcohol Screen: US AUDIT-C     1. How often do you have a drink containing alcohol? 0 Filed at: 08/22/2024 1523   2. How many drinks containing alcohol do you have on a typical day you are drinking?  0 Filed at: 08/22/2024 1523   3b. FEMALE Any Age, or MALE 65+: How often do you have 4 or more drinks on one occassion? 0 Filed at: 08/22/2024 1523   Audit-C Score 0 Filed at: 08/22/2024 1523   STEVIE: How many times in the past year have you...    Used an illegal drug or used a prescription medication for non-medical reasons? Never Filed at: 08/22/2024 1523                  Medical Decision Making  73-year-old female with metastatic neuroendocrine tumor presents to the emergency department with left shin pain.  Patient seen and examined negative Homans' sign, symmetric bilateral calves, no tenderness to palpation.  Differential diagnosis includes but is not limited to DVT, contusion, strain, electrolyte abnormality.  Obtained lab work which showed no acute pertinent findings.  DVT ultrasound obtained reported as negative.  Discussed findings with patient, advised on Tylenol and Motrin use.  Patient appears well, nontoxic, agrees with plan of care at this time.  Answered all questions.  In light of this, patient would benefit from outpatient follow-up.    Amount and/or Complexity of Data Reviewed  Labs: ordered.          Disposition  Final diagnoses:   Left leg pain     Time reflects when diagnosis was documented in both MDM as applicable and the Disposition within this note       Time User Action Codes Description Comment    8/22/2024  4:15 PM Thierry Robles Add [M79.605] Left leg pain           ED Disposition       ED Disposition   Discharge    Condition   Stable    Date/Time   Thu Aug 22, 2024 1615    Comment   Kelsey Brooks discharge to home/self care.                   Follow-up  Information       Follow up With Specialties Details Why Contact Info    Jah Cuenca MD Family Medicine   [address] [place] [address]  [phone]              Discharge Medication List as of 8/22/2024  4:16 PM        CONTINUE these medications which have NOT CHANGED    Details   Cholecalciferol (VITAMIN D) 2000 units CAPS Take 1 tablet by mouth daily, Historical Med      docusate sodium (COLACE) 100 mg capsule Take 1 capsule (100 mg total) by mouth 2 (two) times a day, Starting Tue 1/26/2021, OTC      Fluticasone-Salmeterol (Advair) 100-50 mcg/dose inhaler Inhale 1 puff 2 (two) times a day Rinse mouth after use., Starting Tue 10/11/2022, Normal      hydroCHLOROthiazide 12.5 mg tablet Take 1 tablet by mouth once daily, Normal      LANREOTIDE ACETATE SC Inject under the skin every 30 (thirty) days, Historical Med      Multiple Vitamin (MULTIVITAMIN) tablet Take 1 tablet by mouth daily, Historical Med      omeprazole (PriLOSEC) 40 MG capsule Take 1 capsule (40 mg total) by mouth daily before breakfast, Starting Mon 7/8/2024, Normal      ondansetron (ZOFRAN-ODT) 8 mg disintegrating tablet Take 1 tablet (8 mg total) by mouth every 8 (eight) hours as needed for nausea or vomiting, Starting Thu 11/30/2023, Normal      triamcinolone (KENALOG) 0.1 % ointment Apply topically 2 (two) times a day Apply to areas of itch, Apply thin coat to groin area for maximum of 3 days, Starting Thu 4/25/2024, Normal           No discharge procedures on file.    PDMP Review       None             ED Provider  Attending physically available and evaluated Kelsey Brooks. I managed the patient along with the ED Attending.    Electronically Signed by           Thierry Robles MD  08/22/24 1482

## 2024-08-23 PROCEDURE — 93971 EXTREMITY STUDY: CPT | Performed by: SURGERY

## 2024-08-28 ENCOUNTER — OFFICE VISIT (OUTPATIENT)
Dept: SURGICAL ONCOLOGY | Facility: CLINIC | Age: 74
End: 2024-08-28
Payer: MEDICARE

## 2024-08-28 VITALS
BODY MASS INDEX: 28.8 KG/M2 | DIASTOLIC BLOOD PRESSURE: 84 MMHG | HEART RATE: 56 BPM | WEIGHT: 156.5 LBS | SYSTOLIC BLOOD PRESSURE: 128 MMHG | HEIGHT: 62 IN | OXYGEN SATURATION: 98 % | TEMPERATURE: 97.5 F

## 2024-08-28 DIAGNOSIS — C7B.8 METASTATIC MALIGNANT NEUROENDOCRINE TUMOR TO LIVER (HCC): Primary | ICD-10-CM

## 2024-08-28 PROCEDURE — 99213 OFFICE O/P EST LOW 20 MIN: CPT

## 2024-08-28 PROCEDURE — G2211 COMPLEX E/M VISIT ADD ON: HCPCS

## 2024-08-28 NOTE — LETTER
August 28, 2024     Eric Pierre MD  [address] [facility]  2nd Floor  [facility] [address]    Patient: Kelsey Brooks   YOB: 1950   Date of Visit: 8/28/2024       Dear Dr. Pierre:    Thank you for referring Kelsey Brooks to me for evaluation. Below are my notes for this consultation.    If you have questions, please do not hesitate to call me. I look forward to following your patient along with you.         Sincerely,        ROSALIO Sorto        CC: No Recipients    ROSALIO Sorto  8/28/2024  9:02 AM  Sign when Signing Visit               Surgical Oncology Follow Up       [address] [facility] [place]  [address]  [place] [address]    Kelsey Brooks  1950  [id number]  [address] [facility] [place]  [address]  [place] [address]    1. 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 (HCC)  Assessment & Plan:  The patient is feeling well, and there are no signs of recurrence or progression on her most recent CT scan.  Chromogranin A level is in normal range.  Will plan to repeat CT and tumor marker in 6 months, and she will see Dr Pierre at that time for another clinical exam.  Orders:  -     CT chest abdomen pelvis w contrast; Future; Expected date: 02/28/2025  -     BUN; Future; Expected date: 02/01/2025  -     Creatinine, serum; Future; Expected date: 02/01/2025  -     Chromogranin A; Future; Expected date: 02/01/2025         Chief Complaint   Patient presents with   • Follow-up       Return in about 6 months (around 2/28/2025) for Imaging - See orders, Labs - See Treatment Plan, Office visit with Dr Pierre.      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 Liver mass, needle core biopsy:  - Metastatic  well-differentiated neuroendocrine tumor, G2     1/18/2021 -  Chemotherapy    Lanreotide injections (monthly)     1/20/2021 Surgery    Resection of small bowel and anastomosis, segment 7 liver ablation    A. Small intestine, resection:  - Well- differentiated neuroendocrine tumor (up to 2.1 cm), G2, at least three foci.  - All margins are negative for tumor.  - Three of thirteen lymph nodes are positive for tumor (3/13).     B. 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        2/28/2024 -  Cancer Staged    Staging form: Liver (Excluding Intrahepatic Bile Ducts), AJCC 8th Edition  - Pathologic: Stage IVB (pT3, pN1, cM1) - Signed by Eric Pierre MD on 2/28/2024       Bone metastasis   1/28/2021 Initial Diagnosis    Bone metastasis (HCC)     2/18/2021 - 3/3/2021 Radiation    Treatment:  Course: C1    Plan ID Energy Fractions Dose per Fraction (cGy) Dose Correction (cGy) Total Dose Delivered (cGy) Elapsed Days   [id number] Spine 10X/6X 10 / 10 300 0 3,000 13                  History of Present Illness: This is a 72 y/o female who returns to the office today in follow-up for her history of a metastatic small bowel NET.  She is currently GERARDO at 3 1/2 years and doing well.  She does report recently being diagnosed with DMII, but has modified her diet and started exercising to manage her blood sugars; she notes some weight loss as a result.  She denies any abdominal pain, diarrhea, nausea, flushing, sweating or palpitations.  She continues on Lanreotide indefinitely. A chromogranin A level was drawn recently, and CT was performed on August 21.  I have reviewed these results with the patient today.      Review of Systems   Constitutional:  Negative for activity change, appetite change, fatigue and unexpected weight change.   HENT: Negative.     Respiratory: Negative.     Cardiovascular: Negative.    Gastrointestinal: Negative.  Negative for abdominal  "distention, abdominal pain, diarrhea, nausea and vomiting.   Musculoskeletal: Negative.    Skin: Negative.  Negative for color change.   Neurological: Negative.  Negative for dizziness and headaches.   Hematological: Negative.  Negative for adenopathy.   Psychiatric/Behavioral: Negative.               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)   • Bone metastasis   • Reactive airway disease   • Elevated fasting glucose   • Primary hypertension   • Nonrheumatic tricuspid valve regurgitation   • Chronic obstructive pulmonary disease, unspecified COPD type (HCC)   • Chronic intractable headache     Past Medical History:   Diagnosis Date   • Abdominal pain    • Acute tonsillitis    • Breast pain, left    • Cancer (HCC)     liver&small intestine   • COVID-19 07/2022   • Edema of both lower extremities    • GERD (gastroesophageal reflux disease)    • GERD without esophagitis    • Kidney stone     \" [place]\"   • Lesion of liver    • Liver mass    • Lymphadenopathy    • Mediastinal lymphadenopathy    • Neoplasm of digestive system    • Orthostatic lightheadedness    • Sarcoidosis    • Vertigo      Past Surgical History:   Procedure Laterality Date   • BREAST BIOPSY Left 1996    negative   • CARPAL TUNNEL RELEASE Bilateral    • COLONOSCOPY      2017   • ENDOBRONCHIAL ULTRASOUND (EBUS) N/A 3/6/2023    Procedure: ENDOBRONCHIAL ULTRASOUND (EBUS);  Surgeon: Ricardo Vega MD;  [facility]: [facility];  Service: " Thoracic   • IR BIOPSY LIVER MASS  11/6/2018   • IR BIOPSY LIVER MASS  12/8/2020   • KNEE ARTHROSCOPY Left    • LAPAROTOMY N/A 1/20/2021    Procedure: LAPAROTOMY EXPLORATORY;  Surgeon: Eric Pierre MD;  Location: [facility];  Service: Surgical Oncology   • LIVER LOBECTOMY N/A 1/20/2021    Procedure: LIVER ABLATION SEGMENT 7, INTRAOPERATIVE U/S OF LIVER;  Surgeon: Eric Pierre MD;  [facility]: [facility];  Service: Surgical Oncology   • MEDIASTINOSCOPY N/A 11/27/2018    Procedure: MEDIASTINOSCOPY;  Surgeon: Ricardo Vega MD;  [facility]: [facility];  Service: Thoracic   • SC BRNCHSC INCL FLUOR GDNCE DX W/CELL WASHG SPX N/A 11/27/2018    Procedure: BRONCHOSCOPY FLEXIBLE;  Surgeon: Ricardo Vega MD;  [facility];  Service: Thoracic   • SC BRNCHSC INCL FLUOR GDNCE DX W/CELL WASHG SPX N/A 3/6/2023    Procedure: BRONCHOSCOPY FLEXIBLE;  Surgeon: Ricardo Vega MD;  [facility];  Service: Thoracic   • SC BRONCHOSCOPY NEEDLE BX TRACHEA MAIN STEM&/BRON N/A 11/27/2018    Procedure: EBUS with biopsy;  Surgeon: Ricardo Vega MD;  [facility];  Service: Thoracic   • SC EDG US EXAM SURGICAL ALTER STOM DUODENUM/JEJUNUM N/A 10/29/2018    Procedure: LINEAR ENDOSCOPIC U/S;  Surgeon: Oseas Villagran MD;  Location: [facility];  Service: Gastroenterology   • SKIN BIOPSY     • SMALL INTESTINE SURGERY N/A 1/20/2021    Procedure: RESECTION SMALL BOWEL AND ANASTOMOSIS, RESECTION SMALL BOWEL NODULE;  Surgeon: Eric Pierre MD;  [facility];  Service: Surgical Oncology   • WISDOM TOOTH EXTRACTION       Family History   Problem Relation Age of Onset   • Alzheimer's disease Mother    • Parkinsonism Father    • Psoriasis Father    • Heart failure Father         at older age   • Psoriasis Sister    • Psoriasis Sister    • Stroke Sister    • Colon cancer Maternal Grandfather    • No Known Problems Daughter    • No Known Problems Daughter      Social History     Socioeconomic History   • Marital  status:      Spouse name: Not on file   • Number of children: Not on file   • Years of education: Not on file   • Highest education level: Not on file   Occupational History   • Not on file   Tobacco Use   • Smoking status: Never   • Smokeless tobacco: Never   Vaping Use   • Vaping status: Never Used   Substance and Sexual Activity   • Alcohol use: Yes     Comment: occ wine- very seldom   • Drug use: No   • Sexual activity: Not Currently   Other Topics Concern   • Not on file   Social History Narrative   • Not on file     Social Determinants of Health     Financial Resource Strain: Low Risk  (2/16/2023)    Overall Financial Resource Strain (CARDIA)    • Difficulty of Paying Living Expenses: Not very hard   Food Insecurity: Not on file   Transportation Needs: No Transportation Needs (2/16/2023)    PRAPARE - Transportation    • Lack of Transportation (Medical): No    • Lack of Transportation (Non-Medical): No   Physical Activity: Not on file   Stress: Not on file   Social Connections: Not on file   Intimate Partner Violence: Not on file   Housing Stability: Not on file       Current Outpatient Medications:   •  Cholecalciferol (VITAMIN D) 2000 units CAPS, Take 1 tablet by mouth daily, Disp: , Rfl:   •  docusate sodium (COLACE) 100 mg capsule, Take 1 capsule (100 mg total) by mouth 2 (two) times a day (Patient taking differently: Take 100 mg by mouth daily), Disp: 10 capsule, Rfl: 0  •  hydroCHLOROthiazide 12.5 mg tablet, Take 1 tablet by mouth once daily, Disp: 90 tablet, Rfl: 1  •  LANREOTIDE ACETATE SC, Inject under the skin every 30 (thirty) days, Disp: , Rfl:   •  Multiple Vitamin (MULTIVITAMIN) tablet, Take 1 tablet by mouth daily, Disp: , Rfl:   •  omeprazole (PriLOSEC) 40 MG capsule, Take 1 capsule (40 mg total) by mouth daily before breakfast, Disp: 90 capsule, Rfl: 0  •  triamcinolone (KENALOG) 0.1 % ointment, Apply topically 2 (two) times a day Apply to areas of itch, Apply thin coat to groin area for  maximum of 3 days, Disp: 80 g, Rfl: 1  •  Fluticasone-Salmeterol (Advair) 100-50 mcg/dose inhaler, Inhale 1 puff 2 (two) times a day Rinse mouth after use. (Patient not taking: Reported on 8/28/2024), Disp: 60 blister, Rfl: 3  •  ondansetron (ZOFRAN-ODT) 8 mg disintegrating tablet, Take 1 tablet (8 mg total) by mouth every 8 (eight) hours as needed for nausea or vomiting (Patient not taking: Reported on 8/28/2024), Disp: 20 tablet, Rfl: 5  Allergies   Allergen Reactions   • Bactrim [Sulfamethoxazole-Trimethoprim] Hives   • Clam Shell - Food Allergy Vomiting     Vitals:    08/28/24 0759   BP: 128/84   Pulse: 56   Temp: 97.5 °F (36.4 °C)   SpO2: 98%       Physical Exam  Vitals reviewed.   Constitutional:       General: She is not in acute distress.     Appearance: Normal appearance. She is normal weight. She is not ill-appearing or toxic-appearing.   HENT:      Head: Normocephalic and atraumatic.      Nose: Nose normal.      Mouth/Throat:      Mouth: Mucous membranes are moist.   Eyes:      General: No scleral icterus.  Cardiovascular:      Rate and Rhythm: Normal rate.   Pulmonary:      Effort: Pulmonary effort is normal.   Abdominal:      General: Abdomen is flat. There is no distension.      Palpations: Abdomen is soft. There is no mass.      Tenderness: There is no abdominal tenderness. There is no guarding.   Musculoskeletal:         General: Normal range of motion.      Cervical back: Normal range of motion and neck supple.   Skin:     General: Skin is warm and dry.      Coloration: Skin is not jaundiced.   Neurological:      General: No focal deficit present.      Mental Status: She is alert and oriented to person, place, and time.   Psychiatric:         Mood and Affect: Mood normal.         Behavior: Behavior normal.         Thought Content: Thought content normal.         Judgment: Judgment normal.           Labs:  Chromogranin A  Collected 8/6/2024        Component  Ref Range & Units 3 wk ago   Chromogranin  A  0.0 - 101.8 ng/mL 65.4             Imaging  CT chest abdomen pelvis w contrast    Result Date: 8/21/2024  Narrative: CT CHEST, ABDOMEN AND PELVIS WITH IV CONTRAST INDICATION:   Other secondary neuroendocrine tumors. . COMPARISON: 2/23/2024. TECHNIQUE: CT examination of the chest, abdomen and pelvis was performed. Multiplanar 2D reformatted images were created from the source data. This examination, like all CT scans performed in the [facility], was performed utilizing techniques to minimize radiation dose exposure, including the use of iterative reconstruction and automated exposure control. Radiation dose length product (DLP) for this visit: IV Contrast: Enteric Contrast: Enteric contrast was administered. FINDINGS: CHEST LUNGS:  Lungs are clear.  There is no tracheal or endobronchial lesion. PLEURA:  Unremarkable. HEART/GREAT VESSELS: Heart is unremarkable for patient's age.  No thoracic aortic aneurysm. MEDIASTINUM AND BRIGIDA:  Unremarkable. CHEST WALL AND LOWER NECK:  Unremarkable. ABDOMEN LIVER/BILIARY TREE: Stable 2.4 x 2 cm low-attenuation lesion in the right hepatic lobe image 106 series 2 sequelae of treated disease. Stable wedge-shaped area of hypoenhancement in the right hepatic lobe measuring 1.1 x 1.2 cm image 117 series 2. No new  hepatic lesions. Stable hepatic cysts. Mild heterogeneous enhancement of the liver in segment 7 which is likely due to vascular redistribution from prior therapy. Patent portal vein. GALLBLADDER:  No calcified gallstones. No pericholecystic inflammatory change. SPLEEN:  Unremarkable. PANCREAS: Stable diffusely atrophic pancreas with numerous thin-walled cysts largest measuring 9.8 mm in the pancreatic neck image 114 series 2. ADRENAL GLANDS:  Unremarkable. KIDNEYS/URETERS:  Unremarkable. No hydronephrosis. STOMACH AND BOWEL:  There is colonic diverticulosis without evidence of acute diverticulitis. Prior partial small bowel resection with patent anastomosis  in the right lower quadrant. No foci of abnormal bowel wall thickening. APPENDIX:  No findings to suggest appendicitis. ABDOMINOPELVIC CAVITY:  No ascites.  No pneumoperitoneum.  No lymphadenopathy. VESSELS:  Unremarkable for patient's age. PELVIS REPRODUCTIVE ORGANS: Uterus is atrophic. No adnexal mass. URINARY BLADDER:  Unremarkable. ABDOMINAL WALL/INGUINAL REGIONS: Small fat-containing umbilical hernia. Small fat-containing right periumbilical hernia. OSSEOUS STRUCTURES:  No acute fracture or destructive osseous lesion.     Impression: 1. No interval change since previous study. Stable hypoenhancing liver lesion sequelae of treatment. Stable hepatic cysts. No new lesions. No intra-abdominal or pelvic lymphadenopathy. No peritoneal lesions. No pulmonary metastatic disease. 2. Colonic diverticulosis. No foci of abnormal bowel wall thickening. 3. Diffuse atrophic pancreas containing numerous cysts measuring up to 9.8 mm. No pancreatic duct dilation. 4. Small fat-containing umbilical and right periumbilical hernias. Electronically signed: 08/21/2024 10:42 PM Mo Fraser MD    I personally reviewed and interpreted the above laboratory and imaging data.

## 2024-08-28 NOTE — ASSESSMENT & PLAN NOTE
The patient is feeling well, and there are no signs of recurrence or progression on her most recent CT scan.  Chromogranin A level is in normal range.  Will plan to repeat CT and tumor marker in 6 months, and she will see Dr Pierre at that time for another clinical exam.

## 2024-08-28 NOTE — PROGRESS NOTES
Surgical Oncology Follow Up       [facility] [place]  [address]  [place] [address]    Kelsey Brooks  1950  [id number]  [facility] [place]  [address]  [place] [address]    1. 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 (HCC)  Assessment & Plan:  The patient is feeling well, and there are no signs of recurrence or progression on her most recent CT scan.  Chromogranin A level is in normal range.  Will plan to repeat CT and tumor marker in 6 months, and she will see Dr Pierre at that time for another clinical exam.  Orders:  -     CT chest abdomen pelvis w contrast; Future; Expected date: 02/28/2025  -     BUN; Future; Expected date: 02/01/2025  -     Creatinine, serum; Future; Expected date: 02/01/2025  -     Chromogranin A; Future; Expected date: 02/01/2025         Chief Complaint   Patient presents with    Follow-up       Return in about 6 months (around 2/28/2025) for Imaging - See orders, Labs - See Treatment Plan, Office visit with Dr Pierre.      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 Liver mass, needle core biopsy:  - Metastatic well-differentiated neuroendocrine tumor, G2     1/18/2021 -  Chemotherapy    Lanreotide injections (monthly)     1/20/2021 Surgery    Resection of small bowel and anastomosis, segment 7 liver ablation    A. Small intestine, resection:  - Well- differentiated neuroendocrine tumor (up to 2.1 cm), G2, at least three foci.  - All margins are negative for tumor.  - Three of thirteen lymph nodes are positive for tumor (3/13).     B. 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        2/28/2024 -   Cancer Staged    Staging form: Liver (Excluding Intrahepatic Bile Ducts), AJCC 8th Edition  - Pathologic: Stage IVB (pT3, pN1, cM1) - Signed by Eric Pierre MD on 2/28/2024       Bone metastasis   1/28/2021 Initial Diagnosis    Bone metastasis (HCC)     2/18/2021 - 3/3/2021 Radiation    Treatment:  Course: C1    Plan ID Energy Fractions Dose per Fraction (cGy) Dose Correction (cGy) Total Dose Delivered (cGy) Elapsed Days   T10_T12 Spine 10X/6X 10 / 10 300 0 3,000 13                  History of Present Illness: This is a 72 y/o female who returns to the office today in follow-up for her history of a metastatic small bowel NET.  She is currently GERARDO at 3 1/2 years and doing well.  She does report recently being diagnosed with DMII, but has modified her diet and started exercising to manage her blood sugars; she notes some weight loss as a result.  She denies any abdominal pain, diarrhea, nausea, flushing, sweating or palpitations.  She continues on Lanreotide indefinitely. A chromogranin A level was drawn recently, and CT was performed on August 21.  I have reviewed these results with the patient today.      Review of Systems   Constitutional:  Negative for activity change, appetite change, fatigue and unexpected weight change.   HENT: Negative.     Respiratory: Negative.     Cardiovascular: Negative.    Gastrointestinal: Negative.  Negative for abdominal distention, abdominal pain, diarrhea, nausea and vomiting.   Musculoskeletal: Negative.    Skin: Negative.  Negative for color change.   Neurological: Negative.  Negative for dizziness and headaches.   Hematological: Negative.  Negative for adenopathy.   Psychiatric/Behavioral: Negative.               Patient Active Problem List   Diagnosis    Positional vertigo    Focal nodular hyperplasia of liver    IPMN (intraductal papillary mucinous neoplasm)    Liver mass    Mediastinal adenopathy    Sarcoidosis, lung (HCC)    Granulomatous lymphadenitis    Edema of right  "lower extremity    Diastolic dysfunction    Complex tear of medial meniscus of right knee as current injury    Other tear of medial meniscus, current injury, right knee, initial encounter    Trigger middle finger of right hand    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 (HCC)    Bone metastasis    Reactive airway disease    Elevated fasting glucose    Primary hypertension    Nonrheumatic tricuspid valve regurgitation    Chronic obstructive pulmonary disease, unspecified COPD type (HCC)    Chronic intractable headache     Past Medical History:   Diagnosis Date    Abdominal pain     Acute tonsillitis     Breast pain, left     Cancer (HCC)     liver&small intestine    COVID-19 07/2022    Edema of both lower extremities     GERD (gastroesophageal reflux disease)     GERD without esophagitis     Kidney stone     \" [place]\"    Lesion of liver     Liver mass     Lymphadenopathy     Mediastinal lymphadenopathy     Neoplasm of digestive system     Orthostatic lightheadedness     Sarcoidosis     Vertigo      Past Surgical History:   Procedure Laterality Date    BREAST BIOPSY Left 1996    negative    CARPAL TUNNEL RELEASE Bilateral     COLONOSCOPY      2017    ENDOBRONCHIAL ULTRASOUND (EBUS) N/A 3/6/2023    Procedure: ENDOBRONCHIAL ULTRASOUND (EBUS);  Surgeon: Ricardo Vega MD;  [facility];  Service: Thoracic    IR BIOPSY LIVER MASS  11/6/2018    IR BIOPSY LIVER MASS  12/8/2020    KNEE ARTHROSCOPY Left     LAPAROTOMY N/A 1/20/2021    Procedure: LAPAROTOMY EXPLORATORY;  Surgeon: Eric Pierre MD;  [facility];  Service: Surgical Oncology    LIVER LOBECTOMY N/A 1/20/2021    Procedure: LIVER ABLATION SEGMENT 7, INTRAOPERATIVE U/S OF LIVER;  Surgeon: Eric Pierre MD;  [facility];  Service: Surgical Oncology    MEDIASTINOSCOPY N/A 11/27/2018    Procedure: MEDIASTINOSCOPY;  Surgeon: Ricardo Vega MD;  [facility];  Service: Thoracic    KS NCC INCL FLUOR GDNCE DX W/CELL " WASHG SPX N/A 11/27/2018    Procedure: BRONCHOSCOPY FLEXIBLE;  Surgeon: Ricardo Vega MD;  [facility];  Service: Thoracic    [facility]/CELL WASHG SPX N/A 3/6/2023    Procedure: BRONCHOSCOPY FLEXIBLE;  Surgeon: Ricardo Vega MD;  [facility];  Service: Thoracic    MO BRONCHOSCOPY NEEDLE BX TRACHEA MAIN STEM&/BRON N/A 11/27/2018    Procedure: EBUS with biopsy;  Surgeon: Ricardo Vega MD;  [facility];  Service: Thoracic    MO EDG US EXAM SURGICAL ALTER STOM DUODENUM/JEJUNUM N/A 10/29/2018    Procedure: LINEAR ENDOSCOPIC U/S;  Surgeon: Oseas Villagran MD;  [facility];  Service: Gastroenterology    SKIN BIOPSY      SMALL INTESTINE SURGERY N/A 1/20/2021    Procedure: RESECTION SMALL BOWEL AND ANASTOMOSIS, RESECTION SMALL BOWEL NODULE;  Surgeon: Eric Pierre MD;  [facility];  Service: Surgical Oncology    WISDOM TOOTH EXTRACTION       Family History   Problem Relation Age of Onset    Alzheimer's disease Mother     Parkinsonism Father     Psoriasis Father     Heart failure Father         at older age    Psoriasis Sister     Psoriasis Sister     Stroke Sister     Colon cancer Maternal Grandfather     No Known Problems Daughter     No Known Problems Daughter      Social History     Socioeconomic History    Marital status:      Spouse name: Not on file    Number of children: Not on file    Years of education: Not on file    Highest education level: Not on file   Occupational History    Not on file   Tobacco Use    Smoking status: Never    Smokeless tobacco: Never   Vaping Use    Vaping status: Never Used   Substance and Sexual Activity    Alcohol use: Yes     Comment: occ wine- very seldom    Drug use: No    Sexual activity: Not Currently   Other Topics Concern    Not on file   Social History Narrative    Not on file     Social Determinants of Health     Financial Resource Strain: Low Risk  (2/16/2023)    Overall Financial Resource  Strain (CARDIA)     Difficulty of Paying Living Expenses: Not very hard   Food Insecurity: Not on file   Transportation Needs: No Transportation Needs (2/16/2023)    PRAPARE - Transportation     Lack of Transportation (Medical): No     Lack of Transportation (Non-Medical): No   Physical Activity: Not on file   Stress: Not on file   Social Connections: Not on file   Intimate Partner Violence: Not on file   Housing Stability: Not on file       Current Outpatient Medications:     Cholecalciferol (VITAMIN D) 2000 units CAPS, Take 1 tablet by mouth daily, Disp: , Rfl:     docusate sodium (COLACE) 100 mg capsule, Take 1 capsule (100 mg total) by mouth 2 (two) times a day (Patient taking differently: Take 100 mg by mouth daily), Disp: 10 capsule, Rfl: 0    hydroCHLOROthiazide 12.5 mg tablet, Take 1 tablet by mouth once daily, Disp: 90 tablet, Rfl: 1    LANREOTIDE ACETATE SC, Inject under the skin every 30 (thirty) days, Disp: , Rfl:     Multiple Vitamin (MULTIVITAMIN) tablet, Take 1 tablet by mouth daily, Disp: , Rfl:     omeprazole (PriLOSEC) 40 MG capsule, Take 1 capsule (40 mg total) by mouth daily before breakfast, Disp: 90 capsule, Rfl: 0    triamcinolone (KENALOG) 0.1 % ointment, Apply topically 2 (two) times a day Apply to areas of itch, Apply thin coat to groin area for maximum of 3 days, Disp: 80 g, Rfl: 1    Fluticasone-Salmeterol (Advair) 100-50 mcg/dose inhaler, Inhale 1 puff 2 (two) times a day Rinse mouth after use. (Patient not taking: Reported on 8/28/2024), Disp: 60 blister, Rfl: 3    ondansetron (ZOFRAN-ODT) 8 mg disintegrating tablet, Take 1 tablet (8 mg total) by mouth every 8 (eight) hours as needed for nausea or vomiting (Patient not taking: Reported on 8/28/2024), Disp: 20 tablet, Rfl: 5  Allergies   Allergen Reactions    Bactrim [Sulfamethoxazole-Trimethoprim] Hives    Clam Shell - Food Allergy Vomiting     Vitals:    08/28/24 0759   BP: 128/84   Pulse: 56   Temp: 97.5 °F (36.4 °C)   SpO2: 98%        Physical Exam  Vitals reviewed.   Constitutional:       General: She is not in acute distress.     Appearance: Normal appearance. She is normal weight. She is not ill-appearing or toxic-appearing.   HENT:      Head: Normocephalic and atraumatic.      Nose: Nose normal.      Mouth/Throat:      Mouth: Mucous membranes are moist.   Eyes:      General: No scleral icterus.  Cardiovascular:      Rate and Rhythm: Normal rate.   Pulmonary:      Effort: Pulmonary effort is normal.   Abdominal:      General: Abdomen is flat. There is no distension.      Palpations: Abdomen is soft. There is no mass.      Tenderness: There is no abdominal tenderness. There is no guarding.   Musculoskeletal:         General: Normal range of motion.      Cervical back: Normal range of motion and neck supple.   Skin:     General: Skin is warm and dry.      Coloration: Skin is not jaundiced.   Neurological:      General: No focal deficit present.      Mental Status: She is alert and oriented to person, place, and time.   Psychiatric:         Mood and Affect: Mood normal.         Behavior: Behavior normal.         Thought Content: Thought content normal.         Judgment: Judgment normal.           Labs:  Chromogranin A  Collected 8/6/2024        Component  Ref Range & Units 3 wk ago   Chromogranin A  0.0 - 101.8 ng/mL 65.4             Imaging  CT chest abdomen pelvis w contrast    Result Date: 8/21/2024  Narrative: CT CHEST, ABDOMEN AND PELVIS WITH IV CONTRAST INDICATION:   Other secondary neuroendocrine tumors. . COMPARISON: 2/23/2024. TECHNIQUE: CT examination of the chest, abdomen and pelvis was performed. Multiplanar 2D reformatted images were created from the source data. This examination, like all CT scans performed in the [facility], was performed utilizing techniques to minimize radiation dose exposure, including the use of iterative reconstruction and automated exposure control. Radiation dose length product (DLP)  for this visit: IV Contrast: Enteric Contrast: Enteric contrast was administered. FINDINGS: CHEST LUNGS:  Lungs are clear.  There is no tracheal or endobronchial lesion. PLEURA:  Unremarkable. HEART/GREAT VESSELS: Heart is unremarkable for patient's age.  No thoracic aortic aneurysm. MEDIASTINUM AND BRIGIDA:  Unremarkable. CHEST WALL AND LOWER NECK:  Unremarkable. ABDOMEN LIVER/BILIARY TREE: Stable 2.4 x 2 cm low-attenuation lesion in the right hepatic lobe image 106 series 2 sequelae of treated disease. Stable wedge-shaped area of hypoenhancement in the right hepatic lobe measuring 1.1 x 1.2 cm image 117 series 2. No new  hepatic lesions. Stable hepatic cysts. Mild heterogeneous enhancement of the liver in segment 7 which is likely due to vascular redistribution from prior therapy. Patent portal vein. GALLBLADDER:  No calcified gallstones. No pericholecystic inflammatory change. SPLEEN:  Unremarkable. PANCREAS: Stable diffusely atrophic pancreas with numerous thin-walled cysts largest measuring 9.8 mm in the pancreatic neck image 114 series 2. ADRENAL GLANDS:  Unremarkable. KIDNEYS/URETERS:  Unremarkable. No hydronephrosis. STOMACH AND BOWEL:  There is colonic diverticulosis without evidence of acute diverticulitis. Prior partial small bowel resection with patent anastomosis in the right lower quadrant. No foci of abnormal bowel wall thickening. APPENDIX:  No findings to suggest appendicitis. ABDOMINOPELVIC CAVITY:  No ascites.  No pneumoperitoneum.  No lymphadenopathy. VESSELS:  Unremarkable for patient's age. PELVIS REPRODUCTIVE ORGANS: Uterus is atrophic. No adnexal mass. URINARY BLADDER:  Unremarkable. ABDOMINAL WALL/INGUINAL REGIONS: Small fat-containing umbilical hernia. Small fat-containing right periumbilical hernia. OSSEOUS STRUCTURES:  No acute fracture or destructive osseous lesion.     Impression: 1. No interval change since previous study. Stable hypoenhancing liver lesion sequelae of treatment. Stable  hepatic cysts. No new lesions. No intra-abdominal or pelvic lymphadenopathy. No peritoneal lesions. No pulmonary metastatic disease. 2. Colonic diverticulosis. No foci of abnormal bowel wall thickening. 3. Diffuse atrophic pancreas containing numerous cysts measuring up to 9.8 mm. No pancreatic duct dilation. 4. Small fat-containing umbilical and right periumbilical hernias. Electronically signed: 08/21/2024 10:42 PM Mo Fraser MD    I personally reviewed and interpreted the above laboratory and imaging data.

## 2024-09-04 ENCOUNTER — APPOINTMENT (OUTPATIENT)
Dept: LAB | Facility: CLINIC | Age: 74
End: 2024-09-04
Payer: MEDICARE

## 2024-09-04 ENCOUNTER — TELEPHONE (OUTPATIENT)
Age: 74
End: 2024-09-04

## 2024-09-04 DIAGNOSIS — C7B.8 METASTATIC MALIGNANT NEUROENDOCRINE TUMOR TO LIVER (HCC): Primary | ICD-10-CM

## 2024-09-04 DIAGNOSIS — C7B.8 METASTATIC MALIGNANT NEUROENDOCRINE TUMOR TO LIVER (HCC): ICD-10-CM

## 2024-09-04 LAB
ALBUMIN SERPL BCG-MCNC: 4.4 G/DL (ref 3.5–5)
ALP SERPL-CCNC: 61 U/L (ref 34–104)
ALT SERPL W P-5'-P-CCNC: 18 U/L (ref 7–52)
ANION GAP SERPL CALCULATED.3IONS-SCNC: 4 MMOL/L (ref 4–13)
AST SERPL W P-5'-P-CCNC: 21 U/L (ref 13–39)
BASOPHILS # BLD AUTO: 0.08 THOUSANDS/ÂΜL (ref 0–0.1)
BASOPHILS NFR BLD AUTO: 1 % (ref 0–1)
BILIRUB SERPL-MCNC: 1.37 MG/DL (ref 0.2–1)
BUN SERPL-MCNC: 27 MG/DL (ref 5–25)
CALCIUM SERPL-MCNC: 9.6 MG/DL (ref 8.4–10.2)
CHLORIDE SERPL-SCNC: 103 MMOL/L (ref 96–108)
CO2 SERPL-SCNC: 31 MMOL/L (ref 21–32)
CREAT SERPL-MCNC: 0.71 MG/DL (ref 0.6–1.3)
EOSINOPHIL # BLD AUTO: 0.27 THOUSAND/ÂΜL (ref 0–0.61)
EOSINOPHIL NFR BLD AUTO: 4 % (ref 0–6)
ERYTHROCYTE [DISTWIDTH] IN BLOOD BY AUTOMATED COUNT: 12.7 % (ref 11.6–15.1)
GFR SERPL CREATININE-BSD FRML MDRD: 85 ML/MIN/1.73SQ M
GLUCOSE P FAST SERPL-MCNC: 107 MG/DL (ref 65–99)
HCT VFR BLD AUTO: 39.4 % (ref 34.8–46.1)
HGB BLD-MCNC: 13.1 G/DL (ref 11.5–15.4)
IMM GRANULOCYTES # BLD AUTO: 0.01 THOUSAND/UL (ref 0–0.2)
IMM GRANULOCYTES NFR BLD AUTO: 0 % (ref 0–2)
LYMPHOCYTES # BLD AUTO: 1.87 THOUSANDS/ÂΜL (ref 0.6–4.47)
LYMPHOCYTES NFR BLD AUTO: 29 % (ref 14–44)
MCH RBC QN AUTO: 32.3 PG (ref 26.8–34.3)
MCHC RBC AUTO-ENTMCNC: 33.2 G/DL (ref 31.4–37.4)
MCV RBC AUTO: 97 FL (ref 82–98)
MONOCYTES # BLD AUTO: 0.71 THOUSAND/ÂΜL (ref 0.17–1.22)
MONOCYTES NFR BLD AUTO: 11 % (ref 4–12)
NEUTROPHILS # BLD AUTO: 3.53 THOUSANDS/ÂΜL (ref 1.85–7.62)
NEUTS SEG NFR BLD AUTO: 55 % (ref 43–75)
NRBC BLD AUTO-RTO: 0 /100 WBCS
PLATELET # BLD AUTO: 204 THOUSANDS/UL (ref 149–390)
PMV BLD AUTO: 11 FL (ref 8.9–12.7)
POTASSIUM SERPL-SCNC: 3.8 MMOL/L (ref 3.5–5.3)
PROT SERPL-MCNC: 7.6 G/DL (ref 6.4–8.4)
RBC # BLD AUTO: 4.06 MILLION/UL (ref 3.81–5.12)
SODIUM SERPL-SCNC: 138 MMOL/L (ref 135–147)
WBC # BLD AUTO: 6.47 THOUSAND/UL (ref 4.31–10.16)

## 2024-09-04 PROCEDURE — 80053 COMPREHEN METABOLIC PANEL: CPT

## 2024-09-04 PROCEDURE — 85025 COMPLETE CBC W/AUTO DIFF WBC: CPT

## 2024-09-04 PROCEDURE — 36415 COLL VENOUS BLD VENIPUNCTURE: CPT

## 2024-09-04 NOTE — TELEPHONE ENCOUNTER
Call received from patient. Requesting lab orders to be placed that she gets done monthly that were ordered by Dr Luke. Pt has not seen a new provider yet or has f/u scheduled with new provider.

## 2024-09-11 ENCOUNTER — HOSPITAL ENCOUNTER (OUTPATIENT)
Dept: INFUSION CENTER | Facility: CLINIC | Age: 74
Discharge: HOME | End: 2024-09-11
Payer: MEDICARE

## 2024-09-11 DIAGNOSIS — C7B.8 METASTATIC MALIGNANT NEUROENDOCRINE TUMOR TO LIVER (HCC): Primary | ICD-10-CM

## 2024-09-11 PROCEDURE — 96372 THER/PROPH/DIAG INJ SC/IM: CPT

## 2024-09-11 RX ORDER — LANREOTIDE ACETATE 120 MG/.5ML
120 INJECTION SUBCUTANEOUS ONCE
OUTPATIENT
Start: 2024-10-09

## 2024-09-11 RX ORDER — LANREOTIDE ACETATE 120 MG/.5ML
120 INJECTION SUBCUTANEOUS ONCE
Status: COMPLETED | OUTPATIENT
Start: 2024-09-11 | End: 2024-09-11

## 2024-09-11 RX ADMIN — LANREOTIDE ACETATE 120 MG: 120 INJECTION SUBCUTANEOUS at 10:05

## 2024-10-01 ENCOUNTER — APPOINTMENT (OUTPATIENT)
Dept: LAB | Facility: CLINIC | Age: 74
End: 2024-10-01
Payer: MEDICARE

## 2024-10-01 DIAGNOSIS — K21.9 GASTROESOPHAGEAL REFLUX DISEASE WITHOUT ESOPHAGITIS: ICD-10-CM

## 2024-10-01 DIAGNOSIS — C7B.8 METASTATIC MALIGNANT NEUROENDOCRINE TUMOR TO LIVER (HCC): ICD-10-CM

## 2024-10-01 LAB
ALBUMIN SERPL BCG-MCNC: 4.4 G/DL (ref 3.5–5)
ALP SERPL-CCNC: 75 U/L (ref 34–104)
ALT SERPL W P-5'-P-CCNC: 36 U/L (ref 7–52)
ANION GAP SERPL CALCULATED.3IONS-SCNC: 4 MMOL/L (ref 4–13)
AST SERPL W P-5'-P-CCNC: 31 U/L (ref 13–39)
BASOPHILS # BLD MANUAL: 0.07 THOUSAND/UL (ref 0–0.1)
BASOPHILS NFR MAR MANUAL: 1 % (ref 0–1)
BILIRUB SERPL-MCNC: 1.74 MG/DL (ref 0.2–1)
BUN SERPL-MCNC: 23 MG/DL (ref 5–25)
CALCIUM SERPL-MCNC: 9.6 MG/DL (ref 8.4–10.2)
CHLORIDE SERPL-SCNC: 99 MMOL/L (ref 96–108)
CO2 SERPL-SCNC: 33 MMOL/L (ref 21–32)
CREAT SERPL-MCNC: 0.77 MG/DL (ref 0.6–1.3)
EOSINOPHIL # BLD MANUAL: 0.07 THOUSAND/UL (ref 0–0.4)
EOSINOPHIL NFR BLD MANUAL: 1 % (ref 0–6)
ERYTHROCYTE [DISTWIDTH] IN BLOOD BY AUTOMATED COUNT: 12.5 % (ref 11.6–15.1)
GFR SERPL CREATININE-BSD FRML MDRD: 77 ML/MIN/1.73SQ M
GLUCOSE P FAST SERPL-MCNC: 114 MG/DL (ref 65–99)
HCT VFR BLD AUTO: 40.2 % (ref 34.8–46.1)
HGB BLD-MCNC: 13.4 G/DL (ref 11.5–15.4)
LYMPHOCYTES # BLD AUTO: 0.76 THOUSAND/UL (ref 0.6–4.47)
LYMPHOCYTES # BLD AUTO: 11 % (ref 14–44)
MCH RBC QN AUTO: 32.2 PG (ref 26.8–34.3)
MCHC RBC AUTO-ENTMCNC: 33.3 G/DL (ref 31.4–37.4)
MCV RBC AUTO: 97 FL (ref 82–98)
MONOCYTES # BLD AUTO: 0.14 THOUSAND/UL (ref 0–1.22)
MONOCYTES NFR BLD: 2 % (ref 4–12)
NEUTROPHILS # BLD MANUAL: 5.91 THOUSAND/UL (ref 1.85–7.62)
NEUTS SEG NFR BLD AUTO: 85 % (ref 43–75)
PLATELET # BLD AUTO: 203 THOUSANDS/UL (ref 149–390)
PLATELET BLD QL SMEAR: ADEQUATE
PMV BLD AUTO: 10.7 FL (ref 8.9–12.7)
POTASSIUM SERPL-SCNC: 3.9 MMOL/L (ref 3.5–5.3)
PROT SERPL-MCNC: 7.6 G/DL (ref 6.4–8.4)
RBC # BLD AUTO: 4.16 MILLION/UL (ref 3.81–5.12)
RBC MORPH BLD: NORMAL
SODIUM SERPL-SCNC: 136 MMOL/L (ref 135–147)
WBC # BLD AUTO: 6.95 THOUSAND/UL (ref 4.31–10.16)

## 2024-10-01 PROCEDURE — 36415 COLL VENOUS BLD VENIPUNCTURE: CPT

## 2024-10-01 PROCEDURE — 85007 BL SMEAR W/DIFF WBC COUNT: CPT

## 2024-10-01 PROCEDURE — 86316 IMMUNOASSAY TUMOR OTHER: CPT

## 2024-10-01 PROCEDURE — 85027 COMPLETE CBC AUTOMATED: CPT

## 2024-10-01 PROCEDURE — 80053 COMPREHEN METABOLIC PANEL: CPT

## 2024-10-01 RX ORDER — OMEPRAZOLE 40 MG/1
40 CAPSULE, DELAYED RELEASE ORAL
Qty: 90 CAPSULE | Refills: 0 | OUTPATIENT
Start: 2024-10-01

## 2024-10-01 NOTE — TELEPHONE ENCOUNTER
"    Preventive Care at the 15 Month Visit  Growth Measurements & Percentiles  Head Circumference: 18.5\" (47 cm) (82 %, Source: WHO (Girls, 0-2 years)) 82 %ile based on WHO (Girls, 0-2 years) head circumference-for-age data using vitals from 8/7/2018.   Weight: 23 lbs 10.5 oz / 10.7 kg (actual weight) / 80 %ile based on WHO (Girls, 0-2 years) weight-for-age data using vitals from 8/7/2018.    Length: 2' 7\" / 78.7 cm 63 %ile based on WHO (Girls, 0-2 years) length-for-age data using vitals from 8/7/2018.   Weight for length:83 %ile based on WHO (Girls, 0-2 years) weight-for-recumbent length data using vitals from 8/7/2018.    Your toddler s next Preventive Check-up will be at 18 months of age    Development  At this age, most children will:    feed herself    say four to 10 words    stand alone and walk    stoop to  a toy    roll or toss a ball    drink from a sippy cup or cup    Feeding Tips    Your toddler can eat table foods and drink milk and water each day.  If she is still using a bottle, it may cause problems with her teeth.  A cup is recommended.    Give your toddler foods that are healthy and can be chewed easily.    Your toddler will prefer certain foods over others. Don t worry -- this will change.    You may offer your toddler a spoon to use.  She will need lots of practice.    Avoid small, hard foods that can cause choking (such as popcorn, nuts, hot dogs and carrots).    Your toddler may eat five to six small meals a day.    Give your toddler healthy snacks such as soft fruit, yogurt, beans, cheese and crackers.    Toilet Training    This age is a little too young to begin toilet training for most children.  You can put a potty chair in the bathroom.  At this age, your toddler will think of the potty chair as a toy.    Sleep    Your toddler may go from two to one nap each day during the next 6 months.    Your toddler should sleep about 11 to 16 hours each day.    Continue your regular nighttime " Refill must be reviewed and completed by the office or provider. The refill is unable to be approved or denied by the medication management team.    Last seen 05.2023, no upcoming appt - Please review to see if the refill is appropriate.      routine which may include bathing, brushing teeth and reading.    Safety    Use an approved toddler car seat every time your child rides in the car.  Make sure to install it in the back seat.  Car seats should be rear facing until your child is 2 years of age.    Falls at this age are common.  Keep bai on all stairways and doors to dangerous areas.    Keep all medicines, cleaning supplies and poisons out of your toddler s reach.  Call the poison control center or your health care provider for directions in case your toddler swallows poison.    Put the poison control number on all phones:  1-221.351.9256.    Use safety catches on drawers and cupboards.  Cover electrical outlets with plastic covers.    Use sunscreen with a SPF of more than 15 when your toddler is outside.    Always keep the crib sides up to the highest position and the crib mattress at the lowest setting.    Teach your toddler to wash her hands and face often. This is important before eating and drinking.    Always put a helmet on your toddler if she rides in a bicycle carrier or behind you on a bike.    Never leave your child alone in the bathtub or near water.    Do not leave your child alone in the car, even if he or she is asleep.    What Your Toddler Needs    Read to your toddler often.    Hug, cuddle and kiss your toddler often.  Your toddler is gaining independence but still needs to know you love and support her.    Let your toddler make some choices. Ask her,  Would you like to wear, the green shirt or the red shirt?     Set a few clear rules and be consistent with them.    Teach your toddler about sharing.  Just know that she may not be ready for this.    Teach and praise positive behaviors.  Distract and prevent negative or dangerous behaviors.    Ignore temper tantrums.  Make sure the toddler is safe during the tantrum.  Or, you may hold your toddler gently, but firmly.    Never physically or emotionally hurt your child.  If you are  losing control, take a few deep breaths, put your child in a safe place and go into another room for a few minutes.  If possible, have someone else watch your child so you can take a break.  Call a friend, the Parent Warmline (683-416-7480) or call the Crisis Nursery (003-220-7349).    The American Academy of Pediatrics does not recommend television for children age 2 or younger.    Dental Care    Brush your child's teeth one to two times each day with a soft-bristled toothbrush.    Use a small amount (no more than pea size) of fluoridated toothpaste once daily.    Parents should do the brushing and then let the child play with the toothbrush.    Your pediatric provider will speak with your regarding the need for regular dental appointments for cleanings and check-ups starting when your child s first tooth appears. (Your child may need fluoride supplements if you have well water.)

## 2024-10-02 RX ORDER — OMEPRAZOLE 40 MG/1
40 CAPSULE, DELAYED RELEASE ORAL
Qty: 90 CAPSULE | Refills: 1 | Status: SHIPPED | OUTPATIENT
Start: 2024-10-02

## 2024-10-03 LAB — CGA SERPL-MCNC: 67.8 NG/ML (ref 0–101.8)

## 2024-10-09 ENCOUNTER — HOSPITAL ENCOUNTER (OUTPATIENT)
Dept: INFUSION CENTER | Facility: CLINIC | Age: 74
Discharge: HOME | End: 2024-10-09
Payer: MEDICARE

## 2024-10-09 DIAGNOSIS — C7B.8 METASTATIC MALIGNANT NEUROENDOCRINE TUMOR TO LIVER (HCC): Primary | ICD-10-CM

## 2024-10-09 PROCEDURE — 96372 THER/PROPH/DIAG INJ SC/IM: CPT

## 2024-10-09 RX ORDER — LANREOTIDE ACETATE 120 MG/.5ML
120 INJECTION SUBCUTANEOUS ONCE
OUTPATIENT
Start: 2024-11-06

## 2024-10-09 RX ORDER — LANREOTIDE ACETATE 120 MG/.5ML
120 INJECTION SUBCUTANEOUS ONCE
Status: COMPLETED | OUTPATIENT
Start: 2024-10-09 | End: 2024-10-09

## 2024-10-09 RX ADMIN — LANREOTIDE ACETATE 120 MG: 120 INJECTION SUBCUTANEOUS at 08:29

## 2024-10-09 NOTE — PROGRESS NOTES
Patient here for monthly lanreotide dosing, she is well, no c/o or changes reported. She tolerated injection to right gluteus and was discharged, she will schedule her next dose on discharge.

## 2024-10-09 NOTE — PATIENT INSTRUCTIONS
October 2024 Sunday Monday Tuesday Wednesday Thursday Friday Saturday             1    LAB WALK IN   7:20 AM   (5 min.)   [facility]IR   [facility]     3     4     5                6     7     8     9    INF THERAPY PLAN   8:30 AM   (30 min.)   AN INF QUICK CHAIR   [facility] 10     11     12          Cycle 1, Treatment 49      13     14     15     16  Happy Birthday!     17     18     19                20     21     22     23     24     25     26                27     28     29    CARDIO ONC OVS PG   8:05 AM   (20 min.)   Can Huertas MD   [facility] On[facility] [place] 30     31                                 Treatment Details         10/9/2024 - Cycle 1, Treatment 49      Supportive Care: lanreotide (SOMATULINE)

## 2024-10-28 NOTE — PROGRESS NOTES
Cardio-Oncology Clinic Note    Kelsey Brooks 74 y.o. 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    We checked an echocardiogram and there is mild to moderate TR.  However the valve looks structurally normal so this may be TR from diastolic dysfunction and not necessarily valve pathology from the neuroendocrine tumor.  In any event we will need to follow this closely.    Plan:  I recommend repeating echo.    # Pulmonary sarcoid  bx proven.  Follows with pulm.  We performed screening for cardiac involvement.  Echo shows normal EF.  Holter shows no concerning findings.    # HTN  On HCTZ 12.5 (started years ago for mild unilateral leg swelling and on it ever since)  BP reasonable    # HLD  LDL was 129.    ASCVD risk score is significantly elevated.  Furthermore she was recently diagnosed with diabetes.  In this setting I do recommend statin.  Start Lipitor 20 mg daily.    # DM2  A1c 6.6 -->  6.3  Repeat A1c with lifestyle changes    # Diastolic dysfunction  Abnormal relaxation on echo  On HCTZ in the past for mild diuretic effect  On exam - no volume overload.      Today's Plan Summary:  See above assessment/plan for full details of today's plan.  Briefly,     Repeat A1c  Check echo  Start Lipitor 20              Reason For Visit / Chief Complaint:  F/u neuroendocrine tumor, dizziness, HTN    HPI:   Kelsey Brooks is a 74 y.o.  female with history as noted in the problem list and further detailed in the above assessment and plan.    Initial:    Sept 202  Referred by Dr. Luke for a new patient visit for neuroendocrine tumor.  As  above, the patient has a history of a neuroendocrine tumor diagnosed in 2020.  She had surgery and radiation and is currently on lanreotide.  She also has biopsy-proven lung sarcoid.  At her last oncology visit she reported some dizziness and possible irregular heartbeat.  In this setting (neuroendocrine tumor and pulmonary sarcoid) she was referred to cardio oncology.  Today, the patient reports - feeling overall pretty well.  No chest pain.  No SOB at rest.  Does get INFANTE.   Gets lightheadedness episodes.  Can occur while walking.  Can occur while sitting.   Usually lasts for seconds and then goes away.   During 1 episode recently - BP cuff told her the HR may be irregular.   No syncope.   Retired.  Did office work.  .  2 children.    Interval:  Last visit -->   felt stable.  Mild INFANTE with significant activity (stairs, hills).    Plan last visit -->     Check A1c for elevated fasting glucose on labs    A1c - 6.6%.  I did call the patient and we discussed this result by phone.  Technically this is in the diabetic range.  Recommend dietary changes, exercise, weight loss.  She will follow-up with her primary care doctor about this A1c result as well.     Got A1c down to 6.3.    Oncology in may - Continues on lanreotide    Labs 10/1/2024 reviewed.  Stable renal function.    Today - feeling well.   No CP.   No Sob at rest.   Stable INFANTE.  No leg edema.         Cardiac Imaging personally reviewed:  EKG 2-23-23  NSR    9-1-23  Sinus rhythm with sinus arrhythmia.  1 PVC.  Possible PACs.  PRWP.     10-29-24  Sinus bradycardia with sinus arrhythmia and nonspecific T wave changes       Holter or event monitor Holter - 10/18/23   Sinus rhythm.  Avg HR 71 ()  PAC's - 0.3%  PVC's - 0.7%  4 atrial runs (longest 13 beats)       Echo 2016  Ef 60%. Grade 1 diastolic dysf  Mild-mod TR  RVSP 35    Echo - 10/16/23   borderline LVH.   EF 60%.  Abnormal relaxation.  Normal RV size and function.  Mild-moderate TR on my review  "(the valve looks structurally normal).  RVSP 40 mmHg.       KOMAL    Cardiac MRI    Stress testing    Coronary CTA or [facility]    RHC    CPET              Patient Active Problem List    Diagnosis Date Noted    Chronic obstructive pulmonary disease, unspecified COPD type (HCC) 04/29/2024    Chronic intractable headache 04/29/2024    Elevated fasting glucose 04/16/2024    Primary hypertension 04/16/2024    Nonrheumatic tricuspid valve regurgitation 04/16/2024    Reactive airway disease 07/19/2021    Bone metastasis 01/28/2021    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 (HCC) 12/11/2020    Trigger middle finger of right hand 08/26/2020    Other tear of medial meniscus, current injury, right knee, initial encounter 08/21/2019    Complex tear of medial meniscus of right knee as current injury 07/12/2019    Diastolic dysfunction 06/04/2019    Edema of right lower extremity 06/02/2019    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    Sarcoidosis, lung (HCC) 12/20/2018    Mediastinal adenopathy 11/21/2018    Liver mass 11/13/2018    Positional vertigo 03/26/2018    IPMN (intraductal papillary mucinous neoplasm) 10/08/2015    Focal nodular hyperplasia of liver 03/19/2015       Past Medical History:   Diagnosis Date    Abdominal pain     Acute tonsillitis     Breast pain, left     Cancer (HCC)     liver&small intestine    COVID-19 07/2022    Edema of both lower extremities     GERD (gastroesophageal reflux disease)     GERD without esophagitis     Kidney stone     \" [place]\"    Lesion of liver     Liver mass     Lymphadenopathy     Mediastinal lymphadenopathy     Neoplasm of digestive system     Orthostatic lightheadedness     Sarcoidosis     Vertigo        Allergies   Allergen Reactions    Bactrim [Sulfamethoxazole-Trimethoprim] Hives    Clam Shell - Food Allergy Vomiting       Current Outpatient Medications   Medication Instructions    Cholecalciferol (VITAMIN D) 2000 units CAPS 1 tablet, Oral, Daily    docusate sodium " (COLACE) 100 mg, Oral, 2 times daily    Fluticasone-Salmeterol (Advair) 100-50 mcg/dose inhaler 1 puff, Inhalation, 2 times daily, Rinse mouth after use.    hydroCHLOROthiazide 12.5 mg tablet Take 1 tablet by mouth once daily    LANREOTIDE ACETATE SC Subcutaneous, Every 30 days    Multiple Vitamin (MULTIVITAMIN) tablet 1 tablet, Oral, Daily    omeprazole (PRILOSEC) 40 mg, Oral, Daily before breakfast    ondansetron (ZOFRAN-ODT) 8 mg, Oral, Every 8 hours PRN    triamcinolone (KENALOG) 0.1 % ointment Topical, 2 times daily, Apply to areas of itch, Apply thin coat to groin area for maximum of 3 days       Social History     Socioeconomic History    Marital status:      Spouse name: Not on file    Number of children: Not on file    Years of education: Not on file    Highest education level: Not on file   Occupational History    Not on file   Tobacco Use    Smoking status: Never    Smokeless tobacco: Never   Vaping Use    Vaping status: Never Used   Substance and Sexual Activity    Alcohol use: Yes     Comment: occ wine- very seldom    Drug use: No    Sexual activity: Not Currently   Other Topics Concern    Not on file   Social History Narrative    Not on file     Social Determinants of Health     Financial Resource Strain: Low Risk  (2/16/2023)    Overall Financial Resource Strain (CARDIA)     Difficulty of Paying Living Expenses: Not very hard   Food Insecurity: Not on file   Transportation Needs: No Transportation Needs (2/16/2023)    PRAPARE - Transportation     Lack of Transportation (Medical): No     Lack of Transportation (Non-Medical): No   Physical Activity: Not on file   Stress: Not on file   Social Connections: Not on file   Intimate Partner Violence: Not on file   Housing Stability: Not on file       Family History   Problem Relation Age of Onset    Alzheimer's disease Mother     Parkinsonism Father     Psoriasis Father     Heart failure Father         at older age    Psoriasis Sister     Psoriasis  "Sister     Stroke Sister     Colon cancer Maternal Grandfather     No Known Problems Daughter     No Known Problems Daughter        Physical Exam:  Blood pressure 130/64, pulse 57, height 5' 2\" (1.575 m), weight 68 kg (150 lb), SpO2 99%, not currently breastfeeding.  Body mass index is 27.44 kg/m².  Wt Readings from Last 3 Encounters:   10/29/24 68 kg (150 lb)   08/28/24 71 kg (156 lb 8 oz)   05/30/24 78 kg (172 lb)     Physical Exam  Vitals reviewed.   Constitutional:       General: She is not in acute distress.     Appearance: She is not toxic-appearing.   Neck:      Comments: JVP  est 6 cm h20  Cardiovascular:      Rate and Rhythm: Normal rate and regular rhythm.      Heart sounds: No murmur heard.     No friction rub. No gallop.   Pulmonary:      Breath sounds: Normal breath sounds. No wheezing, rhonchi or rales.   Musculoskeletal:      Comments: There is no leg edema   Neurological:      Mental Status: She is alert.         Labs & Results:  Lab Results   Component Value Date    SODIUM 136 10/01/2024    K 3.9 10/01/2024    CL 99 10/01/2024    CO2 33 (H) 10/01/2024    BUN 23 10/01/2024    CREATININE 0.77 10/01/2024    GLUC 147 (H) 08/22/2024    CALCIUM 9.6 10/01/2024     No results found for: \"NTBNP\"       Thank you for the opportunity to participate in the care of this patient.    Can Huertas MD, [facility]  Staff Cardiologist  Director of Cardio-Oncology  [facility]  "

## 2024-10-29 ENCOUNTER — OFFICE VISIT (OUTPATIENT)
Dept: CARDIOLOGY CLINIC | Facility: CLINIC | Age: 74
End: 2024-10-29
Payer: MEDICARE

## 2024-10-29 ENCOUNTER — APPOINTMENT (OUTPATIENT)
Dept: LAB | Facility: CLINIC | Age: 74
End: 2024-10-29
Payer: MEDICARE

## 2024-10-29 VITALS
HEIGHT: 62 IN | HEART RATE: 57 BPM | WEIGHT: 150 LBS | BODY MASS INDEX: 27.6 KG/M2 | OXYGEN SATURATION: 99 % | DIASTOLIC BLOOD PRESSURE: 64 MMHG | SYSTOLIC BLOOD PRESSURE: 130 MMHG

## 2024-10-29 DIAGNOSIS — I36.1 NONRHEUMATIC TRICUSPID VALVE REGURGITATION: Primary | ICD-10-CM

## 2024-10-29 DIAGNOSIS — E11.9 TYPE 2 DIABETES MELLITUS WITHOUT COMPLICATION, WITHOUT LONG-TERM CURRENT USE OF INSULIN (HCC): ICD-10-CM

## 2024-10-29 DIAGNOSIS — C7B.8 METASTATIC MALIGNANT NEUROENDOCRINE TUMOR TO LIVER (HCC): Primary | ICD-10-CM

## 2024-10-29 DIAGNOSIS — I38 HEART VALVE DISEASE: ICD-10-CM

## 2024-10-29 DIAGNOSIS — C7B.8 METASTATIC MALIGNANT NEUROENDOCRINE TUMOR TO LIVER (HCC): ICD-10-CM

## 2024-10-29 DIAGNOSIS — I10 PRIMARY HYPERTENSION: ICD-10-CM

## 2024-10-29 DIAGNOSIS — D86.0 PULMONARY SARCOIDOSIS (HCC): ICD-10-CM

## 2024-10-29 DIAGNOSIS — E78.2 MIXED HYPERLIPIDEMIA: ICD-10-CM

## 2024-10-29 LAB
ALBUMIN SERPL BCG-MCNC: 4.3 G/DL (ref 3.5–5)
ALP SERPL-CCNC: 69 U/L (ref 34–104)
ALT SERPL W P-5'-P-CCNC: 16 U/L (ref 7–52)
ANION GAP SERPL CALCULATED.3IONS-SCNC: 5 MMOL/L (ref 4–13)
AST SERPL W P-5'-P-CCNC: 20 U/L (ref 13–39)
BASOPHILS # BLD AUTO: 0.06 THOUSANDS/ΜL (ref 0–0.1)
BASOPHILS NFR BLD AUTO: 1 % (ref 0–1)
BILIRUB SERPL-MCNC: 1.04 MG/DL (ref 0.2–1)
BUN SERPL-MCNC: 26 MG/DL (ref 5–25)
CALCIUM SERPL-MCNC: 9.8 MG/DL (ref 8.4–10.2)
CHLORIDE SERPL-SCNC: 102 MMOL/L (ref 96–108)
CO2 SERPL-SCNC: 32 MMOL/L (ref 21–32)
CREAT SERPL-MCNC: 0.66 MG/DL (ref 0.6–1.3)
EOSINOPHIL # BLD AUTO: 0.16 THOUSAND/ΜL (ref 0–0.61)
EOSINOPHIL NFR BLD AUTO: 2 % (ref 0–6)
ERYTHROCYTE [DISTWIDTH] IN BLOOD BY AUTOMATED COUNT: 12.2 % (ref 11.6–15.1)
EST. AVERAGE GLUCOSE BLD GHB EST-MCNC: 134 MG/DL
GFR SERPL CREATININE-BSD FRML MDRD: 87 ML/MIN/1.73SQ M
GLUCOSE P FAST SERPL-MCNC: 107 MG/DL (ref 65–99)
HBA1C MFR BLD: 6.3 %
HCT VFR BLD AUTO: 39.1 % (ref 34.8–46.1)
HGB BLD-MCNC: 12.7 G/DL (ref 11.5–15.4)
IMM GRANULOCYTES # BLD AUTO: 0.01 THOUSAND/UL (ref 0–0.2)
IMM GRANULOCYTES NFR BLD AUTO: 0 % (ref 0–2)
LYMPHOCYTES # BLD AUTO: 1.86 THOUSANDS/ΜL (ref 0.6–4.47)
LYMPHOCYTES NFR BLD AUTO: 28 % (ref 14–44)
MCH RBC QN AUTO: 31.7 PG (ref 26.8–34.3)
MCHC RBC AUTO-ENTMCNC: 32.5 G/DL (ref 31.4–37.4)
MCV RBC AUTO: 98 FL (ref 82–98)
MONOCYTES # BLD AUTO: 0.59 THOUSAND/ΜL (ref 0.17–1.22)
MONOCYTES NFR BLD AUTO: 9 % (ref 4–12)
NEUTROPHILS # BLD AUTO: 4.05 THOUSANDS/ΜL (ref 1.85–7.62)
NEUTS SEG NFR BLD AUTO: 60 % (ref 43–75)
NRBC BLD AUTO-RTO: 0 /100 WBCS
PLATELET # BLD AUTO: 202 THOUSANDS/UL (ref 149–390)
PMV BLD AUTO: 11 FL (ref 8.9–12.7)
POTASSIUM SERPL-SCNC: 4 MMOL/L (ref 3.5–5.3)
PROT SERPL-MCNC: 7.6 G/DL (ref 6.4–8.4)
RBC # BLD AUTO: 4.01 MILLION/UL (ref 3.81–5.12)
SODIUM SERPL-SCNC: 139 MMOL/L (ref 135–147)
WBC # BLD AUTO: 6.73 THOUSAND/UL (ref 4.31–10.16)

## 2024-10-29 PROCEDURE — 85025 COMPLETE CBC W/AUTO DIFF WBC: CPT

## 2024-10-29 PROCEDURE — 99214 OFFICE O/P EST MOD 30 MIN: CPT | Performed by: INTERNAL MEDICINE

## 2024-10-29 PROCEDURE — 93000 ELECTROCARDIOGRAM COMPLETE: CPT | Performed by: INTERNAL MEDICINE

## 2024-10-29 PROCEDURE — 86316 IMMUNOASSAY TUMOR OTHER: CPT

## 2024-10-29 PROCEDURE — 80053 COMPREHEN METABOLIC PANEL: CPT

## 2024-10-29 PROCEDURE — 36415 COLL VENOUS BLD VENIPUNCTURE: CPT

## 2024-10-29 PROCEDURE — 83036 HEMOGLOBIN GLYCOSYLATED A1C: CPT

## 2024-10-29 RX ORDER — ATORVASTATIN CALCIUM 20 MG/1
20 TABLET, FILM COATED ORAL
Qty: 90 TABLET | Refills: 3 | Status: SHIPPED | OUTPATIENT
Start: 2024-10-29

## 2024-10-29 NOTE — PATIENT INSTRUCTIONS
I will order the A1c so we can get that checked  I recommend an echocardiogram to check the tricuspid valve  Start atorvastatin 20 mg daily for cholesterol

## 2024-10-31 LAB — CGA SERPL-MCNC: 57.9 NG/ML (ref 0–101.8)

## 2024-11-06 ENCOUNTER — HOSPITAL ENCOUNTER (OUTPATIENT)
Dept: INFUSION CENTER | Facility: CLINIC | Age: 74
Discharge: HOME | End: 2024-11-06
Payer: MEDICARE

## 2024-11-06 DIAGNOSIS — C7B.8 METASTATIC MALIGNANT NEUROENDOCRINE TUMOR TO LIVER (HCC): Primary | ICD-10-CM

## 2024-11-06 PROCEDURE — 96372 THER/PROPH/DIAG INJ SC/IM: CPT

## 2024-11-06 RX ORDER — LANREOTIDE ACETATE 120 MG/.5ML
120 INJECTION SUBCUTANEOUS ONCE
OUTPATIENT
Start: 2024-12-04

## 2024-11-06 RX ORDER — LANREOTIDE ACETATE 120 MG/.5ML
120 INJECTION SUBCUTANEOUS ONCE
Status: COMPLETED | OUTPATIENT
Start: 2024-11-06 | End: 2024-11-06

## 2024-11-06 RX ADMIN — LANREOTIDE ACETATE 120 MG: 120 INJECTION SUBCUTANEOUS at 08:18

## 2024-11-06 NOTE — PROGRESS NOTES
Pt resting comfortably with no complaints. Lanreotide shot administered as ordered in left gluteal. Pt tolerated well. Declines AVS, aware of next appointment 12/4 0830

## 2024-11-18 ENCOUNTER — RESULTS FOLLOW-UP (OUTPATIENT)
Dept: CARDIOLOGY CLINIC | Facility: CLINIC | Age: 74
End: 2024-11-18

## 2024-11-18 ENCOUNTER — HOSPITAL ENCOUNTER (OUTPATIENT)
Dept: NON INVASIVE DIAGNOSTICS | Facility: CLINIC | Age: 74
Discharge: HOME | End: 2024-11-18
Payer: MEDICARE

## 2024-11-18 VITALS
SYSTOLIC BLOOD PRESSURE: 130 MMHG | BODY MASS INDEX: 27.6 KG/M2 | HEART RATE: 57 BPM | WEIGHT: 150 LBS | DIASTOLIC BLOOD PRESSURE: 64 MMHG | HEIGHT: 62 IN

## 2024-11-18 DIAGNOSIS — I36.1 NONRHEUMATIC TRICUSPID VALVE REGURGITATION: ICD-10-CM

## 2024-11-18 DIAGNOSIS — C7B.8 METASTATIC MALIGNANT NEUROENDOCRINE TUMOR TO LIVER (HCC): ICD-10-CM

## 2024-11-18 LAB
AORTIC ROOT: 2.6 CM
APICAL FOUR CHAMBER EJECTION FRACTION: 57 %
ASCENDING AORTA: 3.1 CM
BSA FOR ECHO PROCEDURE: 1.69 M2
E WAVE DECELERATION TIME: 326 MS
E/A RATIO: 0.88
FRACTIONAL SHORTENING: 27 (ref 28–44)
GLOBAL LONGITUIDAL STRAIN: -20 %
INTERVENTRICULAR SEPTUM IN DIASTOLE (PARASTERNAL SHORT AXIS VIEW): 1.2 CM
INTERVENTRICULAR SEPTUM: 1.2 CM (ref 0.6–1.1)
LAAS-AP2: 24 CM2
LAAS-AP4: 23.8 CM2
LEFT ATRIUM SIZE: 4.5 CM
LEFT ATRIUM VOLUME (MOD BIPLANE): 77 ML
LEFT ATRIUM VOLUME INDEX (MOD BIPLANE): 45.6 ML/M2
LEFT INTERNAL DIMENSION IN SYSTOLE: 3.5 CM (ref 2.1–4)
LEFT VENTRICULAR INTERNAL DIMENSION IN DIASTOLE: 4.8 CM (ref 3.5–6)
LEFT VENTRICULAR POSTERIOR WALL IN END DIASTOLE: 0.9 CM
LEFT VENTRICULAR STROKE VOLUME: 58 ML
LVSV (TEICH): 58 ML
MV E'TISSUE VEL-SEP: 8 CM/S
MV PEAK A VEL: 0.59 M/S
MV PEAK E VEL: 52 CM/S
MV STENOSIS PRESSURE HALF TIME: 95 MS
MV VALVE AREA P 1/2 METHOD: 2.3
RA PRESSURE ESTIMATED: 10 MMHG
RIGHT ATRIUM AREA SYSTOLE A4C: 23.6 CM2
RIGHT VENTRICLE ID DIMENSION: 4.3 CM
RV PSP: 37 MMHG
SL CV LEFT ATRIUM LENGTH A2C: 5.6 CM
SL CV LV EF: 57
SL CV PED ECHO LEFT VENTRICLE DIASTOLIC VOLUME (MOD BIPLANE) 2D: 109 ML
SL CV PED ECHO LEFT VENTRICLE SYSTOLIC VOLUME (MOD BIPLANE) 2D: 51 ML
TR MAX PG: 27 MMHG
TR PEAK VELOCITY: 2.6 M/S
TRICUSPID ANNULAR PLANE SYSTOLIC EXCURSION: 3.2 CM
TRICUSPID VALVE PEAK REGURGITATION VELOCITY: 2.58 M/S

## 2024-11-18 PROCEDURE — 93356 MYOCRD STRAIN IMG SPCKL TRCK: CPT

## 2024-11-18 PROCEDURE — 93306 TTE W/DOPPLER COMPLETE: CPT

## 2024-11-18 PROCEDURE — 93306 TTE W/DOPPLER COMPLETE: CPT | Performed by: INTERNAL MEDICINE

## 2024-11-18 PROCEDURE — 93356 MYOCRD STRAIN IMG SPCKL TRCK: CPT | Performed by: INTERNAL MEDICINE

## 2024-11-18 NOTE — RESULT ENCOUNTER NOTE
Echo - 11/18/24   borderline LVH.  EF 57%.  GLS -20%. Grade 1 DD.  Right Ventricle is mildly dilated. Systolic function is normal.  Biatrial enlargement  mild to moderate TR. valve appears structurally normal.  The estimated right ventricular systolic pressure is 37.00 mmHg.  IVC borderline dilated.  Prior echo 10/16/2023.  On direct comparison there are no significant changes.  TR appears stable.

## 2024-11-26 ENCOUNTER — APPOINTMENT (OUTPATIENT)
Dept: LAB | Facility: CLINIC | Age: 74
End: 2024-11-26
Payer: MEDICARE

## 2024-11-26 DIAGNOSIS — C7B.8 METASTATIC MALIGNANT NEUROENDOCRINE TUMOR TO LIVER (HCC): ICD-10-CM

## 2024-11-26 LAB
ALBUMIN SERPL BCG-MCNC: 4.4 G/DL (ref 3.5–5)
ALP SERPL-CCNC: 69 U/L (ref 34–104)
ALT SERPL W P-5'-P-CCNC: 17 U/L (ref 7–52)
ANION GAP SERPL CALCULATED.3IONS-SCNC: 8 MMOL/L (ref 4–13)
AST SERPL W P-5'-P-CCNC: 25 U/L (ref 13–39)
BASOPHILS # BLD AUTO: 0.07 THOUSANDS/ΜL (ref 0–0.1)
BASOPHILS NFR BLD AUTO: 1 % (ref 0–1)
BILIRUB SERPL-MCNC: 1.24 MG/DL (ref 0.2–1)
BUN SERPL-MCNC: 27 MG/DL (ref 5–25)
CALCIUM SERPL-MCNC: 9.8 MG/DL (ref 8.4–10.2)
CHLORIDE SERPL-SCNC: 100 MMOL/L (ref 96–108)
CO2 SERPL-SCNC: 32 MMOL/L (ref 21–32)
CREAT SERPL-MCNC: 0.71 MG/DL (ref 0.6–1.3)
EOSINOPHIL # BLD AUTO: 0.3 THOUSAND/ΜL (ref 0–0.61)
EOSINOPHIL NFR BLD AUTO: 4 % (ref 0–6)
ERYTHROCYTE [DISTWIDTH] IN BLOOD BY AUTOMATED COUNT: 12.4 % (ref 11.6–15.1)
GFR SERPL CREATININE-BSD FRML MDRD: 84 ML/MIN/1.73SQ M
GLUCOSE P FAST SERPL-MCNC: 108 MG/DL (ref 65–99)
HCT VFR BLD AUTO: 37.7 % (ref 34.8–46.1)
HGB BLD-MCNC: 12.4 G/DL (ref 11.5–15.4)
IMM GRANULOCYTES # BLD AUTO: 0.02 THOUSAND/UL (ref 0–0.2)
IMM GRANULOCYTES NFR BLD AUTO: 0 % (ref 0–2)
LYMPHOCYTES # BLD AUTO: 2.16 THOUSANDS/ΜL (ref 0.6–4.47)
LYMPHOCYTES NFR BLD AUTO: 27 % (ref 14–44)
MCH RBC QN AUTO: 31.9 PG (ref 26.8–34.3)
MCHC RBC AUTO-ENTMCNC: 32.9 G/DL (ref 31.4–37.4)
MCV RBC AUTO: 97 FL (ref 82–98)
MONOCYTES # BLD AUTO: 0.76 THOUSAND/ΜL (ref 0.17–1.22)
MONOCYTES NFR BLD AUTO: 9 % (ref 4–12)
NEUTROPHILS # BLD AUTO: 4.75 THOUSANDS/ΜL (ref 1.85–7.62)
NEUTS SEG NFR BLD AUTO: 59 % (ref 43–75)
NRBC BLD AUTO-RTO: 0 /100 WBCS
PLATELET # BLD AUTO: 196 THOUSANDS/UL (ref 149–390)
PMV BLD AUTO: 11.1 FL (ref 8.9–12.7)
POTASSIUM SERPL-SCNC: 3.6 MMOL/L (ref 3.5–5.3)
PROT SERPL-MCNC: 7.2 G/DL (ref 6.4–8.4)
RBC # BLD AUTO: 3.89 MILLION/UL (ref 3.81–5.12)
SODIUM SERPL-SCNC: 140 MMOL/L (ref 135–147)
WBC # BLD AUTO: 8.06 THOUSAND/UL (ref 4.31–10.16)

## 2024-11-26 PROCEDURE — 85025 COMPLETE CBC W/AUTO DIFF WBC: CPT

## 2024-11-26 PROCEDURE — 36415 COLL VENOUS BLD VENIPUNCTURE: CPT

## 2024-11-26 PROCEDURE — 86316 IMMUNOASSAY TUMOR OTHER: CPT

## 2024-11-26 PROCEDURE — 80053 COMPREHEN METABOLIC PANEL: CPT

## 2024-11-28 LAB — CGA SERPL-MCNC: 56.8 NG/ML (ref 0–101.8)

## 2024-12-04 ENCOUNTER — HOSPITAL ENCOUNTER (OUTPATIENT)
Dept: INFUSION CENTER | Facility: CLINIC | Age: 74
Discharge: HOME | End: 2024-12-04
Payer: MEDICARE

## 2024-12-04 DIAGNOSIS — C7B.8 METASTATIC MALIGNANT NEUROENDOCRINE TUMOR TO LIVER (HCC): Primary | ICD-10-CM

## 2024-12-04 PROCEDURE — 96372 THER/PROPH/DIAG INJ SC/IM: CPT

## 2024-12-04 RX ORDER — LANREOTIDE ACETATE 120 MG/.5ML
120 INJECTION SUBCUTANEOUS ONCE
OUTPATIENT
Start: 2025-01-01

## 2024-12-04 RX ORDER — LANREOTIDE ACETATE 120 MG/.5ML
120 INJECTION SUBCUTANEOUS ONCE
Status: COMPLETED | OUTPATIENT
Start: 2024-12-04 | End: 2024-12-04

## 2024-12-04 RX ADMIN — LANREOTIDE ACETATE 120 MG: 120 INJECTION SUBCUTANEOUS at 08:23

## 2024-12-04 NOTE — ASSESSMENT & PLAN NOTE
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 Currently the patient received long-acting somatostatin analog with lanreotide 120 mg IV every 28 days, which was first initiated on January 18, 2021.  On January 20, 2021, the patient underwent resection of the primary low-grade neuroendocrine tumor of the small intestine, through a small bowel resection with anastomosis.  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  From 5/18/2021 to March 8, 2021, she received palliative external beam radiotherapy to painful bone metastatic disease involving T10 and T12 vertebral bodies.    Interim assessment: Overall, the patient feels well.  Clinically, she has stable, metastatic well-differentiated neuroendocrine tumor of the small intestine with liver and bone metastatic disease.  Her serum chromogranin A is stable.  The patient does not have new symptoms.    Plan:  Continue long-acting somatostatin analog therapy with lanreotide 120 mg subcutaneous injections every 28 days.  Duration of long-acting somatostatin analog therapy is lifelong.  Contrast-enhanced CT scan of the chest, abdomen and pelvis and serum chromogranin A in February 2025   Return to medical oncology clinic for history and physical exam and to review results of serum chromogranin A and cross-sectional imaging studies of the chest abdomen and pelvis in early March 2025

## 2024-12-04 NOTE — PROGRESS NOTES
Pt here for lanreotide injection, offers no complaints. Lanreotide given in R gluteal site without any issues. Pt aware of next appointment 1/2 @ 8am. Declines AVS.

## 2024-12-04 NOTE — PROGRESS NOTES
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 Currently the patient received long-acting somatostatin analog with lanreotide 120 mg IV every 28 days, which was first initiated on 2021.  On 2021, the patient underwent resection of the primary low-grade neuroendocrine tumor of the small intestine, through a small bowel resection with anastomosis.  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  From 2021 to 2021, she received palliative external beam radiotherapy to painful bone metastatic disease involving T10 and T12 vertebral bodies.    Interim assessment: Overall, the patient feels well.  Clinically, she has stable, metastatic well-differentiated neuroendocrine tumor of the small intestine with liver and bone metastatic disease.  Her serum chromogranin A is stable.  The patient does not have new symptoms.    Plan:  Continue long-acting somatostatin analog therapy with lanreotide 120 mg subcutaneous injections every 28 days.  Duration of long-acting somatostatin analog therapy is lifelong.  Contrast-enhanced CT scan of the chest, abdomen and pelvis and serum chromogranin A in 2025   Return to medical oncology clinic for history and physical exam and to review results of serum chromogranin A and cross-sectional imaging studies of the chest abdomen and pelvis in early 2025       Carcinoid heart disease  The patient needs close monitoring of cardiac function specifically right left  ventricular ejection fraction, as well as degree of tricuspid and pulmonary valves function.  If the patient has further progression of her carcinoid heart disease, she may benefit from regional hepatic therapies, such as TACE or Y90 chemoembolization.  In addition, the patient may benefit from PRRT with lutetium 177 dotatate (Lutathera) as well. Continue periodic assessment by the cardiology service.  The patient is at risk for right cardiac failure because of presence of liver metastatic disease originating from a primary well-differentiated neuroendocrine tumor of the small intestine.  The patient is at risk for progression of carcinoid heart disease.      Patient understands and agrees with my management recommendations and plan of care. I answered questions to her satisfaction.  Pulmonary already will take a quick shower in the usual first           History of Present Illness     HPI  Kelsey Brooks is a 74 y.o. 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 Currently the patient received long-acting somatostatin analog with lanreotide 120 mg IV every 28 days, which was first initiated on January 18, 2021.  On January 20, 2021, the patient underwent resection of the primary low-grade neuroendocrine tumor of the small intestine, through a small bowel resection with anastomosis.  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  From 5/18/2021 to March 8, 2021, she received palliative external beam radiotherapy to painful bone metastatic disease involving T10 and T12 vertebral bodies.    Interim history: Today the patient is doing well.  She does not have new complaints.  She denies new gastrointestinal symptoms such as anorexia, nausea, vomiting, diarrhea, constipation, abdominal pain, abdominal distention, jaundice,  hematemesis, melena, or hematochezia.  She denies new systemic, cardiorespiratory, genitourinary, gynecological, muscle skeletal, or neurologic symptoms.  She refers unchanged, chronic fatigue, and mild dyspnea with moderate exertion. Of note, the patient's most recent 2D echocardiogram on November 18, 2024 shows some evidence of right cardiac dysfunction most likely representing carcinoid heart disease.  Specifically, right ventricular cavity size is mildly dilated. Systolic function is normal.  The right atrium is dilated. There is mild to moderate regurgitation. The estimated right ventricular systolic pressure is 37.00 mmHg. Left ventricular cavity size is normal. Wall thickness is borderline increased. The left ventricular ejection fraction is 57% by single dimension measurement. Systolic function is normal. Global longitudinal strain is normal at -20%. Wall motion is normal. Diastolic function is mildly abnormal, consistent with grade I (abnormal) relaxation.  Overall, findings on 2D echocardiogram from November 2024 are unchanged compared to prior echocardiogram from October 16, 2023.      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 Liver mass, needle core biopsy:  - Metastatic well-differentiated neuroendocrine tumor, G2     1/18/2021 -  Chemotherapy    Lanreotide injections (monthly)     1/20/2021 Surgery    Resection of small bowel and anastomosis, segment 7 liver ablation    A. Small intestine, resection:  - Well- differentiated neuroendocrine tumor (up to 2.1 cm), G2, at least three foci.  - All margins are negative for tumor.  - Three of thirteen lymph nodes are positive for tumor (3/13).     B. 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        2/28/2024 -  Cancer Staged     Staging form: Liver (Excluding Intrahepatic Bile Ducts), AJCC 8th Edition  - Pathologic: Stage IVB (pT3, pN1, cM1) - Signed by Eric Pierre MD on 2/28/2024       Bone metastasis   1/28/2021 Initial Diagnosis    Bone metastasis (HCC)     2/18/2021 - 3/3/2021 Radiation    Treatment:  Course: C1    Plan ID Energy Fractions Dose per Fraction (cGy) Dose Correction (cGy) Total Dose Delivered (cGy) Elapsed Days   T10_T12 Spine 10X/6X 10 / 10 300 0 3,000 13                      Review of Systems   Constitutional:  Negative for activity change, appetite change, chills, diaphoresis, fatigue, fever and unexpected weight change.   HENT:  Negative for congestion, dental problem, ear discharge, ear pain, facial swelling, hearing loss, mouth sores, nosebleeds, postnasal drip, rhinorrhea, sinus pain, sneezing, sore throat, tinnitus, trouble swallowing and voice change.    Eyes:  Negative for photophobia, pain, discharge, redness, itching and visual disturbance.   Respiratory:  Negative for apnea, cough, choking, chest tightness, shortness of breath, wheezing and stridor.    Cardiovascular:  Positive for leg swelling. Negative for chest pain and palpitations.        Mild dyspnea with moderate exertion   Gastrointestinal:  Negative for abdominal distention, abdominal pain, anal bleeding, blood in stool, constipation, diarrhea, nausea, rectal pain and vomiting.   Endocrine: Negative for cold intolerance and heat intolerance.   Genitourinary:  Negative for decreased urine volume, difficulty urinating, dysuria, enuresis, flank pain, frequency, hematuria and urgency.   Musculoskeletal:  Negative for arthralgias, back pain, gait problem, joint swelling, myalgias, neck pain and neck stiffness.   Skin:  Negative for color change, pallor, rash and wound.   Neurological:  Negative for dizziness, tremors, seizures, syncope, facial asymmetry, speech difficulty, weakness, light-headedness, numbness and headaches.   Hematological:  Negative for  adenopathy. Does not bruise/bleed easily.   Psychiatric/Behavioral:  Negative for behavioral problems, confusion, dysphoric mood and sleep disturbance. The patient is not nervous/anxious.         Objective   LMP  (LMP Unknown)      Physical Exam  Vitals reviewed.   Constitutional:       General: She is not in acute distress.     Appearance: Normal appearance. She is normal weight. She is not toxic-appearing.   HENT:      Head: Normocephalic and atraumatic.      Nose: Nose normal. No congestion.      Mouth/Throat:      Mouth: Mucous membranes are moist.      Pharynx: Oropharynx is clear. No oropharyngeal exudate or posterior oropharyngeal erythema.   Eyes:      General: No scleral icterus.     Extraocular Movements: Extraocular movements intact.      Conjunctiva/sclera: Conjunctivae normal.      Pupils: Pupils are equal, round, and reactive to light.   Cardiovascular:      Rate and Rhythm: Normal rate and regular rhythm.      Pulses: Normal pulses.      Heart sounds: Murmur (Faint, mild, 1+ out of 6 mid diastolic murmur in the pulmonic and tricuspid regions) heard.      No friction rub. No gallop.   Pulmonary:      Effort: Pulmonary effort is normal. No respiratory distress.      Breath sounds: Normal breath sounds. No stridor. No wheezing, rhonchi or rales.   Chest:      Chest wall: No tenderness.   Abdominal:      General: Bowel sounds are normal. There is no distension.      Palpations: Abdomen is soft. There is no mass.      Tenderness: There is no abdominal tenderness. There is no right CVA tenderness, left CVA tenderness, guarding or rebound.      Hernia: No hernia is present.   Musculoskeletal:         General: No swelling, tenderness or deformity. Normal range of motion.      Cervical back: Normal range of motion and neck supple. No rigidity or tenderness.      Right lower leg: No edema.      Left lower leg: No edema.   Lymphadenopathy:      Cervical: No cervical adenopathy.   Skin:     General: Skin is warm  and dry.      Capillary Refill: Capillary refill takes less than 2 seconds.      Coloration: Skin is not jaundiced or pale.      Findings: No bruising, erythema, lesion or rash.   Neurological:      General: No focal deficit present.      Mental Status: She is alert and oriented to person, place, and time. Mental status is at baseline.      Cranial Nerves: No cranial nerve deficit.      Sensory: No sensory deficit.      Motor: No weakness.      Coordination: Coordination normal.      Gait: Gait normal.      Deep Tendon Reflexes: Reflexes normal.   Psychiatric:         Mood and Affect: Mood normal.         Behavior: Behavior normal.         Thought Content: Thought content normal.         Component      Latest Ref Rng 12/15/2020 3/8/2021 5/4/2021 6/1/2021 6/29/2021 7/26/2021   CHROMOGRANIN A      0.0 - 101.8 ng/mL 468.2 (H)  62.9  79.7  60.8  59.7  69.2      Component      Latest Ref Rng 8/23/2021 9/21/2021 10/19/2021 11/3/2021 11/16/2021 12/15/2021   CHROMOGRANIN A      0.0 - 101.8 ng/mL 69.2  56.9  49.6  44.7  54.7  48.5      Component      Latest Ref Rn 1/14/2022 2/10/2022 3/10/2022 4/6/2022 5/5/2022 5/31/2022   CHROMOGRANIN A      0.0 - 101.8 ng/mL 61.6  27.5  56.2  72.1  51.0  59.7      Component      Latest Ref Rng 7/1/2022 7/29/2022 2/20/2023 5/16/2023 6/14/2023 8/9/2023   CHROMOGRANIN A      0.0 - 101.8 ng/mL 54.5  58.1  50.4  43.8  62.0  57.4      Component      Latest Ref Rng 10/6/2023 11/1/2023 11/30/2023 12/28/2023 2/20/2024 4/16/2024   CHROMOGRANIN A      0.0 - 101.8 ng/mL 46.5  48.8  44.3  52.9  69.8  69.9      Component      Latest Ref Rng 8/6/2024 10/1/2024 10/29/2024 11/26/2024   CHROMOGRANIN A      0.0 - 101.8 ng/mL 65.4  67.8  57.9  56.8

## 2024-12-05 ENCOUNTER — OFFICE VISIT (OUTPATIENT)
Dept: HEMATOLOGY ONCOLOGY | Facility: CLINIC | Age: 74
End: 2024-12-05
Payer: MEDICARE

## 2024-12-05 VITALS
BODY MASS INDEX: 27.14 KG/M2 | RESPIRATION RATE: 18 BRPM | WEIGHT: 147.5 LBS | OXYGEN SATURATION: 60 % | TEMPERATURE: 97.1 F | HEART RATE: 87 BPM | DIASTOLIC BLOOD PRESSURE: 78 MMHG | SYSTOLIC BLOOD PRESSURE: 110 MMHG | HEIGHT: 62 IN

## 2024-12-05 DIAGNOSIS — E34.01 CARCINOID HEART DISEASE: ICD-10-CM

## 2024-12-05 DIAGNOSIS — C7B.8 METASTATIC MALIGNANT NEUROENDOCRINE TUMOR TO LIVER (HCC): Primary | ICD-10-CM

## 2024-12-05 PROCEDURE — 99214 OFFICE O/P EST MOD 30 MIN: CPT | Performed by: INTERNAL MEDICINE

## 2024-12-06 DIAGNOSIS — D86.0 SARCOIDOSIS, LUNG (HCC): ICD-10-CM

## 2024-12-06 RX ORDER — HYDROCHLOROTHIAZIDE 12.5 MG/1
12.5 TABLET ORAL DAILY
Qty: 90 TABLET | Refills: 1 | Status: SHIPPED | OUTPATIENT
Start: 2024-12-06

## 2024-12-06 NOTE — TELEPHONE ENCOUNTER
Per last refill request, Pulm had stated that PCP will need to take over this medication if patient did not schedule a visit with them.     Changed context and routing to PCP pod.

## 2024-12-24 ENCOUNTER — APPOINTMENT (OUTPATIENT)
Dept: LAB | Facility: CLINIC | Age: 74
End: 2024-12-24
Payer: MEDICARE

## 2024-12-24 DIAGNOSIS — C7B.8 METASTATIC MALIGNANT NEUROENDOCRINE TUMOR TO LIVER (HCC): ICD-10-CM

## 2024-12-24 LAB
ALBUMIN SERPL BCG-MCNC: 4.2 G/DL (ref 3.5–5)
ALP SERPL-CCNC: 64 U/L (ref 34–104)
ALT SERPL W P-5'-P-CCNC: 14 U/L (ref 7–52)
ANION GAP SERPL CALCULATED.3IONS-SCNC: 7 MMOL/L (ref 4–13)
AST SERPL W P-5'-P-CCNC: 22 U/L (ref 13–39)
BASOPHILS # BLD AUTO: 0.07 THOUSANDS/ÂΜL (ref 0–0.1)
BASOPHILS NFR BLD AUTO: 1 % (ref 0–1)
BILIRUB SERPL-MCNC: 1.21 MG/DL (ref 0.2–1)
BUN SERPL-MCNC: 29 MG/DL (ref 5–25)
CALCIUM SERPL-MCNC: 9.5 MG/DL (ref 8.4–10.2)
CHLORIDE SERPL-SCNC: 100 MMOL/L (ref 96–108)
CO2 SERPL-SCNC: 32 MMOL/L (ref 21–32)
CREAT SERPL-MCNC: 0.73 MG/DL (ref 0.6–1.3)
EOSINOPHIL # BLD AUTO: 0.04 THOUSAND/ÂΜL (ref 0–0.61)
EOSINOPHIL NFR BLD AUTO: 1 % (ref 0–6)
ERYTHROCYTE [DISTWIDTH] IN BLOOD BY AUTOMATED COUNT: 12.5 % (ref 11.6–15.1)
GFR SERPL CREATININE-BSD FRML MDRD: 81 ML/MIN/1.73SQ M
GLUCOSE P FAST SERPL-MCNC: 108 MG/DL (ref 65–99)
HCT VFR BLD AUTO: 37.3 % (ref 34.8–46.1)
HGB BLD-MCNC: 12.2 G/DL (ref 11.5–15.4)
IMM GRANULOCYTES # BLD AUTO: 0.02 THOUSAND/UL (ref 0–0.2)
IMM GRANULOCYTES NFR BLD AUTO: 0 % (ref 0–2)
LYMPHOCYTES # BLD AUTO: 1.83 THOUSANDS/ÂΜL (ref 0.6–4.47)
LYMPHOCYTES NFR BLD AUTO: 24 % (ref 14–44)
MCH RBC QN AUTO: 32 PG (ref 26.8–34.3)
MCHC RBC AUTO-ENTMCNC: 32.7 G/DL (ref 31.4–37.4)
MCV RBC AUTO: 98 FL (ref 82–98)
MONOCYTES # BLD AUTO: 0.73 THOUSAND/ÂΜL (ref 0.17–1.22)
MONOCYTES NFR BLD AUTO: 9 % (ref 4–12)
NEUTROPHILS # BLD AUTO: 5.1 THOUSANDS/ÂΜL (ref 1.85–7.62)
NEUTS SEG NFR BLD AUTO: 65 % (ref 43–75)
NRBC BLD AUTO-RTO: 0 /100 WBCS
PLATELET # BLD AUTO: 199 THOUSANDS/UL (ref 149–390)
PMV BLD AUTO: 11.1 FL (ref 8.9–12.7)
POTASSIUM SERPL-SCNC: 3.4 MMOL/L (ref 3.5–5.3)
PROT SERPL-MCNC: 7.3 G/DL (ref 6.4–8.4)
RBC # BLD AUTO: 3.81 MILLION/UL (ref 3.81–5.12)
SODIUM SERPL-SCNC: 139 MMOL/L (ref 135–147)
WBC # BLD AUTO: 7.79 THOUSAND/UL (ref 4.31–10.16)

## 2024-12-24 PROCEDURE — 36415 COLL VENOUS BLD VENIPUNCTURE: CPT

## 2024-12-24 PROCEDURE — 80053 COMPREHEN METABOLIC PANEL: CPT

## 2024-12-24 PROCEDURE — 86316 IMMUNOASSAY TUMOR OTHER: CPT

## 2024-12-24 PROCEDURE — 85025 COMPLETE CBC W/AUTO DIFF WBC: CPT

## 2024-12-27 LAB — CGA SERPL-MCNC: 58.9 NG/ML (ref 0–101.8)

## 2025-01-02 ENCOUNTER — HOSPITAL ENCOUNTER (OUTPATIENT)
Dept: INFUSION CENTER | Facility: CLINIC | Age: 75
Discharge: HOME | End: 2025-01-02
Payer: MEDICARE

## 2025-01-02 DIAGNOSIS — C7B.8 METASTATIC MALIGNANT NEUROENDOCRINE TUMOR TO LIVER (HCC): Primary | ICD-10-CM

## 2025-01-02 PROCEDURE — 96372 THER/PROPH/DIAG INJ SC/IM: CPT

## 2025-01-02 RX ORDER — LANREOTIDE ACETATE 120 MG/.5ML
120 INJECTION SUBCUTANEOUS ONCE
OUTPATIENT
Start: 2025-01-29

## 2025-01-02 RX ORDER — LANREOTIDE ACETATE 120 MG/.5ML
120 INJECTION SUBCUTANEOUS ONCE
Status: COMPLETED | OUTPATIENT
Start: 2025-01-02 | End: 2025-01-02

## 2025-01-02 RX ADMIN — LANREOTIDE ACETATE 120 MG: 120 INJECTION SUBCUTANEOUS at 08:06

## 2025-01-02 NOTE — PROGRESS NOTES
Patient to infusion center for lanreotide injection. Patient offers no complaints. Lanreotide injection administered into left gluteal site, tolerated without incident. Next appointment confirmed for 1/30/2025 at 0830. AVS offered and declined.

## 2025-01-23 ENCOUNTER — APPOINTMENT (OUTPATIENT)
Dept: LAB | Facility: CLINIC | Age: 75
End: 2025-01-23
Payer: MEDICARE

## 2025-01-23 DIAGNOSIS — C7B.8 METASTATIC MALIGNANT NEUROENDOCRINE TUMOR TO LIVER (HCC): ICD-10-CM

## 2025-01-23 DIAGNOSIS — E34.01 CARCINOID HEART DISEASE: ICD-10-CM

## 2025-01-23 LAB
ALBUMIN SERPL BCG-MCNC: 4.3 G/DL (ref 3.5–5)
ALP SERPL-CCNC: 63 U/L (ref 34–104)
ALT SERPL W P-5'-P-CCNC: 13 U/L (ref 7–52)
ANION GAP SERPL CALCULATED.3IONS-SCNC: 7 MMOL/L (ref 4–13)
AST SERPL W P-5'-P-CCNC: 21 U/L (ref 13–39)
BASOPHILS # BLD AUTO: 0.07 THOUSANDS/ΜL (ref 0–0.1)
BASOPHILS NFR BLD AUTO: 1 % (ref 0–1)
BILIRUB SERPL-MCNC: 1.28 MG/DL (ref 0.2–1)
BUN SERPL-MCNC: 37 MG/DL (ref 5–25)
CALCIUM SERPL-MCNC: 9.5 MG/DL (ref 8.4–10.2)
CHLORIDE SERPL-SCNC: 100 MMOL/L (ref 96–108)
CO2 SERPL-SCNC: 31 MMOL/L (ref 21–32)
CREAT SERPL-MCNC: 0.68 MG/DL (ref 0.6–1.3)
EOSINOPHIL # BLD AUTO: 0.37 THOUSAND/ΜL (ref 0–0.61)
EOSINOPHIL NFR BLD AUTO: 5 % (ref 0–6)
ERYTHROCYTE [DISTWIDTH] IN BLOOD BY AUTOMATED COUNT: 12.4 % (ref 11.6–15.1)
GFR SERPL CREATININE-BSD FRML MDRD: 87 ML/MIN/1.73SQ M
GLUCOSE P FAST SERPL-MCNC: 104 MG/DL (ref 65–99)
HCT VFR BLD AUTO: 37.6 % (ref 34.8–46.1)
HGB BLD-MCNC: 12.3 G/DL (ref 11.5–15.4)
IMM GRANULOCYTES # BLD AUTO: 0.01 THOUSAND/UL (ref 0–0.2)
IMM GRANULOCYTES NFR BLD AUTO: 0 % (ref 0–2)
LYMPHOCYTES # BLD AUTO: 1.72 THOUSANDS/ΜL (ref 0.6–4.47)
LYMPHOCYTES NFR BLD AUTO: 22 % (ref 14–44)
MCH RBC QN AUTO: 31.9 PG (ref 26.8–34.3)
MCHC RBC AUTO-ENTMCNC: 32.7 G/DL (ref 31.4–37.4)
MCV RBC AUTO: 98 FL (ref 82–98)
MONOCYTES # BLD AUTO: 0.67 THOUSAND/ΜL (ref 0.17–1.22)
MONOCYTES NFR BLD AUTO: 9 % (ref 4–12)
NEUTROPHILS # BLD AUTO: 5.05 THOUSANDS/ΜL (ref 1.85–7.62)
NEUTS SEG NFR BLD AUTO: 63 % (ref 43–75)
NRBC BLD AUTO-RTO: 0 /100 WBCS
PLATELET # BLD AUTO: 215 THOUSANDS/UL (ref 149–390)
PMV BLD AUTO: 11.3 FL (ref 8.9–12.7)
POTASSIUM SERPL-SCNC: 3.4 MMOL/L (ref 3.5–5.3)
PROT SERPL-MCNC: 7.3 G/DL (ref 6.4–8.4)
RBC # BLD AUTO: 3.85 MILLION/UL (ref 3.81–5.12)
SODIUM SERPL-SCNC: 138 MMOL/L (ref 135–147)
WBC # BLD AUTO: 7.89 THOUSAND/UL (ref 4.31–10.16)

## 2025-01-23 PROCEDURE — 86316 IMMUNOASSAY TUMOR OTHER: CPT

## 2025-01-23 PROCEDURE — 36415 COLL VENOUS BLD VENIPUNCTURE: CPT

## 2025-01-23 PROCEDURE — 85025 COMPLETE CBC W/AUTO DIFF WBC: CPT

## 2025-01-23 PROCEDURE — 80053 COMPREHEN METABOLIC PANEL: CPT

## 2025-01-24 ENCOUNTER — OFFICE VISIT (OUTPATIENT)
Dept: PULMONOLOGY | Facility: CLINIC | Age: 75
End: 2025-01-24
Payer: MEDICARE

## 2025-01-24 VITALS
DIASTOLIC BLOOD PRESSURE: 60 MMHG | SYSTOLIC BLOOD PRESSURE: 118 MMHG | TEMPERATURE: 94.4 F | OXYGEN SATURATION: 98 % | BODY MASS INDEX: 26.34 KG/M2 | HEART RATE: 75 BPM | WEIGHT: 144 LBS

## 2025-01-24 DIAGNOSIS — R59.0 MEDIASTINAL ADENOPATHY: Primary | ICD-10-CM

## 2025-01-24 DIAGNOSIS — D86.0 SARCOIDOSIS, LUNG (HCC): ICD-10-CM

## 2025-01-24 PROCEDURE — 99214 OFFICE O/P EST MOD 30 MIN: CPT | Performed by: NURSE PRACTITIONER

## 2025-01-24 NOTE — PROGRESS NOTES
"Progress Note - Pulmonary   Kelsey Brooks 74 y.o. female MRN: [id number]  Unit/Bed#:  Encounter: [id number]    Assessment/Plan:    1.  Sarcoidosis        -11/27/2018-biopsy-proven sarcoidosis        -Currently at respiratory baseline: Denies any wheezing, chest tightness, INFANTE, acute shortness of breath         -Currently walks on the treadmill 45 minutes a day         -2/2019-PFTs mild restriction, DLCO 52%         -9/2022-PFT showed normal spirometry, DLCO 72%         -Recommend repeating pulmonary function test 9/2023         -Patient reports discontinuing her Advair as it did make her throat worse and no benefit         -She is not interested in obtaining albuterol MDI         -Given overall stability of patient's respiratory status we will hold off on repeat PFTs         -Discussed with patient at great length that if any change in her respiratory status at all she will notify office and we will obtain PFTs         -Pulmonary will see again in 1 years time    2.  Enlarged mediastinal lymph node w/ H/O static neuroendocrine cancer with bone metastasis        -3/6/1105-YIYK-lehasmmw for malignancy        -8/21/2024-reviewed chest CT-lungs clear, mediastinal/hilar lymphadenopathy unremarkable         -2/28/2025-repeat chest CT             Chief Complaint:    \"I feel great\"    HPI:    Patient presents today for 1 year follow-up.  Patient reports that she is at her respiratory baseline.  Patient denies any shortness of breath, chest tightness, or wheezing.  Patient reports that she walks on the treadmill for approximately 45 minutes/day.  Patient reports the only time she feels somewhat short of breath is when she is in the climbing on a treadmill which seems to be the standard.  I reviewed chest CT from August which showed no lung abnormalities.  Patient will follow-up in 1 year.    Objective:    Vitals: Blood pressure 118/60, pulse 75, temperature (!) 94.4 °F (34.7 °C), weight 65.3 kg (144 lb), SpO2 98%, not " currently breastfeeding.,Body mass index is 26.34 kg/m².        ROS  WNL    Physical Exam:     Physical Exam  Constitutional:       General: She is not in acute distress.     Appearance: Normal appearance. She is normal weight. She is not ill-appearing.   HENT:      Head: Normocephalic and atraumatic.      Nose: Nose normal. No congestion or rhinorrhea.      Mouth/Throat:      Mouth: Mucous membranes are dry.      Pharynx: Oropharynx is clear. No oropharyngeal exudate or posterior oropharyngeal erythema.   Cardiovascular:      Rate and Rhythm: Normal rate and regular rhythm.      Pulses: Normal pulses.      Heart sounds: Normal heart sounds. No murmur heard.     No friction rub. No gallop.   Pulmonary:      Effort: Pulmonary effort is normal. No respiratory distress.      Breath sounds: Normal breath sounds. No stridor. No wheezing, rhonchi or rales.      Comments: Clear breath sounds  Chest:      Chest wall: No tenderness.   Abdominal:      General: Abdomen is flat. Bowel sounds are normal. There is no distension.      Palpations: Abdomen is soft. There is no mass.   Musculoskeletal:         General: No swelling or tenderness. Normal range of motion.      Cervical back: Normal range of motion. No rigidity or tenderness.   Skin:     General: Skin is warm and dry.      Coloration: Skin is not jaundiced or pale.   Neurological:      General: No focal deficit present.      Mental Status: She is alert and oriented to person, place, and time. Mental status is at baseline.   Psychiatric:         Mood and Affect: Mood normal.         Behavior: Behavior normal.           Imaging and other studies: Results Review Statement: I personally reviewed the following image studies/reports in PACS and discussed pertinent findings with Radiology: chest xray. My interpretation of the radiology images/reports is:  .      Narrative & Impression         CT CHEST, ABDOMEN AND PELVIS WITH IV CONTRAST     INDICATION:   Other secondary  neuroendocrine tumors. .     COMPARISON: 2/23/2024.     TECHNIQUE: CT examination of the chest, abdomen and pelvis was performed. Multiplanar 2D reformatted images were created from the source data.     This examination, like all CT scans performed in the [facility], was performed utilizing techniques to minimize radiation dose exposure, including the use of iterative reconstruction and automated exposure control. Radiation dose length   product (DLP) for this visit:     IV Contrast:  Enteric Contrast: Enteric contrast was administered.     FINDINGS:     CHEST     LUNGS:  Lungs are clear.  There is no tracheal or endobronchial lesion.     PLEURA:  Unremarkable.     HEART/GREAT VESSELS: Heart is unremarkable for patient's age.  No thoracic aortic aneurysm.     MEDIASTINUM AND BRIGIDA:  Unremarkable.     CHEST WALL AND LOWER NECK:  Unremarkable.     ABDOMEN     LIVER/BILIARY TREE: Stable 2.4 x 2 cm low-attenuation lesion in the right hepatic lobe image 106 series 2 sequelae of treated disease. Stable wedge-shaped area of hypoenhancement in the right hepatic lobe measuring 1.1 x 1.2 cm image 117 series 2. No new   hepatic lesions. Stable hepatic cysts. Mild heterogeneous enhancement of the liver in segment 7 which is likely due to vascular redistribution from prior therapy. Patent portal vein.     GALLBLADDER:  No calcified gallstones. No pericholecystic inflammatory change.     SPLEEN:  Unremarkable.     PANCREAS: Stable diffusely atrophic pancreas with numerous thin-walled cysts largest measuring 9.8 mm in the pancreatic neck image 114 series 2.     ADRENAL GLANDS:  Unremarkable.     KIDNEYS/URETERS:  Unremarkable. No hydronephrosis.     STOMACH AND BOWEL:  There is colonic diverticulosis without evidence of acute diverticulitis. Prior partial small bowel resection with patent anastomosis in the right lower quadrant. No foci of abnormal bowel wall thickening.     APPENDIX:  No findings to suggest  appendicitis.     ABDOMINOPELVIC CAVITY:  No ascites.  No pneumoperitoneum.  No lymphadenopathy.     VESSELS:  Unremarkable for patient's age.     PELVIS     REPRODUCTIVE ORGANS: Uterus is atrophic. No adnexal mass.     URINARY BLADDER:  Unremarkable.     ABDOMINAL WALL/INGUINAL REGIONS: Small fat-containing umbilical hernia. Small fat-containing right periumbilical hernia.     OSSEOUS STRUCTURES:  No acute fracture or destructive osseous lesion.     IMPRESSION:     1. No interval change since previous study. Stable hypoenhancing liver lesion sequelae of treatment. Stable hepatic cysts. No new lesions. No intra-abdominal or pelvic lymphadenopathy. No peritoneal lesions. No pulmonary metastatic disease.     2. Colonic diverticulosis. No foci of abnormal bowel wall thickening.     3. Diffuse atrophic pancreas containing numerous cysts measuring up to 9.8 mm. No pancreatic duct dilation.     4. Small fat-containing umbilical and right periumbilical hernias.           Answers submitted by the patient for this visit:  Pulmonology Questionnaire (Submitted on 1/19/2025)  Chief Complaint: Primary symptoms  When did you first notice your symptoms?: more than 1 year ago  How often do your symptoms occur?: rarely  Since you first noticed this problem, how has it changed?: resolved  Have you had a change in appetite?: No  Do you have chest pain?: No  Do you have shortness of breath that occurs with effort or exertion?: No  Do you have ear congestion?: No  Do you have ear pain?: No  Do you have a fever?: No  Do you have headaches?: No  Do you have heartburn?: No  Do you have fatigue?: No  Do you have muscle pain?: No  Do you have nasal congestion?: No  Do you have shortness of breath when lying flat?: No  Do you have shortness of breath when you wake up?: No  Do you have post-nasal drip?: No  Do you have a runny nose?: No  Do you have sneezing?: No  Do you have a sore throat?: No  Do you have sweats?: No  Do you have trouble  swallowing?: No  Have you experienced weight loss?: Yes  Which of the following makes your symptoms worse?: climbing stairs, strenuous activity  Which of the following makes your symptoms better?: rest  Risk factors for lung disease: animal exposure

## 2025-01-25 LAB — CGA SERPL-MCNC: 78.9 NG/ML (ref 0–101.8)

## 2025-01-30 ENCOUNTER — HOSPITAL ENCOUNTER (OUTPATIENT)
Dept: INFUSION CENTER | Facility: CLINIC | Age: 75
Discharge: HOME | End: 2025-01-30
Payer: MEDICARE

## 2025-01-30 DIAGNOSIS — C7B.8 METASTATIC MALIGNANT NEUROENDOCRINE TUMOR TO LIVER (HCC): Primary | ICD-10-CM

## 2025-01-30 PROCEDURE — 96372 THER/PROPH/DIAG INJ SC/IM: CPT

## 2025-01-30 RX ORDER — LANREOTIDE ACETATE 120 MG/.5ML
120 INJECTION SUBCUTANEOUS ONCE
OUTPATIENT
Start: 2025-02-27

## 2025-01-30 RX ORDER — LANREOTIDE ACETATE 120 MG/.5ML
120 INJECTION SUBCUTANEOUS ONCE
Status: COMPLETED | OUTPATIENT
Start: 2025-01-30 | End: 2025-01-30

## 2025-01-30 RX ADMIN — LANREOTIDE ACETATE 120 MG: 120 INJECTION SUBCUTANEOUS at 08:37

## 2025-01-30 NOTE — PROGRESS NOTES
Patient arrived for Lanreotide injection today feeling well with no complaints. IM injection administered in right gluteal site and patient tolerated well. Next appointment confirmed for 2/27 at 0800. AVS declined

## 2025-02-19 ENCOUNTER — APPOINTMENT (OUTPATIENT)
Dept: LAB | Facility: CLINIC | Age: 75
End: 2025-02-19
Payer: MEDICARE

## 2025-02-19 DIAGNOSIS — C7B.8 METASTATIC MALIGNANT NEUROENDOCRINE TUMOR TO LIVER (HCC): ICD-10-CM

## 2025-02-19 DIAGNOSIS — E34.01 CARCINOID HEART DISEASE: ICD-10-CM

## 2025-02-19 LAB
ALBUMIN SERPL BCG-MCNC: 4.3 G/DL (ref 3.5–5)
ALP SERPL-CCNC: 62 U/L (ref 34–104)
ALT SERPL W P-5'-P-CCNC: 14 U/L (ref 7–52)
ANION GAP SERPL CALCULATED.3IONS-SCNC: 7 MMOL/L (ref 4–13)
AST SERPL W P-5'-P-CCNC: 21 U/L (ref 13–39)
BASOPHILS # BLD AUTO: 0.09 THOUSANDS/ΜL (ref 0–0.1)
BASOPHILS NFR BLD AUTO: 1 % (ref 0–1)
BILIRUB SERPL-MCNC: 0.91 MG/DL (ref 0.2–1)
BUN SERPL-MCNC: 34 MG/DL (ref 5–25)
CALCIUM SERPL-MCNC: 9.2 MG/DL (ref 8.4–10.2)
CHLORIDE SERPL-SCNC: 100 MMOL/L (ref 96–108)
CO2 SERPL-SCNC: 30 MMOL/L (ref 21–32)
CREAT SERPL-MCNC: 0.75 MG/DL (ref 0.6–1.3)
EOSINOPHIL # BLD AUTO: 0.29 THOUSAND/ΜL (ref 0–0.61)
EOSINOPHIL NFR BLD AUTO: 4 % (ref 0–6)
ERYTHROCYTE [DISTWIDTH] IN BLOOD BY AUTOMATED COUNT: 12.3 % (ref 11.6–15.1)
GFR SERPL CREATININE-BSD FRML MDRD: 79 ML/MIN/1.73SQ M
GLUCOSE P FAST SERPL-MCNC: 100 MG/DL (ref 65–99)
HCT VFR BLD AUTO: 37.6 % (ref 34.8–46.1)
HGB BLD-MCNC: 12.5 G/DL (ref 11.5–15.4)
IMM GRANULOCYTES # BLD AUTO: 0.02 THOUSAND/UL (ref 0–0.2)
IMM GRANULOCYTES NFR BLD AUTO: 0 % (ref 0–2)
LYMPHOCYTES # BLD AUTO: 1.91 THOUSANDS/ΜL (ref 0.6–4.47)
LYMPHOCYTES NFR BLD AUTO: 24 % (ref 14–44)
MCH RBC QN AUTO: 32.4 PG (ref 26.8–34.3)
MCHC RBC AUTO-ENTMCNC: 33.2 G/DL (ref 31.4–37.4)
MCV RBC AUTO: 97 FL (ref 82–98)
MONOCYTES # BLD AUTO: 0.7 THOUSAND/ΜL (ref 0.17–1.22)
MONOCYTES NFR BLD AUTO: 9 % (ref 4–12)
NEUTROPHILS # BLD AUTO: 4.93 THOUSANDS/ΜL (ref 1.85–7.62)
NEUTS SEG NFR BLD AUTO: 62 % (ref 43–75)
NRBC BLD AUTO-RTO: 0 /100 WBCS
PLATELET # BLD AUTO: 203 THOUSANDS/UL (ref 149–390)
PMV BLD AUTO: 10.7 FL (ref 8.9–12.7)
POTASSIUM SERPL-SCNC: 3.7 MMOL/L (ref 3.5–5.3)
PROT SERPL-MCNC: 7.2 G/DL (ref 6.4–8.4)
RBC # BLD AUTO: 3.86 MILLION/UL (ref 3.81–5.12)
SODIUM SERPL-SCNC: 137 MMOL/L (ref 135–147)
WBC # BLD AUTO: 7.94 THOUSAND/UL (ref 4.31–10.16)

## 2025-02-19 PROCEDURE — 85025 COMPLETE CBC W/AUTO DIFF WBC: CPT

## 2025-02-19 PROCEDURE — 80053 COMPREHEN METABOLIC PANEL: CPT

## 2025-02-19 PROCEDURE — 86316 IMMUNOASSAY TUMOR OTHER: CPT

## 2025-02-21 LAB — CGA SERPL-MCNC: 88.9 NG/ML (ref 0–101.8)

## 2025-02-21 NOTE — PROGRESS NOTES
Cardio-Oncology Clinic Note    Kelsey Brooks 74 y.o. female   MRN: [id number]  Encounter: [id number]        Assessment / Plan:    # Cardio-Oncology Pertinent History   Neuroendocrine tumor  Dx 2020  Metastatic to liver and spine  Had surgery (small bowel) and radiation (to back) and ablation (liver)  Current -  remains on lanreotide    # Neuroendocrine tumor - need for cardiac screening  # Tricuspid regurgitation  There is a risk with metastatic neuroendocrine tumor for carcinoid heart disease.    We checked an echocardiogram and there is mild to moderate TR.  However the valve looks structurally normal so this may be TR from diastolic dysfunction and not necessarily valve pathology from the neuroendocrine tumor.  In any event we will need to follow this closely.    Updated echo last visit --> stable TR    # Pulmonary sarcoid  bx proven.  Follows with pulm.  We performed screening for cardiac involvement.  Echo shows normal EF.  Holter shows no concerning findings.    # HTN  On HCTZ 12.5 (started years ago for mild unilateral leg swelling and on it ever since)  BP at goal    # HLD  LDL was 129.    ASCVD risk score significantly elevated.  Furthermore she was recently diagnosed with diabetes.  In this setting I do recommend statin.  Started Lipitor 20 mg daily.  Repeat lipids on statin    # DM2  A1c 6.6 -->  6.3 --> 6.3    # Diastolic dysfunction  Abnormal relaxation on echo  On HCTZ in the past for mild diuretic effect  On exam - no volume overload.      Today's Plan Summary:  See above assessment/plan for full details of today's plan.  Briefly,     Repeat lipids on statin                Reason For Visit / Chief Complaint:  F/u neuroendocrine tumor, dizziness, HTN    HPI:   Kelsey Brooks is a 74 y.o.  female with history as noted in the problem list and further detailed in the above assessment and plan.    Initial:    Sept 202  Referred by Dr. Luke for a new patient visit for neuroendocrine tumor.  As above, the  patient has a history of a neuroendocrine tumor diagnosed in 2020.  She had surgery and radiation and is currently on lanreotide.  She also has biopsy-proven lung sarcoid.  At her last oncology visit she reported some dizziness and possible irregular heartbeat.  In this setting (neuroendocrine tumor and pulmonary sarcoid) she was referred to cardio oncology.  Today, the patient reports - feeling overall pretty well.  No chest pain.  No SOB at rest.  Does get INFANTE.   Gets lightheadedness episodes.  Can occur while walking.  Can occur while sitting.   Usually lasts for seconds and then goes away.   During 1 episode recently - BP cuff told her the HR may be irregular.   No syncope.   Retired.  Did office work.  .  2 children.    Interval:  Last visit -->  Continues on lanreotide.   Stable INFANTE.  No leg edema.    Plan last visit -->     Check echo  Start Lipitor 20    Echo - 11/18/24   EF normal  mild to moderate TR. valve appears structurally normal.  Stable TR.     Labs from 2-19 reviewed.  CMP stable.  CBC normal.       Today -  feeling well.  Still on lanreotide.   No CP.  No SOB at rest.  Mild INFANTE.  No significant leg edema.                 Cardiac Imaging personally reviewed:  EKG 2-23-23  NSR    9-1-23  Sinus rhythm with sinus arrhythmia.  1 PVC.  Possible PACs.  PRWP.     10-29-24  Sinus bradycardia with sinus arrhythmia and nonspecific T wave changes       Holter or event monitor Holter - 10/18/23   Sinus rhythm.  Avg HR 71 ()  PAC's - 0.3%  PVC's - 0.7%  4 atrial runs (longest 13 beats)       Echo 2016  Ef 60%. Grade 1 diastolic dysf  Mild-mod TR  RVSP 35    Echo - 10/16/23   borderline LVH.   EF 60%.  Abnormal relaxation.  Normal RV size and function.  Mild-moderate TR on my review (the valve looks structurally normal).  RVSP 40 mmHg.    Echo - 11/18/24   borderline LVH.  EF 57%.  GLS -20%. Grade 1 DD.  Right Ventricle is mildly dilated. Systolic function is normal.  Biatrial enlargement  mild to  "moderate TR. valve appears structurally normal.  The estimated right ventricular systolic pressure is 37.00 mmHg.  IVC borderline dilated.  Prior echo 10/16/2023.  On direct comparison there are no significant changes.  TR appears stable.       KOMAL    Cardiac MRI    Stress testing    Coronary CTA or [facility]    CPET              Patient Active Problem List    Diagnosis Date Noted   • Type 2 diabetes mellitus without complication, without long-term current use of insulin (HCC) 02/25/2025   • Mixed hyperlipidemia 10/29/2024   • Chronic obstructive pulmonary disease, unspecified COPD type (HCC) 04/29/2024   • Chronic intractable headache 04/29/2024   • Elevated fasting glucose 04/16/2024   • Primary hypertension 04/16/2024   • Nonrheumatic tricuspid valve regurgitation 04/16/2024   • Reactive airway disease 07/19/2021   • Bone metastasis 01/28/2021   • 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 (HCC) 12/11/2020   • Trigger middle finger of right hand 08/26/2020   • Other tear of medial meniscus, current injury, right knee, initial encounter 08/21/2019   • Complex tear of medial meniscus of right knee as current injury 07/12/2019   • Diastolic dysfunction 06/04/2019   • Edema of right lower extremity 06/02/2019   • 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   • Sarcoidosis, lung (HCC) 12/20/2018   • Mediastinal adenopathy 11/21/2018   • Liver mass 11/13/2018   • Positional vertigo 03/26/2018   • IPMN (intraductal papillary mucinous neoplasm) 10/08/2015   • Focal nodular hyperplasia of liver 03/19/2015       Past Medical History:   Diagnosis Date   • Abdominal pain    • Acute tonsillitis    • Breast pain, left    • Cancer (HCC)     liver&small intestine   • COVID-19 07/2022   • Edema of both lower extremities    • GERD (gastroesophageal reflux disease)    • GERD without esophagitis    • Kidney stone     \" [place]\"   • Lesion of liver    • Liver mass    • Lymphadenopathy    • Mediastinal lymphadenopathy    • Neoplasm " of digestive system    • Orthostatic lightheadedness    • Sarcoidosis    • Vertigo        Allergies   Allergen Reactions   • Bactrim [Sulfamethoxazole-Trimethoprim] Hives   • Clam Shell - Food Allergy Vomiting       Current Outpatient Medications   Medication Instructions   • atorvastatin (LIPITOR) 20 mg, Oral, Daily at bedtime   • Cholecalciferol (VITAMIN D) 2000 units CAPS 1 tablet, Daily   • docusate sodium (COLACE) 100 mg, Oral, 2 times daily   • Fluticasone-Salmeterol (Advair) 100-50 mcg/dose inhaler 1 puff, Inhalation, 2 times daily, Rinse mouth after use.   • hydroCHLOROthiazide 12.5 mg, Oral, Daily   • LANREOTIDE ACETATE SC Every 30 days   • Multiple Vitamin (MULTIVITAMIN) tablet 1 tablet, Daily   • omeprazole (PRILOSEC) 40 mg, Oral, Daily before breakfast   • ondansetron (ZOFRAN-ODT) 8 mg, Oral, Every 8 hours PRN   • triamcinolone (KENALOG) 0.1 % ointment Topical, 2 times daily, Apply to areas of itch, Apply thin coat to groin area for maximum of 3 days       Social History     Socioeconomic History   • Marital status:      Spouse name: Not on file   • Number of children: Not on file   • Years of education: Not on file   • Highest education level: Not on file   Occupational History   • Not on file   Tobacco Use   • Smoking status: Never   • Smokeless tobacco: Never   Vaping Use   • Vaping status: Never Used   Substance and Sexual Activity   • Alcohol use: Yes     Comment: occ wine- very seldom   • Drug use: No   • Sexual activity: Not Currently   Other Topics Concern   • Not on file   Social History Narrative   • Not on file     Social Drivers of Health     Financial Resource Strain: Low Risk  (2/16/2023)    Overall Financial Resource Strain (CARDIA)    • Difficulty of Paying Living Expenses: Not very hard   Food Insecurity: Not on file   Transportation Needs: No Transportation Needs (2/16/2023)    PRAPARE - Transportation    • Lack of Transportation (Medical): No    • Lack of Transportation  "(Non-Medical): No   Physical Activity: Not on file   Stress: Not on file   Social Connections: Not on file   Intimate Partner Violence: Not on file   Housing Stability: Not on file       Family History   Problem Relation Age of Onset   • Alzheimer's disease Mother    • Parkinsonism Father    • Psoriasis Father    • Heart failure Father         at older age   • Psoriasis Sister    • Psoriasis Sister    • Stroke Sister    • Colon cancer Maternal Grandfather    • No Known Problems Daughter    • No Known Problems Daughter        Physical Exam:  Blood pressure 128/80, pulse 66, not currently breastfeeding.  There is no height or weight on file to calculate BMI.  Wt Readings from Last 3 Encounters:   01/24/25 65.3 kg (144 lb)   12/05/24 66.9 kg (147 lb 8 oz)   11/18/24 68 kg (150 lb)     Physical Exam  Vitals reviewed.   Constitutional:       General: She is not in acute distress.     Appearance: She is not toxic-appearing.   Neck:      Comments: JVP  not elevated  Cardiovascular:      Rate and Rhythm: Normal rate and regular rhythm.      Heart sounds: No murmur heard.     No friction rub. No gallop.   Pulmonary:      Breath sounds: Normal breath sounds. No wheezing, rhonchi or rales.   Musculoskeletal:      Comments: There is no leg edema   Neurological:      Mental Status: She is alert.         Labs & Results:  Lab Results   Component Value Date    SODIUM 137 02/19/2025    K 3.7 02/19/2025     02/19/2025    CO2 30 02/19/2025    BUN 34 (H) 02/19/2025    CREATININE 0.75 02/19/2025    GLUC 147 (H) 08/22/2024    CALCIUM 9.2 02/19/2025     No results found for: \"NTBNP\"       Thank you for the opportunity to participate in the care of this patient.    Can Huertas MD, [facility]  Staff Cardiologist  Director of Cardio-Oncology  [facility]  "

## 2025-02-25 ENCOUNTER — OFFICE VISIT (OUTPATIENT)
Dept: CARDIOLOGY CLINIC | Facility: CLINIC | Age: 75
End: 2025-02-25
Payer: MEDICARE

## 2025-02-25 VITALS — DIASTOLIC BLOOD PRESSURE: 80 MMHG | SYSTOLIC BLOOD PRESSURE: 128 MMHG | HEART RATE: 66 BPM

## 2025-02-25 DIAGNOSIS — I10 PRIMARY HYPERTENSION: ICD-10-CM

## 2025-02-25 DIAGNOSIS — C7B.8 METASTATIC MALIGNANT NEUROENDOCRINE TUMOR TO LIVER (HCC): ICD-10-CM

## 2025-02-25 DIAGNOSIS — D86.0 PULMONARY SARCOIDOSIS (HCC): ICD-10-CM

## 2025-02-25 DIAGNOSIS — E78.2 MIXED HYPERLIPIDEMIA: Primary | ICD-10-CM

## 2025-02-25 DIAGNOSIS — I36.1 NONRHEUMATIC TRICUSPID VALVE REGURGITATION: ICD-10-CM

## 2025-02-25 DIAGNOSIS — E11.9 TYPE 2 DIABETES MELLITUS WITHOUT COMPLICATION, WITHOUT LONG-TERM CURRENT USE OF INSULIN (HCC): ICD-10-CM

## 2025-02-25 PROCEDURE — 99214 OFFICE O/P EST MOD 30 MIN: CPT | Performed by: INTERNAL MEDICINE

## 2025-02-27 ENCOUNTER — HOSPITAL ENCOUNTER (OUTPATIENT)
Dept: INFUSION CENTER | Facility: CLINIC | Age: 75
Discharge: HOME | End: 2025-02-27
Payer: MEDICARE

## 2025-02-27 DIAGNOSIS — C7B.8 METASTATIC MALIGNANT NEUROENDOCRINE TUMOR TO LIVER (HCC): Primary | ICD-10-CM

## 2025-02-27 PROCEDURE — 96372 THER/PROPH/DIAG INJ SC/IM: CPT

## 2025-02-27 RX ORDER — LANREOTIDE ACETATE 120 MG/.5ML
120 INJECTION SUBCUTANEOUS ONCE
OUTPATIENT
Start: 2025-03-27

## 2025-02-27 RX ORDER — LANREOTIDE ACETATE 120 MG/.5ML
120 INJECTION SUBCUTANEOUS ONCE
Status: COMPLETED | OUTPATIENT
Start: 2025-02-27 | End: 2025-02-27

## 2025-02-27 RX ADMIN — LANREOTIDE ACETATE 120 MG: 120 INJECTION SUBCUTANEOUS at 08:14

## 2025-02-27 NOTE — PROGRESS NOTES
Patient here for lanreotide injection. Injection given in L buttock without issue by Rachel Christie RN. Patient aware of next appointment for 3/27 0830, provided calendar.

## 2025-02-28 ENCOUNTER — HOSPITAL ENCOUNTER (OUTPATIENT)
Dept: CT IMAGING | Facility: HOSPITAL | Age: 75
Discharge: HOME | End: 2025-02-28
Payer: MEDICARE

## 2025-02-28 DIAGNOSIS — C7B.8 METASTATIC MALIGNANT NEUROENDOCRINE TUMOR TO LIVER (HCC): ICD-10-CM

## 2025-02-28 PROCEDURE — 74177 CT ABD & PELVIS W/CONTRAST: CPT

## 2025-02-28 PROCEDURE — 71260 CT THORAX DX C+: CPT

## 2025-02-28 RX ADMIN — IOHEXOL 80 ML: 350 INJECTION, SOLUTION INTRAVENOUS at 07:50

## 2025-03-04 ENCOUNTER — OFFICE VISIT (OUTPATIENT)
Dept: SURGICAL ONCOLOGY | Facility: CLINIC | Age: 75
End: 2025-03-04
Payer: MEDICARE

## 2025-03-04 VITALS
TEMPERATURE: 98.7 F | OXYGEN SATURATION: 99 % | SYSTOLIC BLOOD PRESSURE: 118 MMHG | BODY MASS INDEX: 26.31 KG/M2 | RESPIRATION RATE: 18 BRPM | WEIGHT: 143 LBS | HEIGHT: 62 IN | DIASTOLIC BLOOD PRESSURE: 62 MMHG | HEART RATE: 65 BPM

## 2025-03-04 DIAGNOSIS — C7B.8 METASTATIC MALIGNANT NEUROENDOCRINE TUMOR TO LIVER (HCC): Primary | ICD-10-CM

## 2025-03-04 PROCEDURE — 99214 OFFICE O/P EST MOD 30 MIN: CPT | Performed by: SURGERY

## 2025-03-04 NOTE — ASSESSMENT & PLAN NOTE
74 year-old female with metastatic neuroendocrine tumor. She underwent small-bowel resection and microwave ablation of  segment 7 liver lesions.  She completed radiation to T11 for a metastasis.   She is on Lanreotide.  She is clinically GERARDO at over 4 years.  Her imaging is stable and her chromogranin level is normal.  The hilar lymph nodes were benign.  I will plan on seeing her again in 6 months with a repeat CT of her chest, abdomen and pelvis and a chromogranin level.  This CT should be able to survey the pancreas as well, to make sure the IPMN is not changing.  IPMN is currently stable.  If there is residual viable disease, we can consider Sir spheres or PRRT if there is extrahepatic disease.  She is agreeable to this plan.  All of her questions were answered.

## 2025-03-04 NOTE — LETTER
March 4, 2025     Jah Cuenca MD  [address] [place] [id number]    Patient: Kelsey Brooks   YOB: 1950   Date of Visit: 3/4/2025       Dear Dr. Cuenca:    Thank you for referring Kelsey Brooks to me for evaluation. Below are my notes for this consultation.    If you have questions, please do not hesitate to call me. I look forward to following your patient along with you.         Sincerely,        Eric Pierre MD        CC: MD Eric Dunbar MD  3/4/2025  9:20 AM  Sign when Signing Visit               Surgical Oncology Follow Up       [facility] SURGICAL ONCOLOGY [address] [address]    Kelsey Brooks  1950  [id number]  [facility] SURGICAL ONCOLOG[facility] [place]  [address]  [place] [address]    1. 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 She underwent small-bowel resection and microwave ablation of  segment 7 liver lesions.  She completed radiation to T11 for a metastasis.   She is on Lanreotide.  She is clinically GERARDO at over 4 years.  Her imaging is stable and her chromogranin level is normal.  The hilar lymph nodes were benign.  I will plan on seeing her again in 6 months with a repeat CT of her chest, abdomen and pelvis and a chromogranin level.  This CT should be able to survey the pancreas as well, to make sure the IPMN is not changing.  IPMN is currently stable.  If there is residual viable disease, we can consider Sir spheres or PRRT if there is extrahepatic disease.  She is agreeable to this plan.  All of her questions were answered.    Orders:  -     BUN; Future; Expected date: 08/15/2025  -     Chromogranin A; Future; Expected date: 08/15/2025  -     Creatinine, serum; Future; Expected date: 08/15/2025  -     CT chest abdomen pelvis w contrast; Future;  Expected date: 09/04/2025         Chief Complaint   Patient presents with   • Office Visit       Return in about 6 months (around 9/4/2025) for Ofice visit short, Imaging - See orders, Labs - See Treatment Plan, with Shonda.      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 Liver mass, needle core biopsy:  - Metastatic well-differentiated neuroendocrine tumor, G2     1/18/2021 -  Chemotherapy    Lanreotide injections (monthly)     1/20/2021 Surgery    Resection of small bowel and anastomosis, segment 7 liver ablation    A. Small intestine, resection:  - Well- differentiated neuroendocrine tumor (up to 2.1 cm), G2, at least three foci.  - All margins are negative for tumor.  - Three of thirteen lymph nodes are positive for tumor (3/13).     B. 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        2/28/2024 -  Cancer Staged    Staging form: Liver (Excluding Intrahepatic Bile Ducts), AJCC 8th Edition  - Pathologic: Stage IVB (pT3, pN1, cM1) - Signed by Eric Pierre MD on 2/28/2024       Bone metastasis   1/28/2021 Initial Diagnosis    Bone metastasis (HCC)     2/18/2021 - 3/3/2021 Radiation    Treatment:  Course: C1    Plan ID Energy Fractions Dose per Fraction (cGy) Dose Correction (cGy) Total Dose Delivered (cGy) Elapsed Days   T10_T12 Spine 10X/6X 10 / 10 300 0 3,000 13                Diagnosis and Staging: Side branch IPMN 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 2013, 1.2cm   Metastatic neuroendocrine tumor, T3N1M1, December 2020  Treatment history: Small bowel resection and microwave ablation of segment 7 liver lesions, January 2021   Lanreotide   radiation to T11  Mediastinal and hilar adenopathy biopsies were benign  Current Therapy: Observation For the IPMN   Lanreotide  Disease Status: GERARDO    History of  Present Illness: Patient returns in follow-up of her metastatic neuroendocrine tumor.  She is doing well at this time with no complaints.  No flushing, diarrhea, palpitations, headaches or sweats.  No change in appetite or unintentional weight loss.  Her only issue is she feels more discomfort on her tailbone that she did not have in the past.  There is no evidence of new bone metastasis on imaging.  CT from February 28, 2025 reveals stable lung nodules.  The liver lesions are stable.  The pancreas cysts are stable as well.  I personally reviewed her films.  Chromogranin level remains normal at 88.9 ng/mL.    Review of Systems  Complete ROS Surg Onc:   Complete ROS Surg Onc:   Constitutional: The patient denies new or recent history of general fatigue, no recent weight loss, no change in appetite.   Eyes: No complaints of visual problems, no scleral icterus.   ENT: no complaints of ear pain, no hoarseness, no difficulty swallowing,  no tinnitus and no new masses in head, oral cavity, or neck.   Cardiovascular: No complaints of chest pain, no palpitations, no ankle edema.   Respiratory: No complaints of shortness of breath, no cough.   Gastrointestinal: No complaints of jaundice, no bloody stools, no pale stools.   Genitourinary: No complaints of dysuria, no hematuria, no nocturia, no frequent urination, no urethral discharge.   Musculoskeletal: No complaints of weakness, paralysis, joint stiffness or arthralgias.  Integumentary: No complaints of rash, no new lesions.   Neurological: No complaints of convulsions, no seizures, no dizziness.   Hematologic/Lymphatic: No complaints of easy bruising.   Endocrine:  No hot or cold intolerance.  No polydipsia, polyphagia, or polyuria.  Allergy/immunology:  No environmental allergies.  No food allergies.  Not immunocompromised.  Skin:  No pallor or rash.  No wound.        Patient Active Problem List   Diagnosis   • Positional vertigo   • Focal nodular hyperplasia of liver   •  "IPMN (intraductal papillary mucinous neoplasm)   • Liver mass   • Mediastinal adenopathy   • Sarcoidosis, lung (HCC)   • Granulomatous lymphadenitis   • Edema of right lower extremity   • Diastolic dysfunction   • Complex tear of medial meniscus of right knee as current injury   • Other tear of medial meniscus, current injury, right knee, initial encounter   • Trigger middle finger of right hand   • 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 (HCC)   • Bone metastasis   • Reactive airway disease   • Elevated fasting glucose   • Primary hypertension   • Nonrheumatic tricuspid valve regurgitation   • Chronic obstructive pulmonary disease, unspecified COPD type (HCC)   • Chronic intractable headache   • Mixed hyperlipidemia   • Type 2 diabetes mellitus without complication, without long-term current use of insulin (HCC)     Past Medical History:   Diagnosis Date   • Abdominal pain    • Acute tonsillitis    • Breast pain, left    • Cancer (HCC)     liver&small intestine   • COVID-19 07/2022   • Edema of both lower extremities    • GERD (gastroesophageal reflux disease)    • GERD without esophagitis    • Kidney stone     \" [place]\"   • Lesion of liver    • Liver mass    • Lymphadenopathy    • Mediastinal lymphadenopathy    • Neoplasm of digestive system    • Orthostatic lightheadedness    • Sarcoidosis    • Vertigo      Past Surgical History:   Procedure Laterality Date   • BREAST BIOPSY Left 1996    negative   • CARPAL TUNNEL RELEASE Bilateral    • COLONOSCOPY      2017   • ENDOBRONCHIAL ULTRASOUND (EBUS) N/A 3/6/2023    Procedure: ENDOBRONCHIAL ULTRASOUND (EBUS);  Surgeon: Ricardo Vega MD;  [facility];  Service: Thoracic   • IR BIOPSY LIVER MASS  11/6/2018   • IR BIOPSY LIVER MASS  12/8/2020   • KNEE ARTHROSCOPY Left    • LAPAROTOMY N/A 1/20/2021    Procedure: LAPAROTOMY EXPLORATORY;  Surgeon: Eric Pierre MD;  [facility];  Service: Surgical Oncology   • LIVER LOBECTOMY N/A 1/20/2021    " Procedure: LIVER ABLATION SEGMENT 7, INTRAOPERATIVE U/S OF LIVER;  Surgeon: Eric Pierre MD;  [facility];  Service: Surgical Oncology   • MEDIASTINOSCOPY N/A 11/27/2018    Procedure: MEDIASTINOSCOPY;  Surgeon: Ricardo Vega MD;  [facility];  Service: Thoracic   • [place] [facility] INCL FLUOR GDNCE DX W/CELL WASHG SPX N/A 11/27/2018    Procedure: BRONCHOSCOPY FLEXIBLE;  Surgeon: Ricardo Vega MD;  [facility];  Service: Thoracic   • [place] [facility] INCL FLUOR GDNCE DX W/CELL WASHG SPX N/A 3/6/2023    Procedure: BRONCHOSCOPY FLEXIBLE;  Surgeon: Ricardo Vega MD;  [facility];  Service: Thoracic   • [place] BRONCHOSCOPY NEEDLE BX TRACHEA MAIN STEM&/BRON N/A 11/27/2018    Procedure: EBUS with biopsy;  Surgeon: Ricardo Vega MD;  [facility];  Service: Thoracic   • [place] EDG US EXAM SURGICAL ALTER STOM DUODENUM/JEJUNUM N/A 10/29/2018    Procedure: LINEAR ENDOSCOPIC U/S;  Surgeon: Oseas Villagran MD;  [facility]: [facility];  Service: Gastroenterology   • SKIN BIOPSY     • SMALL INTESTINE SURGERY N/A 1/20/2021    Procedure: RESECTION SMALL BOWEL AND ANASTOMOSIS, RESECTION SMALL BOWEL NODULE;  Surgeon: Eric Pierre MD;  [facility];  Service: Surgical Oncology   • WISDOM TOOTH EXTRACTION       Family History   Problem Relation Age of Onset   • Alzheimer's disease Mother    • Parkinsonism Father    • Psoriasis Father    • Heart failure Father         at older age   • Psoriasis Sister    • Psoriasis Sister    • Stroke Sister    • Colon cancer Maternal Grandfather    • No Known Problems Daughter    • No Known Problems Daughter      Social History     Socioeconomic History   • Marital status:      Spouse name: Not on file   • Number of children: Not on file   • Years of education: Not on file   • Highest education level: Not on file   Occupational History   • Not on file   Tobacco Use   • Smoking status: Never   • Smokeless tobacco: Never   Vaping Use   • Vaping  status: Never Used   Substance and Sexual Activity   • Alcohol use: Yes     Comment: occ wine- very seldom   • Drug use: No   • Sexual activity: Not Currently   Other Topics Concern   • Not on file   Social History Narrative   • Not on file     Social Drivers of Health     Financial Resource Strain: Low Risk  (2/16/2023)    Overall Financial Resource Strain (CARDIA)    • Difficulty of Paying Living Expenses: Not very hard   Food Insecurity: Not on file   Transportation Needs: No Transportation Needs (2/16/2023)    PRAPARE - Transportation    • Lack of Transportation (Medical): No    • Lack of Transportation (Non-Medical): No   Physical Activity: Not on file   Stress: Not on file   Social Connections: Not on file   Intimate Partner Violence: Not on file   Housing Stability: Not on file       Current Outpatient Medications:   •  atorvastatin (LIPITOR) 20 mg tablet, Take 1 tablet (20 mg total) by mouth daily at bedtime, Disp: 90 tablet, Rfl: 3  •  Cholecalciferol (VITAMIN D) 2000 units CAPS, Take 1 tablet by mouth daily, Disp: , Rfl:   •  docusate sodium (COLACE) 100 mg capsule, Take 1 capsule (100 mg total) by mouth 2 (two) times a day, Disp: 10 capsule, Rfl: 0  •  hydroCHLOROthiazide 12.5 mg tablet, Take 1 tablet by mouth once daily, Disp: 90 tablet, Rfl: 1  •  LANREOTIDE ACETATE SC, Inject under the skin every 30 (thirty) days, Disp: , Rfl:   •  Multiple Vitamin (MULTIVITAMIN) tablet, Take 1 tablet by mouth daily, Disp: , Rfl:   •  omeprazole (PriLOSEC) 40 MG capsule, Take 1 capsule (40 mg total) by mouth daily before breakfast, Disp: 90 capsule, Rfl: 1  •  triamcinolone (KENALOG) 0.1 % ointment, Apply topically 2 (two) times a day Apply to areas of itch, Apply thin coat to groin area for maximum of 3 days, Disp: 80 g, Rfl: 1  •  Fluticasone-Salmeterol (Advair) 100-50 mcg/dose inhaler, Inhale 1 puff 2 (two) times a day Rinse mouth after use. (Patient not taking: Reported on 1/24/2025), Disp: 60 blister, Rfl: 3  •   ondansetron (ZOFRAN-ODT) 8 mg disintegrating tablet, Take 1 tablet (8 mg total) by mouth every 8 (eight) hours as needed for nausea or vomiting (Patient not taking: Reported on 3/4/2025), Disp: 20 tablet, Rfl: 5  Allergies   Allergen Reactions   • Bactrim [Sulfamethoxazole-Trimethoprim] Hives   • Clam Shell - Food Allergy Vomiting     Vitals:    03/04/25 0859   BP: 118/62   Pulse: 65   Resp: 18   Temp: 98.7 °F (37.1 °C)   SpO2: 99%       Physical Exam  Constitutional: General appearance: The Patient is well-developed and well-nourished who appears the stated age in no acute distress. Patient is pleasant and talkative.     HEENT:  Normocephalic.  Sclerae are anicteric. Mucous membranes are moist. Neck is supple without adenopathy. No JVD.     Chest: The lungs are clear to auscultation.     Cardiac: Heart is regular rate.     Abdomen: Abdomen is soft, non-tender, non-distended and without masses.     Extremities: There is no clubbing or cyanosis. There is no edema.  Symmetric.  Neuro: Grossly nonfocal. Gait is normal.     Lymphatic: No evidence of cervical adenopathy bilaterally.   No evidence of axillary adenopathy bilaterally. No evidence of inguinal adenopathy bilaterally.     Skin: Warm, anicteric.    Psych:  Patient is pleasant and talkative.  Breasts:        Pathology:  [unfilled]    Labs:  Component  Ref Range & Units (hover) 2/19/25  7:04 AM 1/23/25  7:46 AM 12/24/24  6:15 AM 11/26/24  7:39 AM 10/29/24  9:37 AM 10/1/24  7:20 AM 8/6/24  6:22 AM   Chromogranin A 88.9 78.9 CM 58.9 CM 56.8 CM 57.9 CM 67.8 CM 65.4       Imaging  CT chest abdomen pelvis w contrast  Result Date: 2/28/2025  Narrative: CT CHEST, ABDOMEN AND PELVIS WITH IV CONTRAST INDICATION:   Other secondary neuroendocrine tumors. . COMPARISON: August 2024 and February 2024. TECHNIQUE: CT examination of the chest, abdomen and pelvis was performed. Multiplanar 2D reformatted images were created from the source data. This examination, like all CT  scans performed in the [facility], was performed utilizing techniques to minimize radiation dose exposure, including the use of iterative reconstruction and automated exposure control. Radiation dose length product (DLP) for this visit: 565.90 IV Contrast: 80 cc of Omnipaque 350. Enteric Contrast: Enteric contrast was administered. FINDINGS: CHEST LUNGS: Lung windows demonstrate bandlike opacities in the medial right middle lobe and lingula consistent with atelectasis/scarring. There is a stable 2 mm nodule in the right middle lobe (series 4, image 150). There are few stable calcified nodules bilaterally consistent with granulomas. PLEURA:  Unremarkable. HEART/GREAT VESSELS: Heart size is within normal limits. The aorta is normal in caliber. There are a few atherosclerotic calcifications of the aorta. There is no pericardial effusion. MEDIASTINUM AND BRIGIDA:  Unremarkable. CHEST WALL AND LOWER NECK:  Unremarkable. ABDOMEN LIVER/BILIARY TREE: Liver is mildly enlarged. There is a stable hypodense mass in the right hepatic lobe measuring 2.3 x 1.9 cm, not significantly changed (series 2, image 103). There is a stable wedge-shaped area of hypodensity in the lateral right hepatic lobe (series 2, image 117) measuring 1.1 x 1.2 cm, unchanged. There are a few stable hepatic cysts. There is redemonstration of heterogeneous enhancement in the posterior right hepatic lobe likely related to perfusion abnormality related to prior  treatment. Portal veins are patent. There is mild stable dilatation of the common bile duct with normal tapering. GALLBLADDER:  No calcified gallstones. No pericholecystic inflammatory change. SPLEEN:  Unremarkable. PANCREAS: Pancreas is atrophic with stable cystic lesions in the pancreatic head and proximal body, unchanged. Largest measures approximately 8 mm (series 2, image 112). ADRENAL GLANDS:  Unremarkable. KIDNEYS/URETERS:  Unremarkable. No hydronephrosis. STOMACH AND BOWEL:   There is colonic diverticulosis without evidence of acute diverticulitis. There is no evidence of obstruction. Stable postsurgical changes of the small bowel in the right lower quadrant. APPENDIX:  No findings to suggest appendicitis. ABDOMINOPELVIC CAVITY:  No ascites.  No pneumoperitoneum.  No significant lymphadenopathy or peritoneal masses. VESSELS:  Unremarkable for patient's age. PELVIS REPRODUCTIVE ORGANS:  Unremarkable for patient's age. URINARY BLADDER:  Unremarkable. ABDOMINAL WALL/INGUINAL REGIONS:  There is a small fat-containing umbilical hernia, unchanged from previous examination. There is a stable small fat-containing infraumbilical hernia just to the right of midline. There are multiple soft tissue nodules in the buttocks bilaterally. OSSEOUS STRUCTURES:  No acute fracture or destructive osseous lesion.  Spinal degenerative changes are noted.     Impression: 1. Overall, no significant change compared to the prior study. Stable hypodense masses in the liver. No significant adenopathy. 2. Stable cystic lesions within the pancreas. 3. Please see above text for additional details. Electronically signed: 02/28/2025 01:03 PM Chinyere Reed MD    I personally reviewed and interpreted the above laboratory and imaging data.

## 2025-03-04 NOTE — PROGRESS NOTES
Surgical Oncology Follow Up       [facility] SURGICAL [facility]COLOGY [place]  [address]  [place] [address]    Kelsey Brooks  1950  [id number]  [facility]COLOGY [place]  [address]  [place] [address]    1. 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 She underwent small-bowel resection and microwave ablation of  segment 7 liver lesions.  She completed radiation to T11 for a metastasis.   She is on Lanreotide.  She is clinically GERARDO at over 4 years.  Her imaging is stable and her chromogranin level is normal.  The hilar lymph nodes were benign.  I will plan on seeing her again in 6 months with a repeat CT of her chest, abdomen and pelvis and a chromogranin level.  This CT should be able to survey the pancreas as well, to make sure the IPMN is not changing.  IPMN is currently stable.  If there is residual viable disease, we can consider Sir spheres or PRRT if there is extrahepatic disease.  She is agreeable to this plan.  All of her questions were answered.    Orders:  -     BUN; Future; Expected date: 08/15/2025  -     Chromogranin A; Future; Expected date: 08/15/2025  -     Creatinine, serum; Future; Expected date: 08/15/2025  -     CT chest abdomen pelvis w contrast; Future; Expected date: 09/04/2025         Chief Complaint   Patient presents with    Office Visit       Return in about 6 months (around 9/4/2025) for Ofice visit short, Imaging - See orders, Labs - See Treatment Plan, with Shonda.      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 Liver mass, needle core biopsy:  - Metastatic well-differentiated neuroendocrine tumor, G2      1/18/2021 -  Chemotherapy    Lanreotide injections (monthly)     1/20/2021 Surgery    Resection of small bowel and anastomosis, segment 7 liver ablation    A. Small intestine, resection:  - Well- differentiated neuroendocrine tumor (up to 2.1 cm), G2, at least three foci.  - All margins are negative for tumor.  - Three of thirteen lymph nodes are positive for tumor (3/13).     B. 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        2/28/2024 -  Cancer Staged    Staging form: Liver (Excluding Intrahepatic Bile Ducts), AJCC 8th Edition  - Pathologic: Stage IVB (pT3, pN1, cM1) - Signed by Eric Pierre MD on 2/28/2024       Bone metastasis   1/28/2021 Initial Diagnosis    Bone metastasis (HCC)     2/18/2021 - 3/3/2021 Radiation    Treatment:  Course: C1    Plan ID Energy Fractions Dose per Fraction (cGy) Dose Correction (cGy) Total Dose Delivered (cGy) Elapsed Days   T10_T12 Spine 10X/6X 10 / 10 300 0 3,000 13                Diagnosis and Staging: Side branch IPMN 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 2013, 1.2cm   Metastatic neuroendocrine tumor, T3N1M1, December 2020  Treatment history: Small bowel resection and microwave ablation of segment 7 liver lesions, January 2021   Lanreotide   radiation to T11  Mediastinal and hilar adenopathy biopsies were benign  Current Therapy: Observation For the IPMN   Lanreotide  Disease Status: GERARDO    History of Present Illness: Patient returns in follow-up of her metastatic neuroendocrine tumor.  She is doing well at this time with no complaints.  No flushing, diarrhea, palpitations, headaches or sweats.  No change in appetite or unintentional weight loss.  Her only issue is she feels more discomfort on her tailbone that she did not have in the past.  There is no evidence of new bone metastasis on imaging.  CT from February 28, 2025 reveals stable lung nodules.  The liver lesions are stable.  The pancreas cysts are stable as well.  I  personally reviewed her films.  Chromogranin level remains normal at 88.9 ng/mL.    Review of Systems  Complete ROS Surg Onc:   Complete ROS Surg Onc:   Constitutional: The patient denies new or recent history of general fatigue, no recent weight loss, no change in appetite.   Eyes: No complaints of visual problems, no scleral icterus.   ENT: no complaints of ear pain, no hoarseness, no difficulty swallowing,  no tinnitus and no new masses in head, oral cavity, or neck.   Cardiovascular: No complaints of chest pain, no palpitations, no ankle edema.   Respiratory: No complaints of shortness of breath, no cough.   Gastrointestinal: No complaints of jaundice, no bloody stools, no pale stools.   Genitourinary: No complaints of dysuria, no hematuria, no nocturia, no frequent urination, no urethral discharge.   Musculoskeletal: No complaints of weakness, paralysis, joint stiffness or arthralgias.  Integumentary: No complaints of rash, no new lesions.   Neurological: No complaints of convulsions, no seizures, no dizziness.   Hematologic/Lymphatic: No complaints of easy bruising.   Endocrine:  No hot or cold intolerance.  No polydipsia, polyphagia, or polyuria.  Allergy/immunology:  No environmental allergies.  No food allergies.  Not immunocompromised.  Skin:  No pallor or rash.  No wound.        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)    Bone metastasis    Reactive airway disease    Elevated fasting glucose    Primary  "hypertension    Nonrheumatic tricuspid valve regurgitation    Chronic obstructive pulmonary disease, unspecified COPD type (HCC)    Chronic intractable headache    Mixed hyperlipidemia    Type 2 diabetes mellitus without complication, without long-term current use of insulin (HCC)     Past Medical History:   Diagnosis Date    Abdominal pain     Acute tonsillitis     Breast pain, left     Cancer (HCC)     liver&small intestine    COVID-19 07/2022    Edema of both lower extremities     GERD (gastroesophageal reflux disease)     GERD without esophagitis     Kidney stone     \" [place]\"    Lesion of liver     Liver mass     Lymphadenopathy     Mediastinal lymphadenopathy     Neoplasm of digestive system     Orthostatic lightheadedness     Sarcoidosis     Vertigo      Past Surgical History:   Procedure Laterality Date    BREAST BIOPSY Left 1996    negative    CARPAL TUNNEL RELEASE Bilateral     COLONOSCOPY      2017    ENDOBRONCHIAL ULTRASOUND (EBUS) N/A 3/6/2023    Procedure: ENDOBRONCHIAL ULTRASOUND (EBUS);  Surgeon: Ricardo Vega MD;  Location: [facility];  Service: Thoracic    IR BIOPSY LIVER MASS  11/6/2018    IR BIOPSY LIVER MASS  12/8/2020    KNEE ARTHROSCOPY Left     LAPAROTOMY N/A 1/20/2021    Procedure: LAPAROTOMY EXPLORATORY;  Surgeon: Eric Pierre MD;  Location: [facility];  Service: Surgical Oncology    LIVER LOBECTOMY N/A 1/20/2021    Procedure: LIVER ABLATION SEGMENT 7, INTRAOPERATIVE U/S OF LIVER;  Surgeon: Eric Pierre MD;  Location: [facility];  Service: Surgical Oncology    MEDIASTINOSCOPY N/A 11/27/2018    Procedure: MEDIASTINOSCOPY;  Surgeon: Ricardo Vega MD;  [facility]: [facility];  Service: Thoracic    [facility] INCL FLUOR GDNCE DX W/CELL WASHG SPX N/A 11/27/2018    Procedure: BRONCHOSCOPY FLEXIBLE;  Surgeon: Ricardo Vega MD;  [facility];  Service: Thoracic    [facility] INCL FLUOR GDNCE DX W/CELL WASHG SPX N/A 3/6/2023    Procedure: BRONCHOSCOPY FLEXIBLE;  Surgeon: " Ricardo Vega MD;  [facility];  Service: Thoracic    AL BRONCHOSCOPY NEEDLE BX TRACHEA MAIN STEM&/BRON N/A 11/27/2018    Procedure: EBUS with biopsy;  Surgeon: Ricardo Vega MD;  [facility];  Service: Thoracic    AL EDG US EXAM SURGICAL ALTER STOM DUODENUM/JEJUNUM N/A 10/29/2018    Procedure: LINEAR ENDOSCOPIC U/S;  Surgeon: Oseas Villagran MD;  Location: [facility];  Service: Gastroenterology    SKIN BIOPSY      SMALL INTESTINE SURGERY N/A 1/20/2021    Procedure: RESECTION SMALL BOWEL AND ANASTOMOSIS, RESECTION SMALL BOWEL NODULE;  Surgeon: Eric Pierre MD;  [facility]: [facility];  Service: Surgical Oncology    WISDOM TOOTH EXTRACTION       Family History   Problem Relation Age of Onset    Alzheimer's disease Mother     Parkinsonism Father     Psoriasis Father     Heart failure Father         at older age    Psoriasis Sister     Psoriasis Sister     Stroke Sister     Colon cancer Maternal Grandfather     No Known Problems Daughter     No Known Problems Daughter      Social History     Socioeconomic History    Marital status:      Spouse name: Not on file    Number of children: Not on file    Years of education: Not on file    Highest education level: Not on file   Occupational History    Not on file   Tobacco Use    Smoking status: Never    Smokeless tobacco: Never   Vaping Use    Vaping status: Never Used   Substance and Sexual Activity    Alcohol use: Yes     Comment: occ wine- very seldom    Drug use: No    Sexual activity: Not Currently   Other Topics Concern    Not on file   Social History Narrative    Not on file     Social Drivers of Health     Financial Resource Strain: Low Risk  (2/16/2023)    Overall Financial Resource Strain (CARDIA)     Difficulty of Paying Living Expenses: Not very hard   Food Insecurity: Not on file   Transportation Needs: No Transportation Needs (2/16/2023)    PRAPARE - Transportation     Lack of Transportation (Medical): No     Lack of Transportation  (Non-Medical): No   Physical Activity: Not on file   Stress: Not on file   Social Connections: Not on file   Intimate Partner Violence: Not on file   Housing Stability: Not on file       Current Outpatient Medications:     atorvastatin (LIPITOR) 20 mg tablet, Take 1 tablet (20 mg total) by mouth daily at bedtime, Disp: 90 tablet, Rfl: 3    Cholecalciferol (VITAMIN D) 2000 units CAPS, Take 1 tablet by mouth daily, Disp: , Rfl:     docusate sodium (COLACE) 100 mg capsule, Take 1 capsule (100 mg total) by mouth 2 (two) times a day, Disp: 10 capsule, Rfl: 0    hydroCHLOROthiazide 12.5 mg tablet, Take 1 tablet by mouth once daily, Disp: 90 tablet, Rfl: 1    LANREOTIDE ACETATE SC, Inject under the skin every 30 (thirty) days, Disp: , Rfl:     Multiple Vitamin (MULTIVITAMIN) tablet, Take 1 tablet by mouth daily, Disp: , Rfl:     omeprazole (PriLOSEC) 40 MG capsule, Take 1 capsule (40 mg total) by mouth daily before breakfast, Disp: 90 capsule, Rfl: 1    triamcinolone (KENALOG) 0.1 % ointment, Apply topically 2 (two) times a day Apply to areas of itch, Apply thin coat to groin area for maximum of 3 days, Disp: 80 g, Rfl: 1    Fluticasone-Salmeterol (Advair) 100-50 mcg/dose inhaler, Inhale 1 puff 2 (two) times a day Rinse mouth after use. (Patient not taking: Reported on 1/24/2025), Disp: 60 blister, Rfl: 3    ondansetron (ZOFRAN-ODT) 8 mg disintegrating tablet, Take 1 tablet (8 mg total) by mouth every 8 (eight) hours as needed for nausea or vomiting (Patient not taking: Reported on 3/4/2025), Disp: 20 tablet, Rfl: 5  Allergies   Allergen Reactions    Bactrim [Sulfamethoxazole-Trimethoprim] Hives    Clam Shell - Food Allergy Vomiting     Vitals:    03/04/25 0859   BP: 118/62   Pulse: 65   Resp: 18   Temp: 98.7 °F (37.1 °C)   SpO2: 99%       Physical Exam  Constitutional: General appearance: The Patient is well-developed and well-nourished who appears the stated age in no acute distress. Patient is pleasant and talkative.      HEENT:  Normocephalic.  Sclerae are anicteric. Mucous membranes are moist. Neck is supple without adenopathy. No JVD.     Chest: The lungs are clear to auscultation.     Cardiac: Heart is regular rate.     Abdomen: Abdomen is soft, non-tender, non-distended and without masses.     Extremities: There is no clubbing or cyanosis. There is no edema.  Symmetric.  Neuro: Grossly nonfocal. Gait is normal.     Lymphatic: No evidence of cervical adenopathy bilaterally.   No evidence of axillary adenopathy bilaterally. No evidence of inguinal adenopathy bilaterally.     Skin: Warm, anicteric.    Psych:  Patient is pleasant and talkative.  Breasts:        Pathology:  [unfilled]    Labs:  Component  Ref Range & Units (hover) 2/19/25  7:04 AM 1/23/25  7:46 AM 12/24/24  6:15 AM 11/26/24  7:39 AM 10/29/24  9:37 AM 10/1/24  7:20 AM 8/6/24  6:22 AM   Chromogranin A 88.9 78.9 CM 58.9 CM 56.8 CM 57.9 CM 67.8 CM 65.4       Imaging  CT chest abdomen pelvis w contrast  Result Date: 2/28/2025  Narrative: CT CHEST, ABDOMEN AND PELVIS WITH IV CONTRAST INDICATION:   Other secondary neuroendocrine tumors. . COMPARISON: August 2024 and February 2024. TECHNIQUE: CT examination of the chest, abdomen and pelvis was performed. Multiplanar 2D reformatted images were created from the source data. This examination, like all CT scans performed in the [facility], was performed utilizing techniques to minimize radiation dose exposure, including the use of iterative reconstruction and automated exposure control. Radiation dose length product (DLP) for this visit: 565.90 IV Contrast: 80 cc of Omnipaque 350. Enteric Contrast: Enteric contrast was administered. FINDINGS: CHEST LUNGS: Lung windows demonstrate bandlike opacities in the medial right middle lobe and lingula consistent with atelectasis/scarring. There is a stable 2 mm nodule in the right middle lobe (series 4, image 150). There are few stable calcified nodules bilaterally  consistent with granulomas. PLEURA:  Unremarkable. HEART/GREAT VESSELS: Heart size is within normal limits. The aorta is normal in caliber. There are a few atherosclerotic calcifications of the aorta. There is no pericardial effusion. MEDIASTINUM AND BRIGIDA:  Unremarkable. CHEST WALL AND LOWER NECK:  Unremarkable. ABDOMEN LIVER/BILIARY TREE: Liver is mildly enlarged. There is a stable hypodense mass in the right hepatic lobe measuring 2.3 x 1.9 cm, not significantly changed (series 2, image 103). There is a stable wedge-shaped area of hypodensity in the lateral right hepatic lobe (series 2, image 117) measuring 1.1 x 1.2 cm, unchanged. There are a few stable hepatic cysts. There is redemonstration of heterogeneous enhancement in the posterior right hepatic lobe likely related to perfusion abnormality related to prior  treatment. Portal veins are patent. There is mild stable dilatation of the common bile duct with normal tapering. GALLBLADDER:  No calcified gallstones. No pericholecystic inflammatory change. SPLEEN:  Unremarkable. PANCREAS: Pancreas is atrophic with stable cystic lesions in the pancreatic head and proximal body, unchanged. Largest measures approximately 8 mm (series 2, image 112). ADRENAL GLANDS:  Unremarkable. KIDNEYS/URETERS:  Unremarkable. No hydronephrosis. STOMACH AND BOWEL:  There is colonic diverticulosis without evidence of acute diverticulitis. There is no evidence of obstruction. Stable postsurgical changes of the small bowel in the right lower quadrant. APPENDIX:  No findings to suggest appendicitis. ABDOMINOPELVIC CAVITY:  No ascites.  No pneumoperitoneum.  No significant lymphadenopathy or peritoneal masses. VESSELS:  Unremarkable for patient's age. PELVIS REPRODUCTIVE ORGANS:  Unremarkable for patient's age. URINARY BLADDER:  Unremarkable. ABDOMINAL WALL/INGUINAL REGIONS:  There is a small fat-containing umbilical hernia, unchanged from previous examination. There is a stable small  fat-containing infraumbilical hernia just to the right of midline. There are multiple soft tissue nodules in the buttocks bilaterally. OSSEOUS STRUCTURES:  No acute fracture or destructive osseous lesion.  Spinal degenerative changes are noted.     Impression: 1. Overall, no significant change compared to the prior study. Stable hypodense masses in the liver. No significant adenopathy. 2. Stable cystic lesions within the pancreas. 3. Please see above text for additional details. Electronically signed: 02/28/2025 01:03 PM Chinyere Reed MD    I personally reviewed and interpreted the above laboratory and imaging data.

## 2025-03-07 ENCOUNTER — OFFICE VISIT (OUTPATIENT)
Dept: HEMATOLOGY ONCOLOGY | Facility: CLINIC | Age: 75
End: 2025-03-07
Payer: MEDICARE

## 2025-03-07 VITALS
SYSTOLIC BLOOD PRESSURE: 112 MMHG | TEMPERATURE: 97.4 F | OXYGEN SATURATION: 99 % | HEART RATE: 60 BPM | RESPIRATION RATE: 18 BRPM | DIASTOLIC BLOOD PRESSURE: 68 MMHG | BODY MASS INDEX: 26.13 KG/M2 | HEIGHT: 62 IN | WEIGHT: 142 LBS

## 2025-03-07 DIAGNOSIS — C7B.8 METASTATIC MALIGNANT NEUROENDOCRINE TUMOR TO LIVER (HCC): Primary | ICD-10-CM

## 2025-03-07 PROCEDURE — 99213 OFFICE O/P EST LOW 20 MIN: CPT | Performed by: INTERNAL MEDICINE

## 2025-03-07 NOTE — ASSESSMENT & PLAN NOTE
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 Currently the patient received long-acting somatostatin analog with lanreotide 120 mg IV every 28 days, which was first initiated on January 18, 2021.  On January 20, 2021, the patient underwent resection of the primary low-grade neuroendocrine tumor of the small intestine, through a small bowel resection with anastomosis.  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  From 5/18/2021 to March 8, 2021, she received palliative external beam radiotherapy to painful bone metastatic disease involving T10 and T12 vertebral bodies.     Interim assessment: Overall, the patient feels well.  Clinically, she has stable, metastatic well-differentiated neuroendocrine tumor of the small intestine with liver and bone metastatic disease.  Her serum chromogranin A is stable.  On February 28, 2025, contrast-enhanced CT scan of the chest, abdomen and pelvis showed stable metastatic disease.  There is no radiographic evidence of progression.  Specifically, there was no significant change compared to the prior study. Stable hypodense masses in the liver were identified. No significant metastatic adenopathy. Stable cystic lesions within the pancreas were identified. The patient does not have new symptoms.     Plan:  Continue long-acting somatostatin analog therapy with lanreotide 120 mg subcutaneous injections every 28 days.  Duration of long-acting somatostatin analog therapy is lifelong.  Contrast-enhanced CT scan of the chest, abdomen and pelvis and serum chromogranin A in August 2025   Patient to take vitamin B3 (niacinamide) 1000 mg per mouth daily.  Duration of vitamin B3 replacement therapy is lifelong  Return to medical oncology clinic for history and physical exam on June 2025      Carcinoid heart disease  The patient will continue close monitoring of cardiac function specifically right left ventricular ejection fraction, as well as degree of tricuspid and pulmonary valves function.  If the patient has further progression of her carcinoid heart disease, she may benefit from regional hepatic therapies, such as TACE or Y90 chemoembolization.  In addition, the patient may benefit from PRRT with lutetium 177 dotatate (Lutathera) as well. Continue periodic assessment by the cardiology service.  The patient is at risk for right cardiac failure because of presence of liver metastatic disease originating from a primary well-differentiated neuroendocrine tumor of the small intestine.  The patient is at risk for progression of carcinoid heart disease.       Mrs. Brooks understands and agrees with my management recommendations and plan of care. I answered questions to her satisfaction.  Pulmonary already will take a quick shower in the usual first

## 2025-03-07 NOTE — PROGRESS NOTES
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 Currently the patient received long-acting somatostatin analog with lanreotide 120 mg IV every 28 days, which was first initiated on 2021.  On 2021, the patient underwent resection of the primary low-grade neuroendocrine tumor of the small intestine, through a small bowel resection with anastomosis.  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  From 2021 to 2021, she received palliative external beam radiotherapy to painful bone metastatic disease involving T10 and T12 vertebral bodies.     Interim assessment: Overall, the patient feels well.  Clinically, she has stable, metastatic well-differentiated neuroendocrine tumor of the small intestine with liver and bone metastatic disease.  Her serum chromogranin A is stable.  On 2025, contrast-enhanced CT scan of the chest, abdomen and pelvis showed stable metastatic disease.  There is no radiographic evidence of progression.  Specifically, there was no significant change compared to the prior study. Stable hypodense masses in the liver were identified. No significant metastatic adenopathy. Stable cystic lesions within the pancreas were identified. The patient does not have new symptoms.     Plan:  Continue long-acting somatostatin analog therapy with lanreotide 120 mg subcutaneous injections every 28 days.  Duration of long-acting somatostatin analog therapy is lifelong.  Contrast-enhanced  CT scan of the chest, abdomen and pelvis and serum chromogranin A in August 2025   Patient to take vitamin B3 (niacinamide) 1000 mg per mouth daily.  Duration of vitamin B3 replacement therapy is lifelong  Return to medical oncology clinic for history and physical exam on June 2025     Carcinoid heart disease  The patient will continue close monitoring of cardiac function specifically right left ventricular ejection fraction, as well as degree of tricuspid and pulmonary valves function.  If the patient has further progression of her carcinoid heart disease, she may benefit from regional hepatic therapies, such as TACE or Y90 chemoembolization.  In addition, the patient may benefit from PRRT with lutetium 177 dotatate (Lutathera) as well. Continue periodic assessment by the cardiology service.  The patient is at risk for right cardiac failure because of presence of liver metastatic disease originating from a primary well-differentiated neuroendocrine tumor of the small intestine.  The patient is at risk for progression of carcinoid heart disease.       Mrs. Brooks understands and agrees with my management recommendations and plan of care. I answered questions to her satisfaction.  Pulmonary already will take a quick shower in the usual first         History of Present Illness   Chief Complaint   Patient presents with    Follow-up   74 y.o. 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 Currently the patient received long-acting somatostatin analog with lanreotide 120 mg IV every 28 days, which was first initiated on January 18, 2021.  On January 20, 2021, the patient underwent resection of the primary low-grade neuroendocrine tumor of the small intestine, through a small bowel resection with anastomosis.  Pathology showed T3 N1, clinical N1, stage IV well-differentiated neuroendocrine tumor  of the small intestine.  From 5/18/2021 to March 8, 2021, she received palliative external beam radiotherapy to painful bone metastatic disease involving T10 and T12 vertebral bodies.    Of note, the patient's most recent 2D echocardiogram on November 18, 2024 shows some evidence of right cardiac dysfunction most likely representing carcinoid heart disease.  Specifically, right ventricular cavity size is mildly dilated. Systolic function is normal.  The right atrium is dilated. There is mild to moderate regurgitation. The estimated right ventricular systolic pressure is 37.00 mmHg. Left ventricular cavity size is normal. Wall thickness is borderline increased. The left ventricular ejection fraction is 57% by single dimension measurement. Systolic function is normal. Global longitudinal strain is normal at -20%. Wall motion is normal. Diastolic function is mildly abnormal, consistent with grade I (abnormal) relaxation.  Overall, findings on 2D echocardiogram from November 2024 are unchanged compared to prior echocardiogram from October 16, 2023.     Interim history: The patient is doing well.  She does not have new complaints.  She denies new gastrointestinal symptoms such as anorexia, nausea, vomiting, diarrhea, constipation, abdominal pain, abdominal distention, jaundice, hematemesis, melena, or hematochezia.  She denies new systemic, cardiorespiratory, genitourinary, gynecological, muscle skeletal, or neurologic symptoms.  She refers unchanged, chronic fatigue, and mild dyspnea with moderate exertion. On February 28, 2025, contrast-enhanced CT scan of the chest, abdomen and pelvis showed stable metastatic disease.  There is no radiographic evidence of progression.  Specifically, there was no significant change compared to the prior study. Stable hypodense masses in the liver were identified. No significant metastatic adenopathy. Stable cystic lesions within the pancreas were identified.    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 Liver mass, needle core biopsy:  - Metastatic well-differentiated neuroendocrine tumor, G2     1/18/2021 -  Chemotherapy    Lanreotide injections (monthly)     1/20/2021 Surgery    Resection of small bowel and anastomosis, segment 7 liver ablation    A. Small intestine, resection:  - Well- differentiated neuroendocrine tumor (up to 2.1 cm), G2, at least three foci.  - All margins are negative for tumor.  - Three of thirteen lymph nodes are positive for tumor (3/13).     B. 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        2/28/2024 -  Cancer Staged    Staging form: Liver (Excluding Intrahepatic Bile Ducts), AJCC 8th Edition  - Pathologic: Stage IVB (pT3, pN1, cM1) - Signed by Eric Pierre MD on 2/28/2024       Bone metastasis   1/28/2021 Initial Diagnosis    Bone metastasis (HCC)     2/18/2021 - 3/3/2021 Radiation    Treatment:  Course: C1    Plan ID Energy Fractions Dose per Fraction (cGy) Dose Correction (cGy) Total Dose Delivered (cGy) Elapsed Days   T10_T12 Spine 10X/6X 10 / 10 300 0 3,000 13               Pertinent Medical History   Past Medical History:   Diagnosis Date    Abdominal pain     Acute tonsillitis     Breast pain, left     Cancer (HCC)     liver&small intestine    COVID-19 07/2022    Edema of both lower extremities     GERD (gastroesophageal reflux disease)     GERD without esophagitis     Kidney stone     \" [place]vel\"    Lesion of liver     Liver mass     Lymphadenopathy     Mediastinal " "lymphadenopathy     Neoplasm of digestive system     Orthostatic lightheadedness     Sarcoidosis     Vertigo             Review of Systems   Constitutional:  Negative for activity change, appetite change, chills, diaphoresis, fatigue, fever and unexpected weight change.   HENT:  Negative for congestion, dental problem, ear discharge, ear pain, facial swelling, hearing loss, mouth sores, nosebleeds, postnasal drip, rhinorrhea, sinus pain, sneezing, sore throat, tinnitus, trouble swallowing and voice change.    Eyes:  Negative for photophobia, pain, discharge, redness, itching and visual disturbance.   Respiratory:  Negative for apnea, cough, choking, chest tightness, shortness of breath, wheezing and stridor.    Cardiovascular:  Negative for chest pain, palpitations and leg swelling.   Gastrointestinal:  Negative for abdominal distention, abdominal pain, anal bleeding, blood in stool, constipation, diarrhea, nausea, rectal pain and vomiting.   Endocrine: Negative for cold intolerance and heat intolerance.   Genitourinary:  Negative for decreased urine volume, difficulty urinating, dysuria, enuresis, flank pain, frequency, hematuria and urgency.   Musculoskeletal:  Negative for arthralgias, back pain, gait problem, joint swelling, myalgias, neck pain and neck stiffness.   Skin:  Negative for color change, pallor, rash and wound.   Neurological:  Negative for dizziness, tremors, seizures, syncope, facial asymmetry, speech difficulty, weakness, light-headedness, numbness and headaches.   Hematological:  Negative for adenopathy. Does not bruise/bleed easily.   Psychiatric/Behavioral:  Negative for behavioral problems, confusion, dysphoric mood and sleep disturbance. The patient is not nervous/anxious.            Objective   /68 (BP Location: Left arm, Patient Position: Sitting, Cuff Size: Adult)   Pulse 60   Temp (!) 97.4 °F (36.3 °C) (Temporal)   Resp 18   Ht 5' 2\" (1.575 m)   Wt 64.4 kg (142 lb)   LMP  (LMP " Unknown)   SpO2 99%   BMI 25.97 kg/m²     Pain Screening:  Pain Score: 0-No pain  ECOG   1  Physical Exam  HENT:      Head: Normocephalic and atraumatic.      Nose: Nose normal.      Mouth/Throat:      Mouth: Mucous membranes are moist.      Pharynx: Oropharynx is clear.   Eyes:      Extraocular Movements: Extraocular movements intact.      Conjunctiva/sclera: Conjunctivae normal.      Pupils: Pupils are equal, round, and reactive to light.   Cardiovascular:      Rate and Rhythm: Normal rate and regular rhythm.      Pulses: Normal pulses.      Heart sounds: Normal heart sounds.   Pulmonary:      Effort: Pulmonary effort is normal.      Breath sounds: Normal breath sounds.   Abdominal:      General: Bowel sounds are normal.      Palpations: Abdomen is soft.   Musculoskeletal:         General: Normal range of motion.      Cervical back: Normal range of motion and neck supple.   Skin:     General: Skin is warm and dry.      Capillary Refill: Capillary refill takes less than 2 seconds.   Neurological:      General: No focal deficit present.      Mental Status: She is alert and oriented to person, place, and time. Mental status is at baseline.   Psychiatric:         Mood and Affect: Mood normal.         Behavior: Behavior normal.         Thought Content: Thought content normal.         Judgment: Judgment normal.       Labs: I have reviewed the following labs:  Lab Results   Component Value Date/Time    WBC 7.94 02/19/2025 07:04 AM    RBC 3.86 02/19/2025 07:04 AM    Hemoglobin 12.5 02/19/2025 07:04 AM    Hematocrit 37.6 02/19/2025 07:04 AM    MCV 97 02/19/2025 07:04 AM    MCH 32.4 02/19/2025 07:04 AM    RDW 12.3 02/19/2025 07:04 AM    Platelets 203 02/19/2025 07:04 AM    Segmented % 62 02/19/2025 07:04 AM    Lymphocytes % 24 02/19/2025 07:04 AM    Monocytes % 9 02/19/2025 07:04 AM    Eosinophils Relative 4 02/19/2025 07:04 AM    Basophils Relative 1 02/19/2025 07:04 AM    Immature Grans % 0 02/19/2025 07:04 AM     Absolute Neutrophils 4.93 02/19/2025 07:04 AM     Lab Results   Component Value Date/Time    Potassium 3.7 02/19/2025 07:04 AM    Chloride 100 02/19/2025 07:04 AM    CO2 30 02/19/2025 07:04 AM    BUN 34 (H) 02/19/2025 07:04 AM    Creatinine 0.75 02/19/2025 07:04 AM    Glucose, Fasting 100 (H) 02/19/2025 07:04 AM    Calcium 9.2 02/19/2025 07:04 AM    AST 21 02/19/2025 07:04 AM    ALT 14 02/19/2025 07:04 AM    Alkaline Phosphatase 62 02/19/2025 07:04 AM    Total Protein 7.2 02/19/2025 07:04 AM    Albumin 4.3 02/19/2025 07:04 AM    Total Bilirubin 0.91 02/19/2025 07:04 AM    eGFR 79 02/19/2025 07:04 AM     Serum chromogranin A levels:    Component      Latest Ref Rng 8/6/2024 10/1/2024 10/29/2024 11/26/2024 12/24/2024 1/23/2025   CHROMOGRANIN A      0.0 - 101.8 ng/mL 65.4  67.8  57.9  56.8  58.9  78.9      Component      Latest Ref Rng 2/19/2025   CHROMOGRANIN A      0.0 - 101.8 ng/mL 88.9

## 2025-03-13 ENCOUNTER — TELEPHONE (OUTPATIENT)
Age: 75
End: 2025-03-13

## 2025-03-13 NOTE — TELEPHONE ENCOUNTER
Patient wants confirm when she is supposed to get blood work. She normally goes once a month for it, and her labs that are placed are for Qi

## 2025-03-14 NOTE — TELEPHONE ENCOUNTER
Returned call to patient.  Made aware lab frequency reviewed by Dr Jonas and she does not need monthly lab work at this point and she can get lab work completed in June. Stated understanding.  No further questions at this time

## 2025-03-25 DIAGNOSIS — K21.9 GASTROESOPHAGEAL REFLUX DISEASE WITHOUT ESOPHAGITIS: ICD-10-CM

## 2025-03-25 RX ORDER — OMEPRAZOLE 40 MG/1
40 CAPSULE, DELAYED RELEASE ORAL
Qty: 90 CAPSULE | Refills: 1 | Status: SHIPPED | OUTPATIENT
Start: 2025-03-25

## 2025-03-27 ENCOUNTER — HOSPITAL ENCOUNTER (OUTPATIENT)
Dept: INFUSION CENTER | Facility: CLINIC | Age: 75
Discharge: HOME | End: 2025-03-27
Payer: MEDICARE

## 2025-03-27 DIAGNOSIS — C7B.8 METASTATIC MALIGNANT NEUROENDOCRINE TUMOR TO LIVER (HCC): Primary | ICD-10-CM

## 2025-03-27 PROCEDURE — 96372 THER/PROPH/DIAG INJ SC/IM: CPT

## 2025-03-27 RX ORDER — LANREOTIDE ACETATE 120 MG/.5ML
120 INJECTION SUBCUTANEOUS ONCE
Status: COMPLETED | OUTPATIENT
Start: 2025-03-27 | End: 2025-03-27

## 2025-03-27 RX ORDER — LANREOTIDE ACETATE 120 MG/.5ML
120 INJECTION SUBCUTANEOUS ONCE
OUTPATIENT
Start: 2025-04-24

## 2025-03-27 RX ADMIN — LANREOTIDE ACETATE 120 MG: 120 INJECTION SUBCUTANEOUS at 08:17

## 2025-03-27 NOTE — PROGRESS NOTES
Pt offers no complaints and was given Lanreotide as ordered in right buttock without incident. Confirmed next apt for 4/24/25 @8:30am @ [facility] ASHBYS.

## 2025-04-24 ENCOUNTER — HOSPITAL ENCOUNTER (OUTPATIENT)
Dept: INFUSION CENTER | Facility: CLINIC | Age: 75
Discharge: HOME | End: 2025-04-24
Payer: MEDICARE

## 2025-04-24 DIAGNOSIS — C7B.8 METASTATIC MALIGNANT NEUROENDOCRINE TUMOR TO LIVER (HCC): Primary | ICD-10-CM

## 2025-04-24 PROCEDURE — 96372 THER/PROPH/DIAG INJ SC/IM: CPT

## 2025-04-24 RX ORDER — LANREOTIDE ACETATE 120 MG/.5ML
120 INJECTION SUBCUTANEOUS ONCE
OUTPATIENT
Start: 2025-05-22

## 2025-04-24 RX ORDER — LANREOTIDE ACETATE 120 MG/.5ML
120 INJECTION SUBCUTANEOUS ONCE
Status: COMPLETED | OUTPATIENT
Start: 2025-04-24 | End: 2025-04-24

## 2025-04-24 RX ADMIN — LANREOTIDE ACETATE 120 MG: 120 INJECTION SUBCUTANEOUS at 08:19

## 2025-04-24 NOTE — PROGRESS NOTES
Patient to infusion for Lanreotide.  She offers no complaints.  She tolerated injection in Left buttock without incident.  Next appointment confirmed for 5/23 0830, she declined AVS

## 2025-04-25 ENCOUNTER — APPOINTMENT (OUTPATIENT)
Dept: RADIOLOGY | Facility: AMBULARY SURGERY CENTER | Age: 75
End: 2025-04-25
Attending: ORTHOPAEDIC SURGERY
Payer: MEDICARE

## 2025-04-25 ENCOUNTER — OFFICE VISIT (OUTPATIENT)
Dept: OBGYN CLINIC | Facility: CLINIC | Age: 75
End: 2025-04-25
Payer: MEDICARE

## 2025-04-25 VITALS — BODY MASS INDEX: 26.13 KG/M2 | WEIGHT: 142 LBS | HEIGHT: 62 IN

## 2025-04-25 DIAGNOSIS — S86.911A KNEE STRAIN, RIGHT, INITIAL ENCOUNTER: Primary | ICD-10-CM

## 2025-04-25 DIAGNOSIS — M25.561 RIGHT KNEE PAIN, UNSPECIFIED CHRONICITY: ICD-10-CM

## 2025-04-25 PROCEDURE — 73562 X-RAY EXAM OF KNEE 3: CPT

## 2025-04-25 PROCEDURE — 99204 OFFICE O/P NEW MOD 45 MIN: CPT | Performed by: ORTHOPAEDIC SURGERY

## 2025-04-25 NOTE — PROGRESS NOTES
Assessment:  Assessment & Plan  Knee strain, right, initial encounter  New x-rays of the right knee were obtained today and reviewed with the patient noting that she has well-maintained joint space in all 3 compartments.  On exam patient has full nonpainful range of motion with no pain on palpation.  Discussed with the patient that most likely she irritated the lateral meniscus or strain the knee.  Due to her modifying her activities and icing she is now back to baseline with no pain.  We will follow-up with the patient as needed  Orders:    XR knee 3 vw right non injury; Future      To do next visit:  Return if symptoms worsen or fail to improve.    The above stated was discussed in layman's terms and the patient expressed understanding.  All questions were answered to the patient's satisfaction.       Scribe Attestation      I,:  Deana Prajapati am acting as a scribe while in the presence of the attending physician.:       I,:  Hue Stevens MD personally performed the services described in this documentation    as scribed in my presence.:               Subjective:   Kelsey Brooks is a 74 y.o. female who presents   Patient has a history of a right knee medial meniscus tear that was worked up by Dr. Osmar Echeverria.  Patient wished to treat conservatively with formal therapy.  Patient reports that after her formal therapy the pain went away.  She reports approximately 6 weeks ago she had increased pain on the lateral aspect of the right knee with no known injury.  She has not done any new activities that would create pain.  She denies any swelling or instability in the knee.  Patient did ice and modify her activities along with using a crutch.    Review of systems negative unless otherwise specified in HPI  Review of Systems   Constitutional:  Negative for chills, fever and unexpected weight change.   HENT:  Negative for hearing loss, nosebleeds and sore throat.    Eyes:  Negative for pain, redness and visual  "disturbance.   Respiratory:  Negative for cough, shortness of breath and wheezing.    Cardiovascular:  Negative for chest pain, palpitations and leg swelling.   Gastrointestinal:  Negative for abdominal pain and nausea.   Genitourinary:  Negative for dyspareunia, dysuria and frequency.   Skin:  Negative for rash and wound.   Neurological:  Negative for dizziness, numbness and headaches.   Psychiatric/Behavioral:  Negative for decreased concentration and suicidal ideas. The patient is not nervous/anxious.        Past Medical History:   Diagnosis Date    Abdominal pain     Acute tonsillitis     Breast pain, left     Cancer (HCC)     liver&small intestine    COVID-19 07/2022    Edema of both lower extremities     GERD (gastroesophageal reflux disease)     GERD without esophagitis     Kidney stone     \" [place]\"    Lesion of liver     Liver mass     Lymphadenopathy     Mediastinal lymphadenopathy     Neoplasm of digestive system     Orthostatic lightheadedness     Sarcoidosis     Vertigo        Past Surgical History:   Procedure Laterality Date    BREAST BIOPSY Left 1996    negative    CARPAL TUNNEL RELEASE Bilateral     COLONOSCOPY      2017    ENDOBRONCHIAL ULTRASOUND (EBUS) N/A 3/6/2023    Procedure: ENDOBRONCHIAL ULTRASOUND (EBUS);  Surgeon: Ricardo Vega MD;  [facility]: [facility];  Service: Thoracic    IR BIOPSY LIVER MASS  11/6/2018    IR BIOPSY LIVER MASS  12/8/2020    KNEE ARTHROSCOPY Left     LAPAROTOMY N/A 1/20/2021    Procedure: LAPAROTOMY EXPLORATORY;  Surgeon: Eric Pierre MD;  Location: [facility];  Service: Surgical Oncology    LIVER LOBECTOMY N/A 1/20/2021    Procedure: LIVER ABLATION SEGMENT 7, INTRAOPERATIVE U/S OF LIVER;  Surgeon: Eric Pierre MD;  [facility];  Service: Surgical Oncology    MEDIASTINOSCOPY N/A 11/27/2018    Procedure: MEDIASTINOSCOPY;  Surgeon: Ricardo Vega MD;  [facility];  Service: Thoracic    MD BRNCHSC INCL FLUOR GDNCE DX W/CELL WASHG SPX N/A " 11/27/2018    Procedure: BRONCHOSCOPY FLEXIBLE;  Surgeon: Ricardo Vega MD;  [facility];  Service: Thoracic    TN [facility] INCL FLUOR GDNCE DX W/CELL WASHG SPX N/A 3/6/2023    Procedure: BRONCHOSCOPY FLEXIBLE;  Surgeon: Ricardo Vega MD;  [facility];  Service: Thoracic    TN BRONCHOSCOPY NEEDLE BX TRACHEA MAIN STEM&/BRON N/A 11/27/2018    Procedure: EBUS with biopsy;  Surgeon: Ricardo Vega MD;  [facility];  Service: Thoracic    TN EDG US EXAM SURGICAL ALTER STOM DUODENUM/JEJUNUM N/A 10/29/2018    Procedure: LINEAR ENDOSCOPIC U/S;  Surgeon: Oseas Villagran MD;  [facility]: [facility];  Service: Gastroenterology    SKIN BIOPSY      SMALL INTESTINE SURGERY N/A 1/20/2021    Procedure: RESECTION SMALL BOWEL AND ANASTOMOSIS, RESECTION SMALL BOWEL NODULE;  Surgeon: Eric Pierre MD;  [facility];  Service: Surgical Oncology    WISDOM TOOTH EXTRACTION         Family History   Problem Relation Age of Onset    Alzheimer's disease Mother     Parkinsonism Father     Psoriasis Father     Heart failure Father         at older age    Psoriasis Sister     Psoriasis Sister     Stroke Sister     Colon cancer Maternal Grandfather     No Known Problems Daughter     No Known Problems Daughter        Social History     Occupational History    Not on file   Tobacco Use    Smoking status: Never    Smokeless tobacco: Never   Vaping Use    Vaping status: Never Used   Substance and Sexual Activity    Alcohol use: Yes     Comment: occ wine- very seldom    Drug use: No    Sexual activity: Not Currently         Current Outpatient Medications:     atorvastatin (LIPITOR) 20 mg tablet, Take 1 tablet (20 mg total) by mouth daily at bedtime, Disp: 90 tablet, Rfl: 3    Cholecalciferol (VITAMIN D) 2000 units CAPS, Take 1 tablet by mouth daily, Disp: , Rfl:     hydroCHLOROthiazide 12.5 mg tablet, Take 1 tablet by mouth once daily, Disp: 90 tablet, Rfl: 1    LANREOTIDE ACETATE SC, Inject under the skin  every 30 (thirty) days, Disp: , Rfl:     Multiple Vitamin (MULTIVITAMIN) tablet, Take 1 tablet by mouth daily, Disp: , Rfl:     omeprazole (PriLOSEC) 40 MG capsule, TAKE 1 CAPSULE BY MOUTH ONCE DAILY BEFORE BREAKFAST, Disp: 90 capsule, Rfl: 1    triamcinolone (KENALOG) 0.1 % ointment, Apply topically 2 (two) times a day Apply to areas of itch, Apply thin coat to groin area for maximum of 3 days, Disp: 80 g, Rfl: 1    docusate sodium (COLACE) 100 mg capsule, Take 1 capsule (100 mg total) by mouth 2 (two) times a day (Patient not taking: Reported on 4/25/2025), Disp: 10 capsule, Rfl: 0    Fluticasone-Salmeterol (Advair) 100-50 mcg/dose inhaler, Inhale 1 puff 2 (two) times a day Rinse mouth after use. (Patient not taking: Reported on 1/24/2025), Disp: 60 blister, Rfl: 3    ondansetron (ZOFRAN-ODT) 8 mg disintegrating tablet, Take 1 tablet (8 mg total) by mouth every 8 (eight) hours as needed for nausea or vomiting (Patient not taking: Reported on 1/24/2025), Disp: 20 tablet, Rfl: 5    Allergies   Allergen Reactions    Bactrim [Sulfamethoxazole-Trimethoprim] Hives    Clam Shell - Food Allergy Vomiting          There were no vitals filed for this visit.    Body mass index is 25.97 kg/m².  Wt Readings from Last 3 Encounters:   04/25/25 64.4 kg (142 lb)   03/07/25 64.4 kg (142 lb)   03/04/25 64.9 kg (143 lb)       Objective:                    Left Knee Exam     Tenderness   The patient is experiencing no tenderness.     Range of Motion   Extension:  0   Flexion:  130     Tests   Varus: negative Valgus: negative  Drawer:  Anterior - negative     Posterior - negative    Other   Erythema: absent  Sensation: normal  Pulse: present  Swelling: none  Effusion: no effusion present    Comments:  Calf is soft and nontender        Distally neurovascularly intact     Diagnostics, reviewed and taken today if performed as documented:      The attending physician has personally reviewed the pertinent films in PACS and interpretation is  "as follows:  X-rays of the right knee 3 views: Well-maintained joint space in all 3 compartments    Procedures, if performed today:    Procedures    None performed      Portions of the record may have been created with voice recognition software.  Occasional wrong word or \"sound a like\" substitutions may have occurred due to the inherent limitations of voice recognition software.  Read the chart carefully and recognize, using context, where substitutions have occurred.  "

## 2025-05-23 ENCOUNTER — HOSPITAL ENCOUNTER (OUTPATIENT)
Dept: INFUSION CENTER | Facility: CLINIC | Age: 75
End: 2025-05-23
Payer: MEDICARE

## 2025-05-23 DIAGNOSIS — C7B.8 METASTATIC MALIGNANT NEUROENDOCRINE TUMOR TO LIVER (HCC): Primary | ICD-10-CM

## 2025-05-23 PROCEDURE — 96372 THER/PROPH/DIAG INJ SC/IM: CPT

## 2025-05-23 RX ORDER — LANREOTIDE ACETATE 120 MG/.5ML
120 INJECTION SUBCUTANEOUS ONCE
OUTPATIENT
Start: 2025-06-19

## 2025-05-23 RX ORDER — LANREOTIDE ACETATE 120 MG/.5ML
120 INJECTION SUBCUTANEOUS ONCE
Status: COMPLETED | OUTPATIENT
Start: 2025-05-23 | End: 2025-05-23

## 2025-05-23 RX ADMIN — LANREOTIDE ACETATE 120 MG: 120 INJECTION SUBCUTANEOUS at 08:20

## 2025-05-23 NOTE — PROGRESS NOTES
Pt given Lanreotide as ordered in R buttock by Neto TRUONG RN without incident.  Confirmed next apt for 6/20/25 @ 8:30am @ [facility] VICK.

## 2025-05-29 DIAGNOSIS — D86.0 SARCOIDOSIS, LUNG (HCC): ICD-10-CM

## 2025-05-30 RX ORDER — HYDROCHLOROTHIAZIDE 12.5 MG/1
12.5 TABLET ORAL DAILY
Qty: 90 TABLET | Refills: 1 | Status: SHIPPED | OUTPATIENT
Start: 2025-05-30

## 2025-06-11 ENCOUNTER — APPOINTMENT (OUTPATIENT)
Dept: LAB | Facility: CLINIC | Age: 75
End: 2025-06-11
Attending: INTERNAL MEDICINE
Payer: MEDICARE

## 2025-06-11 DIAGNOSIS — C7B.8 METASTATIC MALIGNANT NEUROENDOCRINE TUMOR TO LIVER (HCC): ICD-10-CM

## 2025-06-11 LAB
ALBUMIN SERPL BCG-MCNC: 4.1 G/DL (ref 3.5–5)
ALP SERPL-CCNC: 55 U/L (ref 34–104)
ALT SERPL W P-5'-P-CCNC: 14 U/L (ref 7–52)
ANION GAP SERPL CALCULATED.3IONS-SCNC: 6 MMOL/L (ref 4–13)
ANISOCYTOSIS BLD QL SMEAR: PRESENT
AST SERPL W P-5'-P-CCNC: 22 U/L (ref 13–39)
BASOPHILS # BLD MANUAL: 0.18 THOUSAND/UL (ref 0–0.1)
BASOPHILS NFR MAR MANUAL: 3 % (ref 0–1)
BILIRUB SERPL-MCNC: 1.11 MG/DL (ref 0.2–1)
BUN SERPL-MCNC: 37 MG/DL (ref 5–25)
CALCIUM SERPL-MCNC: 9.3 MG/DL (ref 8.4–10.2)
CHLORIDE SERPL-SCNC: 101 MMOL/L (ref 96–108)
CO2 SERPL-SCNC: 31 MMOL/L (ref 21–32)
CREAT SERPL-MCNC: 0.72 MG/DL (ref 0.6–1.3)
EOSINOPHIL # BLD MANUAL: 0.18 THOUSAND/UL (ref 0–0.4)
EOSINOPHIL NFR BLD MANUAL: 3 % (ref 0–6)
ERYTHROCYTE [DISTWIDTH] IN BLOOD BY AUTOMATED COUNT: 12.2 % (ref 11.6–15.1)
GFR SERPL CREATININE-BSD FRML MDRD: 83 ML/MIN/1.73SQ M
GIANT PLATELETS BLD QL SMEAR: PRESENT
GLUCOSE P FAST SERPL-MCNC: 108 MG/DL (ref 65–99)
HCT VFR BLD AUTO: 36.5 % (ref 34.8–46.1)
HGB BLD-MCNC: 12.1 G/DL (ref 11.5–15.4)
LYMPHOCYTES # BLD AUTO: 2.1 THOUSAND/UL (ref 0.6–4.47)
LYMPHOCYTES # BLD AUTO: 35 % (ref 14–44)
MACROCYTES BLD QL AUTO: PRESENT
MCH RBC QN AUTO: 33 PG (ref 26.8–34.3)
MCHC RBC AUTO-ENTMCNC: 33.2 G/DL (ref 31.4–37.4)
MCV RBC AUTO: 100 FL (ref 82–98)
MONOCYTES # BLD AUTO: 0.48 THOUSAND/UL (ref 0–1.22)
MONOCYTES NFR BLD: 8 % (ref 4–12)
NEUTROPHILS # BLD MANUAL: 3.06 THOUSAND/UL (ref 1.85–7.62)
NEUTS BAND NFR BLD MANUAL: 1 % (ref 0–8)
NEUTS SEG NFR BLD AUTO: 50 % (ref 43–75)
OVALOCYTES BLD QL SMEAR: PRESENT
PLATELET # BLD AUTO: 184 THOUSANDS/UL (ref 149–390)
PLATELET BLD QL SMEAR: ADEQUATE
PLATELET CLUMP BLD QL SMEAR: PRESENT
PMV BLD AUTO: 10.6 FL (ref 8.9–12.7)
POIKILOCYTOSIS BLD QL SMEAR: PRESENT
POTASSIUM SERPL-SCNC: 3.6 MMOL/L (ref 3.5–5.3)
PROT SERPL-MCNC: 7.2 G/DL (ref 6.4–8.4)
RBC # BLD AUTO: 3.67 MILLION/UL (ref 3.81–5.12)
RBC MORPH BLD: PRESENT
SODIUM SERPL-SCNC: 138 MMOL/L (ref 135–147)
WBC # BLD AUTO: 6 THOUSAND/UL (ref 4.31–10.16)

## 2025-06-11 PROCEDURE — 36415 COLL VENOUS BLD VENIPUNCTURE: CPT

## 2025-06-11 PROCEDURE — 80053 COMPREHEN METABOLIC PANEL: CPT

## 2025-06-11 PROCEDURE — 85007 BL SMEAR W/DIFF WBC COUNT: CPT

## 2025-06-11 PROCEDURE — 85027 COMPLETE CBC AUTOMATED: CPT

## 2025-06-11 PROCEDURE — 86316 IMMUNOASSAY TUMOR OTHER: CPT

## 2025-06-13 LAB — CGA SERPL-MCNC: 79.5 NG/ML (ref 0–101.8)

## 2025-06-20 ENCOUNTER — HOSPITAL ENCOUNTER (OUTPATIENT)
Dept: INFUSION CENTER | Facility: CLINIC | Age: 75
End: 2025-06-20
Payer: MEDICARE

## 2025-06-20 ENCOUNTER — OFFICE VISIT (OUTPATIENT)
Dept: HEMATOLOGY ONCOLOGY | Facility: CLINIC | Age: 75
End: 2025-06-20
Payer: MEDICARE

## 2025-06-20 VITALS
DIASTOLIC BLOOD PRESSURE: 70 MMHG | OXYGEN SATURATION: 98 % | BODY MASS INDEX: 26.31 KG/M2 | HEART RATE: 62 BPM | WEIGHT: 143 LBS | TEMPERATURE: 97.2 F | RESPIRATION RATE: 18 BRPM | HEIGHT: 62 IN | SYSTOLIC BLOOD PRESSURE: 118 MMHG

## 2025-06-20 DIAGNOSIS — C7B.8 METASTATIC MALIGNANT NEUROENDOCRINE TUMOR TO LIVER (HCC): Primary | ICD-10-CM

## 2025-06-20 PROCEDURE — 96372 THER/PROPH/DIAG INJ SC/IM: CPT

## 2025-06-20 PROCEDURE — 99213 OFFICE O/P EST LOW 20 MIN: CPT | Performed by: INTERNAL MEDICINE

## 2025-06-20 RX ORDER — LANREOTIDE ACETATE 120 MG/.5ML
120 INJECTION SUBCUTANEOUS ONCE
Status: COMPLETED | OUTPATIENT
Start: 2025-06-20 | End: 2025-06-20

## 2025-06-20 RX ORDER — LANREOTIDE ACETATE 120 MG/.5ML
120 INJECTION SUBCUTANEOUS ONCE
OUTPATIENT
Start: 2025-07-18

## 2025-06-20 RX ADMIN — LANREOTIDE ACETATE 120 MG: 120 INJECTION SUBCUTANEOUS at 08:35

## 2025-06-20 NOTE — PROGRESS NOTES
Patient to Infusion Center for Lanreotide: Offers no complaints at present time: Lab work ( 06/11/25 ) reviewed: Within parameters to treat: Injection given in Left Ventrogluteal without incident: No adverse reactions noted: Verified follow up appt with patient ( 07/22/25 ): AVS offered and declined

## 2025-06-20 NOTE — PROGRESS NOTES
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 Currently the patient received long-acting somatostatin analog with lanreotide 120 mg IV every 28 days, which was first initiated on 2021.  On 2021, the patient underwent resection of the primary low-grade neuroendocrine tumor of the small intestine, through a small bowel resection with anastomosis.  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  From 2021 to 2021, she received palliative external beam radiotherapy to painful bone metastatic disease involving T10 and T12 vertebral bodies.    On 2025, contrast-enhanced CT scan of the chest, abdomen and pelvis showed stable metastatic disease.  There is no radiographic evidence of progression.  Specifically, there was no significant change compared to the prior study. Stable hypodense masses in the liver were identified. No significant metastatic adenopathy. Stable cystic lesions within the pancreas were identified     Interim Assessment: Overall, the patient feels well.  Clinically, she has stable, metastatic well-differentiated neuroendocrine tumor of the small intestine with liver and bone metastatic disease.  Her serum chromogranin A is stable. The patient does not have new symptoms.     Plan:  Continue long-acting somatostatin analog therapy with lanreotide 120 mg subcutaneous injections every 28 days.  Duration of long-acting somatostatin analog therapy is  lifelong.  Contrast-enhanced CT scan of the chest, abdomen and pelvis and serum chromogranin A on August 25 2025   Patient to take vitamin B3 (niacinamide) 1000 mg per mouth daily.  Duration of vitamin B3 replacement therapy is lifelong  Return to medical oncology clinic for history and physical exam on September 5, 2025     Carcinoid heart disease  The patient will continue close monitoring of cardiac function specifically right left ventricular ejection fraction, as well as degree of tricuspid and pulmonary valves function.  If the patient has further progression of her carcinoid heart disease, she may benefit from regional hepatic therapies, such as TACE or Y90 chemoembolization.  In addition, the patient may benefit from PRRT with lutetium 177 dotatate (Lutathera) as well. Continue periodic assessment by the cardiology service.  The patient is at risk for right cardiac failure because of presence of liver metastatic disease originating from a primary well-differentiated neuroendocrine tumor of the small intestine.  The patient is at risk for progression of carcinoid heart disease.       Mrs. Brooks understands and agrees with my management recommendations and plan of care. I answered questions to her satisfaction.     Orders:    CBC and differential; Future    Comprehensive metabolic panel; Future    Chromogranin A; Future        History of Present Illness   No chief complaint on file.  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 Currently the patient received long-acting somatostatin analog with lanreotide 120 mg IV every 28 days, which was first initiated on January 18, 2021.  On January 20, 2021, the patient underwent resection of the primary low-grade neuroendocrine tumor of the small intestine, through a small bowel resection with anastomosis.  Pathology showed T3  N1, clinical N1, stage IV well-differentiated neuroendocrine tumor of the small intestine.  From 5/18/2021 to March 8, 2021, she received palliative external beam radiotherapy to painful bone metastatic disease involving T10 and T12 vertebral bodies.On February 28, 2025, contrast-enhanced CT scan of the chest, abdomen and pelvis showed stable metastatic disease.  There is no radiographic evidence of progression.  Specifically, there was no significant change compared to the prior study. Stable hypodense masses in the liver were identified. No significant metastatic adenopathy. Stable cystic lesions within the pancreas were identified     Interim History: Overall, the patient feels well.  Clinically, she has stable, metastatic well-differentiated neuroendocrine tumor of the small intestine with liver and bone metastatic disease.  Her serum chromogranin A is stable. 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 Liver mass, needle core biopsy:  - Metastatic well-differentiated neuroendocrine tumor, G2     1/18/2021 -  Chemotherapy    Lanreotide injections (monthly)     1/20/2021 Surgery    Resection of small bowel and anastomosis, segment 7 liver ablation    A. Small intestine, resection:  - Well- differentiated neuroendocrine tumor (up to 2.1 cm), G2, at least three foci.  - All margins are negative for tumor.  - Three of thirteen lymph nodes are positive for tumor (3/13).     B. 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        2/28/2024 -  Cancer Staged    Staging form: Liver (Excluding Intrahepatic Bile Ducts), AJCC 8th Edition  - Pathologic: Stage IVB (pT3, pN1, cM1) - Signed by Eric Pierre MD on 2/28/2024       Bone metastasis   1/28/2021 Initial Diagnosis    Bone metastasis (HCC)     2/18/2021 - 3/3/2021 Radiation    Treatment:  Course: C1    Plan ID Energy Fractions Dose per Fraction (cGy) Dose Correction (cGy) Total Dose Delivered (cGy) Elapsed Days   T10_T12 Spine 10X/6X 10 / 10 300 0 3,000 13               Pertinent Medical History     Past Medical History:   Diagnosis Date    Abdominal pain     Acute tonsillitis     Breast pain, left     Cancer (HCC)     liver&small intestine    COVID-19 07/2022    Edema of both lower extremities     GERD (gastroesophageal reflux disease)     GERD without esophagitis     Kidney stone     \" [place]\"    Lesion of liver     Liver mass     Lymphadenopathy     Mediastinal lymphadenopathy     Neoplasm of digestive system     Orthostatic lightheadedness     Sarcoidosis     Vertigo               Review of Systems   Constitutional:  Negative for activity change, appetite change, chills, diaphoresis, fatigue, fever and unexpected weight change.   HENT:  Negative for congestion, dental problem, ear discharge, ear pain, facial swelling, hearing loss, mouth sores, nosebleeds, postnasal drip, rhinorrhea, sinus pain, sneezing, sore throat, tinnitus, trouble swallowing and voice change.    Eyes:  Negative for photophobia, pain, discharge, redness, itching and visual disturbance.   Respiratory:  Negative for apnea, cough, choking, chest tightness, shortness of breath, wheezing and stridor.    Cardiovascular:  Negative for chest pain, palpitations and leg swelling.   Gastrointestinal:  Negative for abdominal distention, abdominal pain, anal bleeding, blood in stool, constipation, diarrhea, nausea, rectal pain and vomiting.   Endocrine: Negative for cold intolerance and heat " intolerance.   Genitourinary:  Negative for decreased urine volume, difficulty urinating, dysuria, enuresis, flank pain, frequency, hematuria and urgency.   Musculoskeletal:  Negative for arthralgias, back pain, gait problem, joint swelling, myalgias, neck pain and neck stiffness.   Skin:  Negative for color change, pallor, rash and wound.   Neurological:  Negative for dizziness, tremors, seizures, syncope, facial asymmetry, speech difficulty, weakness, light-headedness, numbness and headaches.   Hematological:  Negative for adenopathy. Does not bruise/bleed easily.   Psychiatric/Behavioral:  Negative for behavioral problems, confusion, dysphoric mood and sleep disturbance. The patient is not nervous/anxious.       Objective   LMP  (LMP Unknown)     Pain Screening:       ECOG   1    Physical Exam  Constitutional:       Comments: Female patient with no respiratory distress   HENT:      Head: Normocephalic and atraumatic.      Nose: Nose normal.      Mouth/Throat:      Mouth: Mucous membranes are moist.      Pharynx: Oropharynx is clear.     Eyes:      Extraocular Movements: Extraocular movements intact.      Conjunctiva/sclera: Conjunctivae normal.      Pupils: Pupils are equal, round, and reactive to light.      Comments: No scleral icterus    Neck:      Comments: No cervical, supraclavicular, axillary, epitrochlear, or inguinal lymphadenopathy.   Cardiovascular:      Rate and Rhythm: Normal rate and regular rhythm.      Pulses: Normal pulses.      Heart sounds: Normal heart sounds.   Pulmonary:      Effort: Pulmonary effort is normal.      Breath sounds: Normal breath sounds.   Abdominal:      General: Bowel sounds are normal.      Palpations: Abdomen is soft.      Comments: Abdomen soft, nontender, nonpainful.  No hepatomegaly.  No splenomegaly.  No rebound tenderness.  No lateral or shifting dullness.  Bowel sounds present, normal.      Musculoskeletal:         General: Normal range of motion.      Cervical back:  Normal range of motion and neck supple.     Skin:     General: Skin is warm and dry.      Capillary Refill: Capillary refill takes less than 2 seconds.     Neurological:      General: No focal deficit present.      Mental Status: She is alert and oriented to person, place, and time. Mental status is at baseline.     Psychiatric:         Mood and Affect: Mood normal.         Behavior: Behavior normal.         Thought Content: Thought content normal.         Judgment: Judgment normal.       Labs: I have reviewed the following labs:  Lab Results   Component Value Date/Time    WBC 6.00 06/11/2025 06:43 AM    RBC 3.67 (L) 06/11/2025 06:43 AM    Hemoglobin 12.1 06/11/2025 06:43 AM    Hematocrit 36.5 06/11/2025 06:43 AM     (H) 06/11/2025 06:43 AM    MCH 33.0 06/11/2025 06:43 AM    RDW 12.2 06/11/2025 06:43 AM    Platelets 184 06/11/2025 06:43 AM    Segmented % 62 02/19/2025 07:04 AM    Lymphocytes % 35 06/11/2025 06:43 AM    Lymphocytes % 24 02/19/2025 07:04 AM    Monocytes % 8 06/11/2025 06:43 AM    Monocytes % 9 02/19/2025 07:04 AM    Eosinophils % 3 06/11/2025 06:43 AM    Eosinophils Relative 4 02/19/2025 07:04 AM    Basophils % 3 (H) 06/11/2025 06:43 AM    Basophils Relative 1 02/19/2025 07:04 AM    Immature Grans % 0 02/19/2025 07:04 AM    Absolute Neutrophils 4.93 02/19/2025 07:04 AM     Lab Results   Component Value Date/Time    Potassium 3.6 06/11/2025 06:43 AM    Chloride 101 06/11/2025 06:43 AM    CO2 31 06/11/2025 06:43 AM    BUN 37 (H) 06/11/2025 06:43 AM    Creatinine 0.72 06/11/2025 06:43 AM    Glucose, Fasting 108 (H) 06/11/2025 06:43 AM    Calcium 9.3 06/11/2025 06:43 AM    AST 22 06/11/2025 06:43 AM    ALT 14 06/11/2025 06:43 AM    Alkaline Phosphatase 55 06/11/2025 06:43 AM    Total Protein 7.2 06/11/2025 06:43 AM    Albumin 4.1 06/11/2025 06:43 AM    Total Bilirubin 1.11 (H) 06/11/2025 06:43 AM    eGFR 83 06/11/2025 06:43 AM       Serum chromogranin A levels:    Component      Latest Ref Rng  12/15/2020 3/8/2021 5/4/2021 6/1/2021 6/29/2021 7/26/2021   CHROMOGRANIN A      0.0 - 101.8 ng/mL 468.2 (H)  62.9  79.7  60.8  59.7  69.2      Component      Latest Ref Rn 8/23/2021 9/21/2021 10/19/2021 11/3/2021 11/16/2021 12/15/2021   CHROMOGRANIN A      0.0 - 101.8 ng/mL 69.2  56.9  49.6  44.7  54.7  48.5      Component      Latest Ref Rn 1/14/2022 2/10/2022 3/10/2022 4/6/2022 5/5/2022 5/31/2022   CHROMOGRANIN A      0.0 - 101.8 ng/mL 61.6  27.5  56.2  72.1  51.0  59.7      Component      Latest Ref Rn 7/1/2022 7/29/2022 2/20/2023 5/16/2023 6/14/2023 8/9/2023   CHROMOGRANIN A      0.0 - 101.8 ng/mL 54.5  58.1  50.4  43.8  62.0  57.4      Component      Latest Ref Rn 10/6/2023 11/1/2023 11/30/2023 12/28/2023 2/20/2024 4/16/2024   CHROMOGRANIN A      0.0 - 101.8 ng/mL 46.5  48.8  44.3  52.9  69.8  69.9      Component      Latest Ref Rn 8/6/2024 10/1/2024 10/29/2024 11/26/2024 12/24/2024 1/23/2025   CHROMOGRANIN A      0.0 - 101.8 ng/mL 65.4  67.8  57.9  56.8  58.9  78.9      Component      Latest Ref Rn 2/19/2025 6/11/2025   CHROMOGRANIN A      0.0 - 101.8 ng/mL 88.9  79.5

## 2025-06-20 NOTE — ASSESSMENT & PLAN NOTE
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 Currently the patient received long-acting somatostatin analog with lanreotide 120 mg IV every 28 days, which was first initiated on January 18, 2021.  On January 20, 2021, the patient underwent resection of the primary low-grade neuroendocrine tumor of the small intestine, through a small bowel resection with anastomosis.  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  From 5/18/2021 to March 8, 2021, she received palliative external beam radiotherapy to painful bone metastatic disease involving T10 and T12 vertebral bodies.    On February 28, 2025, contrast-enhanced CT scan of the chest, abdomen and pelvis showed stable metastatic disease.  There is no radiographic evidence of progression.  Specifically, there was no significant change compared to the prior study. Stable hypodense masses in the liver were identified. No significant metastatic adenopathy. Stable cystic lesions within the pancreas were identified     Interim Assessment: Overall, the patient feels well.  Clinically, she has stable, metastatic well-differentiated neuroendocrine tumor of the small intestine with liver and bone metastatic disease.  Her serum chromogranin A is stable. The patient does not have new symptoms.     Plan:  Continue long-acting somatostatin analog therapy with lanreotide 120 mg subcutaneous injections every 28 days.  Duration of long-acting somatostatin analog therapy is lifelong.  Contrast-enhanced CT scan of the chest, abdomen and pelvis and serum chromogranin A on August 25 2025   Patient to take vitamin B3 (niacinamide) 1000 mg per mouth daily.  Duration of vitamin B3 replacement therapy is lifelong  Return to medical oncology clinic for history and physical exam  on September 5, 2025     Carcinoid heart disease  The patient will continue close monitoring of cardiac function specifically right left ventricular ejection fraction, as well as degree of tricuspid and pulmonary valves function.  If the patient has further progression of her carcinoid heart disease, she may benefit from regional hepatic therapies, such as TACE or Y90 chemoembolization.  In addition, the patient may benefit from PRRT with lutetium 177 dotatate (Lutathera) as well. Continue periodic assessment by the cardiology service.  The patient is at risk for right cardiac failure because of presence of liver metastatic disease originating from a primary well-differentiated neuroendocrine tumor of the small intestine.  The patient is at risk for progression of carcinoid heart disease.       Mrs. Brooks understands and agrees with my management recommendations and plan of care. I answered questions to her satisfaction.     Orders:    CBC and differential; Future    Comprehensive metabolic panel; Future    Chromogranin A; Future

## 2025-06-27 ENCOUNTER — HOSPITAL ENCOUNTER (OUTPATIENT)
Dept: MAMMOGRAPHY | Facility: HOSPITAL | Age: 75
Discharge: HOME | End: 2025-06-27
Payer: MEDICARE

## 2025-06-27 VITALS — HEIGHT: 62 IN | WEIGHT: 139 LBS | BODY MASS INDEX: 25.58 KG/M2

## 2025-06-27 DIAGNOSIS — Z12.31 ENCOUNTER FOR SCREENING MAMMOGRAM FOR MALIGNANT NEOPLASM OF BREAST: ICD-10-CM

## 2025-06-27 PROCEDURE — 77067 SCR MAMMO BI INCL CAD: CPT

## 2025-06-27 PROCEDURE — 77063 BREAST TOMOSYNTHESIS BI: CPT

## 2025-06-28 ENCOUNTER — APPOINTMENT (OUTPATIENT)
Dept: LAB | Facility: CLINIC | Age: 75
End: 2025-06-28
Payer: MEDICARE

## 2025-06-28 DIAGNOSIS — E78.2 MIXED HYPERLIPIDEMIA: ICD-10-CM

## 2025-06-28 LAB
CHOLEST SERPL-MCNC: 137 MG/DL (ref ?–200)
HDLC SERPL-MCNC: 74 MG/DL
LDLC SERPL CALC-MCNC: 54 MG/DL (ref 0–100)
NONHDLC SERPL-MCNC: 63 MG/DL
TRIGL SERPL-MCNC: 44 MG/DL (ref ?–150)

## 2025-06-28 PROCEDURE — 80061 LIPID PANEL: CPT

## 2025-06-28 PROCEDURE — 36415 COLL VENOUS BLD VENIPUNCTURE: CPT

## 2025-06-29 NOTE — PROGRESS NOTES
Cardio-Oncology Clinic Note    Kelsey Brooks 74 y.o. 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    We checked an echocardiogram and there is mild to moderate TR.  However the valve looks structurally normal so this may be TR from diastolic dysfunction and not necessarily valve pathology from the neuroendocrine tumor.  In any event we will need to follow this closely.    Updated echo Nov 2024 --> stable TR    # Pulmonary sarcoid  bx proven.  Follows with pulm.  We performed screening for cardiac involvement.  Echo shows normal EF.  Holter shows no concerning findings.    # HTN  On HCTZ 12.5 (started years ago for mild unilateral leg swelling and on it ever since)  BP well controlled.     # HLD  LDL was 129.    ASCVD risk score significantly elevated.  Furthermore she was recently diagnosed with diabetes.  In this setting we started lipitor 20.  Repeat lipids on lipitor 20  -   --> 54     # DM2  A1c 6.6 -->  6.3 --> 6.3    # Diastolic dysfunction  Abnormal relaxation on echo  On HCTZ in the past for mild diuretic effect  On exam - euvolemic      Today's Plan Summary:  See above assessment/plan for full details of today's plan.  Briefly,     No medication changes                Reason For Visit / Chief Complaint:  F/u neuroendocrine tumor, dizziness, HTN    HPI:   Kelsey Brooks is a 74 y.o.  female with history as noted in the problem list and further detailed in the above assessment and plan.    Initial:    Sept 202  Referred by Dr. Luke for a new patient visit for neuroendocrine tumor.  As above, the patient  has a history of a neuroendocrine tumor diagnosed in 2020.  She had surgery and radiation and is currently on lanreotide.  She also has biopsy-proven lung sarcoid.  At her last oncology visit she reported some dizziness and possible irregular heartbeat.  In this setting (neuroendocrine tumor and pulmonary sarcoid) she was referred to cardio oncology.  Today, the patient reports - feeling overall pretty well.  No chest pain.  No SOB at rest.  Does get INFANTE.   Gets lightheadedness episodes.  Can occur while walking.  Can occur while sitting.   Usually lasts for seconds and then goes away.   During 1 episode recently - BP cuff told her the HR may be irregular.   No syncope.   Retired.  Did office work.  .  2 children.    Interval:  Last visit -->   stable    Plan last visit -->     Repeat lipids on statin    Repeat lipids on statin -  --> 54     Today - feeling well.  No CP or SOB.   No syncope.  No palpitations.                 Cardiac Imaging personally reviewed:  EKG 2-23-23  NSR    9-1-23  Sinus rhythm with sinus arrhythmia.  1 PVC.  Possible PACs.  PRWP.     10-29-24  Sinus bradycardia with sinus arrhythmia and nonspecific T wave changes       Holter or event monitor Holter - 10/18/23   Sinus rhythm.  Avg HR 71 ()  PAC's - 0.3%  PVC's - 0.7%  4 atrial runs (longest 13 beats)       Echo 2016  Ef 60%. Grade 1 diastolic dysf  Mild-mod TR  RVSP 35    Echo - 10/16/23   borderline LVH.   EF 60%.  Abnormal relaxation.  Normal RV size and function.  Mild-moderate TR on my review (the valve looks structurally normal).  RVSP 40 mmHg.    Echo - 11/18/24   borderline LVH.  EF 57%.  GLS -20%. Grade 1 DD.  Right Ventricle is mildly dilated. Systolic function is normal.  Biatrial enlargement  mild to moderate TR. valve appears structurally normal.  The estimated right ventricular systolic pressure is 37.00 mmHg.  IVC borderline dilated.  Prior echo 10/16/2023.  On direct comparison there are no significant  "changes.  TR appears stable.       KOMAL    Cardiac MRI    Stress testing    [facility]    CP[facility]              Patient Active Problem List    Diagnosis Date Noted   • Type 2 diabetes mellitus without complication, without long-term current use of insulin (HCC) 02/25/2025   • Mixed hyperlipidemia 10/29/2024   • Chronic obstructive pulmonary disease, unspecified COPD type (HCC) 04/29/2024   • Chronic intractable headache 04/29/2024   • Elevated fasting glucose 04/16/2024   • Primary hypertension 04/16/2024   • Nonrheumatic tricuspid valve regurgitation 04/16/2024   • Reactive airway disease 07/19/2021   • Bone metastasis 01/28/2021   • 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 (HCC) 12/11/2020   • Trigger middle finger of right hand 08/26/2020   • Other tear of medial meniscus, current injury, right knee, initial encounter 08/21/2019   • Complex tear of medial meniscus of right knee as current injury 07/12/2019   • Diastolic dysfunction 06/04/2019   • Edema of right lower extremity 06/02/2019   • 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   • Sarcoidosis, lung (HCC) 12/20/2018   • Mediastinal adenopathy 11/21/2018   • Liver mass 11/13/2018   • Positional vertigo 03/26/2018   • IPMN (intraductal papillary mucinous neoplasm) 10/08/2015   • Focal nodular hyperplasia of liver 03/19/2015       Past Medical History:   Diagnosis Date   • Abdominal pain    • Acute tonsillitis    • Breast pain, left    • Cancer (HCC)     liver&small intestine   • COVID-19 07/2022   • Edema of both lower extremities    • GERD (gastroesophageal reflux disease)    • GERD without esophagitis    • Kidney stone     \" [place]\"   • Lesion of liver    • Liver mass    • Lymphadenopathy    • Mediastinal lymphadenopathy    • Neoplasm of digestive system    • Orthostatic lightheadedness    • Sarcoidosis    • Vertigo        Allergies   Allergen Reactions   • Bactrim [Sulfamethoxazole-Trimethoprim] Hives   • Clam Shell - Food Allergy Vomiting "       Current Outpatient Medications   Medication Instructions   • atorvastatin (LIPITOR) 20 mg, Oral, Daily at bedtime   • Cholecalciferol (VITAMIN D) 2000 units CAPS 1 tablet, Daily   • docusate sodium (COLACE) 100 mg, Oral, 2 times daily   • hydroCHLOROthiazide 12.5 mg, Oral, Daily   • LANREOTIDE ACETATE SC Every 30 days   • Multiple Vitamin (MULTIVITAMIN) tablet 1 tablet, Daily   • NIACIN PO Oral, Daily, 1 tablet daily   • omeprazole (PRILOSEC) 40 mg, Oral, Daily before breakfast   • ondansetron (ZOFRAN-ODT) 8 mg, Oral, Every 8 hours PRN   • triamcinolone (KENALOG) 0.1 % ointment Topical, 2 times daily, Apply to areas of itch, Apply thin coat to groin area for maximum of 3 days       Social History     Socioeconomic History   • Marital status:      Spouse name: Not on file   • Number of children: Not on file   • Years of education: Not on file   • Highest education level: Not on file   Occupational History   • Not on file   Tobacco Use   • Smoking status: Never   • Smokeless tobacco: Never   Vaping Use   • Vaping status: Never Used   Substance and Sexual Activity   • Alcohol use: Yes     Comment: occ wine- very seldom   • Drug use: No   • Sexual activity: Not Currently   Other Topics Concern   • Not on file   Social History Narrative   • Not on file     Social Drivers of Health     Financial Resource Strain: Low Risk  (2/16/2023)    Overall Financial Resource Strain (CARDIA)    • Difficulty of Paying Living Expenses: Not very hard   Food Insecurity: Not on file   Transportation Needs: No Transportation Needs (2/16/2023)    PRAPARE - Transportation    • Lack of Transportation (Medical): No    • Lack of Transportation (Non-Medical): No   Physical Activity: Not on file   Stress: Not on file   Social Connections: Not on file   Intimate Partner Violence: Not on file   Housing Stability: Not on file       Family History   Problem Relation Name Age of Onset   • Alzheimer's disease Mother     • Parkinsonism  "Father     • Psoriasis Father     • Heart failure Father          at older age   • Psoriasis Sister     • Psoriasis Sister     • Stroke Sister     • No Known Problems Daughter     • No Known Problems Daughter     • No Known Problems Maternal Grandmother     • Colon cancer Maternal Grandfather     • No Known Problems Paternal Grandmother     • No Known Problems Paternal Grandfather     • No Known Problems Maternal Aunt     • No Known Problems Maternal Aunt     • No Known Problems Maternal Aunt     • No Known Problems Maternal Aunt         Physical Exam:  Blood pressure 112/60, pulse 60, height 5' 2\" (1.575 m), weight 64.9 kg (143 lb), SpO2 98%, not currently breastfeeding.  Body mass index is 26.16 kg/m².  Wt Readings from Last 3 Encounters:   07/01/25 64.9 kg (143 lb)   06/27/25 63 kg (139 lb)   06/20/25 64.9 kg (143 lb)     Physical Exam  Vitals reviewed.   Constitutional:       General: She is not in acute distress.     Appearance: She is not toxic-appearing.   Neck:      Comments: No JVD  Cardiovascular:      Rate and Rhythm: Normal rate and regular rhythm.      Heart sounds: No murmur heard.     No friction rub. No gallop.   Pulmonary:      Breath sounds: Normal breath sounds. No wheezing, rhonchi or rales.     Musculoskeletal:      Comments: There is no leg edema     Neurological:      Mental Status: She is alert.         Labs & Results:  Lab Results   Component Value Date    SODIUM 138 06/11/2025    K 3.6 06/11/2025     06/11/2025    CO2 31 06/11/2025    BUN 37 (H) 06/11/2025    CREATININE 0.72 06/11/2025    GLUC 147 (H) 08/22/2024    CALCIUM 9.3 06/11/2025     No results found for: \"NTBNP\"         Thank you for the opportunity to participate in the care of this patient.    Can Huertas MD, [facility]  Staff Cardiologist  Director of Cardio-Oncology  [facility]  "

## 2025-07-01 ENCOUNTER — OFFICE VISIT (OUTPATIENT)
Dept: CARDIOLOGY CLINIC | Facility: CLINIC | Age: 75
End: 2025-07-01
Payer: MEDICARE

## 2025-07-01 VITALS
HEART RATE: 60 BPM | WEIGHT: 143 LBS | BODY MASS INDEX: 26.31 KG/M2 | HEIGHT: 62 IN | SYSTOLIC BLOOD PRESSURE: 112 MMHG | DIASTOLIC BLOOD PRESSURE: 60 MMHG | OXYGEN SATURATION: 98 %

## 2025-07-01 DIAGNOSIS — C7B.8 METASTATIC MALIGNANT NEUROENDOCRINE TUMOR TO LIVER (HCC): ICD-10-CM

## 2025-07-01 DIAGNOSIS — E78.2 MIXED HYPERLIPIDEMIA: Primary | ICD-10-CM

## 2025-07-01 DIAGNOSIS — D86.0 PULMONARY SARCOIDOSIS (HCC): ICD-10-CM

## 2025-07-01 DIAGNOSIS — I38 HEART VALVE DISEASE: ICD-10-CM

## 2025-07-01 DIAGNOSIS — I10 PRIMARY HYPERTENSION: ICD-10-CM

## 2025-07-01 DIAGNOSIS — I36.1 NONRHEUMATIC TRICUSPID VALVE REGURGITATION: ICD-10-CM

## 2025-07-01 PROCEDURE — 99214 OFFICE O/P EST MOD 30 MIN: CPT | Performed by: INTERNAL MEDICINE

## 2025-07-07 DIAGNOSIS — R92.8 ABNORMAL MAMMOGRAM: Primary | ICD-10-CM

## 2025-07-22 ENCOUNTER — HOSPITAL ENCOUNTER (OUTPATIENT)
Dept: INFUSION CENTER | Facility: CLINIC | Age: 75
Discharge: HOME | End: 2025-07-22
Payer: MEDICARE

## 2025-07-22 DIAGNOSIS — C7B.8 METASTATIC MALIGNANT NEUROENDOCRINE TUMOR TO LIVER (HCC): Primary | ICD-10-CM

## 2025-07-22 PROCEDURE — 96372 THER/PROPH/DIAG INJ SC/IM: CPT

## 2025-07-22 RX ORDER — LANREOTIDE ACETATE 120 MG/.5ML
120 INJECTION SUBCUTANEOUS ONCE
OUTPATIENT
Start: 2025-08-15

## 2025-07-22 RX ORDER — LANREOTIDE ACETATE 120 MG/.5ML
120 INJECTION SUBCUTANEOUS ONCE
Status: COMPLETED | OUTPATIENT
Start: 2025-07-22 | End: 2025-07-22

## 2025-07-22 RX ADMIN — LANREOTIDE ACETATE 120 MG: 120 INJECTION SUBCUTANEOUS at 10:23

## 2025-07-22 NOTE — PROGRESS NOTES
Patient is here for lanreotide and offers no complaints. Lanreotide given in right ventroglute  and she tolerated it well. Next appointment confirmed 8/18/25 at 0800 at [place]. Patient declined avs

## 2025-07-23 ENCOUNTER — OFFICE VISIT (OUTPATIENT)
Dept: FAMILY MEDICINE CLINIC | Facility: CLINIC | Age: 75
End: 2025-07-23
Payer: MEDICARE

## 2025-07-23 VITALS
HEIGHT: 62 IN | BODY MASS INDEX: 25.73 KG/M2 | HEART RATE: 70 BPM | DIASTOLIC BLOOD PRESSURE: 62 MMHG | TEMPERATURE: 97.6 F | OXYGEN SATURATION: 99 % | RESPIRATION RATE: 16 BRPM | SYSTOLIC BLOOD PRESSURE: 110 MMHG | WEIGHT: 139.8 LBS

## 2025-07-23 DIAGNOSIS — H91.10 PRESBYCUSIS, UNSPECIFIED LATERALITY: ICD-10-CM

## 2025-07-23 DIAGNOSIS — H61.21 IMPACTED CERUMEN OF RIGHT EAR: ICD-10-CM

## 2025-07-23 DIAGNOSIS — E11.00 TYPE 2 DIABETES MELLITUS WITH HYPEROSMOLARITY WITHOUT COMA, WITHOUT LONG-TERM CURRENT USE OF INSULIN (HCC): Primary | ICD-10-CM

## 2025-07-23 DIAGNOSIS — M25.511 CHRONIC RIGHT SHOULDER PAIN: ICD-10-CM

## 2025-07-23 DIAGNOSIS — G89.29 CHRONIC RIGHT SHOULDER PAIN: ICD-10-CM

## 2025-07-23 DIAGNOSIS — R10.12 LEFT UPPER QUADRANT ABDOMINAL PAIN: ICD-10-CM

## 2025-07-23 LAB
LEFT EYE DIABETIC RETINOPATHY: ABNORMAL
LEFT EYE IMAGE QUALITY: ABNORMAL
LEFT EYE MACULAR EDEMA: ABNORMAL
LEFT EYE OTHER RETINOPATHY: ABNORMAL
RIGHT EYE DIABETIC RETINOPATHY: ABNORMAL
RIGHT EYE IMAGE QUALITY: ABNORMAL
RIGHT EYE MACULAR EDEMA: ABNORMAL
RIGHT EYE OTHER RETINOPATHY: ABNORMAL
SEVERITY (EYE EXAM): ABNORMAL
SL AMB POCT HEMOGLOBIN AIC: 6.1 (ref ?–6.5)

## 2025-07-23 PROCEDURE — 99214 OFFICE O/P EST MOD 30 MIN: CPT | Performed by: FAMILY MEDICINE

## 2025-07-23 PROCEDURE — 83036 HEMOGLOBIN GLYCOSYLATED A1C: CPT | Performed by: FAMILY MEDICINE

## 2025-07-23 PROCEDURE — G2211 COMPLEX E/M VISIT ADD ON: HCPCS | Performed by: FAMILY MEDICINE

## 2025-07-23 PROCEDURE — G0439 PPPS, SUBSEQ VISIT: HCPCS | Performed by: FAMILY MEDICINE

## 2025-07-23 NOTE — PROGRESS NOTES
Name: Kelsey Brooks      : 1950      MRN: [id number]  Encounter Provider: Jah Cuenca MD  Encounter Date: 2025   Encounter department: [facility]  :  Assessment & Plan  Chronic right shoulder pain  Shoulder pain.  Patient will obtain x-ray of right shoulder.  If unremarkable we will consider physical therapy consultation.  If x-ray shows degenerative joint disease she will be referred to orthopedics for evaluation    Orders:    XR shoulder 2+ vw right; Future    Presbycusis, unspecified laterality  Hearing loss.  Patient given referral to [facility] audiology for hearing test.  She is aware to not schedule test until cerumen impaction has been resolved    Orders:    Ambulatory Referral to Audiology; Future    Type 2 diabetes mellitus with hyperosmolarity without coma, without long-term current use of insulin (HCC)  Diabetes.  A1c stable at 6.1.  Patient is diet controlled.  Her eye and foot exams are up-to-date  Lab Results   Component Value Date    HGBA1C 6.1 2025       Orders:    POCT hemoglobin A1c    IRIS Diabetic eye exam    Impacted cerumen of right ear  Cerumen impaction.  Patient with cerumen impaction that was difficult to remove despite irrigation with peroxide and water.  Patient was given instructions to use Debrox over-the-counter 5 drops twice daily to right ear for 1 week.  She will reschedule appointment for ear flushing         Left upper quadrant abdominal pain  Abdominal pain.  Patient with vague left upper abdomen symptoms below rib cage.  No hepatomegaly appreciated.  Patient will observe symptoms over the next week.  If they should increase in severity significantly she will go to the emergency room for evaluation.  If symptoms persist intermittently she will call the office and we will consider abdominal ultrasound for further evaluation            Preventive health issues were discussed with patient, and age appropriate screening tests were ordered  as noted in patient's After Visit Summary. Personalized health advice and appropriate referrals for health education or preventive services given if needed, as noted in patient's After Visit Summary.    History of Present Illness     Patient in the office to discuss several issues.  She states over the past week she has been experiencing intermittent left upper quadrant abdominal discomfort.  She does not related to any meals or activities.  She denies any nausea or vomiting.  She has had no changes in bowel movements.    Patient also has chronic right shoulder discomfort which makes it difficult to lay on her right side.  Symptoms are made worse with certain arm movements.  She denies any acute trauma.    Patient has noticed over several months of some difficulties with hearing acuity especially when multiple people are speaking.  She does not notice any changes with speaking on the phone or listening to television.  She denies any ear pain.    Patient does see her oncologist on a regular basis.  Her last CAT scan of her chest abdomen pelvis in February 2025 was stable.  She is due for follow-up exam in August 2025.       Patient Care Team:  Jah Cuenca MD as PCP - General  Jah Cuenca MD as PCP - [facility]CP-[facility]  MD Oseas Thompson MD (Gastroenterology)  Eric Pierre MD (Surgical Oncology)  Sarah Nelson MD (Radiation Oncology)  Judson Luke MD as Medical Oncologist (Hematology and Oncology)  ROSALIO Sorto as Nurse Practitioner (Surgical Oncology)  Es Jonas MD (Hematology and Oncology)  Es Jonas MD (Hematology and Oncology)  Can Huertas MD (Cardiology)    Review of Systems   Constitutional: Negative.    HENT:  Positive for hearing loss.    Respiratory: Negative.     Cardiovascular: Negative.    Gastrointestinal:  Positive for abdominal pain.   Genitourinary: Negative.    Musculoskeletal:  Positive for arthralgias.   Neurological: Negative.     Psychiatric/Behavioral: Negative.       Medical History Reviewed by provider this encounter:  [facility]       Annual Wellness Visit Questionnaire   Kelsey Espinoza is here for her Subsequent Wellness visit. Last Medicare Wellness visit information reviewed, patient interviewed and updates made to the record today.      Health Risk Assessment:   Patient rates overall health as good. Patient feels that their physical health rating is same. Patient is satisfied with their life. Eyesight was rated as same. Patient feels that their emotional and mental health rating is same. Patients states they are never, rarely angry. Patient states they are often unusually tired/fatigued. Pain experienced in the last 7 days has been some. Patient's pain rating has been 2/10. Patient states that she has experienced no weight loss or gain in last 6 months.     Depression Screening:   PHQ-2 Score: 0      Fall Risk Screening:   In the past year, patient has experienced: no history of falling in past year      Urinary Incontinence Screening:   Patient has not leaked urine accidently in the last six months.     Home Safety:  Patient does not have trouble with stairs inside or outside of their home. Patient has working smoke alarms and has no working carbon monoxide detector. Home safety hazards include: none.     Nutrition:   Current diet is Diabetic and Limited junk food.     Medications:   Patient is currently taking over-the-counter supplements. OTC medications include: see medication list. Patient is able to manage medications.     Activities of Daily Living (ADLs)/Instrumental Activities of Daily Living (IADLs):   Walk and transfer into and out of bed and chair?: Yes  Dress and groom yourself?: Yes    Bathe or shower yourself?: Yes    Feed yourself? Yes  Do your laundry/housekeeping?: Yes  Manage your money, pay your bills and track your expenses?: Yes  Make your own meals?: Yes    Do your own shopping?: Yes    Previous Hospitalizations:   Any  hospitalizations or ED visits within the last 12 months?: No      Advance Care Planning:   Living will: Yes    Durable POA for healthcare: Yes    Advanced directive: Yes      Preventive Screenings      Cardiovascular Screening:    General: Screening Not Indicated and History Lipid Disorder      Diabetes Screening:     General: Screening Not Indicated and History Diabetes      Colorectal Cancer Screening:     General: Screening Current      Breast Cancer Screening:     General: Screening Current      Cervical Cancer Screening:    General: Screening Not Indicated      Lung Cancer Screening:     General: Screening Not Indicated      Hepatitis C Screening:    General: Screening Current    Screening, Brief Intervention, and Referral to Treatment (SBIRT)     Screening  Typical number of drinks in a day: 0  Typical number of drinks in a week: 0  Interpretation: Low risk drinking behavior.    AUDIT-C Screenin) How often did you have a drink containing alcohol in the past year? monthly or less  2) How many drinks did you have on a typical day when you were drinking in the past year? 1 to 2  3) How often did you have 6 or more drinks on one occasion in the past year? never    AUDIT-C Score: 1  Interpretation: Score 0-2 (female): Negative screen for alcohol misuse    Single Item Drug Screening:  How often have you used an illegal drug (including marijuana) or a prescription medication for non-medical reasons in the past year? never    Single Item Drug Screen Score: 0  Interpretation: Negative screen for possible drug use disorder    Social Drivers of Health     Financial Resource Strain: Low Risk  (2023)    Overall Financial Resource Strain (CARDIA)     Difficulty of Paying Living Expenses: Not very hard   Food Insecurity: No Food Insecurity (2025)    Nursing - Inadequate Food Risk Classification     Worried About Running Out of Food in the Last Year: Never true     Ran Out of Food in the Last Year: Never true  "  Transportation Needs: No Transportation Needs (7/18/2025)    PRAPARE - Transportation     Lack of Transportation (Medical): No     Lack of Transportation (Non-Medical): No   Housing Stability: Low Risk  (7/18/2025)    Housing Stability Vital Sign     Unable to Pay for Housing in the Last Year: No     Number of Times Moved in the Last Year: 0     Homeless in the Last Year: No   Utilities: Not At Risk (7/18/2025)    [facility] Utilities     Threatened with loss of utilities: No     No results found.    Objective   /62 (BP Location: Right arm, Patient Position: Sitting, Cuff Size: Standard)   Pulse 70   Temp 97.6 °F (36.4 °C) (Temporal)   Resp 16   Ht 5' 2\" (1.575 m)   Wt 63.4 kg (139 lb 12.8 oz)   LMP  (LMP Unknown)   SpO2 99%   BMI 25.57 kg/m²     Physical Exam  Vitals and nursing note reviewed.   Constitutional:       General: She is not in acute distress.     Appearance: Normal appearance. She is well-developed. She is not ill-appearing.   HENT:      Head: Normocephalic and atraumatic.      Right Ear: Tympanic membrane, ear canal and external ear normal. There is impacted cerumen.      Left Ear: Tympanic membrane, ear canal and external ear normal. There is no impacted cerumen.     Eyes:      General:         Right eye: No discharge.         Left eye: No discharge.      Extraocular Movements: Extraocular movements intact.      Conjunctiva/sclera: Conjunctivae normal.      Pupils: Pupils are equal, round, and reactive to light.     Neck:      Vascular: No carotid bruit.     Cardiovascular:      Rate and Rhythm: Normal rate and regular rhythm.      Pulses: no weak pulses.           Dorsalis pedis pulses are 2+ on the right side and 2+ on the left side.        Posterior tibial pulses are 2+ on the right side and 2+ on the left side.      Heart sounds: No murmur heard.  Pulmonary:      Effort: Pulmonary effort is normal. No respiratory distress.      Breath sounds: Normal breath sounds.   Abdominal:      " General: Abdomen is flat. Bowel sounds are normal.      Palpations: Abdomen is soft.      Tenderness: There is no abdominal tenderness. There is no guarding or rebound.     Musculoskeletal:      Right lower leg: No edema.      Left lower leg: No edema.   Feet:      Right foot:      Skin integrity: No ulcer, skin breakdown, erythema, warmth, callus or dry skin.      Left foot:      Skin integrity: No ulcer, skin breakdown, erythema, warmth, callus or dry skin.   Lymphadenopathy:      Cervical: No cervical adenopathy.     Skin:     General: Skin is warm and dry.      Capillary Refill: Capillary refill takes less than 2 seconds.     Neurological:      Mental Status: She is alert. Mental status is at baseline.     Psychiatric:         Mood and Affect: Mood normal.         Behavior: Behavior normal.         Thought Content: Thought content normal.         Judgment: Judgment normal.     Patient's shoes and socks removed.    Right Foot/Ankle   Right Foot Inspection  Skin Exam: skin normal and skin intact. No dry skin, no warmth, no callus, no erythema, no maceration, no abnormal color, no pre-ulcer, no ulcer and no callus.     Toe Exam: ROM and strength within normal limits.     Sensory   Vibration: intact  Proprioception: intact  Monofilament testing: intact    Vascular  The right DP pulse is 2+. The right PT pulse is 2+.     Left Foot/Ankle  Left Foot Inspection  Skin Exam: skin normal and skin intact. No dry skin, no warmth, no erythema, no maceration, normal color, no pre-ulcer, no ulcer and no callus.     Toe Exam: ROM and strength within normal limits.     Sensory   Vibration: intact  Proprioception: intact  Monofilament testing: intact    Vascular  The left DP pulse is 2+. The left PT pulse is 2+.     Assign Risk Category  No deformity present  No loss of protective sensation  No weak pulses  Risk: 0        I spent 30 minutes with this patient of which greater than 50% was spent counseling or reviewing chart

## 2025-07-23 NOTE — ASSESSMENT & PLAN NOTE
Hearing loss.  Patient given referral to [facility] audiology for hearing test.  She is aware to not schedule test until cerumen impaction has been resolved    Orders:    Ambulatory Referral to Audiology; Future

## 2025-07-23 NOTE — ASSESSMENT & PLAN NOTE
Abdominal pain.  Patient with vague left upper abdomen symptoms below rib cage.  No hepatomegaly appreciated.  Patient will observe symptoms over the next week.  If they should increase in severity significantly she will go to the emergency room for evaluation.  If symptoms persist intermittently she will call the office and we will consider abdominal ultrasound for further evaluation

## 2025-07-23 NOTE — ASSESSMENT & PLAN NOTE
Shoulder pain.  Patient will obtain x-ray of right shoulder.  If unremarkable we will consider physical therapy consultation.  If x-ray shows degenerative joint disease she will be referred to orthopedics for evaluation    Orders:    XR shoulder 2+ vw right; Future

## 2025-07-23 NOTE — ASSESSMENT & PLAN NOTE
Cerumen impaction.  Patient with cerumen impaction that was difficult to remove despite irrigation with peroxide and water.  Patient was given instructions to use Debrox over-the-counter 5 drops twice daily to right ear for 1 week.  She will reschedule appointment for ear flushing

## 2025-07-24 ENCOUNTER — HOSPITAL ENCOUNTER (OUTPATIENT)
Dept: RADIOLOGY | Facility: HOSPITAL | Age: 75
Discharge: HOME | End: 2025-07-24
Payer: MEDICARE

## 2025-07-24 DIAGNOSIS — M25.511 CHRONIC RIGHT SHOULDER PAIN: Primary | ICD-10-CM

## 2025-07-24 DIAGNOSIS — G89.29 CHRONIC RIGHT SHOULDER PAIN: Primary | ICD-10-CM

## 2025-07-24 DIAGNOSIS — G89.29 CHRONIC RIGHT SHOULDER PAIN: ICD-10-CM

## 2025-07-24 DIAGNOSIS — M25.511 CHRONIC RIGHT SHOULDER PAIN: ICD-10-CM

## 2025-07-24 PROCEDURE — 73030 X-RAY EXAM OF SHOULDER: CPT

## 2025-07-29 ENCOUNTER — OFFICE VISIT (OUTPATIENT)
Dept: FAMILY MEDICINE CLINIC | Facility: CLINIC | Age: 75
End: 2025-07-29
Payer: MEDICARE

## 2025-07-29 ENCOUNTER — TELEPHONE (OUTPATIENT)
Age: 75
End: 2025-07-29

## 2025-07-29 VITALS
DIASTOLIC BLOOD PRESSURE: 80 MMHG | HEART RATE: 64 BPM | OXYGEN SATURATION: 99 % | SYSTOLIC BLOOD PRESSURE: 110 MMHG | WEIGHT: 142 LBS | RESPIRATION RATE: 18 BRPM | BODY MASS INDEX: 26.13 KG/M2 | HEIGHT: 62 IN | TEMPERATURE: 97.5 F

## 2025-07-29 DIAGNOSIS — E11.00 TYPE 2 DIABETES MELLITUS WITH HYPEROSMOLARITY WITHOUT COMA, WITHOUT LONG-TERM CURRENT USE OF INSULIN (HCC): Primary | ICD-10-CM

## 2025-07-29 DIAGNOSIS — H61.21 IMPACTED CERUMEN OF RIGHT EAR: ICD-10-CM

## 2025-07-29 LAB
CREAT UR-MCNC: 42.6 MG/DL
MICROALBUMIN UR-MCNC: 9.4 MG/L
MICROALBUMIN/CREAT 24H UR: 22 MG/G CREATININE (ref 0–30)

## 2025-07-29 PROCEDURE — G2211 COMPLEX E/M VISIT ADD ON: HCPCS | Performed by: FAMILY MEDICINE

## 2025-07-29 PROCEDURE — 82043 UR ALBUMIN QUANTITATIVE: CPT | Performed by: FAMILY MEDICINE

## 2025-07-29 PROCEDURE — 82570 ASSAY OF URINE CREATININE: CPT | Performed by: FAMILY MEDICINE

## 2025-07-29 PROCEDURE — 99212 OFFICE O/P EST SF 10 MIN: CPT | Performed by: FAMILY MEDICINE

## 2025-08-04 ENCOUNTER — EVALUATION (OUTPATIENT)
Dept: PHYSICAL THERAPY | Facility: CLINIC | Age: 75
End: 2025-08-04
Attending: FAMILY MEDICINE
Payer: MEDICARE

## 2025-08-04 DIAGNOSIS — G89.29 CHRONIC RIGHT SHOULDER PAIN: Primary | ICD-10-CM

## 2025-08-04 DIAGNOSIS — M25.511 CHRONIC RIGHT SHOULDER PAIN: Primary | ICD-10-CM

## 2025-08-04 PROCEDURE — 97110 THERAPEUTIC EXERCISES: CPT | Performed by: PHYSICAL THERAPIST

## 2025-08-04 PROCEDURE — 97112 NEUROMUSCULAR REEDUCATION: CPT | Performed by: PHYSICAL THERAPIST

## 2025-08-04 PROCEDURE — 97161 PT EVAL LOW COMPLEX 20 MIN: CPT | Performed by: PHYSICAL THERAPIST

## 2025-08-11 ENCOUNTER — OFFICE VISIT (OUTPATIENT)
Dept: PHYSICAL THERAPY | Facility: CLINIC | Age: 75
End: 2025-08-11
Attending: FAMILY MEDICINE
Payer: MEDICARE

## 2025-08-14 ENCOUNTER — OFFICE VISIT (OUTPATIENT)
Dept: PHYSICAL THERAPY | Facility: CLINIC | Age: 75
End: 2025-08-14
Attending: FAMILY MEDICINE
Payer: MEDICARE

## 2025-08-18 ENCOUNTER — HOSPITAL ENCOUNTER (OUTPATIENT)
Dept: INFUSION CENTER | Facility: CLINIC | Age: 75
Discharge: HOME | End: 2025-08-18
Attending: INTERNAL MEDICINE
Payer: MEDICARE

## 2025-08-18 ENCOUNTER — OFFICE VISIT (OUTPATIENT)
Dept: PHYSICAL THERAPY | Facility: CLINIC | Age: 75
End: 2025-08-18
Attending: FAMILY MEDICINE
Payer: MEDICARE

## 2025-08-18 DIAGNOSIS — M25.511 CHRONIC RIGHT SHOULDER PAIN: Primary | ICD-10-CM

## 2025-08-18 DIAGNOSIS — G89.29 CHRONIC RIGHT SHOULDER PAIN: Primary | ICD-10-CM

## 2025-08-18 DIAGNOSIS — C7B.8 METASTATIC MALIGNANT NEUROENDOCRINE TUMOR TO LIVER (HCC): Primary | ICD-10-CM

## 2025-08-18 PROCEDURE — 97110 THERAPEUTIC EXERCISES: CPT | Performed by: PHYSICAL THERAPIST

## 2025-08-18 PROCEDURE — 97112 NEUROMUSCULAR REEDUCATION: CPT | Performed by: PHYSICAL THERAPIST

## 2025-08-18 PROCEDURE — 96372 THER/PROPH/DIAG INJ SC/IM: CPT

## 2025-08-18 RX ORDER — LANREOTIDE ACETATE 120 MG/.5ML
120 INJECTION SUBCUTANEOUS ONCE
Status: COMPLETED | OUTPATIENT
Start: 2025-08-18 | End: 2025-08-18

## 2025-08-18 RX ORDER — LANREOTIDE ACETATE 120 MG/.5ML
120 INJECTION SUBCUTANEOUS ONCE
OUTPATIENT
Start: 2025-08-19

## 2025-08-18 RX ADMIN — LANREOTIDE ACETATE 120 MG: 120 INJECTION SUBCUTANEOUS at 08:00

## 2025-08-21 ENCOUNTER — OFFICE VISIT (OUTPATIENT)
Dept: PHYSICAL THERAPY | Facility: CLINIC | Age: 75
End: 2025-08-21
Attending: FAMILY MEDICINE
Payer: MEDICARE

## 2025-08-21 DIAGNOSIS — M25.511 CHRONIC RIGHT SHOULDER PAIN: Primary | ICD-10-CM

## 2025-08-21 DIAGNOSIS — G89.29 CHRONIC RIGHT SHOULDER PAIN: Primary | ICD-10-CM

## 2025-08-21 PROCEDURE — 97110 THERAPEUTIC EXERCISES: CPT | Performed by: PHYSICAL THERAPIST

## 2025-08-21 PROCEDURE — 97112 NEUROMUSCULAR REEDUCATION: CPT | Performed by: PHYSICAL THERAPIST

## 2025-08-22 PROBLEM — H61.21 IMPACTED CERUMEN OF RIGHT EAR: Status: RESOLVED | Noted: 2025-07-23 | Resolved: 2025-08-22

## (undated) DEVICE — COTTON TIP APPLICTOR 2 PK

## (undated) DEVICE — SINGLE USE BIOPSY VALVE MAJ-210: Brand: SINGLE USE BIOPSY VALVE (STERILE)

## (undated) DEVICE — SYRINGE 10ML LL

## (undated) DEVICE — Device: Brand: MEDEX

## (undated) DEVICE — BLANKET HYPOTHERMIA ADULT GAYMAR

## (undated) DEVICE — INTENDED FOR TISSUE SEPARATION, AND OTHER PROCEDURES THAT REQUIRE A SHARP SURGICAL BLADE TO PUNCTURE OR CUT.: Brand: BARD-PARKER SAFETY BLADES SIZE 15, STERILE

## (undated) DEVICE — SPECIMEN CONTAINER STERILE PEEL PACK

## (undated) DEVICE — SUT VICRYL 2-0 D-SPECIAL 27 IN D8114

## (undated) DEVICE — BETHLEHEM UNIVERSAL MINOR GEN: Brand: CARDINAL HEALTH

## (undated) DEVICE — 2000CC GUARDIAN II: Brand: GUARDIAN

## (undated) DEVICE — UTILITY MARKER,BLACK WITH LABELS: Brand: DEVON

## (undated) DEVICE — SURGICEL 4 X 8

## (undated) DEVICE — Device: Brand: BALLOON

## (undated) DEVICE — STOPCOCK 3-WAY

## (undated) DEVICE — PROXIMATE SKIN STAPLERS (35 WIDE) CONTAINS 35 STAINLESS STEEL STAPLES (FIXED HEAD): Brand: PROXIMATE

## (undated) DEVICE — HEAVY DUTY TABLE COVER: Brand: CONVERTORS

## (undated) DEVICE — ELECTRODE BLADE MOD E-Z CLEAN 2.5IN 6.4CM -0012M

## (undated) DEVICE — GAUZE SPONGES,16 PLY: Brand: CURITY

## (undated) DEVICE — SUT PDS II 1 CTX 36 IN Z371T

## (undated) DEVICE — GLOVE INDICATOR PI UNDERGLOVE SZ 8 BLUE

## (undated) DEVICE — SUT VICRYL 2-0 SH 27 IN UNDYED J417H

## (undated) DEVICE — TRAVELKIT CONTAINS FIRST STEP KIT (200ML EP-4 KIT) AND SOILED SCOPE BAG - 1 KIT: Brand: TRAVELKIT CONTAINS FIRST STEP KIT AND SOILED SCOPE BAG

## (undated) DEVICE — 3M™ STERI-STRIP™ REINFORCED ADHESIVE SKIN CLOSURES, R1547, 1/2 IN X 4 IN (12 MM X 100 MM), 6 STRIPS/ENVELOPE: Brand: 3M™ STERI-STRIP™

## (undated) DEVICE — SINGLE USE SUCTION VALVE MAJ-209: Brand: SINGLE USE SUCTION VALVE (STERILE)

## (undated) DEVICE — CHLORAPREP HI-LITE 26ML ORANGE

## (undated) DEVICE — BETHLEHEM MAJOR GENERAL PACK: Brand: CARDINAL HEALTH

## (undated) DEVICE — ADHESIVE SKN CLSR HISTOACRYL FLEX 0.5ML LF

## (undated) DEVICE — ANTI-FOG SOLUTION WITH FOAM PAD: Brand: DEVON

## (undated) DEVICE — TRAY FOLEY 16FR URIMETER SURESTEP

## (undated) DEVICE — STOPCOCK 4-WAY

## (undated) DEVICE — PAD GROUNDING ADULT

## (undated) DEVICE — SINGLE USE ASPIRATION NEEDLE: Brand: SINGLE USE ASPIRATION NEEDLE

## (undated) DEVICE — ADAPTOR TRACH SWIVEL

## (undated) DEVICE — GLOVE SRG BIOGEL ECLIPSE 8

## (undated) DEVICE — SUT POLYESTER TAPE D-G 8618-00

## (undated) DEVICE — SUT PROLENE 3-0 SH 36 IN 8522H

## (undated) DEVICE — INTENDED FOR TISSUE SEPARATION, AND OTHER PROCEDURES THAT REQUIRE A SHARP SURGICAL BLADE TO PUNCTURE OR CUT.: Brand: BARD-PARKER SAFETY BLADES SIZE 10, STERILE

## (undated) DEVICE — SUT SILK 3-0 SH 30 IN K832H

## (undated) DEVICE — LIGACLIP MCA MULTIPLE CLIP APPLIERS, 20 MEDIUM CLIPS: Brand: LIGACLIP

## (undated) DEVICE — SUT SILK 2-0 SH CR/8 18 IN C012D

## (undated) DEVICE — 3000CC GUARDIAN II: Brand: GUARDIAN

## (undated) DEVICE — SYRINGE 20ML LL

## (undated) DEVICE — MEDI-VAC YANK SUCT HNDL W/TPRD BULBOUS TIP: Brand: CARDINAL HEALTH

## (undated) DEVICE — SUT SILK 3-0 SH CR/8 18 IN C013D

## (undated) DEVICE — PROXIMATE RELOADABLE LINEAR CUTTER WITH SAFETY LOCK-OUT, 75MM: Brand: PROXIMATE

## (undated) DEVICE — SYRINGE 10ML SLIP TIP LF

## (undated) DEVICE — SUT VICRYL 0 54 IN J207G

## (undated) DEVICE — FIRST STEP BEDSIDE KIT - STAND-UP POUCH, ENDOSCOPIC CLEANING PAD - 1 POUCH: Brand: FIRST STEP BEDSIDE KIT - STAND-UP POUCH, ENDOSCOPIC CLEANING PAD

## (undated) DEVICE — PLUMEPEN PRO 10FT

## (undated) DEVICE — GLOVE SRG BIOGEL ECLIPSE 7.5

## (undated) DEVICE — SUT SILK 2-0 18 IN A185H

## (undated) DEVICE — SUT CHROMIC 0 BP-1 27 IN 47T

## (undated) DEVICE — STERILE EMESIS BASIN                 070: Brand: CARDINAL HEALTH

## (undated) DEVICE — SUT MONOCRYL 4-0 PS-2 18 IN Y496G

## (undated) DEVICE — PROXIMATE RELOADABLE LINEAR STAPLER, 60MM: Brand: PROXIMATE

## (undated) DEVICE — HARMONIC 1100 SHEARS, 20CM SHAFT LENGTH: Brand: HARMONIC

## (undated) DEVICE — PROXIMATE LINEAR CUTTER RELOAD, BLUE, 75MM: Brand: PROXIMATE

## (undated) DEVICE — SUT SILK 0 30 IN A306H

## (undated) DEVICE — 3M™ IOBAN™ 2 ANTIMICROBIAL INCISE DRAPE 6640EZ: Brand: IOBAN™ 2

## (undated) DEVICE — SUT SILK 3-0 18 IN A184H

## (undated) DEVICE — SUT VICRYL 3-0 SH 27 IN J416H

## (undated) DEVICE — SUT SILK 0 SH 30 IN K834H

## (undated) DEVICE — TUBING SUCTION 5MM X 12 FT

## (undated) DEVICE — Device

## (undated) DEVICE — SUT MONOCRYL PLUS 4-0 PS-2 18 IN MCP496G

## (undated) DEVICE — TELFA NON-ADHERENT ABSORBENT DRESSING: Brand: TELFA

## (undated) DEVICE — IV EXTENSION TUBING 33 IN

## (undated) DEVICE — ADHESIVE SKIN HIGH VISCOSITY EXOFIN 1ML

## (undated) DEVICE — PENCIL ELECTROSURG E-Z CLEAN -0035H

## (undated) RX ORDER — HYDROXYZINE HYDROCHLORIDE 25 MG/1: 1 TABLET, FILM COATED ORAL